# Patient Record
Sex: MALE | Race: WHITE | Employment: UNEMPLOYED | URBAN - METROPOLITAN AREA
[De-identification: names, ages, dates, MRNs, and addresses within clinical notes are randomized per-mention and may not be internally consistent; named-entity substitution may affect disease eponyms.]

---

## 2017-01-13 PROBLEM — Z82.49 FAMILY HISTORY OF ABDOMINAL AORTIC ANEURYSM (AAA): Status: ACTIVE | Noted: 2017-01-13

## 2017-03-23 PROBLEM — I71.40 AAA (ABDOMINAL AORTIC ANEURYSM) WITHOUT RUPTURE: Status: ACTIVE | Noted: 2017-03-23

## 2017-05-23 PROBLEM — I10 ESSENTIAL HYPERTENSION: Status: ACTIVE | Noted: 2017-05-23

## 2018-02-20 ENCOUNTER — HOSPITAL ENCOUNTER (OUTPATIENT)
Age: 63
Setting detail: SPECIMEN
Discharge: HOME | End: 2018-02-20
Payer: COMMERCIAL

## 2018-02-23 LAB — SURGICAL PATHOLOGY REPORT: NORMAL

## 2018-08-15 PROBLEM — I77.810 DILATED AORTIC ROOT (HCC): Status: ACTIVE | Noted: 2018-08-15

## 2018-08-15 PROBLEM — R93.1 ABNORMAL ECHOCARDIOGRAM: Status: ACTIVE | Noted: 2018-08-15

## 2019-08-21 PROBLEM — Z01.810 PREOP CARDIOVASCULAR EXAM: Status: ACTIVE | Noted: 2019-08-21

## 2019-09-20 PROBLEM — Z01.810 PREOP CARDIOVASCULAR EXAM: Status: RESOLVED | Noted: 2019-08-21 | Resolved: 2019-09-20

## 2022-08-03 PROBLEM — N10 ACUTE INTERSTITIAL NEPHRITIS: Status: ACTIVE | Noted: 2022-08-03

## 2022-08-03 PROBLEM — N17.9 ARF (ACUTE RENAL FAILURE) (HCC): Status: ACTIVE | Noted: 2022-08-03

## 2023-02-12 ENCOUNTER — HOSPITAL ENCOUNTER (INPATIENT)
Age: 68
LOS: 1 days | Discharge: ANOTHER HOSPITAL | DRG: 853 | End: 2023-02-12
Attending: EMERGENCY MEDICINE | Admitting: STUDENT IN AN ORGANIZED HEALTH CARE EDUCATION/TRAINING PROGRAM
Payer: COMMERCIAL

## 2023-02-12 ENCOUNTER — APPOINTMENT (OUTPATIENT)
Dept: GENERAL RADIOLOGY | Age: 68
DRG: 853 | End: 2023-02-12
Payer: COMMERCIAL

## 2023-02-12 ENCOUNTER — HOSPITAL ENCOUNTER (INPATIENT)
Age: 68
LOS: 22 days | Discharge: NURSING, REHAB OR LONG-TERM CARE | DRG: 853 | End: 2023-03-06
Attending: STUDENT IN AN ORGANIZED HEALTH CARE EDUCATION/TRAINING PROGRAM | Admitting: INTERNAL MEDICINE
Payer: COMMERCIAL

## 2023-02-12 VITALS
DIASTOLIC BLOOD PRESSURE: 87 MMHG | TEMPERATURE: 98.2 F | RESPIRATION RATE: 33 BRPM | WEIGHT: 245 LBS | BODY MASS INDEX: 32.47 KG/M2 | HEART RATE: 116 BPM | SYSTOLIC BLOOD PRESSURE: 126 MMHG | OXYGEN SATURATION: 98 % | HEIGHT: 73 IN

## 2023-02-12 DIAGNOSIS — J18.9 PNEUMONIA OF LEFT LUNG DUE TO INFECTIOUS ORGANISM, UNSPECIFIED PART OF LUNG: Primary | ICD-10-CM

## 2023-02-12 DIAGNOSIS — N17.9 ACUTE KIDNEY INJURY (HCC): ICD-10-CM

## 2023-02-12 DIAGNOSIS — J96.00 ACUTE RESPIRATORY FAILURE, UNSPECIFIED WHETHER WITH HYPOXIA OR HYPERCAPNIA (HCC): ICD-10-CM

## 2023-02-12 DIAGNOSIS — R73.9 HYPERGLYCEMIA: ICD-10-CM

## 2023-02-12 DIAGNOSIS — J86.9 EMPYEMA (HCC): ICD-10-CM

## 2023-02-12 DIAGNOSIS — J90 PLEURAL EFFUSION: ICD-10-CM

## 2023-02-12 LAB
ABSOLUTE EOS #: 0 K/UL (ref 0–0.4)
ABSOLUTE LYMPH #: 3.6 K/UL (ref 1–4.8)
ABSOLUTE MONO #: 1.35 K/UL (ref 0.1–0.8)
ALLEN TEST: POSITIVE
ALLEN TEST: POSITIVE
ANION GAP SERPL CALCULATED.3IONS-SCNC: 14 MMOL/L (ref 9–17)
BASOPHILS # BLD: 0 % (ref 0–2)
BASOPHILS ABSOLUTE: 0 K/UL (ref 0–0.2)
BNP SERPL-MCNC: 870 PG/ML
BUN SERPL-MCNC: 77 MG/DL (ref 8–23)
CALCIUM SERPL-MCNC: 10.1 MG/DL (ref 8.6–10.4)
CHLORIDE SERPL-SCNC: 93 MMOL/L (ref 98–107)
CO2 SERPL-SCNC: 25 MMOL/L (ref 20–31)
CREAT SERPL-MCNC: 2.16 MG/DL (ref 0.7–1.2)
D DIMER BLD IA.RAPID-MCNC: 3.88 MG/L FEU
EOSINOPHILS RELATIVE PERCENT: 0 % (ref 1–4)
FIO2: 50
FIO2: 60
FLUAV AG SPEC QL: NEGATIVE
FLUBV AG SPEC QL: NEGATIVE
GFR SERPL CREATININE-BSD FRML MDRD: 33 ML/MIN/1.73M2
GLUCOSE BLD-MCNC: 252 MG/DL (ref 75–110)
GLUCOSE BLD-MCNC: 271 MG/DL (ref 74–100)
GLUCOSE BLD-MCNC: 371 MG/DL (ref 75–110)
GLUCOSE SERPL-MCNC: 394 MG/DL (ref 70–99)
HCT VFR BLD AUTO: 39.1 % (ref 41–53)
HGB BLD-MCNC: 12.8 G/DL (ref 13.5–17.5)
LYMPHOCYTES # BLD: 16 % (ref 24–44)
MCH RBC QN AUTO: 28 PG (ref 26–34)
MCHC RBC AUTO-ENTMCNC: 32.8 G/DL (ref 31–37)
MCV RBC AUTO: 85.2 FL (ref 80–100)
MODE: NORMAL
MONOCYTES # BLD: 6 % (ref 1–7)
MORPHOLOGY: NORMAL
O2 DEVICE/FLOW/%: ABNORMAL
O2 DEVICE/FLOW/%: NORMAL
PDW BLD-RTO: 15.1 % (ref 12.5–15.4)
PLATELET # BLD AUTO: 502 K/UL (ref 140–450)
PMV BLD AUTO: 7.5 FL (ref 6–12)
POC HCO3: 24.9 MMOL/L (ref 21–28)
POC HCO3: 29.2 MMOL/L (ref 21–28)
POC O2 SATURATION: 92 % (ref 94–98)
POC O2 SATURATION: 98 % (ref 94–98)
POC PCO2: 42.9 MM HG (ref 35–48)
POC PCO2: 58.6 MM HG (ref 35–48)
POC PH: 7.3 (ref 7.35–7.45)
POC PH: 7.37 (ref 7.35–7.45)
POC PO2: 102.5 MM HG (ref 83–108)
POC PO2: 72.7 MM HG (ref 83–108)
POSITIVE BASE EXCESS, ART: 0 (ref 0–3)
POSITIVE BASE EXCESS, ART: 2 (ref 0–3)
POTASSIUM SERPL-SCNC: 4.7 MMOL/L (ref 3.7–5.3)
RBC # BLD: 4.59 M/UL (ref 4.5–5.9)
SAMPLE SITE: ABNORMAL
SAMPLE SITE: NORMAL
SARS-COV-2 RDRP RESP QL NAA+PROBE: NOT DETECTED
SEG NEUTROPHILS: 78 % (ref 36–66)
SEGMENTED NEUTROPHILS ABSOLUTE COUNT: 17.55 K/UL (ref 1.8–7.7)
SODIUM SERPL-SCNC: 132 MMOL/L (ref 135–144)
SPECIMEN DESCRIPTION: NORMAL
TROPONIN I SERPL DL<=0.01 NG/ML-MCNC: 29 NG/L (ref 0–22)
TROPONIN I SERPL DL<=0.01 NG/ML-MCNC: 31 NG/L (ref 0–22)
TROPONIN I SERPL DL<=0.01 NG/ML-MCNC: 33 NG/L (ref 0–22)
WBC # BLD AUTO: 22.5 K/UL (ref 3.5–11)

## 2023-02-12 PROCEDURE — 1200000000 HC SEMI PRIVATE

## 2023-02-12 PROCEDURE — 36600 WITHDRAWAL OF ARTERIAL BLOOD: CPT

## 2023-02-12 PROCEDURE — 80048 BASIC METABOLIC PNL TOTAL CA: CPT

## 2023-02-12 PROCEDURE — 99285 EMERGENCY DEPT VISIT HI MDM: CPT

## 2023-02-12 PROCEDURE — 96375 TX/PRO/DX INJ NEW DRUG ADDON: CPT

## 2023-02-12 PROCEDURE — 2580000003 HC RX 258: Performed by: EMERGENCY MEDICINE

## 2023-02-12 PROCEDURE — 36415 COLL VENOUS BLD VENIPUNCTURE: CPT

## 2023-02-12 PROCEDURE — 94660 CPAP INITIATION&MGMT: CPT

## 2023-02-12 PROCEDURE — 85379 FIBRIN DEGRADATION QUANT: CPT

## 2023-02-12 PROCEDURE — 2700000000 HC OXYGEN THERAPY PER DAY

## 2023-02-12 PROCEDURE — 83880 ASSAY OF NATRIURETIC PEPTIDE: CPT

## 2023-02-12 PROCEDURE — 93005 ELECTROCARDIOGRAM TRACING: CPT | Performed by: EMERGENCY MEDICINE

## 2023-02-12 PROCEDURE — 82803 BLOOD GASES ANY COMBINATION: CPT

## 2023-02-12 PROCEDURE — 2580000003 HC RX 258: Performed by: NURSE PRACTITIONER

## 2023-02-12 PROCEDURE — 82947 ASSAY GLUCOSE BLOOD QUANT: CPT

## 2023-02-12 PROCEDURE — 96372 THER/PROPH/DIAG INJ SC/IM: CPT

## 2023-02-12 PROCEDURE — 96365 THER/PROPH/DIAG IV INF INIT: CPT

## 2023-02-12 PROCEDURE — 6360000002 HC RX W HCPCS: Performed by: EMERGENCY MEDICINE

## 2023-02-12 PROCEDURE — 87040 BLOOD CULTURE FOR BACTERIA: CPT

## 2023-02-12 PROCEDURE — 99223 1ST HOSP IP/OBS HIGH 75: CPT | Performed by: INTERNAL MEDICINE

## 2023-02-12 PROCEDURE — 87804 INFLUENZA ASSAY W/OPTIC: CPT

## 2023-02-12 PROCEDURE — 6370000000 HC RX 637 (ALT 250 FOR IP): Performed by: EMERGENCY MEDICINE

## 2023-02-12 PROCEDURE — 2060000000 HC ICU INTERMEDIATE R&B

## 2023-02-12 PROCEDURE — 84484 ASSAY OF TROPONIN QUANT: CPT

## 2023-02-12 PROCEDURE — 85025 COMPLETE CBC W/AUTO DIFF WBC: CPT

## 2023-02-12 PROCEDURE — 87635 SARS-COV-2 COVID-19 AMP PRB: CPT

## 2023-02-12 PROCEDURE — 71045 X-RAY EXAM CHEST 1 VIEW: CPT

## 2023-02-12 RX ORDER — FENOFIBRATE 160 MG/1
160 TABLET ORAL DAILY
Status: CANCELLED | OUTPATIENT
Start: 2023-02-12

## 2023-02-12 RX ORDER — FINASTERIDE 5 MG/1
5 TABLET, FILM COATED ORAL DAILY
Status: CANCELLED | OUTPATIENT
Start: 2023-02-12

## 2023-02-12 RX ORDER — SODIUM CHLORIDE 9 MG/ML
INJECTION, SOLUTION INTRAVENOUS CONTINUOUS
Status: DISCONTINUED | OUTPATIENT
Start: 2023-02-12 | End: 2023-02-12 | Stop reason: HOSPADM

## 2023-02-12 RX ORDER — LEVOFLOXACIN 5 MG/ML
750 INJECTION, SOLUTION INTRAVENOUS EVERY 24 HOURS
Status: CANCELLED | OUTPATIENT
Start: 2023-02-12 | End: 2023-02-19

## 2023-02-12 RX ORDER — LORAZEPAM 2 MG/ML
0.25 INJECTION INTRAMUSCULAR ONCE
Status: COMPLETED | OUTPATIENT
Start: 2023-02-12 | End: 2023-02-12

## 2023-02-12 RX ORDER — ALBUTEROL SULFATE 2.5 MG/3ML
2.5 SOLUTION RESPIRATORY (INHALATION)
Status: CANCELLED | OUTPATIENT
Start: 2023-02-12

## 2023-02-12 RX ORDER — ENOXAPARIN SODIUM 100 MG/ML
100 INJECTION SUBCUTANEOUS ONCE
Status: DISCONTINUED | OUTPATIENT
Start: 2023-02-12 | End: 2023-02-12

## 2023-02-12 RX ORDER — FENTANYL CITRATE 50 UG/ML
50 INJECTION, SOLUTION INTRAMUSCULAR; INTRAVENOUS ONCE
Status: COMPLETED | OUTPATIENT
Start: 2023-02-12 | End: 2023-02-12

## 2023-02-12 RX ORDER — ALBUTEROL SULFATE 2.5 MG/3ML
2.5 SOLUTION RESPIRATORY (INHALATION)
Status: DISCONTINUED | OUTPATIENT
Start: 2023-02-12 | End: 2023-02-23

## 2023-02-12 RX ORDER — SODIUM CHLORIDE 0.9 % (FLUSH) 0.9 %
5-40 SYRINGE (ML) INJECTION EVERY 12 HOURS SCHEDULED
Status: CANCELLED | OUTPATIENT
Start: 2023-02-12

## 2023-02-12 RX ORDER — METOPROLOL SUCCINATE 25 MG/1
25 TABLET, EXTENDED RELEASE ORAL NIGHTLY
Status: CANCELLED | OUTPATIENT
Start: 2023-02-12

## 2023-02-12 RX ORDER — LEVOFLOXACIN 5 MG/ML
750 INJECTION, SOLUTION INTRAVENOUS ONCE
Status: COMPLETED | OUTPATIENT
Start: 2023-02-12 | End: 2023-02-12

## 2023-02-12 RX ORDER — IPRATROPIUM BROMIDE AND ALBUTEROL SULFATE 2.5; .5 MG/3ML; MG/3ML
1 SOLUTION RESPIRATORY (INHALATION)
Status: CANCELLED | OUTPATIENT
Start: 2023-02-12

## 2023-02-12 RX ORDER — SODIUM CHLORIDE 0.9 % (FLUSH) 0.9 %
5-40 SYRINGE (ML) INJECTION PRN
Status: DISCONTINUED | OUTPATIENT
Start: 2023-02-12 | End: 2023-02-23

## 2023-02-12 RX ORDER — ONDANSETRON 4 MG/1
4 TABLET, ORALLY DISINTEGRATING ORAL EVERY 8 HOURS PRN
Status: DISCONTINUED | OUTPATIENT
Start: 2023-02-12 | End: 2023-02-23

## 2023-02-12 RX ORDER — INSULIN LISPRO 100 [IU]/ML
0-4 INJECTION, SOLUTION INTRAVENOUS; SUBCUTANEOUS NIGHTLY
Status: CANCELLED | OUTPATIENT
Start: 2023-02-12

## 2023-02-12 RX ORDER — ACETAMINOPHEN 650 MG/1
650 SUPPOSITORY RECTAL EVERY 6 HOURS PRN
Status: DISCONTINUED | OUTPATIENT
Start: 2023-02-12 | End: 2023-02-23

## 2023-02-12 RX ORDER — IPRATROPIUM BROMIDE AND ALBUTEROL SULFATE 2.5; .5 MG/3ML; MG/3ML
1 SOLUTION RESPIRATORY (INHALATION)
Status: DISCONTINUED | OUTPATIENT
Start: 2023-02-13 | End: 2023-02-22

## 2023-02-12 RX ORDER — ENOXAPARIN SODIUM 100 MG/ML
30 INJECTION SUBCUTANEOUS ONCE
Status: COMPLETED | OUTPATIENT
Start: 2023-02-12 | End: 2023-02-12

## 2023-02-12 RX ORDER — INSULIN LISPRO 100 [IU]/ML
0-4 INJECTION, SOLUTION INTRAVENOUS; SUBCUTANEOUS
Status: CANCELLED | OUTPATIENT
Start: 2023-02-12

## 2023-02-12 RX ORDER — DICYCLOMINE HYDROCHLORIDE 10 MG/1
10 CAPSULE ORAL
Status: CANCELLED | OUTPATIENT
Start: 2023-02-12

## 2023-02-12 RX ORDER — PANTOPRAZOLE SODIUM 40 MG/1
40 TABLET, DELAYED RELEASE ORAL
Status: CANCELLED | OUTPATIENT
Start: 2023-02-13

## 2023-02-12 RX ORDER — ACETAMINOPHEN 325 MG/1
650 TABLET ORAL EVERY 6 HOURS PRN
Status: CANCELLED | OUTPATIENT
Start: 2023-02-12

## 2023-02-12 RX ORDER — SODIUM CHLORIDE 9 MG/ML
INJECTION, SOLUTION INTRAVENOUS PRN
Status: CANCELLED | OUTPATIENT
Start: 2023-02-12

## 2023-02-12 RX ORDER — SODIUM CHLORIDE 9 MG/ML
INJECTION, SOLUTION INTRAVENOUS CONTINUOUS
Status: DISCONTINUED | OUTPATIENT
Start: 2023-02-12 | End: 2023-02-13

## 2023-02-12 RX ORDER — SODIUM CHLORIDE 9 MG/ML
INJECTION, SOLUTION INTRAVENOUS CONTINUOUS
Status: CANCELLED | OUTPATIENT
Start: 2023-02-12

## 2023-02-12 RX ORDER — ONDANSETRON 2 MG/ML
4 INJECTION INTRAMUSCULAR; INTRAVENOUS ONCE
Status: COMPLETED | OUTPATIENT
Start: 2023-02-12 | End: 2023-02-12

## 2023-02-12 RX ORDER — ACETAMINOPHEN 325 MG/1
650 TABLET ORAL EVERY 6 HOURS PRN
Status: DISCONTINUED | OUTPATIENT
Start: 2023-02-12 | End: 2023-02-23

## 2023-02-12 RX ORDER — ONDANSETRON 4 MG/1
4 TABLET, ORALLY DISINTEGRATING ORAL EVERY 8 HOURS PRN
Status: CANCELLED | OUTPATIENT
Start: 2023-02-12

## 2023-02-12 RX ORDER — ENOXAPARIN SODIUM 100 MG/ML
40 INJECTION SUBCUTANEOUS DAILY
Status: DISCONTINUED | OUTPATIENT
Start: 2023-02-13 | End: 2023-02-13

## 2023-02-12 RX ORDER — INSULIN LISPRO 100 [IU]/ML
8 INJECTION, SOLUTION INTRAVENOUS; SUBCUTANEOUS ONCE
Status: COMPLETED | OUTPATIENT
Start: 2023-02-12 | End: 2023-02-12

## 2023-02-12 RX ORDER — SODIUM CHLORIDE 9 MG/ML
INJECTION, SOLUTION INTRAVENOUS PRN
Status: DISCONTINUED | OUTPATIENT
Start: 2023-02-12 | End: 2023-02-23

## 2023-02-12 RX ORDER — SODIUM POLYSTYRENE SULFONATE 15 G/60ML
15 SUSPENSION ORAL; RECTAL
Status: CANCELLED | OUTPATIENT
Start: 2023-02-12 | End: 2023-02-13

## 2023-02-12 RX ORDER — ENOXAPARIN SODIUM 100 MG/ML
30 INJECTION SUBCUTANEOUS 2 TIMES DAILY
Status: CANCELLED | OUTPATIENT
Start: 2023-02-12

## 2023-02-12 RX ORDER — SODIUM CHLORIDE 0.9 % (FLUSH) 0.9 %
10 SYRINGE (ML) INJECTION PRN
Status: CANCELLED | OUTPATIENT
Start: 2023-02-12

## 2023-02-12 RX ORDER — POLYETHYLENE GLYCOL 3350 17 G/17G
17 POWDER, FOR SOLUTION ORAL DAILY PRN
Status: DISCONTINUED | OUTPATIENT
Start: 2023-02-12 | End: 2023-02-23

## 2023-02-12 RX ORDER — DEXTROSE MONOHYDRATE 100 MG/ML
INJECTION, SOLUTION INTRAVENOUS CONTINUOUS PRN
Status: CANCELLED | OUTPATIENT
Start: 2023-02-12

## 2023-02-12 RX ORDER — ONDANSETRON 2 MG/ML
4 INJECTION INTRAMUSCULAR; INTRAVENOUS EVERY 6 HOURS PRN
Status: CANCELLED | OUTPATIENT
Start: 2023-02-12

## 2023-02-12 RX ORDER — SODIUM CHLORIDE 0.9 % (FLUSH) 0.9 %
5-40 SYRINGE (ML) INJECTION EVERY 12 HOURS SCHEDULED
Status: DISCONTINUED | OUTPATIENT
Start: 2023-02-12 | End: 2023-02-23

## 2023-02-12 RX ORDER — DULOXETIN HYDROCHLORIDE 30 MG/1
60 CAPSULE, DELAYED RELEASE ORAL 2 TIMES DAILY
Status: CANCELLED | OUTPATIENT
Start: 2023-02-12

## 2023-02-12 RX ORDER — ACETAMINOPHEN 500 MG
1000 TABLET ORAL ONCE
Status: COMPLETED | OUTPATIENT
Start: 2023-02-12 | End: 2023-02-12

## 2023-02-12 RX ORDER — ONDANSETRON 2 MG/ML
4 INJECTION INTRAMUSCULAR; INTRAVENOUS EVERY 6 HOURS PRN
Status: DISCONTINUED | OUTPATIENT
Start: 2023-02-12 | End: 2023-02-23

## 2023-02-12 RX ORDER — ACETAMINOPHEN 650 MG/1
650 SUPPOSITORY RECTAL EVERY 6 HOURS PRN
Status: CANCELLED | OUTPATIENT
Start: 2023-02-12

## 2023-02-12 RX ORDER — ATORVASTATIN CALCIUM 40 MG/1
80 TABLET, FILM COATED ORAL DAILY
Status: CANCELLED | OUTPATIENT
Start: 2023-02-12

## 2023-02-12 RX ADMIN — ACETAMINOPHEN 1000 MG: 500 TABLET ORAL at 15:45

## 2023-02-12 RX ADMIN — ONDANSETRON 4 MG: 2 INJECTION INTRAMUSCULAR; INTRAVENOUS at 14:26

## 2023-02-12 RX ADMIN — ENOXAPARIN SODIUM 30 MG: 100 INJECTION SUBCUTANEOUS at 14:27

## 2023-02-12 RX ADMIN — SODIUM CHLORIDE, PRESERVATIVE FREE 10 ML: 5 INJECTION INTRAVENOUS at 23:20

## 2023-02-12 RX ADMIN — SODIUM CHLORIDE: 9 INJECTION, SOLUTION INTRAVENOUS at 23:01

## 2023-02-12 RX ADMIN — FENTANYL CITRATE 50 MCG: 50 INJECTION, SOLUTION INTRAMUSCULAR; INTRAVENOUS at 21:18

## 2023-02-12 RX ADMIN — LEVOFLOXACIN 750 MG: 5 INJECTION, SOLUTION INTRAVENOUS at 15:01

## 2023-02-12 RX ADMIN — INSULIN LISPRO 8 UNITS: 100 INJECTION, SOLUTION INTRAVENOUS; SUBCUTANEOUS at 18:41

## 2023-02-12 RX ADMIN — LORAZEPAM 0.25 MG: 2 INJECTION INTRAMUSCULAR; INTRAVENOUS at 12:45

## 2023-02-12 RX ADMIN — SODIUM CHLORIDE: 9 INJECTION, SOLUTION INTRAVENOUS at 12:38

## 2023-02-12 SDOH — SOCIAL STABILITY: SOCIAL NETWORK: IN A TYPICAL WEEK, HOW MANY TIMES DO YOU TALK ON THE PHONE WITH FAMILY, FRIENDS, OR NEIGHBORS?: THREE TIMES A WEEK

## 2023-02-12 SDOH — ECONOMIC STABILITY: INCOME INSECURITY: IN THE LAST 12 MONTHS, WAS THERE A TIME WHEN YOU WERE NOT ABLE TO PAY THE MORTGAGE OR RENT ON TIME?: NO

## 2023-02-12 SDOH — HEALTH STABILITY: MENTAL HEALTH: HOW MANY STANDARD DRINKS CONTAINING ALCOHOL DO YOU HAVE ON A TYPICAL DAY?: PATIENT DOES NOT DRINK

## 2023-02-12 SDOH — ECONOMIC STABILITY: TRANSPORTATION INSECURITY
IN THE PAST 12 MONTHS, HAS LACK OF TRANSPORTATION KEPT YOU FROM MEETINGS, WORK, OR FROM GETTING THINGS NEEDED FOR DAILY LIVING?: NO

## 2023-02-12 SDOH — ECONOMIC STABILITY: HOUSING INSECURITY: IN THE LAST 12 MONTHS, HOW MANY PLACES HAVE YOU LIVED?: 1

## 2023-02-12 SDOH — ECONOMIC STABILITY: HOUSING INSECURITY
IN THE LAST 12 MONTHS, WAS THERE A TIME WHEN YOU DID NOT HAVE A STEADY PLACE TO SLEEP OR SLEPT IN A SHELTER (INCLUDING NOW)?: NO

## 2023-02-12 SDOH — ECONOMIC STABILITY: FOOD INSECURITY: WITHIN THE PAST 12 MONTHS, THE FOOD YOU BOUGHT JUST DIDN'T LAST AND YOU DIDN'T HAVE MONEY TO GET MORE.: NEVER TRUE

## 2023-02-12 SDOH — SOCIAL STABILITY: SOCIAL NETWORK: HOW OFTEN DO YOU GET TOGETHER WITH FRIENDS OR RELATIVES?: THREE TIMES A WEEK

## 2023-02-12 SDOH — ECONOMIC STABILITY: FOOD INSECURITY: WITHIN THE PAST 12 MONTHS, YOU WORRIED THAT YOUR FOOD WOULD RUN OUT BEFORE YOU GOT MONEY TO BUY MORE.: NEVER TRUE

## 2023-02-12 SDOH — HEALTH STABILITY: MENTAL HEALTH: HOW OFTEN DO YOU HAVE A DRINK CONTAINING ALCOHOL?: NEVER

## 2023-02-12 SDOH — SOCIAL STABILITY: SOCIAL NETWORK: HOW OFTEN DO YOU ATTEND CHURCH OR RELIGIOUS SERVICES?: 1 TO 4 TIMES PER YEAR

## 2023-02-12 SDOH — ECONOMIC STABILITY: TRANSPORTATION INSECURITY
IN THE PAST 12 MONTHS, HAS THE LACK OF TRANSPORTATION KEPT YOU FROM MEDICAL APPOINTMENTS OR FROM GETTING MEDICATIONS?: NO

## 2023-02-12 SDOH — SOCIAL STABILITY: SOCIAL INSECURITY
WITHIN THE LAST YEAR, HAVE TO BEEN RAPED OR FORCED TO HAVE ANY KIND OF SEXUAL ACTIVITY BY YOUR PARTNER OR EX-PARTNER?: NO

## 2023-02-12 SDOH — SOCIAL STABILITY: SOCIAL NETWORK: ARE YOU MARRIED, WIDOWED, DIVORCED, SEPARATED, NEVER MARRIED, OR LIVING WITH A PARTNER?: MARRIED

## 2023-02-12 SDOH — SOCIAL STABILITY: SOCIAL INSECURITY: WITHIN THE LAST YEAR, HAVE YOU BEEN HUMILIATED OR EMOTIONALLY ABUSED IN OTHER WAYS BY YOUR PARTNER OR EX-PARTNER?: NO

## 2023-02-12 SDOH — HEALTH STABILITY: MENTAL HEALTH
STRESS IS WHEN SOMEONE FEELS TENSE, NERVOUS, ANXIOUS, OR CAN'T SLEEP AT NIGHT BECAUSE THEIR MIND IS TROUBLED. HOW STRESSED ARE YOU?: TO SOME EXTENT

## 2023-02-12 SDOH — SOCIAL STABILITY: SOCIAL INSECURITY: WITHIN THE LAST YEAR, HAVE YOU BEEN AFRAID OF YOUR PARTNER OR EX-PARTNER?: NO

## 2023-02-12 SDOH — SOCIAL STABILITY: SOCIAL NETWORK
DO YOU BELONG TO ANY CLUBS OR ORGANIZATIONS SUCH AS CHURCH GROUPS UNIONS, FRATERNAL OR ATHLETIC GROUPS, OR SCHOOL GROUPS?: NO

## 2023-02-12 SDOH — ECONOMIC STABILITY: INCOME INSECURITY: HOW HARD IS IT FOR YOU TO PAY FOR THE VERY BASICS LIKE FOOD, HOUSING, MEDICAL CARE, AND HEATING?: NOT VERY HARD

## 2023-02-12 SDOH — SOCIAL STABILITY: SOCIAL INSECURITY
WITHIN THE LAST YEAR, HAVE YOU BEEN KICKED, HIT, SLAPPED, OR OTHERWISE PHYSICALLY HURT BY YOUR PARTNER OR EX-PARTNER?: NO

## 2023-02-12 SDOH — SOCIAL STABILITY: SOCIAL NETWORK: HOW OFTEN DO YOU ATTENT MEETINGS OF THE CLUB OR ORGANIZATION YOU BELONG TO?: 1 TO 4 TIMES PER YEAR

## 2023-02-12 ASSESSMENT — PAIN SCALES - GENERAL
PAINLEVEL_OUTOF10: 8
PAINLEVEL_OUTOF10: 6

## 2023-02-12 NOTE — ED NOTES
Phoned XRAY- they are on way currently- were off department for a stat order      Peng Diop, TORRIE  02/12/23 1214

## 2023-02-12 NOTE — ED NOTES
Pt brought by squad from home d/t difficulty breathing. - per pt x1 week. PB [facility] FIRE established a 20g in the left hand & administered a combivent.       Antonio Griffin RN  02/12/23 1697

## 2023-02-12 NOTE — ED NOTES
[facility]oned [facility] & spoke with [facility], RN- family decided on [facility] [place] since [facility] is not accepting.       Inna Braswell RN  02/12/23 5724

## 2023-02-12 NOTE — ED NOTES
Serenity Serna from respiratory therapy to place pt on bipap.   Settings are rate 20; 02 50%; EPAP 268 [facility], RN  02/12/23 8635

## 2023-02-12 NOTE — ED NOTES
Phoned [facility] to get an update- spoke with Vic Juarez RN-she stated she will give  handykin a call and get an update     Jany Guerrero RN  02/12/23 6018

## 2023-02-12 NOTE — ED NOTES
[facility] Dr[facility] Coye Meigs to have pts most recent chest xray- they will be faxing here soon     Didi Pina RN  02/12/23 2367

## 2023-02-12 NOTE — ED NOTES
Spoke with Delvin Spence with [facility] for transfer to Eileen Jhaveri- will return call one a doc is on the line     Delia Delaney RN  02/12/23 6191

## 2023-02-12 NOTE — ED PROVIDER NOTES
[address] [facility] Emergency Department  [address] [facility] [facility] [address]  Phone: [phone]  Fax: [phone] [facility]      Pt Name: Falguni Uriostegui  MRN: [id number]  [place] 1955  Date of evaluation: 2/12/2023    CHIEF COMPLAINT       Chief Complaint   Patient presents with    Shortness of Breath     Sob x 1 week       HISTORY OF PRESENT ILLNESS    Falguni Uriostegui is a 74 y.o. male who presents to the emergency room with bleeding worsening shortness of breath over the past week. Brought by ambulance and extremis. Receiving a DuoNeb treatment allergy to steroids. Admits to a cough nonproductive. No chest pain admits to tightness. No other symptoms. According to paramedics while receiving Combivent treatment he is doing much better and is able to speak in more complete sentences but still in significant distress. REVIEW OF SYSTEMS       Constitutional: No fevers or chills   HEENT: No sore throat, rhinorrhea, or earache   Eyes: No blurry vision or double vision no drainage   Cardiovascular: No chest pain positive chest tightness positive tachycardia   Respiratory: Positive wheezing shortness of breath and cough  Gastrointestinal: No nausea, vomiting, diarrhea, constipation, or abdominal pain   : No hematuria or dysuria   Musculoskeletal: No swelling or pain   Skin: No rash   Neurological: No focal neurologic complaints, paresthesias, weakness, or headache     PAST MEDICAL HISTORY    has a past medical history of Acute interstitial nephritis, Aortic regurgitation, ARF (acute renal failure) ([address] Chronic kidney disease, Hyperlipidemia, Hypertension, Non-Hodgkin lymphoma ([address] Sarcoidosis, and Type II or unspecified type diabetes mellitus without mention of complication, not stated as uncontrolled. SURGICAL HISTORY      has a past surgical history that includes eye surgery (Right); Knee arthroscopy; Rotator cuff repair (Right, 7/14);  Rotator cuff repair (Left, 5/15); Carpal tunnel release (Left, 11/15 ); Eye surgery (Right, 6/16); Total knee arthroplasty (Right, 10/2018); and malignant skin lesion excision. CURRENT MEDICATIONS       Previous Medications    ALBUTEROL SULFATE  (90 BASE) MCG/ACT INHALER    Inhale 1 puff into the lungs in the morning and at bedtime    AMLODIPINE (NORVASC) 5 MG TABLET    TAKE ONE TABLET BY MOUTH DAILY    ATORVASTATIN (LIPITOR) 80 MG TABLET    Take 80 mg by mouth daily. BRIMONIDINE-TIMOLOL (COMBIGAN) 0.2-0.5 % OPHTHALMIC SOLUTION    Place 1 drop into the right eye every 12 hours    CINNAMON PO    Take by mouth    DICYCLOMINE (BENTYL) 10 MG CAPSULE    Take 10 mg by mouth 4 times daily (before meals and nightly)     DULOXETINE (CYMBALTA) 60 MG CAPSULE    Take 60 mg by mouth 2 times daily. DUTASTERIDE (AVODART) 0.5 MG CAPSULE    Take 0.5 mg by mouth daily    FENOFIBRATE (TRICOR) 145 MG TABLET    Take 145 mg by mouth 2 times daily. FINASTERIDE (PROSCAR) 5 MG TABLET    Take 5 mg by mouth daily    FLUTICASONE-SALMETEROL (ADVAIR DISKUS) 250-50 MCG/ACT AEPB DISKUS INHALER    Inhale 1 puff into the lungs every 12 hours    LIRAGLUTIDE (VICTOZA SC)    Inject 1.8 mg into the skin daily     METOPROLOL SUCCINATE (TOPROL XL) 25 MG EXTENDED RELEASE TABLET    Take 25 mg by mouth at bedtime    MISC. DEVICES (NASAL SPRAY BOTTLE) MISC    by Does not apply route    OMEPRAZOLE (PRILOSEC) 20 MG DELAYED RELEASE CAPSULE    Take 20 mg by mouth Daily    PIMECROLIMUS (ELIDEL) 1 % CREAM    Apply topically as needed Apply topically 2 times daily. REPAGLINIDE (PRANDIN) 1 MG TABLET    Take 1 mg by mouth 3 times daily (before meals)        ALLERGIES     is allergic to latex, gabapentin, erythromycin, glimepiride, demerol hcl [meperidine], hydrocodone, lyrica [pregabalin], macrolides and ketolides, niacin and related, and trelegy ellipta [fluticasone-umeclidin-vilant]. FAMILY HISTORY     He indicated that the status of his father is unknown.  He indicated that the status of his other is unknown.     family history includes Heart Disease in an other family member; High Blood Pressure in his father; Other in his father. SOCIAL HISTORY      reports that he has never smoked. He has quit using smokeless tobacco. He reports current alcohol use. He reports that he does not use drugs. PHYSICAL EXAM       BP (!) 130/94   Pulse (!) 129   Temp 98.2 °F (36.8 °C) (Oral)   Resp 21   Ht 6' 1\" (1.854 m)   Wt 111.1 kg (245 lb)   SpO2 100%   BMI 32.32 kg/m²     Constitutional: Alert, oriented x3, nontoxic, answering questions appropriately, acting properly for age, severe respiratory distress  HEENT: Extraocular muscles intact, mucous membranes dry pupils equal, round, reactive to light,   Neck: Trachea midline   Cardiovascular: Regular rhythm and tachycardic no murmurs   Respiratory: Diminished to auscultation bilaterally diffuse audible wheezes, positive rhonchi, no rales, severe respiratory distress positive tachypnea positive retractions 95% receiving DuoNeb treatment   Gastrointestinal: Soft, nontender, nondistended, positive bowel sounds. No rebound, rigidity, or guarding. Musculoskeletal: No extremity pain or swelling   Neurologic: Moving all 4 extremities without difficulty there are no gross focal neurologic deficits   Skin: Warm and dry       DIFFERENTIAL DIAGNOSIS/ MDM:     IV labs. EKG. Chest imaging. Allergy to steroids. BiPAP. Differentials Considered but not limited to the following:    Shortness of breath: Bronchitis, pneumonia, COPD exacerbation, CHF, ACS, Asthma, PE, Anxiety, Pneumothorax, Pulmonary Fibrosis. DIAGNOSTIC RESULTS     EKG: All EKG's are interpreted by the Emergency Department Physician who either signs or Co-signs this chart in the absence of a cardiologist.    1250 sinus tachycardia rate 128  QRS 98  no acute ST or T wave changes.     Not indicated unless otherwise documented above    LABS:  Results for orders placed or performed during the hospital encounter of 02/12/23   Rapid influenza A/B antigens    Specimen: Nares   Result Value Ref Range    Flu A Antigen NEGATIVE NEGATIVE    Flu B Antigen NEGATIVE NEGATIVE   COVID-19, Rapid    Specimen: Nasopharyngeal Swab   Result Value Ref Range    Specimen Description . NASOPHARYNGEAL SWAB     SARS-CoV-2, Rapid Not Detected Not Detected   CBC with Auto Differential   Result Value Ref Range    WBC 22.5 (H) 3.5 - 11.0 k/uL    RBC 4.59 4.5 - 5.9 m/uL    Hemoglobin 12.8 (L) 13.5 - 17.5 g/dL    Hematocrit 39.1 (L) 41 - 53 %    MCV 85.2 80 - 100 fL    MCH 28.0 26 - 34 pg    MCHC 32.8 31 - 37 g/dL    RDW 15.1 12.5 - 15.4 %    Platelets 279 (H) 833 - 450 k/uL    MPV 7.5 6.0 - 12.0 fL    Seg Neutrophils 78 (H) 36 - 66 %    Lymphocytes 16 (L) 24 - 44 %    Monocytes 6 1 - 7 %    Eosinophils % 0 (L) 1 - 4 %    Basophils 0 0 - 2 %    Segs Absolute 17.55 (H) 1.8 - 7.7 k/uL    Absolute Lymph # 3.60 1.0 - 4.8 k/uL    Absolute Mono # 1.35 (H) 0.1 - 0.8 k/uL    Absolute Eos # 0.00 0.0 - 0.4 k/uL    Basophils Absolute 0.00 0.0 - 0.2 k/uL    Morphology Normal    Basic Metabolic Panel   Result Value Ref Range    Glucose 394 (H) 70 - 99 mg/dL    BUN 77 (H) 8 - 23 mg/dL    Creatinine 2.16 (H) 0.70 - 1.20 mg/dL    Est, Glom Filt Rate 33 (L) >60 mL/min/1.73m2    Calcium 10.1 8.6 - 10.4 mg/dL    Sodium 132 (L) 135 - 144 mmol/L    Potassium 4.7 3.7 - 5.3 mmol/L    Chloride 93 (L) 98 - 107 mmol/L    CO2 25 20 - 31 mmol/L    Anion Gap 14 9 - 17 mmol/L   Troponin   Result Value Ref Range    Troponin, High Sensitivity 29 (H) 0 - 22 ng/L   D-Dimer, Quantitative   Result Value Ref Range    D-Dimer, Quant 3.88 mg/L FEU   Brain Natriuretic Peptide   Result Value Ref Range    Pro- (H) <300 pg/mL   Troponin   Result Value Ref Range    Troponin, High Sensitivity 31 (H) 0 - 22 ng/L   Arterial Blood Gas, POC   Result Value Ref Range    POC pH 7.372 7.350 - 7.450    POC pCO2 42.9 35.0 - 48.0 mm Hg    POC PO2 102.5 83.0 - 108.0 mm Hg    POC HCO3 24.9 21.0 - 28.0 mmol/L    Positive Base Excess, Art 0 0.0 - 3.0    POC O2 SAT 98 94.0 - 98.0 %    O2 Device/Flow/% BIPAP     Vaughn Test POSITIVE     Sample Site Right Radial Artery     Mode Bi-Level Ventilation     FIO2 50.0        Not indicated unless otherwise documented above    RADIOLOGY:   I reviewed the radiologist interpretations:    XR CHEST PORTABLE   Final Result   Probable left-sided pneumonia with loculated pleural effusion. Underlying   pathology not excluded, as above. Mild cardiomegaly and venous hypertension. Discoid atelectasis right base. RECOMMENDATION:   Consider CT chest.             Not indicated unless otherwise documented above    EMERGENCY DEPARTMENT COURSE:     The patient was given the following medications:  Orders Placed This Encounter   Medications    0.9 % sodium chloride infusion    LORazepam (ATIVAN) injection 0.25 mg    levoFLOXacin (LEVAQUIN) 750 MG/150ML infusion 750 mg     Order Specific Question:   Antimicrobial Indications     Answer:   Pneumonia (CAP)    DISCONTD: enoxaparin (LOVENOX) injection 100 mg     Order Specific Question:   Indication of Use     Answer:   Treatment-DVT/PE    enoxaparin Sodium (LOVENOX) injection 30 mg     Order Specific Question:   Indication of Use     Answer:   Prophylaxis-DVT/PE    ondansetron (ZOFRAN) injection 4 mg    acetaminophen (TYLENOL) tablet 1,000 mg    insulin regular (HUMULIN R;NOVOLIN R) injection 8 Units        Vitals:   -------------------------  BP (!) 136/95   Pulse (!) 121   Temp 98.2 °F (36.8 °C) (Oral)   Resp (!) 37   Ht 6' 1\" (1.854 m)   Wt 111.1 kg (245 lb)   SpO2 95%   BMI 32.32 kg/m²     1:10 PM wife at bedside patient tachypneic on BiPAP but improved. Awaiting x-ray. Initial labs show white blood cell count of 22.5 mild anemia with a hemoglobin of 12.8. BUN and creatinine significantly elevated as well compared to his norm.   Elevated D-dimer unable to obtain CT secondary to kidney function we will give a dose of Lovenox. Troponin of 29.    1:45 PM chest x-ray shows left-sided pneumonia with loculated pleural effusion will start IV antibiotics. Allergy to macrolides we will do Levaquin. Blood cultures ordered. 2:15 PM patient prefers to go to [place] [address] where his pulmonologist is. Awaiting callback. 3:15 PM repeat troponin of 31 up only 2 points. Awaiting callback from [place] [address]    4:10 PM [facility] not available for beds. Patient would like to be transferred to [facility] awaiting callback. Patient remains on a nasal cannula was not tolerating the BiPAP. He is tachypneic but maintaining his saturation level of 96%. Persistently tachycardic.    5 PM discussed with [facility] Dr. Pike feels the patient can likely go to stepdown or progressive. Recommends talking to hospitalist.  Awaiting callback. 5:30 PM discussed with Dr. Guadalupe Cabral (sp?)  Hospitalist who agrees to admission. We will address elevated blood sugar. Awaiting bed assignment and transport. At 7 PM care will be transferred to Dr. Alfonso Moe. Remains off BiPAP on 6 L nasal cannula feeling much better. CRITICAL CARE:    None    PROCEDURES:    None      OARRS Report if indicated             FINAL IMPRESSION      1. Pneumonia of left lung due to infectious organism, unspecified part of lung    2. Acute kidney injury ([address]    3. Acute respiratory failure, unspecified whether with hypoxia or hypercapnia ([address]    4. Hyperglycemia          DISPOSITION/PLAN   DISPOSITION Decision To Transfer 02/12/2023 01:47:52 PM        CONDITION ON DISPOSITION: STABLE       PATIENT REFERRED TO:  No follow-up provider specified.     DISCHARGE MEDICATIONS:  New Prescriptions    No medications on file       (Please note that portions of this note were completed with a voice recognition program.  Efforts were made to edit the dictations but occasionally words are mis-transcribed.)    Brian Ramirez DO Attending Emergency Physician     Helen Cochran DO  02/12/23 4981

## 2023-02-13 ENCOUNTER — APPOINTMENT (OUTPATIENT)
Dept: INTERVENTIONAL RADIOLOGY/VASCULAR | Age: 68
DRG: 853 | End: 2023-02-13
Attending: STUDENT IN AN ORGANIZED HEALTH CARE EDUCATION/TRAINING PROGRAM
Payer: COMMERCIAL

## 2023-02-13 ENCOUNTER — APPOINTMENT (OUTPATIENT)
Dept: GENERAL RADIOLOGY | Age: 68
DRG: 853 | End: 2023-02-13
Attending: STUDENT IN AN ORGANIZED HEALTH CARE EDUCATION/TRAINING PROGRAM
Payer: COMMERCIAL

## 2023-02-13 ENCOUNTER — APPOINTMENT (OUTPATIENT)
Dept: CT IMAGING | Age: 68
DRG: 853 | End: 2023-02-13
Attending: STUDENT IN AN ORGANIZED HEALTH CARE EDUCATION/TRAINING PROGRAM
Payer: COMMERCIAL

## 2023-02-13 PROBLEM — E87.1 HYPONATREMIA: Status: ACTIVE | Noted: 2023-02-13

## 2023-02-13 PROBLEM — E87.5 HYPERKALEMIA: Status: ACTIVE | Noted: 2023-02-13

## 2023-02-13 PROBLEM — R73.9 HYPERGLYCEMIA: Status: ACTIVE | Noted: 2023-02-13

## 2023-02-13 PROBLEM — Z86.2 HISTORY OF SARCOIDOSIS: Status: ACTIVE | Noted: 2023-02-13

## 2023-02-13 PROBLEM — J96.01 ACUTE RESPIRATORY FAILURE WITH HYPOXIA (HCC): Status: ACTIVE | Noted: 2023-02-13

## 2023-02-13 PROBLEM — Z85.72 HISTORY OF NON-HODGKIN'S LYMPHOMA: Status: ACTIVE | Noted: 2023-02-13

## 2023-02-13 LAB
ABSOLUTE EOS #: 0 K/UL (ref 0–0.4)
ABSOLUTE IMMATURE GRANULOCYTE: 0.72 K/UL (ref 0–0.3)
ABSOLUTE LYMPH #: 3.8 K/UL (ref 1–4.8)
ABSOLUTE MONO #: 0.91 K/UL (ref 0.1–0.8)
ADENOVIRUS PCR: NOT DETECTED
ALBUMIN SERPL-MCNC: 3 G/DL (ref 3.5–5.2)
ALLEN TEST: POSITIVE
ALT SERPL-CCNC: 18 U/L (ref 5–41)
AMORPHOUS: ABNORMAL
ANGIOTENSIN-CONVERTING ENZYME: 20 U/L (ref 8–52)
ANION GAP SERPL CALCULATED.3IONS-SCNC: 10 MMOL/L (ref 9–17)
ANION GAP SERPL CALCULATED.3IONS-SCNC: 11 MMOL/L (ref 9–17)
ANION GAP SERPL CALCULATED.3IONS-SCNC: 12 MMOL/L (ref 9–17)
ANION GAP SERPL CALCULATED.3IONS-SCNC: 13 MMOL/L (ref 9–17)
AST SERPL-CCNC: 29 U/L
B PARAP IS1001 DNA NPH QL NAA+NON-PROBE: NOT DETECTED
B PERT DNA SPEC QL NAA+PROBE: NOT DETECTED
BASOPHILS # BLD: 0 % (ref 0–2)
BASOPHILS ABSOLUTE: 0 K/UL (ref 0–0.2)
BILIRUB SERPL-MCNC: 0.6 MG/DL (ref 0.3–1.2)
BILIRUBIN URINE: NEGATIVE
BUN SERPL-MCNC: 83 MG/DL (ref 8–23)
BUN SERPL-MCNC: 84 MG/DL (ref 8–23)
BUN SERPL-MCNC: 87 MG/DL (ref 8–23)
BUN SERPL-MCNC: 88 MG/DL (ref 8–23)
CALCIUM SERPL-MCNC: 10.1 MG/DL (ref 8.6–10.4)
CALCIUM SERPL-MCNC: 9.5 MG/DL (ref 8.6–10.4)
CALCIUM SERPL-MCNC: 9.8 MG/DL (ref 8.6–10.4)
CALCIUM SERPL-MCNC: 9.9 MG/DL (ref 8.6–10.4)
CASTS UA: ABNORMAL /LPF (ref 0–2)
CHLAMYDIA PNEUMONIAE BY PCR: NOT DETECTED
CHLORIDE SERPL-SCNC: 97 MMOL/L (ref 98–107)
CHLORIDE SERPL-SCNC: 97 MMOL/L (ref 98–107)
CHLORIDE SERPL-SCNC: 98 MMOL/L (ref 98–107)
CHLORIDE SERPL-SCNC: 98 MMOL/L (ref 98–107)
CHLORIDE, UR: 54 MMOL/L
CO2 SERPL-SCNC: 22 MMOL/L (ref 20–31)
CO2 SERPL-SCNC: 24 MMOL/L (ref 20–31)
CO2 SERPL-SCNC: 25 MMOL/L (ref 20–31)
CO2 SERPL-SCNC: 26 MMOL/L (ref 20–31)
COLOR: YELLOW
CORONAVIRUS 229E PCR: NOT DETECTED
CORONAVIRUS HKU1 PCR: NOT DETECTED
CORONAVIRUS NL63 PCR: NOT DETECTED
CORONAVIRUS OC43 PCR: NOT DETECTED
CREAT SERPL-MCNC: 2.26 MG/DL (ref 0.7–1.2)
CREAT SERPL-MCNC: 2.35 MG/DL (ref 0.7–1.2)
CREAT SERPL-MCNC: 2.38 MG/DL (ref 0.7–1.2)
CREAT SERPL-MCNC: 2.51 MG/DL (ref 0.7–1.2)
CREATININE URINE: 45.3 MG/DL (ref 39–259)
EKG ATRIAL RATE: 116 BPM
EKG ATRIAL RATE: 121 BPM
EKG ATRIAL RATE: 128 BPM
EKG P AXIS: 18 DEGREES
EKG P AXIS: 19 DEGREES
EKG P AXIS: 32 DEGREES
EKG P-R INTERVAL: 122 MS
EKG P-R INTERVAL: 126 MS
EKG P-R INTERVAL: 156 MS
EKG Q-T INTERVAL: 298 MS
EKG Q-T INTERVAL: 300 MS
EKG Q-T INTERVAL: 302 MS
EKG QRS DURATION: 100 MS
EKG QRS DURATION: 94 MS
EKG QRS DURATION: 98 MS
EKG QTC CALCULATION (BAZETT): 419 MS
EKG QTC CALCULATION (BAZETT): 426 MS
EKG QTC CALCULATION (BAZETT): 435 MS
EKG R AXIS: 43 DEGREES
EKG R AXIS: 45 DEGREES
EKG R AXIS: 55 DEGREES
EKG T AXIS: 25 DEGREES
EKG T AXIS: 7 DEGREES
EKG T AXIS: 80 DEGREES
EKG VENTRICULAR RATE: 116 BPM
EKG VENTRICULAR RATE: 121 BPM
EKG VENTRICULAR RATE: 128 BPM
EOSINOPHILS RELATIVE PERCENT: 0 % (ref 1–4)
EPITHELIAL CELLS UA: ABNORMAL /HPF (ref 0–5)
ERYTHROCYTE [SEDIMENTATION RATE] IN BLOOD BY WESTERGREN METHOD: 114 MM/HR (ref 0–20)
EST. AVERAGE GLUCOSE BLD GHB EST-MCNC: 189 MG/DL
FIO2: 50
GFR SERPL CREATININE-BSD FRML MDRD: 28 ML/MIN/1.73M2
GFR SERPL CREATININE-BSD FRML MDRD: 30 ML/MIN/1.73M2
GFR SERPL CREATININE-BSD FRML MDRD: 30 ML/MIN/1.73M2
GFR SERPL CREATININE-BSD FRML MDRD: 31 ML/MIN/1.73M2
GLUCOSE BLD-MCNC: 265 MG/DL (ref 75–110)
GLUCOSE BLD-MCNC: 281 MG/DL (ref 75–110)
GLUCOSE BLD-MCNC: 302 MG/DL (ref 74–100)
GLUCOSE BLD-MCNC: 311 MG/DL (ref 75–110)
GLUCOSE BLD-MCNC: 322 MG/DL (ref 75–110)
GLUCOSE BLD-MCNC: 325 MG/DL (ref 75–110)
GLUCOSE SERPL-MCNC: 269 MG/DL (ref 70–99)
GLUCOSE SERPL-MCNC: 301 MG/DL (ref 70–99)
GLUCOSE SERPL-MCNC: 322 MG/DL (ref 70–99)
GLUCOSE SERPL-MCNC: 357 MG/DL (ref 70–99)
GLUCOSE UR STRIP.AUTO-MCNC: ABNORMAL MG/DL
GLUCOSE, FLUID: 33 MG/DL
HBA1C MFR BLD: 8.2 % (ref 4–6)
HCT VFR BLD AUTO: 36.5 % (ref 40.7–50.3)
HGB BLD-MCNC: 11.5 G/DL (ref 13–17)
HUMAN METAPNEUMOVIRUS PCR: NOT DETECTED
IMMATURE GRANULOCYTES: 4 %
INFLUENZA A BY PCR: NOT DETECTED
INFLUENZA B BY PCR: NOT DETECTED
KETONES UR STRIP.AUTO-MCNC: NEGATIVE MG/DL
L PNEUMO1 AG UR QL IA.RAPID: NEGATIVE
LACTATE DEHYDROGENASE, FLUID: 6590 U/L
LACTIC ACID, WHOLE BLOOD: 1.4 MMOL/L (ref 0.7–2.1)
LDLC SERPL-MCNC: 164 U/L (ref 135–225)
LDLC SERPL-MCNC: 189 U/L (ref 135–225)
LEUKOCYTE ESTERASE UR QL STRIP.AUTO: NEGATIVE
LYMPHOCYTES # BLD: 21 % (ref 24–44)
M PNEUMO IGM SER QL IA: 0.02
MAGNESIUM SERPL-MCNC: 2.9 MG/DL (ref 1.6–2.6)
MCH RBC QN AUTO: 28 PG (ref 25.2–33.5)
MCHC RBC AUTO-ENTMCNC: 31.5 G/DL (ref 28.4–34.8)
MCV RBC AUTO: 89 FL (ref 82.6–102.9)
MICROORGANISM/AGENT SPEC: NORMAL
MONOCYTES # BLD: 5 % (ref 1–7)
MORPHOLOGY: ABNORMAL
MRSA, DNA, NASAL: NEGATIVE
MUCUS: ABNORMAL
MYCOPLASMA PNEUMONIAE PCR: NOT DETECTED
NITRITE UR QL STRIP.AUTO: NEGATIVE
NRBC AUTOMATED: 0.2 PER 100 WBC
NUCLEATED RED BLOOD CELLS: 1 PER 100 WBC
O2 DEVICE/FLOW/%: ABNORMAL
OSMOLALITY URINE: 468 MOSM/KG (ref 80–1300)
PARAINFLUENZA 1 PCR: NOT DETECTED
PARAINFLUENZA 2 PCR: NOT DETECTED
PARAINFLUENZA 3 PCR: NOT DETECTED
PARAINFLUENZA 4 PCR: NOT DETECTED
PDW BLD-RTO: 14.9 % (ref 11.8–14.4)
PH FLUID: 4
PLATELET # BLD AUTO: 459 K/UL (ref 138–453)
PMV BLD AUTO: 9.6 FL (ref 8.1–13.5)
POC HCO3: 26.7 MMOL/L (ref 21–28)
POC O2 SATURATION: 97 % (ref 94–98)
POC PCO2: 48.3 MM HG (ref 35–48)
POC PH: 7.35 (ref 7.35–7.45)
POC PO2: 99.8 MM HG (ref 83–108)
POSITIVE BASE EXCESS, ART: 0 (ref 0–3)
POTASSIUM SERPL-SCNC: 4.9 MMOL/L (ref 3.7–5.3)
POTASSIUM SERPL-SCNC: 5 MMOL/L (ref 3.7–5.3)
POTASSIUM SERPL-SCNC: 5.4 MMOL/L (ref 3.7–5.3)
POTASSIUM SERPL-SCNC: 5.4 MMOL/L (ref 3.7–5.3)
POTASSIUM, UR: 22.8 MMOL/L
PROT SERPL-MCNC: 6.5 G/DL (ref 6.4–8.3)
PROT UR STRIP.AUTO-MCNC: 5 MG/DL (ref 5–8)
PROT UR STRIP.AUTO-MCNC: ABNORMAL MG/DL
RBC # BLD: 4.1 M/UL (ref 4.21–5.77)
RBC CLUMPS #/AREA URNS AUTO: ABNORMAL /HPF (ref 0–2)
RESP SYNCYTIAL VIRUS PCR: NOT DETECTED
RHINO/ENTEROVIRUS PCR: NOT DETECTED
SAMPLE SITE: ABNORMAL
SARS-COV-2 RNA NPH QL NAA+NON-PROBE: NOT DETECTED
SEG NEUTROPHILS: 70 % (ref 36–66)
SEGMENTED NEUTROPHILS ABSOLUTE COUNT: 12.67 K/UL (ref 1.8–7.7)
SERUM OSMOLALITY: 331 MOSM/KG (ref 275–295)
SODIUM SERPL-SCNC: 131 MMOL/L (ref 135–144)
SODIUM SERPL-SCNC: 133 MMOL/L (ref 135–144)
SODIUM SERPL-SCNC: 134 MMOL/L (ref 135–144)
SODIUM SERPL-SCNC: 135 MMOL/L (ref 135–144)
SODIUM,UR: 43 MMOL/L
SOURCE: NORMAL
SPECIFIC GRAVITY UA: 1.02 (ref 1–1.03)
SPECIMEN DESCRIPTION: NORMAL
SPECIMEN TYPE: NORMAL
STREP PNEUMONIAE ANTIGEN: NEGATIVE
TOTAL PROTEIN, URINE: 6 MG/DL
TURBIDITY: CLEAR
UREA NITROGEN, UR: 644 MG/DL
URINE HGB: NEGATIVE
UROBILINOGEN, URINE: NORMAL
WBC # BLD AUTO: 18.1 K/UL (ref 3.5–11.3)
WBC UA: ABNORMAL /HPF (ref 0–5)

## 2023-02-13 PROCEDURE — 2580000003 HC RX 258: Performed by: NURSE PRACTITIONER

## 2023-02-13 PROCEDURE — 87077 CULTURE AEROBIC IDENTIFY: CPT

## 2023-02-13 PROCEDURE — 84155 ASSAY OF PROTEIN SERUM: CPT

## 2023-02-13 PROCEDURE — 51701 INSERT BLADDER CATHETER: CPT

## 2023-02-13 PROCEDURE — 6370000000 HC RX 637 (ALT 250 FOR IP)

## 2023-02-13 PROCEDURE — 94640 AIRWAY INHALATION TREATMENT: CPT

## 2023-02-13 PROCEDURE — 6360000002 HC RX W HCPCS: Performed by: STUDENT IN AN ORGANIZED HEALTH CARE EDUCATION/TRAINING PROGRAM

## 2023-02-13 PROCEDURE — 36415 COLL VENOUS BLD VENIPUNCTURE: CPT

## 2023-02-13 PROCEDURE — 97535 SELF CARE MNGMENT TRAINING: CPT

## 2023-02-13 PROCEDURE — 2580000003 HC RX 258: Performed by: INTERNAL MEDICINE

## 2023-02-13 PROCEDURE — 0W9B30Z DRAINAGE OF LEFT PLEURAL CAVITY WITH DRAINAGE DEVICE, PERCUTANEOUS APPROACH: ICD-10-PCS | Performed by: RADIOLOGY

## 2023-02-13 PROCEDURE — 86334 IMMUNOFIX E-PHORESIS SERUM: CPT

## 2023-02-13 PROCEDURE — 32557 INSERT CATH PLEURA W/ IMAGE: CPT

## 2023-02-13 PROCEDURE — 80048 BASIC METABOLIC PNL TOTAL CA: CPT

## 2023-02-13 PROCEDURE — 83735 ASSAY OF MAGNESIUM: CPT

## 2023-02-13 PROCEDURE — 83986 ASSAY PH BODY FLUID NOS: CPT

## 2023-02-13 PROCEDURE — 2580000003 HC RX 258: Performed by: STUDENT IN AN ORGANIZED HEALTH CARE EDUCATION/TRAINING PROGRAM

## 2023-02-13 PROCEDURE — 88305 TISSUE EXAM BY PATHOLOGIST: CPT

## 2023-02-13 PROCEDURE — 97166 OT EVAL MOD COMPLEX 45 MIN: CPT

## 2023-02-13 PROCEDURE — 85025 COMPLETE CBC W/AUTO DIFF WBC: CPT

## 2023-02-13 PROCEDURE — 87181 SC STD AGAR DILUTION PER AGT: CPT

## 2023-02-13 PROCEDURE — 94660 CPAP INITIATION&MGMT: CPT

## 2023-02-13 PROCEDURE — 89051 BODY FLUID CELL COUNT: CPT

## 2023-02-13 PROCEDURE — 94761 N-INVAS EAR/PLS OXIMETRY MLT: CPT

## 2023-02-13 PROCEDURE — 84165 PROTEIN E-PHORESIS SERUM: CPT

## 2023-02-13 PROCEDURE — 82247 BILIRUBIN TOTAL: CPT

## 2023-02-13 PROCEDURE — C1729 CATH, DRAINAGE: HCPCS

## 2023-02-13 PROCEDURE — 87205 SMEAR GRAM STAIN: CPT

## 2023-02-13 PROCEDURE — 6370000000 HC RX 637 (ALT 250 FOR IP): Performed by: NURSE PRACTITIONER

## 2023-02-13 PROCEDURE — 6360000002 HC RX W HCPCS: Performed by: INTERNAL MEDICINE

## 2023-02-13 PROCEDURE — 36600 WITHDRAWAL OF ARTERIAL BLOOD: CPT

## 2023-02-13 PROCEDURE — 97530 THERAPEUTIC ACTIVITIES: CPT

## 2023-02-13 PROCEDURE — 99223 1ST HOSP IP/OBS HIGH 75: CPT | Performed by: INTERNAL MEDICINE

## 2023-02-13 PROCEDURE — 99291 CRITICAL CARE FIRST HOUR: CPT | Performed by: INTERNAL MEDICINE

## 2023-02-13 PROCEDURE — 85652 RBC SED RATE AUTOMATED: CPT

## 2023-02-13 PROCEDURE — 84540 ASSAY OF URINE/UREA-N: CPT

## 2023-02-13 PROCEDURE — 87070 CULTURE OTHR SPECIMN AEROBIC: CPT

## 2023-02-13 PROCEDURE — 0202U NFCT DS 22 TRGT SARS-COV-2: CPT

## 2023-02-13 PROCEDURE — 82947 ASSAY GLUCOSE BLOOD QUANT: CPT

## 2023-02-13 PROCEDURE — 81001 URINALYSIS AUTO W/SCOPE: CPT

## 2023-02-13 PROCEDURE — 84156 ASSAY OF PROTEIN URINE: CPT

## 2023-02-13 PROCEDURE — 6370000000 HC RX 637 (ALT 250 FOR IP): Performed by: INTERNAL MEDICINE

## 2023-02-13 PROCEDURE — 2700000000 HC OXYGEN THERAPY PER DAY

## 2023-02-13 PROCEDURE — 87075 CULTR BACTERIA EXCEPT BLOOD: CPT

## 2023-02-13 PROCEDURE — 2709999900 HC NON-CHARGEABLE SUPPLY

## 2023-02-13 PROCEDURE — 83036 HEMOGLOBIN GLYCOSYLATED A1C: CPT

## 2023-02-13 PROCEDURE — 97163 PT EVAL HIGH COMPLEX 45 MIN: CPT

## 2023-02-13 PROCEDURE — 87899 AGENT NOS ASSAY W/OPTIC: CPT

## 2023-02-13 PROCEDURE — 82436 ASSAY OF URINE CHLORIDE: CPT

## 2023-02-13 PROCEDURE — 6360000002 HC RX W HCPCS

## 2023-02-13 PROCEDURE — 88112 CYTOPATH CELL ENHANCE TECH: CPT

## 2023-02-13 PROCEDURE — 83930 ASSAY OF BLOOD OSMOLALITY: CPT

## 2023-02-13 PROCEDURE — 83615 LACTATE (LD) (LDH) ENZYME: CPT

## 2023-02-13 PROCEDURE — 82803 BLOOD GASES ANY COMBINATION: CPT

## 2023-02-13 PROCEDURE — 71045 X-RAY EXAM CHEST 1 VIEW: CPT

## 2023-02-13 PROCEDURE — 82570 ASSAY OF URINE CREATININE: CPT

## 2023-02-13 PROCEDURE — 84133 ASSAY OF URINE POTASSIUM: CPT

## 2023-02-13 PROCEDURE — 83605 ASSAY OF LACTIC ACID: CPT

## 2023-02-13 PROCEDURE — 84311 SPECTROPHOTOMETRY: CPT

## 2023-02-13 PROCEDURE — 82040 ASSAY OF SERUM ALBUMIN: CPT

## 2023-02-13 PROCEDURE — 2000000000 HC ICU R&B

## 2023-02-13 PROCEDURE — 83935 ASSAY OF URINE OSMOLALITY: CPT

## 2023-02-13 PROCEDURE — 84450 TRANSFERASE (AST) (SGOT): CPT

## 2023-02-13 PROCEDURE — 82945 GLUCOSE OTHER FLUID: CPT

## 2023-02-13 PROCEDURE — 2500000003 HC RX 250 WO HCPCS

## 2023-02-13 PROCEDURE — 6360000002 HC RX W HCPCS: Performed by: NURSE PRACTITIONER

## 2023-02-13 PROCEDURE — 93005 ELECTROCARDIOGRAM TRACING: CPT | Performed by: NURSE PRACTITIONER

## 2023-02-13 PROCEDURE — 71250 CT THORAX DX C-: CPT

## 2023-02-13 PROCEDURE — C1769 GUIDE WIRE: HCPCS

## 2023-02-13 PROCEDURE — 82164 ANGIOTENSIN I ENZYME TEST: CPT

## 2023-02-13 PROCEDURE — 86738 MYCOPLASMA ANTIBODY: CPT

## 2023-02-13 PROCEDURE — 84300 ASSAY OF URINE SODIUM: CPT

## 2023-02-13 PROCEDURE — 87449 NOS EACH ORGANISM AG IA: CPT

## 2023-02-13 PROCEDURE — 87641 MR-STAPH DNA AMP PROBE: CPT

## 2023-02-13 PROCEDURE — 84460 ALANINE AMINO (ALT) (SGPT): CPT

## 2023-02-13 PROCEDURE — 93010 ELECTROCARDIOGRAM REPORT: CPT | Performed by: INTERNAL MEDICINE

## 2023-02-13 RX ORDER — DULOXETIN HYDROCHLORIDE 30 MG/1
60 CAPSULE, DELAYED RELEASE ORAL 2 TIMES DAILY
Status: DISCONTINUED | OUTPATIENT
Start: 2023-02-13 | End: 2023-02-23

## 2023-02-13 RX ORDER — DEXTROSE MONOHYDRATE 25 G/50ML
25 INJECTION, SOLUTION INTRAVENOUS ONCE
Status: COMPLETED | OUTPATIENT
Start: 2023-02-13 | End: 2023-02-13

## 2023-02-13 RX ORDER — LIDOCAINE HYDROCHLORIDE 10 MG/ML
20 INJECTION, SOLUTION INFILTRATION; PERINEURAL ONCE
Status: DISCONTINUED | OUTPATIENT
Start: 2023-02-13 | End: 2023-02-23

## 2023-02-13 RX ORDER — REPAGLINIDE 0.5 MG/1
1 TABLET ORAL
Status: DISCONTINUED | OUTPATIENT
Start: 2023-02-13 | End: 2023-02-23

## 2023-02-13 RX ORDER — FENOFIBRATE 160 MG/1
160 TABLET ORAL DAILY
Status: DISCONTINUED | OUTPATIENT
Start: 2023-02-13 | End: 2023-02-23

## 2023-02-13 RX ORDER — INSULIN GLARGINE 100 [IU]/ML
20 INJECTION, SOLUTION SUBCUTANEOUS 2 TIMES DAILY
Status: DISCONTINUED | OUTPATIENT
Start: 2023-02-13 | End: 2023-02-23

## 2023-02-13 RX ORDER — INSULIN LISPRO 100 [IU]/ML
0-4 INJECTION, SOLUTION INTRAVENOUS; SUBCUTANEOUS NIGHTLY
Status: DISCONTINUED | OUTPATIENT
Start: 2023-02-13 | End: 2023-02-14

## 2023-02-13 RX ORDER — AMLODIPINE BESYLATE 5 MG/1
5 TABLET ORAL DAILY
Status: DISCONTINUED | OUTPATIENT
Start: 2023-02-13 | End: 2023-02-19

## 2023-02-13 RX ORDER — METHYLPREDNISOLONE SODIUM SUCCINATE 40 MG/ML
40 INJECTION, POWDER, LYOPHILIZED, FOR SOLUTION INTRAMUSCULAR; INTRAVENOUS EVERY 6 HOURS
Status: DISCONTINUED | OUTPATIENT
Start: 2023-02-13 | End: 2023-02-13

## 2023-02-13 RX ORDER — METOPROLOL SUCCINATE 25 MG/1
25 TABLET, EXTENDED RELEASE ORAL NIGHTLY
Status: DISCONTINUED | OUTPATIENT
Start: 2023-02-13 | End: 2023-02-22

## 2023-02-13 RX ORDER — HEPARIN SODIUM 5000 [USP'U]/ML
5000 INJECTION, SOLUTION INTRAVENOUS; SUBCUTANEOUS EVERY 8 HOURS SCHEDULED
Status: DISCONTINUED | OUTPATIENT
Start: 2023-02-13 | End: 2023-02-13

## 2023-02-13 RX ORDER — INSULIN GLARGINE 100 [IU]/ML
20 INJECTION, SOLUTION SUBCUTANEOUS ONCE
Status: COMPLETED | OUTPATIENT
Start: 2023-02-13 | End: 2023-02-13

## 2023-02-13 RX ORDER — ENOXAPARIN SODIUM 100 MG/ML
40 INJECTION SUBCUTANEOUS 2 TIMES DAILY
Status: DISCONTINUED | OUTPATIENT
Start: 2023-02-13 | End: 2023-02-13

## 2023-02-13 RX ORDER — INSULIN LISPRO 100 [IU]/ML
0-8 INJECTION, SOLUTION INTRAVENOUS; SUBCUTANEOUS
Status: DISCONTINUED | OUTPATIENT
Start: 2023-02-13 | End: 2023-02-14

## 2023-02-13 RX ORDER — LIDOCAINE HYDROCHLORIDE 20 MG/ML
INJECTION, SOLUTION INFILTRATION; PERINEURAL
Status: DISPENSED
Start: 2023-02-13 | End: 2023-02-14

## 2023-02-13 RX ORDER — HEPARIN SODIUM 5000 [USP'U]/ML
5000 INJECTION, SOLUTION INTRAVENOUS; SUBCUTANEOUS EVERY 8 HOURS SCHEDULED
Status: DISCONTINUED | OUTPATIENT
Start: 2023-02-14 | End: 2023-02-13

## 2023-02-13 RX ORDER — FUROSEMIDE 10 MG/ML
40 INJECTION INTRAMUSCULAR; INTRAVENOUS 2 TIMES DAILY
Status: DISCONTINUED | OUTPATIENT
Start: 2023-02-13 | End: 2023-02-19

## 2023-02-13 RX ORDER — INSULIN LISPRO 100 [IU]/ML
0-4 INJECTION, SOLUTION INTRAVENOUS; SUBCUTANEOUS NIGHTLY
Status: DISCONTINUED | OUTPATIENT
Start: 2023-02-13 | End: 2023-02-13

## 2023-02-13 RX ORDER — TIMOLOL MALEATE 5 MG/ML
1 SOLUTION/ DROPS OPHTHALMIC 2 TIMES DAILY
Status: DISCONTINUED | OUTPATIENT
Start: 2023-02-13 | End: 2023-02-23

## 2023-02-13 RX ORDER — ATORVASTATIN CALCIUM 80 MG/1
80 TABLET, FILM COATED ORAL DAILY
Status: DISCONTINUED | OUTPATIENT
Start: 2023-02-14 | End: 2023-02-23

## 2023-02-13 RX ORDER — BRIMONIDINE TARTRATE 2 MG/ML
1 SOLUTION/ DROPS OPHTHALMIC 2 TIMES DAILY
Status: DISCONTINUED | OUTPATIENT
Start: 2023-02-13 | End: 2023-02-23

## 2023-02-13 RX ORDER — PANTOPRAZOLE SODIUM 40 MG/1
40 TABLET, DELAYED RELEASE ORAL
Status: DISCONTINUED | OUTPATIENT
Start: 2023-02-13 | End: 2023-02-23

## 2023-02-13 RX ORDER — BRIMONIDINE TARTRATE AND TIMOLOL MALEATE 2; 5 MG/ML; MG/ML
1 SOLUTION OPHTHALMIC EVERY 12 HOURS
Status: DISCONTINUED | OUTPATIENT
Start: 2023-02-13 | End: 2023-02-13

## 2023-02-13 RX ORDER — ENOXAPARIN SODIUM 100 MG/ML
30 INJECTION SUBCUTANEOUS 2 TIMES DAILY
Status: DISCONTINUED | OUTPATIENT
Start: 2023-02-13 | End: 2023-02-14

## 2023-02-13 RX ORDER — INSULIN GLARGINE 100 [IU]/ML
20 INJECTION, SOLUTION SUBCUTANEOUS NIGHTLY
Status: DISCONTINUED | OUTPATIENT
Start: 2023-02-13 | End: 2023-02-13

## 2023-02-13 RX ORDER — DICYCLOMINE HYDROCHLORIDE 10 MG/1
10 CAPSULE ORAL
Status: DISCONTINUED | OUTPATIENT
Start: 2023-02-13 | End: 2023-02-23

## 2023-02-13 RX ORDER — FINASTERIDE 5 MG/1
5 TABLET, FILM COATED ORAL DAILY
Status: DISCONTINUED | OUTPATIENT
Start: 2023-02-13 | End: 2023-02-23

## 2023-02-13 RX ORDER — DEXTROSE MONOHYDRATE 100 MG/ML
INJECTION, SOLUTION INTRAVENOUS CONTINUOUS PRN
Status: DISCONTINUED | OUTPATIENT
Start: 2023-02-13 | End: 2023-02-23

## 2023-02-13 RX ORDER — INSULIN LISPRO 100 [IU]/ML
0-4 INJECTION, SOLUTION INTRAVENOUS; SUBCUTANEOUS
Status: DISCONTINUED | OUTPATIENT
Start: 2023-02-13 | End: 2023-02-13

## 2023-02-13 RX ADMIN — VANCOMYCIN HYDROCHLORIDE 1500 MG: 10 INJECTION, POWDER, LYOPHILIZED, FOR SOLUTION INTRAVENOUS at 00:34

## 2023-02-13 RX ADMIN — BRIMONIDINE TARTRATE 1 DROP: 2 SOLUTION OPHTHALMIC at 03:21

## 2023-02-13 RX ADMIN — AMLODIPINE BESYLATE 5 MG: 5 TABLET ORAL at 08:13

## 2023-02-13 RX ADMIN — FENOFIBRATE 160 MG: 160 TABLET ORAL at 08:14

## 2023-02-13 RX ADMIN — INSULIN HUMAN 10 UNITS: 100 INJECTION, SOLUTION PARENTERAL at 08:44

## 2023-02-13 RX ADMIN — PANTOPRAZOLE SODIUM 40 MG: 40 TABLET, DELAYED RELEASE ORAL at 08:13

## 2023-02-13 RX ADMIN — DICYCLOMINE HYDROCHLORIDE 10 MG: 10 CAPSULE ORAL at 08:13

## 2023-02-13 RX ADMIN — INSULIN LISPRO 4 UNITS: 100 INJECTION, SOLUTION INTRAVENOUS; SUBCUTANEOUS at 18:07

## 2023-02-13 RX ADMIN — SODIUM CHLORIDE, PRESERVATIVE FREE 10 ML: 5 INJECTION INTRAVENOUS at 08:36

## 2023-02-13 RX ADMIN — INSULIN LISPRO 6 UNITS: 100 INJECTION, SOLUTION INTRAVENOUS; SUBCUTANEOUS at 12:14

## 2023-02-13 RX ADMIN — FUROSEMIDE 40 MG: 10 INJECTION, SOLUTION INTRAMUSCULAR; INTRAVENOUS at 08:16

## 2023-02-13 RX ADMIN — TIMOLOL MALEATE 1 DROP: 5 SOLUTION OPHTHALMIC at 19:52

## 2023-02-13 RX ADMIN — BRIMONIDINE TARTRATE 1 DROP: 2 SOLUTION OPHTHALMIC at 08:14

## 2023-02-13 RX ADMIN — TIMOLOL MALEATE 1 DROP: 5 SOLUTION OPHTHALMIC at 08:14

## 2023-02-13 RX ADMIN — IPRATROPIUM BROMIDE AND ALBUTEROL SULFATE 1 AMPULE: 2.5; .5 SOLUTION RESPIRATORY (INHALATION) at 21:02

## 2023-02-13 RX ADMIN — METHYLPREDNISOLONE SODIUM SUCCINATE 40 MG: 40 INJECTION, POWDER, FOR SOLUTION INTRAMUSCULAR; INTRAVENOUS at 01:57

## 2023-02-13 RX ADMIN — CEFEPIME 2000 MG: 2 INJECTION, POWDER, FOR SOLUTION INTRAVENOUS at 14:06

## 2023-02-13 RX ADMIN — INSULIN GLARGINE 20 UNITS: 100 INJECTION, SOLUTION SUBCUTANEOUS at 19:51

## 2023-02-13 RX ADMIN — METOPROLOL SUCCINATE 25 MG: 25 TABLET, FILM COATED, EXTENDED RELEASE ORAL at 01:57

## 2023-02-13 RX ADMIN — FINASTERIDE 5 MG: 5 TABLET, FILM COATED ORAL at 08:14

## 2023-02-13 RX ADMIN — TIMOLOL MALEATE 1 DROP: 5 SOLUTION OPHTHALMIC at 01:57

## 2023-02-13 RX ADMIN — REPAGLINIDE 1 MG: 0.5 TABLET ORAL at 18:01

## 2023-02-13 RX ADMIN — DULOXETINE HYDROCHLORIDE 60 MG: 30 CAPSULE, DELAYED RELEASE ORAL at 19:50

## 2023-02-13 RX ADMIN — DEXTROSE MONOHYDRATE 25 G: 25 INJECTION, SOLUTION INTRAVENOUS at 08:35

## 2023-02-13 RX ADMIN — BRIMONIDINE TARTRATE 1 DROP: 2 SOLUTION OPHTHALMIC at 19:50

## 2023-02-13 RX ADMIN — CEFEPIME 2000 MG: 2 INJECTION, POWDER, FOR SOLUTION INTRAVENOUS at 01:57

## 2023-02-13 RX ADMIN — SODIUM CHLORIDE, PRESERVATIVE FREE 10 ML: 5 INJECTION INTRAVENOUS at 19:50

## 2023-02-13 RX ADMIN — DICYCLOMINE HYDROCHLORIDE 10 MG: 10 CAPSULE ORAL at 14:05

## 2023-02-13 RX ADMIN — DICYCLOMINE HYDROCHLORIDE 10 MG: 10 CAPSULE ORAL at 01:57

## 2023-02-13 RX ADMIN — REPAGLINIDE 1 MG: 0.5 TABLET ORAL at 14:05

## 2023-02-13 RX ADMIN — ENOXAPARIN SODIUM 40 MG: 100 INJECTION SUBCUTANEOUS at 08:30

## 2023-02-13 RX ADMIN — REPAGLINIDE 1 MG: 0.5 TABLET ORAL at 08:14

## 2023-02-13 RX ADMIN — PIPERACILLIN AND TAZOBACTAM 3375 MG: 3; .375 INJECTION, POWDER, FOR SOLUTION INTRAVENOUS at 00:39

## 2023-02-13 RX ADMIN — SODIUM ZIRCONIUM CYCLOSILICATE 10 G: 10 POWDER, FOR SUSPENSION ORAL at 11:41

## 2023-02-13 RX ADMIN — IPRATROPIUM BROMIDE AND ALBUTEROL SULFATE 1 AMPULE: 2.5; .5 SOLUTION RESPIRATORY (INHALATION) at 16:57

## 2023-02-13 RX ADMIN — DULOXETINE HYDROCHLORIDE 60 MG: 30 CAPSULE, DELAYED RELEASE ORAL at 01:57

## 2023-02-13 RX ADMIN — DICYCLOMINE HYDROCHLORIDE 10 MG: 10 CAPSULE ORAL at 18:01

## 2023-02-13 RX ADMIN — DULOXETINE HYDROCHLORIDE 60 MG: 30 CAPSULE, DELAYED RELEASE ORAL at 08:13

## 2023-02-13 RX ADMIN — DICYCLOMINE HYDROCHLORIDE 10 MG: 10 CAPSULE ORAL at 19:50

## 2023-02-13 RX ADMIN — SODIUM CHLORIDE: 9 INJECTION, SOLUTION INTRAVENOUS at 05:38

## 2023-02-13 RX ADMIN — ENOXAPARIN SODIUM 30 MG: 100 INJECTION SUBCUTANEOUS at 19:50

## 2023-02-13 RX ADMIN — INSULIN GLARGINE 20 UNITS: 100 INJECTION, SOLUTION SUBCUTANEOUS at 12:16

## 2023-02-13 RX ADMIN — VANCOMYCIN HYDROCHLORIDE 1000 MG: 10 INJECTION, POWDER, LYOPHILIZED, FOR SOLUTION INTRAVENOUS at 12:23

## 2023-02-13 RX ADMIN — INSULIN LISPRO 6 UNITS: 100 INJECTION, SOLUTION INTRAVENOUS; SUBCUTANEOUS at 06:26

## 2023-02-13 RX ADMIN — INSULIN GLARGINE 20 UNITS: 100 INJECTION, SOLUTION SUBCUTANEOUS at 01:57

## 2023-02-13 RX ADMIN — METOPROLOL SUCCINATE 25 MG: 25 TABLET, FILM COATED, EXTENDED RELEASE ORAL at 20:28

## 2023-02-13 RX ADMIN — IPRATROPIUM BROMIDE AND ALBUTEROL SULFATE 1 AMPULE: 2.5; .5 SOLUTION RESPIRATORY (INHALATION) at 11:49

## 2023-02-13 ASSESSMENT — PAIN SCALES - GENERAL
PAINLEVEL_OUTOF10: 0

## 2023-02-13 NOTE — PROGRESS NOTES
Dr. Vahe Burrows into assess patient. Upon assessment Dr. Vahe Burrows inquired about code status. Wife was called and code status was agreed upon by patient and wife. Patient remains a full code. Critical care was called to come assess patients need for ICU care. Upon assessment critical care doctor asked that patient be placed on Bipap for 4-5 hours. Patient will remain on stepdown unit at this time. Respiratory called to set up Bipap for patient. Patient agreeable to try.

## 2023-02-13 NOTE — ED NOTES
Writer noticed pt did not have pulse ox on from the monitor by nurses station. Went straight into pts room to find that pt did not have on the bipap mask or pulse ox. Upon entering room pt was wide eyed, tripod position, RR 40's, & 's and wife @ the bedside was reading a book. Estimated amount of time off O2 was about 8 min referring to data validate. The pt nor his wife hit the call light or notify us. I immediately placed pulse ox back on while another RN placed the bipap mask back on. Pts SPO2 was 79% on RA. After placing back on O2, pt SPO2 went up to 96%. I phoned RT & relayed to Dr. Raffi House. Pt and family member were educated on that if the oxygen is pulled off for any reason to notify us.       James Kuo RN  02/12/23 4468

## 2023-02-13 NOTE — CARE COORDINATION
Post Acute Facility/Agency List     Provided spouse with the following list, the list includes the overall star ratings obtained from CMS per the Medicare Web site (www.Medicare.gov):     [] [address]  [] Acute Inpatient Rehabilitation Facilities  [x] Skilled Nursing Facilities  [x] Home Care    Provided verbal instructions on how to utilize the QR Code to obtain additional detailed star ratings from www. Medicare. gov

## 2023-02-13 NOTE — H&P
[facility]  Office: [address], DO, Radha Rosenberg, DO, Rebekah Owen, DO, [facility], DO, Shashank Saeed MD, No Fountain MD, Tip Culp MD, Kai Edwards MD,  Horace Capone MD, Uma Pink MD, Dayanna Olivas, DO, Tracey Sue MD,  Cliff Goodson MD, Surendra Pal MD, Peter Morejon, DO, Taty Ling MD, Cassidy Martinez MD, Laurence Gusman, DO, Jon Perry MD, Karoline Dakin, MD, Nette Knapp MD, Farzaneh Rogers MD, Omero Fairbanks, DO, Julio Hayward MD, Khalif Sher MD, Sadiq Sandoval, CNP,  Bina Encarnacion, CNP, Chelsy Kaminski, CNP, Monie Garvin, CNP,  Vikas Gilliam, [facility], Christin Phillips, CNP, Kelsey Sheets, CNP, Shonda Burkett, CNP, Renae Huff, CNP, Elva Otto, CNP, El Brewer PA-C, Bobby Reyes, CNS, Shelli Saavedra, CNP, Andrew Kelsey, [facility]         [address] [facility]    HISTORY AND PHYSICAL EXAMINATION            Date:   2/12/2023  Patient name:  Anjana Morrow  Date of admission:  2/12/2023 10:33 PM  MRN:   [id number]  Account:  [de-identified]  YOB: 1955  PCP:    Marii Luke MD  Room:   2010/2010-01  Code Status:    Full Code    Chief Complaint:     \" Different sign\"    History Obtained From:     patient    History of Present Illness:     Anjana Morrow is a 74 y.o.  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 Patient describes gradual onset of symptoms over the past  2 weeks with acute worsening symptoms of the past week. Denies use of oxygen at home. He does follow with Dr. Farhan Garzon, compliant with CPAP for CLAY.   Denies cough congestion or URI symptoms. Denies fevers or chills. Denies immunosuppressive medications or chemotherapy currently. Denies chest pain or pleurisy, hemoptysis. Denies exposure to COVID-19 ( not vaccinated.)  Denies issues with CHF, denies lower extremity edema. Denies near syncope or collapse or dizziness. Initially did notice some improvement with inhalers however has not noticed any improvement with inhalers in the past few days. Denies dysphagia or odynophagia. Patient initially evaluated [place] [facility] and recommended for transfer to [place] with worsening respiratory failure, status post initiation of high flow. Upon arrival to the floor called by nursing as patient hemodynamically unstable with labored tachypneic breathing, sats 94% on 60 L high flow, RR 30s with labored abdominal breathing. Review of systems is limited as patient is slightly intermittently confused, anxious and acute respiratory distress with labored tachypneic breathing with difficulty speaking, taking frequent pauses answering in one word at a time with long pausing in between each word    Past Medical History:     Past Medical History:   Diagnosis Date    Acute interstitial nephritis 8/3/2022    Unclear cause. Creat bumped to 2.9, baseline in oct 2020 1.6. Exact description of his kidney biopsy is not available to me however pathologist seems to think there is diabetic glomerosclerosis and acute interstitial nephritis seen on the biopsy specimen. No mention about chronic changes. Because of acute interstitial nephritis a trial of steroids for 15 days will be given to see if I can impro    Aortic regurgitation     ARF (acute renal failure) ([facility] [address] 8/3/2022    kidney biopsy from June 2022  showing findings of acute interstitial nephritis. Exact description not available. Trial of steroids will be given to see if I can improve renal function. Creatinine in October 2020 was 1.6, creatinine in June 2022 was 2.9.   Trial of steroids will be given to see if renal function can be improved. Chronic kidney disease     Hyperlipidemia     Hypertension     Non-Hodgkin lymphoma ([facility] [address]     Sarcoidosis     Type II or unspecified type diabetes mellitus without mention of complication, not stated as uncontrolled         Past Surgical History:     Past Surgical History:   Procedure Laterality Date    CARPAL TUNNEL RELEASE Left 11/15     EYE SURGERY Right     shunt and cataract     EYE SURGERY Right 6/16    laser     KNEE ARTHROSCOPY      MALIGNANT SKIN LESION EXCISION      ROTATOR CUFF REPAIR Right 7/14    ROTATOR CUFF REPAIR Left 5/15    TOTAL KNEE ARTHROPLASTY Right 10/2018        Medications Prior to Admission:     Prior to Admission medications    Medication Sig Start Date End Date Taking? Authorizing Provider   amLODIPine (NORVASC) 5 MG tablet TAKE ONE TABLET BY MOUTH DAILY 11/22/22   Bryson Frias MD   omeprazole (PRILOSEC) 20 MG delayed release capsule Take 20 mg by mouth Daily    Historical Provider, MD   fluticasone-salmeterol (ADVAIR DISKUS) 250-50 MCG/ACT AEPB diskus inhaler Inhale 1 puff into the lungs every 12 hours    Historical Provider, MD   metoprolol succinate (TOPROL XL) 25 MG extended release tablet Take 25 mg by mouth at bedtime    Historical Provider, MD   dutasteride (AVODART) 0.5 mg capsule Take 0.5 mg by mouth daily    Historical Provider, MD   finasteride (PROSCAR) 5 MG tablet Take 5 mg by mouth daily    Historical Provider, MD   Misc. Devices (NASAL SPRAY BOTTLE) MISC by Does not apply route    Historical Provider, MD   CINNAMON PO Take by mouth    Historical Provider, MD   repaglinide (PRANDIN) 1 MG tablet Take 1 mg by mouth 3 times daily (before meals)  4/16/18   Historical Provider, MD   pimecrolimus (ELIDEL) 1 % cream Apply topically as needed Apply topically 2 times daily.     Historical Provider, MD   brimonidine-timolol (COMBIGAN) 0.2-0.5 % ophthalmic solution Place 1 drop into the right eye every 12 hours Historical Provider, MD   albuterol sulfate  (90 BASE) MCG/ACT inhaler Inhale 1 puff into the lungs in the morning and at bedtime    Historical Provider, MD   Liraglutide (VICTOZA SC) Inject 1.8 mg into the skin daily     Historical Provider, MD   fenofibrate (TRICOR) 145 MG tablet Take 145 mg by mouth 2 times daily. Historical Provider, MD   dicyclomine (BENTYL) 10 MG capsule Take 10 mg by mouth 4 times daily (before meals and nightly)     Historical Provider, MD   atorvastatin (LIPITOR) 80 MG tablet Take 80 mg by mouth daily. Historical Provider, MD   DULoxetine (CYMBALTA) 60 MG capsule Take 60 mg by mouth 2 times daily. Historical Provider, MD        Allergies:     Latex, Gabapentin, Erythromycin, Glimepiride, Demerol hcl [meperidine], Hydrocodone, Lyrica [pregabalin], Macrolides and ketolides, Niacin and related, and Trelegy ellipta [fluticasone-umeclidin-vilant]    Social History:     Tobacco:    reports that he has never smoked. He has quit using smokeless tobacco.  Alcohol:      reports current alcohol use. Drug Use:  reports no history of drug use. Denies recent travel, denies sick contacts, denies tobacco abuse, denies excessive binge drinking, denies illegal drugs, lives with wife and family. Family History:     Family History   Problem Relation Age of Onset    High Blood Pressure Father     Other Father         AAA    Heart Disease Other         uncle x 2    Brother  from complications associated with sarcoid and lymphoma    Review of Systems:     Positive and Negative as described in HPI. Review of Systems  Limited as stated above with respiratory distress. Patient denies any recent immunosuppressive therapy, denies chemotherapy or treatment for non-Hodgkin's lymphoma or multiple myeloma, currently follows with  with maintenance monitoring. Denies fevers chills weight loss night sweats or B symptoms. Denies hemoptysis. Denies easy bruising or bleeding issues. Denies fevers or chills. Denies sinus congestion URI symptoms headaches ear fullness. Denies vertiginous symptoms. Denies acute vision changes. Denies nausea vomiting abdominal pain no indigestion heartburn, denies diarrhea or constipation. Denies urinary symptoms no dysuria hematuria frequency urgency. Denies myalgias or arthralgias. Denies easy bruising or bleeding issues. Denies prior DVT PE. Denies issues with CHF. Denies recent falls or injuries. Denies any pets or seasonal allergies allergic component. denies prior stroke or TIA. Denies focal deficits. Denies rashes or skin lesions. States his blood sugars have been stable, denies any symptomatic hypoglycemia, denies complications associate with diabetes. States his blood sugars were initially elevated in the fall when he was on steroids, improved. Previously status post prolonged course of steroids and completed in August.  Status post kidney biopsy in 2022, denies any more recent kidney biopsies. Did see pulmonary in his subspecialists a few months ago, no acute issues at that time everything was stable. review of systems otherwise -12 system reviewed and otherwise unremarkable    Physical Exam:   Pulse (!) 121   Resp (!) 33   SpO2 96%   Temp (24hrs), Av.2 °F (36.8 °C), Min:98.2 °F (36.8 °C), Max:98.2 °F (36.8 °C)    Recent Labs     23  1735   POCGLU 371*     No intake or output data in the 24 hours ending 23 2300    Physical Exam  General awake alert pleasant anxious, appropriate but intermittently confused, follows commands, oriented  Eye exam pupils equally round reactive sclera nonicteric normal horizontal gaze  ENT oral pharynx with moist mucous membranes face symmetric neck supple  Cardiovascular tachycardic regular rhythm with limit evaluation for murmurs rubs or gallops with tachypnea, pulmonary noise and tachycardia no obvious murmurs rubs or gallops.  + JVD noted sitting upright  Lungs labored tachypneic breathing with accessory muscle use noted with difficulty speaking in one-word sentences with worsening respiratory symptoms, taking frequent pauses with pursed lipped breathing diminished left lung with expiratory and inspiratory wheezing heard right side with crackles right base  GI soft nontender nondistended umbilical hernia reduces easily, bowel sounds present obese habitus  Vascular chronic trophic changes with varicosities present, minimal pedal edema in feet and ankles with intact dorsalis pedis and posterior tibialis  Musculoskeletal passive range of motion of upper and lower limbs unremarkable no synovitis or joint effusions  Derm adequate foot hygiene no rashes lesions or skin infections  Neuro nonfocal strength symmetric in upper and lower limbs without drift, sensation grossly intact, normal speech fluency    Investigations:      Laboratory Testing:  Recent Results (from the past 24 hour(s))   CBC with Auto Differential    Collection Time: 02/12/23 12:30 PM   Result Value Ref Range    WBC 22.5 (H) 3.5 - 11.0 k/uL    RBC 4.59 4.5 - 5.9 m/uL    Hemoglobin 12.8 (L) 13.5 - 17.5 g/dL    Hematocrit 39.1 (L) 41 - 53 %    MCV 85.2 80 - 100 fL    MCH 28.0 26 - 34 pg    MCHC 32.8 31 - 37 g/dL    RDW 15.1 12.5 - 15.4 %    Platelets 464 (H) 354 - 450 k/uL    MPV 7.5 6.0 - 12.0 fL    Seg Neutrophils 78 (H) 36 - 66 %    Lymphocytes 16 (L) 24 - 44 %    Monocytes 6 1 - 7 %    Eosinophils % 0 (L) 1 - 4 %    Basophils 0 0 - 2 %    Segs Absolute 17.55 (H) 1.8 - 7.7 k/uL    Absolute Lymph # 3.60 1.0 - 4.8 k/uL    Absolute Mono # 1.35 (H) 0.1 - 0.8 k/uL    Absolute Eos # 0.00 0.0 - 0.4 k/uL    Basophils Absolute 0.00 0.0 - 0.2 k/uL    Morphology Normal    Basic Metabolic Panel    Collection Time: 02/12/23 12:30 PM   Result Value Ref Range    Glucose 394 (H) 70 - 99 mg/dL    BUN 77 (H) 8 - 23 mg/dL    Creatinine 2.16 (H) 0.70 - 1.20 mg/dL    Est, Glom Filt Rate 33 (L) >60 mL/min/1.73m2    Calcium 10.1 8.6 - 10.4 mg/dL Sodium 132 (L) 135 - 144 mmol/L    Potassium 4.7 3.7 - 5.3 mmol/L    Chloride 93 (L) 98 - 107 mmol/L    CO2 25 20 - 31 mmol/L    Anion Gap 14 9 - 17 mmol/L   Troponin    Collection Time: 02/12/23 12:30 PM   Result Value Ref Range    Troponin, High Sensitivity 29 (H) 0 - 22 ng/L   D-Dimer, Quantitative    Collection Time: 02/12/23 12:30 PM   Result Value Ref Range    D-Dimer, Quant 3.88 mg/L FEU   Brain Natriuretic Peptide    Collection Time: 02/12/23 12:30 PM   Result Value Ref Range    Pro- (H) <300 pg/mL   Rapid influenza A/B antigens    Collection Time: 02/12/23  1:23 PM    Specimen: Nares   Result Value Ref Range    Flu A Antigen NEGATIVE NEGATIVE    Flu B Antigen NEGATIVE NEGATIVE   COVID-19, Rapid    Collection Time: 02/12/23  1:23 PM    Specimen: Nasopharyngeal Swab   Result Value Ref Range    Specimen Description . NASOPHARYNGEAL SWAB     SARS-CoV-2, Rapid Not Detected Not Detected   Arterial Blood Gas, POC    Collection Time: 02/12/23  1:39 PM   Result Value Ref Range    POC pH 7.372 7.350 - 7.450    POC pCO2 42.9 35.0 - 48.0 mm Hg    POC PO2 102.5 83.0 - 108.0 mm Hg    POC HCO3 24.9 21.0 - 28.0 mmol/L    Positive Base Excess, Art 0 0.0 - 3.0    POC O2 SAT 98 94.0 - 98.0 %    O2 Device/Flow/% BIPAP     Vaughn Test POSITIVE     Sample Site Right Radial Artery     Mode Bi-Level Ventilation     FIO2 50.0    Culture, Blood 1    Collection Time: 02/12/23  2:00 PM    Specimen: Blood   Result Value Ref Range    Specimen Description . BLOOD     Special Requests 20 ML RIGHT ANTECUBITAL     Culture NO GROWTH <24 HRS    Culture, Blood 1    Collection Time: 02/12/23  2:15 PM    Specimen: Blood   Result Value Ref Range    Specimen Description . BLOOD     Special Requests 18ML RIGHT FOREARM     Culture NO GROWTH <24 HRS    Troponin    Collection Time: 02/12/23  2:25 PM   Result Value Ref Range    Troponin, High Sensitivity 31 (H) 0 - 22 ng/L   POC Glucose Fingerstick    Collection Time: 02/12/23  5:35 PM   Result Value Ref Range    POC Glucose 371 (H) 75 - 110 mg/dL   Troponin    Collection Time: 02/12/23  8:12 PM   Result Value Ref Range    Troponin, High Sensitivity 33 (H) 0 - 22 ng/L   Labs 6/14/2022: Sodium 139, potassium 5.1, chloride 101, bicarb 26, creatinine 2.9, calcium 9.6, albumin 4.2, hemoglobin 11.6    Imaging/Diagnostics:  XR CHEST PORTABLE    Result Date: 2/12/2023  Probable left-sided pneumonia with loculated pleural effusion. Underlying pathology not excluded, as above. Mild cardiomegaly and venous hypertension. Discoid atelectasis right base. RECOMMENDATION: Consider CT chest.     Portable chest x-ray independently reviewed by myself with unusual left lower lobe airspace disease with effusion, vascular congestion and cardiomegaly. EKG independently reviewed by myself with sinus tachycardia with diffuse ST-T changes, heart rate 128    Prior records reviewed through care everywhere including records from cardiology, Dr. Paul Rodriguez and Dr. [facility] [employer] office    3/2022 Echo:    Left Ventricle: There is moderate increased wall thickness/hypertrophy no LVOT gradient noted. Systolic function is normal with an ejection fraction of 60-65%. Grade II diastolic dysfunction (pseudonormal) is present. Lateral E' is 7.00 cm/s. Medial E' is 8.00 cm/s. Left Atrium: Left atrium is mildly dilated. Left atrium volume index is mildly increased. The left atrial volume index is 36.0 mL/m2. Aortic Valve: There is mild regurgitation. There is no evidence of aortic valve stenosis. Mitral Valve: There is trace regurgitation. There is no evidence of mitral valve stenosis. Tricuspid Valve: There is trace to mild regurgitation. The right ventricular systolic pressure is moderately elevated. RVSP calculated at 42 mmHg. RVSP is based on RA pressure of 8 mmHg. There is moderate pulmonary hypertension. Tachycardia throughout study.      Assessment :      Hospital Problems             Last Modified POA    * (Principal) Pneumonia, unspecified organism 2/12/2023 Yes    Bilateral pleural effusion 2/12/2023 Yes       Plan:     Patient status inpatient in the Progressive Unit/Step down    Acute hypoxic respiratory failure likely multifactorial with suspected pneumonia, lll asd vs mass, chf.  Continue wean FiO2 as tolerated  Acute bronchitis/ copd exac continue iv steroids, pulm hygiene, bronchodilators  Acute on chronic diastolic hf- iv diuresis pending recheck echo  [place] with ckd 3/4. Consult nephrology to assist with diuresis. Chronic Sarcoid kidney/ with diabetic nephrosclerosis, possibly contributing to acute issues. Nhl/chronic mm pending records outpatient. Consider heme input. Check labs/ct imaging to re-evaluate  Diabetes 2 with neuropathy, nephropathy , hyperglycemia. Add lantus while on steroids. Continue oral hypoglycemics. Consider further adjustment if appropriate    D/w critical care resident with concern for acute respiratory failure, on 60L High flow, continue to monitor closely, recommend transfer to ICU. Critically ill. Code status discussed. Initially patient entertaining DNR status. However after further discussion with patient's wife , both agreeable to remaining full code. Nursing present during discussion. Discussed with nursing. D/w wife on phone. Gravity of acute illness discussed. Treatment plan reviewed including concern for possible intubation if further decline discussed, likely transfer to icu. Anticipate likely discharge in 4-7 days contingent on clinical progress, awaiting input from subspecialist with pulmonary, nephrology and cardiology consulted. Consultations:   None    Patient is admitted as inpatient status because of co-morbidities listed above, severity of signs and symptoms as outlined, requirement for current medical therapies and most importantly because of direct risk to patient if care not provided in a hospital setting.   Expected length of stay > 48 hours.    Eldon Delcid MD  2/12/2023  11:00 PM    Copy sent to Dr. Donna Muhammad MD

## 2023-02-13 NOTE — CONSULTS
Attestation signed by      Attending Physician Statement:    I have discussed the care of  Uday Hong , including pertinent history and exam findings, with the Cardiology fellow/resident. I have seen and examined the patient and the key elements of all parts of the encounter have been performed by me. I agree with the assessment, plan and orders as documented by the fellow/resident, after I modified exam findings and plan of treatments, and the final version is my approved version of the assessment. Additional Comments: The patient was seen and examined agree with below evaluation and plan. Shortness of breath due to pneumonia and effusion. No chest pain. Elevated troponin most likely due to type II from CKD and sepsis. We will obtain an echo. Continue to medical treatment per primary service. We will follow. [facility] Cardiology    Consult / H&P               Today's Date: 2/13/2023  Patient Name: Uday Hong  Date of admission: 2/12/2023 10:33 PM  Patient's age: 74 y.o., 1955  Admission Dx: Pneumonia, unspecified organism [J18.9]  Bilateral pleural effusion [J90]    Reason for Consult:  Cardiac evaluation    Requesting Physician: No admitting provider for patient encounter. CHIEF COMPLAINT: Shortness of breath, respiratory distress    History Obtained From:  electronic medical record    HISTORY OF PRESENT ILLNESS:      The patient is a 74 y.o.  male who is admitted to the hospital for respiratory distress. Uday Hong is a 74 y.o. male with past medical history significant for type 2 diabetes mellitus, multiple myeloma, non-Hodgkin lymphoma, sarcoidosis, referred from [facility] ER with worsening respiratory status, shortness of breath, nonproductive cough. He was started on BiPAP and given antibiotics. Chest x-ray was concerning for loculated left-sided pleural effusion. Transfer was made and accepted by hospitalist, further work-up.   Critical care was consulted for worsening respiratory status. Patient was on high flow and respiratory rate was in 38-40. Patient was very dyspneic and having difficulty breathing with accessory muscle use. Initially he was unable to keep BiPAP but later on able to keep BiPAP. He improved symptomatically with BiPAP and respiratory rate was in 26. Cardiology consulted for evaluation of CHF  Troponins 21--> 29-->33  proBNP 890    Past Medical History:   has a past medical history of Acute interstitial nephritis, Aortic regurgitation, ARF (acute renal failure) ([facility] [address] Chronic kidney disease, Hyperlipidemia, Hypertension, Non-Hodgkin lymphoma ([facility] [address] Sarcoidosis, and Type II or unspecified type diabetes mellitus without mention of complication, not stated as uncontrolled. Past Surgical History:   has a past surgical history that includes eye surgery (Right); Knee arthroscopy; Rotator cuff repair (Right, 7/14); Rotator cuff repair (Left, 5/15); Carpal tunnel release (Left, 11/15 ); Eye surgery (Right, 6/16); Total knee arthroplasty (Right, 10/2018); and malignant skin lesion excision. Home Medications:    Prior to Admission medications    Medication Sig Start Date End Date Taking? Authorizing Provider   amLODIPine (NORVASC) 5 MG tablet TAKE ONE TABLET BY MOUTH DAILY 11/22/22   Cowlitz Baptiste, MD   omeprazole (PRILOSEC) 20 MG delayed release capsule Take 20 mg by mouth Daily    Historical Provider, MD   fluticasone-salmeterol (ADVAIR DISKUS) 250-50 MCG/ACT AEPB diskus inhaler Inhale 1 puff into the lungs every 12 hours    Historical Provider, MD   metoprolol succinate (TOPROL XL) 25 MG extended release tablet Take 25 mg by mouth at bedtime    Historical Provider, MD   dutasteride (AVODART) 0.5 mg capsule Take 0.5 mg by mouth daily    Historical Provider, MD   finasteride (PROSCAR) 5 MG tablet Take 5 mg by mouth daily    Historical Provider, MD   Misc.  Devices (NASAL SPRAY BOTTLE) MISC by Does not apply route    Historical Provider, MD   CINNAMON PO Take by mouth    Historical Provider, MD   repaglinide (PRANDIN) 1 MG tablet Take 1 mg by mouth 3 times daily (before meals)  4/16/18   Historical Provider, MD   pimecrolimus (ELIDEL) 1 % cream Apply topically as needed Apply topically 2 times daily. Historical Provider, MD   brimonidine-timolol (COMBIGAN) 0.2-0.5 % ophthalmic solution Place 1 drop into the right eye every 12 hours    Historical Provider, MD   albuterol sulfate  (90 BASE) MCG/ACT inhaler Inhale 1 puff into the lungs in the morning and at bedtime    Historical Provider, MD   Liraglutide (VICTOZA SC) Inject 1.8 mg into the skin daily     Historical Provider, MD   fenofibrate (TRICOR) 145 MG tablet Take 145 mg by mouth 2 times daily. Historical Provider, MD   dicyclomine (BENTYL) 10 MG capsule Take 10 mg by mouth 4 times daily (before meals and nightly)     Historical Provider, MD   atorvastatin (LIPITOR) 80 MG tablet Take 80 mg by mouth daily. Historical Provider, MD   DULoxetine (CYMBALTA) 60 MG capsule Take 60 mg by mouth 2 times daily. Historical Provider, MD       Allergies:  Latex, Gabapentin, Meperidine, Erythromycin, Glimepiride, Hydrocodone, Macrolides and ketolides, Niacin and related, Trelegy ellipta [fluticasone-umeclidin-vilant], and Pregabalin    Social History:   reports that he has never smoked. He has quit using smokeless tobacco. He reports current alcohol use. He reports that he does not use drugs. Family History: family history includes Heart Disease in an other family member; High Blood Pressure in his father; Other in his father. No h/o sudden cardiac death. No for premature CAD    REVIEW OF SYSTEMS:    Constitutional: there has been no unanticipated weight loss. There's been No change in energy level, No change in activity level. Eyes: No visual changes or diplopia. No scleral icterus.   ENT: No Headaches  Cardiovascular: see above  Respiratory: Past history of severe staph infection dating from 58  Genitourinary: No dysuria, trouble voiding, or hematuria. Musculoskeletal:  No gait disturbance, No weakness or joint complaints. Integumentary: No rash or pruritis. Neurological: No headache, diplopia, change in muscle strength, numbness or tingling. No change in gait, balance, coordination, mood, affect, memory, mentation, behavior. PHYSICAL EXAM:      /89   Pulse (!) 106   Temp 98.4 °F (36.9 °C) (Axillary)   Resp 20   Wt 231 lb 7.7 oz (105 kg)   SpO2 99%   BMI 30.54 kg/m²    Constitutional and General Appearance: alert  HEENT: PERRL, no cervical lymphadenopathy. No masses palpable. Normal oral mucosa  Respiratory:  Normal excursion and expansion without use of accessory muscles  Resp Auscultation: On BiPAP. On auscultation: clear to auscultation bilaterally  Cardiovascular:  Heart tones are crisp and normal. regular S1 and S2.  Jugular venous pulsation Normal  Peripheral pulses are symmetrical and full   Abdomen:   No masses or tenderness  Bowel sounds present  Extremities:   No Cyanosis or Clubbing   Lower extremity edema: No   Skin: Warm and dry  Neurological:  Alert and oriented. DATA:    Diagnostics:    EKG: Sinus tachycardia  ECHO:   03/04/22    Left Ventricle: There is moderate increased wall thickness/hypertrophy   no LVOT gradient noted. Systolic function is normal with an ejection   fraction of 60-65%. Grade II diastolic dysfunction (pseudonormal) is   present. Lateral E' is 7.00 cm/s. Medial E' is 8.00 cm/s. Left Atrium: Left atrium is mildly dilated. Left atrium volume index is   mildly increased. The left atrial volume index is 36.0 mL/m2. Aortic Valve: There is mild regurgitation. There is no evidence of   aortic valve stenosis. Mitral Valve: There is trace regurgitation. There is no evidence of   mitral valve stenosis. Tricuspid Valve: There is trace to mild regurgitation.  The right   ventricular systolic pressure is moderately elevated. RVSP calculated at   42 mmHg. RVSP is based on RA pressure of 8 mmHg. There is moderate   pulmonary hypertension. Tachycardia throughout study. 3/2022 24 Hour Holter:  Patient's heart rate  bpm with an average heart rate of 80 bpm   · Overall PVC burden 0.2% of all heartbeats  · Overall PAC burden 4.9% of all heartbeats   · No AF seen   · No bradyarrhythmias seen   · No symptoms reported by the patient    Labs:     CBC:   Recent Labs     02/12/23  1230 02/13/23  0752   WBC 22.5* 18.1*   HGB 12.8* 11.5*   HCT 39.1* 36.5*   * 459*     BMP:   Recent Labs     02/13/23  0307 02/13/23  0752   * 133*   K 5.4* 5.4*   CO2 22 25   BUN 83* 84*   CREATININE 2.38* 2.26*   LABGLOM 30* 31*   GLUCOSE 301* 357*     BNP: No results for input(s): BNP in the last 72 hours. PT/INR: No results for input(s): PROTIME, INR in the last 72 hours. APTT:No results for input(s): APTT in the last 72 hours. CARDIAC ENZYMES:No results for input(s): CKTOTAL, CKMB, CKMBINDEX, TROPONINI in the last 72 hours. FASTING LIPID PANEL:No results found for: HDL, LDLDIRECT, LDLCALC, TRIG  LIVER PROFILE:No results for input(s): AST, ALT, LABALBU in the last 72 hours.     IMPRESSION:    Patient Active Problem List   Diagnosis    CKD (chronic kidney disease), stage III ([address]    Nephrolithiasis    DM (diabetes mellitus) ([address]    Non-rheumatic mitral regurgitation    Nonrheumatic aortic valve insufficiency    Pulmonary hypertension ([address]    Type 2 diabetes mellitus with diabetic chronic kidney disease ([address]    Sarcoidosis    Non Hodgkin's lymphoma ([facility] [address]    Hyperlipidemia    Family history of abdominal aortic aneurysm (AAA)    AAA (abdominal aortic aneurysm) without rupture    Essential hypertension    Abnormal echocardiogram    Dilated aortic root (HCC)    Acute interstitial nephritis    ARF (acute renal failure) (HCC)    Pneumonia due to infectious organism    Pneumonia, unspecified organism    Bilateral pleural effusion Acute hypoxic respiratory failure  KELSEA on CKD stage III  Type 2 diabetes mellitus  HFpEF - 01%, grade 2 diastolic dysfunction  Sarcoidosis  Non-Hodgkin lymphoma  Hypertension  History of multiple PVCs on beta-blocker  AAA  Elevated troponins  Hypokalemia      RECOMMENDATIONS:  Elevated troponins likely due to underlying KELSEA on CKD stage III, likely type II MI  We will get an echocardiogram      Discussed with Nurse.     Electronically signed by Jone Heart MD on 2/13/2023 at [email or web address]      [phone]

## 2023-02-13 NOTE — PLAN OF CARE
Problem: Discharge Planning  Goal: Discharge to home or other facility with appropriate resources  Outcome: Progressing     Problem: Safety - Adult  Goal: Free from fall injury  Outcome: Progressing  Flowsheets (Taken 2/13/2023 0444 by Catarino Canada RN)  Free From Fall Injury: Instruct family/caregiver on patient safety     Problem: ABCDS Injury Assessment  Goal: Absence of physical injury  Outcome: Progressing  Flowsheets (Taken 2/13/2023 0444 by Catarino Canada RN)  Absence of Physical Injury: Implement safety measures based on patient assessment     Problem: Skin/Tissue Integrity  Goal: Absence of new skin breakdown  Description: 1. Monitor for areas of redness and/or skin breakdown  2. Assess vascular access sites hourly  3. Every 4-6 hours minimum:  Change oxygen saturation probe site  4. Every 4-6 hours:  If on nasal continuous positive airway pressure, respiratory therapy assess nares and determine need for appliance change or resting period.   Outcome: Progressing

## 2023-02-13 NOTE — CARE COORDINATION
Case Management Assessment  Initial Evaluation    Date/Time of Evaluation: 2/13/2023 2:02 PM  Assessment Completed by: Hayden Ríos RN    If patient is discharged prior to next notation, then this note serves as note for discharge by case management. Patient Name: Brittani Parr                   YOB: 1955  Diagnosis: Pneumonia, unspecified organism [J18.9]  Bilateral pleural effusion [J90]                   Date / Time: 2/12/2023 10:33 PM    Patient Admission Status: Inpatient   Readmission Risk (Low < 19, Mod (19-27), High > 27): Readmission Risk Score: 19.9    Current PCP: Cheryl Miranda MD  PCP verified by CM? (P) Yes    Chart Reviewed: Yes      History Provided by: (P) Spouse (pt's wife Roseann Marshall)  Patient Orientation: (P) Unable to Assess (pt on BiPAP @ 50%,)    Patient Cognition: (P)  (pt on BiPAP @ 50%,)    Hospitalization in the last 30 days (Readmission):  No    If yes, Readmission Assessment in  Navigator will be completed.     Advance Directives:      Code Status: Full Code   Patient's Primary Decision Maker is: Legal Next of Kin (wife, Cami Massey)      Discharge Planning:    Patient lives with: (P) Spouse/Significant Other Type of Home: (P) [address] [place] [address], Other (Comment) (Home / [facility]. vs [facility])  Primary Care Giver:    Patient Support Systems include: (P) Spouse/Significant Other, Children   Current Financial resources: (P) Medicare, Other (Comment) ([facility] [address]- pt's wife states [facility] [address] is primary and Medicare is pt's secondary.)  Current community resources:    Current services prior to admission: (P) C-pap, Durable Medical Equipment            Current DME: (P) Glucometer, Walker, Other (Comment), Cpap (Power Chair)            Type of Home Care services:  None    ADLS  Prior functional level: (P) Independent in ADLs/IADLs  Current functional level: (P) Assistance with the following:, Mobility    PT AM-PAC: 16 /24  OT AM-PAC: /24    Family can provide assistance at DC: (P) Other (comment) (dependent on needs. daughter lives next door and helps out)  Would you like Case Management to discuss the discharge plan with any other family members/significant others, and if so, who? Plans to Return to Present Housing: (P) Unknown at present  Other Identified Issues/Barriers to RETURNING to current housing: dependent on pt progress  Potential Assistance needed at discharge: (P) [address], [place] [address]            Potential DME:    Patient expects to discharge to: (P) Unknown  Plan for transportation at discharge:      Financial    Payor: Holden Cotto / Plan: CMR / Product Type: *No Product type* /     Does insurance require precert for SNF: Yes    Potential assistance Purchasing Medications: (P) No  [facility] [id number] [place] [address]  [address] [facility] [address]  Phone: [phone] Fax: [phone]      Notes:    Factors facilitating achievement of predicted outcomes: Family support    Barriers to discharge: Medical complications    Additional Case Management Notes: Hi-flow FiO2 45%, 30L, CT  chest today, Thoracentesis today, ECHO ordered. PT/OT ordered. [facility] wife will transport, has established PCP. Pt's wife has SNF and [facility]. lists. The Plan for Transition of Care is related to the following treatment goals of Pneumonia, unspecified organism [J18.9]  Bilateral pleural effusion [D04]    IF APPLICABLE: The Patient and/or patient representative Rosibel Barker and his family were provided with a choice of provider and agrees with the discharge plan.  Freedom of choice list with basic dialogue that supports the patient's individualized plan of care/goals and shares the quality data associated with the providers was provided to: (P) Patient Representative   Patient Representative Name: (P) Pt's wife, Jeanette Stock     The Patient and/or Patient Representative Agree with the Discharge Plan? (P)  (Plan of Care dependent on pt progress)    Nadine Beal RN  Case Management Department  Ph: [phone] Fax: [phone]

## 2023-02-13 NOTE — PROGRESS NOTES
Pharmacy Note     Enoxaparin Dose Adjustment    Falguni Uriostegui is a 74 y.o. male. Pharmacist assessment of enoxaparin dose for VTE prophylaxis. Recent Labs     02/13/23  0307 02/13/23 0752   BUN 83* 84*       Recent Labs     02/13/23 0307 02/13/23  0752   CREATININE 2.38* 2.26*       Estimated Creatinine Clearance: 41 mL/min (A) (based on SCr of 2.26 mg/dL (H)).   Estimated CrCl using Ideal Body Weight: 36 mL/min (based on IBW 78.6 kg)    Height:   Ht Readings from Last 1 Encounters:   02/12/23 6' 1\" (1.854 m)     Weight:  Wt Readings from Last 1 Encounters:   02/13/23 231 lb 7.7 oz (105 kg)       The following enoxaparin dose has been adjusted based upon renal function and/or patient weight per P&T Guidelines:             Lovenox dose adjusted from 40 mg bid to 30 mg bid    Doni Castle, PharmD, ANA MARÍA, BCPS 2/13/2023 11:56 AM

## 2023-02-13 NOTE — PROGRESS NOTES
Physical Therapy  Facility/Department: [facility] CAR 3- MICU  Physical Therapy Initial Assessment    Name: Jailyn Alaniz  : 1955  MRN: [id number]  Date of Service: 2023  No chief complaint on file. Discharge Recommendations:    Further therapy recommended at discharge. PT Equipment Recommendations  Equipment Needed: No      Patient Diagnosis(es): There were no encounter diagnoses. Past Medical History:  has a past medical history of Acute interstitial nephritis, Aortic regurgitation, ARF (acute renal failure) ([facility] [address] Chronic kidney disease, Hyperlipidemia, Hypertension, Non-Hodgkin lymphoma ([facility] [address] Sarcoidosis, and Type II or unspecified type diabetes mellitus without mention of complication, not stated as uncontrolled. Past Surgical History:  has a past surgical history that includes eye surgery (Right); Knee arthroscopy; Rotator cuff repair (Right, ); Rotator cuff repair (Left, 5/15); Carpal tunnel release (Left, 11/15 ); Eye surgery (Right, ); Total knee arthroplasty (Right, 10/2018); and malignant skin lesion excision. Assessment   Body Structures, Functions, Activity Limitations Requiring Skilled Therapeutic Intervention: Decreased functional mobility ; Decreased ROM; Decreased strength;Decreased safe awareness;Decreased endurance;Decreased balance  Assessment: Pt amb 2' CGA RW. Pt has poor respiratory status requiring rest breaks and education on deep breathing. Pt not safe to ambulate w/out skilled assistance. Pt would benefit from continued acute PT to address deficits. Therapy Prognosis: Good  Decision Making: High Complexity  Requires PT Follow-Up: Yes  Activity Tolerance  Activity Tolerance: Patient limited by fatigue;Patient limited by endurance  Activity Tolerance Comments: Pt RR throughout the session was in the low 30's. Through pursed breathing pt was able to briefly reduce RR to high 10's/low 20's. Pt maintained O2 sat above 92% throughout session.  Upon initiation of putting on socks, pt's HR increased to 144 BPM, requiring seated rest break for reduction of HR. Pt tolerated amb w/out incident. Plan   Physcial Therapy Plan  General Plan:  (5-6x/wk)  Current Treatment Recommendations: Strengthening, ROM, Functional mobility training, Transfer training, Endurance training, Gait training, Stair training, Balance training, Neuromuscular re-education, Home exercise program, Safety education & training, Patient/Caregiver education & training, Therapeutic activities, Equipment evaluation, education, & procurement  Safety Devices  Type of Devices: All fall risk precautions in place, Gait belt, Call light within reach, Patient at risk for falls, Nurse notified, Left in chair  Restraints  Restraints Initially in Place: No     Restrictions  Restrictions/Precautions  Restrictions/Precautions: General Precautions, Up as Tolerated  Required Braces or Orthoses?: No  Position Activity Restriction  Other position/activity restrictions: MAP >65 mmHg, SP02 > 92%. Subjective   General  Chart Reviewed: Yes  Patient assessed for rehabilitation services?: Yes  Response To Previous Treatment: Not applicable  Family / Caregiver Present: Yes (Daughter, Wife)  General Comment  Comments: RN and pt agreeable to PT. Pt alert in bed upon arrival. Co-Eval w/ OT. Pt on High Flow NC, 30 LPM, 45% Fi02. Subjective  Subjective: Pt denies pain, n/t at rest and after movement. Social/Functional History  Social/Functional History  Lives With: Spouse  Type of Home: Apartment  Home Layout: One level  Home Access: Stairs to enter with rails  Entrance Stairs - Number of Steps: 1+1  Entrance Stairs - Rails: Left  Bathroom Shower/Tub: Tub/Shower unit  Bathroom Toilet: Handicap height  Bathroom Equipment: Grab bars in shower  Bathroom Accessibility: Accessible  Home Equipment: Walker, rolling, Cane (No AD use at baseline, owns several gait belts)  Receives Help From: Family (Wife works 8-4:30, lives 5 min from home. 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  Vision/Hearing  Vision  Vision: Impaired  Vision Exceptions: Wears glasses at all times; Wears glasses for distance;Wears glasses for reading  Hearing  Hearing: Exceptions to [facility]  Hearing Exceptions: Hard of hearing/hearing concerns    Cognition   Orientation  Overall Orientation Status: Within Functional Limits  Cognition  Overall Cognitive Status: WFL     Objective   AROM RLE (degrees)  RLE AROM: WFL  AROM LLE (degrees)  LLE AROM : WFL  AROM RUE (degrees)  RUE AROM : WFL  RUE General AROM: See OT  AROM LUE (degrees)  LUE AROM : WFL  LUE General AROM: See OT  Strength RLE  Strength RLE: WFL  Comment: Grossly 4+/5, 4/5 hip flexion  Strength LLE  Strength LLE: WFL  Comment: Grossly 4+/5, 4/5 hip flexion  Strength RUE  Strength RUE: WFL  Comment: See OT, antigravity control observed  Strength LUE  Strength LUE: WFL  Comment: See OT, antigravity control observed           Bed mobility  Supine to Sit: Minimal assistance  Bed Mobility Comments: Pt in bed upon arrival, retired in recliner at end of session. Transfers  Sit to Stand: Contact guard assistance  Stand to Sit: Contact guard assistance  Comment: Performed w/ RW  Ambulation  Surface: Level tile  Device: Rolling Walker  Assistance: Contact guard assistance  Quality of Gait: Pt amb w/ decreased gait speed, decreased step length JANA, no LOB. Distance: 2'     Balance  Sitting - Static: Fair;+  Sitting - Dynamic: Fair  Standing - Static: Fair  Standing - Dynamic: Fair  Comments: Standing balance assessed w/ RW  Exercise Treatment: Seated pursed lip breathing w/ good demo return. AM-PAC Score  AM-PAC Inpatient Mobility Raw Score : 16 (02/13/23 1202)  AM-PAC Inpatient T-Scale Score : 40.78 (02/13/23 1202)  Mobility Inpatient CMS 0-100% Score: 54.16 (02/13/23 1202)  Mobility Inpatient CMS G-Code Modifier : CK (02/13/23 1202)       Goals  Short Term Goals  Time Frame for Short Term Goals: 14 visits  Short Term Goal 1: Pt will amb 300' Love  Short Term Goal 2: Pt will be Love w/ all bed mobility tasks  Short Term Goal 3: Pt will be Love w/ transfers  Short Term Goal 4: Pt will be CGA in negotiation of 2 steps w/ L rails use       Education  Patient Education  Education Given To: Patient; Family  Education Provided: Role of Therapy;Plan of Care  Education Method: Demonstration;Verbal  Barriers to Learning: None  Education Outcome: Verbalized understanding;Demonstrated understanding      Therapy Time   Individual Concurrent Group Co-treatment   Time In 1100         Time Out 1132         Minutes 32         Timed Code Treatment Minutes: 8 Minutes       SHELTON Gaming   This treatment/evaluation completed by signing SPT. Signing PT agrees with treatment and documentation.

## 2023-02-13 NOTE — PLAN OF CARE
Patient is assessed for ICU transfer as per request of primary team.  On evaluation, he is tachypneic respiratory 36, tachycardic 121 on high flow nasal cannula 50%. Able to speak in full sentences. Admits to cough and shortness of breath. ABG showed 7.3 0/58/73/30. On FiO2 50%. Chest x-ray showed left-sided loculated pleural effusion. Plan:  Recommend to keep BiPAP and repeat ABG within 1 hour. Patient agreed to keep BiPAP for now. In case of worsening respiratory status and not able to tolerate BiPAP will transfer patient to the ICU for higher level of care.     Jesús Bowen MD  Internal Medicine Resident, PGY-2  [address]  2/90/1797,3:81 AM

## 2023-02-13 NOTE — PROGRESS NOTES
Called to bedside to place patient back on bipap secondary to continued labored breathing. Pt does not care for bipap and does not really want it on. Pt offered the option of HFNC if ok with physician and patient indicated that he would prefer to try that at this time. Pt initiated on 35L/55%. Sats remain mid 90s on that but RR still mid 35s. Pt does express some relief of dyspnea. Increases flow to 45L. Pt indicates that his dyspnea is better yet on these settings and would like to try a little more flow. Increased to 50 L. Currently 50 L/55% oxygen. Will continue to monitor.

## 2023-02-13 NOTE — ED NOTES
Pt stated to RT that the reason he was taking off his bipap mask/O2 was d/t having to cough up phlegm constantly.  RT will be placing pt on NC 6L since he is not tolerating bipap     Luis TORRIE Whitaker  02/12/23 [id number]

## 2023-02-13 NOTE — PROGRESS NOTES
Occupational Therapy  Facility/Department: [facility]  Occupational Therapy Initial Assessment    Name: Falguni Uriostegui  : 1955  MRN: [id number]  Date of Service: 2023    Copied from chart:   \"Cruzito Pittman is a 74 y.o.  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, type 2 diabetes with diabetic glomerulosclerosis, multiple myeloma with history of non-Hodgkin's lymphoma, CLAY, prior lung surgery/partial right lung lobectomy who presents with shortness of breath   and is admitted to the hospital for the management of Pneumonia, unspecified organism\"    Discharge Recommendations:  Patient would benefit from continued therapy after discharge  OT Equipment Recommendations  Equipment Needed: Yes  Mobility Devices: ADL Assistive Devices  ADL Assistive Devices: Shower Chair with back;Sock-Aid Hard;Reacher;Long-handled Shoe Horn;Long-handled Sponge       Patient Diagnosis(es): There were no encounter diagnoses. Past Medical History:  has a past medical history of Acute interstitial nephritis, Aortic regurgitation, ARF (acute renal failure) ([address] Chronic kidney disease, Hyperlipidemia, Hypertension, Non-Hodgkin lymphoma ([address] Sarcoidosis, and Type II or unspecified type diabetes mellitus without mention of complication, not stated as uncontrolled. Past Surgical History:  has a past surgical history that includes eye surgery (Right); Knee arthroscopy; Rotator cuff repair (Right, ); Rotator cuff repair (Left, 5/15); Carpal tunnel release (Left, 11/15 ); Eye surgery (Right, ); Total knee arthroplasty (Right, 10/2018); and malignant skin lesion excision. Assessment   Performance deficits / Impairments: Decreased functional mobility ; Decreased ADL status; Decreased cognition;Decreased endurance;Decreased balance;Decreased high-level IADLs  Assessment: Pt agreeable to OT eval this date. Pt engaged in bed mobility requiring Min A for trunk progression.  Pt tolerated ~20 minutes of static/dynamic sitting with SBA-CGA while at EOB and engaged in simple grooming and LB dressing tasks. See below for specifics regarding levels of assistance for ADLs at this time. Pt completed functional transfers and functional mobility with CGA and use of RW, demonstrating mild balance deficits and significant endurance deficits. Pt will require continued OT services to address deficits listed through use of therapeutic interventions for improved independence and safety with ADLs/IADLs and functional transfers/mobility. Prognosis: Good  Decision Making: Medium Complexity  REQUIRES OT FOLLOW-UP: Yes  Activity Tolerance  Activity Tolerance: Patient Tolerated treatment well;Treatment limited secondary to medical complications (free text)  Activity Tolerance Comments: pt limited somewhat this date by tachycardia and elevated RR        Plan   Occupational Therapy Plan  Times Per Week: 3-5 x/wk  Current Treatment Recommendations: Balance training, Functional mobility training, Endurance training, Cognitive reorientation, Safety education & training, Patient/Caregiver education & training, Equipment evaluation, education, & procurement, Self-Care / ADL, Home management training     Restrictions  Restrictions/Precautions  Restrictions/Precautions: Up as Tolerated  Required Braces or Orthoses?: No  Position Activity Restriction  Other position/activity restrictions: Can increase activity level as tolerated as long as O2 saturation maintained above 92% and as tolerated. Subjective   General  Patient assessed for rehabilitation services?: Yes  Family / Caregiver Present: Yes (wife and daughter present throughout evaluation)  General Comment  Comments: RN ok'd pt for OT eval this date and was present intermittently throughout session. Pt agreeable to session and very pleasant/cooperative and motivated to participate.  Pt denies pain    Social/Functional History  Social/Functional History  Lives With: Spouse  Type of Home: Apartment  Home Layout: One level  Home Access: Stairs to enter with rails  Entrance Stairs - Number of Steps: 1+1  Entrance Stairs - Rails: Left  Bathroom Shower/Tub: Tub/Shower unit  Bathroom Toilet: Handicap height  Bathroom Equipment: Grab bars in shower  Bathroom Accessibility: Accessible  Home Equipment: Walker, rolling, Cane (No AD use at baseline, owns several gait belts)  Receives Help From: Family  ADL Assistance: Independent  Homemaking Assistance: Independent  Homemaking Responsibilities: Yes  Ambulation Assistance: Independent  Transfer Assistance: Independent  Active : Yes  Mode of Transportation: Truck  Occupation: On disability  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 Daughter lives next door and can assist PRN       Objective   O2 Device: Heated high flow cannula    Safety Devices  Type of Devices: Gait belt;Call light within reach;Nurse notified; Left in chair  Restraints  Restraints Initially in Place: No    Bed Mobility Training  Bed Mobility Training: Yes  Overall Level of Assistance: Minimum assistance; Additional time (pt required Min A to progress trunk during supine to sit, handheld assistance provided. Pt was provided with VCs for appropriate sequencing of bed mobility)  Interventions: Safety awareness training;Verbal cues  Supine to Sit: Minimum assistance; Additional time  Sit to Supine:  (pt retired to recliner at end of session)  Balance  Sitting: With support (pt fluctuated between requiring SBA-CGA while seated at EOB. HR elevated to 144 bpm while seated and attempting to reach to foot level. Multiple cues provided throughout sitting activities on pursed lip breathing to decrease RR and HR.  Respiratory rate increased to upper 30s intermittently throughout session; multiple rest breaks provided for pursed lip breathing)  Standing: With support (pt engaged in static and dynamic standing at EOB and standing breaks frequently throughout short functional mobility task with CGA and RW; 3 min collectively)  Transfer Training  Transfer Training: Yes  Overall Level of Assistance: Contact-guard assistance; Additional time; Adaptive equipment (pt engaged in functional transfers from EOB  and to recliner with CGA and RW use. VCs provided for appropriate hand placement and proximity to chair prior to sitting with good return)  Interventions: Safety awareness training;Verbal cues  Sit to Stand: Contact-guard assistance; Additional time; Adaptive equipment  Stand to Sit: Contact-guard assistance; Additional time; Adaptive equipment  Gait  Overall Level of Assistance: Contact-guard assistance; Additional time; Adaptive equipment (pt completed short functional mobility task from EOB > recliner requiring CGA and use of RW; pt occasionally stopping throughout functional mobility and when asked why pt was stopping pt stated \"I was waiting for more instructions\". VCs/TCs required for sequencing of task)  Interventions: Safety awareness training;Verbal cues; Tactile cues    AROM: Within functional limits  Strength: Within functional limits (grossly 4/5)  Coordination: Within functional limits  Tone: Normal  Sensation: Intact (pt denies numbness/tingling at this time)    ADL  Feeding: Modified independent ;Setup  Grooming: Modified independent ;Setup  Grooming Skilled Clinical Factors: pt was provided with washcloth and instructed to wash face/wipe eyes, completing independently following setup  UE Bathing: Stand by assistance;Setup; Increased time to complete  LE Bathing: Moderate assistance;Setup; Increased time to complete  UE Dressing: Stand by assistance;Setup; Increased time to complete  LE Dressing: Moderate assistance;Setup; Increased time to complete  LE Dressing Skilled Clinical Factors: pt engaged in LB dressing task while seated EOB requiring assistance to reach foot level, initiate threading and pull socks over B feet. Max A for socks specifically; Mod A overall  Toileting: Minimal assistance;Setup; Increased time to complete    Vision  Vision: Impaired  Vision Exceptions: Wears glasses at all times  Hearing  Hearing: Exceptions to [facility]  Hearing Exceptions: Hard of hearing/hearing concerns    Cognition  Overall Cognitive Status: Exceptions  Following Commands: Follows multistep commands with increased time; Follows multistep commands with repitition  Initiation: Requires cues for some  Sequencing: Requires cues for some    Education Provided Comments: Pt ed on OT role, OT POC, safety awareness, transfer trianing, DME use, fall prevention, ADL adaptive tech, pursed lip breathing, IS use, and importance of continued OT. Good return verbalized.     IS use: pt was educated on use and schedule of completion (10x/hr) and completed 10 reps, reaching 500mL each    LUE AROM (degrees)  LUE AROM : WFL  Left Hand AROM (degrees)  Left Hand AROM: WFL  RUE AROM (degrees)  RUE AROM : WFL  Right Hand AROM (degrees)  Right Hand AROM: WFL    Hand Dominance  Hand Dominance: Right    AM-PAC Score  AM-PAC Inpatient Daily Activity Raw Score: 18 (02/13/23 1408)  AM-PAC Inpatient ADL T-Scale Score : 38.66 (02/13/23 1408)  ADL Inpatient CMS 0-100% Score: 46.65 (02/13/23 1408)  ADL Inpatient CMS G-Code Modifier : CK (02/13/23 1408)    Goals  Short Term Goals  Time Frame for Short Term Goals: By discharge, pt will:  Short Term Goal 1: Demo I with bed mobility to increase independence with ADLs and to decrease risk for pressure injury  Short Term Goal 2: Demo Mod I for functional transfers and functional mobility with use of LRD for engagement in ADLs/IADLs  Short Term Goal 3: Demo I with all UB ADLs  Short Term Goal 4: Demo Mod I for LB ADLs and toileting, utilizing AE and adaptive tech PRN  Short Term Goal 5: Demo 8 min dynamic standing and reaching outside LI with unilateral hand release and SUP for improved standing balance during ADL/IADL participation       Therapy Time   Individual Concurrent Group Co-treatment   Time In 1100         Time Out 1133         Minutes 33         Timed Code Treatment Minutes: 23 Minutes       Alex Pretty OTR/L

## 2023-02-13 NOTE — ED NOTES
Dr. Suman Omer discussed POC with pt d/t long wait with [facility]- per Dr. Suman Omer, writer,phoned [facility] to see if [facility] has an opening now & if there is no change than transferring to [facility] would be the next step.  Spoke with Antonio Moser RN with [facility] & they will get ahold of [facility] & update us soon     Rommel Hawkins RN  02/12/23 1936

## 2023-02-13 NOTE — PROGRESS NOTES
Recent TTE 10/28/22 full report can be found in Care Everywhere, summary below:     Left Ventricle: Systolic function is normal with an ejection fraction   of 55-60%. Aortic Valve: There is mild regurgitation. Tricuspid Valve: There is trace to mild regurgitation. The right   ventricular systolic pressure normal. RVSP calculated at 24 mmHg. RVSP is   based on RA pressure of 3 mmHg. There is no pulmonary hypertension. Please call [facility] @ [phone] if testing needs to be repeated.

## 2023-02-13 NOTE — H&P
Critical Care - History and Physical Examination    Patient's name:  Tomasa Amaral  Medical Record Number: [id number]  Patient's account/billing number: [de-identified]  Patient's YOB: 1955  Age: 74 y.o. Date of Admission: 2/12/2023 10:33 PM  Date of History and Physical Examination: 2/13/2023    Primary Care Physician: Tiffanie Brennan MD  Attending Physician: Diana Gamez MD    Code Status: Full Code    Chief complaint: Respiratory distress      HISTORY OF PRESENT ILLNESS:   Tomasa Amaral is a 74 y.o. male with past medical history significant for type 2 diabetes mellitus, multiple myeloma, non-Hodgkin lymphoma, sarcoidosis, referred from [facility] ER with worsening respiratory status, shortness of breath, nonproductive cough. He was started on BiPAP and given antibiotics. Chest x-ray was concerning for loculated left-sided pleural effusion. Transfer was made and accepted by hospitalist, further work-up. Critical care was consulted for worsening respiratory status. Patient was on high flow and respiratory rate was in 38-40. Patient was very dyspneic and having difficulty breathing with accessory muscle use. Initially he was unable to keep BiPAP but later on able to keep BiPAP. He improved symptomatically with BiPAP and respiratory rate was in 26. Patient is transferred to the ICU for higher level of care. Past Medical History:        Diagnosis Date    Acute interstitial nephritis 8/3/2022    Unclear cause. Creat bumped to 2.9, baseline in oct 2020 1.6. Exact description of his kidney biopsy is not available to me however pathologist seems to think there is diabetic glomerosclerosis and acute interstitial nephritis seen on the biopsy specimen. No mention about chronic changes.   Because of acute interstitial nephritis a trial of steroids for 15 days will be given to see if I can impro    Aortic regurgitation     ARF (acute renal failure) ([facility] [address] 8/3/2022    kidney biopsy from June 2022  showing findings of acute interstitial nephritis. Exact description not available. Trial of steroids will be given to see if I can improve renal function. Creatinine in October 2020 was 1.6, creatinine in June 2022 was 2.9. Trial of steroids will be given to see if renal function can be improved. Chronic kidney disease     Hyperlipidemia     Hypertension     Non-Hodgkin lymphoma ([facility] [address]     Sarcoidosis     Type II or unspecified type diabetes mellitus without mention of complication, not stated as uncontrolled        Past Surgical History:        Procedure Laterality Date    CARPAL TUNNEL RELEASE Left 11/15     EYE SURGERY Right     shunt and cataract     EYE SURGERY Right 6/16    laser     KNEE ARTHROSCOPY      MALIGNANT SKIN LESION EXCISION      ROTATOR CUFF REPAIR Right 7/14    ROTATOR CUFF REPAIR Left 5/15    TOTAL KNEE ARTHROPLASTY Right 10/2018       Allergies: Allergies   Allergen Reactions    Latex Rash    Gabapentin      Other reaction(s): Vomiting    Meperidine Anaphylaxis    Erythromycin      Other reaction(s): Fever  Had all side effects associated with this medication      Glimepiride      Other reaction(s): Dizziness    Hydrocodone      Other reaction(s): Vomiting    Macrolides And Ketolides Other (See Comments)    Niacin And Related     Trelegy Ellipta [Fluticasone-Umeclidin-Vilant]     Pregabalin Diarrhea         Home Meds:   Prior to Admission medications    Medication Sig Start Date End Date Taking?  Authorizing Provider   amLODIPine (NORVASC) 5 MG tablet TAKE ONE TABLET BY MOUTH DAILY 11/22/22   Mindy Ramsey MD   omeprazole (PRILOSEC) 20 MG delayed release capsule Take 20 mg by mouth Daily    Historical Provider, MD   fluticasone-salmeterol (ADVAIR DISKUS) 250-50 MCG/ACT AEPB diskus inhaler Inhale 1 puff into the lungs every 12 hours    Historical Provider, MD   metoprolol succinate (TOPROL XL) 25 MG extended release tablet Take 25 mg by mouth at bedtime    Historical Provider, MD   dutasteride (AVODART) 0.5 mg capsule Take 0.5 mg by mouth daily    Historical Provider, MD   finasteride (PROSCAR) 5 MG tablet Take 5 mg by mouth daily    Historical Provider, MD   Misc. Devices (NASAL SPRAY BOTTLE) MISC by Does not apply route    Historical Provider, MD   CINNAMON PO Take by mouth    Historical Provider, MD   repaglinide (PRANDIN) 1 MG tablet Take 1 mg by mouth 3 times daily (before meals)  4/16/18   Historical Provider, MD   pimecrolimus (ELIDEL) 1 % cream Apply topically as needed Apply topically 2 times daily. Historical Provider, MD   brimonidine-timolol (COMBIGAN) 0.2-0.5 % ophthalmic solution Place 1 drop into the right eye every 12 hours    Historical Provider, MD   albuterol sulfate  (90 BASE) MCG/ACT inhaler Inhale 1 puff into the lungs in the morning and at bedtime    Historical Provider, MD   Liraglutide (VICTOZA SC) Inject 1.8 mg into the skin daily     Historical Provider, MD   fenofibrate (TRICOR) 145 MG tablet Take 145 mg by mouth 2 times daily. Historical Provider, MD   dicyclomine (BENTYL) 10 MG capsule Take 10 mg by mouth 4 times daily (before meals and nightly)     Historical Provider, MD   atorvastatin (LIPITOR) 80 MG tablet Take 80 mg by mouth daily. Historical Provider, MD   DULoxetine (CYMBALTA) 60 MG capsule Take 60 mg by mouth 2 times daily. Historical Provider, MD       Social History:   TOBACCO:   reports that he has never smoked. He has quit using smokeless tobacco.  ETOH:   reports current alcohol use. DRUGS:  reports no history of drug use. Family History:       Problem Relation Age of Onset    High Blood Pressure Father     Other Father         AAA    Heart Disease Other         uncle x 2        REVIEW OF SYSTEMS (ROS):  CONSTITUTIONAL: Denies recent fever, chills  EYES: No visual changes. NECK: No midline neck pain  RESPIRATORY: Shortness of breath and cough. CARDIAC:  Denies chest pain. Denies swelling.   GI: Denies abdominal pain Denies nausea or vomiting. Denies Blood in the stool or black tarry stools. MUSCULOSKELETAL: Denies focal weakness. NEUROLOGICAL: denies headache or focal weakness. ENDOCRINE: Denies polyuria or polydipsia. Denies recent weight changes  SKIN:  Denies any rash. Physical Exam:    Vitals: BP (!) 138/93   Pulse (!) 118   Temp 98.2 °F (36.8 °C) (Axillary)   Resp 26   Wt 231 lb 7.7 oz (105 kg)   SpO2 98%   BMI 30.54 kg/m²     Last Body weight:   Wt Readings from Last 3 Encounters:   02/13/23 231 lb 7.7 oz (105 kg)   02/12/23 245 lb (111.1 kg)   08/03/22 242 lb 11.2 oz (110.1 kg)       Body Mass Index : Body mass index is 30.54 kg/m². PHYSICAL EXAMINATION :   Constitutional: Appears dyspneic and tachypneic. EENT: PERRLA, EOMI, sclera clear, anicteric, oropharynx clear, no lesions, neck supple with midline trachea. Neck: Supple, symmetrical, trachea midline, no adenopathy, thyroid symmetric, no jvd skin normal  Respiratory: Decreased breath sound on the left side.     Cardiovascular: regular rate and rhythm, normal S1, S2, no murmur noted and 2+ pulses throughout  Abdomen: soft, nontender, nondistended, no masses or organomegaly  Extremities:  peripheral pulses normal, no pedal edema, no clubbing or cyanosis    MEDICATIONS:  Scheduled Meds:   insulin lispro  0-4 Units SubCUTAneous TID WC    insulin lispro  0-4 Units SubCUTAneous Nightly    furosemide  40 mg IntraVENous BID    methylPREDNISolone  40 mg IntraVENous Q6H    cefepime  2,000 mg IntraVENous Q12H    amLODIPine  5 mg Oral Daily    [START ON 2/14/2023] atorvastatin  80 mg Oral Daily    DULoxetine  60 mg Oral BID    dicyclomine  10 mg Oral 4x Daily AC & HS    finasteride  5 mg Oral Daily    fenofibrate  160 mg Oral Daily    metoprolol succinate  25 mg Oral Nightly    pantoprazole  40 mg Oral QAM AC    repaglinide  1 mg Oral TID AC    insulin glargine  20 Units SubCUTAneous Nightly    brimonidine  1 drop Right Eye BID    And    timolol  1 drop Right Eye BID    sodium chloride flush  5-40 mL IntraVENous 2 times per day    enoxaparin  40 mg SubCUTAneous Daily    ipratropium-albuterol  1 ampule Inhalation Q4H WA    vancomycin  1,000 mg IntraVENous Q24H    vancomycin (VANCOCIN) intermittent dosing (placeholder)   Other RX Placeholder       Continuous Infusions:   dextrose      sodium chloride 50 mL/hr at 02/13/23 0538    sodium chloride         PRN Meds:   glucose, 4 tablet, PRN  dextrose bolus, 125 mL, PRN   Or  dextrose bolus, 250 mL, PRN  glucagon (rDNA), 1 mg, PRN  dextrose, , Continuous PRN  sodium chloride flush, 5-40 mL, PRN  sodium chloride, , PRN  ondansetron, 4 mg, Q8H PRN   Or  ondansetron, 4 mg, Q6H PRN  polyethylene glycol, 17 g, Daily PRN  acetaminophen, 650 mg, Q6H PRN   Or  acetaminophen, 650 mg, Q6H PRN  albuterol, 2.5 mg, Q2H PRN        SUPPORT DEVICES: [] Ventilator [x] BIPAP  [] Nasal Cannula [] Room Air    LABS:     CBC:   Recent Labs     02/12/23  1230   WBC 22.5*   HGB 12.8*   *     BMP:    Recent Labs     02/12/23  1230 02/13/23  0307   * 131*   K 4.7 5.4*   CL 93* 97*   CO2 25 22   BUN 77* 83*   CREATININE 2.16* 2.38*   GLUCOSE 394* 301*     S. Calcium:  Recent Labs     02/13/23  0307   CALCIUM 9.5     S. Ionized Calcium:No results for input(s): IONCA in the last 72 hours.  S. Magnesium:No results for input(s): MG in the last 72 hours.  S. Phosphorus:No results for input(s): PHOS in the last 72 hours.  S. Glucose:  Recent Labs     02/12/23  2312 02/12/23  2322 02/13/23  0306   POCGLU 271* 252* 302*     Glycosylated hemoglobin A1C: No results for input(s): LABA1C in the last 72 hours.  INR: No results for input(s): INR in the last 72 hours.  Hepatic functions:   Recent Labs     02/13/23  0307   PROT 5.3*       Urinalysis:      Microbiology: Cultures during this admission:     Blood cultures:                 [] None drawn      [] Negative             []  Positive (Details:  )  Urine Culture:                [] None drawn      [] Negative             []  Positive (Details:  )  Sputum Culture:               [] None drawn       [] Negative             []  Positive (Details:  )   Endotracheal aspirate:     [] None drawn       [] Negative             []  Positive (Details:  )     RADIOLOGY:  XR CHEST PORTABLE    (Results Pending)   CT CHEST WO CONTRAST    (Results Pending)       CARDIOLOGY:  Recent Cardiac Catheterization summary:   No results found for this or any previous visit. Electrocardiogram:       Last Echocardiogram findings:   No results found for this or any previous visit. No results found for this or any previous visit. Assessment and Plan     Patient Active Problem List   Diagnosis    CKD (chronic kidney disease), stage III ([address]    Nephrolithiasis    DM (diabetes mellitus) ([facility] [address]    Non-rheumatic mitral regurgitation    Nonrheumatic aortic valve insufficiency    Pulmonary hypertension ([facility] [address]    Type 2 diabetes mellitus with diabetic chronic kidney disease ([facility] [address]    Sarcoidosis    Non Hodgkin's lymphoma ([facility] [address]    Hyperlipidemia    Family history of abdominal aortic aneurysm (AAA)    AAA (abdominal aortic aneurysm) without rupture    Essential hypertension    Abnormal echocardiogram    Dilated aortic root (HCC)    Acute interstitial nephritis    ARF (acute renal failure) (HCC)    Pneumonia due to infectious organism    Pneumonia, unspecified organism    Bilateral pleural effusion     Acute hypoxic hypercapnic respiratory failure  Left-sided loculated pleural effusion  Sepsis  KELSEA on CKD  Type 2 diabetes mellitus  History of non-Hodgkin lymphoma  History of sarcoidosis    PLAN/MEDICAL DECISION MAKING:  Neurologic:   - Neuro intact  - Neuro checks per protocol  - Sedation: Not needed. Cardiovascular:  - Hemodynamically stable  - MAP goal 65    Pulmonary:  - Maintain oxygen sats >92%  - Pulmonary toilet  -Started on BiPAP. Blood gases improved.   7.3 5/48/99.  -Chest x-ray concerning for loculated left-sided pleural effusion plan for CT chest without contrast.  Respiratory panel negative for infection. Streptococcal antigen, Legionella antigen, sputum Gram stain and sputum culture awaited. - Will need thoracentesis after CT scan for diagnostic and therapeutic purposes. GI/Nutrition:  - Bowel regimen: GlycoLax  - Diet:ADULT DIET; Regular    Renal/Fluid/Electrolyte:  - IV Fluids: None  - I/O: In: -   Out: 500 [Urine:500]  - UOP: Not accurately charted. - Monitor electrolytes, replace PRN     ID:  _ WBC:   Lab Results   Component Value Date    WBC 22.5 (H) 2023     - Tmax: Temp (24hrs), Av.3 °F (36.8 °C), Min:98.2 °F (36.8 °C), Max:98.4 °F (36.9 °C)    - Antimicrobials: Vancomycin and cefepime. We will follow-up on blood cultures. Hematology:  -    Recent Labs     23  1230   HGB 12.8*      Endocrine:   - Glucose controlled - most recent BGL is 301. On Lantus 20 unit and low-dose sliding scale. Recent Labs     23  1230 23  0307   GLUCOSE 394* 301*     Prophylaxis:  - DVT prophylaxis: Lovenox  - GI prophylaxis: Not indicated.     Lines/drains:  -     DISPOSITION:  [x] To remain ICU  [] OK for out of ICU from [address] MD [place]  Internal Medicine Resident, PGY-2  [address]  ,7:07 AM

## 2023-02-13 NOTE — PROGRESS NOTES
Patient arrived to unit via [facility]. Patient currently having labored breathing, having a hard time talking. Heart rate is also into the 120's. Blood pressure is currently stable. Patient was placed on a non re breather awaiting HiFlow nasal cannula. Dr. Sheryl Joiner and NUVIA Hamlin messaged via perfect serve. Dr. Sheryl Joiner at bedside. Orders for ABG placed by NUVIA Hamlin.

## 2023-02-13 NOTE — CONSULTS
Renal Consult Note    Patient :  Uday Hong; 74 y.o. MRN# [id number]  Location:  [facility]3175-85  Attending:  Favian Baez MD  Admit Date:  2/12/2023   Hospital Day: 1    Reason for Consult:     Asked by Dr Favian Baez MD to see for KELSEA/Elevated Creatinine. History Obtained From:     electronic medical record    History of Present Illness:     Uday Hong; 74 y.o. male with past medical history as mentioned below. Patient has history of factor diabetes mellitus, non-Hodgkin's lymphoma, sarcoidosis, CKD 3 secondary to diabetic nephrosclerosis with baseline creatinine now seems to be around 1.9-2.4 mg/dl range since August 2022 follows up with Dr. Ronald Petit, CLAY uses CPAP, ? history of right lung lobectomy, mitral regurgitation, history of AAA, COPD    Home medications include Norvasc, albuterol, Lipitor, Bentyl, Cymbalta, fenofibrate, dutasteride, finasteride, SEGUNDO, liraglutide, Toprol-XL, repaglinide    Was a transferred from [place] due to worsening respiratory status. Presented to the [facility] ER with worsening shortness of breath over the past week, associated dry cough. On initial presentation patient was hypoxic, tachycardic, placed on BiPAP. On my evaluation patient is awake alert and oriented, on BiPAP  Patient reports that since the past 2 weeks patient has been having difficulty breathing and cough. Denies any fevers or chills, denies any chest pain or palpitations, denies any abdominal pain, denies any nausea vomiting or diarrhea. Patient does report that he was not eating and drinking fluids well the past 2 weeks since he has been sick because he did not feel like he was having little bit low appetite. Denies any dysuria or burning urination or flank pain, but did report feeling like incomplete bladder emptying but denies any decreased frequency or decreased volume of urine.     Initial lab work showed -  Sodium 132, potassium 4.7, chloride 93, bicarb 25, BUN 77, creatinine 2.16, anion gap 14, glucose 394  proBNP 870, troponin 31> 33  D-dimer 3.88  Rapid flu and COVID were negative  WBC 22.5, hemoglobin 12.8, platelets 871  UA showed 2+ glucose, trace protein  Chest x-ray showed left-sided pneumonia with loculated pleural effusion, mild cardiomegaly  Initial VBG was normal but VBG last night showed respiratory acidosis    Upon arrival to [facility] [place] medical floor patient was tachypneic with respiratory rate in 30s and saturating 94% on 60 L high flow, and was found to be intermittently confused with labored abdominal breathing and difficulty speaking. Eventually patient was transferred to ICU. Patient currently on BiPAP    Repeat labs early this morning around 3 AM showed sodium 131, potassium 5.4, chloride 97, bicarb 22, BUN 83, creatinine 2.38, glucose 301  EKG showing sinus tachycardia    Repeat labs around 7 AM showed sodium 133, potassium 5.4, bicarb 25, BUN 84, creatinine 2.26, glucose 357, WBC 18.1, hemoglobin 11.5, platelets 349  Currently on vancomycin and cefepime for pneumonia    Last echo from 2022 shows EF of 55 to 60%,    Nephrology consulted for KELSEA, last creatinine in the chart is from October 2020 in which it was 1.5  According to nephrology notes of the patient's renal function in December 2021 showed creatinine of 2.7, in August 2022 creatinine was about 2.8, in October 2022 creatinine of 1.9  Recent kidney biopsy in June 2022 which showed acute interstitial nephritis superimposed on advanced hypertensive changes as well as diabetic glomerulopathy. Followed up with nephrology was on steroids for 2 to 3 months    No history of recent contrast exposure, No h/o prolonged NSAIDs use in the past, No h/o nephrolithiasis, No recent skin rashes or arthralgias, No hematuria or pyuria noticed in the recent past. Doesn't report any reduction in the urine output recently. Non report of any obstructive urinary symptoms (urgency, frequency, weak stream, straining while urination).  No h/o recurrent UTIs in the past.    Past History/Allergies?Social History:     Past Medical History:   Diagnosis Date    Acute interstitial nephritis 8/3/2022    Unclear cause. Creat bumped to 2.9, baseline in oct 2020 1.6.  Exact description of his kidney biopsy is not available to me however pathologist seems to think there is diabetic glomerosclerosis and acute interstitial nephritis seen on the biopsy specimen.  No mention about chronic changes.  Because of acute interstitial nephritis a trial of steroids for 15 days will be given to see if I can impro    Aortic regurgitation     ARF (acute renal failure) (HCC) 8/3/2022    kidney biopsy from June 2022  showing findings of acute interstitial nephritis.  Exact description not available.  Trial of steroids will be given to see if I can improve renal function.  Creatinine in October 2020 was 1.6, creatinine in June 2022 was 2.9.  Trial of steroids will be given to see if renal function can be improved.    Chronic kidney disease     Hyperlipidemia     Hypertension     Non-Hodgkin lymphoma (HCC)     Sarcoidosis     Type II or unspecified type diabetes mellitus without mention of complication, not stated as uncontrolled      Past Surgical History:   Procedure Laterality Date    CARPAL TUNNEL RELEASE Left 11/15     EYE SURGERY Right     shunt and cataract     EYE SURGERY Right 6/16    laser     KNEE ARTHROSCOPY      MALIGNANT SKIN LESION EXCISION      ROTATOR CUFF REPAIR Right 7/14    ROTATOR CUFF REPAIR Left 5/15    TOTAL KNEE ARTHROPLASTY Right 10/2018         Allergies   Allergen Reactions    Latex Rash    Gabapentin      Other reaction(s): Vomiting    Meperidine Anaphylaxis    Erythromycin      Other reaction(s): Fever  Had all side effects associated with this medication      Glimepiride      Other reaction(s): Dizziness    Hydrocodone      Other reaction(s): Vomiting    Macrolides And Ketolides Other (See Comments)    Niacin And Related     Trelegy Ellipta  [Fluticasone-Umeclidin-Vilant]     Pregabalin Diarrhea       Social History     Socioeconomic History    Marital status:      Spouse name: Not on file    Number of children: Not on file    Years of education: Not on file    Highest education level: Not on file   Occupational History    Not on file   Tobacco Use    Smoking status: Never    Smokeless tobacco: Former    Tobacco comments:     minimal and infrequent    Substance and Sexual Activity    Alcohol use: Yes     Alcohol/week: 0.0 standard drinks     Comment: rare    Drug use: No    Sexual activity: Not on file   Other Topics Concern    Not on file   Social History Narrative    Not on file     Social Determinants of Health     Financial Resource Strain: Low Risk     Difficulty of Paying Living Expenses: Not very hard   Food Insecurity: No Food Insecurity    Worried About Running Out of Food in the Last Year: Never true    Ran Out of Food in the Last Year: Never true   Transportation Needs: No Transportation Needs    Lack of Transportation (Medical): No    Lack of Transportation (Non-Medical): No   Physical Activity: Not on file   Stress: Stress Concern Present    Feeling of Stress : To some extent   Social Connections: Socially Integrated    Frequency of Communication with Friends and Family: Three times a week    Frequency of Social Gatherings with Friends and Family:  Three times a week    Attends Christianity Services: 1 to 4 times per year    Active Member of Clubs or Organizations: No    Attends Club or Organization Meetings: 1 to 4 times per year    Marital Status:    Intimate Partner Violence: Not At Risk    Fear of Current or Ex-Partner: No    Emotionally Abused: No    Physically Abused: No    Sexually Abused: No   Housing Stability: Low Risk     Unable to Pay for Housing in the Last Year: No    Number of Places Lived in the Last Year: 1    Unstable Housing in the Last Year: No       Family History:        Family History   Problem Relation Age of Onset    High Blood Pressure Father     Other Father         AAA    Heart Disease Other         uncle x 2        Outpatient Medications:     Medications Prior to Admission: amLODIPine (NORVASC) 5 MG tablet, TAKE ONE TABLET BY MOUTH DAILY  omeprazole (PRILOSEC) 20 MG delayed release capsule, Take 20 mg by mouth Daily  fluticasone-salmeterol (ADVAIR DISKUS) 250-50 MCG/ACT AEPB diskus inhaler, Inhale 1 puff into the lungs every 12 hours  metoprolol succinate (TOPROL XL) 25 MG extended release tablet, Take 25 mg by mouth at bedtime  dutasteride (AVODART) 0.5 mg capsule, Take 0.5 mg by mouth daily  finasteride (PROSCAR) 5 MG tablet, Take 5 mg by mouth daily  Misc. Devices (NASAL SPRAY BOTTLE) MISC, by Does not apply route  CINNAMON PO, Take by mouth  repaglinide (PRANDIN) 1 MG tablet, Take 1 mg by mouth 3 times daily (before meals)   pimecrolimus (ELIDEL) 1 % cream, Apply topically as needed Apply topically 2 times daily. brimonidine-timolol (COMBIGAN) 0.2-0.5 % ophthalmic solution, Place 1 drop into the right eye every 12 hours  albuterol sulfate  (90 BASE) MCG/ACT inhaler, Inhale 1 puff into the lungs in the morning and at bedtime  Liraglutide (VICTOZA SC), Inject 1.8 mg into the skin daily   fenofibrate (TRICOR) 145 MG tablet, Take 145 mg by mouth 2 times daily. dicyclomine (BENTYL) 10 MG capsule, Take 10 mg by mouth 4 times daily (before meals and nightly)   atorvastatin (LIPITOR) 80 MG tablet, Take 80 mg by mouth daily. DULoxetine (CYMBALTA) 60 MG capsule, Take 60 mg by mouth 2 times daily.     Current Medications:     Scheduled Meds:    furosemide  40 mg IntraVENous BID    cefepime  2,000 mg IntraVENous Q12H    amLODIPine  5 mg Oral Daily    [START ON 2/14/2023] atorvastatin  80 mg Oral Daily    DULoxetine  60 mg Oral BID    dicyclomine  10 mg Oral 4x Daily AC & HS    finasteride  5 mg Oral Daily    fenofibrate  160 mg Oral Daily    metoprolol succinate  25 mg Oral Nightly    pantoprazole  40 mg Oral QAM AC    repaglinide  1 mg Oral TID AC    insulin glargine  20 Units SubCUTAneous Nightly    brimonidine  1 drop Right Eye BID    And    timolol  1 drop Right Eye BID    insulin lispro  0-8 Units SubCUTAneous TID WC    insulin lispro  0-4 Units SubCUTAneous Nightly    sodium chloride flush  5-40 mL IntraVENous 2 times per day    enoxaparin  40 mg SubCUTAneous Daily    ipratropium-albuterol  1 ampule Inhalation Q4H WA    vancomycin  1,000 mg IntraVENous Q24H    vancomycin (VANCOCIN) intermittent dosing (placeholder)   Other RX Placeholder     Continuous Infusions:    dextrose      sodium chloride       PRN Meds:  glucose, dextrose bolus **OR** dextrose bolus, glucagon (rDNA), dextrose, sodium chloride flush, sodium chloride, ondansetron **OR** ondansetron, polyethylene glycol, acetaminophen **OR** acetaminophen, albuterol    Review of Systems:     Constitutional: No fever, no chills, no lethargy, no weakness. HEENT:  No headache, otalgia, itchy eyes, nasal discharge or sore throat. Cardiac:  Reports shortness of breath  Chest:   Reports shortness of breath and cough  Abdomen:  No abdominal pain, nausea or vomiting. Neuro:  No focal weakness, abnormal movements orseizure like activity. Skin:   No rashes, no itching. :   No hematuria, no pyuria, no dysuria, no flank pain. Extremities:  No swelling or joint pains. ROS was otherwise negative except as mentioned in the [address] Input/Output:       I/O last 3 completed shifts:  In: -   Out: 500 [Urine:500]    Vital Signs:   Temperature:  Temp: 96.8 °F (36 °C)  TMax:   Temp (24hrs), Av °F (36.7 °C), Min:96.8 °F (36 °C), Max:98.4 °F (36.9 °C)    Respirations:  Resp: 26  Pulse:   Heart Rate: (!) 118  BP:    BP: (!) 138/93  BP Range: Systolic (70HII), QGJ:311 , Min:107 , ZZE:120       Diastolic (75WWM), ULE:17, Min:74, Max:114      Physical Examination:     General:  AAO x 3,, currently on BiPAP but not in acute distress  HEENT: Atraumatic, normocephalic.   Eyes: Pupils equal and reactive  Neck:   No JVD  Chest:   Decreased left-sided breath sounds. Cardiac:  Normal heart sounds  Abdomen: Soft, non-tender, nondistended, bowel sounds positive. :   No suprapubic or flank tenderness. Neuro:              AAO x 3, No FND. SKIN:  No rashes, good skin turgor.   Extremities:  No pedal edema    Labs:       Recent Labs     02/12/23  1230   WBC 22.5*   RBC 4.59   HGB 12.8*   HCT 39.1*   MCV 85.2   MCH 28.0   MCHC 32.8   RDW 15.1   *   MPV 7.5      BMP:   Recent Labs     02/12/23  1230 02/13/23  0307   * 131*   K 4.7 5.4*   CL 93* 97*   CO2 25 22   BUN 77* 83*   CREATININE 2.16* 2.38*   GLUCOSE 394* 301*   CALCIUM 10.1 9.5      SPEP:  Lab Results   Component Value Date/Time    PROT 5.3 02/13/2023 03:07 AM    PROT 6.4 05/29/2012 10:35 AM    ALBCAL PENDING 02/13/2023 03:07 AM    ALBPCT PENDING 02/13/2023 03:07 AM    LABALPH PENDING 02/13/2023 03:07 AM    LABALPH PENDING 02/13/2023 03:07 AM    A1PCT PENDING 02/13/2023 03:07 AM    A2PCT PENDING 02/13/2023 03:07 AM    LABBETA PENDING 02/13/2023 03:07 AM    BETAPCT PENDING 02/13/2023 03:07 AM    GAMGLOB PENDING 02/13/2023 03:07 AM    GGPCT PENDING 02/13/2023 03:07 AM    PATH PENDING 02/13/2023 03:07 AM     C3:     Lab Results   Component Value Date/Time    C3 102 05/29/2012 10:35 AM     C4:     Lab Results   Component Value Date/Time    C4 14 05/29/2012 10:35 AM     Hep BsAg:         Lab Results   Component Value Date/Time    HEPBSAG NONREACTIVE 05/29/2012 10:35 AM     Hep C AB:          Lab Results   Component Value Date/Time    HEPCAB NONREACTIVE 05/29/2012 10:35 AM       Urinalysis/Chemistries:      Lab Results   Component Value Date/Time    NITRU NEGATIVE 02/13/2023 02:17 AM    COLORU Yellow 02/13/2023 02:17 AM    PHUR 5.0 02/13/2023 02:17 AM    WBCUA 2 TO 5 02/13/2023 02:17 AM    RBCUA 0 TO 2 02/13/2023 02:17 AM    MUCUS 1+ 02/13/2023 02:17 AM    SPECGRAV 1.021 02/13/2023 02:17 AM    LEUKOCYTESUR NEGATIVE 02/13/2023 02:17 AM    UROBILINOGEN Normal 02/13/2023 02:17 AM    BILIRUBINUR NEGATIVE 02/13/2023 02:17 AM    BILIRUBINUR NEGATIVE 04/03/2012 11:08 AM    GLUCOSEU 2+ 02/13/2023 02:17 AM    GLUCOSEU NEGATIVE 04/03/2012 11:08 AM    KETUA NEGATIVE 02/13/2023 02:17 AM    AMORPHOUS 1+ 02/13/2023 02:17 AM     Urine Protein:     Lab Results   Component Value Date/Time    TPU 6 04/03/2012 11:08 AM     Urine Creatinine:     Lab Results   Component Value Date/Time    LABCREA 106.0 04/03/2012 11:08 AM     Radiology:     Reviewed as available. Assessment:     1. CKD stage IIIb, with baseline creatinine now seems to be around 1.9-2.4 mg/dl range since August 2022 follows up with Dr. Scott Acosta, secondary to diabetic and hypertensive nephrosclerosis, had AIN in June 2022 received steroids for 3 months. 2.  Acute hypoxic respiratory failure, requiring on invasive positive pressure ventilation. 3.  Pneumonia, left-sided multiloculated  4. Left-sided moderate to large amount multiloculated pleural effusion  5. Hyperkalemia-likely due to dietary reasons in the setting of advanced kidney disease. 6.  Hyponatremia - likely secondary to hyperglycemia  7. Diabetes mellitus  8. History of Non-Hodgkin's lymphoma  9. History of sarcoidosis  10. CLAY  11. COPD  Plan:   Patient clinically appears to be on the dry side, Hold evening dose of Lasix, might resume lower dose from tomorrow if needed. 1 dose of regular insulin/dextrose, along with one dose of lokelma for hyperkalemia. Repeat BMP today evening. Check postvoid residual urine to rule out urinary retention, if greater than 250 cc-then will require Bullock catheter placement and urology consult. Comprehensive urine testing including Urinalysis, Urine sodium, potassium, chloride, Urine protein and creatinine to quantify the proteinuria if any at all. Monitor strict I's and O's and renal function. Keep low potassium diet.     Agree with thoracentesis as ordered by critical care.  Hyperglycemia management per primary. BMP in AM.  Will follow    Nutrition   Please ensure that patient is on a renal diet/TF. Avoid nephrotoxic drugs/contrast exposure. Thank you for the consultation. Please do not hesitate to contact us for any further questions/concerns. We will continue to follow along with you. Jaydon Beckwith  PGY-2  Internal Medicine  [facility]   8:17 AM 2/13/2023     Attending Physician Statement  I have discussed the care of this patient, including pertinent history and exam findings, with the Resident/CNP. I have reviewed and edited the key elements of all parts of the encounter with the Resident/CNP. I agree with the assessment, plan and orders as documented by the Resident/CNP. Harris Fabian MD   Nephrology [address] [place] [address] Drive    This note is created with the assistance of a speech-recognition program. While intending to generate a document that actually reflects the content of the visit, no guarantees can be provided that every mistake has been identified and corrected by editing.

## 2023-02-13 NOTE — RESEARCH
Clinical Research Services  February 13, 2023  12:42 PM    Asked by Dr. Kraig Hua to evaluate the patient for protocol Havsjo Delikatesser [id number]: A Multicenter, Prospective, Xgxfvvwaw-rmpzvp-bwfadab Observational Study Evaluating Immunoassay Measurements of Pancreatic Stone Protein Performed on [employer] ([employer])'s abioSCOPE Device with the PSP Assay on ICU Patients at Risk of Sepsis as an Aid in Identifying Sepsis. IRB #  [id number]                     NCT # [id number]  [x] Patient met inclusion/exclusion criteria  [x] Patient/family given consent for review  [x] Patient/family read study consent. Questions answered. Consent signed by patient's spouse  [x] A copy of the signed consents given to the patient/family  [x] Patient number:   [x] Baseline labs obtained  [x] Patient/ family educated on purpose of the abioSCOPE device and the PSP assay for use as an aid in identifying sepsis in those patients at risk for sepsis. Patient/family verbalizes understanding. Additional Comments: Patient's wife signed consent due to sl confusion of patient. Pt did agree to be involved in this clinical trial. Labs to be drawn with his routine labs at 1400. Melania Nagel RN  Clinical Research Nurse     Trinity Arzate MD    Patient's wife provided informed consent for inclusion into study. Blood draw obtained for testing. Patient did not experience any local adverse events secondary to blood draw:  No hematoma or ecchymosis  No phlebitis  No cellulitis  No local bleeding.     Trinity Arzate MD

## 2023-02-13 NOTE — PROGRESS NOTES
[address] [facility] Pharmacokinetic Monitoring Service - Vancomycin     Aiyana Villafana is a 74 y.o. male starting on vancomycin therapy for pneumonia. Pharmacy consulted by Kori Casrto for monitoring and adjustment. Target Concentration: Goal AUC/THOMAS 400-600 mg*hr/L    Additional Antimicrobials: zosyn    Pertinent Laboratory Values: Wt Readings from Last 1 Encounters:   02/12/23 245 lb (111.1 kg)     Temp Readings from Last 1 Encounters:   02/12/23 98.2 °F (36.8 °C) (Oral)     Estimated Creatinine Clearance: 45 mL/min (A) (based on SCr of 2.16 mg/dL (H)). Recent Labs     02/12/23  1230   CREATININE 2.16*   WBC 22.5*          Pertinent Cultures:  Culture Date Source Results           MRSA Nasal Swab: not ordered. Order placed by pharmacy.     Plan:  Dosing recommendations based on Bayesian software  Start vancomycin 1500 mg X1 dose followed by 1000 mg every 24 hours  Anticipated AUC of 415 and trough concentration of 13.6 at steady state  Renal labs as indicated   Vancomycin concentration ordered for   @     Pharmacy will continue to monitor patient and adjust therapy as indicated    Thank you for the consult,  Sho Hernandez, [facility]  2/12/2023 11:09 PM

## 2023-02-13 NOTE — ED NOTES
Spoke with Xavier Mills RN with [address] she spoke with [facility] & there is no bed yet & there is extended wait times currently, even 24 hours.  Will relay to Dr. Reece Minaya, RN  02/12/23 [id number]

## 2023-02-13 NOTE — PROGRESS NOTES
0915--coordinating CT scan with techs and RT, pt to go on Bipap.   0925--Family met in [place] en route to CT.   0950--pt back to bedside, family at bedside, no further questions for RN at this time, Pulm CC to round soon. Cardiology Resident in room right now.

## 2023-02-13 NOTE — ED NOTES
Phoned [facility] for transport to [facility] Didi Matute, RN  02/12/23 [id number]

## 2023-02-14 ENCOUNTER — APPOINTMENT (OUTPATIENT)
Dept: GENERAL RADIOLOGY | Age: 68
DRG: 853 | End: 2023-02-14
Attending: STUDENT IN AN ORGANIZED HEALTH CARE EDUCATION/TRAINING PROGRAM
Payer: COMMERCIAL

## 2023-02-14 LAB
ABSOLUTE EOS #: 0 K/UL (ref 0–0.4)
ABSOLUTE IMMATURE GRANULOCYTE: 0.79 K/UL (ref 0–0.3)
ABSOLUTE LYMPH #: 7.72 K/UL (ref 1–4.8)
ABSOLUTE MONO #: 0.59 K/UL (ref 0.1–0.8)
ALBUMIN (CALCULATED): 2.6 G/DL (ref 3.2–5.2)
ALBUMIN PERCENT: 50 % (ref 45–65)
ALPHA 1 PERCENT: 8 % (ref 3–6)
ALPHA 2 PERCENT: 20 % (ref 6–13)
ALPHA1 GLOB SERPL ELPH-MCNC: 0.5 G/DL (ref 0.1–0.4)
ALPHA2 GLOB SERPL ELPH-MCNC: 1 G/DL (ref 0.5–0.9)
ANION GAP SERPL CALCULATED.3IONS-SCNC: 10 MMOL/L (ref 9–17)
B-GLOBULIN SERPL ELPH-MCNC: 0.7 G/DL (ref 0.5–1.1)
BASOPHILS # BLD: 0 % (ref 0–2)
BASOPHILS ABSOLUTE: 0 K/UL (ref 0–0.2)
BETA PERCENT: 13 % (ref 11–19)
BUN SERPL-MCNC: 88 MG/DL (ref 8–23)
CALCIUM SERPL-MCNC: 9.5 MG/DL (ref 8.6–10.4)
CASE NUMBER:: NORMAL
CHLORIDE SERPL-SCNC: 98 MMOL/L (ref 98–107)
CO2 SERPL-SCNC: 25 MMOL/L (ref 20–31)
CREAT SERPL-MCNC: 2.38 MG/DL (ref 0.7–1.2)
EOSINOPHILS RELATIVE PERCENT: 0 % (ref 1–4)
GAMMA GLOB SERPL ELPH-MCNC: 0.5 G/DL (ref 0.5–1.5)
GAMMA GLOBULIN %: 9 % (ref 9–20)
GFR SERPL CREATININE-BSD FRML MDRD: 30 ML/MIN/1.73M2
GLUCOSE BLD-MCNC: 175 MG/DL (ref 75–110)
GLUCOSE BLD-MCNC: 181 MG/DL (ref 75–110)
GLUCOSE BLD-MCNC: 208 MG/DL (ref 75–110)
GLUCOSE BLD-MCNC: 225 MG/DL (ref 75–110)
GLUCOSE SERPL-MCNC: 266 MG/DL (ref 70–99)
HCT VFR BLD AUTO: 35.8 % (ref 40.7–50.3)
HGB BLD-MCNC: 11.1 G/DL (ref 13–17)
IMMATURE GRANULOCYTES: 4 %
LYMPHOCYTES # BLD: 39 % (ref 24–44)
MCH RBC QN AUTO: 27.7 PG (ref 25.2–33.5)
MCHC RBC AUTO-ENTMCNC: 31 G/DL (ref 28.4–34.8)
MCV RBC AUTO: 89.3 FL (ref 82.6–102.9)
MICROORGANISM SPEC CULT: NORMAL
MICROORGANISM/AGENT SPEC: NORMAL
MONOCYTES # BLD: 3 % (ref 1–7)
MORPHOLOGY: ABNORMAL
NRBC AUTOMATED: 0.3 PER 100 WBC
OTHER CELLS FLUID: NORMAL %
PATHOLOGIST: ABNORMAL
PATHOLOGIST: NORMAL
PDW BLD-RTO: 15 % (ref 11.8–14.4)
PLATELET # BLD AUTO: 446 K/UL (ref 138–453)
PMV BLD AUTO: 9.6 FL (ref 8.1–13.5)
POTASSIUM SERPL-SCNC: 4.4 MMOL/L (ref 3.7–5.3)
PROT SERPL-MCNC: 5.3 G/DL (ref 6.4–8.3)
PROTEIN ELECTROPHORESIS, SERUM: ABNORMAL
RBC # BLD: 4.01 M/UL (ref 4.21–5.77)
RBC FLUID: NORMAL CELLS/UL
SEG NEUTROPHILS: 54 % (ref 36–66)
SEGMENTED NEUTROPHILS ABSOLUTE COUNT: 10.7 K/UL (ref 1.8–7.7)
SERUM IFX INTERP: NORMAL
SODIUM SERPL-SCNC: 133 MMOL/L (ref 135–144)
SPECIMEN DESCRIPTION: NORMAL
SPECIMEN DESCRIPTION: NORMAL
SPECIMEN TYPE: NORMAL
TOTAL PROT. SUM,%: 100 % (ref 98–102)
TOTAL PROT. SUM: 5.3 G/DL (ref 6.3–8.2)
WBC # BLD AUTO: 19.8 K/UL (ref 3.5–11.3)
WBC FLUID: NORMAL CELLS/UL

## 2023-02-14 PROCEDURE — 6360000002 HC RX W HCPCS: Performed by: NURSE PRACTITIONER

## 2023-02-14 PROCEDURE — 3E0L3GC INTRODUCTION OF OTHER THERAPEUTIC SUBSTANCE INTO PLEURAL CAVITY, PERCUTANEOUS APPROACH: ICD-10-PCS | Performed by: INTERNAL MEDICINE

## 2023-02-14 PROCEDURE — 82947 ASSAY GLUCOSE BLOOD QUANT: CPT

## 2023-02-14 PROCEDURE — 2580000003 HC RX 258: Performed by: NURSE PRACTITIONER

## 2023-02-14 PROCEDURE — 99291 CRITICAL CARE FIRST HOUR: CPT | Performed by: INTERNAL MEDICINE

## 2023-02-14 PROCEDURE — 6370000000 HC RX 637 (ALT 250 FOR IP): Performed by: INTERNAL MEDICINE

## 2023-02-14 PROCEDURE — 6360000002 HC RX W HCPCS: Performed by: STUDENT IN AN ORGANIZED HEALTH CARE EDUCATION/TRAINING PROGRAM

## 2023-02-14 PROCEDURE — 6370000000 HC RX 637 (ALT 250 FOR IP): Performed by: STUDENT IN AN ORGANIZED HEALTH CARE EDUCATION/TRAINING PROGRAM

## 2023-02-14 PROCEDURE — 71045 X-RAY EXAM CHEST 1 VIEW: CPT

## 2023-02-14 PROCEDURE — 2580000003 HC RX 258: Performed by: STUDENT IN AN ORGANIZED HEALTH CARE EDUCATION/TRAINING PROGRAM

## 2023-02-14 PROCEDURE — 80048 BASIC METABOLIC PNL TOTAL CA: CPT

## 2023-02-14 PROCEDURE — 6370000000 HC RX 637 (ALT 250 FOR IP): Performed by: NURSE PRACTITIONER

## 2023-02-14 PROCEDURE — 2000000000 HC ICU R&B

## 2023-02-14 PROCEDURE — 94761 N-INVAS EAR/PLS OXIMETRY MLT: CPT

## 2023-02-14 PROCEDURE — 6370000000 HC RX 637 (ALT 250 FOR IP)

## 2023-02-14 PROCEDURE — 94640 AIRWAY INHALATION TREATMENT: CPT

## 2023-02-14 PROCEDURE — 2580000003 HC RX 258: Performed by: INTERNAL MEDICINE

## 2023-02-14 PROCEDURE — 36415 COLL VENOUS BLD VENIPUNCTURE: CPT

## 2023-02-14 PROCEDURE — 2700000000 HC OXYGEN THERAPY PER DAY

## 2023-02-14 PROCEDURE — 99232 SBSQ HOSP IP/OBS MODERATE 35: CPT | Performed by: INTERNAL MEDICINE

## 2023-02-14 PROCEDURE — 6360000002 HC RX W HCPCS: Performed by: INTERNAL MEDICINE

## 2023-02-14 PROCEDURE — 85025 COMPLETE CBC W/AUTO DIFF WBC: CPT

## 2023-02-14 RX ORDER — HEPARIN SODIUM 5000 [USP'U]/ML
5000 INJECTION, SOLUTION INTRAVENOUS; SUBCUTANEOUS EVERY 8 HOURS SCHEDULED
Status: DISCONTINUED | OUTPATIENT
Start: 2023-02-14 | End: 2023-02-23

## 2023-02-14 RX ORDER — ALPRAZOLAM 0.5 MG/1
0.5 TABLET ORAL NIGHTLY PRN
Status: DISCONTINUED | OUTPATIENT
Start: 2023-02-14 | End: 2023-02-15

## 2023-02-14 RX ORDER — INSULIN LISPRO 100 [IU]/ML
0-16 INJECTION, SOLUTION INTRAVENOUS; SUBCUTANEOUS
Status: DISCONTINUED | OUTPATIENT
Start: 2023-02-14 | End: 2023-02-23

## 2023-02-14 RX ORDER — INSULIN LISPRO 100 [IU]/ML
0-4 INJECTION, SOLUTION INTRAVENOUS; SUBCUTANEOUS NIGHTLY
Status: DISCONTINUED | OUTPATIENT
Start: 2023-02-14 | End: 2023-02-23

## 2023-02-14 RX ADMIN — INSULIN GLARGINE 20 UNITS: 100 INJECTION, SOLUTION SUBCUTANEOUS at 10:55

## 2023-02-14 RX ADMIN — IPRATROPIUM BROMIDE AND ALBUTEROL SULFATE 1 AMPULE: 2.5; .5 SOLUTION RESPIRATORY (INHALATION) at 08:20

## 2023-02-14 RX ADMIN — REPAGLINIDE 1 MG: 0.5 TABLET ORAL at 10:57

## 2023-02-14 RX ADMIN — INSULIN LISPRO 4 UNITS: 100 INJECTION, SOLUTION INTRAVENOUS; SUBCUTANEOUS at 08:22

## 2023-02-14 RX ADMIN — CEFEPIME 2000 MG: 2 INJECTION, POWDER, FOR SOLUTION INTRAVENOUS at 13:41

## 2023-02-14 RX ADMIN — DULOXETINE HYDROCHLORIDE 60 MG: 30 CAPSULE, DELAYED RELEASE ORAL at 20:03

## 2023-02-14 RX ADMIN — PANTOPRAZOLE SODIUM 40 MG: 40 TABLET, DELAYED RELEASE ORAL at 08:12

## 2023-02-14 RX ADMIN — SODIUM CHLORIDE, PRESERVATIVE FREE 10 ML: 5 INJECTION INTRAVENOUS at 08:20

## 2023-02-14 RX ADMIN — TIMOLOL MALEATE 1 DROP: 5 SOLUTION OPHTHALMIC at 08:10

## 2023-02-14 RX ADMIN — REPAGLINIDE 1 MG: 0.5 TABLET ORAL at 08:12

## 2023-02-14 RX ADMIN — REPAGLINIDE 1 MG: 0.5 TABLET ORAL at 16:55

## 2023-02-14 RX ADMIN — HEPARIN SODIUM 5000 UNITS: 5000 INJECTION INTRAVENOUS; SUBCUTANEOUS at 13:48

## 2023-02-14 RX ADMIN — ALBUTEROL SULFATE 2.5 MG: 2.5 SOLUTION RESPIRATORY (INHALATION) at 20:42

## 2023-02-14 RX ADMIN — DICYCLOMINE HYDROCHLORIDE 10 MG: 10 CAPSULE ORAL at 16:56

## 2023-02-14 RX ADMIN — DICYCLOMINE HYDROCHLORIDE 10 MG: 10 CAPSULE ORAL at 20:03

## 2023-02-14 RX ADMIN — ALTEPLASE 10 MG: 2.2 INJECTION, POWDER, LYOPHILIZED, FOR SOLUTION INTRAVENOUS at 13:31

## 2023-02-14 RX ADMIN — DICYCLOMINE HYDROCHLORIDE 10 MG: 10 CAPSULE ORAL at 10:57

## 2023-02-14 RX ADMIN — INSULIN GLARGINE 20 UNITS: 100 INJECTION, SOLUTION SUBCUTANEOUS at 19:59

## 2023-02-14 RX ADMIN — TIMOLOL MALEATE 1 DROP: 5 SOLUTION OPHTHALMIC at 20:04

## 2023-02-14 RX ADMIN — INSULIN LISPRO 4 UNITS: 100 INJECTION, SOLUTION INTRAVENOUS; SUBCUTANEOUS at 12:37

## 2023-02-14 RX ADMIN — METOPROLOL SUCCINATE 25 MG: 25 TABLET, FILM COATED, EXTENDED RELEASE ORAL at 20:03

## 2023-02-14 RX ADMIN — SODIUM CHLORIDE, PRESERVATIVE FREE 10 ML: 5 INJECTION INTRAVENOUS at 20:04

## 2023-02-14 RX ADMIN — ATORVASTATIN CALCIUM 80 MG: 80 TABLET, FILM COATED ORAL at 20:04

## 2023-02-14 RX ADMIN — BRIMONIDINE TARTRATE 1 DROP: 2 SOLUTION OPHTHALMIC at 08:10

## 2023-02-14 RX ADMIN — DORNASE ALFA 5 MG: 1 SOLUTION RESPIRATORY (INHALATION) at 13:32

## 2023-02-14 RX ADMIN — DULOXETINE HYDROCHLORIDE 60 MG: 30 CAPSULE, DELAYED RELEASE ORAL at 08:12

## 2023-02-14 RX ADMIN — FENOFIBRATE 160 MG: 160 TABLET ORAL at 08:13

## 2023-02-14 RX ADMIN — ONDANSETRON 4 MG: 2 INJECTION INTRAMUSCULAR; INTRAVENOUS at 17:35

## 2023-02-14 RX ADMIN — FINASTERIDE 5 MG: 5 TABLET, FILM COATED ORAL at 08:12

## 2023-02-14 RX ADMIN — HEPARIN SODIUM 5000 UNITS: 5000 INJECTION INTRAVENOUS; SUBCUTANEOUS at 21:38

## 2023-02-14 RX ADMIN — DICYCLOMINE HYDROCHLORIDE 10 MG: 10 CAPSULE ORAL at 08:12

## 2023-02-14 RX ADMIN — BRIMONIDINE TARTRATE 1 DROP: 2 SOLUTION OPHTHALMIC at 20:04

## 2023-02-14 RX ADMIN — CEFEPIME 2000 MG: 2 INJECTION, POWDER, FOR SOLUTION INTRAVENOUS at 01:31

## 2023-02-14 RX ADMIN — ALPRAZOLAM 0.5 MG: 0.5 TABLET ORAL at 23:54

## 2023-02-14 RX ADMIN — AMLODIPINE BESYLATE 5 MG: 5 TABLET ORAL at 08:12

## 2023-02-14 ASSESSMENT — PAIN SCALES - GENERAL
PAINLEVEL_OUTOF10: 0

## 2023-02-14 ASSESSMENT — ENCOUNTER SYMPTOMS
COUGH: 0
ABDOMINAL PAIN: 0
SHORTNESS OF BREATH: 0

## 2023-02-14 NOTE — PROGRESS NOTES
Renal Progress Note    Patient :  Cruzito Pittman; 67 y.o. MRN# [id number]  Location:  3016/3016-01  Attending:  Adrian La MD  Admit Date:  2/12/2023   Hospital Day: 2    Subjective:     Patient was seen and examined. No new issues overnight.     Urine output documented as about 1.8 L in the last 24 hours.  Chest x-ray from today 2/14/2023 showed interval placement of a left chest tube with reduced left pleural fluid.  Patient did receive left-sided chest tube 2/13/2023.    BMP results from today showed sodium 133, potassium 4.4, chloride 98, bicarb 25, calcium 9.5, BUN 88, creatinine stable at 2.38 mg/dl.  His baseline creatinine seems to be around 1.9-2.4 mg/dl.  Outpatient Medications:     Medications Prior to Admission: amLODIPine (NORVASC) 5 MG tablet, TAKE ONE TABLET BY MOUTH DAILY  omeprazole (PRILOSEC) 20 MG delayed release capsule, Take 20 mg by mouth Daily  fluticasone-salmeterol (ADVAIR DISKUS) 250-50 MCG/ACT AEPB diskus inhaler, Inhale 1 puff into the lungs every 12 hours  metoprolol succinate (TOPROL XL) 25 MG extended release tablet, Take 25 mg by mouth at bedtime  dutasteride (AVODART) 0.5 mg capsule, Take 0.5 mg by mouth daily  finasteride (PROSCAR) 5 MG tablet, Take 5 mg by mouth daily  Misc. Devices (NASAL SPRAY BOTTLE) MISC, by Does not apply route  CINNAMON PO, Take by mouth  repaglinide (PRANDIN) 1 MG tablet, Take 1 mg by mouth 3 times daily (before meals)   pimecrolimus (ELIDEL) 1 % cream, Apply topically as needed Apply topically 2 times daily.  brimonidine-timolol (COMBIGAN) 0.2-0.5 % ophthalmic solution, Place 1 drop into the right eye every 12 hours  albuterol sulfate  (90 BASE) MCG/ACT inhaler, Inhale 1 puff into the lungs in the morning and at bedtime  Liraglutide (VICTOZA SC), Inject 1.8 mg into the skin daily   fenofibrate (TRICOR) 145 MG tablet, Take 145 mg by mouth 2 times daily.  dicyclomine (BENTYL) 10 MG capsule, Take 10 mg by mouth 4 times daily (before meals and nightly)  atorvastatin (LIPITOR) 80 MG tablet, Take 80 mg by mouth daily. DULoxetine (CYMBALTA) 60 MG capsule, Take 60 mg by mouth 2 times daily. Current Medications:     Scheduled Meds:    insulin lispro  0-16 Units SubCUTAneous TID WC    insulin lispro  0-4 Units SubCUTAneous Nightly    [Held by provider] furosemide  40 mg IntraVENous BID    cefepime  2,000 mg IntraVENous Q12H    amLODIPine  5 mg Oral Daily    atorvastatin  80 mg Oral Daily    DULoxetine  60 mg Oral BID    dicyclomine  10 mg Oral 4x Daily AC & HS    finasteride  5 mg Oral Daily    fenofibrate  160 mg Oral Daily    metoprolol succinate  25 mg Oral Nightly    pantoprazole  40 mg Oral QAM AC    repaglinide  1 mg Oral TID AC    brimonidine  1 drop Right Eye BID    And    timolol  1 drop Right Eye BID    insulin glargine  20 Units SubCUTAneous BID    enoxaparin  30 mg SubCUTAneous BID    lidocaine  20 mL IntraDERmal Once    sodium chloride flush  5-40 mL IntraVENous 2 times per day    ipratropium-albuterol  1 ampule Inhalation Q4H WA    vancomycin  1,000 mg IntraVENous Q24H    vancomycin (VANCOCIN) intermittent dosing (placeholder)   Other RX Placeholder     Continuous Infusions:    dextrose      sodium chloride       PRN Meds:  glucose, dextrose bolus **OR** dextrose bolus, glucagon (rDNA), dextrose, sodium chloride flush, sodium chloride, ondansetron **OR** ondansetron, polyethylene glycol, acetaminophen **OR** acetaminophen, albuterol    Input/Output:       I/O last 3 completed shifts: In: 65 [P.O.:530]  Out: 3150 [Urine:2350; Chest Tube:800].     Patient Vitals for the past 96 hrs (Last 3 readings):   Weight   23 0600 231 lb 11.3 oz (105.1 kg)   23 0519 231 lb 7.7 oz (105 kg)       Vital Signs:   Temperature:  Temp: 97.9 °F (36.6 °C)  TMax:   Temp (24hrs), Av.3 °F (36.8 °C), Min:97.9 °F (36.6 °C), Max:98.6 °F (37 °C)    Respirations:  Resp: 28  Pulse:   Heart Rate: 95  BP:    BP: 121/85  BP Range: Systolic (74QUL), DVN:695 , Min:100 , FFS:158       Diastolic (81SHZ), DFX:19, Min:82, Max:115      Physical Examination:     General:  AAO x 3, speaking in full sentences, no accessory muscle use. HEENT: Atraumatic, normocephalic, no throat congestion, moist mucosa. Eyes:   Pupils equal, round and reactive to light, EOMI. Neck:   Supple  Chest:   Bilateral vesicular breath sounds, no rales or wheezes. Cardiac:  S1 S2 RR, no murmurs, gallops or rubs. Abdomen: Soft, non-tender, no masses or organomegaly, BS audible. :   No suprapubic or flank tenderness. Neuro:  AAO x 3, No FND. SKIN:  No rashes, good skin turgor. Extremities:  No edema. Labs:       Recent Labs     02/12/23  1230 02/13/23  0752 02/14/23  0534   WBC 22.5* 18.1* 19.8*   RBC 4.59 4.10* 4.01*   HGB 12.8* 11.5* 11.1*   HCT 39.1* 36.5* 35.8*   MCV 85.2 89.0 89.3   MCH 28.0 28.0 27.7   MCHC 32.8 31.5 31.0   RDW 15.1 14.9* 15.0*   * 459* 446   MPV 7.5 9.6 9.6      BMP:   Recent Labs     02/13/23  1336 02/13/23  1814 02/14/23  0534    134* 133*   K 5.0 4.9 4.4   CL 98 97* 98   CO2 24 26 25   BUN 87* 88* 88*   CREATININE 2.51* 2.35* 2.38*   GLUCOSE 322* 269* 266*   CALCIUM 9.9 10.1 9.5      Magnesium:    Recent Labs     02/13/23  0752   MG 2.9*     Albumin:    Recent Labs     02/13/23  1336   LABALBU 3.0*     SPEP:  Lab Results   Component Value Date/Time    PROT 6.5 02/13/2023 01:36 PM    PROT 6.4 05/29/2012 10:35 AM    ALBCAL 2.6 02/13/2023 03:07 AM    ALBPCT 50 02/13/2023 03:07 AM    LABALPH 0.5 02/13/2023 03:07 AM    LABALPH 1.0 02/13/2023 03:07 AM    A1PCT 8 02/13/2023 03:07 AM    A2PCT 20 02/13/2023 03:07 AM    LABBETA 0.7 02/13/2023 03:07 AM    BETAPCT 13 02/13/2023 03:07 AM    GAMGLOB 0.5 02/13/2023 03:07 AM    GGPCT 9 02/13/2023 03:07 AM    PATH ELECTRONICALLY SIGNED. Dominic Keith M.D. 02/13/2023 03:07 AM    PATH ELECTRONICALLY SIGNED.  Dominic Keith M.D. 02/13/2023 03:07 AM     C3:     Lab Results   Component Value Date/Time    C3 102 05/29/2012 10:35 AM     C4:     Lab Results   Component Value Date/Time    C4 14 05/29/2012 10:35 AM     Hep BsAg:         Lab Results   Component Value Date/Time    HEPBSAG NONREACTIVE 05/29/2012 10:35 AM     Hep C AB:          Lab Results   Component Value Date/Time    HEPCAB NONREACTIVE 05/29/2012 10:35 AM       Urinalysis/Chemistries:      Lab Results   Component Value Date/Time    NITRU NEGATIVE 02/13/2023 02:17 AM    COLORU Yellow 02/13/2023 02:17 AM    PHUR 5.0 02/13/2023 02:17 AM    WBCUA 2 TO 5 02/13/2023 02:17 AM    RBCUA 0 TO 2 02/13/2023 02:17 AM    MUCUS 1+ 02/13/2023 02:17 AM    SPECGRAV 1.021 02/13/2023 02:17 AM    LEUKOCYTESUR NEGATIVE 02/13/2023 02:17 AM    UROBILINOGEN Normal 02/13/2023 02:17 AM    BILIRUBINUR NEGATIVE 02/13/2023 02:17 AM    BILIRUBINUR NEGATIVE 04/03/2012 11:08 AM    GLUCOSEU 2+ 02/13/2023 02:17 AM    GLUCOSEU NEGATIVE 04/03/2012 11:08 AM    KETUA NEGATIVE 02/13/2023 02:17 AM    AMORPHOUS 1+ 02/13/2023 02:17 AM     Urine Sodium:     Lab Results   Component Value Date/Time    RUBY 43 02/13/2023 10:18 AM     Urine Potassium:    Lab Results   Component Value Date/Time    KUR 22.8 02/13/2023 10:18 AM     Urine Chloride:    Lab Results   Component Value Date/Time    CLUR 54 02/13/2023 10:18 AM     Urine Osmolarity:   Lab Results   Component Value Date/Time    OSMOU 468 02/13/2023 10:18 AM     Urine Creatinine:     Lab Results   Component Value Date/Time    LABCREA 45.3 02/13/2023 10:18 AM     Radiology:     Reviewed. Assessment:     1. CKD stage IIIb, with baseline creatinine now seems to be around 1.9-2.4 mg/dl range since August 2022 follows up with Dr. Scott Acosta, secondary to diabetic and hypertensive nephrosclerosis, had AIN in June 2022 received steroids for 3 months. 2.  Acute hypoxic respiratory failure, requiring on invasive positive pressure ventilation. 3.  Pneumonia, left-sided multiloculated  4.   Left-sided moderate to large amount multiloculated pleural effusion, status post chest tube placement 2/13/2023.  5.  Hyperkalemia-likely due to dietary reasons in the setting of advanced kidney disease. 6.  Hyponatremia - likely secondary to hyperglycemia  7. Diabetes mellitus  8. History of Non-Hodgkin's lymphoma  9. History of sarcoidosis  10. CLAY  11. COPD     Plan:   No acute need for diuretics currently. Continue to monitor strict I's and O's renal function. Pneumonia/pleural effusion management as per primary, antibiotics as per infectious diseases per renal dosing. Since renal function is stable at baseline, nephrology signed off. Please call with any other questions. Patient should get BMP in 1 week post discharge and results faxed to Dr. Sophia Nava at [phone]. Follow-up with Dr. Sophia Nava in 3 to 4 weeks postdischarge. Nutrition   Please ensure that patient is on a renal diet/TF. Avoid nephrotoxic drugs/contrast exposure. Harris Ruano MD  [facility]     This note is created with the assistance of a speech-recognition program. While intending to generate a document that actually reflects the content of the visit, no guarantees can be provided that every mistake has been identified and corrected by editing.

## 2023-02-14 NOTE — CARE COORDINATION
Transitional planning. Plan is home w/ [facility]- referral sent. 5L NC, L post CT. PT/OT ordered. Wife will transport, has established PCP. [address] Nancy with [facility] called, pt's insurance is showing Medicare and Darian Hiraws are primary insurance. She provided Medicare number for pt's wife to call and inform that Darian Iniguezws is primary.  Provided number [phone] to pt's wife

## 2023-02-14 NOTE — PROGRESS NOTES
SPIRITUAL CARE DEPARTMENT - [facility] [address]  PROGRESS NOTE    Shift date: 2/14/2023  Shift day: Tuesday   Shift # 1    Room # 4404/3636-10   Name: Phyllis Horner                Adventism: Unknown   Place of Latter-day: Unknown    Referral: Routine Visit    Admit Date & Time: 2/12/2023 10:33 PM    Assessment:  Phyllis Horner is a 74 y.o. male in the hospital because pneumonia, unspecified organism. Upon entering the room writer observes patient is sitting up in the bed. Patient does not appear to be in any discomfort. Intervention:  Writer introduced self and title as  Writer offered space for patient  to express feelings, needs, and concerns and provided a ministry presence. Patient said that he has been receiving excellent care and is very grateful. Patient says everyone has even been taking care of his wife and family. Patient mentioned that it was Saturday or Sunday that the went unconscious. He knows he went to [facility], but woke up here at [address]. He is pleased with the medical team making a plan for his care. Outcome:  Patient is grateful for the  for visiting. Patient said that chaplains can pop in anytime they are able. Plan:  Chaplains will remain available to offer spiritual and emotional support as needed. Electronically signed by Garrett Durham, on 2/14/2023 at 12:16 PM.  [facility]  [phone]       02/14/23 1214   Encounter Summary   Service Provided For: Patient   Referral/Consult From: [address] Family members;Spouse   Last Encounter  02/14/23   Complexity of Encounter Low   Begin Time 1115   End Time  1120   Total Time Calculated 5 min   Assessment/Intervention/Outcome   Assessment Calm;Coping; Hopeful   Intervention Sustaining Presence/Ministry of presence;Prayer (assurance of)/Wells; Active listening   Outcome Connection/Belonging;Encouraged;Expressed Gratitude

## 2023-02-14 NOTE — PLAN OF CARE
Problem: Discharge Planning  Goal: Discharge to home or other facility with appropriate resources  2/13/2023 2114 by David Goode RN  Outcome: Progressing  Flowsheets (Taken 2/13/2023 0900 by Nelsy Weiner RN)  Discharge to home or other facility with appropriate resources:   Identify barriers to discharge with patient and caregiver   Identify discharge learning needs (meds, wound care, etc)   Refer to discharge planning if patient needs post-hospital services based on physician order or complex needs related to functional status, cognitive ability or social support system  2/13/2023 0853 by Nelsy Weiner RN  Outcome: Progressing     Problem: Safety - Adult  Goal: Free from fall injury  2/13/2023 2114 by David Goode RN  Outcome: Progressing  [address] (Taken 2/13/2023 2112)  Free From Fall Injury: Instruct family/caregiver on patient safety  2/13/2023 0853 by Nelsy Weiner RN  Outcome: Progressing  [address] (Taken 2/13/2023 0444 by Galileo Lazcano RN)  Free From Fall Injury: Instruct family/caregiver on patient safety     Problem: ABCDS Injury Assessment  Goal: Absence of physical injury  2/13/2023 2114 by David Goode RN  Outcome: Progressing  Flowsheets (Taken 2/13/2023 2112)  Absence of Physical Injury: Implement safety measures based on patient assessment  2/13/2023 0853 by Nelsy Weiner RN  Outcome: Progressing  Flowsheets (Taken 2/13/2023 0444 by Galileo Lazcano RN)  Absence of Physical Injury: Implement safety measures based on patient assessment     Problem: Skin/Tissue Integrity  Goal: Absence of new skin breakdown  Description: 1. Monitor for areas of redness and/or skin breakdown  2. Assess vascular access sites hourly  3. Every 4-6 hours minimum:  Change oxygen saturation probe site  4. Every 4-6 hours:  If on nasal continuous positive airway pressure, respiratory therapy assess nares and determine need for appliance change or resting period.   2/13/2023 2114 by David Goode RN  Outcome: Progressing  2/13/2023 0853 by Santiago Cheung RN  Outcome: Progressing     Problem: Chronic Conditions and Co-morbidities  Goal: Patient's chronic conditions and co-morbidity symptoms are monitored and maintained or improved  Outcome: Progressing  Flowsheets (Taken 2/13/2023 0900 by Santiago Cheung RN)  Care Plan - Patient's Chronic Conditions and Co-Morbidity Symptoms are Monitored and Maintained or Improved: Monitor and assess patient's chronic conditions and comorbid symptoms for stability, deterioration, or improvement     Problem: Respiratory - Adult  Goal: Achieves optimal ventilation and oxygenation  2/13/2023 2114 by Nicolle Pacheco RN  Outcome: Progressing  2/13/2023 1158 by Mayda Olea RCP  Outcome: Progressing

## 2023-02-14 NOTE — PROGRESS NOTES
Physical Therapy          Physical Therapy Cancel Note      DATE: 2023    NAME: Simran Dang  MRN: [id number]   : 1955      Patient not seen this date for Physical Therapy due to:    Patient Declined: Pt stated he had been up to chair for hours, just retired to bed, too tired to get up at this time.  Therapy will resume 2/15/23      Electronically signed by Kendra Fraga PTA on 2023 at 4:04 PM

## 2023-02-14 NOTE — ACP (ADVANCE CARE PLANNING)
Advance Care Planning     Advance Care Planning Activator (Inpatient)  Conversation Note      Date of ACP Conversation: 2/14/2023     Conversation Conducted with:  Healthcare Decision Maker: Next of Kin by law (only applies in absence of above) (name) Bree Shows    ACP Activator: Buddy Dimas RN        Health Care Decision Maker:     Current Designated Health Care Decision Maker:     Click here to complete Healthcare Decision Makers including section of the Healthcare Decision Maker Relationship (ie \"Primary\")      Care Preferences    Ventilation: \"If you were in your present state of health and suddenly became very ill and were unable to breathe on your own, what would your preference be about the use of a ventilator (breathing machine) if it were available to you? \"      Would the patient desire the use of ventilator (breathing machine)?: yes    \"If your health worsens and it becomes clear that your chance of recovery is unlikely, what would your preference be about the use of a ventilator (breathing machine) if it were available to you? \"     Would the patient desire the use of ventilator (breathing machine)?: Yes      Resuscitation  \"CPR works best to restart the heart when there is a sudden event, like a heart attack, in someone who is otherwise healthy. Unfortunately, CPR does not typically restart the heart for people who have serious health conditions or who are very sick. \"    \"In the event your heart stopped as a result of an underlying serious health condition, would you want attempts to be made to restart your heart (answer \"yes\" for attempt to resuscitate) or would you prefer a natural death (answer \"no\" for do not attempt to resuscitate)? \" yes       [x] Yes   [] No   Educated Patient / Delbert Pineda regarding differences between Advance Directives and portable DNR orders.     Length of ACP Conversation in minutes:      Conversation Outcomes:  [x] ACP discussion completed  [] Existing advance directive reviewed with patient; no changes to patient's previously recorded wishes  [] New Advance Directive completed  [] Portable Do Not Rescitate prepared for Provider review and signature  [] POLST/POST/MOLST/MOST prepared for Provider review and signature      Follow-up plan:    [] Schedule follow-up conversation to continue planning  [] Referred individual to Provider for additional questions/concerns   [x] Advised patient/agent/surrogate to review completed ACP document and update if needed with changes in condition, patient preferences or care setting    [] This note routed to one or more involved healthcare providers

## 2023-02-14 NOTE — RESEARCH
Clinical Research Services  2/14/2023  12:06 PM    Subject: 01RADHA- Marcial De La Garza evaluated via chart review follow up for [employer] AB-PSP-002 A Multicenter, Prospective, Ycwiktuyz-ohgjci-pgcneoz Observational Study Evaluating Immunoassay Measurements of Pancreatic Stone Protein Performed on [employer]'s abioSCOPE Device with the PSP Assay on ICU Patients at Risk of Sepsis as an Aid in Identifying Sepsis. IRB #  [id number]                     NCT # [id number]    Day 2 assessment completed as applicable for [id number]    SOFA score: 6     Protocol appendix 1 Susanna Harvey 2016) clinical definition met? No .      Protocol defined events to report? No  will continue to monitor. Protocol defined concomitant medications reviewed and updated as appropriate. Please reach out to the primary investigator Dr. Jocelyne Hatchet via perfect serve or clinical research at [phone] with any questions or concerns regarding this research for [employer] participation.         Prashant Hanley RN  Clinical Research Nurse  Infectious Disease Research  [address] [place] [address], [facility] Anibal Tolbert  P: [phone]  F: [phone]

## 2023-02-14 NOTE — PLAN OF CARE
Problem: Respiratory - Adult  Goal: Achieves optimal ventilation and oxygenation  2/14/2023 0826 by Burak Miner RCP  Outcome: Progressing   BRONCHOSPASM/BRONCHOCONSTRICTION     [x]         IMPROVE AERATION/BREATH SOUNDS  [x]   ADMINISTER BRONCHODILATOR THERAPY AS APPROPRIATE  [x]   ASSESS BREATH SOUNDS  []   IMPLEMENT AEROSOL/MDI PROTOCOL  [x]   PATIENT EDUCATION AS NEEDED

## 2023-02-14 NOTE — PLAN OF CARE
Med pended Problem: Discharge Planning  Goal: Discharge to home or other facility with appropriate resources  Outcome: Progressing     Problem: Safety - Adult  Goal: Free from fall injury  Outcome: Progressing     Problem: ABCDS Injury Assessment  Goal: Absence of physical injury  Outcome: Progressing     Problem: Skin/Tissue Integrity  Goal: Absence of new skin breakdown  Outcome: Progressing     Problem: Chronic Conditions and Co-morbidities  Goal: Patient's chronic conditions and co-morbidity symptoms are monitored and maintained or improved  Outcome: Progressing     Problem: Respiratory - Adult  Goal: Achieves optimal ventilation and oxygenation  2/14/2023 1508 by Maria Elena Peace RN  Outcome: Progressing  2/14/2023 0826 by Kyle De La Rosa RCP  Outcome: Progressing

## 2023-02-14 NOTE — PROGRESS NOTES
INTENSIVE CARE UNIT  Resident Physician Progress Note    Patient - Walden Gosselin  Date of Admission -  2/12/2023 10:33 PM  Date of Evaluation -  2/14/2023  Room and Bed Number -  7961/8503-11   Hospital Day - 2      SUBJECTIVE:     OVERNIGHT EVENTS: Placed chest tube yesterday, weaned off high flow, feeling \"100 x better\". No nursing concerns     HOSPITAL COURSE: Walden Gosselin is a 74 y.o. male with past medical history significant for type 2 diabetes mellitus, multiple myeloma, non-Hodgkin lymphoma, sarcoidosis, referred from [facility] ER with worsening respiratory status, shortness of breath, nonproductive cough. He was started on BiPAP and given antibiotics. Chest x-ray was concerning for loculated left-sided pleural effusion. Transfer was made and accepted by hospitalist, further work-up. Critical care was consulted for worsening respiratory status. Patient was on high flow and respiratory rate was in 38-40. Patient was very dyspneic and having difficulty breathing with accessory muscle use. Initially he was unable to keep BiPAP but later on able to keep BiPAP. He improved symptomatically with BiPAP and respiratory rate was in 26. Patient is transferred to the ICU for higher level of care. 2/13: Attempted to perform bedside thoracentesis, no clear pocket identified, sent to IR, chest tube placed, appears that an empyema was encountered and chest tube was placed growing gram positive cocci in pairs, on cefepime and vanco      REVIEW OF SYSTEMS:  Review of Systems   Constitutional:  Negative for appetite change. Respiratory:  Negative for cough and shortness of breath. Gastrointestinal:  Negative for abdominal pain. Genitourinary:  Negative for difficulty urinating. Skin:  Positive for wound.        OBJECTIVE:     VITAL SIGNS:  BP (!) 135/90   Pulse 93   Temp 98.6 °F (37 °C) (Oral)   Resp 25   Wt 231 lb 11.3 oz (105.1 kg)   SpO2 99%   BMI 30.57 kg/m²   Tmax over 24 hours: Temp (24hrs), Av.3 °F (36.8 °C), Min:98.1 °F (36.7 °C), Max:98.6 °F (37 °C)      Patient Vitals for the past 8 hrs:   BP Temp Temp src Pulse Resp SpO2 Weight   23 0600 (!) 135/90 -- -- 93 25 99 % 231 lb 11.3 oz (105.1 kg)   23 0500 132/83 -- -- 94 24 99 % --   23 0400 122/84 98.6 °F (37 °C) Oral 96 26 96 % --   23 0300 132/87 -- -- 95 25 99 % --   23 0200 115/84 98.3 °F (36.8 °C) Oral 95 25 93 % --   23 0100 (!) 135/90 -- -- 96 25 96 % --   23 0000 100/88 98.1 °F (36.7 °C) Oral 98 24 98 % --         Intake/Output Summary (Last 24 hours) at 2023 0701  Last data filed at 2023 0416  Gross per 24 hour   Intake 530 ml   Output 2280 ml   Net -1750 ml     Date 23 0000 - 23 2359   Shift 8802-9293 4718-8709 6778-3292 24 Hour Total   INTAKE   Shift Total(mL/kg)       OUTPUT   Urine(mL/kg/hr) 800   800   Shift Total(mL/kg) 800(7.6)   800(7.6)   Weight (kg) 105.1 105.1 105.1 105.1     Wt Readings from Last 3 Encounters:   23 231 lb 11.3 oz (105.1 kg)   23 245 lb (111.1 kg)   22 242 lb 11.2 oz (110.1 kg)     Body mass index is 30.57 kg/m². PHYSICAL EXAM:   Physical Exam  Constitutional:       General: He is not in acute distress. Appearance: He is obese. He is not ill-appearing. Cardiovascular:      Rate and Rhythm: Normal rate and regular rhythm. Heart sounds: No murmur heard. Pulmonary:      Effort: No respiratory distress. Breath sounds: Wheezing present. Chest:      Chest wall: Tenderness present. Abdominal:      Tenderness: There is no abdominal tenderness. There is no guarding. Musculoskeletal:      Right lower leg: No edema. Left lower leg: No edema. Skin:     General: Skin is warm and dry. Capillary Refill: Capillary refill takes less than 2 seconds. Neurological:      General: No focal deficit present. Mental Status: He is oriented to person, place, and time. MEDICATIONS:  Scheduled Meds:   [Held by provider] furosemide  40 mg IntraVENous BID    cefepime  2,000 mg IntraVENous Q12H    amLODIPine  5 mg Oral Daily    atorvastatin  80 mg Oral Daily    DULoxetine  60 mg Oral BID    dicyclomine  10 mg Oral 4x Daily AC & HS    finasteride  5 mg Oral Daily    fenofibrate  160 mg Oral Daily    metoprolol succinate  25 mg Oral Nightly    pantoprazole  40 mg Oral QAM AC    repaglinide  1 mg Oral TID AC    brimonidine  1 drop Right Eye BID    And    timolol  1 drop Right Eye BID    insulin lispro  0-8 Units SubCUTAneous TID WC    insulin lispro  0-4 Units SubCUTAneous Nightly    insulin glargine  20 Units SubCUTAneous BID    enoxaparin  30 mg SubCUTAneous BID    lidocaine  20 mL IntraDERmal Once    sodium chloride flush  5-40 mL IntraVENous 2 times per day    ipratropium-albuterol  1 ampule Inhalation Q4H WA    vancomycin  1,000 mg IntraVENous Q24H    vancomycin (VANCOCIN) intermittent dosing (placeholder)   Other RX Placeholder     Continuous Infusions:   dextrose      sodium chloride       PRN Meds:   glucose, 4 tablet, PRN  dextrose bolus, 125 mL, PRN   Or  dextrose bolus, 250 mL, PRN  glucagon (rDNA), 1 mg, PRN  dextrose, , Continuous PRN  sodium chloride flush, 5-40 mL, PRN  sodium chloride, , PRN  ondansetron, 4 mg, Q8H PRN   Or  ondansetron, 4 mg, Q6H PRN  polyethylene glycol, 17 g, Daily PRN  acetaminophen, 650 mg, Q6H PRN   Or  acetaminophen, 650 mg, Q6H PRN  albuterol, 2.5 mg, Q2H PRN        SUPPORT DEVICES: [x] Nasal Cannula 6 L       Additional Respiratory Assessments  Heart Rate: 93  Resp: 25  SpO2: 99 %  Humidification Source: Heated wire  Humidification Temp: 34        DATA:  Complete Blood Count:   Recent Labs     02/12/23  1230 02/13/23  0752 02/14/23  0534   WBC 22.5* 18.1* PENDING   RBC 4.59 4.10* 4.01*   HGB 12.8* 11.5* 11.1*   HCT 39.1* 36.5* 35.8*   MCV 85.2 89.0 89.3   MCH 28.0 28.0 27.7   MCHC 32.8 31.5 31.0   RDW 15.1 14.9* 15.0*   * 459* PENDING   MPV 7.5 9.6 PENDING        Last 3 Blood Glucose:   Recent Labs     02/12/23  1230 02/13/23  0307 02/13/23  0752 02/13/23  1336 02/13/23  1814 02/14/23  0534   GLUCOSE 394* 301* 357* 322* 269* 266*        PT/INR:  No results found for: PROTIME, INR  PTT:  No results found for: APTT, PTT    Comprehensive Metabolic Profile:   Recent Labs     02/13/23  0307 02/13/23  0752 02/13/23  1336 02/13/23  1814 02/14/23  0534   *   < > 135 134* 133*   K 5.4*   < > 5.0 4.9 4.4   CL 97*   < > 98 97* 98   CO2 22   < > 24 26 25   BUN 83*   < > 87* 88* 88*   CREATININE 2.38*   < > 2.51* 2.35* 2.38*   GLUCOSE 301*   < > 322* 269* 266*   CALCIUM 9.5   < > 9.9 10.1 9.5   PROT 5.3*  --  6.5  --   --    LABALBU  --   --  3.0*  --   --    BILITOT  --   --  0.6  --   --    AST  --   --  29  --   --    ALT  --   --  18  --   --     < > = values in this interval not displayed.       Magnesium:   Lab Results   Component Value Date/Time    MG 2.9 02/13/2023 07:52 AM    MG 1.9 10/12/2020 12:00 AM    MG 1.9 03/04/2020 12:00 AM     Phosphorus:   Lab Results   Component Value Date/Time    PHOS 2.5 03/04/2020 12:00 AM    PHOS 2.7 08/19/2019 12:00 AM     Ionized Calcium:   Lab Results   Component Value Date/Time    CAION 5.19 04/03/2012 11:08 AM        Urinalysis:   Lab Results   Component Value Date/Time    NITRU NEGATIVE 02/13/2023 02:17 AM    COLORU Yellow 02/13/2023 02:17 AM    PHUR 5.0 02/13/2023 02:17 AM    WBCUA 2 TO 5 02/13/2023 02:17 AM    RBCUA 0 TO 2 02/13/2023 02:17 AM    MUCUS 1+ 02/13/2023 02:17 AM    SPECGRAV 1.021 02/13/2023 02:17 AM    LEUKOCYTESUR NEGATIVE 02/13/2023 02:17 AM    UROBILINOGEN Normal 02/13/2023 02:17 AM    BILIRUBINUR NEGATIVE 02/13/2023 02:17 AM    BILIRUBINUR NEGATIVE 04/03/2012 11:08 AM    GLUCOSEU 2+ 02/13/2023 02:17 AM    GLUCOSEU NEGATIVE 04/03/2012 11:08 AM    KETUA NEGATIVE 02/13/2023 02:17 AM    AMORPHOUS 1+ 02/13/2023 02:17 AM       HgBA1c:    Lab Results   Component Value Date/Time    LABA1C 8.2 2023 03:07 AM     TSH:  No results found for: TSH  Lactic Acid: No results found for: LACTA   Troponin: No results for input(s): TROPONINI in the last 72 hours.       Radiology/Imagin/13 CT chest showed moderate to large amount of multiloculated left pleural effusion    ASSESSMENT:     Patient Active Problem List    Diagnosis Date Noted    Hyperkalemia 2023    Acute respiratory failure with hypoxia (HCC) 2023    Hyponatremia 2023    Hyperglycemia 2023    History of non-Hodgkin's lymphoma 2023    History of sarcoidosis 2023    Pneumonia due to infectious organism 2023    Pneumonia, unspecified organism 2023    Pleural effusion 2023    Acute interstitial nephritis 2022    ARF (acute renal failure) ([facility] 2022    Abnormal echocardiogram 08/15/2018    Dilated aortic root ([address] 08/15/2018    Essential hypertension 2017    AAA (abdominal aortic aneurysm) without rupture 2017    Family history of abdominal aortic aneurysm (AAA) 2017    Hyperlipidemia 2016    Sarcoidosis 2016    Non Hodgkin's lymphoma ([facility] [address] 2016    Type 2 diabetes mellitus with diabetic chronic kidney disease ([address] 2015    Non-rheumatic mitral regurgitation 2015    Nonrheumatic aortic valve insufficiency 2015    Pulmonary hypertension ([address] 2015    DM (diabetes mellitus) ([address] 2014    CKD (chronic kidney disease), stage III ([address] 10/28/2014    Nephrolithiasis 10/28/2014          PLAN:   Potential Discharge from the ICU     HOME MEDS RECONCILED: Yes    CONSULTATION NEEDED:  No    FAMILY UPDATED:    Yes    TRANSFER OUT OF ICU:   Potential         Additional Assessment:  Principal Problem:    Pneumonia, unspecified organism  Active Problems:    Pleural effusion    Hyperkalemia    Acute respiratory failure with hypoxia (HCC)    Hyponatremia    Hyperglycemia    History of non-Hodgkin's lymphoma    History of sarcoidosis  Resolved Problems:    * No resolved hospital problems.  *             Plan:  Neuro:  No deficits   Sedation: None  Pain control: Tylenol PRN    CV:  Telemetry  BP and HR normal - tachycardic yesterday, resolved overnight   Amlodipine 5 mg daily  Metoprolol 25 mg nightly    Heme:  H&H 11.1  Platelets 726    Resp:  RR and SpO2 25 99% on 6L NC  Weaned off Hi-Gennaro  Went for thoracentesis yesterday, 10 Slovak chest tube placed, 800 cc out, on continuous wall suction   Thoracentesis results: Glucose 33, pH 4.0, LDH 6590, culture showing many gram-positive cocci in pairs  Repeat CXR today     /Fluids/Electrolytes:  BUN 88, Cr 2.38 (stable)  Monitor UO - 530/1850  Daily BMP: yes  Electrolytes: Sodium 133, otherwise stable  Fluids - None    GI/Nutrition:  Adult diabetic diet  Bowel regimen: PRN    ID:  Cefepime and Vanco    Endo:  Monitor glucose - running high   Lantus 20 units nightly   Switched to high dose sliding scale, diet changed to carb restricted       Musculoskeletal:  Injuries: None  Restrictions: Left sided chest tube     Lines/Drains:  Left chest tube   Peripheral IV    Prophylaxis:  DVT: Lovenox 30 mg BID, SCD  GI: Protonix 40 mg    Dispo:  Potential transfer       Jeannette Gaspar MD  EM Resident PGY-3  Intensive Care Unit  2/14/2023 7:01 AM

## 2023-02-14 NOTE — PROGRESS NOTES
2Attestation signed by      Attending Physician Statement:    I have discussed the care of  Gera Wheat , including pertinent history and exam findings, with the Cardiology fellow/resident. I have seen and examined the patient and the key elements of all parts of the encounter have been performed by me. I agree with the assessment, plan and orders as documented by the fellow/resident, after I modified exam findings and plan of treatments, and the final version is my approved version of the assessment. Additional Comments: The patient was seen and examined agree with below evaluation and plan. Shortness of breath due to pneumonia and effusion. No chest pain. Elevated troponin most likely due to type II from CKD and sepsis. We will obtain an echo. Continue to medical treatment per primary service. We will follow. [facility] Cardiology    Consult / H&P               Today's Date: 2/14/2023  Patient Name: Gera Wheat  Date of admission: 2/12/2023 10:33 PM  Patient's age: 74 y.o., 1955  Admission Dx: Pneumonia, unspecified organism [J18.9]  Bilateral pleural effusion [J90]    Reason for Consult:  Cardiac evaluation    Requesting Physician: Hung Chavez MD    CHIEF COMPLAINT: Shortness of breath, respiratory distress    History Obtained From:  electronic medical record    INTERVAL HISTORY    Patient seen and examined bedside. History bedside thoracentesis was attempted but no clear pocket of fluid was identified. IR ended up placing chest tube, 10 cc of purulent fluid taken out, exudative patient on antibiotics. Patient hemodynamically stable, afebrile. Currently saturating well on 5 L nasal cannula. Urine output 1.8 L    Recent echo reviewed from 10/22/2022 showing EF of 55 to 60%    HISTORY OF PRESENT ILLNESS:      The patient is a 74 y.o.  male who is admitted to the hospital for respiratory distress.   Gera Wheat is a 74 y.o. male with past medical history significant for type 2 diabetes mellitus, multiple myeloma, non-Hodgkin lymphoma, sarcoidosis, referred from [facility] ER with worsening respiratory status, shortness of breath, nonproductive cough. He was started on BiPAP and given antibiotics. Chest x-ray was concerning for loculated left-sided pleural effusion. Transfer was made and accepted by hospitalist, further work-up. Critical care was consulted for worsening respiratory status. Patient was on high flow and respiratory rate was in 38-40. Patient was very dyspneic and having difficulty breathing with accessory muscle use. Initially he was unable to keep BiPAP but later on able to keep BiPAP. He improved symptomatically with BiPAP and respiratory rate was in 26. Cardiology consulted for evaluation of CHF  Troponins 21--> 29-->33  proBNP 890    Past Medical History:   has a past medical history of Acute interstitial nephritis, Aortic regurgitation, ARF (acute renal failure) ([facility] Chronic kidney disease, Hyperlipidemia, Hypertension, Non-Hodgkin lymphoma ([facility] [address] Research study patient, Sarcoidosis, and Type II or unspecified type diabetes mellitus without mention of complication, not stated as uncontrolled. Past Surgical History:   has a past surgical history that includes eye surgery (Right); Knee arthroscopy; Rotator cuff repair (Right, 7/14); Rotator cuff repair (Left, 5/15); Carpal tunnel release (Left, 11/15 ); Eye surgery (Right, 6/16); Total knee arthroplasty (Right, 10/2018); and malignant skin lesion excision. Home Medications:    Prior to Admission medications    Medication Sig Start Date End Date Taking?  Authorizing Provider   amLODIPine (NORVASC) 5 MG tablet TAKE ONE TABLET BY MOUTH DAILY 11/22/22   Flavia Vergara MD   omeprazole (PRILOSEC) 20 MG delayed release capsule Take 20 mg by mouth Daily    Historical Provider, MD   fluticasone-salmeterol (ADVAIR DISKUS) 250-50 MCG/ACT AEPB diskus inhaler Inhale 1 puff into the lungs every 12 hours    Historical Provider, MD   metoprolol succinate (TOPROL XL) 25 MG extended release tablet Take 25 mg by mouth at bedtime    Historical Provider, MD   dutasteride (AVODART) 0.5 mg capsule Take 0.5 mg by mouth daily    Historical Provider, MD   finasteride (PROSCAR) 5 MG tablet Take 5 mg by mouth daily    Historical Provider, MD   Misc. Devices (NASAL SPRAY BOTTLE) MISC by Does not apply route    Historical Provider, MD   CINNAMON PO Take by mouth    Historical Provider, MD   repaglinide (PRANDIN) 1 MG tablet Take 1 mg by mouth 3 times daily (before meals)  4/16/18   Historical Provider, MD   pimecrolimus (ELIDEL) 1 % cream Apply topically as needed Apply topically 2 times daily. Historical Provider, MD   brimonidine-timolol (COMBIGAN) 0.2-0.5 % ophthalmic solution Place 1 drop into the right eye every 12 hours    Historical Provider, MD   albuterol sulfate  (90 BASE) MCG/ACT inhaler Inhale 1 puff into the lungs in the morning and at bedtime    Historical Provider, MD   Liraglutide (VICTOZA SC) Inject 1.8 mg into the skin daily     Historical Provider, MD   fenofibrate (TRICOR) 145 MG tablet Take 145 mg by mouth 2 times daily. Historical Provider, MD   dicyclomine (BENTYL) 10 MG capsule Take 10 mg by mouth 4 times daily (before meals and nightly)     Historical Provider, MD   atorvastatin (LIPITOR) 80 MG tablet Take 80 mg by mouth daily. Historical Provider, MD   DULoxetine (CYMBALTA) 60 MG capsule Take 60 mg by mouth 2 times daily. Historical Provider, MD       Allergies:  Latex, Gabapentin, Meperidine, Erythromycin, Glimepiride, Hydrocodone, Macrolides and ketolides, Niacin and related, Trelegy ellipta [fluticasone-umeclidin-vilant], and Pregabalin    Social History:   reports that he has never smoked. He has quit using smokeless tobacco. He reports current alcohol use. He reports that he does not use drugs.      Family History: family history includes Heart Disease in an other family member; High Blood Pressure in his father; Other in his father. No h/o sudden cardiac death. No for premature CAD    REVIEW OF SYSTEMS:    Constitutional: there has been no unanticipated weight loss. There's been No change in energy level, No change in activity level. Eyes: No visual changes or diplopia. No scleral icterus. ENT: No Headaches  Cardiovascular: see above  Respiratory: Past history of severe staph infection dating from 58  Genitourinary: No dysuria, trouble voiding, or hematuria. Musculoskeletal:  No gait disturbance, No weakness or joint complaints. Integumentary: No rash or pruritis. Neurological: No headache, diplopia, change in muscle strength, numbness or tingling. No change in gait, balance, coordination, mood, affect, memory, mentation, behavior. PHYSICAL EXAM:      /85   Pulse 95   Temp 97.9 °F (36.6 °C) (Axillary)   Resp 28   Wt 231 lb 11.3 oz (105.1 kg)   SpO2 100%   BMI 30.57 kg/m²    Constitutional and General Appearance: alert  HEENT: PERRL, no cervical lymphadenopathy. No masses palpable. Normal oral mucosa  Respiratory:  Normal excursion and expansion without use of accessory muscles  Resp Auscultation: On auscultation: clear to auscultation bilaterally  Cardiovascular:  Heart tones are crisp and normal. regular S1 and S2.  Jugular venous pulsation Normal  Peripheral pulses are symmetrical and full   Abdomen:   No masses or tenderness  Bowel sounds present  Extremities:   No Cyanosis or Clubbing   Lower extremity edema: No   Skin: Warm and dry  Neurological:  Alert and oriented. DATA:    Diagnostics:    EKG: Sinus tachycardia  ECHO:   03/04/22    Left Ventricle: There is moderate increased wall thickness/hypertrophy   no LVOT gradient noted. Systolic function is normal with an ejection   fraction of 60-65%. Grade II diastolic dysfunction (pseudonormal) is   present. Lateral E' is 7.00 cm/s. Medial E' is 8.00 cm/s.      Left Atrium: Left atrium is mildly dilated. Left atrium volume index is   mildly increased. The left atrial volume index is 36.0 mL/m2. Aortic Valve: There is mild regurgitation. There is no evidence of   aortic valve stenosis. Mitral Valve: There is trace regurgitation. There is no evidence of   mitral valve stenosis. Tricuspid Valve: There is trace to mild regurgitation. The right   ventricular systolic pressure is moderately elevated. RVSP calculated at   42 mmHg. RVSP is based on RA pressure of 8 mmHg. There is moderate   pulmonary hypertension. Tachycardia throughout study. 3/2022 24 Hour Holter:  Patient's heart rate  bpm with an average heart rate of 80 bpm   · Overall PVC burden 0.2% of all heartbeats  · Overall PAC burden 4.9% of all heartbeats   · No AF seen   · No bradyarrhythmias seen   · No symptoms reported by the patient    Labs:     CBC:   Recent Labs     02/13/23  0752 02/14/23  0534   WBC 18.1* 19.8*   HGB 11.5* 11.1*   HCT 36.5* 35.8*   * 446       BMP:   Recent Labs     02/13/23  1814 02/14/23  0534   * 133*   K 4.9 4.4   CO2 26 25   BUN 88* 88*   CREATININE 2.35* 2.38*   LABGLOM 30* 30*   GLUCOSE 269* 266*       BNP: No results for input(s): BNP in the last 72 hours. PT/INR: No results for input(s): PROTIME, INR in the last 72 hours. APTT:No results for input(s): APTT in the last 72 hours. CARDIAC ENZYMES:No results for input(s): CKTOTAL, CKMB, CKMBINDEX, TROPONINI in the last 72 hours.   FASTING LIPID PANEL:No results found for: HDL, LDLDIRECT, LDLCALC, TRIG  LIVER PROFILE:  Recent Labs     02/13/23  1336   AST 29   ALT 18   LABALBU 3.0*       IMPRESSION:    Patient Active Problem List   Diagnosis    CKD (chronic kidney disease), stage III ([facility]    Nephrolithiasis    DM (diabetes mellitus) ([facility] [address]    Non-rheumatic mitral regurgitation    Nonrheumatic aortic valve insufficiency    Pulmonary hypertension (HCC)    Type 2 diabetes mellitus with diabetic chronic kidney disease ([facility] [address] Sarcoidosis    Non Hodgkin's lymphoma ([facility]    Hyperlipidemia    Family history of abdominal aortic aneurysm (AAA)    AAA (abdominal aortic aneurysm) without rupture    Essential hypertension    Abnormal echocardiogram    Dilated aortic root (HCC)    Acute interstitial nephritis    ARF (acute renal failure) (HCC)    Pneumonia due to infectious organism    Pneumonia, unspecified organism    Pleural effusion    Hyperkalemia    Acute respiratory failure with hypoxia (HCC)    Hyponatremia    Hyperglycemia    History of non-Hodgkin's lymphoma    History of sarcoidosis     Acute hypoxic respiratory failure  KELSEA on CKD stage III  Type 2 diabetes mellitus  HFpEF - 34%, grade 2 diastolic dysfunction  Sarcoidosis  Non-Hodgkin lymphoma  Hypertension  History of multiple PVCs on beta-blocker  AAA  Elevated troponins  Hypokalemia      RECOMMENDATIONS:  Elevated troponins likely due to underlying KELSEA on CKD stage III, likely type II MI  Recent echo: Reviewed  We will sign off at this time. Discussed with Nurse.     Electronically signed by Cheri Dumas MD on 2/14/2023 at 9:41 [address] [place] [address] [facility]      [phone]

## 2023-02-15 LAB
ABSOLUTE EOS #: 0 K/UL (ref 0–0.4)
ABSOLUTE IMMATURE GRANULOCYTE: 1.09 K/UL (ref 0–0.3)
ABSOLUTE LYMPH #: 5.67 K/UL (ref 1–4.8)
ABSOLUTE MONO #: 1.74 K/UL (ref 0.1–0.8)
ANION GAP SERPL CALCULATED.3IONS-SCNC: 9 MMOL/L (ref 9–17)
BASOPHILS # BLD: 0 % (ref 0–2)
BASOPHILS ABSOLUTE: 0 K/UL (ref 0–0.2)
BUN SERPL-MCNC: 74 MG/DL (ref 8–23)
CALCIUM SERPL-MCNC: 9.7 MG/DL (ref 8.6–10.4)
CHLORIDE SERPL-SCNC: 100 MMOL/L (ref 98–107)
CHOLESTEROL FLUID: 44 MG/DL
CO2 SERPL-SCNC: 24 MMOL/L (ref 20–31)
CREAT SERPL-MCNC: 2.02 MG/DL (ref 0.7–1.2)
EOSINOPHILS RELATIVE PERCENT: 0 % (ref 1–4)
GFR SERPL CREATININE-BSD FRML MDRD: 36 ML/MIN/1.73M2
GLUCOSE BLD-MCNC: 128 MG/DL (ref 75–110)
GLUCOSE BLD-MCNC: 151 MG/DL (ref 75–110)
GLUCOSE BLD-MCNC: 178 MG/DL (ref 75–110)
GLUCOSE BLD-MCNC: 216 MG/DL (ref 75–110)
GLUCOSE BLD-MCNC: 226 MG/DL (ref 75–110)
GLUCOSE SERPL-MCNC: 138 MG/DL (ref 70–99)
HCT VFR BLD AUTO: 38.1 % (ref 40.7–50.3)
HGB BLD-MCNC: 12 G/DL (ref 13–17)
IMMATURE GRANULOCYTES: 5 %
LYMPHOCYTES # BLD: 26 % (ref 24–44)
MCH RBC QN AUTO: 28.2 PG (ref 25.2–33.5)
MCHC RBC AUTO-ENTMCNC: 31.5 G/DL (ref 28.4–34.8)
MCV RBC AUTO: 89.4 FL (ref 82.6–102.9)
MONOCYTES # BLD: 8 % (ref 1–7)
MORPHOLOGY: ABNORMAL
NRBC AUTOMATED: 0.2 PER 100 WBC
PDW BLD-RTO: 15.3 % (ref 11.8–14.4)
PLATELET # BLD AUTO: 471 K/UL (ref 138–453)
PMV BLD AUTO: 9.6 FL (ref 8.1–13.5)
POTASSIUM SERPL-SCNC: 4.7 MMOL/L (ref 3.7–5.3)
RBC # BLD: 4.26 M/UL (ref 4.21–5.77)
SEG NEUTROPHILS: 61 % (ref 36–66)
SEGMENTED NEUTROPHILS ABSOLUTE COUNT: 13.3 K/UL (ref 1.8–7.7)
SODIUM SERPL-SCNC: 133 MMOL/L (ref 135–144)
SOURCE: NORMAL
SURGICAL PATHOLOGY REPORT: NORMAL
WBC # BLD AUTO: 21.8 K/UL (ref 3.5–11.3)

## 2023-02-15 PROCEDURE — 6370000000 HC RX 637 (ALT 250 FOR IP)

## 2023-02-15 PROCEDURE — 6370000000 HC RX 637 (ALT 250 FOR IP): Performed by: INTERNAL MEDICINE

## 2023-02-15 PROCEDURE — 1200000000 HC SEMI PRIVATE

## 2023-02-15 PROCEDURE — 80048 BASIC METABOLIC PNL TOTAL CA: CPT

## 2023-02-15 PROCEDURE — 99291 CRITICAL CARE FIRST HOUR: CPT | Performed by: INTERNAL MEDICINE

## 2023-02-15 PROCEDURE — 2580000003 HC RX 258: Performed by: STUDENT IN AN ORGANIZED HEALTH CARE EDUCATION/TRAINING PROGRAM

## 2023-02-15 PROCEDURE — 2580000003 HC RX 258: Performed by: NURSE PRACTITIONER

## 2023-02-15 PROCEDURE — 6360000002 HC RX W HCPCS: Performed by: NURSE PRACTITIONER

## 2023-02-15 PROCEDURE — 97535 SELF CARE MNGMENT TRAINING: CPT

## 2023-02-15 PROCEDURE — 94640 AIRWAY INHALATION TREATMENT: CPT

## 2023-02-15 PROCEDURE — 6370000000 HC RX 637 (ALT 250 FOR IP): Performed by: NURSE PRACTITIONER

## 2023-02-15 PROCEDURE — 36415 COLL VENOUS BLD VENIPUNCTURE: CPT

## 2023-02-15 PROCEDURE — 6370000000 HC RX 637 (ALT 250 FOR IP): Performed by: STUDENT IN AN ORGANIZED HEALTH CARE EDUCATION/TRAINING PROGRAM

## 2023-02-15 PROCEDURE — 6360000002 HC RX W HCPCS: Performed by: STUDENT IN AN ORGANIZED HEALTH CARE EDUCATION/TRAINING PROGRAM

## 2023-02-15 PROCEDURE — 6360000002 HC RX W HCPCS: Performed by: INTERNAL MEDICINE

## 2023-02-15 PROCEDURE — 2580000003 HC RX 258: Performed by: INTERNAL MEDICINE

## 2023-02-15 PROCEDURE — 2700000000 HC OXYGEN THERAPY PER DAY

## 2023-02-15 PROCEDURE — 85025 COMPLETE CBC W/AUTO DIFF WBC: CPT

## 2023-02-15 PROCEDURE — 82947 ASSAY GLUCOSE BLOOD QUANT: CPT

## 2023-02-15 PROCEDURE — 97530 THERAPEUTIC ACTIVITIES: CPT

## 2023-02-15 PROCEDURE — 94761 N-INVAS EAR/PLS OXIMETRY MLT: CPT

## 2023-02-15 RX ORDER — ALPRAZOLAM 0.5 MG/1
0.5 TABLET ORAL ONCE
Status: COMPLETED | OUTPATIENT
Start: 2023-02-15 | End: 2023-02-15

## 2023-02-15 RX ORDER — CHOLECALCIFEROL (VITAMIN D3) 125 MCG
5 CAPSULE ORAL NIGHTLY
Status: DISCONTINUED | OUTPATIENT
Start: 2023-02-15 | End: 2023-02-23

## 2023-02-15 RX ORDER — QUETIAPINE FUMARATE 25 MG/1
25 TABLET, FILM COATED ORAL NIGHTLY
Status: DISCONTINUED | OUTPATIENT
Start: 2023-02-15 | End: 2023-02-17

## 2023-02-15 RX ORDER — OXYCODONE HYDROCHLORIDE AND ACETAMINOPHEN 5; 325 MG/1; MG/1
1 TABLET ORAL EVERY 4 HOURS PRN
Status: DISCONTINUED | OUTPATIENT
Start: 2023-02-15 | End: 2023-02-21

## 2023-02-15 RX ADMIN — Medication 5 MG: at 19:24

## 2023-02-15 RX ADMIN — DICYCLOMINE HYDROCHLORIDE 10 MG: 10 CAPSULE ORAL at 17:05

## 2023-02-15 RX ADMIN — METOPROLOL SUCCINATE 25 MG: 25 TABLET, FILM COATED, EXTENDED RELEASE ORAL at 19:24

## 2023-02-15 RX ADMIN — ACETAMINOPHEN 650 MG: 325 TABLET ORAL at 15:23

## 2023-02-15 RX ADMIN — DICYCLOMINE HYDROCHLORIDE 10 MG: 10 CAPSULE ORAL at 11:57

## 2023-02-15 RX ADMIN — INSULIN GLARGINE 20 UNITS: 100 INJECTION, SOLUTION SUBCUTANEOUS at 20:48

## 2023-02-15 RX ADMIN — FENOFIBRATE 160 MG: 160 TABLET ORAL at 08:12

## 2023-02-15 RX ADMIN — TIMOLOL MALEATE 1 DROP: 5 SOLUTION OPHTHALMIC at 19:52

## 2023-02-15 RX ADMIN — ATORVASTATIN CALCIUM 80 MG: 80 TABLET, FILM COATED ORAL at 19:25

## 2023-02-15 RX ADMIN — FINASTERIDE 5 MG: 5 TABLET, FILM COATED ORAL at 08:12

## 2023-02-15 RX ADMIN — QUETIAPINE FUMARATE 25 MG: 25 TABLET ORAL at 19:24

## 2023-02-15 RX ADMIN — SODIUM CHLORIDE, PRESERVATIVE FREE 10 ML: 5 INJECTION INTRAVENOUS at 08:06

## 2023-02-15 RX ADMIN — HEPARIN SODIUM 5000 UNITS: 5000 INJECTION INTRAVENOUS; SUBCUTANEOUS at 21:31

## 2023-02-15 RX ADMIN — ALPRAZOLAM 0.5 MG: 0.5 TABLET ORAL at 03:54

## 2023-02-15 RX ADMIN — TIMOLOL MALEATE 1 DROP: 5 SOLUTION OPHTHALMIC at 08:21

## 2023-02-15 RX ADMIN — ALTEPLASE 10 MG: 2.2 INJECTION, POWDER, LYOPHILIZED, FOR SOLUTION INTRAVENOUS at 02:28

## 2023-02-15 RX ADMIN — DORNASE ALFA 5 MG: 1 SOLUTION RESPIRATORY (INHALATION) at 14:26

## 2023-02-15 RX ADMIN — SODIUM CHLORIDE, PRESERVATIVE FREE 10 ML: 5 INJECTION INTRAVENOUS at 19:51

## 2023-02-15 RX ADMIN — PANTOPRAZOLE SODIUM 40 MG: 40 TABLET, DELAYED RELEASE ORAL at 08:12

## 2023-02-15 RX ADMIN — CEFEPIME 2000 MG: 2 INJECTION, POWDER, FOR SOLUTION INTRAVENOUS at 14:05

## 2023-02-15 RX ADMIN — ALTEPLASE 10 MG: 2.2 INJECTION, POWDER, LYOPHILIZED, FOR SOLUTION INTRAVENOUS at 14:25

## 2023-02-15 RX ADMIN — DULOXETINE HYDROCHLORIDE 60 MG: 30 CAPSULE, DELAYED RELEASE ORAL at 08:12

## 2023-02-15 RX ADMIN — HEPARIN SODIUM 5000 UNITS: 5000 INJECTION INTRAVENOUS; SUBCUTANEOUS at 06:13

## 2023-02-15 RX ADMIN — DICYCLOMINE HYDROCHLORIDE 10 MG: 10 CAPSULE ORAL at 19:25

## 2023-02-15 RX ADMIN — DORNASE ALFA 5 MG: 1 SOLUTION RESPIRATORY (INHALATION) at 02:27

## 2023-02-15 RX ADMIN — HEPARIN SODIUM 5000 UNITS: 5000 INJECTION INTRAVENOUS; SUBCUTANEOUS at 14:08

## 2023-02-15 RX ADMIN — BRIMONIDINE TARTRATE 1 DROP: 2 SOLUTION OPHTHALMIC at 19:52

## 2023-02-15 RX ADMIN — BRIMONIDINE TARTRATE 1 DROP: 2 SOLUTION OPHTHALMIC at 08:31

## 2023-02-15 RX ADMIN — REPAGLINIDE 1 MG: 0.5 TABLET ORAL at 17:05

## 2023-02-15 RX ADMIN — IPRATROPIUM BROMIDE AND ALBUTEROL SULFATE 1 AMPULE: 2.5; .5 SOLUTION RESPIRATORY (INHALATION) at 16:39

## 2023-02-15 RX ADMIN — CEFEPIME 2000 MG: 2 INJECTION, POWDER, FOR SOLUTION INTRAVENOUS at 01:50

## 2023-02-15 RX ADMIN — ALBUTEROL SULFATE 2.5 MG: 2.5 SOLUTION RESPIRATORY (INHALATION) at 08:45

## 2023-02-15 RX ADMIN — REPAGLINIDE 1 MG: 0.5 TABLET ORAL at 11:57

## 2023-02-15 RX ADMIN — DULOXETINE HYDROCHLORIDE 60 MG: 30 CAPSULE, DELAYED RELEASE ORAL at 19:24

## 2023-02-15 RX ADMIN — DICYCLOMINE HYDROCHLORIDE 10 MG: 10 CAPSULE ORAL at 08:12

## 2023-02-15 RX ADMIN — REPAGLINIDE 1 MG: 0.5 TABLET ORAL at 08:12

## 2023-02-15 RX ADMIN — AMLODIPINE BESYLATE 5 MG: 5 TABLET ORAL at 08:12

## 2023-02-15 RX ADMIN — ACETAMINOPHEN 650 MG: 325 TABLET ORAL at 08:25

## 2023-02-15 RX ADMIN — ACETAMINOPHEN 650 MG: 325 TABLET ORAL at 21:31

## 2023-02-15 ASSESSMENT — ENCOUNTER SYMPTOMS
SHORTNESS OF BREATH: 0
COUGH: 1
ABDOMINAL PAIN: 0

## 2023-02-15 ASSESSMENT — PAIN SCALES - GENERAL
PAINLEVEL_OUTOF10: 0

## 2023-02-15 NOTE — CARE COORDINATION
Transitional planning. Nancy with [facility] called, they can accept pt as long as one of his insurances is determined to be primary. [facility] takes both, [facility]. Provided pt's wife the Medicare's number yesterday (so she could call Medicare to inform that her insurance, Neema Jama is primary)       5 pt's wife confirmed that she spoke to Medicare and confirmed that her insurance Neema Jama is primary since she is still working. Informed Nancy w/ [facility]. Pt has L CT, on 4L NC. Plan is home w/ family support & [facility]  (accepted as long as Primary insurance is identified) , wife will transport.  Has established PCP

## 2023-02-15 NOTE — PROGRESS NOTES
Physical Therapy  Facility/Department: [facility] CAR 3- MICU  Physical Therapy Daily Treatment Note    Name: Jailyn Alaniz  : 1955  MRN: [id number]  Date of Service: 2/15/2023    Discharge Recommendations:  Patient would benefit from continued therapy after discharge   PT Equipment Recommendations  Equipment Needed: No      Patient Diagnosis(es): There were no encounter diagnoses. Past Medical History:  has a past medical history of Acute interstitial nephritis, Aortic regurgitation, ARF (acute renal failure) ([facility] [place] [address] Chronic kidney disease, Hyperlipidemia, Hypertension, Non-Hodgkin lymphoma ([facility] [place] [address] Research study patient, Sarcoidosis, and Type II or unspecified type diabetes mellitus without mention of complication, not stated as uncontrolled. Past Surgical History:  has a past surgical history that includes eye surgery (Right); Knee arthroscopy; Rotator cuff repair (Right, ); Rotator cuff repair (Left, 5/15); Carpal tunnel release (Left, 11/15 ); Eye surgery (Right, ); Total knee arthroplasty (Right, 10/2018); malignant skin lesion excision; and IR CHEST TUBE INSERTION (2023). Assessment   Body Structures, Functions, Activity Limitations Requiring Skilled Therapeutic Intervention: Decreased functional mobility ; Decreased ROM; Decreased strength;Decreased safe awareness;Decreased endurance;Decreased balance  Assessment: Pt required CGA for bed mobility and ambulated ~2ft to chair with RW and CGA, most limited by pain and endurance. Pt would benefit from continued PT to address further deficits and maximize safety and independence with mobility. Therapy Prognosis: Good  Requires PT Follow-Up: Yes  Activity Tolerance  Activity Tolerance: Patient limited by endurance; Patient limited by pain; Patient tolerated treatment well     Plan   Physcial Therapy Plan  General Plan:  (5-6x/week)  Current Treatment Recommendations: Strengthening, ROM, Functional mobility training, Transfer training, Endurance training, Gait training, Stair training, Balance training, Neuromuscular re-education, Home exercise program, Safety education & training, Patient/Caregiver education & training, Therapeutic activities, Equipment evaluation, education, & procurement  Safety Devices  Type of Devices: Gait belt, Nurse notified, Left in chair (Pt left with OT present in room.)  Restraints  Restraints Initially in Place: No     Restrictions  Restrictions/Precautions  Restrictions/Precautions: Up as Tolerated  Required Braces or Orthoses?: No  Position Activity Restriction  Other position/activity restrictions: Can increase activity level as tolerated as long as O2 saturation maintained above 92% and as tolerated. Subjective   General  Chart Reviewed: Yes  Patient assessed for rehabilitation services?: Yes  Response To Previous Treatment: Patient with no complaints from previous session. Family / Caregiver Present: Yes (daughter and wife)  Follows Commands: Within Functional Limits  General Comment  Comments: Pt retired to chair at end of session set up to work with OT. Subjective  Subjective: Pt and RN agreeable to PT this afternoon. Pt supine in bed upon arrival with c/o 4/10 pain in chest tube site. Pt pleasant and cooperative throughout session. Cognition   Orientation  Overall Orientation Status: Within Functional Limits  Cognition  Overall Cognitive Status: WFL     Objective   Bed mobility  Supine to Sit: Contact guard assistance  Sit to Supine: Unable to assess (Pt retired to chair at end of session.)  Scooting: Contact guard assistance  Bed Mobility Comments: HOB very slightly elevated, increased time and effort utilized bed rails. Transfers  Sit to Stand: Contact guard assistance  Stand to Sit: Contact guard assistance  Comment: RW used for transfers. Ambulation  Surface: Level tile  Device: Rolling Walker  Other Apparatus: O2  Assistance: Contact guard assistance  Quality of Gait: slowed small steps.   Gait Deviations: Slow Portia;Decreased step length  Distance: 2ft to chair  Comments: Pt slow and steady with no true LOB, limited by pain. More Ambulation?: No  Stairs/Curb  Stairs?: No     Balance  Posture: Good  Sitting - Static: Good  Sitting - Dynamic: Fair  Standing - Static: Fair;+  Standing - Dynamic: Fair;-  Comments: Standing balance assessed w/ RW, able to sit EOB with CGA/Gema. Exercise Treatment:  Seated LE exercise program: Long Arc Quads, hip abduction/adduction, heel/toe raises, and marches. Reps: x10 BLE  Comments: Pt performed some exercises while seated EOB and some while seated in chair. AM-PAC Score  AM-PAC Inpatient Mobility Raw Score : 16 (02/15/23 1612)  AM-PAC Inpatient T-Scale Score : 40.78 (02/15/23 1612)  Mobility Inpatient CMS 0-100% Score: 54.16 (02/15/23 1612)  Mobility Inpatient CMS G-Code Modifier : CK (02/15/23 1612)      Goals  Short Term Goals  Time Frame for Short Term Goals: 14 visits  Short Term Goal 1: Pt will amb 300' Love  Short Term Goal 2: Pt will be Love w/ all bed mobility tasks  Short Term Goal 3: Pt will be Love w/ transfers  Short Term Goal 4: Pt will be CGA in negotiation of 2 steps w/ L rails use       Education  Patient Education  Education Given To: Patient; Family  Education Provided: Role of Therapy;Plan of Care;Home Exercise Program;Transfer Training  Education Method: Demonstration;Verbal  Barriers to Learning: None  Education Outcome: Verbalized understanding;Demonstrated understanding      Therapy Time   Individual Concurrent Group Co-treatment   Time In 1550         Time Out 1605         Minutes 15         Timed Code Treatment Minutes: Feng 46, PTA

## 2023-02-15 NOTE — PROGRESS NOTES
Occupational Therapy  Facility/Department: [facility] CAR 3- MICU  Occupational Therapy Daily Treatment Note      Name: Milla Ochoa  : 1955  MRN: [id number]  Date of Service: 2/15/2023    Discharge Recommendations:  Patient would benefit from continued therapy after discharge  OT Equipment Recommendations  ADL Assistive Devices: Shower Chair with back;Sock-Aid Hard;Reacher;Long-handled Shoe Horn;Long-handled Sponge       Patient Diagnosis(es): There were no encounter diagnoses. Past Medical History:  has a past medical history of Acute interstitial nephritis, Aortic regurgitation, ARF (acute renal failure) ([facility] [address] Chronic kidney disease, Hyperlipidemia, Hypertension, Non-Hodgkin lymphoma ([facility] [address] Research study patient, Sarcoidosis, and Type II or unspecified type diabetes mellitus without mention of complication, not stated as uncontrolled. Past Surgical History:  has a past surgical history that includes eye surgery (Right); Knee arthroscopy; Rotator cuff repair (Right, ); Rotator cuff repair (Left, 5/15); Carpal tunnel release (Left, 11/15 ); Eye surgery (Right, ); Total knee arthroplasty (Right, 10/2018); malignant skin lesion excision; and IR CHEST TUBE INSERTION (2023). Treatment Diagnosis: PNA      Assessment   Performance deficits / Impairments: Decreased functional mobility ; Decreased ADL status; Decreased cognition;Decreased endurance;Decreased balance;Decreased high-level IADLs  Assessment: Pt will require continued OT services to address deficits listed through use of therapeutic interventions for improved independence and safety with ADLs/IADLs and functional transfers/mobility.   Treatment Diagnosis: PNA  Prognosis: Good  Activity Tolerance  Activity Tolerance: Patient Tolerated treatment well;Patient limited by fatigue        Plan   Occupational Therapy Plan  Times Per Week: 3-5 x/wk  Current Treatment Recommendations: Balance training, Functional mobility training, Endurance training, Cognitive reorientation, Safety education & training, Patient/Caregiver education & training, Equipment evaluation, education, & procurement, Self-Care / ADL, Home management training     Restrictions  Restrictions/Precautions  Restrictions/Precautions: Up as Tolerated, Surgical Protocols, Fall Risk  Required Braces or Orthoses?: No  Position Activity Restriction  Other position/activity restrictions: Can increase activity level as tolerated as long as O2 saturation maintained above 92% and as tolerated. Subjective   General  Patient assessed for rehabilitation services?: Yes  Family / Caregiver Present: Yes (wife and daughter present throughout)  General Comment  Comments: RN ok'd pt for OT tx this date. Pt agreeable to session and very pleasant/cooperative and motivated to participate. Pt denies pain. Objective   SpO2: 95 %  O2 Device: Nasal cannula  Safety Devices  Type of Devices: Nurse notified; Left in chair;Call light within reach; Patient at risk for falls  Restraints  Restraints Initially in Place: No  Bed Mobility Training  Bed Mobility Training: Yes  Overall Level of Assistance: Contact-guard assistance  Supine to Sit: Contact-guard assistance  Sit to Supine: Other (left up in chair)  Scooting: Stand-by assistance  Balance  Sitting: With support (seated at EOB ~10 min and in recliner ~30 min supported w/SBA)  Standing: With support (w/RW ~2 min for transfers and func mob w/CGA)  Transfer Training  Transfer Training: Yes  Overall Level of Assistance: Contact-guard assistance; Additional time; Adaptive equipment  Interventions: Safety awareness training;Verbal cues  Sit to Stand: Contact-guard assistance; Additional time; Adaptive equipment  Stand to Sit: Contact-guard assistance; Additional time; Adaptive equipment  Functional mobility: CGA w/RW  Overall Level of Assistance: Contact-guard assistance; Additional time; Adaptive equipment  Interventions: Safety awareness training;Verbal cues; Tactile cues ADL  Feeding: Modified independent ;Setup; Increased time to complete (able to open up most containers, assist to cut food, able to feed self)  Grooming: Setup;Stand by assistance; Increased time to complete;Verbal cueing (wash face/head, brush teeth)  UE Bathing: Stand by assistance;Setup; Increased time to complete;Verbal cueing (assist to wash back)  LE Bathing: Setup; Increased time to complete;Minimal assistance;Verbal cueing (assist to wash feet)  UE Dressing: Stand by assistance;Setup; Increased time to complete;Verbal cueing (assist to doff/don gown and tie)  LE Dressing: Setup; Increased time to complete;Maximum assistance;Verbal cueing (assist to doff/don footies)  Toileting: Minimal assistance;Setup; Increased time to complete (has urinal at chair side)  Comments: Pt transferred to recliner w/RW and setup at tray table for self care (see above for LOF). Pt needed increased time and assist d/t fatigue and overall weakness. Activity Tolerance  Activity Tolerance: Patient limited by endurance; Patient limited by pain; Patient tolerated treatment well           Cognition  Overall Cognitive Status: WFL  Orientation  Overall Orientation Status: Within Functional Limits  Orientation Level: Oriented X4                  Education Given To: Patient  Education Provided: Role of Therapy;Plan of Care;ADL Adaptive Strategies;Transfer Training;Energy Conservation;IADL Safety; Family Education  Education Provided Comments: Pt ed on OT role, OT POC, safety awareness, transfer trianing, DME use, fall prevention, ADL adaptive tech, pursed lip breathing, IS use, and importance of continued OT. Good return verbalized.   Education Method: Demonstration;Verbal  Barriers to Learning: None  Education Outcome: Verbalized understanding;Demonstrated understanding;Continued education needed     AM-PAC Score  AM-PAC Inpatient Daily Activity Raw Score: 18 (02/15/23 1647)  AM-PAC Inpatient ADL T-Scale Score : 38.66 (02/15/23 1647)  ADL Inpatient CMS 0-100% Score: 46.65 (02/15/23 1647)  ADL Inpatient CMS G-Code Modifier : CK (02/15/23 1647)      Goals  Short Term Goals  Time Frame for Short Term Goals: By discharge, pt will:  Short Term Goal 1: Demo I with bed mobility to increase independence with ADLs and to decrease risk for pressure injury  Short Term Goal 2: Demo Mod I for functional transfers and functional mobility with use of LRD for engagement in ADLs/IADLs  Short Term Goal 3: Demo I with all UB ADLs  Short Term Goal 4: Demo Mod I for LB ADLs and toileting, utilizing AE and adaptive tech PRN  Short Term Goal 5: Demo 8 min dynamic standing and reaching outside LI with unilateral hand release and SUP for improved standing balance during ADL/IADL participation       Therapy Time   Individual Concurrent Group Co-treatment   Time In  1600         Time Out  1645         Minutes  45 total tx time                 FREEDOM JOSE, MELODY/WINTER

## 2023-02-15 NOTE — PLAN OF CARE
Problem: Discharge Planning  Goal: Discharge to home or other facility with appropriate resources  2/14/2023 2028 by Stefania Malhotra RN  Outcome: Progressing  2/14/2023 1508 by Nevaeh Goodson RN  Outcome: Progressing     Problem: Safety - Adult  Goal: Free from fall injury  2/14/2023 2028 by Stefania Malhotra RN  Outcome: Yasir Santos (Taken 2/14/2023 2027)  Free From Fall Injury: Instruct family/caregiver on patient safety  2/14/2023 1508 by Nevaeh Goodson RN  Outcome: Progressing     Problem: ABCDS Injury Assessment  Goal: Absence of physical injury  2/14/2023 2028 by Stefania Malhotra RN  Outcome: Progressing  Flowsheets (Taken 2/14/2023 2027)  Absence of Physical Injury: Implement safety measures based on patient assessment  2/14/2023 1508 by Nevaeh Goodson RN  Outcome: Progressing     Problem: Skin/Tissue Integrity  Goal: Absence of new skin breakdown  Description: 1. Monitor for areas of redness and/or skin breakdown  2. Assess vascular access sites hourly  3. Every 4-6 hours minimum:  Change oxygen saturation probe site  4. Every 4-6 hours:  If on nasal continuous positive airway pressure, respiratory therapy assess nares and determine need for appliance change or resting period.   2/14/2023 2028 by Stefania Malhotra RN  Outcome: Progressing  2/14/2023 1508 by Nevaeh Goodson RN  Outcome: Progressing     Problem: Chronic Conditions and Co-morbidities  Goal: Patient's chronic conditions and co-morbidity symptoms are monitored and maintained or improved  2/14/2023 2028 by Stefania Malhotra RN  Outcome: Progressing  2/14/2023 1508 by Nevaeh Goodson RN  Outcome: Progressing     Problem: Respiratory - Adult  Goal: Achieves optimal ventilation and oxygenation  2/14/2023 2028 by Stefania Malhotra RN  Outcome: Progressing  2/14/2023 1508 by Nevaeh Goodson RN  Outcome: Progressing  2/14/2023 0826 by Hiram Huitron RCP  Outcome: Progressing     Problem: Pain  Goal: Verbalizes/displays adequate comfort level or baseline comfort level  Outcome: Progressing

## 2023-02-15 NOTE — RESEARCH
Clinical Research Services  2/15/2023  9:25 AM    Subject:     Quinten Garvin evaluated via chart review follow up for [employer] Trans [id number] A Multicenter, Prospective, Nwqbwqsec-ouemyb-vywotrq Observational Study Evaluating Immunoassay Measurements of Pancreatic Stone Protein Performed on [employer]'s abioSCOPE Device with the PSP Assay on ICU Patients at Risk of Sepsis as an Aid in Identifying Sepsis. IRB #  [id number]                     NCT # [id number]    Day 2 & Day 3 assessments completed as applicable for [id number]    Day 2 SOFA score: 6     Day 3 SOFA score: 6     Protocol appendix 1 Rafaela Stanley 2016) clinical definition met? No.      Protocol defined events to report? No. Will continue to monitor. Protocol defined concomitant medications reviewed and updated as appropriate. Applicable laboratory results listed below:   Latest Reference Range & Units 2/15/23 04:13   Sodium 135 - 144 mmol/L 133 (L)   Potassium 3.7 - 5.3 mmol/L 4.7   BUN,BUNPL 8 - 23 mg/dL 74 (H)   Creatinine 0.70 - 1.20 mg/dL 2.02 (H)   Glucose, Random 70 - 99 mg/dL 138 (H)   CALCIUM, SERUM, 497391 8.6 - 10.4 mg/dL 9.7   WBC 3.5 - 11.3 k/uL 21.8 (H)   RBC 4.21 - 5.77 m/uL 4.26   Hemoglobin Quant 13.0 - 17.0 g/dL 12.0 (L)   Hematocrit 40.7 - 50.3 % 38.1 (L)   RDW 11.8 - 14.4 % 15.3 (H)   Platelet Count 289 - 453 k/uL 471 (H)   Segs Absolute 1.8 - 7.7 k/uL 13.30 (H)   Absolute Lymph # 1.0 - 4.8 k/uL 5.67 (H)   Absolute Eos # 0.0 - 0.4 k/uL 0.00   Immature Granulocytes 0 % 5 (H)           Please reach out to the primary investigator Dr. Serafin Brewer via perfect serve or clinical research at [phone] with any questions or concerns regarding this research or [facility] participation.         Dwayne Sevilla RN  Clinical Research Nurse  Infectious Disease Research  [address] [place] [address], [facility] Anibal Tolbert  P: [phone]  F: [phone]

## 2023-02-15 NOTE — PLAN OF CARE
Concurrent intra-pleural administration of alteplase and dornase done at 2 35am. Tube clamped. Dwell time 2 hours. Unclamp and suction after 2 hours.     Falguni Kee MD  INTERNAL MEDICINE RESIDENT,  PGY3  [address] [place], [place]   2/15/2023

## 2023-02-15 NOTE — PLAN OF CARE
Problem: Discharge Planning  Goal: Discharge to home or other facility with appropriate resources  2/15/2023 0738 by Valeriy Cronin  Outcome: Progressing  2/14/2023 2028 by Breann Mccallum RN  Outcome: Progressing     Problem: Safety - Adult  Goal: Free from fall injury  2/15/2023 0738 by Valeiry Cronin  Outcome: Progressing  2/14/2023 2028 by Breann Mccallum RN  Outcome: Judit Restrepo (Taken 2/14/2023 2027)  Free From Fall Injury: Instruct family/caregiver on patient safety     Problem: ABCDS Injury Assessment  Goal: Absence of physical injury  2/15/2023 0738 by Valeriy Cronin  Outcome: Progressing  2/14/2023 2028 by Breann Mccallum RN  Outcome: Progressing  Flowsheets (Taken 2/14/2023 2027)  Absence of Physical Injury: Implement safety measures based on patient assessment     Problem: Skin/Tissue Integrity  Goal: Absence of new skin breakdown  Description: 1. Monitor for areas of redness and/or skin breakdown  2. Assess vascular access sites hourly  3. Every 4-6 hours minimum:  Change oxygen saturation probe site  4. Every 4-6 hours:  If on nasal continuous positive airway pressure, respiratory therapy assess nares and determine need for appliance change or resting period.   2/15/2023 0738 by Valeriy Cronin  Outcome: Progressing  2/14/2023 2028 by Breann Mccallum RN  Outcome: Progressing     Problem: Chronic Conditions and Co-morbidities  Goal: Patient's chronic conditions and co-morbidity symptoms are monitored and maintained or improved  2/15/2023 0738 by Valeriy Cronin  Outcome: Progressing  2/14/2023 2028 by Breann Mccallum RN  Outcome: Progressing     Problem: Respiratory - Adult  Goal: Achieves optimal ventilation and oxygenation  2/15/2023 0738 by Valeriy Cronin  Outcome: Progressing  2/14/2023 2028 by Breann Mccallum RN  Outcome: Progressing     Problem: Pain  Goal: Verbalizes/displays adequate comfort level or baseline comfort level  2/15/2023 0738 by Randi Kwan Bernarda  Outcome: Progressing  2/14/2023 2028 by Alen Moss, RN  Outcome: Progressing

## 2023-02-15 NOTE — PROGRESS NOTES
PATIENT REFUSES TO WEAR BIPAP     [x] Risks and benefits explained to patient   [x] Patient refuses to wear Bipap stating he doesn't want to wear it at this time. \"He can't sleep. \" RN is giving him some sleeping medicine. [x] Patient verbalizes understanding of information presented.

## 2023-02-15 NOTE — PROGRESS NOTES
INTENSIVE CARE UNIT  Resident Physician Progress Note    Patient - Cruzito Pittman  Date of Admission -  2/12/2023 10:33 PM  Date of Evaluation -  2/15/2023  Room and Bed Number -  3016/3016-01   Hospital Day - 3      SUBJECTIVE:     OVERNIGHT EVENTS: Continued TPA/Dornase chest tube flush, Got anxious overnight requiring xanax, 1200 cc of purulent chest tube output in the last 24 hours, patient did not sleep last night    HOSPITAL COURSE: HOSPITAL COURSE: Cruzito Pittman is a 67 y.o. male with past medical history significant for type 2 diabetes mellitus, multiple myeloma, non-Hodgkin lymphoma, sarcoidosis, referred from [facility] ER with worsening respiratory status, shortness of breath, nonproductive cough.  He was started on BiPAP and given antibiotics.  Chest x-ray was concerning for loculated left-sided pleural effusion.  Transfer was made and accepted by hospitalist, further work-up.  Critical care was consulted for worsening respiratory status.  Patient was on high flow and respiratory rate was in 38-40.  Patient was very dyspneic and having difficulty breathing with accessory muscle use.  Initially he was unable to keep BiPAP but later on able to keep BiPAP.  He improved symptomatically with BiPAP and respiratory rate was in 26.  Patient is transferred to the ICU for higher level of care.       2/13: Attempted to perform bedside thoracentesis, no clear pocket identified, sent to IR, chest tube placed, appears that an empyema was encountered and chest tube was placed growing gram positive cocci in pairs, on cefepime and vanco    2/14: Placed chest tube yesterday, weaned off high flow, feeling \"100 x better\".       REVIEW OF SYSTEMS:  Review of Systems   Constitutional:  Negative for chills and fever.   Respiratory:  Positive for cough. Negative for shortness of breath.    Cardiovascular:  Negative for chest pain.   Gastrointestinal:  Negative for abdominal pain.   Psychiatric/Behavioral:  Positive  for sleep disturbance. The patient is nervous/anxious. OBJECTIVE:     VITAL SIGNS:  BP 98/74   Pulse 90   Temp 97.3 °F (36.3 °C) (Oral)   Resp 19   Ht 6' 1\" (1.854 m)   Wt 225 lb 12 oz (102.4 kg)   SpO2 94%   BMI 29.78 kg/m²   Tmax over 24 hours:  Temp (24hrs), Av.1 °F (36.7 °C), Min:97.3 °F (36.3 °C), Max:98.5 °F (36.9 °C)      Patient Vitals for the past 8 hrs:   BP Temp Temp src Pulse Resp SpO2 Height   02/15/23 1045 -- -- -- -- -- -- 6' 1\" (1.854 m)   02/15/23 1000 98/74 -- -- 90 19 94 % --   02/15/23 0900 105/74 -- -- 90 21 96 % --   02/15/23 0846 -- -- -- 92 20 97 % --   02/15/23 0800 (!) 117/98 97.3 °F (36.3 °C) Oral (!) 103 18 95 % --         Intake/Output Summary (Last 24 hours) at 2/15/2023 1535  Last data filed at 2/15/2023 1244  Gross per 24 hour   Intake 197.13 ml   Output 1650 ml   Net -1452.87 ml     Date 02/15/23 0000 - 02/15/23 2359   Shift 9878-7363 5384-5017 9262-0239 24 Hour Total   INTAKE   Shift Total(mL/kg)       OUTPUT   Urine(mL/kg/hr) 300(0.4) 325  625   Chest Tube 600   600   Shift Total(mL/kg) 900(8.8) 325(3.2)  1225(12)   Weight (kg) 102.4 102.4 102.4 102.4     Wt Readings from Last 3 Encounters:   02/15/23 225 lb 12 oz (102.4 kg)   23 245 lb (111.1 kg)   22 242 lb 11.2 oz (110.1 kg)     Body mass index is 29.78 kg/m². PHYSICAL EXAM:   Physical Exam  Constitutional:       Appearance: Normal appearance. He is obese. HENT:      Head: Normocephalic and atraumatic. Nose: Nose normal.   Eyes:      Extraocular Movements: Extraocular movements intact. Pupils: Pupils are equal, round, and reactive to light. Cardiovascular:      Rate and Rhythm: Regular rhythm. Tachycardia present. Pulmonary:      Effort: No respiratory distress. Breath sounds: Rhonchi present. No rales. Abdominal:      General: Abdomen is flat. Palpations: Abdomen is soft. Tenderness: There is no abdominal tenderness. There is no guarding.    Musculoskeletal: Right lower leg: No edema. Left lower leg: No edema. Skin:     General: Skin is warm and dry. Neurological:      Mental Status: He is alert. MEDICATIONS:  Scheduled Meds:   melatonin  5 mg Oral Nightly    QUEtiapine  25 mg Oral Nightly    insulin lispro  0-16 Units SubCUTAneous TID WC    insulin lispro  0-4 Units SubCUTAneous Nightly    alteplase (ACTIVASE) syringe  10 mg IntraPLEUral Q12H    And    dornase (PULMOZYME) syringe  5 mg IntraPLEUral Q12H    heparin (porcine)  5,000 Units SubCUTAneous 3 times per day    [Held by provider] furosemide  40 mg IntraVENous BID    cefepime  2,000 mg IntraVENous Q12H    amLODIPine  5 mg Oral Daily    atorvastatin  80 mg Oral Daily    DULoxetine  60 mg Oral BID    dicyclomine  10 mg Oral 4x Daily AC & HS    finasteride  5 mg Oral Daily    fenofibrate  160 mg Oral Daily    metoprolol succinate  25 mg Oral Nightly    pantoprazole  40 mg Oral QAM AC    repaglinide  1 mg Oral TID AC    brimonidine  1 drop Right Eye BID    And    timolol  1 drop Right Eye BID    insulin glargine  20 Units SubCUTAneous BID    lidocaine  20 mL IntraDERmal Once    sodium chloride flush  5-40 mL IntraVENous 2 times per day    ipratropium-albuterol  1 ampule Inhalation Q4H WA     Continuous Infusions:   dextrose      sodium chloride       PRN Meds:   ALPRAZolam, 0.5 mg, Nightly PRN  glucose, 4 tablet, PRN  dextrose bolus, 125 mL, PRN   Or  dextrose bolus, 250 mL, PRN  glucagon (rDNA), 1 mg, PRN  dextrose, , Continuous PRN  sodium chloride flush, 5-40 mL, PRN  sodium chloride, , PRN  ondansetron, 4 mg, Q8H PRN   Or  ondansetron, 4 mg, Q6H PRN  polyethylene glycol, 17 g, Daily PRN  acetaminophen, 650 mg, Q6H PRN   Or  acetaminophen, 650 mg, Q6H PRN  albuterol, 2.5 mg, Q2H PRN      SUPPORT DEVICES: [x] Nasal Cannula 4L    VENT SETTINGS (Comprehensive) (if applicable):      Additional Respiratory Assessments  Heart Rate: 90  Resp: 19  SpO2: 94 %  Humidification Source: Heated wire  Humidification Temp: 34    ABGs:   Lab Results   Component Value Date/Time    FIO2 50.0 02/13/2023 03:06 AM         DATA:  Complete Blood Count:   Recent Labs     02/13/23 0752 02/14/23  0534 02/15/23  0413   WBC 18.1* 19.8* 21.8*   RBC 4.10* 4.01* 4.26   HGB 11.5* 11.1* 12.0*   HCT 36.5* 35.8* 38.1*   MCV 89.0 89.3 89.4   MCH 28.0 27.7 28.2   MCHC 31.5 31.0 31.5   RDW 14.9* 15.0* 15.3*   * 446 471*   MPV 9.6 9.6 9.6        Last 3 Blood Glucose:   Recent Labs     02/13/23 0307 02/13/23 0752 02/13/23 1336 02/13/23 1814 02/14/23  0534 02/15/23  0413   GLUCOSE 301* 357* 322* 269* 266* 138*        PT/INR:  No results found for: PROTIME, INR  PTT:  No results found for: APTT, PTT    Comprehensive Metabolic Profile:   Recent Labs     02/13/23 0307 02/13/23 0752 02/13/23 1336 02/13/23 1814 02/14/23  0534 02/15/23  0413   *   < > 135 134* 133* 133*   K 5.4*   < > 5.0 4.9 4.4 4.7   CL 97*   < > 98 97* 98 100   CO2 22   < > 24 26 25 24   BUN 83*   < > 87* 88* 88* 74*   CREATININE 2.38*   < > 2.51* 2.35* 2.38* 2.02*   GLUCOSE 301*   < > 322* 269* 266* 138*   CALCIUM 9.5   < > 9.9 10.1 9.5 9.7   PROT 5.3*  --  6.5  --   --   --    LABALBU  --   --  3.0*  --   --   --    BILITOT  --   --  0.6  --   --   --    AST  --   --  29  --   --   --    ALT  --   --  18  --   --   --     < > = values in this interval not displayed.       Magnesium:   Lab Results   Component Value Date/Time    MG 2.9 02/13/2023 07:52 AM    MG 1.9 10/12/2020 12:00 AM    MG 1.9 03/04/2020 12:00 AM     Phosphorus:   Lab Results   Component Value Date/Time    PHOS 2.5 03/04/2020 12:00 AM    PHOS 2.7 08/19/2019 12:00 AM     Ionized Calcium:   Lab Results   Component Value Date/Time    CAION 5.19 04/03/2012 11:08 AM        Urinalysis:   Lab Results   Component Value Date/Time    NITRU NEGATIVE 02/13/2023 02:17 AM    COLORU Yellow 02/13/2023 02:17 AM    PHUR 5.0 02/13/2023 02:17 AM    WBCUA 2 TO 5 02/13/2023 02:17 AM    RBCUA 0 TO 2 02/13/2023 02:17 AM    MUCUS 1+ 02/13/2023 02:17 AM    SPECGRAV 1.021 02/13/2023 02:17 AM    LEUKOCYTESUR NEGATIVE 02/13/2023 02:17 AM    UROBILINOGEN Normal 02/13/2023 02:17 AM    BILIRUBINUR NEGATIVE 02/13/2023 02:17 AM    BILIRUBINUR NEGATIVE 04/03/2012 11:08 AM    GLUCOSEU 2+ 02/13/2023 02:17 AM    GLUCOSEU NEGATIVE 04/03/2012 11:08 AM    KETUA NEGATIVE 02/13/2023 02:17 AM    AMORPHOUS 1+ 02/13/2023 02:17 AM       HgBA1c:    Lab Results   Component Value Date/Time    LABA1C 8.2 02/13/2023 03:07 AM     TSH:  No results found for: TSH  Lactic Acid: No results found for: LACTA   Troponin: No results for input(s): TROPONINI in the last 72 hours.       Radiology/Imaging:  CXR yesterday shows interval placement of the chest tube with reduced left sided pleural effusion     ASSESSMENT:     Patient Active Problem List    Diagnosis Date Noted    Hyperkalemia 02/13/2023    Acute respiratory failure with hypoxia ([address] 02/13/2023    Hyponatremia 02/13/2023    Hyperglycemia 02/13/2023    History of non-Hodgkin's lymphoma 02/13/2023    History of sarcoidosis 02/13/2023    Pneumonia due to infectious organism 02/12/2023    Pneumonia, unspecified organism 02/12/2023    Pleural effusion 02/12/2023    Acute interstitial nephritis 08/03/2022    ARF (acute renal failure) ([address] 08/03/2022    Abnormal echocardiogram 08/15/2018    Dilated aortic root ([address] 08/15/2018    Essential hypertension 05/23/2017    AAA (abdominal aortic aneurysm) without rupture 03/23/2017    Family history of abdominal aortic aneurysm (AAA) 01/13/2017    Hyperlipidemia 06/17/2016    Sarcoidosis 05/24/2016    Non Hodgkin's lymphoma ([address] 05/24/2016    Type 2 diabetes mellitus with diabetic chronic kidney disease ([address] 12/08/2015    Non-rheumatic mitral regurgitation 04/08/2015    Nonrheumatic aortic valve insufficiency 04/08/2015    Pulmonary hypertension ([address] 04/08/2015    DM (diabetes mellitus) ([facility] 12/02/2014    CKD (chronic kidney disease), stage III ([facility] 10/28/2014    Nephrolithiasis 10/28/2014          PLAN:   Continue Dornase/TPA chest tube washes, potential transfer, may need repeat CT Chest with contrast in 2 days     HOME MEDS RECONCILED: Yes    CONSULTATION NEEDED:  No    FAMILY UPDATED:    Yes    TRANSFER OUT OF ICU:   Potential        Additional Assessment:  Principal Problem:    Pneumonia, unspecified organism  Active Problems:    Pleural effusion    Hyperkalemia    Acute respiratory failure with hypoxia (HCC)    Hyponatremia    Hyperglycemia    History of non-Hodgkin's lymphoma    History of sarcoidosis  Resolved Problems:    * No resolved hospital problems.  *             Plan:  Neuro:  No deficits   Sedation: None  Pain control: Tylenol PRN     CV:  Telemetry  BP and HR normal - tachycardic overnight, BP stable,, RR improved  Amlodipine 5 mg daily  Metoprolol 25 mg nightly     Heme:  H&H 12.0  Platelets 349     Resp:  99% on 4L NC  Weaned off Hi-Gennaro yesterday  Went for thoracentesis yesterday, 10 German chest tube placed, 800 cc out, on continuous wall suction   Chest tube output - 600 cc/24 hours  Began Dornase/TPA flushes  Thoracentesis results: Glucose 33, pH 4.0, LDH 6590, culture showing many gram-positive cocci in pairs  Repeat CXR yesterday - improved pleural effusion      /Fluids/Electrolytes:  BUN 74, Cr 2.02 (stable)  Monitor UO - 197 (taking PO fluids) / 1525 (urine)  Daily BMP: yes  Electrolytes: Sodium 133, otherwise stable  Fluids - None     GI/Nutrition:  Adult diabetic diet  Bowel regimen: PRN     ID:  Cefepime   Dc'd vanco yesterday, MRSA negative, empyema suggesting strep     Endo:  Monitor glucose - stabilized   Lantus 20 units nightly   Switched to high dose sliding scale, diet changed to carb restricted         Musculoskeletal:  Injuries: None  Restrictions: Left sided chest tube      Lines/Drains:  Left chest tube   Peripheral IV     Prophylaxis:  DVT: Heparin 5000 units TID, SCD  GI: Protonix 40 mg     Dispo:  Potential transfer       Sophia Menchaca MD  EM Resident PGY-3  Intensive Care Unit  2/15/2023 3:35 PM

## 2023-02-15 NOTE — PROGRESS NOTES
Physician Progress Note      Lucy Petit  CSN #:                  [id number]  :                       1955  ADMIT DATE:       2023 10:33 PM  [address] [place] DATE:  Tod English  PROVIDER #:        Tammi Montes De Oca MD          QUERY TEXT:    Patient admitted with Pneumonia, Acute respiratory failure . noted Empyema s/p   chest tube . Documentation reflects Sepsis in H&P on  . Per medical   record review WBC 22.5>18.1>19.8, heart rate 90s-120, creatinine 2.16 and   respiratory rate of 28-33. Please clarify one of the following: The medical record reflects the following:  Risk Factors: pneumonia, empyema history of lymphoma  Clinical Indicators:  admitted with Pneumonia, Acute respiratory failure . noted Empyema s/p chest tube . Documentation reflects Sepsis in H&P on  . Per medical record review WBC 22.5>18.1>19.8, heart rate 90s-120, creatinine   2.16 and respiratory rate of 28-33  Treatment: chest tube insertion, iv antibiotics, oxygen, ICU monitoring    Thank Timothy Sheridan RN BSN  CCDS  Email [email or web address]  Cell [phone]  office hours M-F 6am to 2:30pm  Options provided:  -- Sepsis confirmed after study  -- Sepsis  ruled out after study  -- Other - I will add my own diagnosis  -- Disagree - Not applicable / Not valid  -- Disagree - Clinically unable to determine / Unknown  -- Refer to Clinical Documentation Reviewer    PROVIDER RESPONSE TEXT:    sepsis confirmed after study    Query created by:  Archana Maciel on 2023 12:38 PM      Electronically signed by:  Tammi Montes De Oca MD 2/15/2023 6:03 AM

## 2023-02-15 NOTE — PROGRESS NOTES
Critical care team - Resident sign-out to medicine service      Date and time: 2/15/2023 3:35 PM  Patient's name:  Pelon Spain Record Number: [id number]  Patient's account/billing number: [de-identified]  Patient's YOB: 1955  Age: 74 y.o. Date of Admission: 2/12/2023 10:33 PM  Length of stay during current admission: 3    Primary Care Physician: Clifford Huffman MD    Code Status: Full Code    Mode of physician to physician communication:        [x] Via telephone   [] In person     Date and time of sign-out: 2/15/2023 3:35 PM    Accepting Internal Medicine NP: Primitivo Keith    Accepting Medicine team: IM Team Intermed    Accepting team's attending: Dr. Kristen Santillan    Patient's current ICU Bed:  [phone]     Patient's assigned bed on floor:  [phone]        [] Med-Surg Monitored [x] Step-down       [] Psychiatry ICU       [] Psych floor     Reason for ICU admission:     Respiratory Distress, Increased O2 demand     ICU course summary:      Marylene Monaco is a 74 y.o. male with past medical history significant for type 2 diabetes mellitus, multiple myeloma, non-Hodgkin lymphoma, sarcoidosis, referred from [facility] ER with worsening respiratory status, shortness of breath, nonproductive cough. He was started on BiPAP and given antibiotics. Chest x-ray was concerning for loculated left-sided pleural effusion. Transfer was made and accepted by hospitalist, further work-up. Critical care was consulted for worsening respiratory status. Patient was on high flow and respiratory rate was in 38-40. Patient was very dyspneic and having difficulty breathing with accessory muscle use. Initially he was unable to keep BiPAP but later on able to keep BiPAP. He improved symptomatically with BiPAP and respiratory rate was in 26. Patient is transferred to the ICU for higher level of care.        2/13: Attempted to perform bedside thoracentesis, no clear pocket identified, sent to IR, chest tube placed, appears that an empyema was encountered and chest tube was placed growing gram positive cocci in pairs, on cefepime and vanco     2/14: Placed chest tube yesterday, weaned off high flow, feeling \"100 x better\". 2/15: 1200 cc out of chest tube, conitnue Dornase/TPA Q12h, transfer out of ICU     2/16: Continued TPA/DORNASE    2/17: WBC increased, decreased chest tube output    2/18:consulted CT surgery and IR, will go back to IR for more chest tube placement    Procedures during patient's ICU stay:     IR Chest tube    Current Vitals:     BP 98/74   Pulse 90   Temp 97.3 °F (36.3 °C) (Oral)   Resp 19   Ht 6' 1\" (1.854 m)   Wt 225 lb 12 oz (102.4 kg)   SpO2 94%   BMI 29.78 kg/m²       Cultures:     Blood cultures:                 [] None drawn      [x] Negative             []  Positive  Urine Culture:                   [x] None drawn        Viral PCR Culture  [x] Negative          Pleural Culture:     [x]  Positive (Details: Strep Viridans  )       Consults:     1.  Pulmonology will stay on     Assessment:     Patient Active Problem List    Diagnosis Date Noted    Hyperkalemia 02/13/2023    Acute respiratory failure with hypoxia ([address] 02/13/2023    Hyponatremia 02/13/2023    Hyperglycemia 02/13/2023    History of non-Hodgkin's lymphoma 02/13/2023    History of sarcoidosis 02/13/2023    Pneumonia due to infectious organism 02/12/2023    Pneumonia, unspecified organism 02/12/2023    Pleural effusion 02/12/2023    Acute interstitial nephritis 08/03/2022    ARF (acute renal failure) ([address] 08/03/2022    Abnormal echocardiogram 08/15/2018    Dilated aortic root ([address] 08/15/2018    Essential hypertension 05/23/2017    AAA (abdominal aortic aneurysm) without rupture 03/23/2017    Family history of abdominal aortic aneurysm (AAA) 01/13/2017    Hyperlipidemia 06/17/2016    Sarcoidosis 05/24/2016    Non Hodgkin's lymphoma ([address] 05/24/2016    Type 2 diabetes mellitus with diabetic chronic kidney disease ([address] 12/08/2015 Non-rheumatic mitral regurgitation 04/08/2015    Nonrheumatic aortic valve insufficiency 04/08/2015    Pulmonary hypertension ([facility] 04/08/2015    DM (diabetes mellitus) ([facility] 12/02/2014    CKD (chronic kidney disease), stage III ([facility] 10/28/2014    Nephrolithiasis 10/28/2014       Additional assessment:    Continue to wean off O2 if able  Continue Abx  Continue Dornase/TPA    Recommended Follow-up:     Pulmonology         Above mentioned assessment and plan was discussed by me with the admitting medicine resident. The medicine team assigned to the patient by medicine admitting resident will be following up the patient from now onwards on the floor.        Mariama Knott MD  Emergency Medicine Resident  [address]  2/15/2023, 3:35 PM EST

## 2023-02-16 LAB
ABSOLUTE EOS #: 0.18 K/UL (ref 0–0.4)
ABSOLUTE IMMATURE GRANULOCYTE: 0.92 K/UL (ref 0–0.3)
ABSOLUTE LYMPH #: 6.07 K/UL (ref 1–4.8)
ABSOLUTE MONO #: 2.94 K/UL (ref 0.1–0.8)
ANION GAP SERPL CALCULATED.3IONS-SCNC: 9 MMOL/L (ref 9–17)
BASOPHILS # BLD: 0 % (ref 0–2)
BASOPHILS ABSOLUTE: 0 K/UL (ref 0–0.2)
BUN SERPL-MCNC: 67 MG/DL (ref 8–23)
CALCIUM SERPL-MCNC: 9.5 MG/DL (ref 8.6–10.4)
CHLORIDE SERPL-SCNC: 99 MMOL/L (ref 98–107)
CO2 SERPL-SCNC: 23 MMOL/L (ref 20–31)
CREAT SERPL-MCNC: 1.83 MG/DL (ref 0.7–1.2)
EOSINOPHILS RELATIVE PERCENT: 1 % (ref 1–4)
GFR SERPL CREATININE-BSD FRML MDRD: 40 ML/MIN/1.73M2
GLUCOSE BLD-MCNC: 129 MG/DL (ref 75–110)
GLUCOSE BLD-MCNC: 161 MG/DL (ref 75–110)
GLUCOSE BLD-MCNC: 217 MG/DL (ref 75–110)
GLUCOSE BLD-MCNC: 221 MG/DL (ref 75–110)
GLUCOSE SERPL-MCNC: 162 MG/DL (ref 70–99)
HCT VFR BLD AUTO: 41.8 % (ref 40.7–50.3)
HGB BLD-MCNC: 12.7 G/DL (ref 13–17)
IMMATURE GRANULOCYTES: 5 %
LYMPHOCYTES # BLD: 33 % (ref 24–44)
MCH RBC QN AUTO: 27.9 PG (ref 25.2–33.5)
MCHC RBC AUTO-ENTMCNC: 30.4 G/DL (ref 28.4–34.8)
MCV RBC AUTO: 91.9 FL (ref 82.6–102.9)
MICROORGANISM SPEC CULT: ABNORMAL
MICROORGANISM/AGENT SPEC: ABNORMAL
MONOCYTES # BLD: 16 % (ref 1–7)
MORPHOLOGY: ABNORMAL
NRBC AUTOMATED: 0.2 PER 100 WBC
PDW BLD-RTO: 15.5 % (ref 11.8–14.4)
PLATELET # BLD AUTO: 422 K/UL (ref 138–453)
PMV BLD AUTO: 9.4 FL (ref 8.1–13.5)
POTASSIUM SERPL-SCNC: 4.6 MMOL/L (ref 3.7–5.3)
RBC # BLD: 4.55 M/UL (ref 4.21–5.77)
SEG NEUTROPHILS: 45 % (ref 36–66)
SEGMENTED NEUTROPHILS ABSOLUTE COUNT: 8.29 K/UL (ref 1.8–7.7)
SODIUM SERPL-SCNC: 131 MMOL/L (ref 135–144)
SPECIMEN DESCRIPTION: ABNORMAL
WBC # BLD AUTO: 18.4 K/UL (ref 3.5–11.3)

## 2023-02-16 PROCEDURE — 6360000002 HC RX W HCPCS: Performed by: INTERNAL MEDICINE

## 2023-02-16 PROCEDURE — 94640 AIRWAY INHALATION TREATMENT: CPT

## 2023-02-16 PROCEDURE — 6370000000 HC RX 637 (ALT 250 FOR IP): Performed by: INTERNAL MEDICINE

## 2023-02-16 PROCEDURE — 6370000000 HC RX 637 (ALT 250 FOR IP): Performed by: NURSE PRACTITIONER

## 2023-02-16 PROCEDURE — 6370000000 HC RX 637 (ALT 250 FOR IP)

## 2023-02-16 PROCEDURE — 6360000002 HC RX W HCPCS: Performed by: STUDENT IN AN ORGANIZED HEALTH CARE EDUCATION/TRAINING PROGRAM

## 2023-02-16 PROCEDURE — 36415 COLL VENOUS BLD VENIPUNCTURE: CPT

## 2023-02-16 PROCEDURE — 2700000000 HC OXYGEN THERAPY PER DAY

## 2023-02-16 PROCEDURE — 82947 ASSAY GLUCOSE BLOOD QUANT: CPT

## 2023-02-16 PROCEDURE — 93005 ELECTROCARDIOGRAM TRACING: CPT

## 2023-02-16 PROCEDURE — 2580000003 HC RX 258: Performed by: INTERNAL MEDICINE

## 2023-02-16 PROCEDURE — 2580000003 HC RX 258: Performed by: NURSE PRACTITIONER

## 2023-02-16 PROCEDURE — 6370000000 HC RX 637 (ALT 250 FOR IP): Performed by: STUDENT IN AN ORGANIZED HEALTH CARE EDUCATION/TRAINING PROGRAM

## 2023-02-16 PROCEDURE — 99291 CRITICAL CARE FIRST HOUR: CPT | Performed by: INTERNAL MEDICINE

## 2023-02-16 PROCEDURE — 2580000003 HC RX 258

## 2023-02-16 PROCEDURE — 85025 COMPLETE CBC W/AUTO DIFF WBC: CPT

## 2023-02-16 PROCEDURE — 80048 BASIC METABOLIC PNL TOTAL CA: CPT

## 2023-02-16 PROCEDURE — 1200000000 HC SEMI PRIVATE

## 2023-02-16 PROCEDURE — 2580000003 HC RX 258: Performed by: STUDENT IN AN ORGANIZED HEALTH CARE EDUCATION/TRAINING PROGRAM

## 2023-02-16 RX ORDER — 0.9 % SODIUM CHLORIDE 0.9 %
500 INTRAVENOUS SOLUTION INTRAVENOUS ONCE
Status: COMPLETED | OUTPATIENT
Start: 2023-02-16 | End: 2023-02-17

## 2023-02-16 RX ORDER — OXYCODONE HYDROCHLORIDE 5 MG/1
5 TABLET ORAL EVERY 4 HOURS PRN
Status: DISCONTINUED | OUTPATIENT
Start: 2023-02-16 | End: 2023-02-23

## 2023-02-16 RX ORDER — ALPRAZOLAM 0.5 MG/1
0.5 TABLET ORAL ONCE
Status: COMPLETED | OUTPATIENT
Start: 2023-02-17 | End: 2023-02-17

## 2023-02-16 RX ADMIN — INSULIN GLARGINE 20 UNITS: 100 INJECTION, SOLUTION SUBCUTANEOUS at 07:58

## 2023-02-16 RX ADMIN — BRIMONIDINE TARTRATE 1 DROP: 2 SOLUTION OPHTHALMIC at 07:54

## 2023-02-16 RX ADMIN — ALTEPLASE 10 MG: 2.2 INJECTION, POWDER, LYOPHILIZED, FOR SOLUTION INTRAVENOUS at 02:02

## 2023-02-16 RX ADMIN — INSULIN GLARGINE 20 UNITS: 100 INJECTION, SOLUTION SUBCUTANEOUS at 20:54

## 2023-02-16 RX ADMIN — DORNASE ALFA 5 MG: 1 SOLUTION RESPIRATORY (INHALATION) at 14:27

## 2023-02-16 RX ADMIN — DORNASE ALFA 5 MG: 1 SOLUTION RESPIRATORY (INHALATION) at 02:02

## 2023-02-16 RX ADMIN — HEPARIN SODIUM 5000 UNITS: 5000 INJECTION INTRAVENOUS; SUBCUTANEOUS at 14:04

## 2023-02-16 RX ADMIN — FENOFIBRATE 160 MG: 160 TABLET ORAL at 07:53

## 2023-02-16 RX ADMIN — DICYCLOMINE HYDROCHLORIDE 10 MG: 10 CAPSULE ORAL at 11:44

## 2023-02-16 RX ADMIN — IPRATROPIUM BROMIDE AND ALBUTEROL SULFATE 1 AMPULE: 2.5; .5 SOLUTION RESPIRATORY (INHALATION) at 11:31

## 2023-02-16 RX ADMIN — SODIUM CHLORIDE, PRESERVATIVE FREE 10 ML: 5 INJECTION INTRAVENOUS at 20:49

## 2023-02-16 RX ADMIN — DULOXETINE HYDROCHLORIDE 60 MG: 30 CAPSULE, DELAYED RELEASE ORAL at 07:53

## 2023-02-16 RX ADMIN — DICYCLOMINE HYDROCHLORIDE 10 MG: 10 CAPSULE ORAL at 17:03

## 2023-02-16 RX ADMIN — QUETIAPINE FUMARATE 25 MG: 25 TABLET ORAL at 20:48

## 2023-02-16 RX ADMIN — SODIUM CHLORIDE, PRESERVATIVE FREE 10 ML: 5 INJECTION INTRAVENOUS at 07:59

## 2023-02-16 RX ADMIN — AMLODIPINE BESYLATE 5 MG: 5 TABLET ORAL at 07:54

## 2023-02-16 RX ADMIN — CEFEPIME 2000 MG: 2 INJECTION, POWDER, FOR SOLUTION INTRAVENOUS at 14:00

## 2023-02-16 RX ADMIN — ACETAMINOPHEN 650 MG: 325 TABLET ORAL at 11:44

## 2023-02-16 RX ADMIN — OXYCODONE 5 MG: 5 TABLET ORAL at 14:08

## 2023-02-16 RX ADMIN — REPAGLINIDE 1 MG: 0.5 TABLET ORAL at 07:53

## 2023-02-16 RX ADMIN — HEPARIN SODIUM 5000 UNITS: 5000 INJECTION INTRAVENOUS; SUBCUTANEOUS at 21:34

## 2023-02-16 RX ADMIN — FINASTERIDE 5 MG: 5 TABLET, FILM COATED ORAL at 07:53

## 2023-02-16 RX ADMIN — TIMOLOL MALEATE 1 DROP: 5 SOLUTION OPHTHALMIC at 20:49

## 2023-02-16 RX ADMIN — HEPARIN SODIUM 5000 UNITS: 5000 INJECTION INTRAVENOUS; SUBCUTANEOUS at 05:32

## 2023-02-16 RX ADMIN — TIMOLOL MALEATE 1 DROP: 5 SOLUTION OPHTHALMIC at 07:58

## 2023-02-16 RX ADMIN — IPRATROPIUM BROMIDE AND ALBUTEROL SULFATE 1 AMPULE: 2.5; .5 SOLUTION RESPIRATORY (INHALATION) at 15:43

## 2023-02-16 RX ADMIN — BRIMONIDINE TARTRATE 1 DROP: 2 SOLUTION OPHTHALMIC at 20:49

## 2023-02-16 RX ADMIN — Medication 5 MG: at 20:48

## 2023-02-16 RX ADMIN — SODIUM CHLORIDE 500 ML: 9 INJECTION, SOLUTION INTRAVENOUS at 23:23

## 2023-02-16 RX ADMIN — ALTEPLASE 10 MG: 2.2 INJECTION, POWDER, LYOPHILIZED, FOR SOLUTION INTRAVENOUS at 14:27

## 2023-02-16 RX ADMIN — DICYCLOMINE HYDROCHLORIDE 10 MG: 10 CAPSULE ORAL at 07:53

## 2023-02-16 RX ADMIN — ATORVASTATIN CALCIUM 80 MG: 80 TABLET, FILM COATED ORAL at 20:48

## 2023-02-16 RX ADMIN — METOPROLOL SUCCINATE 25 MG: 25 TABLET, FILM COATED, EXTENDED RELEASE ORAL at 20:49

## 2023-02-16 RX ADMIN — REPAGLINIDE 1 MG: 0.5 TABLET ORAL at 17:03

## 2023-02-16 RX ADMIN — OXYCODONE HYDROCHLORIDE AND ACETAMINOPHEN 1 TABLET: 5; 325 TABLET ORAL at 04:17

## 2023-02-16 RX ADMIN — IPRATROPIUM BROMIDE AND ALBUTEROL SULFATE 1 AMPULE: 2.5; .5 SOLUTION RESPIRATORY (INHALATION) at 08:06

## 2023-02-16 RX ADMIN — DULOXETINE HYDROCHLORIDE 60 MG: 30 CAPSULE, DELAYED RELEASE ORAL at 20:48

## 2023-02-16 RX ADMIN — PANTOPRAZOLE SODIUM 40 MG: 40 TABLET, DELAYED RELEASE ORAL at 07:53

## 2023-02-16 RX ADMIN — REPAGLINIDE 1 MG: 0.5 TABLET ORAL at 11:44

## 2023-02-16 RX ADMIN — CEFEPIME 2000 MG: 2 INJECTION, POWDER, FOR SOLUTION INTRAVENOUS at 01:45

## 2023-02-16 RX ADMIN — IPRATROPIUM BROMIDE AND ALBUTEROL SULFATE 1 AMPULE: 2.5; .5 SOLUTION RESPIRATORY (INHALATION) at 20:19

## 2023-02-16 RX ADMIN — INSULIN LISPRO 4 UNITS: 100 INJECTION, SOLUTION INTRAVENOUS; SUBCUTANEOUS at 11:46

## 2023-02-16 RX ADMIN — DICYCLOMINE HYDROCHLORIDE 10 MG: 10 CAPSULE ORAL at 20:49

## 2023-02-16 ASSESSMENT — PAIN DESCRIPTION - ORIENTATION: ORIENTATION: LEFT;LOWER

## 2023-02-16 ASSESSMENT — PAIN DESCRIPTION - ONSET: ONSET: AWAKENED FROM SLEEP

## 2023-02-16 ASSESSMENT — PAIN DESCRIPTION - FREQUENCY: FREQUENCY: INTERMITTENT

## 2023-02-16 ASSESSMENT — PAIN SCALES - GENERAL
PAINLEVEL_OUTOF10: 0
PAINLEVEL_OUTOF10: 7
PAINLEVEL_OUTOF10: 7
PAINLEVEL_OUTOF10: 0

## 2023-02-16 ASSESSMENT — ENCOUNTER SYMPTOMS
COUGH: 1
ABDOMINAL PAIN: 0
SHORTNESS OF BREATH: 0

## 2023-02-16 ASSESSMENT — PAIN DESCRIPTION - DESCRIPTORS: DESCRIPTORS: ACHING

## 2023-02-16 ASSESSMENT — PAIN DESCRIPTION - PAIN TYPE: TYPE: SURGICAL PAIN

## 2023-02-16 ASSESSMENT — PAIN DESCRIPTION - LOCATION: LOCATION: BACK

## 2023-02-16 ASSESSMENT — PAIN - FUNCTIONAL ASSESSMENT: PAIN_FUNCTIONAL_ASSESSMENT: PREVENTS OR INTERFERES SOME ACTIVE ACTIVITIES AND ADLS

## 2023-02-16 NOTE — PROGRESS NOTES
INTENSIVE CARE UNIT  Resident Physician Progress Note    Patient - Marva Chavis  Date of Admission -  2/12/2023 10:33 PM  Date of Evaluation -  2/16/2023  Room and Bed Number -  2112/9754-13   Hospital Day - 4      SUBJECTIVE:     OVERNIGHT EVENTS: Added seroquel to help sleep and agitation at night, continued Dornase/TPA, 700 cc of chest tube output that was less purulent, afebrile, WBC downtrending, continue cefepime    HOSPITAL COURSE: HOSPITAL COURSE: Marva Chavis is a 74 y.o. male with past medical history significant for type 2 diabetes mellitus, multiple myeloma, non-Hodgkin lymphoma, sarcoidosis, [place] from [place] [facility] with worsening respiratory status, shortness of breath, nonproductive cough. He was started on BiPAP and given antibiotics. Chest x-ray was concerning for loculated left-sided pleural effusion. Transfer was made and accepted by hospitalist, further work-up. Critical care was consulted for worsening respiratory status. Patient was on high flow and respiratory rate was in 38-40. Patient was very dyspneic and having difficulty breathing with accessory muscle use. Initially he was unable to keep BiPAP but later on able to keep BiPAP. He improved symptomatically with BiPAP and respiratory rate was in 26. Patient is transferred to the ICU for higher level of care. 2/13: Attempted to perform bedside thoracentesis, no clear pocket identified, sent to IR, chest tube placed, appears that an empyema was encountered and chest tube was placed growing gram positive cocci in pairs, on cefepime and vanco    2/14: Placed chest tube yesterday, weaned off high flow, feeling \"100 x better\". 2/15: Continued TPA/Dornase chest tube flush, Got anxious overnight requiring xanax, 1200 cc of purulent chest tube output in the last 24 hours, patient did not sleep last night    REVIEW OF SYSTEMS:  Review of Systems   Constitutional:  Negative for fever.    Respiratory:  Positive for cough. Negative for shortness of breath. Cardiovascular:  Positive for chest pain. Gastrointestinal:  Negative for abdominal pain. Psychiatric/Behavioral:  Negative for sleep disturbance. The patient is not nervous/anxious. OBJECTIVE:     VITAL SIGNS:  /77   Pulse 96   Temp 97.7 °F (36.5 °C) (Axillary)   Resp 21   Ht 6' 1\" (1.854 m)   Wt 224 lb 6.9 oz (101.8 kg)   SpO2 98%   BMI 29.61 kg/m²   Tmax over 24 hours:  Temp (24hrs), Av.6 °F (36.4 °C), Min:97.3 °F (36.3 °C), Max:97.7 °F (36.5 °C)      Patient Vitals for the past 8 hrs:   BP Temp Temp src Pulse Resp SpO2 Weight   23 0607 110/77 -- -- 96 21 -- --   23 0559 -- -- -- 95 22 98 % --   23 0536 -- -- -- -- -- -- 224 lb 6.9 oz (101.8 kg)   23 0400 130/86 97.7 °F (36.5 °C) Axillary 99 28 94 % --   23 0000 125/75 97.7 °F (36.5 °C) Axillary 89 21 95 % --         Intake/Output Summary (Last 24 hours) at 2023 0704  Last data filed at 2023 0600  Gross per 24 hour   Intake --   Output 1675 ml   Net -1675 ml     Date 23 0000 - 23 2359   Shift 4625-0494 6246-4214 0122-8596 24 Hour Total   INTAKE   Shift Total(mL/kg)       OUTPUT   Chest Tube 350   350   Shift Total(mL/kg) 350(3.4)   350(3.4)   Weight (kg) 101.8 101.8 101.8 101.8     Wt Readings from Last 3 Encounters:   23 224 lb 6.9 oz (101.8 kg)   23 245 lb (111.1 kg)   22 242 lb 11.2 oz (110.1 kg)     Body mass index is 29.61 kg/m². PHYSICAL EXAM:   Physical Exam  Constitutional:       General: He is not in acute distress. Appearance: Normal appearance. He is obese. He is not ill-appearing. HENT:      Head: Normocephalic and atraumatic. Nose: Nose normal.   Eyes:      Extraocular Movements: Extraocular movements intact. Pupils: Pupils are equal, round, and reactive to light. Cardiovascular:      Rate and Rhythm: Regular rhythm. Tachycardia present.    Pulmonary:      Effort: No respiratory distress. Breath sounds: No wheezing, rhonchi or rales. Abdominal:      General: Abdomen is flat. Palpations: Abdomen is soft. Tenderness: There is no abdominal tenderness. There is no guarding. Musculoskeletal:      Right lower leg: No edema. Left lower leg: No edema. Skin:     General: Skin is warm and dry. Neurological:      Mental Status: He is alert.    Psychiatric:         Mood and Affect: Mood normal.         Behavior: Behavior normal.          MEDICATIONS:  Scheduled Meds:   melatonin  5 mg Oral Nightly    QUEtiapine  25 mg Oral Nightly    insulin lispro  0-16 Units SubCUTAneous TID WC    insulin lispro  0-4 Units SubCUTAneous Nightly    alteplase (ACTIVASE) syringe  10 mg IntraPLEUral Q12H    And    dornase (PULMOZYME) syringe  5 mg IntraPLEUral Q12H    heparin (porcine)  5,000 Units SubCUTAneous 3 times per day    [Held by provider] furosemide  40 mg IntraVENous BID    cefepime  2,000 mg IntraVENous Q12H    amLODIPine  5 mg Oral Daily    atorvastatin  80 mg Oral Daily    DULoxetine  60 mg Oral BID    dicyclomine  10 mg Oral 4x Daily AC & HS    finasteride  5 mg Oral Daily    fenofibrate  160 mg Oral Daily    metoprolol succinate  25 mg Oral Nightly    pantoprazole  40 mg Oral QAM AC    repaglinide  1 mg Oral TID AC    brimonidine  1 drop Right Eye BID    And    timolol  1 drop Right Eye BID    insulin glargine  20 Units SubCUTAneous BID    lidocaine  20 mL IntraDERmal Once    sodium chloride flush  5-40 mL IntraVENous 2 times per day    ipratropium-albuterol  1 ampule Inhalation Q4H WA     Continuous Infusions:   dextrose      sodium chloride       PRN Meds:   oxyCODONE-acetaminophen, 1 tablet, Q4H PRN  glucose, 4 tablet, PRN  dextrose bolus, 125 mL, PRN   Or  dextrose bolus, 250 mL, PRN  glucagon (rDNA), 1 mg, PRN  dextrose, , Continuous PRN  sodium chloride flush, 5-40 mL, PRN  sodium chloride, , PRN  ondansetron, 4 mg, Q8H PRN   Or  ondansetron, 4 mg, Q6H PRN  polyethylene glycol, 17 g, Daily PRN  acetaminophen, 650 mg, Q6H PRN   Or  acetaminophen, 650 mg, Q6H PRN  albuterol, 2.5 mg, Q2H PRN      SUPPORT DEVICES: [x] Nasal Cannula 2L    VENT SETTINGS (Comprehensive) (if applicable): Additional Respiratory Assessments  Heart Rate: 96  Resp: 21  SpO2: 98 %  Humidification Source: Heated wire  Humidification Temp: 34    ABGs:   Lab Results   Component Value Date/Time    FIO2 50.0 02/13/2023 03:06 AM         DATA:  Complete Blood Count:   Recent Labs     02/14/23  0534 02/15/23  0413 02/16/23  0333   WBC 19.8* 21.8* 18.4*   RBC 4.01* 4.26 4.55   HGB 11.1* 12.0* 12.7*   HCT 35.8* 38.1* 41.8   MCV 89.3 89.4 91.9   MCH 27.7 28.2 27.9   MCHC 31.0 31.5 30.4   RDW 15.0* 15.3* 15.5*    471* 422   MPV 9.6 9.6 9.4        Last 3 Blood Glucose:   Recent Labs     02/13/23  0752 02/13/23  1336 02/13/23 1814 02/14/23 0534 02/15/23  0413 02/16/23  0333   GLUCOSE 357* 322* 269* 266* 138* 162*        PT/INR:  No results found for: PROTIME, INR  PTT:  No results found for: APTT, PTT    Comprehensive Metabolic Profile:   Recent Labs     02/13/23  1336 02/13/23 1814 02/14/23 0534 02/15/23  0413 02/16/23  0333      < > 133* 133* 131*   K 5.0   < > 4.4 4.7 4.6   CL 98   < > 98 100 99   CO2 24   < > 25 24 23   BUN 87*   < > 88* 74* 67*   CREATININE 2.51*   < > 2.38* 2.02* 1.83*   GLUCOSE 322*   < > 266* 138* 162*   CALCIUM 9.9   < > 9.5 9.7 9.5   PROT 6.5  --   --   --   --    LABALBU 3.0*  --   --   --   --    BILITOT 0.6  --   --   --   --    AST 29  --   --   --   --    ALT 18  --   --   --   --     < > = values in this interval not displayed.       Magnesium:   Lab Results   Component Value Date/Time    MG 2.9 02/13/2023 07:52 AM    MG 1.9 10/12/2020 12:00 AM    MG 1.9 03/04/2020 12:00 AM     Phosphorus:   Lab Results   Component Value Date/Time    PHOS 2.5 03/04/2020 12:00 AM    PHOS 2.7 08/19/2019 12:00 AM     Ionized Calcium:   Lab Results   Component Value Date/Time    CAION 5.19 04/03/2012 11:08 AM        Urinalysis:   Lab Results   Component Value Date/Time    NITRU NEGATIVE 02/13/2023 02:17 AM    COLORU Yellow 02/13/2023 02:17 AM    PHUR 5.0 02/13/2023 02:17 AM    WBCUA 2 TO 5 02/13/2023 02:17 AM    RBCUA 0 TO 2 02/13/2023 02:17 AM    MUCUS 1+ 02/13/2023 02:17 AM    SPECGRAV 1.021 02/13/2023 02:17 AM    LEUKOCYTESUR NEGATIVE 02/13/2023 02:17 AM    UROBILINOGEN Normal 02/13/2023 02:17 AM    BILIRUBINUR NEGATIVE 02/13/2023 02:17 AM    BILIRUBINUR NEGATIVE 04/03/2012 11:08 AM    GLUCOSEU 2+ 02/13/2023 02:17 AM    GLUCOSEU NEGATIVE 04/03/2012 11:08 AM    KETUA NEGATIVE 02/13/2023 02:17 AM    AMORPHOUS 1+ 02/13/2023 02:17 AM       HgBA1c:    Lab Results   Component Value Date/Time    LABA1C 8.2 02/13/2023 03:07 AM     TSH:  No results found for: TSH  Lactic Acid: No results found for: LACTA   Troponin: No results for input(s): TROPONINI in the last 72 hours.       Radiology/Imaging:  CXR yesterday shows interval placement of the chest tube with reduced left sided pleural effusion     ASSESSMENT:     Patient Active Problem List    Diagnosis Date Noted    Hyperkalemia 02/13/2023    Acute respiratory failure with hypoxia ([address] 02/13/2023    Hyponatremia 02/13/2023    Hyperglycemia 02/13/2023    History of non-Hodgkin's lymphoma 02/13/2023    History of sarcoidosis 02/13/2023    Pneumonia due to infectious organism 02/12/2023    Pneumonia, unspecified organism 02/12/2023    Pleural effusion 02/12/2023    Acute interstitial nephritis 08/03/2022    ARF (acute renal failure) ([address] 08/03/2022    Abnormal echocardiogram 08/15/2018    Dilated aortic root ([address] 08/15/2018    Essential hypertension 05/23/2017    AAA (abdominal aortic aneurysm) without rupture 03/23/2017    Family history of abdominal aortic aneurysm (AAA) 01/13/2017    Hyperlipidemia 06/17/2016    Sarcoidosis 05/24/2016    Non Hodgkin's lymphoma ([facility] 05/24/2016    Type 2 diabetes mellitus with diabetic chronic kidney disease ([facility] 12/08/2015    Non-rheumatic mitral regurgitation 04/08/2015    Nonrheumatic aortic valve insufficiency 04/08/2015    Pulmonary hypertension ([facility] 04/08/2015    DM (diabetes mellitus) ([facility] 12/02/2014    CKD (chronic kidney disease), stage III ([facility] 10/28/2014    Nephrolithiasis 10/28/2014          PLAN:   Continue Dornase/TPA chest tube washes, potential transfer, may need repeat CT Chest with contrast in 2 days     HOME MEDS RECONCILED: Yes    CONSULTATION NEEDED:  No    FAMILY UPDATED:    Yes    TRANSFER OUT OF ICU:   Potential        Additional Assessment:  Principal Problem:    Pneumonia, unspecified organism  Active Problems:    Pleural effusion    Hyperkalemia    Acute respiratory failure with hypoxia (HCC)    Hyponatremia    Hyperglycemia    History of non-Hodgkin's lymphoma    History of sarcoidosis  Resolved Problems:    * No resolved hospital problems.  *             Plan:  Neuro:  No deficits   Sedation: None  Pain control: Tylenol PRN     CV:  Telemetry  BP and HR normal - HR stable overnight, BP stable, RR improved  Amlodipine 5 mg daily  Metoprolol 25 mg nightly     Heme:  H&H 12.7  Platelets 350     Resp:  100% on 2L NC  Weaned off Hi-Gennaro 2/14  Went for thoracentesis yesterday, 10 Palestinian chest tube placed, 700 cc out, on continuous wall suction   Continue Dornase/TPA flushes  Thoracentesis results: Glucose 33, pH 4.0, LDH 6590, culture growing strep viridans  CXR 2/14 - improved pleural effusion      /Fluids/Electrolytes:  BUN 67, Cr 1.83 (stable)  Monitor UO - 975 out (urine)  Daily BMP: yes  Electrolytes: Sodium 131, otherwise stable  Fluids - None     GI/Nutrition:  Adult diabetic diet  Bowel regimen: PRN     ID:  Cefepime   Afebrile, WBC downtrending     Endo:  Monitor glucose - stabilized   Lantus 20 units nightly   Switched to high dose sliding scale, diet changed to carb restricted         Musculoskeletal:  Injuries: None  Restrictions: Left sided chest tube      Lines/Drains:  Left chest tube Peripheral IV     Prophylaxis:  DVT: Heparin 5000 units TID, SCD  GI: Protonix 40 mg     Dispo:  Transfer pending bed        Karlos Morgan MD  EM Resident PGY-3  Intensive Care Unit  2/16/2023 7:04 AM

## 2023-02-16 NOTE — PLAN OF CARE
Problem: Discharge Planning  Goal: Discharge to home or other facility with appropriate resources  2/15/2023 2049 by Shakeel Machado RN  Outcome: Progressing  2/15/2023 0738 by Guillermo Medrano  Outcome: Progressing     Problem: Safety - Adult  Goal: Free from fall injury  2/15/2023 2049 by Shakeel Machado RN  Outcome: Progressing  2/15/2023 0738 by Guillermo Medrano  Outcome: Progressing     Problem: ABCDS Injury Assessment  Goal: Absence of physical injury  2/15/2023 2049 by Shakeel Machado RN  Outcome: Progressing  2/15/2023 0738 by Guillermo Medrano  Outcome: Progressing     Problem: Skin/Tissue Integrity  Goal: Absence of new skin breakdown  Description: 1. Monitor for areas of redness and/or skin breakdown  2. Assess vascular access sites hourly  3. Every 4-6 hours minimum:  Change oxygen saturation probe site  4. Every 4-6 hours:  If on nasal continuous positive airway pressure, respiratory therapy assess nares and determine need for appliance change or resting period.   2/15/2023 2049 by Shakeel Machado RN  Outcome: Progressing  2/15/2023 0738 by Guillermo Medrano  Outcome: Progressing     Problem: Pain  Goal: Verbalizes/displays adequate comfort level or baseline comfort level  2/15/2023 2049 by Shakeel aMchado RN  Outcome: Progressing  2/15/2023 0738 by Guillermo Medrano  Outcome: Progressing

## 2023-02-16 NOTE — PROGRESS NOTES
PATIENT REFUSES TO WEAR BIPAP     [x] Risks and benefits explained to patient   [x] Patient refuses to wear Bipap stating he is finally sleeping after not sleeping for days. [x] Patient verbalizes understanding of information presented.

## 2023-02-17 ENCOUNTER — APPOINTMENT (OUTPATIENT)
Dept: CT IMAGING | Age: 68
DRG: 853 | End: 2023-02-17
Attending: STUDENT IN AN ORGANIZED HEALTH CARE EDUCATION/TRAINING PROGRAM
Payer: COMMERCIAL

## 2023-02-17 ENCOUNTER — APPOINTMENT (OUTPATIENT)
Dept: GENERAL RADIOLOGY | Age: 68
DRG: 853 | End: 2023-02-17
Attending: STUDENT IN AN ORGANIZED HEALTH CARE EDUCATION/TRAINING PROGRAM
Payer: COMMERCIAL

## 2023-02-17 LAB
ABSOLUTE EOS #: 0 K/UL (ref 0–0.4)
ABSOLUTE EOS #: 0.33 K/UL (ref 0–0.4)
ABSOLUTE IMMATURE GRANULOCYTE: 2.4 K/UL (ref 0–0.3)
ABSOLUTE IMMATURE GRANULOCYTE: 2.62 K/UL (ref 0–0.3)
ABSOLUTE LYMPH #: 10.82 K/UL (ref 1–4.8)
ABSOLUTE LYMPH #: 5.07 K/UL (ref 1–4.8)
ABSOLUTE MONO #: 0.27 K/UL (ref 0.1–0.8)
ABSOLUTE MONO #: 1.64 K/UL (ref 0.1–0.8)
ANION GAP SERPL CALCULATED.3IONS-SCNC: 9 MMOL/L (ref 9–17)
BASOPHILS # BLD: 0 % (ref 0–2)
BASOPHILS # BLD: 0 % (ref 0–2)
BASOPHILS ABSOLUTE: 0 K/UL (ref 0–0.2)
BASOPHILS ABSOLUTE: 0 K/UL (ref 0–0.2)
BUN SERPL-MCNC: 66 MG/DL (ref 8–23)
CALCIUM SERPL-MCNC: 9.6 MG/DL (ref 8.6–10.4)
CHLORIDE SERPL-SCNC: 101 MMOL/L (ref 98–107)
CO2 SERPL-SCNC: 22 MMOL/L (ref 20–31)
CREAT SERPL-MCNC: 1.98 MG/DL (ref 0.7–1.2)
EKG ATRIAL RATE: 124 BPM
EKG P AXIS: -21 DEGREES
EKG P-R INTERVAL: 130 MS
EKG Q-T INTERVAL: 292 MS
EKG QRS DURATION: 94 MS
EKG QTC CALCULATION (BAZETT): 419 MS
EKG R AXIS: 34 DEGREES
EKG T AXIS: -14 DEGREES
EKG VENTRICULAR RATE: 124 BPM
EOSINOPHILS RELATIVE PERCENT: 0 % (ref 1–4)
EOSINOPHILS RELATIVE PERCENT: 1 % (ref 1–4)
GFR SERPL CREATININE-BSD FRML MDRD: 37 ML/MIN/1.73M2
GLUCOSE BLD-MCNC: 137 MG/DL (ref 75–110)
GLUCOSE BLD-MCNC: 155 MG/DL (ref 75–110)
GLUCOSE BLD-MCNC: 161 MG/DL (ref 75–110)
GLUCOSE SERPL-MCNC: 139 MG/DL (ref 70–99)
HCT VFR BLD AUTO: 39.8 % (ref 40.7–50.3)
HCT VFR BLD AUTO: 42.4 % (ref 40.7–50.3)
HGB BLD-MCNC: 12.3 G/DL (ref 13–17)
HGB BLD-MCNC: 12.9 G/DL (ref 13–17)
IMMATURE GRANULOCYTES: 8 %
IMMATURE GRANULOCYTES: 9 %
LYMPHOCYTES # BLD: 19 % (ref 24–44)
LYMPHOCYTES # BLD: 33 % (ref 24–44)
MCH RBC QN AUTO: 27.2 PG (ref 25.2–33.5)
MCH RBC QN AUTO: 27.7 PG (ref 25.2–33.5)
MCHC RBC AUTO-ENTMCNC: 30.4 G/DL (ref 28.4–34.8)
MCHC RBC AUTO-ENTMCNC: 30.9 G/DL (ref 28.4–34.8)
MCV RBC AUTO: 89.5 FL (ref 82.6–102.9)
MCV RBC AUTO: 89.6 FL (ref 82.6–102.9)
MICROORGANISM SPEC CULT: NORMAL
MICROORGANISM SPEC CULT: NORMAL
MONOCYTES # BLD: 1 % (ref 1–7)
MONOCYTES # BLD: 5 % (ref 1–7)
MORPHOLOGY: ABNORMAL
NRBC AUTOMATED: 0 PER 100 WBC
NRBC AUTOMATED: 0 PER 100 WBC
PDW BLD-RTO: 15.2 % (ref 11.8–14.4)
PDW BLD-RTO: 15.2 % (ref 11.8–14.4)
PLATELET # BLD AUTO: 450 K/UL (ref 138–453)
PLATELET # BLD AUTO: 538 K/UL (ref 138–453)
PMV BLD AUTO: 9.7 FL (ref 8.1–13.5)
PMV BLD AUTO: 9.8 FL (ref 8.1–13.5)
POTASSIUM SERPL-SCNC: 5.4 MMOL/L (ref 3.7–5.3)
PROCALCITONIN SERPL-MCNC: 0.94 NG/ML
RBC # BLD: 4.44 M/UL (ref 4.21–5.77)
RBC # BLD: 4.74 M/UL (ref 4.21–5.77)
SEG NEUTROPHILS: 53 % (ref 36–66)
SEG NEUTROPHILS: 71 % (ref 36–66)
SEGMENTED NEUTROPHILS ABSOLUTE COUNT: 17.39 K/UL (ref 1.8–7.7)
SEGMENTED NEUTROPHILS ABSOLUTE COUNT: 18.96 K/UL (ref 1.8–7.7)
SERVICE CMNT-IMP: NORMAL
SERVICE CMNT-IMP: NORMAL
SODIUM SERPL-SCNC: 132 MMOL/L (ref 135–144)
SPECIMEN DESCRIPTION: NORMAL
SPECIMEN DESCRIPTION: NORMAL
WBC # BLD AUTO: 26.7 K/UL (ref 3.5–11.3)
WBC # BLD AUTO: 32.8 K/UL (ref 3.5–11.3)

## 2023-02-17 PROCEDURE — 2500000003 HC RX 250 WO HCPCS: Performed by: STUDENT IN AN ORGANIZED HEALTH CARE EDUCATION/TRAINING PROGRAM

## 2023-02-17 PROCEDURE — 97530 THERAPEUTIC ACTIVITIES: CPT

## 2023-02-17 PROCEDURE — 82947 ASSAY GLUCOSE BLOOD QUANT: CPT

## 2023-02-17 PROCEDURE — 2700000000 HC OXYGEN THERAPY PER DAY

## 2023-02-17 PROCEDURE — 87086 URINE CULTURE/COLONY COUNT: CPT

## 2023-02-17 PROCEDURE — 6370000000 HC RX 637 (ALT 250 FOR IP): Performed by: INTERNAL MEDICINE

## 2023-02-17 PROCEDURE — 6370000000 HC RX 637 (ALT 250 FOR IP): Performed by: STUDENT IN AN ORGANIZED HEALTH CARE EDUCATION/TRAINING PROGRAM

## 2023-02-17 PROCEDURE — 6370000000 HC RX 637 (ALT 250 FOR IP)

## 2023-02-17 PROCEDURE — 94640 AIRWAY INHALATION TREATMENT: CPT

## 2023-02-17 PROCEDURE — 2580000003 HC RX 258: Performed by: NURSE PRACTITIONER

## 2023-02-17 PROCEDURE — 80048 BASIC METABOLIC PNL TOTAL CA: CPT

## 2023-02-17 PROCEDURE — 94761 N-INVAS EAR/PLS OXIMETRY MLT: CPT

## 2023-02-17 PROCEDURE — 6360000002 HC RX W HCPCS: Performed by: INTERNAL MEDICINE

## 2023-02-17 PROCEDURE — 2580000003 HC RX 258: Performed by: STUDENT IN AN ORGANIZED HEALTH CARE EDUCATION/TRAINING PROGRAM

## 2023-02-17 PROCEDURE — 97110 THERAPEUTIC EXERCISES: CPT

## 2023-02-17 PROCEDURE — 2580000003 HC RX 258: Performed by: INTERNAL MEDICINE

## 2023-02-17 PROCEDURE — 71250 CT THORAX DX C-: CPT

## 2023-02-17 PROCEDURE — 85025 COMPLETE CBC W/AUTO DIFF WBC: CPT

## 2023-02-17 PROCEDURE — 81001 URINALYSIS AUTO W/SCOPE: CPT

## 2023-02-17 PROCEDURE — 93970 EXTREMITY STUDY: CPT

## 2023-02-17 PROCEDURE — 36415 COLL VENOUS BLD VENIPUNCTURE: CPT

## 2023-02-17 PROCEDURE — 87040 BLOOD CULTURE FOR BACTERIA: CPT

## 2023-02-17 PROCEDURE — 93010 ELECTROCARDIOGRAM REPORT: CPT | Performed by: INTERNAL MEDICINE

## 2023-02-17 PROCEDURE — 6370000000 HC RX 637 (ALT 250 FOR IP): Performed by: NURSE PRACTITIONER

## 2023-02-17 PROCEDURE — 71045 X-RAY EXAM CHEST 1 VIEW: CPT

## 2023-02-17 PROCEDURE — 2060000000 HC ICU INTERMEDIATE R&B

## 2023-02-17 PROCEDURE — 94660 CPAP INITIATION&MGMT: CPT

## 2023-02-17 PROCEDURE — 6360000002 HC RX W HCPCS: Performed by: STUDENT IN AN ORGANIZED HEALTH CARE EDUCATION/TRAINING PROGRAM

## 2023-02-17 PROCEDURE — 84145 PROCALCITONIN (PCT): CPT

## 2023-02-17 PROCEDURE — 2500000003 HC RX 250 WO HCPCS

## 2023-02-17 PROCEDURE — 99291 CRITICAL CARE FIRST HOUR: CPT | Performed by: INTERNAL MEDICINE

## 2023-02-17 PROCEDURE — 97116 GAIT TRAINING THERAPY: CPT

## 2023-02-17 RX ORDER — QUETIAPINE FUMARATE 25 MG/1
50 TABLET, FILM COATED ORAL NIGHTLY
Status: DISCONTINUED | OUTPATIENT
Start: 2023-02-17 | End: 2023-02-19

## 2023-02-17 RX ORDER — DEXTROSE MONOHYDRATE 25 G/50ML
25 INJECTION, SOLUTION INTRAVENOUS ONCE
Status: COMPLETED | OUTPATIENT
Start: 2023-02-17 | End: 2023-02-17

## 2023-02-17 RX ORDER — METOPROLOL TARTRATE 5 MG/5ML
5 INJECTION INTRAVENOUS ONCE
Status: COMPLETED | OUTPATIENT
Start: 2023-02-17 | End: 2023-02-17

## 2023-02-17 RX ADMIN — Medication 5 MG: at 20:59

## 2023-02-17 RX ADMIN — ATORVASTATIN CALCIUM 80 MG: 80 TABLET, FILM COATED ORAL at 21:01

## 2023-02-17 RX ADMIN — CEFEPIME 2000 MG: 2 INJECTION, POWDER, FOR SOLUTION INTRAVENOUS at 13:33

## 2023-02-17 RX ADMIN — INSULIN GLARGINE 20 UNITS: 100 INJECTION, SOLUTION SUBCUTANEOUS at 20:33

## 2023-02-17 RX ADMIN — HEPARIN SODIUM 5000 UNITS: 5000 INJECTION INTRAVENOUS; SUBCUTANEOUS at 21:00

## 2023-02-17 RX ADMIN — SODIUM CHLORIDE, PRESERVATIVE FREE 10 ML: 5 INJECTION INTRAVENOUS at 09:08

## 2023-02-17 RX ADMIN — IPRATROPIUM BROMIDE AND ALBUTEROL SULFATE 1 AMPULE: 2.5; .5 SOLUTION RESPIRATORY (INHALATION) at 11:54

## 2023-02-17 RX ADMIN — DICYCLOMINE HYDROCHLORIDE 10 MG: 10 CAPSULE ORAL at 20:59

## 2023-02-17 RX ADMIN — SODIUM CHLORIDE, PRESERVATIVE FREE 10 ML: 5 INJECTION INTRAVENOUS at 21:00

## 2023-02-17 RX ADMIN — OXYCODONE 5 MG: 5 TABLET ORAL at 03:25

## 2023-02-17 RX ADMIN — DULOXETINE HYDROCHLORIDE 60 MG: 30 CAPSULE, DELAYED RELEASE ORAL at 08:39

## 2023-02-17 RX ADMIN — HEPARIN SODIUM 5000 UNITS: 5000 INJECTION INTRAVENOUS; SUBCUTANEOUS at 05:37

## 2023-02-17 RX ADMIN — HEPARIN SODIUM 5000 UNITS: 5000 INJECTION INTRAVENOUS; SUBCUTANEOUS at 13:33

## 2023-02-17 RX ADMIN — INSULIN HUMAN 10 UNITS: 100 INJECTION, SOLUTION PARENTERAL at 09:01

## 2023-02-17 RX ADMIN — ALTEPLASE 10 MG: 2.2 INJECTION, POWDER, LYOPHILIZED, FOR SOLUTION INTRAVENOUS at 02:22

## 2023-02-17 RX ADMIN — IPRATROPIUM BROMIDE AND ALBUTEROL SULFATE 1 AMPULE: 2.5; .5 SOLUTION RESPIRATORY (INHALATION) at 08:28

## 2023-02-17 RX ADMIN — DEXTROSE MONOHYDRATE 25 G: 25 INJECTION, SOLUTION INTRAVENOUS at 08:54

## 2023-02-17 RX ADMIN — REPAGLINIDE 1 MG: 0.5 TABLET ORAL at 12:04

## 2023-02-17 RX ADMIN — IPRATROPIUM BROMIDE AND ALBUTEROL SULFATE 1 AMPULE: 2.5; .5 SOLUTION RESPIRATORY (INHALATION) at 15:43

## 2023-02-17 RX ADMIN — IPRATROPIUM BROMIDE AND ALBUTEROL SULFATE 1 AMPULE: 2.5; .5 SOLUTION RESPIRATORY (INHALATION) at 20:09

## 2023-02-17 RX ADMIN — DULOXETINE HYDROCHLORIDE 60 MG: 30 CAPSULE, DELAYED RELEASE ORAL at 20:59

## 2023-02-17 RX ADMIN — PANTOPRAZOLE SODIUM 40 MG: 40 TABLET, DELAYED RELEASE ORAL at 08:38

## 2023-02-17 RX ADMIN — ALPRAZOLAM 0.5 MG: 0.5 TABLET ORAL at 00:01

## 2023-02-17 RX ADMIN — OXYCODONE 5 MG: 5 TABLET ORAL at 16:24

## 2023-02-17 RX ADMIN — FENOFIBRATE 160 MG: 160 TABLET ORAL at 08:37

## 2023-02-17 RX ADMIN — BRIMONIDINE TARTRATE 1 DROP: 2 SOLUTION OPHTHALMIC at 08:39

## 2023-02-17 RX ADMIN — INSULIN GLARGINE 20 UNITS: 100 INJECTION, SOLUTION SUBCUTANEOUS at 08:51

## 2023-02-17 RX ADMIN — DICYCLOMINE HYDROCHLORIDE 10 MG: 10 CAPSULE ORAL at 08:38

## 2023-02-17 RX ADMIN — METOPROLOL TARTRATE 5 MG: 5 INJECTION, SOLUTION INTRAVENOUS at 01:17

## 2023-02-17 RX ADMIN — REPAGLINIDE 1 MG: 0.5 TABLET ORAL at 16:25

## 2023-02-17 RX ADMIN — TIMOLOL MALEATE 1 DROP: 5 SOLUTION OPHTHALMIC at 08:39

## 2023-02-17 RX ADMIN — BRIMONIDINE TARTRATE 1 DROP: 2 SOLUTION OPHTHALMIC at 21:01

## 2023-02-17 RX ADMIN — QUETIAPINE FUMARATE 50 MG: 25 TABLET ORAL at 20:59

## 2023-02-17 RX ADMIN — AMLODIPINE BESYLATE 5 MG: 5 TABLET ORAL at 08:38

## 2023-02-17 RX ADMIN — DICYCLOMINE HYDROCHLORIDE 10 MG: 10 CAPSULE ORAL at 16:25

## 2023-02-17 RX ADMIN — TIMOLOL MALEATE 1 DROP: 5 SOLUTION OPHTHALMIC at 21:01

## 2023-02-17 RX ADMIN — DICYCLOMINE HYDROCHLORIDE 10 MG: 10 CAPSULE ORAL at 12:04

## 2023-02-17 RX ADMIN — CEFEPIME 2000 MG: 2 INJECTION, POWDER, FOR SOLUTION INTRAVENOUS at 01:15

## 2023-02-17 RX ADMIN — FINASTERIDE 5 MG: 5 TABLET, FILM COATED ORAL at 08:38

## 2023-02-17 RX ADMIN — DORNASE ALFA 5 MG: 1 SOLUTION RESPIRATORY (INHALATION) at 02:22

## 2023-02-17 RX ADMIN — MAGNESIUM HYDROXIDE 30 ML: 400 SUSPENSION ORAL at 08:51

## 2023-02-17 ASSESSMENT — PAIN DESCRIPTION - ONSET: ONSET: AWAKENED FROM SLEEP

## 2023-02-17 ASSESSMENT — PAIN DESCRIPTION - FREQUENCY: FREQUENCY: INTERMITTENT

## 2023-02-17 ASSESSMENT — PAIN DESCRIPTION - LOCATION
LOCATION: BACK
LOCATION: BACK

## 2023-02-17 ASSESSMENT — ENCOUNTER SYMPTOMS
ABDOMINAL PAIN: 0
COUGH: 1
SHORTNESS OF BREATH: 0

## 2023-02-17 ASSESSMENT — PAIN - FUNCTIONAL ASSESSMENT: PAIN_FUNCTIONAL_ASSESSMENT: PREVENTS OR INTERFERES SOME ACTIVE ACTIVITIES AND ADLS

## 2023-02-17 ASSESSMENT — PAIN DESCRIPTION - ORIENTATION: ORIENTATION: POSTERIOR;LEFT

## 2023-02-17 ASSESSMENT — PAIN SCALES - GENERAL
PAINLEVEL_OUTOF10: 7
PAINLEVEL_OUTOF10: 7
PAINLEVEL_OUTOF10: 6

## 2023-02-17 ASSESSMENT — PAIN DESCRIPTION - DESCRIPTORS
DESCRIPTORS: ACHING
DESCRIPTORS: ACHING

## 2023-02-17 ASSESSMENT — PAIN DESCRIPTION - PAIN TYPE: TYPE: SURGICAL PAIN

## 2023-02-17 NOTE — CARE COORDINATION
Transitional planning. CT Chest today. L Posterior CT, 2L NC, Plan is home w/ [facility] (accepted as long as Primary insurance is identified) Wife will transport, has established PCP.

## 2023-02-17 NOTE — PLAN OF CARE
Suction turned off, Chest tube clamped. Intrapleural alteplase and dornase were administered at 2:20 am. Dwell for 2 hours until 4:20 am. Unclamp and start on suction after 2 hours. Discussed with TORRIE.         Dragan Fileds MD, MPH,   Internal Medicine Resident  [place], [place]

## 2023-02-17 NOTE — PROGRESS NOTES
INTENSIVE CARE UNIT  Resident Physician Progress Note    Patient - Abdulaziz Humphrey  Date of Admission -  2/12/2023 10:33 PM  Date of Evaluation -  2/17/2023  Room and Bed Number -  3104/8475-12   Hospital Day - 5      SUBJECTIVE:     OVERNIGHT EVENTS: WBC uptrended, sent cultures, tachypneic, restless overnight, given xanax, more tachycardic overnight as well, plan for CT chest today, will discuss possible CTA to evaluate for PE    HOSPITAL COURSE: HOSPITAL COURSE: Abdulaziz Humphrey is a 74 y.o. male with past medical history significant for type 2 diabetes mellitus, multiple myeloma, non-Hodgkin lymphoma, sarcoidosis, referred from [facility] ER with worsening respiratory status, shortness of breath, nonproductive cough. He was started on BiPAP and given antibiotics. Chest x-ray was concerning for loculated left-sided pleural effusion. Transfer was made and accepted by hospitalist, further work-up. Critical care was consulted for worsening respiratory status. Patient was on high flow and respiratory rate was in 38-40. Patient was very dyspneic and having difficulty breathing with accessory muscle use. Initially he was unable to keep BiPAP but later on able to keep BiPAP. He improved symptomatically with BiPAP and respiratory rate was in 26. Patient is transferred to the ICU for higher level of care. 2/13: Attempted to perform bedside thoracentesis, no clear pocket identified, sent to IR, chest tube placed, appears that an empyema was encountered and chest tube was placed growing gram positive cocci in pairs, on cefepime and vanco    2/14: Placed chest tube yesterday, weaned off high flow, feeling \"100 x better\".      2/15: Continued TPA/Dornase chest tube flush, Got anxious overnight requiring xanax, 1200 cc of purulent chest tube output in the last 24 hours, patient did not sleep last night    2/16: Added seroquel to help sleep and agitation at night, continued Dornase/TPA, 700 cc of chest tube output that was less purulent, afebrile, WBC downtrending, continue cefepime    REVIEW OF SYSTEMS:  Review of Systems   Constitutional:  Negative for fever. Respiratory:  Positive for cough. Negative for shortness of breath. Cardiovascular:  Positive for chest pain. Gastrointestinal:  Negative for abdominal pain. Psychiatric/Behavioral:  Negative for sleep disturbance. The patient is not nervous/anxious. OBJECTIVE:     VITAL SIGNS:  /81   Pulse 100   Temp 97.9 °F (36.6 °C) (Oral)   Resp 22   Ht 6' 1\" (1.854 m)   Wt 224 lb 10.4 oz (101.9 kg)   SpO2 94%   BMI 29.64 kg/m²   Tmax over 24 hours:  Temp (24hrs), Av.8 °F (36.6 °C), Min:97.5 °F (36.4 °C), Max:98.2 °F (36.8 °C)      Patient Vitals for the past 8 hrs:   BP Temp Temp src Pulse Resp SpO2 Weight   23 0700 111/81 -- -- 100 22 94 % --   23 0656 -- -- -- -- -- -- 224 lb 10.4 oz (101.9 kg)   23 0600 124/85 97.9 °F (36.6 °C) Oral (!) 103 23 95 % --   23 0500 103/77 -- -- (!) 105 22 96 % --   23 0400 117/82 98.2 °F (36.8 °C) Oral (!) 108 26 95 % --   23 0340 -- -- -- (!) 111 27 94 % --   23 0300 113/82 -- -- (!) 111 (!) 35 95 % --   23 0200 117/80 -- -- (!) 109 20 95 % --   23 0100 117/87 -- -- (!) 122 25 94 % --           Intake/Output Summary (Last 24 hours) at 2023 0817  Last data filed at 2023 0600  Gross per 24 hour   Intake --   Output 2125 ml   Net -2125 ml       Date 23 0000 - 23 2359   Shift 6053-0203 7503-9222 2517-8215 24 Hour Total   INTAKE   Shift Total(mL/kg)       OUTPUT   Urine(mL/kg/hr) 200(0.2)   200   Chest Tube 420   420   Shift Total(mL/kg) 620(6.1)   620(6.1)   Weight (kg) 101.9 101.9 101.9 101.9       Wt Readings from Last 3 Encounters:   23 224 lb 10.4 oz (101.9 kg)   23 245 lb (111.1 kg)   22 242 lb 11.2 oz (110.1 kg)     Body mass index is 29.64 kg/m².         PHYSICAL EXAM:   Physical Exam  Constitutional: General: He is not in acute distress.     Appearance: Normal appearance. He is obese. He is not ill-appearing.   HENT:      Head: Normocephalic and atraumatic.      Nose: Nose normal.   Eyes:      Extraocular Movements: Extraocular movements intact.      Pupils: Pupils are equal, round, and reactive to light.   Cardiovascular:      Rate and Rhythm: Regular rhythm. Tachycardia present.      Pulses: Normal pulses.   Pulmonary:      Effort: No respiratory distress.      Breath sounds: No wheezing, rhonchi or rales.   Abdominal:      General: Abdomen is flat.      Palpations: Abdomen is soft.      Tenderness: There is no abdominal tenderness. There is no guarding.   Musculoskeletal:      Right lower leg: No edema.      Left lower leg: No edema.   Skin:     General: Skin is warm and dry.      Capillary Refill: Capillary refill takes less than 2 seconds.   Neurological:      Mental Status: He is alert.   Psychiatric:         Mood and Affect: Mood normal.         Behavior: Behavior normal.          MEDICATIONS:  Scheduled Meds:   melatonin  5 mg Oral Nightly    QUEtiapine  25 mg Oral Nightly    insulin lispro  0-16 Units SubCUTAneous TID WC    insulin lispro  0-4 Units SubCUTAneous Nightly    heparin (porcine)  5,000 Units SubCUTAneous 3 times per day    [Held by provider] furosemide  40 mg IntraVENous BID    cefepime  2,000 mg IntraVENous Q12H    amLODIPine  5 mg Oral Daily    atorvastatin  80 mg Oral Daily    DULoxetine  60 mg Oral BID    dicyclomine  10 mg Oral 4x Daily AC & HS    finasteride  5 mg Oral Daily    fenofibrate  160 mg Oral Daily    metoprolol succinate  25 mg Oral Nightly    pantoprazole  40 mg Oral QAM AC    repaglinide  1 mg Oral TID AC    brimonidine  1 drop Right Eye BID    And    timolol  1 drop Right Eye BID    insulin glargine  20 Units SubCUTAneous BID    lidocaine  20 mL IntraDERmal Once    sodium chloride flush  5-40 mL IntraVENous 2 times per day    ipratropium-albuterol  1 ampule Inhalation Q4H  WA     Continuous Infusions:   dextrose      sodium chloride       PRN Meds:   oxyCODONE, 5 mg, Q4H PRN  oxyCODONE-acetaminophen, 1 tablet, Q4H PRN  glucose, 4 tablet, PRN  dextrose bolus, 125 mL, PRN   Or  dextrose bolus, 250 mL, PRN  glucagon (rDNA), 1 mg, PRN  dextrose, , Continuous PRN  sodium chloride flush, 5-40 mL, PRN  sodium chloride, , PRN  ondansetron, 4 mg, Q8H PRN   Or  ondansetron, 4 mg, Q6H PRN  polyethylene glycol, 17 g, Daily PRN  acetaminophen, 650 mg, Q6H PRN   Or  acetaminophen, 650 mg, Q6H PRN  albuterol, 2.5 mg, Q2H PRN      SUPPORT DEVICES: [x] Nasal Cannula 5L    VENT SETTINGS (Comprehensive) (if applicable):     Additional Respiratory Assessments  Heart Rate: 100  Resp: 22  SpO2: 94 %  Humidification Source: Heated wire  Humidification Temp: 34    ABGs:   Lab Results   Component Value Date/Time    FIO2 50.0 02/13/2023 03:06 AM         DATA:  Complete Blood Count:   Recent Labs     02/15/23  0413 02/16/23  0333 02/17/23  0342   WBC 21.8* 18.4* 32.8*   RBC 4.26 4.55 4.74   HGB 12.0* 12.7* 12.9*   HCT 38.1* 41.8 42.4   MCV 89.4 91.9 89.5   MCH 28.2 27.9 27.2   MCHC 31.5 30.4 30.4   RDW 15.3* 15.5* 15.2*   * 422 538*   MPV 9.6 9.4 9.8          Last 3 Blood Glucose:   Recent Labs     02/15/23  0413 02/16/23  0333 02/17/23  0342   GLUCOSE 138* 162* 139*          PT/INR:  No results found for: PROTIME, INR  PTT:  No results found for: APTT, PTT    Comprehensive Metabolic Profile:   Recent Labs     02/15/23  0413 02/16/23  0333 02/17/23  0342   * 131* 132*   K 4.7 4.6 5.4*    99 101   CO2 24 23 22   BUN 74* 67* 66*   CREATININE 2.02* 1.83* 1.98*   GLUCOSE 138* 162* 139*   CALCIUM 9.7 9.5 9.6        Magnesium:   Lab Results   Component Value Date/Time    MG 2.9 02/13/2023 07:52 AM    MG 1.9 10/12/2020 12:00 AM    MG 1.9 03/04/2020 12:00 AM     Phosphorus:   Lab Results   Component Value Date/Time    PHOS 2.5 03/04/2020 12:00 AM    PHOS 2.7 08/19/2019 12:00 AM     Ionized Calcium:  Lab Results   Component Value Date/Time    CAION 5.19 04/03/2012 11:08 AM        Urinalysis:   Lab Results   Component Value Date/Time    NITRU NEGATIVE 02/13/2023 02:17 AM    COLORU Yellow 02/13/2023 02:17 AM    PHUR 5.0 02/13/2023 02:17 AM    WBCUA 2 TO 5 02/13/2023 02:17 AM    RBCUA 0 TO 2 02/13/2023 02:17 AM    MUCUS 1+ 02/13/2023 02:17 AM    SPECGRAV 1.021 02/13/2023 02:17 AM    LEUKOCYTESUR NEGATIVE 02/13/2023 02:17 AM    UROBILINOGEN Normal 02/13/2023 02:17 AM    BILIRUBINUR NEGATIVE 02/13/2023 02:17 AM    BILIRUBINUR NEGATIVE 04/03/2012 11:08 AM    GLUCOSEU 2+ 02/13/2023 02:17 AM    GLUCOSEU NEGATIVE 04/03/2012 11:08 AM    KETUA NEGATIVE 02/13/2023 02:17 AM    AMORPHOUS 1+ 02/13/2023 02:17 AM       HgBA1c:    Lab Results   Component Value Date/Time    LABA1C 8.2 02/13/2023 03:07 AM     TSH:  No results found for: TSH  Lactic Acid: No results found for: LACTA   Troponin: No results for input(s): TROPONINI in the last 72 hours.       Radiology/Imaging:  CXR yesterday shows interval placement of the chest tube with reduced left sided pleural effusion     CT chest today    ASSESSMENT:     Patient Active Problem List    Diagnosis Date Noted    Hyperkalemia 02/13/2023    Acute respiratory failure with hypoxia ([address] 02/13/2023    Hyponatremia 02/13/2023    Hyperglycemia 02/13/2023    History of non-Hodgkin's lymphoma 02/13/2023    History of sarcoidosis 02/13/2023    Pneumonia due to infectious organism 02/12/2023    Pneumonia, unspecified organism 02/12/2023    Pleural effusion 02/12/2023    Acute interstitial nephritis 08/03/2022    ARF (acute renal failure) ([address] 08/03/2022    Abnormal echocardiogram 08/15/2018    Dilated aortic root ([address] 08/15/2018    Essential hypertension 05/23/2017    AAA (abdominal aortic aneurysm) without rupture 03/23/2017    Family history of abdominal aortic aneurysm (AAA) 01/13/2017    Hyperlipidemia 06/17/2016    Sarcoidosis 05/24/2016    Non Hodgkin's lymphoma ([facility] 05/24/2016 Type 2 diabetes mellitus with diabetic chronic kidney disease ([facility] 12/08/2015    Non-rheumatic mitral regurgitation 04/08/2015    Nonrheumatic aortic valve insufficiency 04/08/2015    Pulmonary hypertension ([facility] 04/08/2015    DM (diabetes mellitus) ([facility] 12/02/2014    CKD (chronic kidney disease), stage III ([facility] 10/28/2014    Nephrolithiasis 10/28/2014          PLAN:     Feeding Diet: ADULT DIET; Regular; 3 carb choices (45 gm/meal)    Fluids normal saline 500 cc bolus last night    Family at bedside     Analgesic acetaminophen and roxicodone    Sedation none    Thrombo-prophylaxis heparin     Mobility difficult to assess     Heads up 45 Degrees    Ulcer prophylaxis [x] PPI Agent      Glycemic control insulin Lantus 20 units nightly, HISS     Bowel regimen/urine output Constipation     Indwelling catheter/lines: Left sided chest tube - 900 cc out    De-escalation: N/A      HOME MEDS RECONCILED: Yes    CONSULTATION NEEDED:  No    FAMILY UPDATED:    Yes    TRANSFER OUT OF ICU:   Potential        Additional Assessment:  Principal Problem:    Pneumonia, unspecified organism  Active Problems:    Pleural effusion    Hyperkalemia    Acute respiratory failure with hypoxia (HCC)    Hyponatremia    Hyperglycemia    History of non-Hodgkin's lymphoma    History of sarcoidosis  Resolved Problems:    * No resolved hospital problems.  *             Plan:  Neuro:  Anxious overnight again, seroquel nightly  1 time dose of xanax  Poor Sleep  No deficits   Sedation: None  Pain control: Tylenol PRN, roxicodone PRN     CV:  Telemetry  BP and HR - HR  tachycardic overnight, BP stable  Given 500 cc bolus of NS  Amlodipine 5 mg daily  Metoprolol 25 mg nightly     Heme:  H&H 12.7  Platelets 067     Resp:  100% on 5L NC  CT Chest today, possible CTA to look for PE  10 [place] chest tube placed, 900 cc out, on continuous wall suction   Continue Dornase/TPA flushes  Thoracentesis results: Glucose 33, pH 4.0, LDH 6590, culture growing strep viridans  CXR 2/14 - improved pleural effusion        /Fluids/Electrolytes:  BUN 66, Cr 1.98 (stable)  Monitor UO - 1775 out (urine)  Daily BMP: yes  Electrolytes: potassium 5.4, given dextrose and insulin  Fluids - 500 cc bolus last night for tachycardia     GI/Nutrition:  Adult diabetic diet  Bowel regimen: PRN     ID:  Cefepime   Afebrile  WBC jumped up to 32k, resent cbc to make sure it was not lab error  Sent cultures     Endo:  Monitor glucose - stabilized   Lantus 20 units nightly   Switched to high dose sliding scale, diet changed to carb restricted         Musculoskeletal:  Injuries: None  Restrictions: Left sided chest tube      Lines/Drains:  Left chest tube   Peripheral IV     Prophylaxis:  DVT: Heparin 5000 units TID, SCD  GI: Protonix 40 mg     Dispo:  Transfer pending bed        Jennifer Blount MD  EM Resident PGY-3  Intensive Care Unit  2/17/2023 8:17 AM

## 2023-02-17 NOTE — PROGRESS NOTES
Physical Therapy  Facility/Department: [facility] CAR 3- MICU  Physical Therapy Daily Treatment Note    Name: Milla Ochoa  : 1955  MRN: [id number]  Date of Service: 2023    Discharge Recommendations:  Patient would benefit from continued therapy after discharge   PT Equipment Recommendations  Equipment Needed: Yes  Mobility Devices: Jolynn Call: Rolling      Patient Diagnosis(es): There were no encounter diagnoses. Past Medical History:  has a past medical history of Acute interstitial nephritis, Aortic regurgitation, ARF (acute renal failure) ([facility] Chronic kidney disease, Hyperlipidemia, Hypertension, Non-Hodgkin lymphoma ([facility] Research study patient, Sarcoidosis, and Type II or unspecified type diabetes mellitus without mention of complication, not stated as uncontrolled. Past Surgical History:  has a past surgical history that includes eye surgery (Right); Knee arthroscopy; Rotator cuff repair (Right, ); Rotator cuff repair (Left, 5/15); Carpal tunnel release (Left, 11/15 ); Eye surgery (Right, ); Total knee arthroplasty (Right, 10/2018); malignant skin lesion excision; and IR CHEST TUBE INSERTION (2023). Assessment   Body Structures, Functions, Activity Limitations Requiring Skilled Therapeutic Intervention: Decreased functional mobility ; Decreased ROM; Decreased strength;Decreased safe awareness;Decreased endurance;Decreased balance  Assessment: Pt with mobility deficits requiring CGA to ambulate 4 feet with a RW. Pt limited this date secondary to decreased endurance, BLE strength and standing balance. Pt would benefit from additional PT upon discharge to maximize functional independence. Pt will require 24 hour assistance with mobility upon discharge.   Therapy Prognosis: Good  Requires PT Follow-Up: Yes  Activity Tolerance  Activity Tolerance: Patient limited by fatigue;Patient limited by endurance     Plan   Physcial Therapy Plan  General Plan:  (5-6x/week)  Current Treatment Recommendations: Strengthening, ROM, Functional mobility training, Transfer training, Endurance training, Gait training, Stair training, Balance training, Neuromuscular re-education, Home exercise program, Safety education & training, Patient/Caregiver education & training, Therapeutic activities, Equipment evaluation, education, & procurement  Safety Devices  Type of Devices: Nurse notified, Left in chair, Call light within reach, Patient at risk for falls, Chair alarm in place  Restraints  Restraints Initially in Place: No     Restrictions  Restrictions/Precautions  Restrictions/Precautions: Up as Tolerated, Fall Risk  Required Braces or Orthoses?: No  Position Activity Restriction  Other position/activity restrictions: Can increase activity level as tolerated as long as O2 saturation maintained above 92% and as tolerated. Subjective   General  Patient assessed for rehabilitation services?: Yes  Response To Previous Treatment: Patient with no complaints from previous session. Family / Caregiver Present: Yes (spouse and dtr)  Follows Commands: Within Functional Limits  Subjective  Subjective: Pt supine in bed and agreeable to therapy, RN agreeable to therapy. Pt pleasant and cooperative throughout. Pt denies pain at rest.           Cognition   Cognition  Overall Cognitive Status: WNL     Objective                                Bed mobility  Supine to Sit: Contact guard assistance  Sit to Supine:  (pt retired up to a chair at the conclusion of today's session.)  Scooting: Minimal assistance  Bed Mobility Comments: HOB elevated ~25 degrees with use of handrails. Increased time/effort to perform. Transfers  Sit to Stand: Contact guard assistance  Stand to Sit: Contact guard assistance  Comment: Transfers performed 3x this date. Ambulation  Surface: Level tile  Device: Rolling Walker  Assistance: Contact guard assistance  Quality of Gait: mildly unsteady, decreased stride length, no true LOB.   Gait Deviations: Slow Portia;Decreased step length  Distance: 4 feet  More Ambulation?: No  Stairs/Curb  Stairs?: No     Balance  Posture: Fair  Sitting - Static: Good  Sitting - Dynamic: Fair  Standing - Static: Fair;+  Standing - Dynamic: Fair;-  Comments: Standing balance assessed w/ RW  Exercise Treatment: x10 BLE in sitting: LAQ, hip abduction, hip flexion, ankle dorsi/plantarflexion.                                                      AM-PAC Score  AM-PAC Inpatient Mobility Raw Score : 16 (02/17/23 1625)  AM-PAC Inpatient T-Scale Score : 40.78 (02/17/23 1625)  Mobility Inpatient CMS 0-100% Score: 54.16 (02/17/23 1625)  Mobility Inpatient CMS G-Code Modifier : CK (02/17/23 1625)            Goals  Short Term Goals  Time Frame for Short Term Goals: 14 visits  Short Term Goal 1: Pt will amb 300' Love  Short Term Goal 2: Pt will be Love w/ all bed mobility tasks  Short Term Goal 3: Pt will be Love w/ transfers  Short Term Goal 4: Pt will be CGA in negotiation of 2 steps w/ L rails use       Education  Patient Education  Education Given To: Patient  Education Provided: Role of Therapy;Plan of Care;Transfer Training  Education Method: Verbal  Barriers to Learning: None  Education Outcome: Verbalized understanding      Therapy Time   Individual Concurrent Group Co-treatment   Time In 1441         Time Out 1519         Minutes 38         Timed Code Treatment Minutes: Robert Miller [address], PT

## 2023-02-17 NOTE — PLAN OF CARE
Problem: Discharge Planning  Goal: Discharge to home or other facility with appropriate resources  2/17/2023 1043 by Bharath Covarrubias RN  Outcome: Progressing  2/16/2023 2117 by Caitlin Castillo RN  Outcome: Progressing     Problem: Safety - Adult  Goal: Free from fall injury  2/17/2023 1043 by Bharath Covarrubias RN  Outcome: Progressing  2/16/2023 2117 by Caitlin Castillo RN  Outcome: Progressing     Problem: ABCDS Injury Assessment  Goal: Absence of physical injury  2/17/2023 1043 by Bharath Covarrubias RN  Outcome: Progressing  2/16/2023 2117 by Caitlin Castillo RN  Outcome: Progressing     Problem: Skin/Tissue Integrity  Goal: Absence of new skin breakdown  Description: 1. Monitor for areas of redness and/or skin breakdown  2. Assess vascular access sites hourly  3. Every 4-6 hours minimum:  Change oxygen saturation probe site  4. Every 4-6 hours:  If on nasal continuous positive airway pressure, respiratory therapy assess nares and determine need for appliance change or resting period.   2/17/2023 1043 by Bharath Covarrubias RN  Outcome: Progressing  2/16/2023 2117 by Caitlin Castillo RN  Outcome: Progressing     Problem: Chronic Conditions and Co-morbidities  Goal: Patient's chronic conditions and co-morbidity symptoms are monitored and maintained or improved  2/17/2023 1043 by Bharath Covarrubias RN  Outcome: Progressing  2/16/2023 2117 by Caitlin Castillo RN  Outcome: Progressing     Problem: Respiratory - Adult  Goal: Achieves optimal ventilation and oxygenation  2/17/2023 1043 by Bharath Covarrubias RN  Outcome: Progressing  2/16/2023 2117 by Caitlin Castillo RN  Outcome: Progressing     Problem: Pain  Goal: Verbalizes/displays adequate comfort level or baseline comfort level  2/17/2023 1043 by Bharath Covarrubias RN  Outcome: Progressing  Flowsheets (Taken 2/17/2023 0800)  Verbalizes/displays adequate comfort level or baseline comfort level:   Assess pain using appropriate pain scale   Administer analgesics based on type and severity of pain and evaluate response   Encourage patient to monitor pain and request assistance   Implement non-pharmacological measures as appropriate and evaluate response   Consider cultural and social influences on pain and pain management   Notify Licensed Independent Practitioner if interventions unsuccessful or patient reports new pain  2/16/2023 2117 by Reymundo Esteban RN  Outcome: Progressing

## 2023-02-18 ENCOUNTER — APPOINTMENT (OUTPATIENT)
Dept: INTERVENTIONAL RADIOLOGY/VASCULAR | Age: 68
DRG: 853 | End: 2023-02-18
Attending: STUDENT IN AN ORGANIZED HEALTH CARE EDUCATION/TRAINING PROGRAM
Payer: COMMERCIAL

## 2023-02-18 ENCOUNTER — APPOINTMENT (OUTPATIENT)
Dept: GENERAL RADIOLOGY | Age: 68
DRG: 853 | End: 2023-02-18
Attending: STUDENT IN AN ORGANIZED HEALTH CARE EDUCATION/TRAINING PROGRAM
Payer: COMMERCIAL

## 2023-02-18 PROBLEM — J86.9 EMPYEMA (HCC): Status: ACTIVE | Noted: 2023-02-18

## 2023-02-18 LAB
ABSOLUTE EOS #: 0 K/UL (ref 0–0.4)
ABSOLUTE IMMATURE GRANULOCYTE: 1.62 K/UL (ref 0–0.3)
ABSOLUTE LYMPH #: 10.04 K/UL (ref 1–4.8)
ABSOLUTE MONO #: 1.62 K/UL (ref 0.1–0.8)
ANION GAP SERPL CALCULATED.3IONS-SCNC: 11 MMOL/L (ref 9–17)
BASOPHILS # BLD: 0 % (ref 0–2)
BASOPHILS ABSOLUTE: 0 K/UL (ref 0–0.2)
BILIRUBIN URINE: NEGATIVE
BUN SERPL-MCNC: 76 MG/DL (ref 8–23)
CALCIUM SERPL-MCNC: 9.6 MG/DL (ref 8.6–10.4)
CASTS UA: NORMAL /LPF (ref 0–8)
CHLORIDE SERPL-SCNC: 96 MMOL/L (ref 98–107)
CO2 SERPL-SCNC: 23 MMOL/L (ref 20–31)
COLOR: YELLOW
CREAT SERPL-MCNC: 1.95 MG/DL (ref 0.7–1.2)
EOSINOPHILS RELATIVE PERCENT: 0 % (ref 1–4)
EPITHELIAL CELLS UA: NORMAL /HPF (ref 0–5)
GFR SERPL CREATININE-BSD FRML MDRD: 37 ML/MIN/1.73M2
GLUCOSE BLD-MCNC: 127 MG/DL (ref 75–110)
GLUCOSE BLD-MCNC: 129 MG/DL (ref 75–110)
GLUCOSE BLD-MCNC: 146 MG/DL (ref 75–110)
GLUCOSE BLD-MCNC: 148 MG/DL (ref 75–110)
GLUCOSE SERPL-MCNC: 159 MG/DL (ref 70–99)
GLUCOSE UR STRIP.AUTO-MCNC: NEGATIVE MG/DL
HCT VFR BLD AUTO: 38.7 % (ref 40.7–50.3)
HGB BLD-MCNC: 11.9 G/DL (ref 13–17)
IMMATURE GRANULOCYTES: 5 %
KETONES UR STRIP.AUTO-MCNC: ABNORMAL MG/DL
LEUKOCYTE ESTERASE UR QL STRIP.AUTO: NEGATIVE
LYMPHOCYTES # BLD: 31 % (ref 24–44)
MCH RBC QN AUTO: 27.8 PG (ref 25.2–33.5)
MCHC RBC AUTO-ENTMCNC: 30.7 G/DL (ref 28.4–34.8)
MCV RBC AUTO: 90.4 FL (ref 82.6–102.9)
MICROORGANISM SPEC CULT: NO GROWTH
MONOCYTES # BLD: 5 % (ref 1–7)
MORPHOLOGY: ABNORMAL
MORPHOLOGY: ABNORMAL
NITRITE UR QL STRIP.AUTO: NEGATIVE
NRBC AUTOMATED: 0 PER 100 WBC
PDW BLD-RTO: 15.7 % (ref 11.8–14.4)
PLATELET # BLD AUTO: 492 K/UL (ref 138–453)
PMV BLD AUTO: 10.2 FL (ref 8.1–13.5)
POTASSIUM SERPL-SCNC: 5 MMOL/L (ref 3.7–5.3)
PROT UR STRIP.AUTO-MCNC: 5.5 MG/DL (ref 5–8)
PROT UR STRIP.AUTO-MCNC: ABNORMAL MG/DL
RBC # BLD: 4.28 M/UL (ref 4.21–5.77)
RBC CLUMPS #/AREA URNS AUTO: NORMAL /HPF (ref 0–4)
SEG NEUTROPHILS: 59 % (ref 36–66)
SEGMENTED NEUTROPHILS ABSOLUTE COUNT: 19.12 K/UL (ref 1.8–7.7)
SODIUM SERPL-SCNC: 130 MMOL/L (ref 135–144)
SPECIFIC GRAVITY UA: 1.02 (ref 1–1.03)
SPECIMEN DESCRIPTION: NORMAL
TURBIDITY: ABNORMAL
URINE HGB: NEGATIVE
UROBILINOGEN, URINE: NORMAL
WBC # BLD AUTO: 32.4 K/UL (ref 3.5–11.3)
WBC UA: NORMAL /HPF (ref 0–5)

## 2023-02-18 PROCEDURE — 6370000000 HC RX 637 (ALT 250 FOR IP): Performed by: STUDENT IN AN ORGANIZED HEALTH CARE EDUCATION/TRAINING PROGRAM

## 2023-02-18 PROCEDURE — 6370000000 HC RX 637 (ALT 250 FOR IP): Performed by: INTERNAL MEDICINE

## 2023-02-18 PROCEDURE — 6360000002 HC RX W HCPCS: Performed by: STUDENT IN AN ORGANIZED HEALTH CARE EDUCATION/TRAINING PROGRAM

## 2023-02-18 PROCEDURE — 80048 BASIC METABOLIC PNL TOTAL CA: CPT

## 2023-02-18 PROCEDURE — 97530 THERAPEUTIC ACTIVITIES: CPT

## 2023-02-18 PROCEDURE — 94761 N-INVAS EAR/PLS OXIMETRY MLT: CPT

## 2023-02-18 PROCEDURE — 6360000002 HC RX W HCPCS: Performed by: RADIOLOGY

## 2023-02-18 PROCEDURE — 2580000003 HC RX 258: Performed by: STUDENT IN AN ORGANIZED HEALTH CARE EDUCATION/TRAINING PROGRAM

## 2023-02-18 PROCEDURE — 99232 SBSQ HOSP IP/OBS MODERATE 35: CPT | Performed by: INTERNAL MEDICINE

## 2023-02-18 PROCEDURE — 2700000000 HC OXYGEN THERAPY PER DAY

## 2023-02-18 PROCEDURE — 82947 ASSAY GLUCOSE BLOOD QUANT: CPT

## 2023-02-18 PROCEDURE — 71045 X-RAY EXAM CHEST 1 VIEW: CPT

## 2023-02-18 PROCEDURE — C1729 CATH, DRAINAGE: HCPCS

## 2023-02-18 PROCEDURE — 2060000000 HC ICU INTERMEDIATE R&B

## 2023-02-18 PROCEDURE — 94640 AIRWAY INHALATION TREATMENT: CPT

## 2023-02-18 PROCEDURE — 87205 SMEAR GRAM STAIN: CPT

## 2023-02-18 PROCEDURE — 36415 COLL VENOUS BLD VENIPUNCTURE: CPT

## 2023-02-18 PROCEDURE — 49423 EXCHANGE DRAINAGE CATHETER: CPT

## 2023-02-18 PROCEDURE — 2709999900 HC NON-CHARGEABLE SUPPLY

## 2023-02-18 PROCEDURE — 0W2BX0Z CHANGE DRAINAGE DEVICE IN LEFT PLEURAL CAVITY, EXTERNAL APPROACH: ICD-10-PCS | Performed by: RADIOLOGY

## 2023-02-18 PROCEDURE — 6360000002 HC RX W HCPCS: Performed by: INTERNAL MEDICINE

## 2023-02-18 PROCEDURE — 2580000003 HC RX 258: Performed by: INTERNAL MEDICINE

## 2023-02-18 PROCEDURE — 87070 CULTURE OTHR SPECIMN AEROBIC: CPT

## 2023-02-18 PROCEDURE — 75984 XRAY CONTROL CATHETER CHANGE: CPT

## 2023-02-18 PROCEDURE — 94660 CPAP INITIATION&MGMT: CPT

## 2023-02-18 PROCEDURE — 6370000000 HC RX 637 (ALT 250 FOR IP)

## 2023-02-18 PROCEDURE — 99291 CRITICAL CARE FIRST HOUR: CPT | Performed by: INTERNAL MEDICINE

## 2023-02-18 PROCEDURE — 87075 CULTR BACTERIA EXCEPT BLOOD: CPT

## 2023-02-18 PROCEDURE — 6370000000 HC RX 637 (ALT 250 FOR IP): Performed by: NURSE PRACTITIONER

## 2023-02-18 PROCEDURE — 2580000003 HC RX 258: Performed by: NURSE PRACTITIONER

## 2023-02-18 PROCEDURE — 85025 COMPLETE CBC W/AUTO DIFF WBC: CPT

## 2023-02-18 RX ORDER — BISACODYL 10 MG
10 SUPPOSITORY, RECTAL RECTAL ONCE
Status: COMPLETED | OUTPATIENT
Start: 2023-02-18 | End: 2023-02-18

## 2023-02-18 RX ORDER — FENTANYL CITRATE 50 UG/ML
INJECTION, SOLUTION INTRAMUSCULAR; INTRAVENOUS
Status: COMPLETED | OUTPATIENT
Start: 2023-02-18 | End: 2023-02-18

## 2023-02-18 RX ORDER — 0.9 % SODIUM CHLORIDE 0.9 %
1000 INTRAVENOUS SOLUTION INTRAVENOUS ONCE
Status: COMPLETED | OUTPATIENT
Start: 2023-02-18 | End: 2023-02-18

## 2023-02-18 RX ORDER — FENTANYL CITRATE 50 UG/ML
100 INJECTION, SOLUTION INTRAMUSCULAR; INTRAVENOUS ONCE
Status: DISCONTINUED | OUTPATIENT
Start: 2023-02-18 | End: 2023-02-23

## 2023-02-18 RX ORDER — SODIUM CHLORIDE 0.9 % (FLUSH) 0.9 %
10 SYRINGE (ML) INJECTION 2 TIMES DAILY
Status: DISCONTINUED | OUTPATIENT
Start: 2023-02-19 | End: 2023-02-23

## 2023-02-18 RX ORDER — ALPRAZOLAM 0.5 MG/1
0.5 TABLET ORAL NIGHTLY PRN
Status: DISCONTINUED | OUTPATIENT
Start: 2023-02-18 | End: 2023-02-23

## 2023-02-18 RX ADMIN — QUETIAPINE FUMARATE 50 MG: 25 TABLET ORAL at 20:23

## 2023-02-18 RX ADMIN — BRIMONIDINE TARTRATE 1 DROP: 2 SOLUTION OPHTHALMIC at 08:13

## 2023-02-18 RX ADMIN — DICYCLOMINE HYDROCHLORIDE 10 MG: 10 CAPSULE ORAL at 12:10

## 2023-02-18 RX ADMIN — TIMOLOL MALEATE 1 DROP: 5 SOLUTION OPHTHALMIC at 08:13

## 2023-02-18 RX ADMIN — BISACODYL 10 MG: 10 SUPPOSITORY RECTAL at 12:00

## 2023-02-18 RX ADMIN — DICYCLOMINE HYDROCHLORIDE 10 MG: 10 CAPSULE ORAL at 20:23

## 2023-02-18 RX ADMIN — Medication 5 MG: at 20:23

## 2023-02-18 RX ADMIN — HEPARIN SODIUM 5000 UNITS: 5000 INJECTION INTRAVENOUS; SUBCUTANEOUS at 06:07

## 2023-02-18 RX ADMIN — IPRATROPIUM BROMIDE AND ALBUTEROL SULFATE 1 AMPULE: 2.5; .5 SOLUTION RESPIRATORY (INHALATION) at 16:45

## 2023-02-18 RX ADMIN — CEFEPIME 2000 MG: 2 INJECTION, POWDER, FOR SOLUTION INTRAVENOUS at 00:56

## 2023-02-18 RX ADMIN — SODIUM CHLORIDE 1000 ML: 9 INJECTION, SOLUTION INTRAVENOUS at 08:32

## 2023-02-18 RX ADMIN — PANTOPRAZOLE SODIUM 40 MG: 40 TABLET, DELAYED RELEASE ORAL at 08:16

## 2023-02-18 RX ADMIN — HEPARIN SODIUM 5000 UNITS: 5000 INJECTION INTRAVENOUS; SUBCUTANEOUS at 20:23

## 2023-02-18 RX ADMIN — TIMOLOL MALEATE 1 DROP: 5 SOLUTION OPHTHALMIC at 20:22

## 2023-02-18 RX ADMIN — DULOXETINE HYDROCHLORIDE 60 MG: 30 CAPSULE, DELAYED RELEASE ORAL at 20:23

## 2023-02-18 RX ADMIN — SODIUM CHLORIDE, PRESERVATIVE FREE 10 ML: 5 INJECTION INTRAVENOUS at 08:34

## 2023-02-18 RX ADMIN — FENOFIBRATE 160 MG: 160 TABLET ORAL at 12:12

## 2023-02-18 RX ADMIN — CEFEPIME 2000 MG: 2 INJECTION, POWDER, FOR SOLUTION INTRAVENOUS at 15:46

## 2023-02-18 RX ADMIN — OXYCODONE 5 MG: 5 TABLET ORAL at 12:08

## 2023-02-18 RX ADMIN — ATORVASTATIN CALCIUM 80 MG: 80 TABLET, FILM COATED ORAL at 20:22

## 2023-02-18 RX ADMIN — DULOXETINE HYDROCHLORIDE 60 MG: 30 CAPSULE, DELAYED RELEASE ORAL at 08:13

## 2023-02-18 RX ADMIN — IPRATROPIUM BROMIDE AND ALBUTEROL SULFATE 1 AMPULE: 2.5; .5 SOLUTION RESPIRATORY (INHALATION) at 08:40

## 2023-02-18 RX ADMIN — SODIUM CHLORIDE, PRESERVATIVE FREE 10 ML: 5 INJECTION INTRAVENOUS at 21:28

## 2023-02-18 RX ADMIN — FENTANYL CITRATE 75 MCG: 50 INJECTION, SOLUTION INTRAMUSCULAR; INTRAVENOUS at 17:26

## 2023-02-18 RX ADMIN — FINASTERIDE 5 MG: 5 TABLET, FILM COATED ORAL at 08:14

## 2023-02-18 RX ADMIN — DICYCLOMINE HYDROCHLORIDE 10 MG: 10 CAPSULE ORAL at 08:14

## 2023-02-18 RX ADMIN — ACETAMINOPHEN 650 MG: 325 TABLET ORAL at 12:08

## 2023-02-18 RX ADMIN — IPRATROPIUM BROMIDE AND ALBUTEROL SULFATE 1 AMPULE: 2.5; .5 SOLUTION RESPIRATORY (INHALATION) at 20:43

## 2023-02-18 RX ADMIN — BRIMONIDINE TARTRATE 1 DROP: 2 SOLUTION OPHTHALMIC at 20:22

## 2023-02-18 RX ADMIN — IPRATROPIUM BROMIDE AND ALBUTEROL SULFATE 1 AMPULE: 2.5; .5 SOLUTION RESPIRATORY (INHALATION) at 12:27

## 2023-02-18 RX ADMIN — INSULIN GLARGINE 20 UNITS: 100 INJECTION, SOLUTION SUBCUTANEOUS at 08:37

## 2023-02-18 RX ADMIN — METOPROLOL TARTRATE 25 MG: 25 TABLET, FILM COATED ORAL at 09:07

## 2023-02-18 RX ADMIN — POLYETHYLENE GLYCOL 3350 17 G: 17 POWDER, FOR SOLUTION ORAL at 09:07

## 2023-02-18 RX ADMIN — REPAGLINIDE 1 MG: 0.5 TABLET ORAL at 08:14

## 2023-02-18 RX ADMIN — METOPROLOL SUCCINATE 25 MG: 25 TABLET, FILM COATED, EXTENDED RELEASE ORAL at 20:23

## 2023-02-18 RX ADMIN — MAGNESIUM HYDROXIDE 30 ML: 400 SUSPENSION ORAL at 08:38

## 2023-02-18 RX ADMIN — REPAGLINIDE 1 MG: 0.5 TABLET ORAL at 12:10

## 2023-02-18 ASSESSMENT — ENCOUNTER SYMPTOMS
SHORTNESS OF BREATH: 0
ABDOMINAL PAIN: 0
COUGH: 1

## 2023-02-18 ASSESSMENT — PAIN DESCRIPTION - LOCATION: LOCATION: BACK

## 2023-02-18 ASSESSMENT — PAIN SCALES - GENERAL
PAINLEVEL_OUTOF10: 7
PAINLEVEL_OUTOF10: 0
PAINLEVEL_OUTOF10: 7

## 2023-02-18 NOTE — PROGRESS NOTES
INTENSIVE CARE UNIT  Resident Physician Progress Note    Patient - Karol Pérez  Date of Admission -  2/12/2023 10:33 PM  Date of Evaluation -  2/18/2023  Room and Bed Number -  5619/3023-44   Hospital Day - 6      SUBJECTIVE:     OVERNIGHT EVENTS: Improved Sleep, decreased chest tube output (200 cc), Still no BM, tolerated BiPAP overnight , still tachycardic will give 1L bolus and metoprolol XL    HOSPITAL COURSE: HOSPITAL COURSE: Karol Pérez is a 74 y.o. male with past medical history significant for type 2 diabetes mellitus, multiple myeloma, non-Hodgkin lymphoma, sarcoidosis, referred from [facility] ER with worsening respiratory status, shortness of breath, nonproductive cough. He was started on BiPAP and given antibiotics. Chest x-ray was concerning for loculated left-sided pleural effusion. Transfer was made and accepted by hospitalist, further work-up. Critical care was consulted for worsening respiratory status. Patient was on high flow and respiratory rate was in 38-40. Patient was very dyspneic and having difficulty breathing with accessory muscle use. Initially he was unable to keep BiPAP but later on able to keep BiPAP. He improved symptomatically with BiPAP and respiratory rate was in 26. Patient is transferred to the ICU for higher level of care. 2/13: Attempted to perform bedside thoracentesis, no clear pocket identified, sent to IR, chest tube placed, appears that an empyema was encountered and chest tube was placed growing gram positive cocci in pairs, on cefepime and vanco    2/14: Placed chest tube yesterday, weaned off high flow, feeling \"100 x better\".      2/15: Continued TPA/Dornase chest tube flush, Got anxious overnight requiring xanax, 1200 cc of purulent chest tube output in the last 24 hours, patient did not sleep last night    2/16: Added seroquel to help sleep and agitation at night, continued Dornase/TPA, 700 cc of chest tube output that was less purulent, afebrile, WBC downtrending, continue cefepime    : WBC uptrended, sent cultures, tachypneic, restless overnight, given xanax, more tachycardic overnight as well, plan for CT chest today, will discuss possible CTA to evaluate for PE    REVIEW OF SYSTEMS:   Review of Systems   Constitutional:  Negative for fever. Respiratory:  Positive for cough. Negative for shortness of breath. Cardiovascular:  Positive for chest pain. Gastrointestinal:  Negative for abdominal pain. Psychiatric/Behavioral:  Negative for sleep disturbance. The patient is not nervous/anxious.         OBJECTIVE:     VITAL SIGNS:  BP 92/74   Pulse (!) 115   Temp 98.2 °F (36.8 °C) (Axillary)   Resp 19   Ht 6' 1\" (1.854 m)   Wt 228 lb 13.4 oz (103.8 kg)   SpO2 96%   BMI 30.19 kg/m²   Tmax over 24 hours:  Temp (24hrs), Av °F (36.7 °C), Min:97.7 °F (36.5 °C), Max:98.2 °F (36.8 °C)      Patient Vitals for the past 8 hrs:   BP Temp Temp src Pulse Resp SpO2   23 0600 92/74 -- -- (!) 115 19 96 %   23 0500 92/77 -- -- (!) 110 18 96 %   23 0400 103/79 98.2 °F (36.8 °C) Axillary (!) 110 19 96 %   23 0300 98/80 -- -- (!) 112 19 95 %   23 0200 103/71 -- -- (!) 113 19 96 %   23 0100 104/74 -- -- (!) 113 14 95 %   23 0000 93/72 97.7 °F (36.5 °C) Axillary (!) 109 18 97 %   23 2329 -- 97.7 °F (36.5 °C) Axillary (!) 111 18 --   23 2306 -- -- -- -- 16 --   23 2300 88/62 -- -- (!) 110 20 --           Intake/Output Summary (Last 24 hours) at 2023 0654  Last data filed at 2023 0400  Gross per 24 hour   Intake 360 ml   Output 600 ml   Net -240 ml       Date 23 0000 - 23 2359   Shift 6377-6770 9364-2483 7658-7882 24 Hour Total   INTAKE   Shift Total(mL/kg)       OUTPUT   Chest Tube 0   0   Shift Total(mL/kg) 0(0)   0(0)   Weight (kg) 103.8 103.8 103.8 103.8       Wt Readings from Last 3 Encounters:   23 228 lb 13.4 oz (103.8 kg)   23 245 lb (111.1 kg)   22 242 lb 11.2 oz (110.1 kg)     Body mass index is 30.19 kg/m². PHYSICAL EXAM:   Physical Exam  Constitutional:       General: He is not in acute distress. Appearance: Normal appearance. He is obese. He is not ill-appearing. HENT:      Head: Normocephalic and atraumatic. Nose: Nose normal.   Eyes:      Extraocular Movements: Extraocular movements intact. Pupils: Pupils are equal, round, and reactive to light. Cardiovascular:      Rate and Rhythm: Regular rhythm. Tachycardia present. Pulses: Normal pulses. Pulmonary:      Effort: No respiratory distress. Breath sounds: No wheezing, rhonchi or rales. Abdominal:      General: Abdomen is flat. Palpations: Abdomen is soft. Tenderness: There is no abdominal tenderness. There is no guarding. Musculoskeletal:      Right lower leg: No edema. Left lower leg: No edema. Skin:     General: Skin is warm and dry. Capillary Refill: Capillary refill takes less than 2 seconds. Neurological:      Mental Status: He is alert.    Psychiatric:         Mood and Affect: Mood normal.         Behavior: Behavior normal.          MEDICATIONS:  Scheduled Meds:   QUEtiapine  50 mg Oral Nightly    melatonin  5 mg Oral Nightly    insulin lispro  0-16 Units SubCUTAneous TID WC    insulin lispro  0-4 Units SubCUTAneous Nightly    heparin (porcine)  5,000 Units SubCUTAneous 3 times per day    [Held by provider] furosemide  40 mg IntraVENous BID    cefepime  2,000 mg IntraVENous Q12H    amLODIPine  5 mg Oral Daily    atorvastatin  80 mg Oral Daily    DULoxetine  60 mg Oral BID    dicyclomine  10 mg Oral 4x Daily AC & HS    finasteride  5 mg Oral Daily    fenofibrate  160 mg Oral Daily    metoprolol succinate  25 mg Oral Nightly    pantoprazole  40 mg Oral QAM AC    repaglinide  1 mg Oral TID AC    brimonidine  1 drop Right Eye BID    And    timolol  1 drop Right Eye BID    insulin glargine  20 Units SubCUTAneous BID    lidocaine  20 mL IntraDERmal Once    sodium chloride flush  5-40 mL IntraVENous 2 times per day    ipratropium-albuterol  1 ampule Inhalation Q4H WA     Continuous Infusions:   dextrose      sodium chloride       PRN Meds:   oxyCODONE, 5 mg, Q4H PRN  oxyCODONE-acetaminophen, 1 tablet, Q4H PRN  glucose, 4 tablet, PRN  dextrose bolus, 125 mL, PRN   Or  dextrose bolus, 250 mL, PRN  glucagon (rDNA), 1 mg, PRN  dextrose, , Continuous PRN  sodium chloride flush, 5-40 mL, PRN  sodium chloride, , PRN  ondansetron, 4 mg, Q8H PRN   Or  ondansetron, 4 mg, Q6H PRN  polyethylene glycol, 17 g, Daily PRN  acetaminophen, 650 mg, Q6H PRN   Or  acetaminophen, 650 mg, Q6H PRN  albuterol, 2.5 mg, Q2H PRN      SUPPORT DEVICES: [x] Nasal Cannula During the day, BiPAP at night    VENT SETTINGS (Comprehensive) (if applicable):      Additional Respiratory Assessments  Heart Rate: (!) 115  Resp: 19  SpO2: 96 %  Humidification Source: Heated wire  Humidification Temp: 34    ABGs:   Lab Results   Component Value Date/Time    FIO2 50.0 02/13/2023 03:06 AM         DATA:  Complete Blood Count:   Recent Labs     02/16/23 0333 02/17/23 0342 02/17/23 0915   WBC 18.4* 32.8* 26.7*   RBC 4.55 4.74 4.44   HGB 12.7* 12.9* 12.3*   HCT 41.8 42.4 39.8*   MCV 91.9 89.5 89.6   MCH 27.9 27.2 27.7   MCHC 30.4 30.4 30.9   RDW 15.5* 15.2* 15.2*    538* 450   MPV 9.4 9.8 9.7          Last 3 Blood Glucose:   Recent Labs     02/16/23 0333 02/17/23 0342   GLUCOSE 162* 139*          PT/INR:  No results found for: PROTIME, INR  PTT:  No results found for: APTT, PTT    Comprehensive Metabolic Profile:   Recent Labs     02/16/23 0333 02/17/23 0342   * 132*   K 4.6 5.4*   CL 99 101   CO2 23 22   BUN 67* 66*   CREATININE 1.83* 1.98*   GLUCOSE 162* 139*   CALCIUM 9.5 9.6        Magnesium:   Lab Results   Component Value Date/Time    MG 2.9 02/13/2023 07:52 AM    MG 1.9 10/12/2020 12:00 AM    MG 1.9 03/04/2020 12:00 AM     Phosphorus:   Lab Results   Component Value Date/Time    PHOS 2.5 03/04/2020 12:00 AM    PHOS 2.7 08/19/2019 12:00 AM     Ionized Calcium:   Lab Results   Component Value Date/Time    CAION 5.19 04/03/2012 11:08 AM        Urinalysis:   Lab Results   Component Value Date/Time    NITRU NEGATIVE 02/13/2023 02:17 AM    COLORU Yellow 02/13/2023 02:17 AM    PHUR 5.0 02/13/2023 02:17 AM    WBCUA 2 TO 5 02/13/2023 02:17 AM    RBCUA 0 TO 2 02/13/2023 02:17 AM    MUCUS 1+ 02/13/2023 02:17 AM    SPECGRAV 1.021 02/13/2023 02:17 AM    LEUKOCYTESUR NEGATIVE 02/13/2023 02:17 AM    UROBILINOGEN Normal 02/13/2023 02:17 AM    BILIRUBINUR NEGATIVE 02/13/2023 02:17 AM    BILIRUBINUR NEGATIVE 04/03/2012 11:08 AM    GLUCOSEU 2+ 02/13/2023 02:17 AM    GLUCOSEU NEGATIVE 04/03/2012 11:08 AM    KETUA NEGATIVE 02/13/2023 02:17 AM    AMORPHOUS 1+ 02/13/2023 02:17 AM       HgBA1c:    Lab Results   Component Value Date/Time    LABA1C 8.2 02/13/2023 03:07 AM     TSH:  No results found for: TSH  Lactic Acid: No results found for: LACTA   Troponin: No results for input(s): TROPONINI in the last 72 hours. Radiology/Imaging:  CT CHEST 2/17:   Small to moderate-sized multiloculated left pleural effusions have improved. New small foci of air within the collection is presumably related to the   introduction of the left thoracostomy tube. Stable small to moderate sized mediastinal lymph nodes are nonspecific but   may relate to the patient's history of lymphoma. Additional small bilateral   axial lymph nodes are identified.        ASSESSMENT:     Patient Active Problem List    Diagnosis Date Noted    Hyperkalemia 02/13/2023    Acute respiratory failure with hypoxia (HCC) 02/13/2023    Hyponatremia 02/13/2023    Hyperglycemia 02/13/2023    History of non-Hodgkin's lymphoma 02/13/2023    History of sarcoidosis 02/13/2023    Pneumonia due to infectious organism 02/12/2023    Pneumonia, unspecified organism 02/12/2023    Pleural effusion 02/12/2023    Acute interstitial nephritis 08/03/2022    ARF (acute renal failure) ([facility] 08/03/2022    Abnormal echocardiogram 08/15/2018    Dilated aortic root ([facility] 08/15/2018    Essential hypertension 05/23/2017    AAA (abdominal aortic aneurysm) without rupture 03/23/2017    Family history of abdominal aortic aneurysm (AAA) 01/13/2017    Hyperlipidemia 06/17/2016    Sarcoidosis 05/24/2016    Non Hodgkin's lymphoma [facility] 05/24/2016    Type 2 diabetes mellitus with diabetic chronic kidney disease ([facility] 12/08/2015    Non-rheumatic mitral regurgitation 04/08/2015    Nonrheumatic aortic valve insufficiency 04/08/2015    Pulmonary hypertension ([facility] 04/08/2015    DM (diabetes mellitus) ([facility] 12/02/2014    CKD (chronic kidney disease), stage III ([facility] 10/28/2014    Nephrolithiasis 10/28/2014          PLAN:     Feeding Diet: ADULT DIET; Regular; 3 carb choices (45 gm/meal)    Fluids - Regular Diet    Family at bedside     Analgesic acetaminophen and roxicodone    Sedation none    Thrombo-prophylaxis heparin     Mobility difficult to assess     Heads up 45 Degrees    Ulcer prophylaxis [x] PPI Agent      Glycemic control insulin Lantus 20 units nightly, HISS     Bowel regimen/urine output Constipation     Indwelling catheter/lines: Left sided chest tube - 200 cc out    De-escalation: N/A      HOME MEDS RECONCILED: Yes    CONSULTATION NEEDED:  No    FAMILY UPDATED:    Yes    TRANSFER OUT OF ICU:   Potential        Additional Assessment:  Principal Problem:    Pneumonia, unspecified organism  Active Problems:    Pleural effusion    Hyperkalemia    Acute respiratory failure with hypoxia (HCC)    Hyponatremia    Hyperglycemia    History of non-Hodgkin's lymphoma    History of sarcoidosis  Resolved Problems:    * No resolved hospital problems.  *             Plan:  Neuro:  Anxious overnight again,  increased seroquel to 50 mg nightly  Improved Sleep  No deficits   Sedation: None  Pain control: Tylenol PRN, roxicodone PRN     CV:  Telemetry  BP and HR - HR  tachycardic overnight, BP stable  Amlodipine 5 mg daily  Metoprolol 25 mg nightly   Low-Normal blood pressure      Heme:   Stable  Pending morning labs     Resp:  Pneumonia with Empyema   100% on 5L NC during the day, BiPAP at night  10 Amharic chest tube placed, 200 cc out over 24 hours, on continuous wall suction   Finished Dornase/TPA flushes  Thoracentesis results: Glucose 33, pH 4.0, LDH 6590, culture growing strep viridans  CT Chest 2/17: Small to moderate-sized multiloculated left pleural effusions have improved. New small foci of air within the collection is presumably related to the introduction of the left thoracostomy tube. Stable small to moderate sized mediastinal lymph nodes are nonspecific but may relate to the patient's history of lymphoma. Additional small bilateral axial lymph nodes are identified.        /Fluids/Electrolytes:   CKD  Pending mornig labs  BUN 66, Cr 1.98 (stable)  Monitor UO - 1775 out (urine)  Daily BMP: yes  Electrolytes: potassium 5.4, given dextrose and insulin  Fluids - 500 cc bolus last night for tachycardia     GI/Nutrition:  Adult diabetic diet  Bowel regimen: PRN     ID:  Left lower pneumonia with empyema   Cefepime   Afebrile  WBC jumped up to 32k, resent cbc to make sure it was not lab error, repeat did show persistently elevated leukocytosis  Sent cultures     Endo:  Diabetes  Monitor glucose - stabilized   Lantus 20 units nightly   Switched to high dose sliding scale, diet changed to carb restricted         Musculoskeletal:  Injuries: None  Restrictions: Left sided chest tube      Lines/Drains:  Left chest tube - keep in   Peripheral IV     Prophylaxis:  DVT: Heparin 5000 units TID, SCD  GI: Protonix 40 mg     Dispo:  Transfer pending bed        Jennifer Blount MD  EM Resident PGY-3  Intensive Care Unit  2/18/2023 6:54 AM

## 2023-02-18 NOTE — PLAN OF CARE
Problem: Respiratory - Adult  Goal: Achieves optimal ventilation and oxygenation  2/17/2023 2158 by Uma Leone RCP  Outcome: Progressing  Flowsheets (Taken 2/17/2023 2158)  Achieves optimal ventilation and oxygenation:   Assess for changes in respiratory status   Respiratory therapy support as indicated   Assess for changes in mentation and behavior   Oxygen supplementation based on oxygen saturation or arterial blood gases   Encourage broncho-pulmonary hygiene including cough, deep breathe, incentive spirometry   Assess and instruct to report shortness of breath or any respiratory difficulty

## 2023-02-18 NOTE — PLAN OF CARE
Problem: Discharge Planning  Goal: Discharge to home or other facility with appropriate resources  Outcome: Progressing     Problem: Safety - Adult  Goal: Free from fall injury  Outcome: Progressing     Problem: ABCDS Injury Assessment  Goal: Absence of physical injury  Outcome: Progressing     Problem: Skin/Tissue Integrity  Goal: Absence of new skin breakdown  Description: 1. Monitor for areas of redness and/or skin breakdown  2. Assess vascular access sites hourly  3. Every 4-6 hours minimum:  Change oxygen saturation probe site  4. Every 4-6 hours:  If on nasal continuous positive airway pressure, respiratory therapy assess nares and determine need for appliance change or resting period.   Outcome: Progressing     Problem: Chronic Conditions and Co-morbidities  Goal: Patient's chronic conditions and co-morbidity symptoms are monitored and maintained or improved  Outcome: Progressing     Problem: Respiratory - Adult  Goal: Achieves optimal ventilation and oxygenation  2/18/2023 0259 by Louis Pratt RN  Outcome: Progressing  2/17/2023 2158 by Seda Leone RCP  Outcome: Progressing  Flowsheets (Taken 2/17/2023 2158)  Achieves optimal ventilation and oxygenation:   Assess for changes in respiratory status   Respiratory therapy support as indicated   Assess for changes in mentation and behavior   Oxygen supplementation based on oxygen saturation or arterial blood gases   Encourage broncho-pulmonary hygiene including cough, deep breathe, incentive spirometry   Assess and instruct to report shortness of breath or any respiratory difficulty     Problem: Pain  Goal: Verbalizes/displays adequate comfort level or baseline comfort level  Outcome: Progressing  Flowsheets (Taken 2/17/2023 1600 by Bharath Covarrubias RN)  Verbalizes/displays adequate comfort level or baseline comfort level:   Encourage patient to monitor pain and request assistance   Assess pain using appropriate pain scale   Administer analgesics based on type and severity of pain and evaluate response   Implement non-pharmacological measures as appropriate and evaluate response   Consider cultural and social influences on pain and pain management   Notify Licensed Independent Practitioner if interventions unsuccessful or patient reports new pain     Problem: Neurosensory - Adult  Goal: Achieves stable or improved neurological status  Outcome: Progressing  Goal: Absence of seizures  Outcome: Progressing  Goal: Remains free of injury related to seizures activity  Outcome: Progressing  Goal: Achieves maximal functionality and self care  Outcome: Progressing     Problem: Cardiovascular - Adult  Goal: Maintains optimal cardiac output and hemodynamic stability  Outcome: Progressing  Goal: Absence of cardiac dysrhythmias or at baseline  Outcome: Progressing     Problem: Skin/Tissue Integrity - Adult  Goal: Skin integrity remains intact  Outcome: Progressing  Flowsheets (Taken 2/17/2023 2043)  Skin Integrity Remains Intact: Monitor for areas of redness and/or skin breakdown  Goal: Incisions, wounds, or drain sites healing without S/S of infection  Outcome: Progressing  Goal: Oral mucous membranes remain intact  Outcome: Progressing     Problem: Musculoskeletal - Adult  Goal: Return mobility to safest level of function  Outcome: Progressing  Goal: Maintain proper alignment of affected body part  Outcome: Progressing  Goal: Return ADL status to a safe level of function  Outcome: Progressing     Problem: Gastrointestinal - Adult  Goal: Minimal or absence of nausea and vomiting  Outcome: Progressing  Goal: Maintains or returns to baseline bowel function  Outcome: Progressing  Goal: Maintains adequate nutritional intake  Outcome: Progressing  Goal: Establish and maintain optimal ostomy function  Outcome: Progressing     Problem: Genitourinary - Adult  Goal: Absence of urinary retention  Outcome: Progressing  Goal: Urinary catheter remains patent  Outcome: Progressing Problem: Infection - Adult  Goal: Absence of infection at discharge  Outcome: Progressing  Goal: Absence of infection during hospitalization  Outcome: Progressing  Goal: Absence of fever/infection during anticipated neutropenic period  Outcome: Progressing     Problem: Metabolic/Fluid and Electrolytes - Adult  Goal: Electrolytes maintained within normal limits  Outcome: Progressing  Goal: Hemodynamic stability and optimal renal function maintained  Outcome: Progressing  Goal: Glucose maintained within prescribed range  Outcome: Progressing     Problem: Hematologic - Adult  Goal: Maintains hematologic stability  Outcome: Progressing

## 2023-02-18 NOTE — PROGRESS NOTES
Physical Therapy  Facility/Department: [facility] CAR 3- MICU  Daily Treatment Note  NAME: Anaya Dhillon  : 1955  MRN: [id number]    Date of Service: 2023    Discharge Recommendations:  Patient would benefit from continued therapy after discharge      Patient Diagnosis(es): There were no encounter diagnoses. Assessment   Assessment: pt mariann transfer from bedside commode to bed. pt took ~6 steps toward bed with RW & CGA of 1.pt requires v.c's to upright flexed posture. Pt defer there ex & further mobility d/t reports of fatigue. Pt sit to supine with min assist of 1 for bilat LE management & v.c's for trunk. Pt could benefit from increasing standing & or amb distance next therapy visit to improve his dynamic functional mobility. Activity Tolerance: Patient limited by fatigue;Patient limited by endurance     Plan    Physcial Therapy Plan  Current Treatment Recommendations: Strengthening;ROM; Functional mobility training;Transfer training; Endurance training;Gait training;Stair training;Balance training;Neuromuscular re-education;Home exercise program;Safety education & training;Patient/Caregiver education & training; Therapeutic activities; Equipment evaluation, education, & procurement     Restrictions  Restrictions/Precautions  Restrictions/Precautions: Up as Tolerated, Fall Risk  Required Braces or Orthoses?: No  Position Activity Restriction  Other position/activity restrictions: Can increase activity level as tolerated as long as O2 saturation maintained above 92% and as tolerated. Subjective    Subjective  Subjective: RN & pt agreeable to participate with therapy. Pain: 9/10 chest pain  Orientation  Overall Orientation Status: Within Functional Limits  Orientation Level: Oriented X4  Cognition  Overall Cognitive Status: WNL  Following Commands:  Follows one step commands consistently     Objective      Bed Mobility Training  Bed Mobility Training: Yes  Interventions: Safety awareness training  Sit to Supine: Minimum assistance (with bilat LE management & v.c's for trunk)  Scooting: Stand-by assistance  Balance  Sitting: Without support (slumped posture while on bedside commode)  Standing: With support (RW, CGA of 1 during static stand)  Transfer Training  Transfer Training: Yes  Overall Level of Assistance: Contact-guard assistance  Interventions: Safety awareness training;Visual cues  Sit to Stand: Contact-guard assistance  Stand to Sit: contact guard assist  (& RW & v.c's for technique)  Toilet Transfer: Contact-guard assistance (& RW from commode to EOB)  Gait Training  Gait Training: Yes  Gait  Overall Level of Assistance: Contact-guard assistance (RW)  Interventions: Verbal cues; Safety awareness training (v.c's to ensure upright posture as pt's posture is flexed)  Speed/Portia: Shuffled  Distance (ft):  (6 steps)  Assistive Device: Walker, rolling     PT Exercises  Exercise Treatment:  (pt defer as he requested to eat his lunch)     Safety Devices  Type of Devices: All fall risk precautions in place; Bed alarm in place;Call light within reach; Left in bed  Restraints  Restraints Initially in Place: No       Goals  Short Term Goals  Time Frame for Short Term Goals: 14 visits  Short Term Goal 1: Pt will amb 300' Love  Short Term Goal 2: Pt will be Love w/ all bed mobility tasks  Short Term Goal 3: Pt will be Love w/ transfers  Short Term Goal 4: Pt will be CGA in negotiation of 2 steps w/ L rails use    Education  Patient Education  Education Given To: Patient (pt with family present)  Education Provided: Role of Therapy;Plan of Care;Transfer Training  Education Provided Comments: pt educated on use of call light  Education Method: Verbal  Barriers to Learning: None  Education Outcome: Verbalized understanding    Therapy Time   Individual Concurrent Group Co-treatment   Time In  1358         Time Out  1406         Minutes  [address] [place] [address], [facility]

## 2023-02-18 NOTE — PROGRESS NOTES
[facility]  Office: [phone]  Bernardo Morales DO, Ronny Cruz DO, Mason García DO, Sher Lamar DO, Enrique Pickett MD, Sumi Ferro MD, Bandar Aguirre MD, Farzana Trammell MD,  Garcia Berrios MD, Buzz Penn MD, Steven Raymundo DO, René Maloney MD,  Laura Melvin DO, Shereen Sagastume MD, Lucas Ambrose MD, Jose Morales DO, Dona Smith MD, Bandar Grier MD, Carlos A Selby DO, Meche Golden MD, Anastacia Farah MD, Jing Dodge MD, Regi Timmons MD, Adarsh Alvarado DO, Travis Rubio MD, Yariel Miranda MD, Shirley Waterhouse, CNP,  Mariel Etseban, CNP, Harleen Mccain, CNP, Yogesh Lopez, CNP,  Muriel Potts, DNP, Vibha Padilla, CNP, Gia Francisco, CNP, Camila Buenrostro, CNP, Amanda Persaud, CNP, Aide Ferguson, CNP, Mesfin Tovar PA-C, Carina David, CNS, Demetra Mcrae, CNP, Keira Arguelles, CNP         [facility]   IN-PATIENT SERVICE   [facility]    Progress Note    2/18/2023    6:47 PM    Name:   Cruzito Pittman  MRN:     [id number]     Acct:      [id number]   Room:   0539/0539-01   Day:  6  Admit Date:  2/12/2023 10:33 PM    PCP:   DAVID RAYA MD  Code Status:  Full Code    Subjective:     C/C: Shortness of breath    Interval History Status: not changed.     Patient continues with intermittent cough and sputum production, chest discomfort on the left at chest tube insertion site.  Some pleuritic quality chest pain.  Denies any nausea vomiting, fevers or chills or acute complaints.    Brief History:     This is a 67-year-old male who was initially admitted to [facility] on February 12 with complaint of shortness of breath and found to have pneumonia with pleural effusion.  He subsequently decompensated and was transferred to the intensive care unit with high flow oxygen and for further management.  He was found to have empyema and underwent chest tube insertion and has been treated with tPA without improvement in his fluid volume.   Cardiothoracic surgery has been consulted. He is going for additional chest tube placement today with interventional radiology and being transferred out of the intensive care unit. Review of Systems:     Constitutional:  negative for chills, fevers, sweats  Respiratory:  negative for cough, dyspnea on exertion, shortness of breath, wheezing  Cardiovascular:  negative for chest pain, chest pressure/discomfort, lower extremity edema, palpitations  Gastrointestinal:  negative for abdominal pain, constipation, diarrhea, nausea, vomiting  Neurological:  negative for dizziness, headache    Medications: Allergies:     Allergies   Allergen Reactions    Latex Rash    Gabapentin      Other reaction(s): Vomiting    Meperidine Anaphylaxis    Erythromycin      Other reaction(s): Fever  Had all side effects associated with this medication      Glimepiride      Other reaction(s): Dizziness    Hydrocodone      Other reaction(s): Vomiting    Macrolides And Ketolides Other (See Comments)    Niacin And Related     Trelegy Ellipta [Fluticasone-Umeclidin-Vilant]     Pregabalin Diarrhea    Xanax [Alprazolam] Anxiety     Anxiety, restlessness, insomnia       Current Meds:   Scheduled Meds:    fentanNYL  100 mcg IntraVENous Once    [START ON 2/19/2023] sodium chloride flush  10 mL IntraCATHeter BID    QUEtiapine  50 mg Oral Nightly    melatonin  5 mg Oral Nightly    insulin lispro  0-16 Units SubCUTAneous TID WC    insulin lispro  0-4 Units SubCUTAneous Nightly    heparin (porcine)  5,000 Units SubCUTAneous 3 times per day    [Held by provider] furosemide  40 mg IntraVENous BID    cefepime  2,000 mg IntraVENous Q12H    amLODIPine  5 mg Oral Daily    atorvastatin  80 mg Oral Daily    DULoxetine  60 mg Oral BID    dicyclomine  10 mg Oral 4x Daily AC & HS    finasteride  5 mg Oral Daily    fenofibrate  160 mg Oral Daily    metoprolol succinate  25 mg Oral Nightly    pantoprazole  40 mg Oral QAM AC    repaglinide  1 mg Oral TID AC brimonidine  1 drop Right Eye BID    And    timolol  1 drop Right Eye BID    insulin glargine  20 Units SubCUTAneous BID    lidocaine  20 mL IntraDERmal Once    sodium chloride flush  5-40 mL IntraVENous 2 times per day    ipratropium-albuterol  1 ampule Inhalation Q4H WA     Continuous Infusions:    dextrose      sodium chloride       PRN Meds: ALPRAZolam, oxyCODONE, oxyCODONE-acetaminophen, glucose, dextrose bolus **OR** dextrose bolus, glucagon (rDNA), dextrose, sodium chloride flush, sodium chloride, ondansetron **OR** ondansetron, polyethylene glycol, acetaminophen **OR** acetaminophen, albuterol    Data:     Past Medical History:   has a past medical history of Acute interstitial nephritis, Aortic regurgitation, ARF (acute renal failure) ([facility] [address] Chronic kidney disease, Hyperlipidemia, Hypertension, Non-Hodgkin lymphoma ([facility] [address] Research study patient, Sarcoidosis, and Type II or unspecified type diabetes mellitus without mention of complication, not stated as uncontrolled. Social History:   reports that he has never smoked. He has quit using smokeless tobacco. He reports current alcohol use. He reports that he does not use drugs. Family History:   Family History   Problem Relation Age of Onset    High Blood Pressure Father     Other Father         AAA    Heart Disease Other         uncle x 2        Vitals:  /76   Pulse (!) 109   Temp 97.9 °F (36.6 °C) (Oral)   Resp 21   Ht 6' 1\" (1.854 m)   Wt 228 lb 13.4 oz (103.8 kg)   SpO2 96%   BMI 30.19 kg/m²   Temp (24hrs), Av °F (36.7 °C), Min:97.7 °F (36.5 °C), Max:98.4 °F (36.9 °C)    Recent Labs     23  2033 02/18/23  0757 23  1216 23  1520   POCGLU 155* 129* 148* 127*       I/O (24Hr):     Intake/Output Summary (Last 24 hours) at 2023 1847  Last data filed at 2023 1800  Gross per 24 hour   Intake 1840 ml   Output 880 ml   Net 960 ml       Labs:  Hematology:  Recent Labs     23  0342 23  0915 23  8517 WBC 32.8* 26.7* 32.4*   RBC 4.74 4.44 4.28   HGB 12.9* 12.3* 11.9*   HCT 42.4 39.8* 38.7*   MCV 89.5 89.6 90.4   MCH 27.2 27.7 27.8   MCHC 30.4 30.9 30.7   RDW 15.2* 15.2* 15.7*   * 450 492*   MPV 9.8 9.7 10.2     Chemistry:  Recent Labs     02/16/23  0333 02/17/23  0342 02/18/23  0852   * 132* 130*   K 4.6 5.4* 5.0   CL 99 101 96*   CO2 23 22 23   GLUCOSE 162* 139* 159*   BUN 67* 66* 76*   CREATININE 1.83* 1.98* 1.95*   ANIONGAP 9 9 11   LABGLOM 40* 37* 37*   CALCIUM 9.5 9.6 9.6     Recent Labs     02/17/23  1142 02/17/23  1642 02/17/23 2033 02/18/23  0757 02/18/23  1216 02/18/23  1520   POCGLU 137* 161* 155* 129* 148* 127*     ABG:  Lab Results   Component Value Date/Time    POCPH 7.351 02/13/2023 03:06 AM    POCPCO2 48.3 02/13/2023 03:06 AM    POCPO2 99.8 02/13/2023 03:06 AM    POCHCO3 26.7 02/13/2023 03:06 AM    PBEA 0 02/13/2023 03:06 AM    BASA5GIG 97 02/13/2023 03:06 AM    FIO2 50.0 02/13/2023 03:06 AM     Lab Results   Component Value Date/Time    SPECIAL 18ML RIGHT FOREARM 02/12/2023 02:15 PM     Lab Results   Component Value Date/Time    CULTURE NO GROWTH 1 DAY 02/17/2023 09:15 AM    CULTURE NO GROWTH 1 DAY 02/17/2023 09:15 AM       Radiology:  CT CHEST WO CONTRAST    Result Date: 2/17/2023  Small to moderate-sized multiloculated left pleural effusions have improved. New small foci of air within the collection is presumably related to the introduction of the left thoracostomy tube. Stable small to moderate sized mediastinal lymph nodes are nonspecific but may relate to the patient's history of lymphoma. Additional small bilateral axial lymph nodes are identified. Small hiatal hernia. CT CHEST WO CONTRAST    Result Date: 2/13/2023  1. Moderate to large amount of multiloculated left pleural fluid.   Assessment for pleural thickening and/or pleural nodularity is limited without IV contrast. 2. Consolidation in the left lower lobe, likely atelectasis, but possibility of superimposed pneumonia would be difficult to exclude. 3. Mediastinal and bilateral axillary lymphadenopathy, which may be related to the patient's history of lymphoma. If available, comparison to any recent imaging may be of use to assess for progression. XR CHEST PORTABLE    Result Date: 2/17/2023  Similar findings when rotation taken into account; left chest tube with unchanged or slightly increased loculated appearing left pleural effusion; atelectatic appearing changes. No pneumothorax. XR CHEST PORTABLE    Result Date: 2/14/2023  Interval placement of a left chest tube with reduced left pleural fluid. XR CHEST PORTABLE    Result Date: 2/13/2023  No significant interval change. Redemonstration of opacification of the mid to lower left lung field which may in part be due to loculated left pleural effusion. Consider further evaluation with CT. XR CHEST PORTABLE    Result Date: 2/12/2023  Probable left-sided pneumonia with loculated pleural effusion. Underlying pathology not excluded, as above. Mild cardiomegaly and venous hypertension. Discoid atelectasis right base.  RECOMMENDATION: Consider CT chest.     IR CHEST TUBE INSERTION    Result Date: 2/14/2023  Successful ultrasound guided placement of a left 10 [place] chest tube       Physical Examination:        General appearance:  alert, cooperative and no distress  Mental Status:  oriented to person, place and time and normal affect  Lungs:  clear to auscultation bilaterally, normal effort, diminished throughout  Heart:  regular rate and rhythm, no murmur  Abdomen:  soft, nontender, nondistended, normal bowel sounds, no masses, hepatomegaly, splenomegaly  Extremities:  no edema, redness, tenderness in the calves  Skin:  no gross lesions, rashes, induration    Assessment:        Hospital Problems             Last Modified POA    * (Principal) Pneumonia, unspecified organism 2/12/2023 Yes    Pleural effusion 2/13/2023 Yes    Hyperkalemia 2/13/2023 Yes [facility] 2/13/2023 Yes    Hyponatremia 2/13/2023 Yes    Hyperglycemia 2/13/2023 Yes    History of non-Hodgkin's lymphoma 2/13/2023 Yes    History of sarcoidosis 2/13/2023 Yes    Empyema ([facility] 2/18/2023 Yes       Plan:        Continue present antibiotics  Second chest tube to be placed today by IR  We will eventually require VATS/decortication per cardiothoracic surgery  Trend labs, correct electrolytes as needed  Monitor renal function  GI DVT prophylaxis  Trend sugars, correct insulin scale as needed  Discussed with family at [address] [place] [address], DO  2/18/2023  6:47 PM

## 2023-02-18 NOTE — PLAN OF CARE
Problem: Discharge Planning  Goal: Discharge to home or other facility with appropriate resources  Outcome: Progressing     Problem: Safety - Adult  Goal: Free from fall injury  Outcome: Progressing     Problem: Skin/Tissue Integrity  Goal: Absence of new skin breakdown  Description: 1. Monitor for areas of redness and/or skin breakdown  2. Assess vascular access sites hourly  3. Every 4-6 hours minimum:  Change oxygen saturation probe site  4. Every 4-6 hours:  If on nasal continuous positive airway pressure, respiratory therapy assess nares and determine need for appliance change or resting period.   Outcome: Progressing     Problem: Chronic Conditions and Co-morbidities  Goal: Patient's chronic conditions and co-morbidity symptoms are monitored and maintained or improved  Outcome: Progressing     Problem: Respiratory - Adult  Goal: Achieves optimal ventilation and oxygenation  Outcome: Progressing     Problem: Pain  Goal: Verbalizes/displays adequate comfort level or baseline comfort level  Outcome: Progressing     Problem: Neurosensory - Adult  Goal: Achieves stable or improved neurological status  Outcome: Progressing  Goal: Absence of seizures  Outcome: Progressing  Goal: Remains free of injury related to seizures activity  Outcome: Progressing  Goal: Achieves maximal functionality and self care  Outcome: Progressing     Problem: Cardiovascular - Adult  Goal: Maintains optimal cardiac output and hemodynamic stability  Outcome: Progressing  Goal: Absence of cardiac dysrhythmias or at baseline  Outcome: Progressing     Problem: Skin/Tissue Integrity - Adult  Goal: Skin integrity remains intact  Outcome: Progressing  Flowsheets (Taken 2/18/2023 0800 by Gwenlyn Kawasaki, RN)  Skin Integrity Remains Intact: Monitor for areas of redness and/or skin breakdown  Goal: Incisions, wounds, or drain sites healing without S/S of infection  Outcome: Progressing  Goal: Oral mucous membranes remain intact  Outcome: Progressing     Problem: Musculoskeletal - Adult  Goal: Return mobility to safest level of function  Outcome: Progressing  Goal: Maintain proper alignment of affected body part  Outcome: Progressing  Goal: Return ADL status to a safe level of function  Outcome: Progressing     Problem: Gastrointestinal - Adult  Goal: Minimal or absence of nausea and vomiting  Outcome: Progressing  Goal: Maintains or returns to baseline bowel function  Outcome: Progressing  Goal: Maintains adequate nutritional intake  Outcome: Progressing  Goal: Establish and maintain optimal ostomy function  Outcome: Progressing     Problem: Genitourinary - Adult  Goal: Absence of urinary retention  Outcome: Progressing  Goal: Urinary catheter remains patent  Outcome: Progressing     Problem: Infection - Adult  Goal: Absence of infection at discharge  Outcome: Progressing  Goal: Absence of infection during hospitalization  Outcome: Progressing  Goal: Absence of fever/infection during anticipated neutropenic period  Outcome: Progressing     Problem: Metabolic/Fluid and Electrolytes - Adult  Goal: Electrolytes maintained within normal limits  Outcome: Progressing  Goal: Hemodynamic stability and optimal renal function maintained  Outcome: Progressing  Goal: Glucose maintained within prescribed range  Outcome: Progressing     Problem: Hematologic - Adult  Goal: Maintains hematologic stability  Outcome: Progressing

## 2023-02-18 NOTE — PROGRESS NOTES
[facility]up[facility] [employer]  Occupational Therapy Not Seen Note    DATE: 2023    NAME: Keila Powell  MRN: [id number]   : 1955      Patient not seen this date for Occupational Therapy due to: Pt declined d/t being in a lot of pain 10/10, RN in room giving pain meds.     Next Scheduled Treatment: 23    Electronically signed by NORIS Johnson on 2023 at 12:39 PM DISCHARGE

## 2023-02-19 ENCOUNTER — APPOINTMENT (OUTPATIENT)
Dept: ULTRASOUND IMAGING | Age: 68
DRG: 853 | End: 2023-02-19
Attending: STUDENT IN AN ORGANIZED HEALTH CARE EDUCATION/TRAINING PROGRAM
Payer: COMMERCIAL

## 2023-02-19 LAB
ABSOLUTE EOS #: 0 K/UL (ref 0–0.4)
ABSOLUTE IMMATURE GRANULOCYTE: 1.3 K/UL (ref 0–0.3)
ABSOLUTE LYMPH #: 9.72 K/UL (ref 1–4.8)
ABSOLUTE MONO #: 1.62 K/UL (ref 0.1–0.8)
ANION GAP SERPL CALCULATED.3IONS-SCNC: 12 MMOL/L (ref 9–17)
BASOPHILS # BLD: 0 % (ref 0–2)
BASOPHILS ABSOLUTE: 0 K/UL (ref 0–0.2)
BUN SERPL-MCNC: 69 MG/DL (ref 8–23)
CALCIUM SERPL-MCNC: 9.5 MG/DL (ref 8.6–10.4)
CHLORIDE SERPL-SCNC: 100 MMOL/L (ref 98–107)
CO2 SERPL-SCNC: 22 MMOL/L (ref 20–31)
CREAT SERPL-MCNC: 2 MG/DL (ref 0.7–1.2)
EOSINOPHILS RELATIVE PERCENT: 0 % (ref 1–4)
GFR SERPL CREATININE-BSD FRML MDRD: 36 ML/MIN/1.73M2
GLUCOSE BLD-MCNC: 118 MG/DL (ref 75–110)
GLUCOSE BLD-MCNC: 120 MG/DL (ref 75–110)
GLUCOSE BLD-MCNC: 132 MG/DL (ref 75–110)
GLUCOSE BLD-MCNC: 199 MG/DL (ref 75–110)
GLUCOSE SERPL-MCNC: 123 MG/DL (ref 70–99)
HCT VFR BLD AUTO: 35.1 % (ref 40.7–50.3)
HGB BLD-MCNC: 11 G/DL (ref 13–17)
IMMATURE GRANULOCYTES: 4 %
LYMPHOCYTES # BLD: 30 % (ref 24–44)
MCH RBC QN AUTO: 27.8 PG (ref 25.2–33.5)
MCHC RBC AUTO-ENTMCNC: 31.3 G/DL (ref 28.4–34.8)
MCV RBC AUTO: 88.6 FL (ref 82.6–102.9)
MONOCYTES # BLD: 5 % (ref 1–7)
MORPHOLOGY: ABNORMAL
MORPHOLOGY: ABNORMAL
NRBC AUTOMATED: 0 PER 100 WBC
PDW BLD-RTO: 15.3 % (ref 11.8–14.4)
PLATELET # BLD AUTO: 470 K/UL (ref 138–453)
PMV BLD AUTO: 9.7 FL (ref 8.1–13.5)
POTASSIUM SERPL-SCNC: 5.3 MMOL/L (ref 3.7–5.3)
RBC # BLD: 3.96 M/UL (ref 4.21–5.77)
SEG NEUTROPHILS: 61 % (ref 36–66)
SEGMENTED NEUTROPHILS ABSOLUTE COUNT: 19.76 K/UL (ref 1.8–7.7)
SODIUM SERPL-SCNC: 134 MMOL/L (ref 135–144)
WBC # BLD AUTO: 32.4 K/UL (ref 3.5–11.3)

## 2023-02-19 PROCEDURE — 2580000003 HC RX 258: Performed by: RADIOLOGY

## 2023-02-19 PROCEDURE — 80048 BASIC METABOLIC PNL TOTAL CA: CPT

## 2023-02-19 PROCEDURE — 6360000002 HC RX W HCPCS: Performed by: NURSE PRACTITIONER

## 2023-02-19 PROCEDURE — 2700000000 HC OXYGEN THERAPY PER DAY

## 2023-02-19 PROCEDURE — 6370000000 HC RX 637 (ALT 250 FOR IP): Performed by: FAMILY MEDICINE

## 2023-02-19 PROCEDURE — 2060000000 HC ICU INTERMEDIATE R&B

## 2023-02-19 PROCEDURE — 6370000000 HC RX 637 (ALT 250 FOR IP): Performed by: INTERNAL MEDICINE

## 2023-02-19 PROCEDURE — 51798 US URINE CAPACITY MEASURE: CPT

## 2023-02-19 PROCEDURE — 94640 AIRWAY INHALATION TREATMENT: CPT

## 2023-02-19 PROCEDURE — 6370000000 HC RX 637 (ALT 250 FOR IP): Performed by: NURSE PRACTITIONER

## 2023-02-19 PROCEDURE — 6360000002 HC RX W HCPCS: Performed by: INTERNAL MEDICINE

## 2023-02-19 PROCEDURE — 82947 ASSAY GLUCOSE BLOOD QUANT: CPT

## 2023-02-19 PROCEDURE — 94761 N-INVAS EAR/PLS OXIMETRY MLT: CPT

## 2023-02-19 PROCEDURE — 6370000000 HC RX 637 (ALT 250 FOR IP): Performed by: STUDENT IN AN ORGANIZED HEALTH CARE EDUCATION/TRAINING PROGRAM

## 2023-02-19 PROCEDURE — 85025 COMPLETE CBC W/AUTO DIFF WBC: CPT

## 2023-02-19 PROCEDURE — 99232 SBSQ HOSP IP/OBS MODERATE 35: CPT | Performed by: FAMILY MEDICINE

## 2023-02-19 PROCEDURE — 99233 SBSQ HOSP IP/OBS HIGH 50: CPT | Performed by: INTERNAL MEDICINE

## 2023-02-19 PROCEDURE — 94660 CPAP INITIATION&MGMT: CPT

## 2023-02-19 PROCEDURE — 76770 US EXAM ABDO BACK WALL COMP: CPT

## 2023-02-19 PROCEDURE — 6370000000 HC RX 637 (ALT 250 FOR IP)

## 2023-02-19 PROCEDURE — 2580000003 HC RX 258: Performed by: NURSE PRACTITIONER

## 2023-02-19 PROCEDURE — 6360000002 HC RX W HCPCS: Performed by: STUDENT IN AN ORGANIZED HEALTH CARE EDUCATION/TRAINING PROGRAM

## 2023-02-19 PROCEDURE — 36415 COLL VENOUS BLD VENIPUNCTURE: CPT

## 2023-02-19 PROCEDURE — 2580000003 HC RX 258: Performed by: FAMILY MEDICINE

## 2023-02-19 PROCEDURE — 2580000003 HC RX 258: Performed by: INTERNAL MEDICINE

## 2023-02-19 RX ORDER — AMLODIPINE BESYLATE 2.5 MG/1
2.5 TABLET ORAL DAILY
Status: DISCONTINUED | OUTPATIENT
Start: 2023-02-20 | End: 2023-02-22

## 2023-02-19 RX ORDER — DIPHENHYDRAMINE HYDROCHLORIDE 50 MG/ML
25 INJECTION INTRAMUSCULAR; INTRAVENOUS ONCE
Status: COMPLETED | OUTPATIENT
Start: 2023-02-19 | End: 2023-02-19

## 2023-02-19 RX ORDER — DOCUSATE SODIUM 100 MG/1
100 CAPSULE, LIQUID FILLED ORAL 3 TIMES DAILY
Status: DISCONTINUED | OUTPATIENT
Start: 2023-02-19 | End: 2023-02-23

## 2023-02-19 RX ORDER — BISACODYL 10 MG
10 SUPPOSITORY, RECTAL RECTAL DAILY PRN
Status: DISCONTINUED | OUTPATIENT
Start: 2023-02-19 | End: 2023-02-23

## 2023-02-19 RX ORDER — QUETIAPINE FUMARATE 25 MG/1
25 TABLET, FILM COATED ORAL NIGHTLY
Status: DISCONTINUED | OUTPATIENT
Start: 2023-02-19 | End: 2023-02-21

## 2023-02-19 RX ORDER — SODIUM CHLORIDE 9 MG/ML
INJECTION, SOLUTION INTRAVENOUS CONTINUOUS
Status: ACTIVE | OUTPATIENT
Start: 2023-02-19 | End: 2023-02-20

## 2023-02-19 RX ADMIN — INSULIN GLARGINE 20 UNITS: 100 INJECTION, SOLUTION SUBCUTANEOUS at 08:27

## 2023-02-19 RX ADMIN — HEPARIN SODIUM 5000 UNITS: 5000 INJECTION INTRAVENOUS; SUBCUTANEOUS at 21:38

## 2023-02-19 RX ADMIN — IPRATROPIUM BROMIDE AND ALBUTEROL SULFATE 1 AMPULE: 2.5; .5 SOLUTION RESPIRATORY (INHALATION) at 08:45

## 2023-02-19 RX ADMIN — HEPARIN SODIUM 5000 UNITS: 5000 INJECTION INTRAVENOUS; SUBCUTANEOUS at 06:18

## 2023-02-19 RX ADMIN — ALPRAZOLAM 0.5 MG: 0.5 TABLET ORAL at 01:17

## 2023-02-19 RX ADMIN — Medication 5 MG: at 21:36

## 2023-02-19 RX ADMIN — IPRATROPIUM BROMIDE AND ALBUTEROL SULFATE 1 AMPULE: 2.5; .5 SOLUTION RESPIRATORY (INHALATION) at 11:25

## 2023-02-19 RX ADMIN — TIMOLOL MALEATE 1 DROP: 5 SOLUTION OPHTHALMIC at 21:38

## 2023-02-19 RX ADMIN — DOCUSATE SODIUM 100 MG: 100 CAPSULE ORAL at 21:36

## 2023-02-19 RX ADMIN — REPAGLINIDE 1 MG: 0.5 TABLET ORAL at 17:08

## 2023-02-19 RX ADMIN — ATORVASTATIN CALCIUM 80 MG: 80 TABLET, FILM COATED ORAL at 21:36

## 2023-02-19 RX ADMIN — HEPARIN SODIUM 5000 UNITS: 5000 INJECTION INTRAVENOUS; SUBCUTANEOUS at 14:38

## 2023-02-19 RX ADMIN — SODIUM CHLORIDE, PRESERVATIVE FREE 10 ML: 5 INJECTION INTRAVENOUS at 10:04

## 2023-02-19 RX ADMIN — REPAGLINIDE 1 MG: 0.5 TABLET ORAL at 11:20

## 2023-02-19 RX ADMIN — METOPROLOL SUCCINATE 25 MG: 25 TABLET, FILM COATED, EXTENDED RELEASE ORAL at 21:36

## 2023-02-19 RX ADMIN — IPRATROPIUM BROMIDE AND ALBUTEROL SULFATE 1 AMPULE: 2.5; .5 SOLUTION RESPIRATORY (INHALATION) at 21:11

## 2023-02-19 RX ADMIN — SODIUM CHLORIDE, PRESERVATIVE FREE 10 ML: 5 INJECTION INTRAVENOUS at 10:38

## 2023-02-19 RX ADMIN — OXYCODONE HYDROCHLORIDE AND ACETAMINOPHEN 1 TABLET: 5; 325 TABLET ORAL at 21:36

## 2023-02-19 RX ADMIN — INSULIN GLARGINE 20 UNITS: 100 INJECTION, SOLUTION SUBCUTANEOUS at 21:52

## 2023-02-19 RX ADMIN — DICYCLOMINE HYDROCHLORIDE 10 MG: 10 CAPSULE ORAL at 07:03

## 2023-02-19 RX ADMIN — CEFEPIME 2000 MG: 2 INJECTION, POWDER, FOR SOLUTION INTRAVENOUS at 14:02

## 2023-02-19 RX ADMIN — ACETAMINOPHEN 650 MG: 325 TABLET ORAL at 17:08

## 2023-02-19 RX ADMIN — DICYCLOMINE HYDROCHLORIDE 10 MG: 10 CAPSULE ORAL at 21:36

## 2023-02-19 RX ADMIN — SODIUM CHLORIDE, PRESERVATIVE FREE 10 ML: 5 INJECTION INTRAVENOUS at 01:16

## 2023-02-19 RX ADMIN — REPAGLINIDE 1 MG: 0.5 TABLET ORAL at 06:16

## 2023-02-19 RX ADMIN — FINASTERIDE 5 MG: 5 TABLET, FILM COATED ORAL at 08:06

## 2023-02-19 RX ADMIN — FENOFIBRATE 160 MG: 160 TABLET ORAL at 08:06

## 2023-02-19 RX ADMIN — IPRATROPIUM BROMIDE AND ALBUTEROL SULFATE 1 AMPULE: 2.5; .5 SOLUTION RESPIRATORY (INHALATION) at 15:25

## 2023-02-19 RX ADMIN — BRIMONIDINE TARTRATE 1 DROP: 2 SOLUTION OPHTHALMIC at 10:04

## 2023-02-19 RX ADMIN — AMLODIPINE BESYLATE 5 MG: 5 TABLET ORAL at 08:06

## 2023-02-19 RX ADMIN — PANTOPRAZOLE SODIUM 40 MG: 40 TABLET, DELAYED RELEASE ORAL at 06:16

## 2023-02-19 RX ADMIN — DICYCLOMINE HYDROCHLORIDE 10 MG: 10 CAPSULE ORAL at 17:08

## 2023-02-19 RX ADMIN — QUETIAPINE FUMARATE 25 MG: 25 TABLET ORAL at 21:36

## 2023-02-19 RX ADMIN — TIMOLOL MALEATE 1 DROP: 5 SOLUTION OPHTHALMIC at 10:04

## 2023-02-19 RX ADMIN — DULOXETINE HYDROCHLORIDE 60 MG: 30 CAPSULE, DELAYED RELEASE ORAL at 08:06

## 2023-02-19 RX ADMIN — BRIMONIDINE TARTRATE 1 DROP: 2 SOLUTION OPHTHALMIC at 21:38

## 2023-02-19 RX ADMIN — DOCUSATE SODIUM 100 MG: 100 CAPSULE ORAL at 17:08

## 2023-02-19 RX ADMIN — DIPHENHYDRAMINE HYDROCHLORIDE 25 MG: 50 INJECTION, SOLUTION INTRAMUSCULAR; INTRAVENOUS at 22:55

## 2023-02-19 RX ADMIN — CEFEPIME 2000 MG: 2 INJECTION, POWDER, FOR SOLUTION INTRAVENOUS at 01:16

## 2023-02-19 RX ADMIN — DULOXETINE HYDROCHLORIDE 60 MG: 30 CAPSULE, DELAYED RELEASE ORAL at 21:36

## 2023-02-19 RX ADMIN — SODIUM CHLORIDE, PRESERVATIVE FREE 10 ML: 5 INJECTION INTRAVENOUS at 21:53

## 2023-02-19 RX ADMIN — DICYCLOMINE HYDROCHLORIDE 10 MG: 10 CAPSULE ORAL at 11:20

## 2023-02-19 RX ADMIN — SODIUM CHLORIDE: 9 INJECTION, SOLUTION INTRAVENOUS at 16:37

## 2023-02-19 ASSESSMENT — PAIN DESCRIPTION - DESCRIPTORS: DESCRIPTORS: ACHING

## 2023-02-19 ASSESSMENT — ENCOUNTER SYMPTOMS
CONSTIPATION: 0
DIARRHEA: 0
SHORTNESS OF BREATH: 1
VOICE CHANGE: 0
NAUSEA: 0
SORE THROAT: 0
WHEEZING: 0
VOMITING: 0
COUGH: 0
BLOOD IN STOOL: 0
ABDOMINAL PAIN: 0
SINUS PRESSURE: 0

## 2023-02-19 ASSESSMENT — PAIN DESCRIPTION - LOCATION: LOCATION: BACK

## 2023-02-19 ASSESSMENT — PAIN SCALES - GENERAL: PAINLEVEL_OUTOF10: 4

## 2023-02-19 NOTE — PROGRESS NOTES
PULMONARY & CRITICAL CARE MEDICINE CONSULT NOTE     Patient:  Ayah Herr  MRN: [id number]  [facility] date: 2/12/2023  Primary Care Physician: Misha Marti MD  Consulting Physician: Mitesh Nguyen MD  CODE Status: Full Code  LOS: 7     SUBJECTIVE     CHIEF COMPLAINT/REASON FOR CONSULT:  Acute Hypoxic respiratory failure    HISTORY OF PRESENT ILLNESS:  Ayah Herr is a 74 y.o. male with past medical history significant for type 2 diabetes mellitus, multiple myeloma, non-Hodgkin lymphoma, sarcoidosis, referred from [facility] with worsening respiratory status, shortness of breath, nonproductive cough. He was started on BiPAP and given antibiotics. Chest x-ray was concerning for loculated left-sided pleural effusion. Transfer was made and accepted by hospitalist, further work-up. Critical care was consulted for worsening respiratory status. Patient was on high flow and respiratory rate was in 38-40. Patient was very dyspneic and having difficulty breathing with accessory muscle use. Initially he was unable to keep BiPAP but later on able to keep BiPAP. He improved symptomatically with BiPAP and respiratory rate was in 26. Patient is transferred to the ICU for higher level of care. 2/13: Attempted to perform bedside thoracentesis, no clear pocket identified, sent to IR, chest tube placed, appears that an empyema was encountered and chest tube was placed growing gram positive cocci in pairs, on cefepime and vanco     2/14: Placed chest tube yesterday, weaned off high flow, feeling \"100 x better\".       2/15: Continued TPA/Dornase chest tube flush, Got anxious overnight requiring xanax, 1200 cc of purulent chest tube output in the last 24 hours, patient did not sleep last night     2/16: Added seroquel to help sleep and agitation at night, continued Dornase/TPA, 700 cc of chest tube output that was less purulent, afebrile, WBC downtrending, continue cefepime     2/17: WBC uptrended, sent cultures, tachypneic, restless overnight, given xanax, more tachycardic overnight as well, plan for CT chest today, will discuss possible CTA to evaluate for PE.    02/18: Cardiothoracic surgery consulted and they increased the size of the chest tube. Will likely need decortication. Interval History:  Patient seen and examined at the bed side:  No acute events over night   I/O record:  Intake 1480 ml  Chest tube 910 ml   Culture growing STREPTOCOCCUS GORDONII         PAST MEDICAL HISTORY:        Diagnosis Date    Acute interstitial nephritis 08/03/2022    Unclear cause. Creat bumped to 2.9, baseline in oct 2020 1.6. Exact description of his kidney biopsy is not available to me however pathologist seems to think there is diabetic glomerosclerosis and acute interstitial nephritis seen on the biopsy specimen. No mention about chronic changes. Because of acute interstitial nephritis a trial of steroids for 15 days will be given to see if I can impro    Aortic regurgitation     ARF (acute renal failure) ([facility] [address] 08/03/2022    kidney biopsy from June 2022  showing findings of acute interstitial nephritis. Exact description not available. Trial of steroids will be given to see if I can improve renal function. Creatinine in October 2020 was 1.6, creatinine in June 2022 was 2.9. Trial of steroids will be given to see if renal function can be improved.     Chronic kidney disease     Hyperlipidemia     Hypertension     Non-Hodgkin lymphoma ([facility] [address]     Research study patient 02/13/2023    [id number] Completed 2/15/23    Sarcoidosis     Type II or unspecified type diabetes mellitus without mention of complication, not stated as uncontrolled      PAST SURGICAL HISTORY:        Procedure Laterality Date    CARPAL TUNNEL RELEASE Left 11/15     EYE SURGERY Right     shunt and cataract     EYE SURGERY Right 6/16    laser     IR CHEST TUBE INSERTION  2/13/2023    IR CHEST TUBE INSERTION 2/13/2023 [facility] PROCEDURES    KNEE ARTHROSCOPY      MALIGNANT SKIN LESION EXCISION      ROTATOR CUFF REPAIR Right 7/14    ROTATOR CUFF REPAIR Left 5/15    TOTAL KNEE ARTHROPLASTY Right 10/2018     FAMILY HISTORY:       Problem Relation Age of Onset    High Blood Pressure Father     Other Father         AAA    Heart Disease Other         uncle x 2      SOCIAL HISTORY:   TOBACCO:   reports that he has never smoked. He has quit using smokeless tobacco.  ETOH:  reports current alcohol use. DRUGS: reports no history of drug use. ALLERGIES:    Allergies   Allergen Reactions    Latex Rash    Gabapentin      Other reaction(s): Vomiting    Meperidine Anaphylaxis    Erythromycin      Other reaction(s): Fever  Had all side effects associated with this medication      Glimepiride      Other reaction(s): Dizziness    Hydrocodone      Other reaction(s): Vomiting    Macrolides And Ketolides Other (See Comments)    Niacin And Related     Trelegy Ellipta [Fluticasone-Umeclidin-Vilant]     Pregabalin Diarrhea    Xanax [Alprazolam] Anxiety     Anxiety, restlessness, insomnia         HOME MEDICATIONS:  Prior to Admission medications    Medication Sig Start Date End Date Taking? Authorizing Provider   amLODIPine (NORVASC) 5 MG tablet TAKE ONE TABLET BY MOUTH DAILY 11/22/22   Mindy Ramsey MD   omeprazole (PRILOSEC) 20 MG delayed release capsule Take 20 mg by mouth Daily    Historical Provider, MD   fluticasone-salmeterol (ADVAIR DISKUS) 250-50 MCG/ACT AEPB diskus inhaler Inhale 1 puff into the lungs every 12 hours    Historical Provider, MD   metoprolol succinate (TOPROL XL) 25 MG extended release tablet Take 25 mg by mouth at bedtime    Historical Provider, MD   dutasteride (AVODART) 0.5 mg capsule Take 0.5 mg by mouth daily    Historical Provider, MD   finasteride (PROSCAR) 5 MG tablet Take 5 mg by mouth daily    Historical Provider, MD   Misc.  Devices (NASAL SPRAY BOTTLE) MISC by Does not apply route    Historical Provider, MD   CINNAMON PO Take by mouth Historical Provider, MD   repaglinide (PRANDIN) 1 MG tablet Take 1 mg by mouth 3 times daily (before meals)  4/16/18   Historical Provider, MD   pimecrolimus (ELIDEL) 1 % cream Apply topically as needed Apply topically 2 times daily. Historical Provider, MD   brimonidine-timolol (COMBIGAN) 0.2-0.5 % ophthalmic solution Place 1 drop into the right eye every 12 hours    Historical Provider, MD   albuterol sulfate  (90 BASE) MCG/ACT inhaler Inhale 1 puff into the lungs in the morning and at bedtime    Historical Provider, MD   Liraglutide (VICTOZA SC) Inject 1.8 mg into the skin daily     Historical Provider, MD   fenofibrate (TRICOR) 145 MG tablet Take 145 mg by mouth 2 times daily. Historical Provider, MD   dicyclomine (BENTYL) 10 MG capsule Take 10 mg by mouth 4 times daily (before meals and nightly)     Historical Provider, MD   atorvastatin (LIPITOR) 80 MG tablet Take 80 mg by mouth daily. Historical Provider, MD   DULoxetine (CYMBALTA) 60 MG capsule Take 60 mg by mouth 2 times daily. Historical Provider, MD     IMMUNIZATIONS:  Most Recent Immunizations   Administered Date(s) Administered    COVID-19, PFIZER Bivalent BOOSTER, DO NOT Dilute, (age 12y+), IM, 30 mcg/0.3 mL 10/01/2022    COVID-19, PFIZER GRAY top, DO NOT Dilute, (age 15 y+), IM, 30 mcg/0.3 mL 04/03/2022    COVID-19, PFIZER PURPLE top, DILUTE for use, (age 15 y+), 30mcg/0.3mL 10/10/2021     REVIEW OF SYSTEMS:  Review of Systems   Constitutional:  Positive for activity change, appetite change and fatigue. Negative for chills, diaphoresis, fever and unexpected weight change. HENT:  Negative for congestion. Respiratory:  Positive for cough and shortness of breath. Negative for apnea, choking, chest tightness, wheezing and stridor. Cardiovascular:  Positive for chest pain (at the site of chest tube insertion). Negative for leg swelling. Gastrointestinal:  Negative for abdominal distention, diarrhea, nausea and vomiting. Genitourinary:  Negative for difficulty urinating. Neurological:  Negative for dizziness and numbness. Psychiatric/Behavioral:  Negative for agitation. OBJECTIVE     PaO2/FiO2 RATIO:  No results for input(s): POCPO2 in the last 72 hours. FiO2 : 40 %     VITAL SIGNS:   LAST:  /79   Pulse (!) 112   Temp 98.7 °F (37.1 °C) (Axillary)   Resp 20   Ht 6' 1\" (1.854 m)   Wt 228 lb 13.4 oz (103.8 kg)   SpO2 95%   BMI 30.19 kg/m²   8-24 HR RANGE:  TEMP Temp  Av.4 °F (36.9 °C)  Min: 97.9 °F (36.6 °C)  Max: 98.7 °F (87.0 °C)   BP Systolic (46AIU), AOQ:489 , Min:102 , XCW:334      Diastolic (68TUF), DSF:63, Min:62, Max:107     PULSE Pulse  Av.1  Min: 97  Max: 128   RR Resp  Av  Min: 20  Max: 24   O2 SAT No data recorded   OXYGEN DELIVERY No data recorded        SYSTEMIC EXAMINATION:   Constitutional:  Alert, cooperative and no distress. Mental Status:  Oriented to person, place and time and normal affect. Lungs: Decreased air entry on the left side with chest tube in place  Heart:  Regular rate and rhythm, no murmur. Abdomen:  Soft, nontender, nondistended, normal bowel sounds. Extremities:  No edema, redness, tenderness in the calves. Skin:  Warm, dry, no gross lesions or rashes.   DATA REVIEW     Medications: Current Inpatient  Scheduled Meds:   fentanNYL  100 mcg IntraVENous Once    sodium chloride flush  10 mL IntraCATHeter BID    QUEtiapine  50 mg Oral Nightly    melatonin  5 mg Oral Nightly    insulin lispro  0-16 Units SubCUTAneous TID     insulin lispro  0-4 Units SubCUTAneous Nightly    heparin (porcine)  5,000 Units SubCUTAneous 3 times per day    [Held by provider] furosemide  40 mg IntraVENous BID    cefepime  2,000 mg IntraVENous Q12H    amLODIPine  5 mg Oral Daily    atorvastatin  80 mg Oral Daily    DULoxetine  60 mg Oral BID    dicyclomine  10 mg Oral 4x Daily AC & HS    finasteride  5 mg Oral Daily    fenofibrate  160 mg Oral Daily    metoprolol succinate  25 mg Oral Nightly    pantoprazole  40 mg Oral QAM AC    repaglinide  1 mg Oral TID AC    brimonidine  1 drop Right Eye BID    And    timolol  1 drop Right Eye BID    insulin glargine  20 Units SubCUTAneous BID    lidocaine  20 mL IntraDERmal Once    sodium chloride flush  5-40 mL IntraVENous 2 times per day    ipratropium-albuterol  1 ampule Inhalation Q4H WA     Continuous Infusions:   dextrose      sodium chloride         INPUT/OUTPUT:  In: 1480 [P.O.:480; I.V.:1000]  Out: 1110 [Urine:200]  Date 02/19/23 0000 - 02/19/23 2359   Shift 2582-3141 5322-7314 6258-5612 24 Hour Total   INTAKE   Shift Total(mL/kg)       OUTPUT   Chest Tube 460   460   Shift Total(mL/kg) 460(4.4)   460(4.4)   Weight (kg) 103.8 103.8 103.8 103.8        LABS:  ABGs:   No results for input(s): POCPH, POCPCO2, POCPO2, POCHCO3, ZJYH4LKO in the last 72 hours. CBC:   Recent Labs     02/17/23 0342 02/17/23 0915 02/18/23  0852 02/19/23  0557   WBC 32.8* 26.7* 32.4* 32.4*   HGB 12.9* 12.3* 11.9* 11.0*   HCT 42.4 39.8* 38.7* 35.1*   MCV 89.5 89.6 90.4 88.6   * 450 492* 470*   LYMPHOPCT 33 19* 31 PENDING   RBC 4.74 4.44 4.28 3.96*   MCH 27.2 27.7 27.8 27.8   MCHC 30.4 30.9 30.7 31.3   RDW 15.2* 15.2* 15.7* 15.3*     CRP:   No results for input(s): CRP in the last 72 hours. LDH:   No results for input(s): LDH in the last 72 hours. BMP:   Recent Labs     02/17/23 0342 02/18/23  0852 02/19/23  0557   * 130* 134*   K 5.4* 5.0 5.3    96* 100   CO2 22 23 22   BUN 66* 76* 69*   CREATININE 1.98* 1.95* 2.00*   GLUCOSE 139* 159* 123*     Liver Function Test:   No results for input(s): PROT, LABALBU, ALT, AST, GGT, ALKPHOS, BILITOT in the last 72 hours. Coagulation Profile:   No results for input(s): INR, PROTIME, APTT in the last 72 hours. D-Dimer:  No results for input(s): DDIMER in the last 72 hours. Lactic Acid:  No results for input(s): LACTA in the last 72 hours.   Cardiac Enzymes:  No results for input(s): CKTOTAL, CKMB, CKMBINDEX, TROPONINI in the last 72 hours. Invalid input(s): TROPONIN, HSTROP  BNP/ProBNP:   No results for input(s): BNP, PROBNP in the last 72 hours. Triglycerides:  No results for input(s): TRIG in the last 72 hours. Microbiology:  Urine Culture:  No components found for: CURINE  Blood Culture:  No components found for: CBLOOD, CFUNGUSBL  Sputum Culture:  No components found for: CSPUTUM  Recent Labs     02/18/23  1810   SPECDESC . CHEST   CULTURE PENDING     Recent Labs     02/17/23  0615 02/17/23  0915 02/18/23  1810   CULTURE NO GROWTH NO GROWTH 1 DAY  NO GROWTH 1 DAY PENDING          Radiology Reports:  XR CHEST PORTABLE   Final Result   Moderate left pleural effusion increased. No change left chest tube/pigtail   catheter. VL DUP LOWER EXTREMITY VENOUS BILATERAL   Final Result      CT CHEST WO CONTRAST   Final Result   Small to moderate-sized multiloculated left pleural effusions have improved. New small foci of air within the collection is presumably related to the   introduction of the left thoracostomy tube. Stable small to moderate sized mediastinal lymph nodes are nonspecific but   may relate to the patient's history of lymphoma. Additional small bilateral   axial lymph nodes are identified. Small hiatal hernia. XR CHEST PORTABLE   Final Result   Similar findings when rotation taken into account; left chest tube with   unchanged or slightly increased loculated appearing left pleural effusion;   atelectatic appearing changes. No pneumothorax. XR CHEST PORTABLE   Final Result   Interval placement of a left chest tube with reduced left pleural fluid. IR CHEST TUBE INSERTION   Final Result   Successful ultrasound guided placement of a left 10 [place] chest tube         CT CHEST WO CONTRAST   Final Result   1. Moderate to large amount of multiloculated left pleural fluid.   Assessment   for pleural thickening and/or pleural nodularity is limited without IV contrast.   2. Consolidation in the left lower lobe, likely atelectasis, but possibility   of superimposed pneumonia would be difficult to exclude. 3. Mediastinal and bilateral axillary lymphadenopathy, which may be related   to the patient's history of lymphoma. If available, comparison to any recent   imaging may be of use to assess for progression. XR CHEST PORTABLE   Final Result   No significant interval change. Redemonstration of opacification of the mid   to lower left lung field which may in part be due to loculated left pleural   effusion. Consider further evaluation with CT. IR CHEST TUBE INSERTION    (Results Pending)        Echocardiogram:   No results found for this or any previous visit. ASSESSMENT AND PLAN     Assessment:  Acute hypoxic respiratory failure due to #2 amd #3  Community acquired pneumonia  Empyema left side s/p chest tube insertion  Mediastinal and bilateral axillary lymphadenopathy  Hx of lymphoma  Hypertension  Hx of AAA  Hx of mitral regurgitation    Plan:  Patient is currently on 2.5 L of oxygen and saturating 96%  Keep supplemental oxygen to keep oxygen saturation above 92%  Chest tube management as per cardiothoracic surgery  Patient will likely need decortication as per the CTS  Patient is on cefepime -we will suggest infectious disease consult'  We suggest hematology/oncology consult due to background hx of lymphoma  Bronchodilator therapy  Encourage incentive spirometry/Acapella  Bronchopulmonary hygiene  Patient is getting heparin for DVT prophylaxis  Fasting blood glucose 120, management of diabetes per the primary team  Rest of the management as per urinary team  We will continue to follow    Thank you for this interesting consult. We will continue to follow. I will discuss with attending.     Sary Almendarez MD      Internal Medicine Resident, PGY-3  [facility]         2/19/2023, 8:17 AM    Please note that this chart was generated using voice recognition Dragon dictation software. Although every effort was made to ensure the accuracy of this automated transcription, some errors in transcription may have occurred. Attending Physician Statement  I have discussed the care of Caity Miranda, including pertinent history and exam findings with the resident. I have reviewed the key elements of all parts of the encounter with the resident. I have seen and examined the patient with the resident. I agree with the assessment and plan and status of the problem list as documented. I have reviewed the chart, labs seen, 2 CT scan seen and also chest x-ray seen and I have reviewed the ICU events culture data reviewed. Patient has multiloculated empyema on first CT scan which was done on admission on 02/13/2023 patient has multiloculated pleural effusion posterior laterally extending up there and he had a chest tube placed apparently he had good drainage with 1200 mL and then he had 2 doses of intrapleural dornase xena/alteplase in the ICU. Patient had repositioning of the chest tube yesterday on the left side because of persistent loculation as a CT scan of the chest on 02/17/2023 shows slight decrease in the loculation but continued to have persistent multiloculation with air in the locules. Patient is persistently leukocytotic with WBC count of 32 which is persistent very weak. On nasal cannula 3 L. He had grown Streptococcus viridans from the first pleural fluid culture currently he is on cefepime. CT surgery has been consulted patient is going to need decortication as he has persistent multiloculation and failure of intrapleural thrombolytic  Continue to follow-up drainage from the chest tube he had apparently 900 out after chest tube was placed on 475 overnight. Continue with respiratory/pulmonary toilet and bronchopulmonary hygiene.   I would recommend infectious disease evaluation as patient is currently on cefepime and would need change in antibiotic possibly Unasyn/Zosyn also to cover anaerobes with persistent leukocytosis. Patient also have history of lymphoma he does have mediastinal lymph node and axillary lymph node present on CT and his status of his treatment is not confirmed. Will need hematology oncology evaluation/follow-up    Discussed with nursing staff, treatment and plan discussed. Please note that this chart was generated using voice recognition Dragon dictation software. Although every effort was made to ensure the accuracy of this automated transcription, some errors in transcription may have occurred.      Anibal Holliday MD  2/19/2023 2:20 PM

## 2023-02-19 NOTE — PROGRESS NOTES
2482: Page sent to Michelle Le NP to verify order to flush chest tube. [id number] Reasons NP said to hold off and they will be by to see the patient.    Electronically signed by Chani Michelle RN on 2/19/2023 at 9:47 AM

## 2023-02-19 NOTE — DISCHARGE INSTR - COC
Continuity of Care Form    Patient Name: Gera Wheat   :  1955  MRN:  [id number]    Admit date:  2023  Discharge date:  3/6/2023    Code Status Order: Full Code   Advance Directives:     Admitting Physician:  Garo Deleon MD  PCP: Sowmya Carbajal MD    Discharging Nurse: Patricio Unit/Room#: 5689/8062-27  Discharging Unit Phone Number: [phone]    Emergency Contact:   Extended Emergency Contact Information  Primary Emergency Contact: Myriam Guillaume-  [facility] [address] Phone: [phone]  Mobile Phone: [phone]  Relation: Spouse  Secondary Emergency Contact: James Pittmanine  Address: [place] [address] Phone: [phone]  Relation: Child    Past Surgical History:  Past Surgical History:   Procedure Laterality Date    CARPAL TUNNEL RELEASE Left 11/15     EYE SURGERY Right     shunt and cataract     EYE SURGERY Right     laser     IR CHEST TUBE INSERTION  2023    IR CHEST TUBE INSERTION 2023 [facility] PROCEDURES    KNEE ARTHROSCOPY      MALIGNANT SKIN LESION EXCISION      ROTATOR CUFF REPAIR Right     ROTATOR CUFF REPAIR Left 5/15    TOTAL KNEE ARTHROPLASTY Right 10/2018       Immunization History:   Immunization History   Administered Date(s) Administered    COVID-19, PFIZER Bivalent BOOSTER, DO NOT Dilute, (age 12y+), IM, 27 mcg/0.3 mL 10/01/2022    COVID-19, PFIZER GRAY top, DO NOT Dilute, (age 15 y+), IM, 30 mcg/0.3 mL 2022    COVID-19, PFIZER PURPLE top, DILUTE for use, (age 15 y+), 30mcg/0.3mL 2021, 2021, 10/10/2021       Active Problems:  Patient Active Problem List   Diagnosis Code    CKD (chronic kidney disease), stage III ([facility] [address] N18.30    Nephrolithiasis N20.0    DM (diabetes mellitus) ([facility] [address] E11.9    Non-rheumatic mitral regurgitation I34.0    Nonrheumatic aortic valve insufficiency I35.1    Pulmonary hypertension ([facility] [address] I27.20    Type 2 diabetes mellitus with diabetic chronic kidney disease (HCC) E11.22    Sarcoidosis D86.9    Non Hodgkin's lymphoma (HCC) C85.90    Hyperlipidemia E78.5    Family history of abdominal aortic aneurysm (AAA) Z82.49    AAA (abdominal aortic aneurysm) without rupture I71.40    Essential hypertension I10    Abnormal echocardiogram R93.1    Dilated aortic root ([facility]) I77.810    Acute interstitial nephritis N10    ARF (acute renal failure) ([facility]) N17.9    Pneumonia due to infectious organism J18.9    Pneumonia, unspecified organism J18.9    Pleural effusion J90    Hyperkalemia E87.5    Acute respiratory failure with hypoxia ([facility]) J96.01    Hyponatremia E87.1    Hyperglycemia R73.9    History of non-Hodgkin's lymphoma Z85.72    History of sarcoidosis Z86.2    Empyema ([facility]) J86.9       Isolation/Infection:   Isolation            No Isolation          Patient Infection Status       Infection Onset Added Last Indicated Last Indicated By Review Planned Expiration Resolved Resolved By    None active    Resolved    COVID-19 (Rule Out) 02/13/23 02/13/23 02/13/23 Respiratory Panel, Molecular, with COVID-19 (Restricted: peds pts or suitable admitted adults) (Ordered)   02/13/23 Rule-Out Test Resulted    COVID-19 (Rule Out) 02/12/23 02/12/23 02/12/23 COVID-19, Rapid (Ordered)   02/12/23 Rule-Out Test Resulted            Nurse Assessment:  Last Vital Signs: /79   Pulse (!) 105   Temp 98.7 °F (37.1 °C) (Axillary)   Resp 20   Ht 6' 1\" (1.854 m)   Wt 228 lb 13.4 oz (103.8 kg)   SpO2 96%   BMI 30.19 kg/m²     Last documented pain score (0-10 scale): Pain Level: 0  Last Weight:   Wt Readings from Last 1 Encounters:   02/17/23 228 lb 13.4 oz (103.8 kg)     Mental Status:  alert, coherent, and able to concentrate and follow conversation    IV Access:  - Peripheral IV - site  L Basilic, insertion date: Midline Single Lumen 57/48/62 Left Basilic    Nursing Mobility/ADLs:  Walking   Assisted  Transfer Assisted  Bathing  Assisted  Dressing  Assisted  Toileting  Independent  Feeding  Assisted  Med Admin  Assisted  Med Delivery   whole    Wound Care Documentation and Therapy:        Elimination:  Continence:   Bowel: Yes  Bladder: Yes  Urinary Catheter: None   Colostomy/Ileostomy/Ileal Conduit: No       Date of Last BM: 3/4/2023      Intake/Output Summary (Last 24 hours) at 2/19/2023 1116  Last data filed at 2/19/2023 0942  Gross per 24 hour   Intake --   Output 1210 ml   Net -1210 ml     I/O last 3 completed shifts:  In: 1480 [P.O.:480; I.V.:1000]  Out: 1110 [Urine:200; Chest Tube:910]    Safety Concerns:     At Risk for Falls    Impairments/Disabilities:      Vision and Hearing    Nutrition Therapy:  Current Nutrition Therapy:   - Oral Diet:  Low Fat, Low Sodium (2gm), and renal oral supplement   - Oral Nutrition Supplement:  Renal  twice a day    Routes of Feeding: Oral  Liquids: No Restrictions  Daily Fluid Restriction: no  Last Modified Barium Swallow with Video (Video Swallowing Test): not done    Treatments at the Time of Hospital Discharge:   Respiratory Treatments: room air  Oxygen Therapy:  is not on home oxygen therapy.  Ventilator:    - No ventilator support    Rehab Therapies: Physical Therapy and Occupational Therapy  Weight Bearing Status/Restrictions: No weight bearing restrictions  Other Medical Equipment (for information only, NOT a DME order):  wheelchair, walker, and bath bench  Other Treatments: N/A    Patient's personal belongings (please select all that are sent with patient):  Maggy    RN SIGNATURE:  Electronically signed by Carina Elise RN on 3/6/23 at 9:36 AM EST    CASE MANAGEMENT/SOCIAL WORK SECTION    Inpatient Status Date: 2-    Readmission Risk Assessment Score:  Readmission Risk              Risk of Unplanned Readmission:  31           Discharging to Facility/ Agency   Name: [facility]  Address:  Phone:  Fax:    Dialysis Facility (if applicable)  Name:  Address:  Dialysis Schedule:  Phone:  Fax:    / signature: Electronically signed by Jorge Shore RN on 3/6/23 at 9:08 AM EST    PHYSICIAN SECTION    Prognosis: Fair    Condition at Discharge: Stable    Rehab Potential (if transferring to Rehab): Good    Recommended Labs or Other Treatments After Discharge: BMP in 3 days    Physician Certification: I certify the above information and transfer of Simran Dang  is necessary for the continuing treatment of the diagnosis listed and that he requires [facility] for less 30 days.      Update Admission H&P: No change in H&P    PHYSICIAN SIGNATURE:  Electronically signed by Hilda Pickett MD on 3/6/2023 9:17AM EST

## 2023-02-19 NOTE — PROGRESS NOTES
[facility]  Office: [address], DO, Salas Tarina, DO, Comfort Paredes, DO, Eli Olvera Blood, DO, Rylan Soriano MD, Dodie Wall MD, Jackeline Chris MD, Lanie Hill MD,  Joel Sears MD, Sage Maciel MD, Izzy Chu, DO, Tracy Pantoja MD,  Tae Durant MD, Kendra Redman MD, Gera Wallace DO, Renata Ruiz MD, Yash Coleman MD, Sowmya Munson DO, Tabatha Cartwright MD, Olinda Desai MD, Richard Hurst MD, Ifeoma Horn MD, Jenifer Teran, DO, Christina Wagoner MD, Froy Mabry MD, Emma Avila, CNP,  Jamia Phillips, CNP, Tete Negus, CNP, Elena Edilberto, CNP,  Lisa Wiseman, [facility], Solitario Camera, CNP, Radha [place], CNP, [facility], CNP, [employer], [facility], [employer], CNP, Juan Baugh PA-C, Fracisco Bergman, CNS, Christian Nelson, [facility], Delilah Nurse, [address] [facility]      Daily Progress Note     Admit Date: 2/12/2023  Bed/Room No.  3442/9530-38  Admitting Physician : Jackeline Chris MD  Code Status :Full Code  Hospital Day:  LOS: 7 days   Chief Complaint:     Shortness of breath      Principal Problem:    Pneumonia, unspecified organism  Active Problems:    Pleural effusion    Hyperkalemia    Acute respiratory failure with hypoxia (HCC)    Hyponatremia    Hyperglycemia    History of non-Hodgkin's lymphoma    History of sarcoidosis    Empyema [facility])  Resolved Problems:    * No resolved hospital problems. *    Subjective : Interval History/Significant events :  02/19/23    Patient is feeling down and depressed. He has poor appetite. Chest tube in place and is having purulent drainage. Patient remains afebrile. He is still tachycardic. Blood pressure stable. Breathing is stable on 2.5 L of oxygen by nasal cannula. Vitals - Stable afebrile  Labs -BUN 69, creatinine 2.00, leukocytosis worsening. Thrombocytosis. Hemoglobin stable. Nursing notes , Consults notes reviewed.  Overnight events and updates discussed with Nursing staff . Background History:         Milla Ochoa is 74 y.o. male  Who was admitted to the hospital on 2/12/2023 for treatment of Pneumonia, unspecified organism. Patient presented to ER at [facility] on 2/12/2023 with dyspnea and was found to have left multiloculated pneumonia with large pleural effusion with mediastinal and bilateral axillary lymphadenopathy. Patient has prior history of non-Hodgkin's lymphoma, multiple myeloma. Patient was requiring high flow nasal cannula and had thoracentesis. Pleural fluid was consistent with empyema and patient had chest tube placement by IR. He was treated with broad-spectrum antibiotics. Patient was also given dornase without much improvement. CT surgery was consulted and recommended increasing the size of chest tube and did not recommend any open surgical intervention at this time. Patient had exchange of chest tube on 2/18/2023. PMH:  Past Medical History:   Diagnosis Date    Acute interstitial nephritis 08/03/2022    Unclear cause. Creat bumped to 2.9, baseline in oct 2020 1.6. Exact description of his kidney biopsy is not available to me however pathologist seems to think there is diabetic glomerosclerosis and acute interstitial nephritis seen on the biopsy specimen. No mention about chronic changes. Because of acute interstitial nephritis a trial of steroids for 15 days will be given to see if I can impro    Aortic regurgitation     ARF (acute renal failure) ([facility] [address] 08/03/2022    kidney biopsy from June 2022  showing findings of acute interstitial nephritis. Exact description not available. Trial of steroids will be given to see if I can improve renal function. Creatinine in October 2020 was 1.6, creatinine in June 2022 was 2.9. Trial of steroids will be given to see if renal function can be improved.     Chronic kidney disease     Hyperlipidemia     Hypertension     Non-Hodgkin lymphoma ([facility] [address]     Research study patient 02/13/2023    [id number] Completed 2/15/23    Sarcoidosis     Type II or unspecified type diabetes mellitus without mention of complication, not stated as uncontrolled       Allergies:   Allergies   Allergen Reactions    Latex Rash    Gabapentin      Other reaction(s): Vomiting    Meperidine Anaphylaxis    Erythromycin      Other reaction(s): Fever  Had all side effects associated with this medication      Glimepiride      Other reaction(s): Dizziness    Hydrocodone      Other reaction(s): Vomiting    Macrolides And Ketolides Other (See Comments)    Niacin And Related     Trelegy Ellipta [Fluticasone-Umeclidin-Vilant]     Pregabalin Diarrhea    Xanax [Alprazolam] Anxiety     Anxiety, restlessness, insomnia      Medications :  fentanNYL, 100 mcg, IntraVENous, Once  sodium chloride flush, 10 mL, IntraCATHeter, BID  QUEtiapine, 50 mg, Oral, Nightly  melatonin, 5 mg, Oral, Nightly  insulin lispro, 0-16 Units, SubCUTAneous, TID WC  insulin lispro, 0-4 Units, SubCUTAneous, Nightly  heparin (porcine), 5,000 Units, SubCUTAneous, 3 times per day  [Held by provider] furosemide, 40 mg, IntraVENous, BID  cefepime, 2,000 mg, IntraVENous, Q12H  amLODIPine, 5 mg, Oral, Daily  atorvastatin, 80 mg, Oral, Daily  DULoxetine, 60 mg, Oral, BID  dicyclomine, 10 mg, Oral, 4x Daily AC & HS  finasteride, 5 mg, Oral, Daily  fenofibrate, 160 mg, Oral, Daily  metoprolol succinate, 25 mg, Oral, Nightly  pantoprazole, 40 mg, Oral, QAM AC  repaglinide, 1 mg, Oral, TID AC  brimonidine, 1 drop, Right Eye, BID   And  timolol, 1 drop, Right Eye, BID  insulin glargine, 20 Units, SubCUTAneous, BID  lidocaine, 20 mL, IntraDERmal, Once  sodium chloride flush, 5-40 mL, IntraVENous, 2 times per day  ipratropium-albuterol, 1 ampule, Inhalation, Q4H WA        Review of Systems   Review of Systems   Constitutional:  Positive for activity change, appetite change and fatigue. Negative for chills, fever and unexpected weight change.   HENT:  Negative for  congestion, mouth sores, postnasal drip, sinus pressure, sore throat and voice change. Eyes:  Negative for visual disturbance. Respiratory:  Positive for shortness of breath. Negative for cough and wheezing. Cardiovascular:  Negative for chest pain and palpitations. Gastrointestinal:  Negative for abdominal pain, blood in stool, constipation, diarrhea, nausea and vomiting. Endocrine: Negative for polyuria. Genitourinary:  Negative for difficulty urinating, dysuria, frequency and urgency. Musculoskeletal:  Negative for arthralgias, joint swelling and myalgias. Neurological:  Negative for dizziness, tremors, speech difficulty, light-headedness and headaches.    Objective :      Current Vitals : Temp: 98.7 °F (37.1 °C),  Heart Rate: (!) 112, Resp: 20, BP: 112/79, SpO2: 95 %  Last 24 Hrs Vitals   Patient Vitals for the past 24 hrs:   BP Temp Temp src Pulse Resp SpO2   02/19/23 0806 112/79 -- -- -- -- --   02/19/23 0400 102/82 98.7 °F (37.1 °C) Axillary (!) 112 20 --   02/19/23 0339 -- -- -- -- 24 --   02/19/23 0000 128/84 98.4 °F (36.9 °C) Axillary (!) 113 20 95 %   02/18/23 2330 -- -- -- -- 24 --   02/18/23 2106 -- -- -- (!) 115 -- --   02/18/23 2044 116/72 98.6 °F (37 °C) Oral (!) 114 28 97 %   02/18/23 1834 104/76 97.9 °F (36.6 °C) Oral (!) 109 21 96 %   02/18/23 1730 (!) 130/95 -- -- (!) 108 22 --   02/18/23 1725 (!) 141/107 -- -- (!) 107 23 99 %   02/18/23 1718 (!) 143/103 -- -- (!) 107 21 99 %   02/18/23 1717 (!) 142/99 -- -- (!) 108 22 94 %   02/18/23 1651 -- -- -- (!) 103 30 99 %   02/18/23 1630 -- -- -- (!) 104 25 97 %   02/18/23 1600 -- 98.4 °F (36.9 °C) Axillary 100 24 96 %   02/18/23 1530 -- -- -- 100 30 99 %   02/18/23 1500 -- -- -- 100 24 98 %   02/18/23 1430 -- -- -- (!) 102 25 98 %   02/18/23 1330 106/62 -- -- (!) 107 23 97 %   02/18/23 1300 102/70 -- -- (!) 104 15 97 %   02/18/23 1238 -- -- -- (!) 102 26 100 %   02/18/23 1230 106/75 -- -- (!) 102 (!) 36 99 %   02/18/23 1200 113/87 -- -- (!) 102 (!) 33 98 %   02/18/23 1100 -- -- -- 97 28 95 %   02/18/23 1030 105/83 -- -- 98 23 100 %   02/18/23 1000 108/71 -- -- (!) 103 23 100 %   02/18/23 0930 118/71 -- -- (!) 128 26 93 %   02/18/23 0907 120/83 -- -- (!) 118 25 100 %   02/18/23 0900 120/83 -- -- (!) 113 29 100 %   02/18/23 0840 -- -- -- (!) 125 20 97 %   02/18/23 0830 -- -- -- (!) 127 27 100 %     Intake / output   02/17 1901 - 02/19 0700  In: 1480 [P.O.:480; I.V.:1000]  Out: 1110 [Urine:200]  Physical Exam:  Physical Exam  Vitals and nursing note reviewed. Constitutional:       General: He is not in acute distress. Appearance: He is not diaphoretic. Interventions: Nasal cannula in place. HENT:      Head: Normocephalic and atraumatic. Nose:      Right Sinus: No maxillary sinus tenderness or frontal sinus tenderness. Left Sinus: No maxillary sinus tenderness or frontal sinus tenderness. Mouth/Throat:      Pharynx: No oropharyngeal exudate. Eyes:      General: No scleral icterus. Conjunctiva/sclera: Conjunctivae normal.      Pupils: Pupils are equal, round, and reactive to light. Neck:      Thyroid: No thyromegaly. Vascular: No JVD. Cardiovascular:      Rate and Rhythm: Normal rate and regular rhythm. Pulses:           Dorsalis pedis pulses are 2+ on the right side and 2+ on the left side. Heart sounds: Normal heart sounds. No murmur heard. Pulmonary:      Effort: Pulmonary effort is normal.      Breath sounds: Normal breath sounds. No wheezing or rales. Chest:      Comments: Left chest tube in place. Abdominal:      Palpations: Abdomen is soft. There is no mass. Tenderness: There is no abdominal tenderness. Musculoskeletal:      Cervical back: Full passive range of motion without pain and neck supple. Lymphadenopathy:      Head:      Right side of head: No submandibular adenopathy. Left side of head: No submandibular adenopathy. Cervical: No cervical adenopathy.    Skin: General: Skin is warm. Neurological:      Mental Status: He is alert and oriented to person, place, and time. Motor: No tremor. Psychiatric:         Behavior: Behavior is cooperative. Laboratory findings:    Recent Labs     02/17/23  0915 02/18/23  0852 02/19/23  0557   WBC 26.7* 32.4* 32.4*   HGB 12.3* 11.9* 11.0*   HCT 39.8* 38.7* 35.1*    492* 470*     Recent Labs     02/17/23  0342 02/18/23  0852 02/19/23  0557   * 130* 134*   K 5.4* 5.0 5.3    96* 100   CO2 22 23 22   GLUCOSE 139* 159* 123*   BUN 66* 76* 69*   CREATININE 1.98* 1.95* 2.00*   CALCIUM 9.6 9.6 9.5     No results for input(s): PROT, LABALBU, LABA1C, X2LXVAH, S0UXBMR, FT4, TSH, AST, ALT, LDH, GGT, ALKPHOS, BILITOT, BILIDIR, AMMONIA, AMYLASE, LIPASE, LACTATE, CHOL, HDL, LDLCHOLESTEROL, CHOLHDLRATIO, TRIG, VLDL, BNP, TROPONINI, CKTOTAL, CKMB, CKMBINDEX, RF, SAMANTHA in the last 72 hours. Specific Gravity, UA   Date Value Ref Range Status   02/17/2023 1.025 1.005 - 1.030 Final     Protein, UA   Date Value Ref Range Status   02/17/2023 1+ (A) NEGATIVE Final     RBC, UA   Date Value Ref Range Status   02/17/2023 5 TO 10 0 - 4 /HPF Final     Comment:     Reference range defined for non-centrifuged specimen. Nitrite, Urine   Date Value Ref Range Status   02/17/2023 NEGATIVE NEGATIVE Final     WBC, UA   Date Value Ref Range Status   02/17/2023 0 TO 2 0 - 5 /HPF Final     Leukocyte Esterase, Urine   Date Value Ref Range Status   02/17/2023 NEGATIVE NEGATIVE Final       Imaging / Clinical Data :-   CT CHEST WO CONTRAST    Result Date: 2/17/2023  Small to moderate-sized multiloculated left pleural effusions have improved. New small foci of air within the collection is presumably related to the introduction of the left thoracostomy tube. Stable small to moderate sized mediastinal lymph nodes are nonspecific but may relate to the patient's history of lymphoma. Additional small bilateral axial lymph nodes are identified. Small hiatal hernia. CT CHEST WO CONTRAST    Result Date: 2/13/2023  1. Moderate to large amount of multiloculated left pleural fluid. Assessment for pleural thickening and/or pleural nodularity is limited without IV contrast. 2. Consolidation in the left lower lobe, likely atelectasis, but possibility of superimposed pneumonia would be difficult to exclude. 3. Mediastinal and bilateral axillary lymphadenopathy, which may be related to the patient's history of lymphoma. If available, comparison to any recent imaging may be of use to assess for progression. XR CHEST PORTABLE    Result Date: 2/18/2023  Moderate left pleural effusion increased. No change left chest tube/pigtail catheter. XR CHEST PORTABLE    Result Date: 2/17/2023  Similar findings when rotation taken into account; left chest tube with unchanged or slightly increased loculated appearing left pleural effusion; atelectatic appearing changes. No pneumothorax. XR CHEST PORTABLE    Result Date: 2/14/2023  Interval placement of a left chest tube with reduced left pleural fluid. XR CHEST PORTABLE    Result Date: 2/13/2023  No significant interval change. Redemonstration of opacification of the mid to lower left lung field which may in part be due to loculated left pleural effusion. Consider further evaluation with CT. XR CHEST PORTABLE    Result Date: 2/12/2023  Probable left-sided pneumonia with loculated pleural effusion. Underlying pathology not excluded, as above. Mild cardiomegaly and venous hypertension. Discoid atelectasis right base. RECOMMENDATION: Consider CT chest.     IR CHEST TUBE INSERTION    Result Date: 2/14/2023  Successful ultrasound guided placement of a left 10 Greek chest tube        Clinical Course : unchanged  Assessment and Plan  :        Left empyema -on cefepime. Pleural fluid growing strep viridans gordonii -consult ID. Chest tube in place. Patient getting tPA.   CT surgery and pulmonary following. Acute respiratory failure with hypoxia -continue O2 supplement. H/o non-Hodgkin's lymphoma and multiple myeloma  H/o sarcoidosis -   Acute kidney injury with CKD stage III - baseline creatinine 1.5 -hold Lasix. Check kidney ultrasound. Start IVF   Demand ischemia from acute respiratory failure  Reactive thrombocytosis. Obstructive sleep apnea -   COPD - albuterol as needed     Discussed with Id attedning Dr Dilia Arce   Continue to monitor vitals , Intake / output ,  Cell count , HGB , Kidney function, oxygenation  as indicated . Plan and updates discussed with patient ,  answers  explained to satisfaction.    Plan discussed with Staff Andree Alaniz RN     (Please note that portions of this note were completed with a voice recognition program. Efforts were made to edit the dictations but occasionally words are mis-transcribed.)      Conor Tobias MD  2/19/2023

## 2023-02-19 NOTE — PLAN OF CARE
Problem: Discharge Planning  Goal: Discharge to home or other facility with appropriate resources  2/19/2023 0319 by Bridgette Cortez  Outcome: Progressing  2/18/2023 1839 by Ac Capps RN  Outcome: Progressing     Problem: Safety - Adult  Goal: Free from fall injury  2/19/2023 0319 by Bridgette Cortez  Outcome: Progressing  2/18/2023 1839 by Ac Capps RN  Outcome: Progressing     Problem: ABCDS Injury Assessment  Goal: Absence of physical injury  2/19/2023 0319 by Bridgette Cortez  Outcome: Progressing  2/18/2023 1839 by Ac Capps RN  Outcome: Progressing     Problem: Skin/Tissue Integrity  Goal: Absence of new skin breakdown  Description: 1. Monitor for areas of redness and/or skin breakdown  2. Assess vascular access sites hourly  3. Every 4-6 hours minimum:  Change oxygen saturation probe site  4. Every 4-6 hours:  If on nasal continuous positive airway pressure, respiratory therapy assess nares and determine need for appliance change or resting period.   2/19/2023 0319 by Bridgette Cortez  Outcome: Progressing  2/18/2023 1839 by Ac Capps RN  Outcome: Progressing     Problem: Chronic Conditions and Co-morbidities  Goal: Patient's chronic conditions and co-morbidity symptoms are monitored and maintained or improved  2/19/2023 0319 by Bridgette Cortez  Outcome: Progressing  2/18/2023 1839 by Ac Capps RN  Outcome: Progressing     Problem: Respiratory - Adult  Goal: Achieves optimal ventilation and oxygenation  2/19/2023 0319 by Bridgette Cortez  Outcome: Progressing  2/18/2023 1839 by Ac Capps RN  Outcome: Progressing     Problem: Pain  Goal: Verbalizes/displays adequate comfort level or baseline comfort level  2/19/2023 0319 by Bridgette Cortez  Outcome: Progressing  2/18/2023 1839 by Ac Capps RN  Outcome: Progressing     Problem: Neurosensory - Adult  Goal: Achieves stable or improved neurological status  2/19/2023 0319 by Bridgette Cortez  Outcome: Progressing  2/18/2023 1839 by Renetta Juarez RN  Outcome: Progressing  Goal: Absence of seizures  2/19/2023 0319 by Everrett Decree  Outcome: Progressing  2/18/2023 1839 by Renetta Juarez RN  Outcome: Progressing  Goal: Remains free of injury related to seizures activity  2/19/2023 0319 by Everrett Decree  Outcome: Progressing  2/18/2023 1839 by Renetta Juarez RN  Outcome: Progressing  Goal: Achieves maximal functionality and self care  2/19/2023 0319 by Everrett Decree  Outcome: Progressing  2/18/2023 1839 by Renetta Juarez RN  Outcome: Progressing     Problem: Cardiovascular - Adult  Goal: Maintains optimal cardiac output and hemodynamic stability  2/19/2023 0319 by Everrett Decree  Outcome: Progressing  2/18/2023 1839 by Renetta Juarez RN  Outcome: Progressing  Goal: Absence of cardiac dysrhythmias or at baseline  2/19/2023 0319 by Everrett Decree  Outcome: Progressing  2/18/2023 1839 by Renetta Juarez RN  Outcome: Progressing     Problem: Skin/Tissue Integrity - Adult  Goal: Skin integrity remains intact  2/19/2023 0319 by Everrett Decree  Outcome: Progressing  2/18/2023 1839 by Renetta Juarez RN  Outcome: Progressing  Flowsheets (Taken 2/18/2023 0800 by Duong Hyman RN)  Skin Integrity Remains Intact: Monitor for areas of redness and/or skin breakdown  Goal: Incisions, wounds, or drain sites healing without S/S of infection  2/19/2023 0319 by Everrett Decree  Outcome: Progressing  2/18/2023 1839 by Renetta Juarez RN  Outcome: Progressing  Goal: Oral mucous membranes remain intact  2/19/2023 0319 by Everrett Decree  Outcome: Progressing  2/18/2023 1839 by Renetta Juarez RN  Outcome: Progressing     Problem: Musculoskeletal - Adult  Goal: Return mobility to safest level of function  2/19/2023 0319 by Everrett Decree  Outcome: Progressing  2/18/2023 1839 by Renetta Juarez RN  Outcome: Progressing  Goal: Maintain proper alignment of affected body part  2/19/2023 0319 by Miko Wiley  Outcome: Progressing  2/18/2023 1839 by Yogesh Tanner RN  Outcome: Progressing  Goal: Return ADL status to a safe level of function  2/19/2023 0319 by Miko Wiley  Outcome: Progressing  2/18/2023 1839 by Yogesh Tanner RN  Outcome: Progressing     Problem: Gastrointestinal - Adult  Goal: Minimal or absence of nausea and vomiting  2/19/2023 0319 by Miko Wiley  Outcome: Progressing  2/18/2023 1839 by Yogesh Tanner RN  Outcome: Progressing  Goal: Maintains or returns to baseline bowel function  2/19/2023 0319 by Miko Wiley  Outcome: Progressing  2/18/2023 1839 by Yogesh Tanner RN  Outcome: Progressing  Goal: Maintains adequate nutritional intake  2/19/2023 0319 by Miko Wiley  Outcome: Progressing  2/18/2023 1839 by Yogesh Tanner RN  Outcome: Progressing  Goal: Establish and maintain optimal ostomy function  2/19/2023 0319 by Miko Wiley  Outcome: Progressing  2/18/2023 1839 by Yogesh Tanner RN  Outcome: Progressing     Problem: Genitourinary - Adult  Goal: Absence of urinary retention  2/19/2023 0319 by Miko Wiley  Outcome: Progressing  2/18/2023 1839 by Yogesh Tanner RN  Outcome: Progressing  Goal: Urinary catheter remains patent  2/19/2023 0319 by Miko Wiley  Outcome: Progressing  2/18/2023 1839 by Yogesh Tanner RN  Outcome: Progressing     Problem: Infection - Adult  Goal: Absence of infection at discharge  2/19/2023 0319 by Miko Wiley  Outcome: Progressing  2/18/2023 1839 by Yogesh Tanner RN  Outcome: Progressing  Goal: Absence of infection during hospitalization  2/19/2023 0319 by Miko Wiley  Outcome: Progressing  2/18/2023 1839 by Yogesh Tanner RN  Outcome: Progressing  Goal: Absence of fever/infection during anticipated neutropenic period  2/19/2023 0319 by Miko Wiley  Outcome: Progressing  2/18/2023 1839 by Yogesh Tanner RN  Outcome: Progressing     Problem: Metabolic/Fluid and Electrolytes  - Adult  Goal: Electrolytes maintained within normal limits  2/19/2023 0319 by Venda Schilder  Outcome: Progressing  2/18/2023 1839 by Janae Benoit RN  Outcome: Progressing  Goal: Hemodynamic stability and optimal renal function maintained  2/19/2023 0319 by Venda Schilder  Outcome: Progressing  2/18/2023 1839 by Janae Benoit RN  Outcome: Progressing  Goal: Glucose maintained within prescribed range  2/19/2023 0319 by Venda Schilder  Outcome: Progressing  2/18/2023 1839 by Janae Benoit RN  Outcome: Progressing     Problem: Hematologic - Adult  Goal: Maintains hematologic stability  2/19/2023 0319 by Venda Schilder  Outcome: Progressing  2/18/2023 1839 by Janae Benoit RN  Outcome: Progressing

## 2023-02-19 NOTE — PLAN OF CARE
Problem: Discharge Planning  Goal: Discharge to home or other facility with appropriate resources  2/19/2023 0907 by Regine Jackson RN  Outcome: Progressing  2/19/2023 0319 by Nicci Coronado  Outcome: Progressing     Problem: Safety - Adult  Goal: Free from fall injury  2/19/2023 0907 by Regine Jackson RN  Outcome: Progressing  2/19/2023 0319 by Nicci Coronado  Outcome: Progressing     Problem: ABCDS Injury Assessment  Goal: Absence of physical injury  2/19/2023 0907 by Regine Jackson RN  Outcome: Progressing  2/19/2023 0319 by Nicci Coronado  Outcome: Progressing     Problem: Skin/Tissue Integrity  Goal: Absence of new skin breakdown  Description: 1. Monitor for areas of redness and/or skin breakdown  2. Assess vascular access sites hourly  3. Every 4-6 hours minimum:  Change oxygen saturation probe site  4. Every 4-6 hours:  If on nasal continuous positive airway pressure, respiratory therapy assess nares and determine need for appliance change or resting period.   2/19/2023 6123 by Regine Jackson RN  Outcome: Progressing  2/19/2023 0319 by Nicci Coronado  Outcome: Progressing     Problem: Chronic Conditions and Co-morbidities  Goal: Patient's chronic conditions and co-morbidity symptoms are monitored and maintained or improved  2/19/2023 0907 by Regine Jackson RN  Outcome: Progressing  2/19/2023 0319 by Nicci Coronado  Outcome: Progressing     Problem: Respiratory - Adult  Goal: Achieves optimal ventilation and oxygenation  2/19/2023 0907 by Regine Jackson RN  Outcome: Progressing  2/19/2023 0319 by Nicci Coronado  Outcome: Progressing     Problem: Pain  Goal: Verbalizes/displays adequate comfort level or baseline comfort level  2/19/2023 0907 by Regine Jackson RN  Outcome: Progressing  2/19/2023 0319 by Nicci Coronado  Outcome: Progressing     Problem: Neurosensory - Adult  Goal: Achieves stable or improved neurological status  2/19/2023 0907 by Regine Jackson RN  Outcome: Progressing  2/19/2023 0319 by Tayla Zelaya  Outcome: Progressing  Goal: Absence of seizures  2/19/2023 0907 by Roseann Garrett RN  Outcome: Progressing  2/19/2023 0319 by Tayla Zelaya  Outcome: Progressing  Goal: Remains free of injury related to seizures activity  2/19/2023 0907 by Roseann Garrett RN  Outcome: Progressing  2/19/2023 0319 by aTyla Zelaya  Outcome: Progressing  Goal: Achieves maximal functionality and self care  2/19/2023 0907 by Roseann Garrett RN  Outcome: Progressing  2/19/2023 0319 by Tayla Zelaya  Outcome: Progressing     Problem: Cardiovascular - Adult  Goal: Maintains optimal cardiac output and hemodynamic stability  2/19/2023 0907 by Roseann Garrett RN  Outcome: Progressing  2/19/2023 0319 by Tayla Zelaya  Outcome: Progressing  Goal: Absence of cardiac dysrhythmias or at baseline  2/19/2023 0907 by Roseann Garrett RN  Outcome: Progressing  2/19/2023 0319 by Tayla Zelaya  Outcome: Progressing     Problem: Skin/Tissue Integrity - Adult  Goal: Skin integrity remains intact  2/19/2023 0907 by Roseann Garrett RN  Outcome: Progressing  2/19/2023 0319 by Tayla Zelaya  Outcome: Progressing  Goal: Incisions, wounds, or drain sites healing without S/S of infection  2/19/2023 0907 by oRseann Garrett RN  Outcome: Progressing  2/19/2023 0319 by Tayla Zelaya  Outcome: Progressing  Goal: Oral mucous membranes remain intact  2/19/2023 0907 by Roseann Garrett RN  Outcome: Progressing  2/19/2023 0319 by Tayla Zelaya  Outcome: Progressing     Problem: Musculoskeletal - Adult  Goal: Return mobility to safest level of function  2/19/2023 0907 by Roseann Garrett RN  Outcome: Progressing  2/19/2023 0319 by Tayla Zelaya  Outcome: Progressing  Goal: Maintain proper alignment of affected body part  2/19/2023 0907 by Roseann Garrett RN  Outcome: Progressing  2/19/2023 0319 by Tayla Zelaya  Outcome: Progressing  Goal: Return ADL status to a safe level of function  2/19/2023 0907 by Jaimie Cruz RN  Outcome: Progressing  2/19/2023 0319 by Emelia Heart  Outcome: Progressing     Problem: Gastrointestinal - Adult  Goal: Minimal or absence of nausea and vomiting  2/19/2023 0907 by Jaimie Cruz RN  Outcome: Progressing  2/19/2023 0319 by Emelia Heart  Outcome: Progressing  Goal: Maintains or returns to baseline bowel function  2/19/2023 0907 by Jaimie Cruz RN  Outcome: Progressing  2/19/2023 0319 by Emelia Heart  Outcome: Progressing  Goal: Maintains adequate nutritional intake  2/19/2023 0907 by Jaimie Cruz RN  Outcome: Progressing  2/19/2023 0319 by Emelia Heart  Outcome: Progressing  Goal: Establish and maintain optimal ostomy function  2/19/2023 0907 by Jaimie Cruz RN  Outcome: Progressing  2/19/2023 0319 by Emelia Heart  Outcome: Progressing     Problem: Genitourinary - Adult  Goal: Absence of urinary retention  2/19/2023 0907 by Jaimie Cruz RN  Outcome: Progressing  2/19/2023 0319 by Emelia Heart  Outcome: Progressing  Goal: Urinary catheter remains patent  2/19/2023 0907 by Jaimie Cruz RN  Outcome: Progressing  2/19/2023 0319 by Emelia Heart  Outcome: Progressing     Problem: Infection - Adult  Goal: Absence of infection at discharge  2/19/2023 0907 by Jaimie Cruz RN  Outcome: Progressing  2/19/2023 0319 by Emelia Heart  Outcome: Progressing  Goal: Absence of infection during hospitalization  2/19/2023 0907 by Jaimie Cruz RN  Outcome: Progressing  2/19/2023 0319 by Emelia Heart  Outcome: Progressing  Goal: Absence of fever/infection during anticipated neutropenic period  2/19/2023 0907 by Jaimie Cruz RN  Outcome: Progressing  2/19/2023 0319 by Emelia Heart  Outcome: Progressing     Problem: Metabolic/Fluid and Electrolytes - Adult  Goal: Electrolytes maintained within normal limits  2/19/2023 0907 by Jaimie Cruz RN  Outcome: Progressing  2/19/2023 4986 by Andrés Rojas  Outcome: Progressing  Goal: Hemodynamic stability and optimal renal function maintained  2/19/2023 0907 by Hector Meckel, RN  Outcome: Progressing  2/19/2023 0319 by Andrés Rojas  Outcome: Progressing  Goal: Glucose maintained within prescribed range  2/19/2023 0907 by Hector Meckel, RN  Outcome: Progressing  2/19/2023 0319 by Andrés Rojas  Outcome: Progressing     Problem: Hematologic - Adult  Goal: Maintains hematologic stability  2/19/2023 0907 by Hector Meckel, RN  Outcome: Progressing  2/19/2023 0319 by Andrés Rojas  Outcome: Progressing

## 2023-02-20 LAB
ABSOLUTE EOS #: 0 K/UL (ref 0–0.4)
ABSOLUTE IMMATURE GRANULOCYTE: 0.76 K/UL (ref 0–0.3)
ABSOLUTE LYMPH #: 7.6 K/UL (ref 1–4.8)
ABSOLUTE MONO #: 1.14 K/UL (ref 0.1–0.8)
ANION GAP SERPL CALCULATED.3IONS-SCNC: 9 MMOL/L (ref 9–17)
BASOPHILS # BLD: 0 % (ref 0–2)
BASOPHILS ABSOLUTE: 0 K/UL (ref 0–0.2)
BUN SERPL-MCNC: 63 MG/DL (ref 8–23)
CALCIUM SERPL-MCNC: 9 MG/DL (ref 8.6–10.4)
CHLORIDE SERPL-SCNC: 101 MMOL/L (ref 98–107)
CO2 SERPL-SCNC: 23 MMOL/L (ref 20–31)
CREAT SERPL-MCNC: 2.24 MG/DL (ref 0.7–1.2)
EOSINOPHILS RELATIVE PERCENT: 0 % (ref 1–4)
GFR SERPL CREATININE-BSD FRML MDRD: 32 ML/MIN/1.73M2
GLUCOSE BLD-MCNC: 162 MG/DL (ref 75–110)
GLUCOSE BLD-MCNC: 177 MG/DL (ref 75–110)
GLUCOSE BLD-MCNC: 222 MG/DL (ref 75–110)
GLUCOSE SERPL-MCNC: 151 MG/DL (ref 70–99)
HCT VFR BLD AUTO: 32.1 % (ref 40.7–50.3)
HGB BLD-MCNC: 10 G/DL (ref 13–17)
IMMATURE GRANULOCYTES: 4 %
LYMPHOCYTES # BLD: 40 % (ref 24–44)
MCH RBC QN AUTO: 28 PG (ref 25.2–33.5)
MCHC RBC AUTO-ENTMCNC: 31.2 G/DL (ref 28.4–34.8)
MCV RBC AUTO: 89.9 FL (ref 82.6–102.9)
MONOCYTES # BLD: 6 % (ref 1–7)
MORPHOLOGY: ABNORMAL
MORPHOLOGY: ABNORMAL
NRBC AUTOMATED: 0 PER 100 WBC
PDW BLD-RTO: 15.5 % (ref 11.8–14.4)
PLATELET # BLD AUTO: 425 K/UL (ref 138–453)
PMV BLD AUTO: 9.6 FL (ref 8.1–13.5)
POTASSIUM SERPL-SCNC: 5.1 MMOL/L (ref 3.7–5.3)
RBC # BLD: 3.57 M/UL (ref 4.21–5.77)
RBC # BLD: ABNORMAL 10*6/UL
SEG NEUTROPHILS: 50 % (ref 36–66)
SEGMENTED NEUTROPHILS ABSOLUTE COUNT: 9.5 K/UL (ref 1.8–7.7)
SODIUM SERPL-SCNC: 133 MMOL/L (ref 135–144)
WBC # BLD AUTO: 19 K/UL (ref 3.5–11.3)

## 2023-02-20 PROCEDURE — 6360000002 HC RX W HCPCS: Performed by: INTERNAL MEDICINE

## 2023-02-20 PROCEDURE — 6370000000 HC RX 637 (ALT 250 FOR IP)

## 2023-02-20 PROCEDURE — 99232 SBSQ HOSP IP/OBS MODERATE 35: CPT | Performed by: FAMILY MEDICINE

## 2023-02-20 PROCEDURE — 6370000000 HC RX 637 (ALT 250 FOR IP): Performed by: INTERNAL MEDICINE

## 2023-02-20 PROCEDURE — 82947 ASSAY GLUCOSE BLOOD QUANT: CPT

## 2023-02-20 PROCEDURE — 97110 THERAPEUTIC EXERCISES: CPT

## 2023-02-20 PROCEDURE — 97530 THERAPEUTIC ACTIVITIES: CPT

## 2023-02-20 PROCEDURE — 6360000002 HC RX W HCPCS: Performed by: STUDENT IN AN ORGANIZED HEALTH CARE EDUCATION/TRAINING PROGRAM

## 2023-02-20 PROCEDURE — 99254 IP/OBS CNSLTJ NEW/EST MOD 60: CPT | Performed by: INTERNAL MEDICINE

## 2023-02-20 PROCEDURE — 2580000003 HC RX 258: Performed by: NURSE PRACTITIONER

## 2023-02-20 PROCEDURE — 6370000000 HC RX 637 (ALT 250 FOR IP): Performed by: FAMILY MEDICINE

## 2023-02-20 PROCEDURE — 94640 AIRWAY INHALATION TREATMENT: CPT

## 2023-02-20 PROCEDURE — 97116 GAIT TRAINING THERAPY: CPT

## 2023-02-20 PROCEDURE — 99232 SBSQ HOSP IP/OBS MODERATE 35: CPT | Performed by: INTERNAL MEDICINE

## 2023-02-20 PROCEDURE — 2580000003 HC RX 258: Performed by: FAMILY MEDICINE

## 2023-02-20 PROCEDURE — 6370000000 HC RX 637 (ALT 250 FOR IP): Performed by: NURSE PRACTITIONER

## 2023-02-20 PROCEDURE — 2580000003 HC RX 258: Performed by: RADIOLOGY

## 2023-02-20 PROCEDURE — 2060000000 HC ICU INTERMEDIATE R&B

## 2023-02-20 PROCEDURE — 85025 COMPLETE CBC W/AUTO DIFF WBC: CPT

## 2023-02-20 PROCEDURE — 2580000003 HC RX 258: Performed by: INTERNAL MEDICINE

## 2023-02-20 PROCEDURE — 6370000000 HC RX 637 (ALT 250 FOR IP): Performed by: STUDENT IN AN ORGANIZED HEALTH CARE EDUCATION/TRAINING PROGRAM

## 2023-02-20 PROCEDURE — 36415 COLL VENOUS BLD VENIPUNCTURE: CPT

## 2023-02-20 PROCEDURE — 80048 BASIC METABOLIC PNL TOTAL CA: CPT

## 2023-02-20 PROCEDURE — 94660 CPAP INITIATION&MGMT: CPT

## 2023-02-20 PROCEDURE — 2700000000 HC OXYGEN THERAPY PER DAY

## 2023-02-20 RX ORDER — SODIUM CHLORIDE 9 MG/ML
INJECTION, SOLUTION INTRAVENOUS CONTINUOUS
Status: ACTIVE | OUTPATIENT
Start: 2023-02-20 | End: 2023-02-20

## 2023-02-20 RX ADMIN — TIMOLOL MALEATE 1 DROP: 5 SOLUTION OPHTHALMIC at 09:04

## 2023-02-20 RX ADMIN — HEPARIN SODIUM 5000 UNITS: 5000 INJECTION INTRAVENOUS; SUBCUTANEOUS at 15:10

## 2023-02-20 RX ADMIN — SODIUM CHLORIDE, PRESERVATIVE FREE 10 ML: 5 INJECTION INTRAVENOUS at 10:42

## 2023-02-20 RX ADMIN — IPRATROPIUM BROMIDE AND ALBUTEROL SULFATE 1 AMPULE: 2.5; .5 SOLUTION RESPIRATORY (INHALATION) at 20:28

## 2023-02-20 RX ADMIN — AMLODIPINE BESYLATE 2.5 MG: 2.5 TABLET ORAL at 08:11

## 2023-02-20 RX ADMIN — DICYCLOMINE HYDROCHLORIDE 10 MG: 10 CAPSULE ORAL at 21:21

## 2023-02-20 RX ADMIN — BRIMONIDINE TARTRATE 1 DROP: 2 SOLUTION OPHTHALMIC at 21:21

## 2023-02-20 RX ADMIN — DULOXETINE HYDROCHLORIDE 60 MG: 30 CAPSULE, DELAYED RELEASE ORAL at 21:21

## 2023-02-20 RX ADMIN — REPAGLINIDE 1 MG: 0.5 TABLET ORAL at 10:42

## 2023-02-20 RX ADMIN — PANTOPRAZOLE SODIUM 40 MG: 40 TABLET, DELAYED RELEASE ORAL at 06:29

## 2023-02-20 RX ADMIN — INSULIN GLARGINE 20 UNITS: 100 INJECTION, SOLUTION SUBCUTANEOUS at 22:09

## 2023-02-20 RX ADMIN — CEFTRIAXONE SODIUM 1000 MG: 10 INJECTION, POWDER, FOR SOLUTION INTRAVENOUS at 15:11

## 2023-02-20 RX ADMIN — FINASTERIDE 5 MG: 5 TABLET, FILM COATED ORAL at 08:11

## 2023-02-20 RX ADMIN — SODIUM CHLORIDE: 9 INJECTION, SOLUTION INTRAVENOUS at 09:43

## 2023-02-20 RX ADMIN — Medication 5 MG: at 22:10

## 2023-02-20 RX ADMIN — HEPARIN SODIUM 5000 UNITS: 5000 INJECTION INTRAVENOUS; SUBCUTANEOUS at 22:11

## 2023-02-20 RX ADMIN — OXYCODONE 5 MG: 5 TABLET ORAL at 11:51

## 2023-02-20 RX ADMIN — IPRATROPIUM BROMIDE AND ALBUTEROL SULFATE 1 AMPULE: 2.5; .5 SOLUTION RESPIRATORY (INHALATION) at 11:30

## 2023-02-20 RX ADMIN — DICYCLOMINE HYDROCHLORIDE 10 MG: 10 CAPSULE ORAL at 10:42

## 2023-02-20 RX ADMIN — SODIUM CHLORIDE, PRESERVATIVE FREE 10 ML: 5 INJECTION INTRAVENOUS at 21:22

## 2023-02-20 RX ADMIN — REPAGLINIDE 1 MG: 0.5 TABLET ORAL at 06:29

## 2023-02-20 RX ADMIN — ATORVASTATIN CALCIUM 80 MG: 80 TABLET, FILM COATED ORAL at 21:21

## 2023-02-20 RX ADMIN — BRIMONIDINE TARTRATE 1 DROP: 2 SOLUTION OPHTHALMIC at 09:04

## 2023-02-20 RX ADMIN — ACETAMINOPHEN 650 MG: 325 TABLET ORAL at 17:16

## 2023-02-20 RX ADMIN — HEPARIN SODIUM 5000 UNITS: 5000 INJECTION INTRAVENOUS; SUBCUTANEOUS at 06:29

## 2023-02-20 RX ADMIN — OXYCODONE HYDROCHLORIDE AND ACETAMINOPHEN 1 TABLET: 5; 325 TABLET ORAL at 22:10

## 2023-02-20 RX ADMIN — DICYCLOMINE HYDROCHLORIDE 10 MG: 10 CAPSULE ORAL at 06:29

## 2023-02-20 RX ADMIN — IPRATROPIUM BROMIDE AND ALBUTEROL SULFATE 1 AMPULE: 2.5; .5 SOLUTION RESPIRATORY (INHALATION) at 15:34

## 2023-02-20 RX ADMIN — SODIUM CHLORIDE, PRESERVATIVE FREE 10 ML: 5 INJECTION INTRAVENOUS at 09:05

## 2023-02-20 RX ADMIN — SODIUM CHLORIDE, PRESERVATIVE FREE 10 ML: 5 INJECTION INTRAVENOUS at 21:23

## 2023-02-20 RX ADMIN — DICYCLOMINE HYDROCHLORIDE 10 MG: 10 CAPSULE ORAL at 17:16

## 2023-02-20 RX ADMIN — IPRATROPIUM BROMIDE AND ALBUTEROL SULFATE 1 AMPULE: 2.5; .5 SOLUTION RESPIRATORY (INHALATION) at 08:18

## 2023-02-20 RX ADMIN — DULOXETINE HYDROCHLORIDE 60 MG: 30 CAPSULE, DELAYED RELEASE ORAL at 08:11

## 2023-02-20 RX ADMIN — INSULIN GLARGINE 20 UNITS: 100 INJECTION, SOLUTION SUBCUTANEOUS at 08:11

## 2023-02-20 RX ADMIN — OXYCODONE 5 MG: 5 TABLET ORAL at 15:57

## 2023-02-20 RX ADMIN — FENOFIBRATE 160 MG: 160 TABLET ORAL at 08:11

## 2023-02-20 RX ADMIN — REPAGLINIDE 1 MG: 0.5 TABLET ORAL at 17:16

## 2023-02-20 RX ADMIN — TIMOLOL MALEATE 1 DROP: 5 SOLUTION OPHTHALMIC at 21:21

## 2023-02-20 RX ADMIN — CEFEPIME 2000 MG: 2 INJECTION, POWDER, FOR SOLUTION INTRAVENOUS at 01:01

## 2023-02-20 RX ADMIN — METOPROLOL SUCCINATE 25 MG: 25 TABLET, FILM COATED, EXTENDED RELEASE ORAL at 21:21

## 2023-02-20 RX ADMIN — QUETIAPINE FUMARATE 25 MG: 25 TABLET ORAL at 22:10

## 2023-02-20 RX ADMIN — DOCUSATE SODIUM 100 MG: 100 CAPSULE ORAL at 08:11

## 2023-02-20 ASSESSMENT — ENCOUNTER SYMPTOMS
COUGH: 0
SHORTNESS OF BREATH: 1
SINUS PRESSURE: 0
ABDOMINAL PAIN: 0
SORE THROAT: 0
DIARRHEA: 0
BLOOD IN STOOL: 0
VOMITING: 0
WHEEZING: 0
VOICE CHANGE: 0
NAUSEA: 0
CONSTIPATION: 0

## 2023-02-20 NOTE — PROGRESS NOTES
Physical Therapy  Facility/Department: [facility]  Physical Therapy daily treatment note  Name: Tomasa Amaral  : 1955  MRN: [id number]  Date of Service: 2023    Discharge Recommendations:  Patient would benefit from continued therapy after discharge   PT Equipment Recommendations  Equipment Needed: Yes  Mobility Devices: Rosa Shay: Rolling      Patient Diagnosis(es): There were no encounter diagnoses. Past Medical History:  has a past medical history of Acute interstitial nephritis, Aortic regurgitation, ARF (acute renal failure) ([facility] [address] Chronic kidney disease, Hyperlipidemia, Hypertension, Non-Hodgkin lymphoma ([facility] [address] Research study patient, Sarcoidosis, and Type II or unspecified type diabetes mellitus without mention of complication, not stated as uncontrolled. Past Surgical History:  has a past surgical history that includes eye surgery (Right); Knee arthroscopy; Rotator cuff repair (Right, ); Rotator cuff repair (Left, 5/15); Carpal tunnel release (Left, 11/15 ); Eye surgery (Right, ); Total knee arthroplasty (Right, 10/2018); malignant skin lesion excision; IR CHEST TUBE INSERTION (2023); and IR CHEST TUBE INSERTION (2023). Assessment   Body Structures, Functions, Activity Limitations Requiring Skilled Therapeutic Intervention: Decreased functional mobility ; Decreased ROM; Decreased strength;Decreased safe awareness;Decreased endurance;Decreased balance  Assessment: Pt sup to sit with increased cues and CGA, pt sat EOB x15 without support, pt midly confused, increased time for processing, CGA grossly for mobility with 2nd staff SBA. Pt with mobility deficits requiring CGA to ambulate 6 feet with a RW. Pt limited this date secondary to decreased endurance, BLE strength and standing balance. Pt would benefit from additional PT upon discharge to maximize functional independence. Pt will require 24 hour assistance with mobility upon discharge.   Therapy Prognosis: Good  Decision Making: High Complexity  Requires PT Follow-Up: Yes  Activity Tolerance  Activity Tolerance: Treatment limited secondary to decreased cognition;Patient tolerated treatment well  Activity Tolerance Comments: .increased cueing     Plan   Physcial Therapy Plan  General Plan:  (5-6x week)  Current Treatment Recommendations: Strengthening, ROM, Functional mobility training, Transfer training, Endurance training, Gait training, Stair training, Balance training, Neuromuscular re-education, Home exercise program, Safety education & training, Patient/Caregiver education & training, Therapeutic activities, Equipment evaluation, education, & procurement  Safety Devices  Type of Devices: All fall risk precautions in place, Call light within reach, Chair alarm in place, Gait belt, Left in chair  Restraints  Restraints Initially in Place: No     Restrictions  Restrictions/Precautions  Restrictions/Precautions: Up as Tolerated, Fall Risk  Required Braces or Orthoses?: No  Position Activity Restriction  Other position/activity restrictions: Can increase activity level as tolerated as long as O2 saturation maintained above 92% and as tolerated. Subjective   General  Chart Reviewed: Yes  Patient assessed for rehabilitation services?: Yes  Response To Previous Treatment: Patient with no complaints from previous session. Family / Caregiver Present: Yes (spouse and daughter)  Follows Commands: Within Functional Limits  General Comment  Comments: Pt retired to chair at end of session, chair alarm set, call light given  Subjective  Subjective: Pt supine in bed and agreeable to therapy, RN agreeable to therapy. Pt pleasant and cooperative throughout.   Pt denies pain at rest.         Cognition   Orientation  Overall Orientation Status: Within Functional Limits  Orientation Level: Oriented to person;Disoriented to time;Disoriented to situation;Disoriented to place  Cognition  Overall Cognitive Status: Exceptions  Arousal/Alertness: Delayed responses to stimuli  Following Commands: Follows one step commands with increased time; Follows one step commands with repetition  Attention Span: Difficulty attending to directions  Memory: Decreased recall of biographical Information;Decreased recall of recent events  Safety Judgement: Decreased awareness of need for assistance;Decreased awareness of need for safety  Problem Solving: Assistance required to generate solutions;Assistance required to identify errors made  Insights: Decreased awareness of deficits  Initiation: Requires cues for some  Sequencing: Requires cues for some  Cognition Comment: Pt in and out of lucidy, can be redirected      Bed mobility  Supine to Sit: Contact guard assistance  Sit to Supine: Unable to assess  Scooting: Minimal assistance  Bed Mobility Comments: HOB elevated ~25 degrees with use of handrails. Increased time/effort to perform, pt semi confused with task  Transfers  Sit to Stand: Stand by assistance;Contact guard assistance  Stand to Sit: Contact guard assistance;Stand by assistance  Bed to Chair: Stand by assistance;Contact guard assistance  Comment: Transfers performed 3x this date with RW. Increased time, pt sat EOB 15 mins,\"prepping\" for transfer, than stood up himself, with SBAx1 and CGA x1. Ambulation  Surface: Level tile  Device: Rolling Walker  Other Apparatus: O2  Assistance: Contact guard assistance  Quality of Gait: mildly unsteady, decreased stride length, no true LOB.   Gait Deviations: Slow Portia;Decreased step length  Distance: 6 ft  Comments: Pt slow and steady with no true LOB, midly confused  More Ambulation?: No  Stairs/Curb  Stairs?: No     Balance  Posture: Fair  Sitting - Static: Good  Sitting - Dynamic: Fair  Standing - Static: Fair;+  Standing - Dynamic: Fair;-  Comments: Standing balance assessed w/ RW, pt stood 3x for hygiene 1-3 minutes with CGA and RW  Exercise Treatment: pt performed ankle pumps , gustabo and LAQs x10 in recliner  Static Sitting Balance Exercises: Pt sat EOB x15 without assist  Dynamic Standing Balance Exercises: Pt stood 3x 1-3 min with CGA and RW             AM-PAC Score  AM-PAC Inpatient Mobility Raw Score : 16 (02/20/23 1628)  AM-PAC Inpatient T-Scale Score : 40.78 (02/20/23 1628)  Mobility Inpatient CMS 0-100% Score: 54.16 (02/20/23 1628)  Mobility Inpatient CMS G-Code Modifier : CK (02/20/23 1628)     Goals  Short Term Goals  Time Frame for Short Term Goals: 14 visits  Short Term Goal 1: Pt will amb 300' Love  Short Term Goal 2: Pt will be Love w/ all bed mobility tasks  Short Term Goal 3: Pt will be Love w/ transfers  Short Term Goal 4: Pt will be CGA in negotiation of 2 steps w/ L rails use       Education  Patient Education  Education Given To: Patient  Education Provided: Role of Therapy;Plan of Care;Transfer Training  Education Provided Comments: pt educated on use of call light  Education Method: Verbal  Barriers to Learning: Cognition  Education Outcome: Verbalized understanding      Therapy Time   Individual Concurrent Group Co-treatment   Time In 1505         Time Out 1544         Minutes 39         Timed Code Treatment Minutes: 39 Minutes       Skylar Fox PTA

## 2023-02-20 NOTE — PROGRESS NOTES
Comprehensive Nutrition Assessment    Type and Reason for Visit:  Initial (Length of stay)    Nutrition Recommendations/Plan:   Liberalize diet to allow for 75 gm CHO per meal  Encourage PO intake as tolerated. If PO does not consistently improve, will consider trial of ONS (available liquid ONS are lactose-free). Nutrition Assessment:    Pt difficult to obtain detailed diet hx from. States appetite has been \"up and down\". Two days ago, pt had two meals documented by nursing staff (% x 1, 1-25% x 1). Untouched breakfast at bedside. Pt does state it is difficult to find foods to eat with various disease states requiring nutrition restrictions (DM, HLD, CKD, lymphoma, etc). Offered nutrition supplements - pt states milk consistencies cause significant nausea a couple hours after consumption. Nutrition Related Findings:    meds/labs reviewed Wound Type: None       Current Nutrition Intake & Therapies:    Average Meal Intake: 0% (breakfast this morning - observed)  Average Supplements Intake: None Ordered  ADULT DIET; Regular; 3 carb choices (45 gm/meal)    Anthropometric Measures:  Height: 6' 1\" (185.4 cm)  Ideal Body Weight (IBW): 184 lbs (84 kg)    Admission Body Weight: 231 lb 7.7 oz (105 kg)  Current Body Weight: 240 lb 15.4 oz (109.3 kg), 131 % IBW. Current BMI (kg/m2): 31.8                          BMI Categories: Obese Class 1 (BMI 30.0-34. 9)    Estimated Daily Nutrient Needs:  Energy Requirements Based On: Formula  Weight Used for Energy Requirements: Admission  Energy (kcal/day): 2100 kcals/day  Weight Used for Protein Requirements: Ideal  Protein (g/day): 110-120 gm/day  Method Used for Fluid Requirements: Other (Comment)  Fluid (ml/day): per MD    Nutrition Diagnosis:   Predicted inadequate energy intake related to  (appetite, current condition) as evidenced by  (variable PO intake)    Nutrition Interventions:   Food and/or Nutrient Delivery: Modify Current Diet  Nutrition Education/Counseling: Survival skills/brief education completed (Reviewed high potassium foods/beverages to limit. Encouraged adequate PO intake.)  Coordination of Nutrition Care: Continue to monitor while inpatient  Plan of Care discussed with: Pt    Goals:  Previous Goal Met:  (goal set)  Goals: PO intake 50% or greater, prior to discharge       Nutrition Monitoring and Evaluation:   Behavioral-Environmental Outcomes: None Identified  Food/Nutrient Intake Outcomes: Food and Nutrient Intake  Physical Signs/Symptoms Outcomes: Weight, Biochemical Data, Nutrition Focused Physical Findings, Meal Time Behavior    Discharge Planning:     Too soon to determine     Dexter Ownes MS, RD, LD  Contact: [phone]

## 2023-02-20 NOTE — PROGRESS NOTES
PULMONARY & CRITICAL CARE MEDICINE CONSULT NOTE     Patient:  Rasheed Lewis  MRN: [id number]  [facility] date: 2/12/2023  Primary Care Physician: Osmin Moralez MD  Consulting Physician: Kahlil Cisneros MD  CODE Status: Full Code  LOS: 8     SUBJECTIVE     CHIEF COMPLAINT/REASON FOR CONSULT:  Acute Hypoxic respiratory failure    HISTORY OF PRESENT ILLNESS:  Rasheed Lewis is a 74 y.o. male with past medical history significant for type 2 diabetes mellitus, multiple myeloma, non-Hodgkin lymphoma, sarcoidosis, referred from [facility] ER with worsening respiratory status, shortness of breath, nonproductive cough. He was started on BiPAP and given antibiotics. Chest x-ray was concerning for loculated left-sided pleural effusion. Transfer was made and accepted by hospitalist, further work-up. Critical care was consulted for worsening respiratory status. Patient was on high flow and respiratory rate was in 38-40. Patient was very dyspneic and having difficulty breathing with accessory muscle use. Initially he was unable to keep BiPAP but later on able to keep BiPAP. He improved symptomatically with BiPAP and respiratory rate was in 26. Patient is transferred to the ICU for higher level of care. 2/13: Attempted to perform bedside thoracentesis, no clear pocket identified, sent to IR, chest tube placed, appears that an empyema was encountered and chest tube was placed growing gram positive cocci in pairs, on cefepime and vanco     2/14: Placed chest tube yesterday, weaned off high flow, feeling \"100 x better\".       2/15: Continued TPA/Dornase chest tube flush, Got anxious overnight requiring xanax, 1200 cc of purulent chest tube output in the last 24 hours, patient did not sleep last night     2/16: Added seroquel to help sleep and agitation at night, continued Dornase/TPA, 700 cc of chest tube output that was less purulent, afebrile, WBC downtrending, continue cefepime     2/17: WBC uptrended, sent cultures, tachypneic, restless overnight, given xanax, more tachycardic overnight as well, plan for CT chest today, will discuss possible CTA to evaluate for PE.    02/18: Cardiothoracic surgery consulted and they increased the size of the chest tube. Will likely need decortication. Interval History:  Patient seen and examined at the bed side:  No acute events overnight  Patient is currently on BiPAP with FiO2 40% and saturating 100%  Patient is mildly tachycardic with heart rate of 103 and vitals are otherwise stable  Chest tube have to 275 mL of output in 24 hours      PAST MEDICAL HISTORY:        Diagnosis Date    Acute interstitial nephritis 08/03/2022    Unclear cause. Creat bumped to 2.9, baseline in oct 2020 1.6. Exact description of his kidney biopsy is not available to me however pathologist seems to think there is diabetic glomerosclerosis and acute interstitial nephritis seen on the biopsy specimen. No mention about chronic changes. Because of acute interstitial nephritis a trial of steroids for 15 days will be given to see if I can impro    Aortic regurgitation     ARF (acute renal failure) ([facility] [address] 08/03/2022    kidney biopsy from June 2022  showing findings of acute interstitial nephritis. Exact description not available. Trial of steroids will be given to see if I can improve renal function. Creatinine in October 2020 was 1.6, creatinine in June 2022 was 2.9. Trial of steroids will be given to see if renal function can be improved.     Chronic kidney disease     Hyperlipidemia     Hypertension     Non-Hodgkin lymphoma ([facility] [address]     Research study patient 02/13/2023    [id number] Completed 2/15/23    Sarcoidosis     Type II or unspecified type diabetes mellitus without mention of complication, not stated as uncontrolled      PAST SURGICAL HISTORY:        Procedure Laterality Date    CARPAL TUNNEL RELEASE Left 11/15     EYE SURGERY Right     shunt and cataract     EYE SURGERY Right 6/16    laser IR CHEST TUBE INSERTION  2/13/2023    IR CHEST TUBE INSERTION 2/13/2023 [facility] SPECIAL PROCEDURES    KNEE ARTHROSCOPY      MALIGNANT SKIN LESION EXCISION      ROTATOR CUFF REPAIR Right 7/14    ROTATOR CUFF REPAIR Left 5/15    TOTAL KNEE ARTHROPLASTY Right 10/2018     FAMILY HISTORY:       Problem Relation Age of Onset    High Blood Pressure Father     Other Father         AAA    Heart Disease Other         uncle x 2      SOCIAL HISTORY:   TOBACCO:   reports that he has never smoked. He has quit using smokeless tobacco.  ETOH:  reports current alcohol use. DRUGS: reports no history of drug use. ALLERGIES:    Allergies   Allergen Reactions    Latex Rash    Gabapentin      Other reaction(s): Vomiting    Meperidine Anaphylaxis    Erythromycin      Other reaction(s): Fever  Had all side effects associated with this medication      Glimepiride      Other reaction(s): Dizziness    Hydrocodone      Other reaction(s): Vomiting    Macrolides And Ketolides Other (See Comments)    Niacin And Related     Trelegy Ellipta [Fluticasone-Umeclidin-Vilant]     Pregabalin Diarrhea    Xanax [Alprazolam] Anxiety     Anxiety, restlessness, insomnia         HOME MEDICATIONS:  Prior to Admission medications    Medication Sig Start Date End Date Taking? Authorizing Provider   amLODIPine (NORVASC) 5 MG tablet TAKE ONE TABLET BY MOUTH DAILY 11/22/22   Marcella Shaw MD   omeprazole (PRILOSEC) 20 MG delayed release capsule Take 20 mg by mouth Daily    Historical Provider, MD   fluticasone-salmeterol (ADVAIR DISKUS) 250-50 MCG/ACT AEPB diskus inhaler Inhale 1 puff into the lungs every 12 hours    Historical Provider, MD   metoprolol succinate (TOPROL XL) 25 MG extended release tablet Take 25 mg by mouth at bedtime    Historical Provider, MD   dutasteride (AVODART) 0.5 mg capsule Take 0.5 mg by mouth daily    Historical Provider, MD   finasteride (PROSCAR) 5 MG tablet Take 5 mg by mouth daily    Historical Provider, MD [place].  Devices (NASAL SPRAY BOTTLE) MISC by Does not apply route    Historical Provider, MD   CINNAMON PO Take by mouth    Historical Provider, MD   repaglinide (PRANDIN) 1 MG tablet Take 1 mg by mouth 3 times daily (before meals)  4/16/18   Historical Provider, MD   pimecrolimus (ELIDEL) 1 % cream Apply topically as needed Apply topically 2 times daily. Historical Provider, MD   brimonidine-timolol (COMBIGAN) 0.2-0.5 % ophthalmic solution Place 1 drop into the right eye every 12 hours    Historical Provider, MD   albuterol sulfate  (90 BASE) MCG/ACT inhaler Inhale 1 puff into the lungs in the morning and at bedtime    Historical Provider, MD   Liraglutide (VICTOZA SC) Inject 1.8 mg into the skin daily     Historical Provider, MD   fenofibrate (TRICOR) 145 MG tablet Take 145 mg by mouth 2 times daily. Historical Provider, MD   dicyclomine (BENTYL) 10 MG capsule Take 10 mg by mouth 4 times daily (before meals and nightly)     Historical Provider, MD   atorvastatin (LIPITOR) 80 MG tablet Take 80 mg by mouth daily. Historical Provider, MD   DULoxetine (CYMBALTA) 60 MG capsule Take 60 mg by mouth 2 times daily. Historical Provider, MD     IMMUNIZATIONS:  Most Recent Immunizations   Administered Date(s) Administered    COVID-19, PFIZER Bivalent BOOSTER, DO NOT Dilute, (age 12y+), IM, 30 mcg/0.3 mL 10/01/2022    COVID-19, PFIZER GRAY top, DO NOT Dilute, (age 15 y+), IM, 30 mcg/0.3 mL 04/03/2022    COVID-19, PFIZER PURPLE top, DILUTE for use, (age 15 y+), 30mcg/0.3mL 10/10/2021     REVIEW OF SYSTEMS:  Review of Systems   Constitutional:  Positive for activity change, appetite change and fatigue. Negative for chills, diaphoresis, fever and unexpected weight change. HENT:  Negative for congestion. Respiratory:  Positive for cough and shortness of breath. Negative for apnea, choking, chest tightness, wheezing and stridor. Cardiovascular:  Positive for chest pain (at the site of chest tube insertion).  Negative for leg swelling. Gastrointestinal:  Negative for abdominal distention, diarrhea, nausea and vomiting. Genitourinary:  Negative for difficulty urinating. Neurological:  Negative for dizziness and numbness. Psychiatric/Behavioral:  Negative for agitation. OBJECTIVE     PaO2/FiO2 RATIO:  No results for input(s): POCPO2 in the last 72 hours. FiO2 : 40 %     VITAL SIGNS:   LAST:  /77   Pulse 90   Temp 97.5 °F (36.4 °C) (Axillary)   Resp 13   Ht 6' 1\" (1.854 m)   Wt 240 lb 15.4 oz (109.3 kg) Comment: Bed zeroed  SpO2 100%   BMI 31.79 kg/m²   8-24 HR RANGE:  TEMP Temp  Av °F (36.7 °C)  Min: 97.5 °F (36.4 °C)  Max: 98.6 °F (37 °C)   BP Systolic (70XUA), TRT:709 , Min:107 , AUO:376      Diastolic (15IUS), EVQ:69, Min:71, Max:82     PULSE Pulse  Av.4  Min: 86  Max: 109   RR Resp  Av.5  Min: 13  Max: 16   O2 SAT SpO2  Av %  Min: 100 %  Max: 100 %   OXYGEN DELIVERY No data recorded        SYSTEMIC EXAMINATION:   Constitutional:  Alert, cooperative and no distress. Mental Status:  Oriented to person, place and time and normal affect. Lungs: Decreased air entry on the left side with chest tube in place  Heart:  Regular rate and rhythm, no murmur. Abdomen:  Soft, nontender, nondistended, normal bowel sounds. Extremities:  No edema, redness, tenderness in the calves. Skin:  Warm, dry, no gross lesions or rashes.   DATA REVIEW     Medications: Current Inpatient  Scheduled Meds:   QUEtiapine  25 mg Oral Nightly    amLODIPine  2.5 mg Oral Daily    docusate sodium  100 mg Oral TID    fentanNYL  100 mcg IntraVENous Once    sodium chloride flush  10 mL IntraCATHeter BID    melatonin  5 mg Oral Nightly    insulin lispro  0-16 Units SubCUTAneous TID     insulin lispro  0-4 Units SubCUTAneous Nightly    heparin (porcine)  5,000 Units SubCUTAneous 3 times per day    cefepime  2,000 mg IntraVENous Q12H    atorvastatin  80 mg Oral Daily    DULoxetine  60 mg Oral BID    dicyclomine  10 mg Oral 4x Daily AC & HS    finasteride  5 mg Oral Daily    fenofibrate  160 mg Oral Daily    metoprolol succinate  25 mg Oral Nightly    pantoprazole  40 mg Oral QAM AC    repaglinide  1 mg Oral TID AC    brimonidine  1 drop Right Eye BID    And    timolol  1 drop Right Eye BID    insulin glargine  20 Units SubCUTAneous BID    lidocaine  20 mL IntraDERmal Once    sodium chloride flush  5-40 mL IntraVENous 2 times per day    ipratropium-albuterol  1 ampule Inhalation Q4H WA     Continuous Infusions:   dextrose      sodium chloride         INPUT/OUTPUT:  In: -   Out: 2000 [[id number]]  Date 02/20/23 0000 - 02/20/23 2359   Shift 4255-5847 6946-2210 2745-4982 24 Hour Total   INTAKE   Shift Total(mL/kg)       OUTPUT   Urine(mL/kg/hr) 350(0.4)   350   Chest Tube 150   150   Shift Total(mL/kg) 500(4.6)   500(4.6)   Weight (kg) 109.3 109.3 109.3 109.3          LABS:  ABGs:   No results for input(s): POCPH, POCPCO2, POCPO2, POCHCO3, QLTO4GTN in the last 72 hours. CBC:   Recent Labs     02/17/23  0915 02/18/23  0852 02/19/23 0557 02/20/23 0233   WBC 26.7* 32.4* 32.4* 19.0*   HGB 12.3* 11.9* 11.0* 10.0*   HCT 39.8* 38.7* 35.1* 32.1*   MCV 89.6 90.4 88.6 89.9    492* 470* 425   LYMPHOPCT 19* 31 30 40   RBC 4.44 4.28 3.96* 3.57*   MCH 27.7 27.8 27.8 28.0   MCHC 30.9 30.7 31.3 31.2   RDW 15.2* 15.7* 15.3* 15.5*       CRP:   No results for input(s): CRP in the last 72 hours. LDH:   No results for input(s): LDH in the last 72 hours. BMP:   Recent Labs     02/18/23  0852 02/19/23 0557 02/20/23 0233   * 134* 133*   K 5.0 5.3 5.1   CL 96* 100 101   CO2 23 22 23   BUN 76* 69* 63*   CREATININE 1.95* 2.00* 2.24*   GLUCOSE 159* 123* 151*       Liver Function Test:   No results for input(s): PROT, LABALBU, ALT, AST, GGT, ALKPHOS, BILITOT in the last 72 hours. Coagulation Profile:   No results for input(s): INR, PROTIME, APTT in the last 72 hours. D-Dimer:  No results for input(s): DDIMER in the last 72 hours.   Lactic Acid:  No results for input(s): LACTA in the last 72 hours. Cardiac Enzymes:  No results for input(s): CKTOTAL, CKMB, CKMBINDEX, TROPONINI in the last 72 hours. Invalid input(s): TROPONIN, HSTROP  BNP/ProBNP:   No results for input(s): BNP, PROBNP in the last 72 hours. Triglycerides:  No results for input(s): TRIG in the last 72 hours. Microbiology:  Urine Culture:  No components found for: CURINE  Blood Culture:  No components found for: CBLOOD, CFUNGUSBL  Sputum Culture:  No components found for: CSPUTUM  Recent Labs     02/18/23  1810   SPECDESC . CHEST   CULTURE NO GROWTH 16 HOURS       Recent Labs     02/17/23  0915 02/18/23  1810   CULTURE NO GROWTH 2 DAYS  NO GROWTH 2 DAYS NO GROWTH 16 HOURS            Radiology Reports:  US RETROPERITONEAL COMPLETE   Final Result   1. Simple cortical cyst at the upper pole left kidney measuring 6 mm. 2. Limited renal ultrasound. No hydronephrosis. 3. Nonvisualization of ureteral jets. Minimal distention of the urinary   bladder. Patient no urge to void during the exam.      RECOMMENDATIONS:   Unavailable         XR CHEST PORTABLE   Final Result   Moderate left pleural effusion increased. No change left chest tube/pigtail   catheter. VL DUP LOWER EXTREMITY VENOUS BILATERAL   Final Result      CT CHEST WO CONTRAST   Final Result   Small to moderate-sized multiloculated left pleural effusions have improved. New small foci of air within the collection is presumably related to the   introduction of the left thoracostomy tube. Stable small to moderate sized mediastinal lymph nodes are nonspecific but   may relate to the patient's history of lymphoma. Additional small bilateral   axial lymph nodes are identified. Small hiatal hernia. XR CHEST PORTABLE   Final Result   Similar findings when rotation taken into account; left chest tube with   unchanged or slightly increased loculated appearing left pleural effusion;   atelectatic appearing changes. No pneumothorax. XR CHEST PORTABLE   Final Result   Interval placement of a left chest tube with reduced left pleural fluid. IR CHEST TUBE INSERTION   Final Result   Successful ultrasound guided placement of a left 10 Tajik chest tube         CT CHEST WO CONTRAST   Final Result   1. Moderate to large amount of multiloculated left pleural fluid. Assessment   for pleural thickening and/or pleural nodularity is limited without IV   contrast.   2. Consolidation in the left lower lobe, likely atelectasis, but possibility   of superimposed pneumonia would be difficult to exclude. 3. Mediastinal and bilateral axillary lymphadenopathy, which may be related   to the patient's history of lymphoma. If available, comparison to any recent   imaging may be of use to assess for progression. XR CHEST PORTABLE   Final Result   No significant interval change. Redemonstration of opacification of the mid   to lower left lung field which may in part be due to loculated left pleural   effusion. Consider further evaluation with CT. IR CHEST TUBE INSERTION    (Results Pending)        Echocardiogram:   No results found for this or any previous visit.        ASSESSMENT AND PLAN     Assessment:  Acute hypoxic respiratory failure due to #2 amd #3  Community acquired pneumonia  Empyema left side s/p chest tube insertion  Mediastinal and bilateral axillary lymphadenopathy  Hx of lymphoma  Hypertension  Hx of AAA  Hx of mitral regurgitation    Plan:  Patient is currently on BiPAP and saturating 100%  Keep supplemental oxygen to keep oxygen saturation above 92%  Chest tube management as per cardiothoracic surgery  Patient will likely need decortication as per the CTS  Patient is on cefepime -we will suggest infectious disease consult  We suggest hematology/oncology consult due to background hx of lymphoma  Bronchodilator therapy  Encourage incentive spirometry/Acapella  Bronchopulmonary hygiene  Patient is getting heparin for DVT prophylaxis  Fasting blood glucose 120, management of diabetes per the primary team  Rest of the management as per urinary team  We will continue to follow    Thank you for this interesting consult. We will continue to follow. I will discuss with attending. Sierra Polk MD      Internal Medicine Resident, PGY-3  [facility]         2/20/2023, 8:10 AM    Attending Physician Statement  I have discussed the care of Aiyana Ba, including pertinent history and exam findings,  with the resident. I have seen and examined the patient and the key elements of all parts of the encounter have been performed by me. I agree with the assessment, plan and orders as documented by the resident with additions . Will lkely need decotication. robably too late for fibronolytic    Treatment plan Discussed with nursing staff in detail , all questions answered . Electronically signed by Rosalba Perdue MD on   2/20/23 at 12:19 PM EST    Please note that this chart was generated using voice recognition Dragon dictation software. Although every effort was made to ensure the accuracy of this automated transcription, some errors in transcription may have occurred. Please note that this chart was generated using voice recognition Dragon dictation software. Although every effort was made to ensure the accuracy of this automated transcription, some errors in transcription may have occurred.

## 2023-02-20 NOTE — PLAN OF CARE
Problem: Discharge Planning  Goal: Discharge to home or other facility with appropriate resources  Outcome: Progressing     Problem: Safety - Adult  Goal: Free from fall injury  Outcome: Progressing     Problem: ABCDS Injury Assessment  Goal: Absence of physical injury  Outcome: Progressing     Problem: Skin/Tissue Integrity  Goal: Absence of new skin breakdown  Description: 1. Monitor for areas of redness and/or skin breakdown  2. Assess vascular access sites hourly  3. Every 4-6 hours minimum:  Change oxygen saturation probe site  4. Every 4-6 hours:  If on nasal continuous positive airway pressure, respiratory therapy assess nares and determine need for appliance change or resting period.   Outcome: Progressing     Problem: Chronic Conditions and Co-morbidities  Goal: Patient's chronic conditions and co-morbidity symptoms are monitored and maintained or improved  Outcome: Progressing     Problem: Respiratory - Adult  Goal: Achieves optimal ventilation and oxygenation  Outcome: Progressing     Problem: Pain  Goal: Verbalizes/displays adequate comfort level or baseline comfort level  Outcome: Progressing     Problem: Neurosensory - Adult  Goal: Achieves stable or improved neurological status  Outcome: Progressing  Goal: Absence of seizures  Outcome: Progressing  Goal: Remains free of injury related to seizures activity  Outcome: Progressing  Goal: Achieves maximal functionality and self care  Outcome: Progressing     Problem: Cardiovascular - Adult  Goal: Maintains optimal cardiac output and hemodynamic stability  Outcome: Progressing  Goal: Absence of cardiac dysrhythmias or at baseline  Outcome: Progressing     Problem: Skin/Tissue Integrity - Adult  Goal: Skin integrity remains intact  Outcome: Progressing  Goal: Incisions, wounds, or drain sites healing without S/S of infection  Outcome: Progressing  Goal: Oral mucous membranes remain intact  Outcome: Progressing     Problem: Musculoskeletal - Adult  Goal: Return mobility to safest level of function  Outcome: Progressing  Goal: Maintain proper alignment of affected body part  Outcome: Progressing  Goal: Return ADL status to a safe level of function  Outcome: Progressing     Problem: Gastrointestinal - Adult  Goal: Minimal or absence of nausea and vomiting  Outcome: Progressing  Goal: Maintains or returns to baseline bowel function  Outcome: Progressing  Goal: Maintains adequate nutritional intake  Outcome: Progressing  Goal: Establish and maintain optimal ostomy function  Outcome: Progressing     Problem: Genitourinary - Adult  Goal: Absence of urinary retention  Outcome: Progressing  Goal: Urinary catheter remains patent  Outcome: Progressing     Problem: Infection - Adult  Goal: Absence of infection at discharge  Outcome: Progressing  Goal: Absence of infection during hospitalization  Outcome: Progressing  Goal: Absence of fever/infection during anticipated neutropenic period  Outcome: Progressing     Problem: Metabolic/Fluid and Electrolytes - Adult  Goal: Electrolytes maintained within normal limits  Outcome: Progressing  Goal: Hemodynamic stability and optimal renal function maintained  Outcome: Progressing  Goal: Glucose maintained within prescribed range  Outcome: Progressing     Problem: Hematologic - Adult  Goal: Maintains hematologic stability  Outcome: Progressing

## 2023-02-20 NOTE — ADT AUTH CERT
Pneumonia - Care Day 5 (2/16/2023) by Sandra Erickson RN       Review Status Review Entered   Completed 2/20/2023 1026       Created By   Sandra Erickson RN      Criteria Review      Care Day: 5 Care Date: 2/16/2023 Level of Care: ICU    Guideline Day 2    Level Of Care    (X) Floor    2/20/2023 10:26 AM EST by Jose L Dorsey      icu    Clinical Status    ( ) * No CO2 retention or acidosis    (X) * No requirement for mechanical ventilation    2/20/2023 10:26 AM EST by Jose L Dorsey      5L nc    (X) * Hypotension absent    2/20/2023 10:26 AM EST by Jose L Dorsey      100  111/81    (X) * Afebrile or fever improved    2/20/2023 10:26 AM EST by Jose L Dorsey      98.1 oral    ( ) * No hypoxia on room air or oxygenation improved    2/20/2023 10:26 AM EST by Jose L Dorsey      02 5L nc   fi02 40    ( ) * Mental status improved or at baseline    Activity    (X) * Increased activity    2/20/2023 10:26 AM EST by Jose L Dorsey      up as mariann    Routes    (X) Oral hydration    2/20/2023 10:26 AM EST by Jose L Dorsey      po and iv    500 ml ns bolus    (X) Oral medications    (X) Usual diet    2/20/2023 10:26 AM EST by Jose L Dorsey      reg diet    Interventions    (X) Incentive spirometry    2/20/2023 10:26 AM EST by Jose L Dorsey      is  q2h    (X) Pulse oximetry    2/20/2023 10:26 AM EST by Jose L Dorsey      cont pulse ox    (X) Head of bed at 30 degrees    2/20/2023 10:26 AM EST by Jose L Dorsey      up as mariann    (X) Possible oxygen    2/20/2023 10:26 AM EST by Jose L Dorsey      5L nc    Medications    (X) IV or oral antibiotics    2/20/2023 10:26 AM EST by Jose L Dorsey      maxipime 2 gm iv   q12h    * Milestone   Additional Notes   DATE: 2.16.23       VITALS:   98.1   100   22   111/81   map 93    sp02 94%   5L nc          ABNL/PERTINENT LABS/RADIOLOGY/DIAGNOSTIC STUDIES:   Na 131   Bun 67   Cr 1.83   Gfr 40   Glucose   162   Wbc 18.4    H/h  12.7 / 41.8       Sinus tachycardia   Moderate voltage criteria for LVH, may be normal variant   Nonspecific ST and T wave abnormality   Abnormal ECG   When compared with ECG of 16-FEB-2023 23:10,   No significant change was found         PHYSICAL EXAM:   He is obese. He is not ill-appearing. HENT:       Head: Normocephalic and atraumatic. Nose: Nose normal.    Eyes:       Extraocular Movements: Extraocular movements intact. Pupils: Pupils are equal, round, and reactive to light. Cardiovascular:       Rate and Rhythm: Regular rhythm. Tachycardia present. Pulmonary:       Effort: No respiratory distress. Breath sounds: No wheezing, rhonchi or rales. Abdominal:       General: Abdomen is flat. Palpations: Abdomen is soft. Tenderness: There is no abdominal tenderness. There is no guarding. Musculoskeletal:       Right lower leg: No edema. Left lower leg: No edema. Skin:      General: Skin is warm and dry. Neurological:       Mental Status: He is alert.     Psychiatric:          Mood and Affect: Mood normal.          Behavior: Behavior normal.              MD CONSULTS/ASSESSMENT AND PLAN:   Critical care    OVERNIGHT EVENTS: Added seroquel to help sleep and agitation at night, continued Dornase/TPA, 700 cc of chest tube output that was less purulent, afebrile, WBC downtrending, continue cefepime      Continue Dornase/TPA chest tube washes, potential transfer, may need repeat CT Chest with contrast in 2 days       Neuro:   · No deficits    · Sedation: None   · Pain control: Tylenol PRN       CV:   · Telemetry   · BP and HR normal - HR stable overnight, BP stable, RR improved   · Amlodipine 5 mg daily   · Metoprolol 25 mg nightly       Heme:   · H&H 12.7   · Platelets 463       Resp:   · 100% on 2L NC   · Weaned off Hi-Gennaro 2/14   · Went for thoracentesis yesterday, 10 [place] chest tube placed, 700 cc out, on continuous wall suction    · Continue Dornase/TPA flushes   · Thoracentesis results: Glucose 33, pH 4.0, LDH 6590, culture growing strep viridans   · CXR 2/14 - improved pleural effusion        /Fluids/Electrolytes:   · BUN 67, Cr 1.83 (stable)   · Monitor UO - 975 out (urine)   · Daily BMP: yes   · Electrolytes: Sodium 131, otherwise stable   · Fluids - None       GI/Nutrition:   · Adult diabetic diet      · Lantus 20 units nightly    · Switched to high dose sliding scale, diet changed to carb restricted          MEDICATIONS:   norvasc  5mg   daily,  lipitor 80 mg daily,  alphagan and timoptic  ophthalmic soln  1 drop right eye 2xd,  bentyl 10 mg   4xd, triglade 160 mg daily,  hepairn  5000 units sc  3xd,  duoneb neb  q4h,  toprol 25 mg   daily,   tylenol 650 mg   q6hrpn x1,     prandin 1 mg   3xd   oxycodone 5 mg   q4hprn x1,  percocet 5-325 1 tab  q4hprn x1        PLEURAL : q12h    alteplase (CATHFLO) 10 mg in sodium chloride 0.9 % 30 mL   dornase alpha (PULMOZYME) 5 mg in sterile water 30 mL      ORDERS:   CT  care and mgmt,  suction  to wall low continuous ,    glucose  4xd,  dailywt ,  iand o q8h,   epc,  increase act level as mariann keep  02 sat  >92%,  cont pulse ox , telemetry,   vs       PT/OT/SLP/CM ASSESSMENT OR NOTES:    Dc plan  home with home care                       Pneumonia - Care Day 4 (2/15/2023) by Kusum Munoz RN       Review Status Review Entered   Completed 2/20/2023 1012       Created By   Kusum Munoz RN      Criteria Review      Care Day: 4 Care Date: 2/15/2023 Level of Care: ICU    Guideline Day 2    Level Of Care    (X) Floor    2/20/2023 10:12 AM EST by Dmitry Daily      icu    Clinical Status    ( ) * No CO2 retention or acidosis    ( ) * No requirement for mechanical ventilation    (X) * Hypotension absent    2/20/2023 10:12 AM EST by Dmitry Daily      95  115/84    (X) * Afebrile or fever improved    2/20/2023 10:12 AM EST by Dmitry Daily      97.3  oral    ( ) * No hypoxia on room air or oxygenation improved    2/20/2023 10:12 AM EST by Dmitry Daily      02 2l nc   sp02 95%    ( ) * Mental status improved or at baseline    Activity    (X) * Increased activity    2/20/2023 10:12 AM EST by Rafa Guerra      as mariann    Routes    (X) Oral hydration    (X) Oral medications    (X) Usual diet    2/20/2023 10:12 AM EST by Rafa Guerra      reg diet    Interventions    (X) Incentive spirometry    2/20/2023 10:12 AM EST by Rafa Guerra      q2h    (X) Pulse oximetry    2/20/2023 10:12 AM EST by Rafa Guerra      cont pulse ox    (X) Head of bed at 30 degrees    2/20/2023 10:12 AM EST by Rafa Guerra      hob as mariann    (X) Possible oxygen    2/20/2023 10:12 AM EST by Rafa Guerra      02 2l nc    Medications    (X) IV or oral antibiotics    2/20/2023 10:12 AM EST by Rafa Guerra      maxipime  2 gm iv   q12h    * Milestone   Additional Notes   DATE: 2.15.23             VITALS:   97.3  95   20   115/84  map 93   sp02 95%  2L nc         ABNL/PERTINENT LABS/RADIOLOGY/DIAGNOSTIC STUDIES:   Na 133   Bun 74   Cr 2.02   Gfr 36   Glucose 138   Wbc 21.8   H/h 12.0 / 38.1          PHYSICAL EXAM:   He is obese. HENT:       Head: Normocephalic and atraumatic. Nose: Nose normal.    Eyes:       Extraocular Movements: Extraocular movements intact. Pupils: Pupils are equal, round, and reactive to light. Cardiovascular:       Rate and Rhythm: Regular rhythm. Tachycardia present. Pulmonary:       Effort: No respiratory distress. Breath sounds: Rhonchi present. No rales. Abdominal:       General: Abdomen is flat. Palpations: Abdomen is soft. Tenderness: There is no abdominal tenderness. There is no guarding. Musculoskeletal:       Right lower leg: No edema. Left lower leg: No edema. Skin:      General: Skin is warm and dry. Neurological:       Mental Status: He is alert. MD CONSULTS/ASSESSMENT AND PLAN:   Critical care    Concurrent intra-pleural administration of alteplase and dornase done at 2 35am. Tube clamped. Dwell time 2 hours.  Unclamp and suction after 2 hours. OVERNIGHT EVENTS: Continued TPA/Dornase chest tube flush, Got anxious overnight requiring xanax, 1200 cc of purulent chest tube output in the last 24 hours, patient did not sleep last night       PLAN:   Continue Dornase/TPA chest tube washes, potential transfer, may need repeat CT Chest with contrast in 2 days       Plan:   Neuro:   · No deficits    · Sedation: None   · Pain control: Tylenol PRN       CV:   · Telemetry   · BP and HR normal - tachycardic overnight, BP stable,, RR improved   · Amlodipine 5 mg daily   · Metoprolol 25 mg nightly       Heme:   · H&H 12.0   · Platelets 405       Resp:   · 99% on 4L NC   · Weaned off Hi-Gennaro yesterday   · Went for thoracentesis yesterday, 10 [place] chest tube placed, 800 cc out, on continuous wall suction    · Chest tube output - 600 cc/24 hours   · Began Dornase/TPA flushes   · Thoracentesis results: Glucose 33, pH 4.0, LDH 6590, culture showing many gram-positive cocci in pairs   · Repeat CXR yesterday - improved pleural effusion             MEDICATIONS:   Xanax 0.5 mg   x1,  norvasc  5mg   daily,  lipitor 80 mg daily,  alphagan and timoptic  ophthalmic soln  1 drop right eye 2xd,  bentyl 10 mg   4xd, triglade 160 mg daily,  hepairn  5000 units sc  3xd,  duoneb neb  q4h,  toprol 25 mg   daily,   tylenol 650 mg   q6hrpn x3,  proventil neb  2.5 mg   q2hprn x1,        ORDERS:   CT  care and mgmt,  suction  to wall low continuous ,    glucose  4xd,  dailywt ,  iand o q8h,   epc,  increase act level as mariann keep  02 sat  >92%,  cont pulse ox , telemetry,   vs       PT/OT/SLP/CM ASSESSMENT OR NOTES:    Dc plan  home with home care                Physical Therapy   Patient would benefit from continued therapy after discharge    Body Structures, Functions, Activity Limitations Requiring Skilled Therapeutic Intervention: Decreased functional mobility ; Decreased ROM; Decreased strength;Decreased safe awareness;Decreased endurance;Decreased balance Assessment: Pt required CGA for bed mobility and ambulated ~2ft to chair with RW and CGA, most limited by pain and endurance. Pt would benefit from continued PT to address further deficits and maximize safety and independence with mobility. Therapy Prognosis: Good      Occupational Therapy   Patient would benefit from continued therapy after discharge   Performance deficits / Impairments: Decreased functional mobility ; Decreased ADL status; Decreased cognition;Decreased endurance;Decreased balance;Decreased high-level IADLs   Assessment: Pt will require continued OT services to address deficits listed through use of therapeutic interventions for improved independence and safety with ADLs/IADLs and functional transfers/mobility.    Treatment Diagnosis: PNA   Prognosis: Good

## 2023-02-20 NOTE — CARE COORDINATION
Transitional planning-attempted to talk with patient-confused. Has CT to SX, O2 2 1/2L per N/C. Talked with wife Jordan Duggan and daughter Xuan Martinez are wanting SNF. Telesitter.

## 2023-02-20 NOTE — PROGRESS NOTES
[facility]  Office: [address], DO, Loretta Cervantesa, DO, Bree Easton, DO, Ruby Valdez Blood, DO, Demetra Moreno MD, Aristeo Hernandez MD, Geovanna Valdez MD, Claudean Millard, MD,  Martine Cordero MD, Mana Chávez MD, Jaimie Chowdary, DO, Farzana Hernandez MD,  Delmy Blake MD, Isaias Casarez MD, Verla Kawasaki, DO, Steven Sousa MD, Tari Gusman MD, Brooke Bonilla, DO, Christine Earl MD, Malcolm Eaton MD, Hitesh Stephens MD, Cristina Alvares MD, Shawn Campbell, DO, Jarocho Stern MD, Gertrude Dockery MD, Parvin Rodriguez, CNP,  Reji Phillips, CNP, Antonella Maravilla, CNP, Tomasa Norwood, CNP,  Juvenal July, [facility], Michelle Mendes, CNP, Josie Aviles, CNP, Deena Winn, CNP, Tim Saint, CNP, Moe Carter, CNP, Shayna Benedict PA-C, Deirdre Leahy, [facility], Srinvias Coronado, [facility], Ame Griffin, [address]      Daily Progress Note     Admit Date: 2/12/2023  Bed/Room No.  5920/5420-30  Admitting Physician : Geovanna Valdez MD  Code Status :Full Code  Hospital Day:  LOS: 8 days   Chief Complaint:     Shortness of breath      Principal Problem:    Pneumonia, unspecified organism  Active Problems:    Pleural effusion    Hyperkalemia    Acute respiratory failure with hypoxia (HCC)    Hyponatremia    Hyperglycemia    History of non-Hodgkin's lymphoma    History of sarcoidosis    Empyema (HCC)    KELSEA (acute kidney injury) [facility])  Resolved Problems:    * No resolved hospital problems. *    Subjective : Interval History/Significant events :  02/20/23    Patient is confused today. He is on the edge trying to get up. Patient has chest tube in place and drained 275 ml in last 24 Hrs . He is on supplemental oxygen. Denies any cough , congestion, chest pain. No focal weakness or swallowing or speech difficulty. Vitals - Stable afebrile  Labs - worsened creatinine 2.24 , BUN 63 , improving leucocytosis   Kidney US did not show any hydronephrosis .      Nursing notes , Consults notes reviewed. Overnight events and updates discussed with Nursing staff . Background History:         Caity Miranda is 74 y.o. male  Who was admitted to the hospital on 2/12/2023 for treatment of Pneumonia, unspecified organism. Patient presented to ER at [facility] on 2/12/2023 with dyspnea and was found to have left multiloculated pneumonia with large pleural effusion with mediastinal and bilateral axillary lymphadenopathy. Patient has prior history of non-Hodgkin's lymphoma, multiple myeloma. Patient was requiring high flow nasal cannula and had thoracentesis. Pleural fluid was consistent with empyema and patient had chest tube placement by IR. He was treated with broad-spectrum antibiotics. Patient was also given dornase without much improvement. CT surgery was consulted and recommended increasing the size of chest tube and did not recommend any open surgical intervention at this time. Patient had exchange of chest tube on 2/18/2023. PMH:  Past Medical History:   Diagnosis Date    Acute interstitial nephritis 08/03/2022    Unclear cause. Creat bumped to 2.9, baseline in oct 2020 1.6. Exact description of his kidney biopsy is not available to me however pathologist seems to think there is diabetic glomerosclerosis and acute interstitial nephritis seen on the biopsy specimen. No mention about chronic changes. Because of acute interstitial nephritis a trial of steroids for 15 days will be given to see if I can impro    Aortic regurgitation     ARF (acute renal failure) ([facility] [address] 08/03/2022    kidney biopsy from June 2022  showing findings of acute interstitial nephritis. Exact description not available. Trial of steroids will be given to see if I can improve renal function. Creatinine in October 2020 was 1.6, creatinine in June 2022 was 2.9. Trial of steroids will be given to see if renal function can be improved.     Chronic kidney disease     Hyperlipidemia     Hy[facility]     Research study patient 02/13/2023    [id number] Completed 2/15/23    Sarcoidosis     Type II or unspecified type diabetes mellitus without mention of complication, not stated as uncontrolled       Allergies: Allergies   Allergen Reactions    Latex Rash    Gabapentin      Other reaction(s): Vomiting    Meperidine Anaphylaxis    Erythromycin      Other reaction(s): Fever  Had all side effects associated with this medication      Glimepiride      Other reaction(s): Dizziness    Hydrocodone      Other reaction(s): Vomiting    Macrolides And Ketolides Other (See Comments)    Niacin And Related     Trelegy Ellipta [Fluticasone-Umeclidin-Vilant]     Pregabalin Diarrhea    Xanax [Alprazolam] Anxiety     Anxiety, restlessness, insomnia      Medications :  QUEtiapine, 25 mg, Oral, Nightly  amLODIPine, 2.5 mg, Oral, Daily  docusate sodium, 100 mg, Oral, TID  fentanNYL, 100 mcg, IntraVENous, Once  sodium chloride flush, 10 mL, IntraCATHeter, BID  melatonin, 5 mg, Oral, Nightly  insulin lispro, 0-16 Units, SubCUTAneous, TID WC  insulin lispro, 0-4 Units, SubCUTAneous, Nightly  heparin (porcine), 5,000 Units, SubCUTAneous, 3 times per day  cefepime, 2,000 mg, IntraVENous, Q12H  atorvastatin, 80 mg, Oral, Daily  DULoxetine, 60 mg, Oral, BID  dicyclomine, 10 mg, Oral, 4x Daily AC & HS  finasteride, 5 mg, Oral, Daily  fenofibrate, 160 mg, Oral, Daily  metoprolol succinate, 25 mg, Oral, Nightly  pantoprazole, 40 mg, Oral, QAM AC  repaglinide, 1 mg, Oral, TID AC  brimonidine, 1 drop, Right Eye, BID   And  timolol, 1 drop, Right Eye, BID  insulin glargine, 20 Units, SubCUTAneous, BID  lidocaine, 20 mL, IntraDERmal, Once  sodium chloride flush, 5-40 mL, IntraVENous, 2 times per day  ipratropium-albuterol, 1 ampule, Inhalation, Q4H WA      Review of Systems   Review of Systems   Constitutional:  Positive for activity change, appetite change and fatigue.  Negative for chills, fever and unexpected weight change. HENT:  Negative for congestion, mouth sores, postnasal drip, sinus pressure, sore throat and voice change. Eyes:  Negative for visual disturbance. Respiratory:  Positive for shortness of breath. Negative for cough and wheezing. Cardiovascular:  Negative for chest pain and palpitations. Gastrointestinal:  Negative for abdominal pain, blood in stool, constipation, diarrhea, nausea and vomiting. Endocrine: Negative for polyuria. Genitourinary:  Negative for difficulty urinating, dysuria, frequency and urgency. Musculoskeletal:  Negative for arthralgias, joint swelling and myalgias. Neurological:  Negative for dizziness, tremors, speech difficulty, light-headedness and headaches. Psychiatric/Behavioral:  Positive for confusion. Objective :      Current Vitals : Temp: 97.5 °F (36.4 °C),  Heart Rate: 90, Resp: 13, BP: 110/77, SpO2: 100 %  Last 24 Hrs Vitals   Patient Vitals for the past 24 hrs:   BP Temp Temp src Pulse Resp SpO2 Weight   02/20/23 0600 -- -- -- -- -- -- 240 lb 15.4 oz (109.3 kg)   02/20/23 0400 110/77 97.5 °F (36.4 °C) Axillary 90 13 100 % --   02/20/23 0336 -- -- -- 86 16 100 % --   02/19/23 2338 -- -- -- -- 15 -- --   02/19/23 2300 107/71 98.6 °F (37 °C) Oral 100 18 98 % --   02/19/23 2140 124/82 98.1 °F (36.7 °C) Oral (!) 104 25 99 % --   02/19/23 1525 -- -- -- (!) 109 26 96 % --   02/19/23 1224 116/75 97.7 °F (36.5 °C) Oral (!) 106 30 98 % --   02/19/23 1125 -- -- -- (!) 103 24 98 % --   02/19/23 0845 -- -- -- (!) 105 20 96 % --   02/19/23 0806 112/79 -- -- -- -- -- --       Intake / output   02/18 1901 - 02/20 0700  In: -   Out: 2460 [Urine:1725]  Physical Exam:  Physical Exam  Vitals and nursing note reviewed. Constitutional:       General: He is not in acute distress. Appearance: He is not diaphoretic. Interventions: Nasal cannula in place. HENT:      Head: Normocephalic and atraumatic.       Nose:      Right Sinus: No maxillary sinus tenderness or frontal sinus tenderness. Left Sinus: No maxillary sinus tenderness or frontal sinus tenderness. Mouth/Throat:      Pharynx: No oropharyngeal exudate. Eyes:      General: No scleral icterus. Conjunctiva/sclera: Conjunctivae normal.      Pupils: Pupils are equal, round, and reactive to light. Neck:      Thyroid: No thyromegaly. Vascular: No JVD. Cardiovascular:      Rate and Rhythm: Normal rate and regular rhythm. Pulses:           Dorsalis pedis pulses are 2+ on the right side and 2+ on the left side. Heart sounds: Normal heart sounds. No murmur heard. Pulmonary:      Effort: Pulmonary effort is normal.      Breath sounds: Normal breath sounds. No wheezing or rales. Chest:      Comments: Left chest tube in place. Abdominal:      Palpations: Abdomen is soft. There is no mass. Tenderness: There is no abdominal tenderness. Musculoskeletal:      Cervical back: Full passive range of motion without pain and neck supple. Lymphadenopathy:      Head:      Right side of head: No submandibular adenopathy. Left side of head: No submandibular adenopathy. Cervical: No cervical adenopathy. Skin:     General: Skin is warm. Neurological:      Mental Status: He is alert and oriented to person, place, and time. Motor: No tremor. Psychiatric:         Behavior: Behavior is cooperative.          Laboratory findings:    Recent Labs     02/18/23  0852 02/19/23  0557 02/20/23  0233   WBC 32.4* 32.4* 19.0*   HGB 11.9* 11.0* 10.0*   HCT 38.7* 35.1* 32.1*   * 470* 425       Recent Labs     02/18/23  0852 02/19/23  0557 02/20/23  0233   * 134* 133*   K 5.0 5.3 5.1   CL 96* 100 101   CO2 23 22 23   GLUCOSE 159* 123* 151*   BUN 76* 69* 63*   CREATININE 1.95* 2.00* 2.24*   CALCIUM 9.6 9.5 9.0       No results for input(s): PROT, LABALBU, LABA1C, W4OYLCV, W7EQDBI, FT4, TSH, AST, ALT, LDH, GGT, ALKPHOS, BILITOT, BILIDIR, AMMONIA, AMYLASE, LIPASE, LACTATE, CHOL, HDL, LDLCHOLESTEROL, CHOLHDLRATIO, TRIG, VLDL, BNP, TROPONINI, CKTOTAL, CKMB, CKMBINDEX, RF, SAMANTHA in the last 72 hours. Specific Gravity, UA   Date Value Ref Range Status   02/17/2023 1.025 1.005 - 1.030 Final     Protein, UA   Date Value Ref Range Status   02/17/2023 1+ (A) NEGATIVE Final     RBC, UA   Date Value Ref Range Status   02/17/2023 5 TO 10 0 - 4 /HPF Final     Comment:     Reference range defined for non-centrifuged specimen. Nitrite, Urine   Date Value Ref Range Status   02/17/2023 NEGATIVE NEGATIVE Final     WBC, UA   Date Value Ref Range Status   02/17/2023 0 TO 2 0 - 5 /HPF Final     Leukocyte Esterase, Urine   Date Value Ref Range Status   02/17/2023 NEGATIVE NEGATIVE Final       Imaging / Clinical Data :-   CT CHEST WO CONTRAST    Result Date: 2/17/2023  Small to moderate-sized multiloculated left pleural effusions have improved. New small foci of air within the collection is presumably related to the introduction of the left thoracostomy tube. Stable small to moderate sized mediastinal lymph nodes are nonspecific but may relate to the patient's history of lymphoma. Additional small bilateral axial lymph nodes are identified. Small hiatal hernia. CT CHEST WO CONTRAST    Result Date: 2/13/2023  1. Moderate to large amount of multiloculated left pleural fluid. Assessment for pleural thickening and/or pleural nodularity is limited without IV contrast. 2. Consolidation in the left lower lobe, likely atelectasis, but possibility of superimposed pneumonia would be difficult to exclude. 3. Mediastinal and bilateral axillary lymphadenopathy, which may be related to the patient's history of lymphoma. If available, comparison to any recent imaging may be of use to assess for progression. XR CHEST PORTABLE    Result Date: 2/18/2023  Moderate left pleural effusion increased. No change left chest tube/pigtail catheter.      XR CHEST PORTABLE    Result Date: 2/17/2023  Similar findings when rotation taken into account; left chest tube with unchanged or slightly increased loculated appearing left pleural effusion; atelectatic appearing changes. No pneumothorax. XR CHEST PORTABLE    Result Date: 2/14/2023  Interval placement of a left chest tube with reduced left pleural fluid. XR CHEST PORTABLE    Result Date: 2/13/2023  No significant interval change. Redemonstration of opacification of the mid to lower left lung field which may in part be due to loculated left pleural effusion. Consider further evaluation with CT. XR CHEST PORTABLE    Result Date: 2/12/2023  Probable left-sided pneumonia with loculated pleural effusion. Underlying pathology not excluded, as above. Mild cardiomegaly and venous hypertension. Discoid atelectasis right base. RECOMMENDATION: Consider CT chest.     IR CHEST TUBE INSERTION    Result Date: 2/14/2023  Successful ultrasound guided placement of a left 10 French chest tube        Clinical Course : unchanged  Assessment and Plan  :        Left empyema - on cefepime. Pleural fluid growing strep viridans gordonii -ID consultation pending . Chest tube in place. Patient getting tPA. CT surgery and pulmonary following. Acute respiratory failure with hypoxia -continue O2 supplement. H/o non-Hodgkin's lymphoma and multiple myeloma  H/o sarcoidosis -   Acute kidney injury with CKD stage III - baseline creatinine 1.5 - hold Lasix. Continue IVF. Demand ischemia from acute respiratory failure  Reactive thrombocytosis. Obstructive sleep apnea -   COPD - albuterol as needed   Acute Delirium - Seroquel at night       Continue to monitor vitals , Intake / output ,  Cell count , HGB , Kidney function, oxygenation  as indicated . Plan and updates discussed with patient ,  answers  explained to satisfaction.    Plan discussed with Staff  RN     (Please note that portions of this note were completed with a voice recognition program. Efforts were made to edit the dictations but occasionally words are mis-transcribed.)      Leif Schafer MD  2/20/2023

## 2023-02-20 NOTE — PLAN OF CARE
Problem: Discharge Planning  Goal: Discharge to home or other facility with appropriate resources  2/20/2023 1032 by Yogesh Tanner RN  Outcome: Progressing  2/20/2023 0636 by Samantha Coelho RN  Outcome: Progressing     Problem: Safety - Adult  Goal: Free from fall injury  2/20/2023 1032 by Yogesh Tanner RN  Outcome: Progressing  2/20/2023 0636 by Samantha Coelho RN  Outcome: Progressing     Problem: ABCDS Injury Assessment  Goal: Absence of physical injury  2/20/2023 1032 by Yogesh Tanner RN  Outcome: Progressing  2/20/2023 0636 by Samantha Coelho RN  Outcome: Progressing     Problem: Skin/Tissue Integrity  Goal: Absence of new skin breakdown  Description: 1.  Monitor for areas of redness and/or skin breakdown  2.  Assess vascular access sites hourly  3.  Every 4-6 hours minimum:  Change oxygen saturation probe site  4.  Every 4-6 hours:  If on nasal continuous positive airway pressure, respiratory therapy assess nares and determine need for appliance change or resting period.  2/20/2023 1032 by Yogesh Tanner RN  Outcome: Progressing  2/20/2023 0636 by Samantha Coelho RN  Outcome: Progressing     Problem: Chronic Conditions and Co-morbidities  Goal: Patient's chronic conditions and co-morbidity symptoms are monitored and maintained or improved  2/20/2023 1032 by Yogesh Tanner RN  Outcome: Progressing  2/20/2023 0636 by Samantha Coelho RN  Outcome: Progressing     Problem: Respiratory - Adult  Goal: Achieves optimal ventilation and oxygenation  2/20/2023 1032 by Yogesh Tanner RN  Outcome: Progressing  2/20/2023 0636 by Samantha Coelho RN  Outcome: Progressing     Problem: Pain  Goal: Verbalizes/displays adequate comfort level or baseline comfort level  2/20/2023 1032 by Yogesh Tanner RN  Outcome: Progressing  2/20/2023 0636 by Samantha Coelho RN  Outcome: Progressing     Problem: Neurosensory - Adult  Goal: Achieves stable or improved neurological  status  2/20/2023 1032 by Noemi Ramon RN  Outcome: Progressing  2/20/2023 0636 by Francisco Hernandez RN  Outcome: Progressing  Goal: Absence of seizures  2/20/2023 1032 by Noemi Ramon RN  Outcome: Progressing  2/20/2023 0636 by Francisco Hernandez RN  Outcome: Progressing  Goal: Remains free of injury related to seizures activity  2/20/2023 1032 by Noemi Ramon RN  Outcome: Progressing  2/20/2023 0636 by Francisco Hernandez RN  Outcome: Progressing  Goal: Achieves maximal functionality and self care  2/20/2023 1032 by Noemi Ramon RN  Outcome: Progressing  2/20/2023 0636 by Francisco Hernandez RN  Outcome: Progressing     Problem: Cardiovascular - Adult  Goal: Maintains optimal cardiac output and hemodynamic stability  2/20/2023 1032 by Noemi Ramon RN  Outcome: Progressing  2/20/2023 0636 by Francisco Hernandez RN  Outcome: Progressing  Goal: Absence of cardiac dysrhythmias or at baseline  2/20/2023 1032 by Noemi Ramon RN  Outcome: Progressing  2/20/2023 0636 by Francisco Hernandez RN  Outcome: Progressing     Problem: Skin/Tissue Integrity - Adult  Goal: Skin integrity remains intact  2/20/2023 1032 by Noemi Ramon RN  Outcome: Progressing  2/20/2023 0636 by Francisco Hernandez RN  Outcome: Progressing  Goal: Incisions, wounds, or drain sites healing without S/S of infection  2/20/2023 1032 by Noemi Ramon RN  Outcome: Progressing  2/20/2023 0636 by Francisco Hernandez RN  Outcome: Progressing  Goal: Oral mucous membranes remain intact  2/20/2023 1032 by Noemi Ramon RN  Outcome: Progressing  2/20/2023 0636 by Francisco Hernandez RN  Outcome: Progressing     Problem: Musculoskeletal - Adult  Goal: Return mobility to safest level of function  2/20/2023 1032 by Noemi Ramon RN  Outcome: Progressing  2/20/2023 0636 by Francisco Hernandez RN  Outcome: Progressing  Goal: Maintain proper alignment of affected body part  2/20/2023 1032 by Niya Howard Sancho López RN  Outcome: Progressing  2/20/2023 0636 by Steen Lanes, RN  Outcome: Progressing  Goal: Return ADL status to a safe level of function  2/20/2023 1032 by Fadia Banks RN  Outcome: Progressing  2/20/2023 0636 by Steen Lanes, RN  Outcome: Progressing     Problem: Gastrointestinal - Adult  Goal: Minimal or absence of nausea and vomiting  2/20/2023 1032 by Fadia Banks RN  Outcome: Progressing  2/20/2023 0636 by Steen Lanes, RN  Outcome: Progressing  Goal: Maintains or returns to baseline bowel function  2/20/2023 1032 by Fadia Banks RN  Outcome: Progressing  2/20/2023 0636 by Steen Lanes, RN  Outcome: Progressing  Goal: Maintains adequate nutritional intake  2/20/2023 1032 by Fadia Banks RN  Outcome: Progressing  2/20/2023 0636 by Steen Lanes, RN  Outcome: Progressing  Goal: Establish and maintain optimal ostomy function  2/20/2023 1032 by Fadia Banks RN  Outcome: Progressing  2/20/2023 0636 by Steen Lanes, RN  Outcome: Progressing     Problem: Genitourinary - Adult  Goal: Absence of urinary retention  2/20/2023 1032 by Fadia Banks RN  Outcome: Progressing  2/20/2023 0636 by Steen Lanes, RN  Outcome: Progressing  Goal: Urinary catheter remains patent  2/20/2023 1032 by Fadia Banks RN  Outcome: Progressing  2/20/2023 0636 by Steen Lanes, RN  Outcome: Progressing     Problem: Infection - Adult  Goal: Absence of infection at discharge  2/20/2023 1032 by Fadia Banks RN  Outcome: Progressing  2/20/2023 0636 by Steen Lanes, RN  Outcome: Progressing  Goal: Absence of infection during hospitalization  2/20/2023 1032 by Fadia Banks RN  Outcome: Progressing  2/20/2023 0636 by Steen Lanes, RN  Outcome: Progressing  Goal: Absence of fever/infection during anticipated neutropenic period  2/20/2023 1032 by Fadia Banks RN  Outcome: Progressing  2/20/2023 0636 by Steen Lanes, RN  Outcome: Progressing     Problem: Metabolic/Fluid and Electrolytes - Adult  Goal: Electrolytes maintained within normal limits  2/20/2023 1032 by Renetta Juarez RN  Outcome: Progressing  2/20/2023 0636 by Jossy Rivas RN  Outcome: Progressing  Goal: Hemodynamic stability and optimal renal function maintained  2/20/2023 1032 by Renetta Juarez RN  Outcome: Progressing  2/20/2023 0636 by Jossy Rivas RN  Outcome: Progressing  Goal: Glucose maintained within prescribed range  2/20/2023 1032 by Renetta Juarez RN  Outcome: Progressing  2/20/2023 0636 by Jossy Rivas RN  Outcome: Progressing

## 2023-02-20 NOTE — CONSULTS
[facility] - Initial Consult Note  Today's Date and Time: 2/20/2023, 11:54 AM    Impression :   Left-sided empyema  Streptococci Gordonii on pleural fluid culture from 2/14/23  Repeat culture 2/18/23 with no bacterial growth thus far  Shortness of breath  KELSEA on CKD  Hyponatremia  Leukocytosis  Hx non-Hodgkin's lymphoma  Hx multiple myeloma  Sarcoidosis  DM II  Interstitial nephritis  COPD  Hx partial right lung lobectomy  Allergies to macrolides and ketolides. Recommendations:   Continue IV Cefepime while inpatient    Medical Decision Making/Summary/Discussion:2/20/2023       Infection Control Recommendations   [place] Precautions    Antimicrobial Stewardship Recommendations     Simplification of therapy  Targeted therapy  PK dosing    Coordination of Outpatient Care:   Estimated Length of IV antimicrobials:TBD  Patient will need Midline Catheter Insertion: TBD  Patient will need PICC line Insertion:TBD  Patient will need: Home IV , [place],  SNF,  LTAC: TBD  Patient will need outpatient wound care:No    Chief complaint/reason for consultation:   Empyema-streptococcus Gordonii      History of Present Illness:   Lindsay Huff is a 74y.o.-year-old male who was initially admitted on 2/12/2023. Patient seen at the request of Dr. Ling Scales:    This patient, with a history of COPD, CKD, sarcoidosis, partial right lung lobectomy, multiple myeloma, non-hodgkin's lymphoma, acute interstitial nephritis and DM II, initially presented to [facility] ED on 2/12/23 with complaints of worsening shortness of breath over the previous week with a non-productive cough. He has has allergies to macrolides and ketolides. He was experiencing increased work of breathing upon evaluation, initially requiring CPAP. ABG values were grossly unremarkable.     He denied any recent history of chemotherapy, fever, chills, N/V/D, abnormal bleeding, weight-loss or night sweats. Chest x-ray showed probable left-sided pneumonia with loculated pleural effusion which was confirmed via CT chest, which revealed a moderate to large amount of multiloculated left pleural fluid in the left lung. Mediastinal and bilateral axillary lymphadenopathy was also noted. He was transferred to [facility] for a higher level of care and was initiated on broad spectrum antibiotics with IV cefepime and vancomycin. Pulmonology was consulted and a thoracentesis was attempted, but was unable to be completed as no clear pocket was visualized via [address]. IR was consulted and a 10 [place], left-sided chest tube was placed on 2/14/23.  10 ml of purulent fluid was initially drained and sent for culture. TPA and dornase were administered with improvement in output. MRSA Nares was not detected and the pleural fluid culture grew PCN sensitive Streptococcus Gordonii. Vancomycin was discontinued on 2/14/23 and cefepime continued. A repeat CT of the patient's chest  on 2/17/23 revealed some improvement in the multiloculated left pleural effusions. A larger bore chest tube exchange was completed on 2/18/23 in IR. A repeat pleural fluid culture has not had any bacterial growth to date. Chest tube output to date:   2/14 - 800 cc  2/15- 600 cc  2/16 - 700 cc  2/17 - 900 cc  2/18- 200 cc  2/19 - 910 cc  2/20 - 275 cc    CT sugery was consulted for possible decortication, but they have no plans for decortication at this time. Abnormal labs at the time of consult include:   Na:133  Cr:2.24  WBC:32.4-->19.0  Hgb:10.0    There is no growth on urine or blood cultures. Imaging/Diagonstics:  CT CHEST WO CONTRAST     Result Date: 2/17/2023  Small to moderate-sized multiloculated left pleural effusions have improved. New small foci of air within the collection is presumably related to the introduction of the left thoracostomy tube.  Stable small to moderate sized mediastinal lymph nodes are nonspecific but may relate to the patient's history of lymphoma.  Additional small bilateral axial lymph nodes are identified. Small hiatal hernia.      XR CHEST PORTABLE     Result Date: 2/18/2023  Moderate left pleural effusion increased.  No change left chest tube/pigtail catheter.      XR CHEST PORTABLE     Result Date: 2/17/2023  Similar findings when rotation taken into account; left chest tube with unchanged or slightly increased loculated appearing left pleural effusion; atelectatic appearing changes.  No pneumothorax.      XR CHEST PORTABLE     Result Date: 2/14/2023  Interval placement of a left chest tube with reduced left pleural fluid.      IR CHEST TUBE INSERTION     Result Date: 2/14/2023  Successful ultrasound guided placement of a left 10 [place] chest tube      US RETROPERITONEAL COMPLETE     Result Date: 2/19/2023  1. Simple cortical cyst at the upper pole left kidney measuring 6 mm. 2. Limited renal ultrasound.  No hydronephrosis. 3. Nonvisualization of ureteral jets.  Minimal distention of the urinary bladder.  Patient no urge to void during the exam. RECOMMENDATIONS: Unavailable        Infectious disease was consulted for continued empyema.      CURRENT EVALUATION 2/20/2023  /65   Pulse (!) 104   Temp 97.5 °F (36.4 °C) (Axillary)   Resp (!) 32   Ht 6' 1\" (1.854 m)   Wt 240 lb 15.4 oz (109.3 kg) Comment: Bed zeroed  SpO2 100%   BMI 31.79 kg/m²     Afebrile  VS stable on 3 L NC    He is alert and has no new complaints    Left sided-chest tube is in place and hooked to suction.  Serosanguinous drainage present.    Medications Reviewed:  On IV cefepime  Will simplify treatment to Ceftriaxone  Leukocytosis is on a down-trend    Labs, X rays reviewed: 2/20/2023    BUN:63  Cr:2.24    WBC:32.4-->19.0  Hb:10.0  Plat: 425    Cultures:  Urine:  2/17/23: No growth  Blood:  2/17/23: No growth  MRSA Nares:  2/13/23: Not detected  Pleural Fluid:  2/14/23: Streptococci Gordonii  2/18/23: No growth  thus far    Discussed with patient, RN, ADRIANNE. I have personally reviewed the past medical history, past surgical history, medications, social history, and family history, and I have updated the database accordingly. Past Medical History:     Past Medical History:   Diagnosis Date    Acute interstitial nephritis 08/03/2022    Unclear cause. Creat bumped to 2.9, baseline in oct 2020 1.6. Exact description of his kidney biopsy is not available to me however pathologist seems to think there is diabetic glomerosclerosis and acute interstitial nephritis seen on the biopsy specimen. No mention about chronic changes. Because of acute interstitial nephritis a trial of steroids for 15 days will be given to see if I can impro    Aortic regurgitation     ARF (acute renal failure) ([facility] 08/03/2022    kidney biopsy from June 2022  showing findings of acute interstitial nephritis. Exact description not available. Trial of steroids will be given to see if I can improve renal function. Creatinine in October 2020 was 1.6, creatinine in June 2022 was 2.9. Trial of steroids will be given to see if renal function can be improved.     Chronic kidney disease     Hyperlipidemia     Hypertension     Non-Hodgkin lymphoma ([facility]     Research study patient 02/13/2023    [id number] Completed 2/15/23    Sarcoidosis     Type II or unspecified type diabetes mellitus without mention of complication, not stated as uncontrolled        Past Surgical  History:     Past Surgical History:   Procedure Laterality Date    CARPAL TUNNEL RELEASE Left 11/15     EYE SURGERY Right     shunt and cataract     EYE SURGERY Right 6/16    laser     IR CHEST TUBE INSERTION  2/13/2023    IR CHEST TUBE INSERTION 2/13/2023 [facility] PROCEDURES    KNEE ARTHROSCOPY      MALIGNANT SKIN LESION EXCISION      ROTATOR CUFF REPAIR Right 7/14    ROTATOR CUFF REPAIR Left 5/15    TOTAL KNEE ARTHROPLASTY Right 10/2018       Medications:      QUEtiapine  25 mg Oral Nightly    amLODIPine  2.5 mg Oral Daily    docusate sodium  100 mg Oral TID    fentanNYL  100 mcg IntraVENous Once    sodium chloride flush  10 mL IntraCATHeter BID    melatonin  5 mg Oral Nightly    insulin lispro  0-16 Units SubCUTAneous TID WC    insulin lispro  0-4 Units SubCUTAneous Nightly    heparin (porcine)  5,000 Units SubCUTAneous 3 times per day    cefepime  2,000 mg IntraVENous Q12H    atorvastatin  80 mg Oral Daily    DULoxetine  60 mg Oral BID    dicyclomine  10 mg Oral 4x Daily AC & HS    finasteride  5 mg Oral Daily    fenofibrate  160 mg Oral Daily    metoprolol succinate  25 mg Oral Nightly    pantoprazole  40 mg Oral QAM AC    repaglinide  1 mg Oral TID AC    brimonidine  1 drop Right Eye BID    And    timolol  1 drop Right Eye BID    insulin glargine  20 Units SubCUTAneous BID    lidocaine  20 mL IntraDERmal Once    sodium chloride flush  5-40 mL IntraVENous 2 times per day    ipratropium-albuterol  1 ampule Inhalation Q4H WA       Social History:     Social History     Socioeconomic History    Marital status:      Spouse name: Not on file    Number of children: Not on file    Years of education: Not on file    Highest education level: Not on file   Occupational History    Not on file   Tobacco Use    Smoking status: Never    Smokeless tobacco: Former    Tobacco comments:     minimal and infrequent    Substance and Sexual Activity    Alcohol use: Yes     Alcohol/week: 0.0 standard drinks     Comment: rare    Drug use: No    Sexual activity: Not on file   Other Topics Concern    Not on file   Social History Narrative    Not on file     Social Determinants of Health     Financial Resource Strain: Low Risk     Difficulty of Paying Living Expenses: Not very hard   Food Insecurity: No Food Insecurity    Worried About Running Out of Food in the Last Year: Never true    Ran Out of Food in the Last Year: Never true   Transportation Needs: No Transportation Needs    Lack of Transportation  (Medical): No    Lack of Transportation (Non-Medical): No   Physical Activity: Not on file   Stress: Stress Concern Present    Feeling of Stress : To some extent   Social Connections: Socially Integrated    Frequency of Communication with Friends and Family: Three times a week    Frequency of Social Gatherings with Friends and Family: Three times a week    Attends Christianity Services: 1 to 4 times per year    Active Member of Clubs or Organizations: No    Attends Club or Organization Meetings: 1 to 4 times per year    Marital Status:    Intimate Partner Violence: Not At Risk    Fear of Current or Ex-Partner: No    Emotionally Abused: No    Physically Abused: No    Sexually Abused: No   Housing Stability: Low Risk     Unable to Pay for Housing in the Last Year: No    Number of [place] in the Last Year: 1    Unstable Housing in the Last Year: No       Family History:     Family History   Problem Relation Age of Onset    High Blood Pressure Father     Other Father         AAA    Heart Disease Other         uncle x 2         Allergies:   Latex, Gabapentin, Meperidine, Erythromycin, Glimepiride, Hydrocodone, Macrolides and ketolides, Niacin and related, Trelegy ellipta [fluticasone-umeclidin-vilant], Pregabalin, and Xanax [alprazolam]     Review of Systems:   Constitutional: No fevers or chills. No systemic complaints  Head: No headaches  Eyes: No double vision or blurry vision. No conjunctival inflammation. ENT: No sore throat or runny nose. . No hearing loss, tinnitus or vertigo. Cardiovascular: No chest pain or palpitations. shortness of breath. WANG  Lung: Left sided chest tube with some shortness of breath with dry cough. No sputum production  Abdomen: No nausea, vomiting, diarrhea, or abdominal pain. Gl[place]y Red No cramps. Genitourinary: No increased urinary frequency, or dysuria. No hematuria. No suprapubic or CVA pain  Musculoskeletal: No muscle aches or pains.  No joint effusions, swelling or deformities  Hematologic: No bleeding or bruising. Neurologic: No headache, weakness, numbness, or tingling. Integument: No rash, no ulcers. Psychiatric: No depression. Endocrine: No polyuria, no polydipsia, no polyphagia. Physical Examination :   Patient Vitals for the past 8 hrs:   BP Temp Temp src Pulse Resp SpO2 Height Weight   02/20/23 1133 -- -- -- (!) 104 (!) 32 100 % -- --   02/20/23 1125 -- -- -- -- -- -- 6' 1\" (1.854 m) --   02/20/23 0811 111/65 -- -- -- -- -- -- --   02/20/23 0600 -- -- -- -- -- -- -- 240 lb 15.4 oz (109.3 kg)   02/20/23 0400 110/77 97.5 °F (36.4 °C) Axillary 90 13 100 % -- --       General Appearance: Awake, alert, and in no apparent distress  Head:  Normocephalic, no trauma  Eyes: Pupils equal, round, reactive to light and accommodation; extraocular movements intact; sclera anicteric; conjunctivae pink. No embolic phenomena. ENT: Oropharynx clear, without erythema, exudate, or thrush. No tenderness of sinuses. Mouth/throat: mucosa pink and moist. No lesions. Dentition in good repair. Neck:Supple, without lymphadenopathy. Thyroid normal, No bruits. Pulmonary/Chest: Left sided chest tube in place to suction with serosanguinous drainage. Diminished to auscultation. No dullness to percussion. Cardiovascular: Regular rate and rhythm without murmurs, rubs, or gallops. Abdomen: Soft, non tender. Bowel sounds normal. No organomegaly  All four Extremities: No cyanosis, clubbing, edema, or effusions. Neurologic: No gross sensory or motor deficits. Skin: Warm and dry with good turgor. No signs of peripheral arterial or venous insufficiency. No ulcerations.  Left sided-chest tube site CDI    Medical Decision Making -Laboratory:   I have independently reviewed/ordered the following labs:    CBC with Differential:   Recent Labs     02/19/23  0557 02/20/23  0233   WBC 32.4* 19.0*   HGB 11.0* 10.0*   HCT 35.1* 32.1*   * 425   LYMPHOPCT 30 40   MONOPCT 5 6       BMP:   Recent Labs 02/19/23  0557 02/20/23  0233   * 133*   K 5.3 5.1    101   CO2 22 23   BUN 69* 63*   CREATININE 2.00* 2.24*       Hepatic Function Panel: No results for input(s): PROT, LABALBU, BILIDIR, IBILI, BILITOT, ALKPHOS, ALT, AST in the last 72 hours. No results for input(s): RPR in the last 72 hours. No results for input(s): HIV in the last 72 hours. No results for input(s): BC in the last 72 hours. Lab Results   Component Value Date/Time    MUCUS 1+ 02/13/2023 02:17 AM    RBC 3.57 02/20/2023 02:33 AM    RBC 5.15 04/03/2012 11:08 AM    WBC 19.0 02/20/2023 02:33 AM    TURBIDITY Cloudy 02/17/2023 06:15 AM     Lab Results   Component Value Date/Time    CREATININE 2.24 02/20/2023 02:33 AM    GLUCOSE 151 02/20/2023 02:33 AM    GLUCOSE 140 04/03/2012 11:08 AM       Medical Decision Making-Imaging:     Narrative   EXAMINATION:   CT OF THE CHEST WITHOUT CONTRAST, 2/17/2023 9:29 am       TECHNIQUE:   CT of the chest was performed without the administration of intravenous   contrast. Multiplanar reformatted images are provided for review. Automated   exposure control, iterative reconstruction, and/or weight based adjustment of   the mA/kV was utilized to reduce the radiation dose to as low as reasonably   achievable. COMPARISON:   02/13/2023       HISTORY:   ORDERING SYSTEM PROVIDED HISTORY:  Improvement? TECHNOLOGIST PROVIDED HISTORY:   Improvement? FINDINGS:   Mediastinum: Small bilateral axial lymph nodes are identified not   significantly changed. Small to moderate size mediastinal lymph nodes are   additionally present. Lungs/Pleura: There are multiple loculated effusions within the left chest   which have improved since previous exam.  Small amounts of new air are likely   secondary to the presence of the left thoracostomy tube. Left lower lobe atelectasis is identified. Upper Abdomen: There is a probable small hiatal hernia.        Soft Tissues/Bones:  No acute osseous abnormality is appreciated. Old right   rib fractures are noted. Impression   Small to moderate-sized multiloculated left pleural effusions have improved. New small foci of air within the collection is presumably related to the   introduction of the left thoracostomy tube. Stable small to moderate sized mediastinal lymph nodes are nonspecific but   may relate to the patient's history of lymphoma. Additional small bilateral   axial lymph nodes are identified. Small hiatal hernia. Narrative   EXAMINATION:   ONE XRAY VIEW OF THE CHEST       2/18/2023 3:04 pm       COMPARISON:   CT chest February 17, 2023       HISTORY:   ORDERING SYSTEM PROVIDED HISTORY: worsening   TECHNOLOGIST PROVIDED HISTORY:   worsening       FINDINGS:   Pigtail catheter on the left. Moderate left effusion appears   denser/increased. Mild bilateral interstitial infiltrates or edema. Heart   and mediastinum unremarkable. Bony thorax intact. No pneumothorax noted. Subsegmental atelectasis right lower lung. Impression   Moderate left pleural effusion increased. No change left chest tube/pigtail   catheter. Medical Decision Ofheps-[id number]:       Medical Decision Making-Other:     Note:  Labs, medications, radiologic studies were reviewed with personal review of films  Large amounts of data were reviewed  Discussed with nursing Staff, Discharge planner  Infection Control and Prevention measures reviewed  All prior entries were reviewed  Administer medications as ordered  Prognosis: Genny Aguilar  Discharge planning reviewed  Follow up as outpatient. Thank you for allowing us to participate in the care of this patient. Please call with questions. FLOYD Woodruff - CNP    ATTESTATION:    I have discussed the case, including pertinent history and exam findings with the APRN.  I have evaluated the  History, physical findings and pictures of the patient and the key elements of the encounter have been performed by me. I have reviewed the laboratory data, other diagnostic studies and discussed them with the APRN. I have updated the medical record where necessary. I agree with the assessment, plan and orders as documented by the APRN.     Meagan Myers MD.    Pager: [phone] - Office: [phone]

## 2023-02-21 LAB
ABSOLUTE EOS #: 0 K/UL (ref 0–0.4)
ABSOLUTE IMMATURE GRANULOCYTE: 1.37 K/UL (ref 0–0.3)
ABSOLUTE LYMPH #: 6.41 K/UL (ref 1–4.8)
ABSOLUTE MONO #: 2.06 K/UL (ref 0.1–0.8)
ANION GAP SERPL CALCULATED.3IONS-SCNC: 11 MMOL/L (ref 9–17)
BASOPHILS # BLD: 0 % (ref 0–2)
BASOPHILS ABSOLUTE: 0 K/UL (ref 0–0.2)
BUN SERPL-MCNC: 47 MG/DL (ref 8–23)
CALCIUM SERPL-MCNC: 9.8 MG/DL (ref 8.6–10.4)
CHLORIDE SERPL-SCNC: 98 MMOL/L (ref 98–107)
CO2 SERPL-SCNC: 23 MMOL/L (ref 20–31)
CREAT SERPL-MCNC: 1.96 MG/DL (ref 0.7–1.2)
EOSINOPHILS RELATIVE PERCENT: 0 % (ref 1–4)
GFR SERPL CREATININE-BSD FRML MDRD: 37 ML/MIN/1.73M2
GLUCOSE BLD-MCNC: 126 MG/DL (ref 75–110)
GLUCOSE BLD-MCNC: 142 MG/DL (ref 75–110)
GLUCOSE BLD-MCNC: 150 MG/DL (ref 75–110)
GLUCOSE BLD-MCNC: 153 MG/DL (ref 75–110)
GLUCOSE BLD-MCNC: 168 MG/DL (ref 75–110)
GLUCOSE SERPL-MCNC: 150 MG/DL (ref 70–99)
HCT VFR BLD AUTO: 38.1 % (ref 40.7–50.3)
HGB BLD-MCNC: 11.4 G/DL (ref 13–17)
IMMATURE GRANULOCYTES: 6 %
INTERVENTION: NORMAL
LV EF: 51 %
LVEF MODALITY: NORMAL
LYMPHOCYTES # BLD: 28 % (ref 24–44)
MCH RBC QN AUTO: 27.6 PG (ref 25.2–33.5)
MCHC RBC AUTO-ENTMCNC: 29.9 G/DL (ref 28.4–34.8)
MCV RBC AUTO: 92.3 FL (ref 82.6–102.9)
MONOCYTES # BLD: 9 % (ref 1–7)
MORPHOLOGY: ABNORMAL
MORPHOLOGY: ABNORMAL
NRBC AUTOMATED: 0 PER 100 WBC
PDW BLD-RTO: 15.8 % (ref 11.8–14.4)
PLATELET # BLD AUTO: 461 K/UL (ref 138–453)
PMV BLD AUTO: 9.8 FL (ref 8.1–13.5)
POTASSIUM SERPL-SCNC: 5.5 MMOL/L (ref 3.7–5.3)
RBC # BLD: 4.13 M/UL (ref 4.21–5.77)
SEG NEUTROPHILS: 57 % (ref 36–66)
SEGMENTED NEUTROPHILS ABSOLUTE COUNT: 13.06 K/UL (ref 1.8–7.7)
SODIUM SERPL-SCNC: 132 MMOL/L (ref 135–144)
TROPONIN I SERPL DL<=0.01 NG/ML-MCNC: 25 NG/L (ref 0–22)
TROPONIN I SERPL DL<=0.01 NG/ML-MCNC: 26 NG/L (ref 0–22)
WBC # BLD AUTO: 22.9 K/UL (ref 3.5–11.3)

## 2023-02-21 PROCEDURE — 6360000002 HC RX W HCPCS: Performed by: STUDENT IN AN ORGANIZED HEALTH CARE EDUCATION/TRAINING PROGRAM

## 2023-02-21 PROCEDURE — 6370000000 HC RX 637 (ALT 250 FOR IP): Performed by: NURSE PRACTITIONER

## 2023-02-21 PROCEDURE — 6360000002 HC RX W HCPCS: Performed by: INTERNAL MEDICINE

## 2023-02-21 PROCEDURE — 94660 CPAP INITIATION&MGMT: CPT

## 2023-02-21 PROCEDURE — 84484 ASSAY OF TROPONIN QUANT: CPT

## 2023-02-21 PROCEDURE — 99232 SBSQ HOSP IP/OBS MODERATE 35: CPT | Performed by: FAMILY MEDICINE

## 2023-02-21 PROCEDURE — 2500000003 HC RX 250 WO HCPCS: Performed by: STUDENT IN AN ORGANIZED HEALTH CARE EDUCATION/TRAINING PROGRAM

## 2023-02-21 PROCEDURE — 6370000000 HC RX 637 (ALT 250 FOR IP): Performed by: INTERNAL MEDICINE

## 2023-02-21 PROCEDURE — 85025 COMPLETE CBC W/AUTO DIFF WBC: CPT

## 2023-02-21 PROCEDURE — 6370000000 HC RX 637 (ALT 250 FOR IP): Performed by: FAMILY MEDICINE

## 2023-02-21 PROCEDURE — 93306 TTE W/DOPPLER COMPLETE: CPT

## 2023-02-21 PROCEDURE — 2060000000 HC ICU INTERMEDIATE R&B

## 2023-02-21 PROCEDURE — 99232 SBSQ HOSP IP/OBS MODERATE 35: CPT | Performed by: INTERNAL MEDICINE

## 2023-02-21 PROCEDURE — 6370000000 HC RX 637 (ALT 250 FOR IP): Performed by: STUDENT IN AN ORGANIZED HEALTH CARE EDUCATION/TRAINING PROGRAM

## 2023-02-21 PROCEDURE — 36415 COLL VENOUS BLD VENIPUNCTURE: CPT

## 2023-02-21 PROCEDURE — 2580000003 HC RX 258: Performed by: NURSE PRACTITIONER

## 2023-02-21 PROCEDURE — 6370000000 HC RX 637 (ALT 250 FOR IP)

## 2023-02-21 PROCEDURE — 82947 ASSAY GLUCOSE BLOOD QUANT: CPT

## 2023-02-21 PROCEDURE — 94640 AIRWAY INHALATION TREATMENT: CPT

## 2023-02-21 PROCEDURE — 6360000002 HC RX W HCPCS: Performed by: NURSE PRACTITIONER

## 2023-02-21 PROCEDURE — 2580000003 HC RX 258: Performed by: RADIOLOGY

## 2023-02-21 PROCEDURE — 80048 BASIC METABOLIC PNL TOTAL CA: CPT

## 2023-02-21 PROCEDURE — 2700000000 HC OXYGEN THERAPY PER DAY

## 2023-02-21 PROCEDURE — 93005 ELECTROCARDIOGRAM TRACING: CPT | Performed by: NURSE PRACTITIONER

## 2023-02-21 PROCEDURE — 2580000003 HC RX 258: Performed by: INTERNAL MEDICINE

## 2023-02-21 PROCEDURE — 94761 N-INVAS EAR/PLS OXIMETRY MLT: CPT

## 2023-02-21 PROCEDURE — 99222 1ST HOSP IP/OBS MODERATE 55: CPT | Performed by: NURSE PRACTITIONER

## 2023-02-21 RX ORDER — METOPROLOL TARTRATE 5 MG/5ML
5 INJECTION INTRAVENOUS EVERY 6 HOURS PRN
Status: DISCONTINUED | OUTPATIENT
Start: 2023-02-21 | End: 2023-02-23

## 2023-02-21 RX ORDER — QUETIAPINE FUMARATE 25 MG/1
50 TABLET, FILM COATED ORAL NIGHTLY
Status: DISCONTINUED | OUTPATIENT
Start: 2023-02-21 | End: 2023-02-23

## 2023-02-21 RX ORDER — SODIUM POLYSTYRENE SULFONATE 15 G/60ML
15 SUSPENSION ORAL; RECTAL ONCE
Status: COMPLETED | OUTPATIENT
Start: 2023-02-21 | End: 2023-02-21

## 2023-02-21 RX ADMIN — DOCUSATE SODIUM 100 MG: 100 CAPSULE ORAL at 22:03

## 2023-02-21 RX ADMIN — HEPARIN SODIUM 5000 UNITS: 5000 INJECTION INTRAVENOUS; SUBCUTANEOUS at 22:03

## 2023-02-21 RX ADMIN — Medication 5 MG: at 22:11

## 2023-02-21 RX ADMIN — SODIUM CHLORIDE, PRESERVATIVE FREE 10 ML: 5 INJECTION INTRAVENOUS at 10:25

## 2023-02-21 RX ADMIN — INSULIN GLARGINE 20 UNITS: 100 INJECTION, SOLUTION SUBCUTANEOUS at 22:11

## 2023-02-21 RX ADMIN — ONDANSETRON 4 MG: 2 INJECTION INTRAMUSCULAR; INTRAVENOUS at 17:53

## 2023-02-21 RX ADMIN — OXYCODONE 5 MG: 5 TABLET ORAL at 22:32

## 2023-02-21 RX ADMIN — IPRATROPIUM BROMIDE AND ALBUTEROL SULFATE 1 AMPULE: 2.5; .5 SOLUTION RESPIRATORY (INHALATION) at 16:38

## 2023-02-21 RX ADMIN — SODIUM CHLORIDE, PRESERVATIVE FREE 10 ML: 5 INJECTION INTRAVENOUS at 20:55

## 2023-02-21 RX ADMIN — METOPROLOL SUCCINATE 25 MG: 25 TABLET, FILM COATED, EXTENDED RELEASE ORAL at 22:02

## 2023-02-21 RX ADMIN — DICYCLOMINE HYDROCHLORIDE 10 MG: 10 CAPSULE ORAL at 05:34

## 2023-02-21 RX ADMIN — IPRATROPIUM BROMIDE AND ALBUTEROL SULFATE 1 AMPULE: 2.5; .5 SOLUTION RESPIRATORY (INHALATION) at 12:36

## 2023-02-21 RX ADMIN — METOPROLOL TARTRATE 5 MG: 5 INJECTION, SOLUTION INTRAVENOUS at 20:55

## 2023-02-21 RX ADMIN — PANTOPRAZOLE SODIUM 40 MG: 40 TABLET, DELAYED RELEASE ORAL at 05:34

## 2023-02-21 RX ADMIN — ATORVASTATIN CALCIUM 80 MG: 80 TABLET, FILM COATED ORAL at 16:58

## 2023-02-21 RX ADMIN — DICYCLOMINE HYDROCHLORIDE 10 MG: 10 CAPSULE ORAL at 16:53

## 2023-02-21 RX ADMIN — DULOXETINE HYDROCHLORIDE 60 MG: 30 CAPSULE, DELAYED RELEASE ORAL at 22:03

## 2023-02-21 RX ADMIN — SODIUM CHLORIDE, PRESERVATIVE FREE 10 ML: 5 INJECTION INTRAVENOUS at 22:03

## 2023-02-21 RX ADMIN — IPRATROPIUM BROMIDE AND ALBUTEROL SULFATE 1 AMPULE: 2.5; .5 SOLUTION RESPIRATORY (INHALATION) at 21:10

## 2023-02-21 RX ADMIN — HEPARIN SODIUM 5000 UNITS: 5000 INJECTION INTRAVENOUS; SUBCUTANEOUS at 16:49

## 2023-02-21 RX ADMIN — HEPARIN SODIUM 5000 UNITS: 5000 INJECTION INTRAVENOUS; SUBCUTANEOUS at 05:53

## 2023-02-21 RX ADMIN — FINASTERIDE 5 MG: 5 TABLET, FILM COATED ORAL at 22:02

## 2023-02-21 RX ADMIN — IPRATROPIUM BROMIDE AND ALBUTEROL SULFATE 1 AMPULE: 2.5; .5 SOLUTION RESPIRATORY (INHALATION) at 08:04

## 2023-02-21 RX ADMIN — CEFTRIAXONE SODIUM 1000 MG: 10 INJECTION, POWDER, FOR SOLUTION INTRAVENOUS at 16:48

## 2023-02-21 RX ADMIN — QUETIAPINE FUMARATE 50 MG: 25 TABLET ORAL at 22:11

## 2023-02-21 RX ADMIN — REPAGLINIDE 1 MG: 0.5 TABLET ORAL at 05:34

## 2023-02-21 RX ADMIN — DULOXETINE HYDROCHLORIDE 60 MG: 30 CAPSULE, DELAYED RELEASE ORAL at 10:13

## 2023-02-21 RX ADMIN — OXYCODONE 5 MG: 5 TABLET ORAL at 16:24

## 2023-02-21 RX ADMIN — REPAGLINIDE 1 MG: 0.5 TABLET ORAL at 16:56

## 2023-02-21 RX ADMIN — INSULIN GLARGINE 20 UNITS: 100 INJECTION, SOLUTION SUBCUTANEOUS at 09:34

## 2023-02-21 RX ADMIN — FENOFIBRATE 160 MG: 160 TABLET ORAL at 22:14

## 2023-02-21 RX ADMIN — DOCUSATE SODIUM 100 MG: 100 CAPSULE ORAL at 16:54

## 2023-02-21 RX ADMIN — SODIUM POLYSTYRENE SULFONATE 15 G: 15 SUSPENSION ORAL; RECTAL at 16:24

## 2023-02-21 RX ADMIN — DICYCLOMINE HYDROCHLORIDE 10 MG: 10 CAPSULE ORAL at 22:03

## 2023-02-21 ASSESSMENT — ENCOUNTER SYMPTOMS
ABDOMINAL PAIN: 0
BLOOD IN STOOL: 0
NAUSEA: 0
CONSTIPATION: 0
VOMITING: 0
DIARRHEA: 0
COUGH: 0
WHEEZING: 0
SORE THROAT: 0
SINUS PRESSURE: 0
VOICE CHANGE: 0
SHORTNESS OF BREATH: 1

## 2023-02-21 ASSESSMENT — PAIN SCALES - GENERAL
PAINLEVEL_OUTOF10: 7
PAINLEVEL_OUTOF10: 8
PAINLEVEL_OUTOF10: 0

## 2023-02-21 NOTE — PROGRESS NOTES
Occupational [address]  Occupational Therapy Not Seen Note    DATE: 2023    NAME: Vidhya Vo  MRN: [id number]   : 1955      Patient not seen this date for Occupational Therapy due to:    Pt. Off floor at [facility].     Electronically signed by NORIS Crowley on 2023 at 3:13 PM

## 2023-02-21 NOTE — PLAN OF CARE
Problem: Respiratory - Adult  Goal: Achieves optimal ventilation and oxygenation  2/20/2023 2030 by Jessika Farah RCP  Outcome: Progressing   BRONCHOSPASM/BRONCHOCONSTRICTION     [x]         IMPROVE AERATION/BREATH SOUNDS  [x]   ADMINISTER BRONCHODILATOR THERAPY AS APPROPRIATE  [x]   ASSESS BREATH SOUNDS  []   IMPLEMENT AEROSOL/MDI PROTOCOL  [x]   PATIENT EDUCATION AS NEEDED

## 2023-02-21 NOTE — PLAN OF CARE
Problem: Respiratory - Adult  Goal: Achieves optimal ventilation and oxygenation  2/21/2023 0809 by Erin Fonseca RCNATI  Outcome: Progressing  Flowsheets (Taken 2/21/2023 0809)  Achieves optimal ventilation and oxygenation:   Assess for changes in respiratory status   Respiratory therapy support as indicated   Assess for changes in mentation and behavior   Oxygen supplementation based on oxygen saturation or arterial blood gases   Encourage broncho-pulmonary hygiene including cough, deep breathe, incentive spirometry   Assess and instruct to report shortness of breath or any respiratory difficulty  2/21/2023 0350 by Sherryle Camel, RN  Outcome: Progressing  2/20/2023 2030 by Yulisa Presley RCP  Outcome: Progressing

## 2023-02-21 NOTE — PROGRESS NOTES
[facility] - Progress Note  Today's Date and Time: 2/21/2023, 9:21 AM    Impression :   Left-sided empyema  Streptococci Gordonii on pleural fluid culture from 2/14/23  Repeat culture 2/18/23 with no bacterial growth thus far  Shortness of breath  KELSEA on CKD  Hyponatremia  Leukocytosis  Hx non-Hodgkin's lymphoma  Hx multiple myeloma  Sarcoidosis  DM II  Interstitial nephritis  COPD  Hx partial right lung lobectomy  Allergies to macrolides and ketolides. Recommendations:   2/20/23: Discontinued IV Cefepime on 2-20-23  IV Rocephin 1 gm q 24 hrs until 3/12/23  [facility] for [facility] for outpatient therapy    Medical Decision Making/Summary/Discussion:2/21/2023       Infection Control Recommendations   Edison Precautions    Antimicrobial Stewardship Recommendations     Simplification of therapy  Targeted therapy  PK dosing    Coordination of Outpatient Care:   Estimated Length of IV antimicrobials:3/12/23  Patient will need Midline Catheter Insertion: TBD  Patient will need PICC line Insertion:TBD  Patient will need: Home IV , [place],  SNF,  LTAC: TBD  Patient will need outpatient wound care:No    Chief complaint/reason for consultation:   Empyema-streptococcus Gordonii      History of Present Illness:   Bernie Austin is a 79y.o.-year-old male who was initially admitted on 2/12/2023. Patient seen at the request of Dr. Leonidas Ross:    This patient, with a history of COPD, CKD, sarcoidosis, partial right lung lobectomy, multiple myeloma, non-hodgkin's lymphoma, acute interstitial nephritis and DM II, initially presented to [facility] ED on 2/12/23 with complaints of worsening shortness of breath over the previous week with a non-productive cough. He has has allergies to macrolides and ketolides. He was experiencing increased work of breathing upon evaluation, initially requiring CPAP. ABG values were grossly unremarkable.     He denied any recent history of chemotherapy, fever, chills, N/V/D, abnormal bleeding, weight-loss or night sweats.    Chest x-ray showed probable left-sided pneumonia with loculated pleural effusion which was confirmed via CT chest, which revealed a moderate to large amount of multiloculated left pleural fluid in the left lung. Mediastinal and bilateral axillary lymphadenopathy was also noted.    He was transferred to [facility] for a higher level of care and was initiated on broad spectrum antibiotics with IV cefepime and vancomycin.    Pulmonology was consulted and a thoracentesis was attempted, but was unable to be completed as no clear pocket was visualized via US.   IR was consulted and a 10 Russian, left-sided chest tube was placed on 2/14/23.  10 ml of purulent fluid was initially drained and sent for culture.   TPA and dornase were administered with improvement in output.     MRSA Nares was not detected and the pleural fluid culture grew PCN sensitive Streptococcus Gordonii.    Vancomycin was discontinued on 2/14/23 and cefepime continued.    A repeat CT of the patient's chest  on 2/17/23 revealed some improvement in the multiloculated left pleural effusions.     A larger bore chest tube exchange was completed on 2/18/23 in IR. A repeat pleural fluid culture has not had any bacterial growth to date.     Chest tube output to date:   2/14 - 800 cc  2/15- 600 cc  2/16 - 700 cc  2/17 - 900 cc  2/18- 200 cc  2/19 - 910 cc  2/20 - 275 cc    CT sugery was consulted for possible decortication, but they have no plans for decortication at this time.     Abnormal labs at the time of consult include:   Na:133  Cr:2.24  WBC:32.4-->19.0  Hgb:10.0    There is no growth on urine or blood cultures.      Imaging/Diagonstics:  CT CHEST WO CONTRAST     Result Date: 2/17/2023  Small to moderate-sized multiloculated left pleural effusions have improved. New small foci of air within the collection is presumably related to the introduction of the  left thoracostomy tube. Stable small to moderate sized mediastinal lymph nodes are nonspecific but may relate to the patient's history of lymphoma. Additional small bilateral axial lymph nodes are identified. Small hiatal hernia. XR CHEST PORTABLE     Result Date: 2/18/2023  Moderate left pleural effusion increased. No change left chest tube/pigtail catheter. XR CHEST PORTABLE     Result Date: 2/17/2023  Similar findings when rotation taken into account; left chest tube with unchanged or slightly increased loculated appearing left pleural effusion; atelectatic appearing changes. No pneumothorax. XR CHEST PORTABLE     Result Date: 2/14/2023  Interval placement of a left chest tube with reduced left pleural fluid. IR CHEST TUBE INSERTION     Result Date: 2/14/2023  Successful ultrasound guided placement of a left 10 [place] chest tube      US RETROPERITONEAL COMPLETE     Result Date: 2/19/2023  1. Simple cortical cyst at the upper pole left kidney measuring 6 mm. 2. Limited renal ultrasound. No hydronephrosis. 3. Nonvisualization of ureteral jets. Minimal distention of the urinary bladder. Patient no urge to void during the exam. RECOMMENDATIONS: Unavailable        Infectious disease was consulted for continued empyema. CURRENT EVALUATION 2/21/2023  /70   Pulse (!) 103   Temp 97.9 °F (36.6 °C) (Oral)   Resp 24   Ht 6' 1\" (1.854 m)   Wt 244 lb 4.3 oz (110.8 kg)   SpO2 96%   BMI 32.23 kg/m²     Afebrile  VS stable on 3 L NC  Tachycardia    He is experiencing some confusion  Wife indicates he had some memory lapses at home before admission but that he is confused at present. Left sided-chest tube is in place and hooked to suction. Serosanguinous drainage present. Discussed with patient and wife at length on 2-20-23  CT scans reviewed with wife and daughter on 2-20-23  Indicated that he will need about a month of I antibiotics.  He may need additional drainage procedures. Wife indicated similar empyema a few years ago on Rt chest. He had S aureus back then and was severely ill. Medications Reviewed: On IV Ceftriaxone    Leukocytosis increased to 22.9    Discharge planning is underway    Labs, X rays reviewed: 2/21/2023    BUN:63-->47  Cr:2.24-->1.96    WBC:32.4-->19.0-->22.9  Hb:10.0-->11.4  Plat: 425-->461    Cultures:  Urine:  2/17/23: No growth  Blood:  2/17/23: No growth  MRSA Nares:  2/13/23: Not detected  Pleural Fluid:  2/14/23: Streptococci Gordonii  2/18/23: No growth thus far    Discussed with patient, RN, IM. I have personally reviewed the past medical history, past surgical history, medications, social history, and family history, and I have updated the database accordingly. Past Medical History:     Past Medical History:   Diagnosis Date    Acute interstitial nephritis 08/03/2022    Unclear cause. Creat bumped to 2.9, baseline in oct 2020 1.6. Exact description of his kidney biopsy is not available to me however pathologist seems to think there is diabetic glomerosclerosis and acute interstitial nephritis seen on the biopsy specimen. No mention about chronic changes. Because of acute interstitial nephritis a trial of steroids for 15 days will be given to see if I can impro    Aortic regurgitation     ARF (acute renal failure) ([facility] [address] 08/03/2022    kidney biopsy from June 2022  showing findings of acute interstitial nephritis. Exact description not available. Trial of steroids will be given to see if I can improve renal function. Creatinine in October 2020 was 1.6, creatinine in June 2022 was 2.9. Trial of steroids will be given to see if renal function can be improved.     Chronic kidney disease     Hyperlipidemia     Hypertension     Non-Hodgkin lymphoma ([facility] [address]     Research study patient 02/13/2023    [id number] Completed 2/15/23    Sarcoidosis     Type II or unspecified type diabetes mellitus without mention of complication, not stated as uncontrolled        Past Surgical  History:     Past Surgical History:   Procedure Laterality Date    CARPAL TUNNEL RELEASE Left 11/15     EYE SURGERY Right     shunt and cataract     EYE SURGERY Right 6/16    laser     IR CHEST TUBE INSERTION  2/13/2023    IR CHEST TUBE INSERTION 2/13/2023 [facility]    IR CHEST TUBE INSERTION  2/18/2023    IR CHEST TUBE INSERTION 2/18/2023 Ishaan Falk MD [facility]    KNEE ARTHROSCOPY      MALIGNANT SKIN LESION EXCISION      ROTATOR CUFF REPAIR Right 7/14    ROTATOR CUFF REPAIR Left 5/15    TOTAL KNEE ARTHROPLASTY Right 10/2018       Medications:      cefTRIAXone (ROCEPHIN) IV  1,000 mg IntraVENous Q24H    QUEtiapine  25 mg Oral Nightly    amLODIPine  2.5 mg Oral Daily    docusate sodium  100 mg Oral TID    fentanNYL  100 mcg IntraVENous Once    sodium chloride flush  10 mL IntraCATHeter BID    melatonin  5 mg Oral Nightly    insulin lispro  0-16 Units SubCUTAneous TID WC    insulin lispro  0-4 Units SubCUTAneous Nightly    heparin (porcine)  5,000 Units SubCUTAneous 3 times per day    atorvastatin  80 mg Oral Daily    DULoxetine  60 mg Oral BID    dicyclomine  10 mg Oral 4x Daily AC & HS    finasteride  5 mg Oral Daily    fenofibrate  160 mg Oral Daily    metoprolol succinate  25 mg Oral Nightly    pantoprazole  40 mg Oral QAM AC    repaglinide  1 mg Oral TID AC    brimonidine  1 drop Right Eye BID    And    timolol  1 drop Right Eye BID    insulin glargine  20 Units SubCUTAneous BID    lidocaine  20 mL IntraDERmal Once    sodium chloride flush  5-40 mL IntraVENous 2 times per day    ipratropium-albuterol  1 ampule Inhalation Q4H WA       Social History:     Social History     Socioeconomic History    Marital status:      Spouse name: Not on file    Number of children: Not on file    Years of education: Not on file    Highest education level: Not on file   Occupational History    Not on file   Tobacco Use    Smoking status: Never Smokeless tobacco: Former    Tobacco comments:     minimal and infrequent    Substance and Sexual Activity    Alcohol use: Yes     Alcohol/week: 0.0 standard drinks     Comment: rare    Drug use: No    Sexual activity: Not on file   Other Topics Concern    Not on file   Social History Narrative    Not on file     Social Determinants of Health     Financial Resource Strain: Low Risk     Difficulty of Paying Living Expenses: Not very hard   Food Insecurity: No Food Insecurity    Worried About Running Out of Food in the Last Year: Never true    Ran Out of Food in the Last Year: Never true   Transportation Needs: No Transportation Needs    Lack of Transportation (Medical): No    Lack of Transportation (Non-Medical): No   Physical Activity: Not on file   Stress: Stress Concern Present    Feeling of Stress : To some extent   Social Connections: Socially Integrated    Frequency of Communication with Friends and Family: Three times a week    Frequency of Social Gatherings with Friends and Family:  Three times a week    Attends L[employer] Services: 1 to 4 times per year    Active Member of Clubs or Organizations: No    Attends Club or Organization Meetings: 1 to 4 times per year    Marital Status:    Intimate Partner Violence: Not At Risk    Fear of Current or Ex-Partner: No    Emotionally Abused: No    Physically Abused: No    Sexually Abused: No   Housing Stability: Low Risk     Unable to Pay for Housing in the Last Year: No    Number of [place] in the Last Year: 1    Unstable Housing in the Last Year: No       Family History:     Family History   Problem Relation Age of Onset    High Blood Pressure Father     Other Father         AAA    Heart Disease Other         uncle x 2         Allergies:   Latex, Gabapentin, Meperidine, Erythromycin, Glimepiride, Hydrocodone, Macrolides and ketolides, Niacin and related, Trelegy ellipta [fluticasone-umeclidin-vilant], Pregabalin, and Xanax [alprazolam]     Review of Systems:   Constitutional: No fevers or chills. No systemic complaints  Head: No headaches  Eyes: No double vision or blurry vision. No conjunctival inflammation. ENT: No sore throat or runny nose. . No hearing loss, tinnitus or vertigo. Cardiovascular: No chest pain or palpitations. shortness of breath. WANG  Lung: Left sided chest tube with some shortness of breath with dry cough. No sputum production  Abdomen: No nausea, vomiting, diarrhea, or abdominal pain. Ian Juli No cramps. Genitourinary: No increased urinary frequency, or dysuria. No hematuria. No suprapubic or CVA pain  Musculoskeletal: No muscle aches or pains. No joint effusions, swelling or deformities  Hematologic: No bleeding or bruising. Neurologic: No headache, weakness, numbness, or tingling. Integument: No rash, no ulcers. Psychiatric: No depression. Endocrine: No polyuria, no polydipsia, no polyphagia. Physical Examination :   Patient Vitals for the past 8 hrs:   BP Temp Temp src Pulse Resp SpO2 Weight   02/21/23 0815 113/70 97.9 °F (36.6 °C) Oral (!) 103 24 96 % --   02/21/23 0805 -- -- -- (!) 104 27 95 % --   02/21/23 0630 -- -- -- (!) 107 26 98 % --   02/21/23 0535 -- -- -- -- -- -- 244 lb 4.3 oz (110.8 kg)   02/21/23 0343 -- -- -- 100 20 100 % --   02/21/23 0330 107/75 98.4 °F (36.9 °C) Oral 100 22 100 % --     General Appearance: Awake, alert, and in no apparent distress  Head:  Normocephalic, no trauma  Eyes: Pupils equal, round, reactive to light and accommodation; extraocular movements intact; sclera anicteric; conjunctivae pink. No embolic phenomena. ENT: Oropharynx clear, without erythema, exudate, or thrush. No tenderness of sinuses. Mouth/throat: mucosa pink and moist. No lesions. Dentition in good repair. Neck:Supple, without lymphadenopathy. Thyroid normal, No bruits. Pulmonary/Chest: Left sided chest tube in place to suction with serosanguinous drainage. Diminished to auscultation. No dullness to percussion.    Cardiovascular: Regular rate and rhythm without murmurs, rubs, or gallops. Abdomen: Soft, non tender. Bowel sounds normal. No organomegaly  All four Extremities: No cyanosis, clubbing, edema, or effusions. Neurologic: No gross sensory or motor deficits. Skin: Warm and dry with good turgor. No signs of peripheral arterial or venous insufficiency. No ulcerations. Left sided-chest tube site CDI    Medical Decision Making -Laboratory:   I have independently reviewed/ordered the following labs:    CBC with Differential:   Recent Labs     02/20/23 0233 02/21/23 0622   WBC 19.0* 22.9*   HGB 10.0* 11.4*   HCT 32.1* 38.1*    461*   LYMPHOPCT 40 28   MONOPCT 6 9*     BMP:   Recent Labs     02/20/23 0233 02/21/23 0622   * 132*   K 5.1 5.5*    98   CO2 23 23   BUN 63* 47*   CREATININE 2.24* 1.96*     Hepatic Function Panel: No results for input(s): PROT, LABALBU, BILIDIR, IBILI, BILITOT, ALKPHOS, ALT, AST in the last 72 hours. No results for input(s): RPR in the last 72 hours. No results for input(s): HIV in the last 72 hours. No results for input(s): BC in the last 72 hours. Lab Results   Component Value Date/Time    MUCUS 1+ 02/13/2023 02:17 AM    RBC 4.13 02/21/2023 06:22 AM    RBC 5.15 04/03/2012 11:08 AM    WBC 22.9 02/21/2023 06:22 AM    TURBIDITY Cloudy 02/17/2023 06:15 AM     Lab Results   Component Value Date/Time    CREATININE 1.96 02/21/2023 06:22 AM    GLUCOSE 150 02/21/2023 06:22 AM    GLUCOSE 140 04/03/2012 11:08 AM       Medical Decision Making-Imaging:     Narrative   EXAMINATION:   CT OF THE CHEST WITHOUT CONTRAST, 2/17/2023 9:29 am       TECHNIQUE:   CT of the chest was performed without the administration of intravenous   contrast. Multiplanar reformatted images are provided for review. Automated   exposure control, iterative reconstruction, and/or weight based adjustment of   the mA/kV was utilized to reduce the radiation dose to as low as reasonably   achievable.        COMPARISON:   02/13/2023 HISTORY:   ORDERING SYSTEM PROVIDED HISTORY:  Improvement? TECHNOLOGIST PROVIDED HISTORY:   Improvement? FINDINGS:   Mediastinum: Small bilateral axial lymph nodes are identified not   significantly changed. Small to moderate size mediastinal lymph nodes are   additionally present. Lungs/Pleura: There are multiple loculated effusions within the left chest   which have improved since previous exam.  Small amounts of new air are likely   secondary to the presence of the left thoracostomy tube. Left lower lobe atelectasis is identified. Upper Abdomen: There is a probable small hiatal hernia. Soft Tissues/Bones:  No acute osseous abnormality is appreciated. Old right   rib fractures are noted. Impression   Small to moderate-sized multiloculated left pleural effusions have improved. New small foci of air within the collection is presumably related to the   introduction of the left thoracostomy tube. Stable small to moderate sized mediastinal lymph nodes are nonspecific but   may relate to the patient's history of lymphoma. Additional small bilateral   axial lymph nodes are identified. Small hiatal hernia. Narrative   EXAMINATION:   ONE XRAY VIEW OF THE CHEST       2/18/2023 3:04 pm       COMPARISON:   CT chest February 17, 2023       HISTORY:   ORDERING SYSTEM PROVIDED HISTORY: worsening   TECHNOLOGIST PROVIDED HISTORY:   worsening       FINDINGS:   Pigtail catheter on the left. Moderate left effusion appears   denser/increased. Mild bilateral interstitial infiltrates or edema. Heart   and mediastinum unremarkable. Bony thorax intact. No pneumothorax noted. Subsegmental atelectasis right lower lung. Impression   Moderate left pleural effusion increased. No change left chest tube/pigtail   catheter.            Medical Decision Tlmcws-Lkmgmcah-Ddley:       Medical Decision Making-Other:     Note:  Labs, medications, radiologic studies were reviewed with personal review of films  Large amounts of data were reviewed  Discussed with nursing Staff, Discharge planner  Infection Control and Prevention measures reviewed  All prior entries were reviewed  Administer medications as ordered  Prognosis: [address]  Discharge planning reviewed  Follow up as outpatient. Thank you for allowing us to participate in the care of this patient. Please call with questions.     FLOYD Nguyen CNP      Pager: [phone] - Office: [phone]

## 2023-02-21 NOTE — PROGRESS NOTES
[facility] Cardiothoracic Surgery  Progress Note    2/21/2023 11:58 AM       Subjective:  Mr. Evan Reid   Patient has mild anxiety no SOb. Bedside he was upset regarding communication with significant other. Pt is paranoid. No SOb or CP     Objective:  /70   Pulse (!) 103   Temp 97.9 °F (36.6 °C) (Oral)   Resp 24   Ht 6' 1\" (1.854 m)   Wt 244 lb 4.3 oz (110.8 kg)   SpO2 96%   BMI 32.23 kg/m²   Chest: pacing wires: no, chest tubes:yes, air leak no, 1 +  CV: no murmur noted, Normal S1, S2, NSR-concerns for EKG changes on monitor in room  Lungs: clear to auscultation, no wheezes, rales, or rhonchi  Abd: normal bowel sounds   Lower Extremities: Trace edema  Saph Incison: no sign of drainage or infection  Sternal Incison: dressing applied-no excessive drainage or signs of infection noted  Reviewed Ct chest which shows many loculated pockets with chest tube in place. Labs: Labs reviewed today  CBC:   Recent Labs     02/19/23  0557 02/20/23  0233 02/21/23  0622   WBC 32.4* 19.0* 22.9*   HGB 11.0* 10.0* 11.4*   HCT 35.1* 32.1* 38.1*   MCV 88.6 89.9 92.3   * 425 461*       BMP:   Recent Labs     02/19/23  0557 02/20/23  0233 02/21/23  0622   * 133* 132*   K 5.3 5.1 5.5*    101 98   CO2 22 23 23   BUN 69* 63* 47*   CREATININE 2.00* 2.24* 1.96*         I/O: I/O last 3 completed shifts:  In: -   Out: 8500 [Urine:1050;  Chest Tube:585]  Scheduled Meds:   QUEtiapine  50 mg Oral Nightly    cefTRIAXone (ROCEPHIN) IV  1,000 mg IntraVENous Q24H    amLODIPine  2.5 mg Oral Daily    docusate sodium  100 mg Oral TID    fentanNYL  100 mcg IntraVENous Once    sodium chloride flush  10 mL IntraCATHeter BID    melatonin  5 mg Oral Nightly    insulin lispro  0-16 Units SubCUTAneous TID     insulin lispro  0-4 Units SubCUTAneous Nightly    heparin (porcine)  5,000 Units SubCUTAneous 3 times per day    atorvastatin  80 mg Oral Daily    DULoxetine  60 mg Oral BID    dicyclomine  10 mg Oral 4x Daily AC & HS finasteride  5 mg Oral Daily    fenofibrate  160 mg Oral Daily    metoprolol succinate  25 mg Oral Nightly    pantoprazole  40 mg Oral QAM AC    repaglinide  1 mg Oral TID AC    brimonidine  1 drop Right Eye BID    And    timolol  1 drop Right Eye BID    insulin glargine  20 Units SubCUTAneous BID    lidocaine  20 mL IntraDERmal Once    sodium chloride flush  5-40 mL IntraVENous 2 times per day    ipratropium-albuterol  1 ampule Inhalation Q4H WA     Continuous Infusions:   dextrose      sodium chloride       PRN Meds:bisacodyl, ALPRAZolam, oxyCODONE, oxyCODONE-acetaminophen, glucose, dextrose bolus **OR** dextrose bolus, glucagon (rDNA), dextrose, sodium chloride flush, sodium chloride, ondansetron **OR** ondansetron, polyethylene glycol, acetaminophen **OR** acetaminophen, albuterol        Daily Nursing Care:  Please keep SCDS in place as DVT prophylaxis  If not intubated, Please have patient use IS and acapella 10X/hr or during commercial breaks  Please continue BM management  Daily labs and CXR  Daily PT/OT and ambulation  Continue with Case Management for DC planning      Assessment/ Plan:    6 min walk test to see how patient oxygen saturation   Patient has no known cardiac Hx. Family Hx noted. Noted tachycardia present today   Hx of sarcoidosis  At this time we would like cardiac risk stratification and clearance. Echocardiogram ordered. Cardiology consulted. EKG ordered. Pt has many loculated pockets therefore will likely need decortication. No surgical date at this time.    We will continue to round       [address] [place] [address], FLOYD - NP

## 2023-02-21 NOTE — PROGRESS NOTES
[facility]  Office: [address], DO, Rhona Cain, DO, Jean-Pierre Lopez, DO, April Victoria Blood, DO, Paola Lindsey MD, Pennie Sheridan MD, Marilu Sears MD, Cassie Foster MD,  Omero Drake MD, Adan Garvin MD, Fouzia Foley, DO, Deborah Skinner MD,  Praful Espinoza MD, Hakeem Weathers MD, Yi Hernandez DO, Kenn Tate MD, Mireille Paul MD, Jose De Jesus Mitchell DO, Praneeth Yao MD, Jarocho Padron MD, Des Baugh MD, Sabi Nelson MD, Ambreen Sawyer DO, Nanette Ingram MD, Sadi Medeiros MD, Steffanie Flowers, CNP,  Carol Wakefield, CNP, Valentín Aviles, CNP, Bala Mendoza, CNP,  Kaitlyn Barrios, [facility], Vikram Stevens, CNP, Maikel Schmidt, CNP, Susan Duenas, CNP, Edith Titus, CNP, Felix Porras, CNP, Jocelyn De Leon PAHaileeC, [facility], CNS, Veronique Mcelroy, CNP, Emmanuelle Esparza, [address] [facility]      Daily Progress Note     Admit Date: 2/12/2023  Bed/Room No.  5872/4561-37  Admitting Physician : Marilu Sears MD  Code Status :Full Code  Hospital Day:  LOS: 9 days   Chief Complaint:     Shortness of breath      Principal Problem:    Pneumonia, unspecified organism  Active Problems:    Pleural effusion    Hyperkalemia    Acute respiratory failure with hypoxia (HCC)    Hyponatremia    Hyperglycemia    History of non-Hodgkin's lymphoma    History of sarcoidosis    Empyema (HCC)    KELSEA (acute kidney injury) [facility])  Resolved Problems:    * No resolved hospital problems. *    Subjective : Interval History/Significant events :  02/21/23    Patient has been more confused. He has been trying to get out of the bed. Patient is oriented to self. He is redirectable. Denies any agitation. Patient is feeling overall weak. Vitals - Stable afebrile, tachycardia  Labs -hyperkalemia 5.5, BUN 47, creatinine 1.96. Kidney US did not show any hydronephrosis . Nursing notes , Consults notes reviewed.  Overnight events and updates discussed with Nursing staff . Background History:         Marylene Monaco is 79 y.o. male  Who was admitted to the hospital on 2/12/2023 for treatment of Pneumonia, unspecified organism. Patient presented to ER at [facility] on 2/12/2023 with dyspnea and was found to have left multiloculated pneumonia with large pleural effusion with mediastinal and bilateral axillary lymphadenopathy. Patient has prior history of non-Hodgkin's lymphoma, multiple myeloma. Patient was requiring high flow nasal cannula and had thoracentesis. Pleural fluid was consistent with empyema and patient had chest tube placement by IR. He was treated with broad-spectrum antibiotics. Patient was also given dornase without much improvement. CT surgery was consulted and recommended increasing the size of chest tube and did not recommend any open surgical intervention at this time. Patient had exchange of chest tube on 2/18/2023. PMH:  Past Medical History:   Diagnosis Date    Acute interstitial nephritis 08/03/2022    Unclear cause. Creat bumped to 2.9, baseline in oct 2020 1.6. Exact description of his kidney biopsy is not available to me however pathologist seems to think there is diabetic glomerosclerosis and acute interstitial nephritis seen on the biopsy specimen. No mention about chronic changes. Because of acute interstitial nephritis a trial of steroids for 15 days will be given to see if I can impro    Aortic regurgitation     ARF (acute renal failure) ([facility] [address] 08/03/2022    kidney biopsy from June 2022  showing findings of acute interstitial nephritis. Exact description not available. Trial of steroids will be given to see if I can improve renal function. Creatinine in October 2020 was 1.6, creatinine in June 2022 was 2.9. Trial of steroids will be given to see if renal function can be improved.     Chronic kidney disease     Hyperlipidemia     Hypertension     Non-Hodgkin lymphoma ([facility] [address]     Research study patient 02/13/2023    [id number] Completed 2/15/23    Sarcoidosis     Type II or unspecified type diabetes mellitus without mention of complication, not stated as uncontrolled       Allergies: Allergies   Allergen Reactions    Latex Rash    Gabapentin      Other reaction(s): Vomiting    Meperidine Anaphylaxis    Erythromycin      Other reaction(s): Fever  Had all side effects associated with this medication      Glimepiride      Other reaction(s): Dizziness    Hydrocodone      Other reaction(s): Vomiting    Macrolides And Ketolides Other (See Comments)    Niacin And Related     Trelegy Ellipta [Fluticasone-Umeclidin-Vilant]     Pregabalin Diarrhea    Xanax [Alprazolam] Anxiety     Anxiety, restlessness, insomnia      Medications :  cefTRIAXone (ROCEPHIN) IV, 1,000 mg, IntraVENous, Q24H  QUEtiapine, 25 mg, Oral, Nightly  amLODIPine, 2.5 mg, Oral, Daily  docusate sodium, 100 mg, Oral, TID  fentanNYL, 100 mcg, IntraVENous, Once  sodium chloride flush, 10 mL, IntraCATHeter, BID  melatonin, 5 mg, Oral, Nightly  insulin lispro, 0-16 Units, SubCUTAneous, TID WC  insulin lispro, 0-4 Units, SubCUTAneous, Nightly  heparin (porcine), 5,000 Units, SubCUTAneous, 3 times per day  atorvastatin, 80 mg, Oral, Daily  DULoxetine, 60 mg, Oral, BID  dicyclomine, 10 mg, Oral, 4x Daily AC & HS  finasteride, 5 mg, Oral, Daily  fenofibrate, 160 mg, Oral, Daily  metoprolol succinate, 25 mg, Oral, Nightly  pantoprazole, 40 mg, Oral, QAM AC  repaglinide, 1 mg, Oral, TID AC  brimonidine, 1 drop, Right Eye, BID   And  timolol, 1 drop, Right Eye, BID  insulin glargine, 20 Units, SubCUTAneous, BID  lidocaine, 20 mL, IntraDERmal, Once  sodium chloride flush, 5-40 mL, IntraVENous, 2 times per day  ipratropium-albuterol, 1 ampule, Inhalation, Q4H WA      Review of Systems   Review of Systems   Constitutional:  Positive for activity change, appetite change and fatigue. Negative for chills, fever and unexpected weight change.    HENT:  Negative for congestion, mouth sores, postnasal drip, sinus pressure, sore throat and voice change. Eyes:  Negative for visual disturbance. Respiratory:  Positive for shortness of breath. Negative for cough and wheezing. Cardiovascular:  Negative for chest pain and palpitations. Gastrointestinal:  Negative for abdominal pain, blood in stool, constipation, diarrhea, nausea and vomiting. Endocrine: Negative for polyuria. Genitourinary:  Negative for difficulty urinating, dysuria, frequency and urgency. Musculoskeletal:  Negative for arthralgias, joint swelling and myalgias. Neurological:  Negative for dizziness, tremors, speech difficulty, light-headedness and headaches. Psychiatric/Behavioral:  Positive for confusion. Objective :      Current Vitals : Temp: 98.4 °F (36.9 °C),  Heart Rate: (!) 107, Resp: 26, BP: 107/75, SpO2: 98 %  Last 24 Hrs Vitals   Patient Vitals for the past 24 hrs:   BP Temp Temp src Pulse Resp SpO2 Height Weight   02/21/23 0630 -- -- -- (!) 107 26 98 % -- --   02/21/23 0535 -- -- -- -- -- -- -- 244 lb 4.3 oz (110.8 kg)   02/21/23 0343 -- -- -- 100 20 100 % -- --   02/21/23 0330 107/75 98.4 °F (36.9 °C) Oral 100 22 100 % -- --   02/20/23 2345 117/80 98.4 °F (36.9 °C) Oral (!) 107 22 99 % -- --   02/20/23 2327 -- -- -- -- 22 -- -- --   02/20/23 2240 -- -- -- -- 25 -- -- --   02/20/23 2000 (!) 121/93 98.4 °F (36.9 °C) Oral (!) 118 22 100 % -- --   02/20/23 1600 125/76 97.7 °F (36.5 °C) Oral (!) 108 25 100 % -- --   02/20/23 1133 -- -- -- (!) 104 (!) 32 100 % -- --   02/20/23 1125 -- -- -- -- -- -- 6' 1\" (1.854 m) --   02/20/23 1100 (!) 112/99 98.4 °F (36.9 °C) Oral (!) 103 18 -- -- --   02/20/23 0811 111/65 -- -- -- -- -- -- --       Intake / output   02/19 1901 - 02/21 0700  In: -   Out: 0935 [Urine:1050]  Physical Exam:  Physical Exam  Vitals and nursing note reviewed. Constitutional:       General: He is not in acute distress. Appearance: He is not diaphoretic. Interventions: Nasal cannula in place.    HENT: Head: Normocephalic and atraumatic. Nose:      Right Sinus: No maxillary sinus tenderness or frontal sinus tenderness. Left Sinus: No maxillary sinus tenderness or frontal sinus tenderness. Mouth/Throat:      Pharynx: No oropharyngeal exudate. Eyes:      General: No scleral icterus. Conjunctiva/sclera: Conjunctivae normal.      Pupils: Pupils are equal, round, and reactive to light. Neck:      Thyroid: No thyromegaly. Vascular: No JVD. Cardiovascular:      Rate and Rhythm: Normal rate and regular rhythm. Pulses:           Dorsalis pedis pulses are 2+ on the right side and 2+ on the left side. Heart sounds: Normal heart sounds. No murmur heard. Pulmonary:      Effort: Pulmonary effort is normal.      Breath sounds: Normal breath sounds. No wheezing or rales. Chest:      Comments: Left chest tube in place. Abdominal:      Palpations: Abdomen is soft. There is no mass. Tenderness: There is no abdominal tenderness. Musculoskeletal:      Cervical back: Full passive range of motion without pain and neck supple. Lymphadenopathy:      Head:      Right side of head: No submandibular adenopathy. Left side of head: No submandibular adenopathy. Cervical: No cervical adenopathy. Skin:     General: Skin is warm. Neurological:      Mental Status: He is alert and oriented to person, place, and time. Motor: No tremor. Psychiatric:         Behavior: Behavior is cooperative.          Laboratory findings:    Recent Labs     02/19/23  0557 02/20/23  0233 02/21/23 0622   WBC 32.4* 19.0* 22.9*   HGB 11.0* 10.0* 11.4*   HCT 35.1* 32.1* 38.1*   * 425 461*       Recent Labs     02/19/23  0557 02/20/23  0233 02/21/23 0622   * 133* 132*   K 5.3 5.1 5.5*    101 98   CO2 22 23 23   GLUCOSE 123* 151* 150*   BUN 69* 63* 47*   CREATININE 2.00* 2.24* 1.96*   CALCIUM 9.5 9.0 9.8       No results for input(s): PROT, LABALBU, LABA1C, A5HGAMF, U5KNOZV, FT4, TSH, AST, ALT, LDH, GGT, ALKPHOS, BILITOT, BILIDIR, AMMONIA, AMYLASE, LIPASE, LACTATE, CHOL, HDL, LDLCHOLESTEROL, CHOLHDLRATIO, TRIG, VLDL, BNP, TROPONINI, CKTOTAL, CKMB, CKMBINDEX, RF, SAMANTHA in the last 72 hours. Specific Gravity, UA   Date Value Ref Range Status   02/17/2023 1.025 1.005 - 1.030 Final     Protein, UA   Date Value Ref Range Status   02/17/2023 1+ (A) NEGATIVE Final     RBC, UA   Date Value Ref Range Status   02/17/2023 5 TO 10 0 - 4 /HPF Final     Comment:     Reference range defined for non-centrifuged specimen. Nitrite, Urine   Date Value Ref Range Status   02/17/2023 NEGATIVE NEGATIVE Final     WBC, UA   Date Value Ref Range Status   02/17/2023 0 TO 2 0 - 5 /HPF Final     Leukocyte Esterase, Urine   Date Value Ref Range Status   02/17/2023 NEGATIVE NEGATIVE Final       Imaging / Clinical Data :-   CT CHEST WO CONTRAST    Result Date: 2/17/2023  Small to moderate-sized multiloculated left pleural effusions have improved. New small foci of air within the collection is presumably related to the introduction of the left thoracostomy tube. Stable small to moderate sized mediastinal lymph nodes are nonspecific but may relate to the patient's history of lymphoma. Additional small bilateral axial lymph nodes are identified. Small hiatal hernia. CT CHEST WO CONTRAST    Result Date: 2/13/2023  1. Moderate to large amount of multiloculated left pleural fluid. Assessment for pleural thickening and/or pleural nodularity is limited without IV contrast. 2. Consolidation in the left lower lobe, likely atelectasis, but possibility of superimposed pneumonia would be difficult to exclude. 3. Mediastinal and bilateral axillary lymphadenopathy, which may be related to the patient's history of lymphoma. If available, comparison to any recent imaging may be of use to assess for progression. XR CHEST PORTABLE    Result Date: 2/18/2023  Moderate left pleural effusion increased.   No change left chest tube/pigtail catheter. XR CHEST PORTABLE    Result Date: 2/17/2023  Similar findings when rotation taken into account; left chest tube with unchanged or slightly increased loculated appearing left pleural effusion; atelectatic appearing changes. No pneumothorax. XR CHEST PORTABLE    Result Date: 2/14/2023  Interval placement of a left chest tube with reduced left pleural fluid. XR CHEST PORTABLE    Result Date: 2/13/2023  No significant interval change. Redemonstration of opacification of the mid to lower left lung field which may in part be due to loculated left pleural effusion. Consider further evaluation with CT. XR CHEST PORTABLE    Result Date: 2/12/2023  Probable left-sided pneumonia with loculated pleural effusion. Underlying pathology not excluded, as above. Mild cardiomegaly and venous hypertension. Discoid atelectasis right base. RECOMMENDATION: Consider CT chest.     IR CHEST TUBE INSERTION    Result Date: 2/14/2023  Successful ultrasound guided placement of a left 10 French chest tube        Clinical Course : unchanged  Assessment and Plan  :        Left empyema -   Pleural fluid growing strep viridans gordonii -ID following. Chest tube in place. Cefepime switched to Rocephin. Patient getting tPA. CT surgery and pulmonary following. Plan for decortication in few days. Acute respiratory failure with hypoxia -continue O2 supplement. H/o non-Hodgkin's lymphoma and multiple myeloma  H/o sarcoidosis -   Acute kidney injury with CKD stage III - baseline creatinine 1.5 - hold Lasix. Continue IVF. Hyperkalemia due to decreased renal clearance-Kayexalate. Demand ischemia from acute respiratory failure  Reactive thrombocytosis. Obstructive sleep apnea -CPAP at nighttime. COPD - albuterol as needed   Acute Delirium -increase Seroquel dose at night       Continue to monitor vitals , Intake / output ,  Cell count , HGB , Kidney function, oxygenation  as indicated .    Plan and updates discussed with patient ,  answers  explained to satisfaction.    Plan discussed with Staff Epifanio Rojas RN     (Please note that portions of this note were completed with a voice recognition program. Efforts were made to edit the dictations but occasionally words are mis-transcribed.)      Estrada Nava MD  2/21/2023

## 2023-02-21 NOTE — PROGRESS NOTES
Patient confused and refusing treatment.  Will not take oral meds, eat breakfast or allow therapy to ambulate for saturation screening

## 2023-02-21 NOTE — PLAN OF CARE
Problem: Discharge Planning  Goal: Discharge to home or other facility with appropriate resources  Outcome: Progressing     Problem: Safety - Adult  Goal: Free from fall injury  Outcome: Progressing     Problem: ABCDS Injury Assessment  Goal: Absence of physical injury  Outcome: Progressing     Problem: Skin/Tissue Integrity  Goal: Absence of new skin breakdown  Description: 1.  Monitor for areas of redness and/or skin breakdown  2.  Assess vascular access sites hourly  3.  Every 4-6 hours minimum:  Change oxygen saturation probe site  4.  Every 4-6 hours:  If on nasal continuous positive airway pressure, respiratory therapy assess nares and determine need for appliance change or resting period.  Outcome: Progressing     Problem: Chronic Conditions and Co-morbidities  Goal: Patient's chronic conditions and co-morbidity symptoms are monitored and maintained or improved  Outcome: Progressing     Problem: Respiratory - Adult  Goal: Achieves optimal ventilation and oxygenation  2/21/2023 0350 by Cecy Ramos RN  Outcome: Progressing  2/20/2023 2030 by Alvin Sanchez RCP  Outcome: Progressing     Problem: Pain  Goal: Verbalizes/displays adequate comfort level or baseline comfort level  Outcome: Progressing     Problem: Neurosensory - Adult  Goal: Achieves stable or improved neurological status  Outcome: Progressing  Goal: Absence of seizures  Outcome: Progressing  Goal: Remains free of injury related to seizures activity  Outcome: Progressing  Goal: Achieves maximal functionality and self care  Outcome: Progressing     Problem: Cardiovascular - Adult  Goal: Maintains optimal cardiac output and hemodynamic stability  Outcome: Progressing  Goal: Absence of cardiac dysrhythmias or at baseline  Outcome: Progressing     Problem: Skin/Tissue Integrity - Adult  Goal: Skin integrity remains intact  Outcome: Progressing  Goal: Incisions, wounds, or drain sites healing without S/S of infection  Outcome: Progressing  Goal:  Oral mucous membranes remain intact  Outcome: Progressing     Problem: Musculoskeletal - Adult  Goal: Return mobility to safest level of function  Outcome: Progressing  Goal: Maintain proper alignment of affected body part  Outcome: Progressing  Goal: Return ADL status to a safe level of function  Outcome: Progressing     Problem: Gastrointestinal - Adult  Goal: Minimal or absence of nausea and vomiting  Outcome: Progressing  Goal: Maintains or returns to baseline bowel function  Outcome: Progressing  Goal: Maintains adequate nutritional intake  Outcome: Progressing  Goal: Establish and maintain optimal ostomy function  Outcome: Progressing     Problem: Genitourinary - Adult  Goal: Absence of urinary retention  Outcome: Progressing  Goal: Urinary catheter remains patent  Outcome: Progressing     Problem: Infection - Adult  Goal: Absence of infection at discharge  Outcome: Progressing  Goal: Absence of infection during hospitalization  Outcome: Progressing  Goal: Absence of fever/infection during anticipated neutropenic period  Outcome: Progressing     Problem: Metabolic/Fluid and Electrolytes - Adult  Goal: Electrolytes maintained within normal limits  Outcome: Progressing  Goal: Hemodynamic stability and optimal renal function maintained  Outcome: Progressing  Goal: Glucose maintained within prescribed range  Outcome: Progressing     Problem: Hematologic - Adult  Goal: Maintains hematologic stability  Outcome: Progressing       - will continue to monitor the patient

## 2023-02-21 NOTE — PROGRESS NOTES
Physical Therapy        Physical Therapy Cancel Note      DATE: 2023    NAME: Jeevan Bennett  MRN: [id number]   : 1955      Patient not seen this date for Physical Therapy due to:    Patient Declined: Attempted to encourage pt x 2 trials to participate; RN present during 2nd trial.  Pt continued to refuse. Pt stating, \"The doctor said to stay in bed. If I move, the doctor said it would burst. I have cancer. I don't trust the doctor.     Plan to check back tomorrow 2023      Electronically signed by Dee Gilliam PTA on 2023 at 10:12 AM

## 2023-02-21 NOTE — CONSULTS
[facility]   Consult Note         Today's Date: 2/21/2023  Patient Name: Falguni Uriostegui  Date of admission: 2/12/2023 10:33 PM  Patient's age: 79 y.o., 1955  Admission Dx: Pneumonia, unspecified organism [J18.9]  Bilateral pleural effusion [J90]    Reason for Consult:  Cardiac evaluation    Requesting Physician: Geovanna Valdez MD    REASON FOR CONSULT:  Pre-op clearance for VATS procedure     History Obtained From:  Patient, chart, staff, records    HISTORY OF PRESENT ILLNESS:      The patient is a 79 y.o. male who is admitted to the hospital for pneumonia. 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 Patient presented with shortness of breath describing his symptoms as a gradual onset over the last 2 weeks with acute worsening of symptoms over the past week. Patient denies use of oxygen at home. Does follow with Dr. Tomeka David and is compliant with CPAP for obstructive sleep apnea. Patient denied any congestion cough or upper respiratory symptoms. Denies fevers and chills. Not on any immunosuppressive medications or chemotherapy currently. Patient denies any pleurisy or hemoptysis. Patient denies any lower extremity edema syncope or collapse. No dizziness. Patient presented to [facility] ED and was transferred to [facility] for worsening respiratory failure. Upon arrival to the floor patient became hemodynamically unstable and tachypneic saturating 94% on 60 L of high flow respiratory rate was 30 at that time with labored abdominal breathing at which time he was transferred to the ICU. Chest x-ray was concerning for loculated left-sided pleural effusion. Had extensive conversation with wife over the phone regarding PMHx.      Past Medical History:   has a past medical history of Acute interstitial nephritis, Aortic regurgitation, ARF (acute renal failure) ([facility]), Chronic kidney disease, Hyperlipidemia, Hypertension, Non-Hodgkin lymphoma (HCC), Research study patient, Sarcoidosis, and Type II or unspecified type diabetes mellitus without mention of complication, not stated as uncontrolled.    Past Surgical History:   has a past surgical history that includes eye surgery (Right); Knee arthroscopy; Rotator cuff repair (Right, 7/14); Rotator cuff repair (Left, 5/15); Carpal tunnel release (Left, 11/15 ); Eye surgery (Right, 6/16); Total knee arthroplasty (Right, 10/2018); malignant skin lesion excision; IR CHEST TUBE INSERTION (2/13/2023); and IR CHEST TUBE INSERTION (2/18/2023).     Home Medications:    Prior to Admission medications    Medication Sig Start Date End Date Taking? Authorizing Provider   amLODIPine (NORVASC) 5 MG tablet TAKE ONE TABLET BY MOUTH DAILY 11/22/22   Torres Arrieta MD   omeprazole (PRILOSEC) 20 MG delayed release capsule Take 20 mg by mouth Daily    Historical Provider, MD   fluticasone-salmeterol (ADVAIR DISKUS) 250-50 MCG/ACT AEPB diskus inhaler Inhale 1 puff into the lungs every 12 hours    Historical Provider, MD   metoprolol succinate (TOPROL XL) 25 MG extended release tablet Take 25 mg by mouth at bedtime    Historical Provider, MD   dutasteride (AVODART) 0.5 mg capsule Take 0.5 mg by mouth daily    Historical Provider, MD   finasteride (PROSCAR) 5 MG tablet Take 5 mg by mouth daily    Historical Provider, MD   Misc. Devices (NASAL SPRAY BOTTLE) MISC by Does not apply route    Historical Provider, MD   CINNAMON PO Take by mouth    Historical Provider, MD   repaglinide (PRANDIN) 1 MG tablet Take 1 mg by mouth 3 times daily (before meals)  4/16/18   Historical Provider, MD   pimecrolimus (ELIDEL) 1 % cream Apply topically as needed Apply topically 2 times daily.    Historical Provider, MD   brimonidine-timolol (COMBIGAN) 0.2-0.5 % ophthalmic solution  Place 1 drop into the right eye every 12 hours    Historical Provider, MD   albuterol sulfate  (90 BASE) MCG/ACT inhaler Inhale 1 puff into the lungs in the morning and at bedtime    Historical Provider, MD   Liraglutide (VICTOZA SC) Inject 1.8 mg into the skin daily     Historical Provider, MD   fenofibrate (TRICOR) 145 MG tablet Take 145 mg by mouth 2 times daily. Historical Provider, MD   dicyclomine (BENTYL) 10 MG capsule Take 10 mg by mouth 4 times daily (before meals and nightly)     Historical Provider, MD   atorvastatin (LIPITOR) 80 MG tablet Take 80 mg by mouth daily. Historical Provider, MD   DULoxetine (CYMBALTA) 60 MG capsule Take 60 mg by mouth 2 times daily.     Historical Provider, MD       QUEtiapine, 50 mg, Oral, Nightly    cefTRIAXone (ROCEPHIN) IV, 1,000 mg, IntraVENous, Q24H    amLODIPine, 2.5 mg, Oral, Daily    docusate sodium, 100 mg, Oral, TID    fentanNYL, 100 mcg, IntraVENous, Once    sodium chloride flush, 10 mL, IntraCATHeter, BID    melatonin, 5 mg, Oral, Nightly    insulin lispro, 0-16 Units, SubCUTAneous, TID WC    insulin lispro, 0-4 Units, SubCUTAneous, Nightly    heparin (porcine), 5,000 Units, SubCUTAneous, 3 times per day    atorvastatin, 80 mg, Oral, Daily    DULoxetine, 60 mg, Oral, BID    dicyclomine, 10 mg, Oral, 4x Daily AC & HS    finasteride, 5 mg, Oral, Daily    fenofibrate, 160 mg, Oral, Daily    metoprolol succinate, 25 mg, Oral, Nightly    pantoprazole, 40 mg, Oral, QAM AC    repaglinide, 1 mg, Oral, TID AC    brimonidine, 1 drop, Right Eye, BID **AND** timolol, 1 drop, Right Eye, BID    insulin glargine, 20 Units, SubCUTAneous, BID    lidocaine, 20 mL, IntraDERmal, Once    sodium chloride flush, 5-40 mL, IntraVENous, 2 times per day    ipratropium-albuterol, 1 ampule, Inhalation, Q4H WA      Allergies:  Latex, Gabapentin, Meperidine, Erythromycin, Glimepiride, Hydrocodone, Macrolides and ketolides, Niacin and related, Trelegy ellipta [fluticasone-umeclidin-vilant], Pregabalin, and Xanax [alprazolam]    Social History:   reports that he has never smoked. He has quit using smokeless tobacco. He reports current alcohol use. He reports that he does not use drugs. Family History: family history includes Heart Disease in an other family member; High Blood Pressure in his father; Other in his father. No h/o sudden cardiac death. REVIEW OF SYSTEMS:    Constitutional: Pt appeared to answer questions appropriately but was stating he knew my father and became tearful     Eyes: No visual changes or diplopia. No scleral icterus. ENT: No Headaches, hearing loss or vertigo. No mouth sores or sore throat. Cardiovascular: per HPI  Respiratory: per HPI  Gastrointestinal: No abdominal pain, appetite loss, blood in stools. No change in bowel or bladder habits. Genitourinary: No dysuria, trouble voiding, or hematuria. Musculoskeletal:  No gait disturbance, No weakness or joint complaints. Integumentary: No rash or pruritis. Neurological: No headache, diplopia, change in muscle strength, numbness or tingling. No change in gait, balance, coordination, mood, affect, memory, mentation, behavior. Psychiatric: No anxiety, or depression. Endocrine: No temperature intolerance. No excessive thirst, fluid intake, or urination. No tremor. Hematologic/Lymphatic: No abnormal bruising or bleeding, blood clots or swollen lymph nodes. Allergic/Immunologic: No nasal congestion or hives. PHYSICAL EXAM:      /70   Pulse (!) 103   Temp 97.9 °F (36.6 °C) (Oral)   Resp 24   Ht 6' 1\" (1.854 m)   Wt 244 lb 4.3 oz (110.8 kg)   SpO2 96%   BMI 32.23 kg/m²    Constitutional and General Appearance: Pt   HEENT: PERRL, no cervical lymphadenopathy. No masses palpable. Normal oral mucosa  Respiratory:  Normal excursion and expansion without use of accessory muscles  Resp Auscultation: Good respiratory effort. No for increased work of breathing.  Absent breath sounds in the right lower lobe   On 3L of O2  Left sided chest tube present with purulent contents   Cardiovascular: The apical impulse is not displaced  Heart tones are crisp and normal. regular S1 and S2.  Jugular venous pulsation Normal  The carotid upstroke is normal in amplitude and contour without delay or bruit  Peripheral pulses are symmetrical and full   Abdomen:   No masses or tenderness  Bowel sounds present  Extremities:   No Cyanosis or Clubbing   Lower extremity edema: No   Skin: Warm and dry  Neurological:  Alert and oriented. Moves all extremities well  No abnormalities of mood, affect, memory, mentation, or behavior are noted        EKG:    Date: 02/21/23  Reading:  Sinus tachycardia  Moderate voltage criteria for LVH, may be normal variant  Nonspecific ST and T wave abnormality  Abnormal ECG  When compared with ECG of 16-FEB-2023 23:10,  No significant change was found    LAST ECHO:  Date:  Findings Summary: 10/22     Left Ventricle: Systolic function is normal with an ejection fraction   of 55-60%. Aortic Valve: There is mild regurgitation. Tricuspid Valve: There is trace to mild regurgitation. The right   ventricular systolic pressure normal. RVSP calculated at 24 mmHg. RVSP is   based on RA pressure of 3 mmHg. There is no pulmonary hypertension. Left Ventricle   Systolic function is normal with an ejection fraction of 55-60%. No obvious regional wall motion abnormalities. There is abnormal septal motion. Right Ventricle   Right ventricular size appears normal. Systolic function is normal.     Right Atrium   Right atrium is normal in size. IVC/SVC   The right atrial pressure is estimated at 3 mmHg. IVC appears normal. There is normal collapse with deep inspiration. Tricuspid Valve   Tricuspid valve appears to be normal. There is trace to mild regurgitation. The right ventricular systolic pressure normal. RVSP calculated at 24 mmHg. RVSP is based on RA pressure of 3 mmHg. There is no pulmonary hypertension. Aortic Valve   The aortic valve is trileaflet. The leaflets are mildly thickened. There is mild regurgitation. Pulmonic Valve   Pulmonic valve structure is grossly normal. There is trace to mild regurgitation. Pericardium   There is no pericardial effusion. LAST Stress Test:   Date of last ST:  Major Findings:    LAST Cardiac Angiography:.  Date:  Findings:      Labs:     CBC:   Recent Labs     02/20/23 0233 02/21/23  0622   WBC 19.0* 22.9*   HGB 10.0* 11.4*   HCT 32.1* 38.1*    461*     BMP:   Recent Labs     02/20/23  0233 02/21/23 0622   * 132*   K 5.1 5.5*   CO2 23 23   BUN 63* 47*   CREATININE 2.24* 1.96*   LABGLOM 32* 37*   GLUCOSE 151* 150*     BNP: No results for input(s): BNP in the last 72 hours. PT/INR: No results for input(s): PROTIME, INR in the last 72 hours. APTT:No results for input(s): APTT in the last 72 hours. CARDIAC ENZYMES:No results for input(s): CKTOTAL, CKMB, CKMBINDEX, TROPONINI in the last 72 hours. FASTING LIPID PANEL:No results found for: HDL, LDLDIRECT, LDLCALC, TRIG  LIVER PROFILE:No results for input(s): AST, ALT, LABALBU in the last 72 hours.   Troponins: Invalid input(s): TROPONIN     Other Current Problems  Patient Active Problem List   Diagnosis    CKD (chronic kidney disease), stage III ([address]    Nephrolithiasis    DM (diabetes mellitus) ([facility]    Non-rheumatic mitral regurgitation    Nonrheumatic aortic valve insufficiency    Pulmonary hypertension ([address]    Type 2 diabetes mellitus with diabetic chronic kidney disease ([address]    Sarcoidosis    Non Hodgkin's lymphoma ([address]    Hyperlipidemia    Family history of abdominal aortic aneurysm (AAA)    AAA (abdominal aortic aneurysm) without rupture    Essential hypertension    Abnormal echocardiogram    Dilated aortic root (HCC)    Acute interstitial nephritis    KELSEA (acute kidney injury) ([address]    Pneumonia due to infectious organism    Pneumonia, unspecified organism Pleural effusion    Hyperkalemia    Acute respiratory failure with hypoxia (HCC)    Hyponatremia    Hyperglycemia    History of non-Hodgkin's lymphoma    History of sarcoidosis    Empyema (HCC)     Impression:  Preoperative clearance for VATS procedure. Patient has a revised cardiac index score of . METS greater than 4. Empyema  Loculated left-sided pleural effusion   Acute hypoxic respiratory failure secondary to pneumonia  Pneumonia  History of sarcoidosis  History of non-Hodgkin's lymphoma  History of nonrheumatic mitral regurgitation  Tricuspid regurgitation  History of dilated aortic root  Type 2 diabetes  Chronic kidney disease stage III/IV    Recommendations: Follow-up echocardiogram  Follow up EKG   Will discuss clearance with Dr. Nini Power     Discussed with patient, family, and Nurse. Electronically signed by Pancho Wheat MD on 2/21/2023 at 11:00 AM    Pancho Wheat MD   [id number] [facility]. Attending note,   Seen and examined, agree with above, SOB, Empyema. Possible VATS, no chest pain, Troponin negative, ECG showed Diffuse ST elevation with LA elevation in AVR consisting with acute pericarditis, trend troponin, obtain Echo. Will follow.

## 2023-02-21 NOTE — PLAN OF CARE
[facility] Cardiothoracic Surgery  Progress Note    2/21/2023 11:39 AM       Subjective:  Mr. Enrique Lentz   Patient has mild anxiety no SOb. Bedside he was upset regarding communication with significant other. Pt is paranoid. No SOb or CP     Objective:  /70   Pulse (!) 103   Temp 97.9 °F (36.6 °C) (Oral)   Resp 24   Ht 6' 1\" (1.854 m)   Wt 244 lb 4.3 oz (110.8 kg)   SpO2 96%   BMI 32.23 kg/m²   Chest: pacing wires: no, chest tubes:yes, air leak no, 1 +  CV: no murmur noted, Normal S1, S2, NSR-concerns for EKG changes on monitor in room  Lungs: clear to auscultation, no wheezes, rales, or rhonchi  Abd: normal bowel sounds   Lower Extremities: Trace edema  Saph Incison: no sign of drainage or infection  Sternal Incison: dressing applied-no excessive drainage or signs of infection noted  Reviewed Ct chest which shows many loculated pockets with chest tube in place. Labs: Labs reviewed today  CBC:   Recent Labs     02/19/23  0557 02/20/23  0233 02/21/23  0622   WBC 32.4* 19.0* 22.9*   HGB 11.0* 10.0* 11.4*   HCT 35.1* 32.1* 38.1*   MCV 88.6 89.9 92.3   * 425 461*     BMP:   Recent Labs     02/19/23  0557 02/20/23  0233 02/21/23  0622   * 133* 132*   K 5.3 5.1 5.5*    101 98   CO2 22 23 23   BUN 69* 63* 47*   CREATININE 2.00* 2.24* 1.96*       I/O: I/O last 3 completed shifts:  In: -   Out: 1504 [Urine:1050;  Chest Tube:585]  Scheduled Meds:   QUEtiapine  50 mg Oral Nightly    cefTRIAXone (ROCEPHIN) IV  1,000 mg IntraVENous Q24H    amLODIPine  2.5 mg Oral Daily    docusate sodium  100 mg Oral TID    fentanNYL  100 mcg IntraVENous Once    sodium chloride flush  10 mL IntraCATHeter BID    melatonin  5 mg Oral Nightly    insulin lispro  0-16 Units SubCUTAneous TID     insulin lispro  0-4 Units SubCUTAneous Nightly    heparin (porcine)  5,000 Units SubCUTAneous 3 times per day    atorvastatin  80 mg Oral Daily    DULoxetine  60 mg Oral BID    dicyclomine  10 mg Oral 4x Daily AC & HS finasteride  5 mg Oral Daily    fenofibrate  160 mg Oral Daily    metoprolol succinate  25 mg Oral Nightly    pantoprazole  40 mg Oral QAM AC    repaglinide  1 mg Oral TID AC    brimonidine  1 drop Right Eye BID    And    timolol  1 drop Right Eye BID    insulin glargine  20 Units SubCUTAneous BID    lidocaine  20 mL IntraDERmal Once    sodium chloride flush  5-40 mL IntraVENous 2 times per day    ipratropium-albuterol  1 ampule Inhalation Q4H WA     Continuous Infusions:   dextrose      sodium chloride       PRN Meds:bisacodyl, ALPRAZolam, oxyCODONE, oxyCODONE-acetaminophen, glucose, dextrose bolus **OR** dextrose bolus, glucagon (rDNA), dextrose, sodium chloride flush, sodium chloride, ondansetron **OR** ondansetron, polyethylene glycol, acetaminophen **OR** acetaminophen, albuterol        Daily Nursing Care:  Please keep SCDS in place as DVT prophylaxis  If not intubated, Please have patient use IS and acapella 10X/hr or during commercial breaks  Please continue BM management  Daily labs and CXR  Daily PT/OT and ambulation  Continue with Case Management for DC planning      Assessment/ Plan:    6 min walk test to see how patient oxygen saturation   Patient has no known cardiac Hx. Family Hx noted. Noted tachycardia present today   Hx of sarcoidosis  At this time we would like cardiac risk stratification and clearance. Echocardiogram ordered. Cardiology consulted. EKG ordered. Pt has many loculated pockets therefore will likely need decortication. No surgical date at this time.    We will continue to round       [address] [place] [address], FLOYD - NP

## 2023-02-22 LAB
ABSOLUTE EOS #: 0 K/UL (ref 0–0.4)
ABSOLUTE IMMATURE GRANULOCYTE: 0.72 K/UL (ref 0–0.3)
ABSOLUTE LYMPH #: 6.96 K/UL (ref 1–4.8)
ABSOLUTE MONO #: 2.16 K/UL (ref 0.1–0.8)
AMORPHOUS: ABNORMAL
ANION GAP SERPL CALCULATED.3IONS-SCNC: 11 MMOL/L (ref 9–17)
BACTERIA: ABNORMAL
BASOPHILS # BLD: 0 % (ref 0–2)
BASOPHILS ABSOLUTE: 0 K/UL (ref 0–0.2)
BILIRUBIN URINE: NEGATIVE
BUN SERPL-MCNC: 47 MG/DL (ref 8–23)
CALCIUM SERPL-MCNC: 9.4 MG/DL (ref 8.6–10.4)
CASTS UA: ABNORMAL /LPF (ref 0–2)
CASTS UA: ABNORMAL /LPF (ref 0–2)
CHLORIDE SERPL-SCNC: 97 MMOL/L (ref 98–107)
CO2 SERPL-SCNC: 23 MMOL/L (ref 20–31)
COLOR: YELLOW
CREAT SERPL-MCNC: 2.62 MG/DL (ref 0.7–1.2)
EKG ATRIAL RATE: 116 BPM
EKG ATRIAL RATE: 119 BPM
EKG P AXIS: 15 DEGREES
EKG P AXIS: 17 DEGREES
EKG P-R INTERVAL: 114 MS
EKG P-R INTERVAL: 120 MS
EKG Q-T INTERVAL: 302 MS
EKG Q-T INTERVAL: 304 MS
EKG QRS DURATION: 92 MS
EKG QRS DURATION: 94 MS
EKG QTC CALCULATION (BAZETT): 422 MS
EKG QTC CALCULATION (BAZETT): 424 MS
EKG R AXIS: 36 DEGREES
EKG R AXIS: 38 DEGREES
EKG T AXIS: 26 DEGREES
EKG T AXIS: 28 DEGREES
EKG VENTRICULAR RATE: 116 BPM
EKG VENTRICULAR RATE: 119 BPM
EOSINOPHILS RELATIVE PERCENT: 0 % (ref 1–4)
EPITHELIAL CELLS UA: ABNORMAL /HPF (ref 0–5)
GFR SERPL CREATININE-BSD FRML MDRD: 26 ML/MIN/1.73M2
GLUCOSE BLD-MCNC: 170 MG/DL (ref 75–110)
GLUCOSE BLD-MCNC: 175 MG/DL (ref 75–110)
GLUCOSE BLD-MCNC: 176 MG/DL (ref 75–110)
GLUCOSE BLD-MCNC: 182 MG/DL (ref 75–110)
GLUCOSE BLD-MCNC: 188 MG/DL (ref 75–110)
GLUCOSE BLD-MCNC: 192 MG/DL (ref 75–110)
GLUCOSE BLD-MCNC: 198 MG/DL (ref 75–110)
GLUCOSE BLD-MCNC: 211 MG/DL (ref 75–110)
GLUCOSE BLD-MCNC: 244 MG/DL (ref 75–110)
GLUCOSE BLD-MCNC: 296 MG/DL (ref 75–110)
GLUCOSE SERPL-MCNC: 180 MG/DL (ref 70–99)
GLUCOSE UR STRIP.AUTO-MCNC: ABNORMAL MG/DL
HCT VFR BLD AUTO: 36 % (ref 40.7–50.3)
HGB BLD-MCNC: 11.2 G/DL (ref 13–17)
IMMATURE GRANULOCYTES: 3 %
INR PPP: 1.1
KETONES UR STRIP.AUTO-MCNC: NEGATIVE MG/DL
LEUKOCYTE ESTERASE UR QL STRIP.AUTO: NEGATIVE
LYMPHOCYTES # BLD: 29 % (ref 24–44)
MAGNESIUM SERPL-MCNC: 2.2 MG/DL (ref 1.6–2.6)
MCH RBC QN AUTO: 27.5 PG (ref 25.2–33.5)
MCHC RBC AUTO-ENTMCNC: 31.1 G/DL (ref 28.4–34.8)
MCV RBC AUTO: 88.5 FL (ref 82.6–102.9)
MICROORGANISM SPEC CULT: NORMAL
MONOCYTES # BLD: 9 % (ref 1–7)
MORPHOLOGY: ABNORMAL
MORPHOLOGY: ABNORMAL
NITRITE UR QL STRIP.AUTO: NEGATIVE
NRBC AUTOMATED: 0 PER 100 WBC
NUCLEATED RED BLOOD CELLS: 1 PER 100 WBC
PDW BLD-RTO: 15.7 % (ref 11.8–14.4)
PLATELET # BLD AUTO: 434 K/UL (ref 138–453)
PMV BLD AUTO: 9.8 FL (ref 8.1–13.5)
POTASSIUM SERPL-SCNC: 5.1 MMOL/L (ref 3.7–5.3)
POTASSIUM SERPL-SCNC: 5.8 MMOL/L (ref 3.7–5.3)
PROT UR STRIP.AUTO-MCNC: 5 MG/DL (ref 5–8)
PROT UR STRIP.AUTO-MCNC: ABNORMAL MG/DL
PROTHROMBIN TIME: 11.7 SEC (ref 9.1–12.3)
RBC # BLD: 4.07 M/UL (ref 4.21–5.77)
RBC CLUMPS #/AREA URNS AUTO: ABNORMAL /HPF (ref 0–2)
SEG NEUTROPHILS: 59 % (ref 36–66)
SEGMENTED NEUTROPHILS ABSOLUTE COUNT: 14.16 K/UL (ref 1.8–7.7)
SODIUM SERPL-SCNC: 131 MMOL/L (ref 135–144)
SPECIFIC GRAVITY UA: 1.02 (ref 1–1.03)
SPECIMEN DESCRIPTION: NORMAL
TURBIDITY: ABNORMAL
URINE HGB: NEGATIVE
UROBILINOGEN, URINE: NORMAL
WBC # BLD AUTO: 24 K/UL (ref 3.5–11.3)
WBC UA: ABNORMAL /HPF (ref 0–5)

## 2023-02-22 PROCEDURE — 86850 RBC ANTIBODY SCREEN: CPT

## 2023-02-22 PROCEDURE — 80048 BASIC METABOLIC PNL TOTAL CA: CPT

## 2023-02-22 PROCEDURE — 2580000003 HC RX 258: Performed by: INTERNAL MEDICINE

## 2023-02-22 PROCEDURE — 94761 N-INVAS EAR/PLS OXIMETRY MLT: CPT

## 2023-02-22 PROCEDURE — 99232 SBSQ HOSP IP/OBS MODERATE 35: CPT | Performed by: INTERNAL MEDICINE

## 2023-02-22 PROCEDURE — 6370000000 HC RX 637 (ALT 250 FOR IP)

## 2023-02-22 PROCEDURE — 83036 HEMOGLOBIN GLYCOSYLATED A1C: CPT

## 2023-02-22 PROCEDURE — 2580000003 HC RX 258: Performed by: NURSE PRACTITIONER

## 2023-02-22 PROCEDURE — 86920 COMPATIBILITY TEST SPIN: CPT

## 2023-02-22 PROCEDURE — 6360000002 HC RX W HCPCS: Performed by: INTERNAL MEDICINE

## 2023-02-22 PROCEDURE — 6360000002 HC RX W HCPCS: Performed by: STUDENT IN AN ORGANIZED HEALTH CARE EDUCATION/TRAINING PROGRAM

## 2023-02-22 PROCEDURE — 6370000000 HC RX 637 (ALT 250 FOR IP): Performed by: INTERNAL MEDICINE

## 2023-02-22 PROCEDURE — 93005 ELECTROCARDIOGRAM TRACING: CPT | Performed by: FAMILY MEDICINE

## 2023-02-22 PROCEDURE — 94640 AIRWAY INHALATION TREATMENT: CPT

## 2023-02-22 PROCEDURE — 6370000000 HC RX 637 (ALT 250 FOR IP): Performed by: STUDENT IN AN ORGANIZED HEALTH CARE EDUCATION/TRAINING PROGRAM

## 2023-02-22 PROCEDURE — 2700000000 HC OXYGEN THERAPY PER DAY

## 2023-02-22 PROCEDURE — 93010 ELECTROCARDIOGRAM REPORT: CPT | Performed by: INTERNAL MEDICINE

## 2023-02-22 PROCEDURE — 6370000000 HC RX 637 (ALT 250 FOR IP): Performed by: FAMILY MEDICINE

## 2023-02-22 PROCEDURE — 93005 ELECTROCARDIOGRAM TRACING: CPT | Performed by: INTERNAL MEDICINE

## 2023-02-22 PROCEDURE — 99232 SBSQ HOSP IP/OBS MODERATE 35: CPT | Performed by: FAMILY MEDICINE

## 2023-02-22 PROCEDURE — 6370000000 HC RX 637 (ALT 250 FOR IP): Performed by: NURSE PRACTITIONER

## 2023-02-22 PROCEDURE — 87086 URINE CULTURE/COLONY COUNT: CPT

## 2023-02-22 PROCEDURE — 85025 COMPLETE CBC W/AUTO DIFF WBC: CPT

## 2023-02-22 PROCEDURE — 36415 COLL VENOUS BLD VENIPUNCTURE: CPT

## 2023-02-22 PROCEDURE — 2580000003 HC RX 258: Performed by: STUDENT IN AN ORGANIZED HEALTH CARE EDUCATION/TRAINING PROGRAM

## 2023-02-22 PROCEDURE — 83735 ASSAY OF MAGNESIUM: CPT

## 2023-02-22 PROCEDURE — 85610 PROTHROMBIN TIME: CPT

## 2023-02-22 PROCEDURE — 86900 BLOOD TYPING SEROLOGIC ABO: CPT

## 2023-02-22 PROCEDURE — 81001 URINALYSIS AUTO W/SCOPE: CPT

## 2023-02-22 PROCEDURE — 84132 ASSAY OF SERUM POTASSIUM: CPT

## 2023-02-22 PROCEDURE — 2060000000 HC ICU INTERMEDIATE R&B

## 2023-02-22 PROCEDURE — 99233 SBSQ HOSP IP/OBS HIGH 50: CPT | Performed by: INTERNAL MEDICINE

## 2023-02-22 PROCEDURE — 87081 CULTURE SCREEN ONLY: CPT

## 2023-02-22 PROCEDURE — 6360000002 HC RX W HCPCS: Performed by: FAMILY MEDICINE

## 2023-02-22 PROCEDURE — 86901 BLOOD TYPING SEROLOGIC RH(D): CPT

## 2023-02-22 PROCEDURE — 6370000000 HC RX 637 (ALT 250 FOR IP): Performed by: SURGERY

## 2023-02-22 PROCEDURE — 87641 MR-STAPH DNA AMP PROBE: CPT

## 2023-02-22 PROCEDURE — 2580000003 HC RX 258: Performed by: RADIOLOGY

## 2023-02-22 RX ORDER — DIGOXIN 0.25 MG/ML
125 INJECTION INTRAMUSCULAR; INTRAVENOUS ONCE
Status: COMPLETED | OUTPATIENT
Start: 2023-02-22 | End: 2023-02-22

## 2023-02-22 RX ORDER — DEXTROSE MONOHYDRATE 100 MG/ML
INJECTION, SOLUTION INTRAVENOUS CONTINUOUS PRN
Status: DISCONTINUED | OUTPATIENT
Start: 2023-02-22 | End: 2023-02-23

## 2023-02-22 RX ORDER — 0.9 % SODIUM CHLORIDE 0.9 %
500 INTRAVENOUS SOLUTION INTRAVENOUS ONCE
Status: COMPLETED | OUTPATIENT
Start: 2023-02-22 | End: 2023-02-22

## 2023-02-22 RX ORDER — METOPROLOL SUCCINATE 25 MG/1
25 TABLET, EXTENDED RELEASE ORAL DAILY
Status: DISCONTINUED | OUTPATIENT
Start: 2023-02-22 | End: 2023-02-22

## 2023-02-22 RX ADMIN — HEPARIN SODIUM 5000 UNITS: 5000 INJECTION INTRAVENOUS; SUBCUTANEOUS at 05:54

## 2023-02-22 RX ADMIN — DEXTROSE 150 MG: 50 INJECTION, SOLUTION INTRAVENOUS at 02:50

## 2023-02-22 RX ADMIN — METOPROLOL TARTRATE 25 MG: 25 TABLET, FILM COATED ORAL at 20:48

## 2023-02-22 RX ADMIN — OXYCODONE 5 MG: 5 TABLET ORAL at 20:45

## 2023-02-22 RX ADMIN — FENOFIBRATE 160 MG: 160 TABLET ORAL at 20:45

## 2023-02-22 RX ADMIN — DOCUSATE SODIUM 100 MG: 100 CAPSULE ORAL at 14:18

## 2023-02-22 RX ADMIN — INSULIN GLARGINE 20 UNITS: 100 INJECTION, SOLUTION SUBCUTANEOUS at 10:04

## 2023-02-22 RX ADMIN — SODIUM CHLORIDE 500 ML: 9 INJECTION, SOLUTION INTRAVENOUS at 02:05

## 2023-02-22 RX ADMIN — ATORVASTATIN CALCIUM 80 MG: 80 TABLET, FILM COATED ORAL at 11:33

## 2023-02-22 RX ADMIN — REPAGLINIDE 1 MG: 0.5 TABLET ORAL at 11:33

## 2023-02-22 RX ADMIN — DOCUSATE SODIUM 100 MG: 100 CAPSULE ORAL at 20:45

## 2023-02-22 RX ADMIN — IPRATROPIUM BROMIDE AND ALBUTEROL SULFATE 1 AMPULE: 2.5; .5 SOLUTION RESPIRATORY (INHALATION) at 08:20

## 2023-02-22 RX ADMIN — FINASTERIDE 5 MG: 5 TABLET, FILM COATED ORAL at 20:45

## 2023-02-22 RX ADMIN — HEPARIN SODIUM 5000 UNITS: 5000 INJECTION INTRAVENOUS; SUBCUTANEOUS at 20:47

## 2023-02-22 RX ADMIN — SODIUM CHLORIDE, PRESERVATIVE FREE 10 ML: 5 INJECTION INTRAVENOUS at 20:50

## 2023-02-22 RX ADMIN — INSULIN LISPRO 8 UNITS: 100 INJECTION, SOLUTION INTRAVENOUS; SUBCUTANEOUS at 18:29

## 2023-02-22 RX ADMIN — DICYCLOMINE HYDROCHLORIDE 10 MG: 10 CAPSULE ORAL at 16:08

## 2023-02-22 RX ADMIN — INSULIN GLARGINE 20 UNITS: 100 INJECTION, SOLUTION SUBCUTANEOUS at 20:43

## 2023-02-22 RX ADMIN — CEFTRIAXONE SODIUM 1000 MG: 10 INJECTION, POWDER, FOR SOLUTION INTRAVENOUS at 16:07

## 2023-02-22 RX ADMIN — PANTOPRAZOLE SODIUM 40 MG: 40 TABLET, DELAYED RELEASE ORAL at 05:54

## 2023-02-22 RX ADMIN — HEPARIN SODIUM 5000 UNITS: 5000 INJECTION INTRAVENOUS; SUBCUTANEOUS at 14:18

## 2023-02-22 RX ADMIN — DICYCLOMINE HYDROCHLORIDE 10 MG: 10 CAPSULE ORAL at 05:54

## 2023-02-22 RX ADMIN — Medication 5 MG: at 20:47

## 2023-02-22 RX ADMIN — INSULIN HUMAN 10 UNITS: 100 INJECTION, SOLUTION PARENTERAL at 04:20

## 2023-02-22 RX ADMIN — DICYCLOMINE HYDROCHLORIDE 10 MG: 10 CAPSULE ORAL at 20:45

## 2023-02-22 RX ADMIN — BRIMONIDINE TARTRATE 1 DROP: 2 SOLUTION OPHTHALMIC at 11:29

## 2023-02-22 RX ADMIN — AMIODARONE HYDROCHLORIDE 1 MG/MIN: 50 INJECTION, SOLUTION INTRAVENOUS at 03:20

## 2023-02-22 RX ADMIN — METOPROLOL SUCCINATE 25 MG: 25 TABLET, EXTENDED RELEASE ORAL at 11:29

## 2023-02-22 RX ADMIN — DIGOXIN 125 MCG: 0.25 INJECTION INTRAMUSCULAR; INTRAVENOUS at 11:29

## 2023-02-22 RX ADMIN — AMIODARONE HYDROCHLORIDE 0.5 MG/MIN: 50 INJECTION, SOLUTION INTRAVENOUS at 10:02

## 2023-02-22 RX ADMIN — TIMOLOL MALEATE 1 DROP: 5 SOLUTION OPHTHALMIC at 20:44

## 2023-02-22 RX ADMIN — DULOXETINE HYDROCHLORIDE 60 MG: 30 CAPSULE, DELAYED RELEASE ORAL at 11:30

## 2023-02-22 RX ADMIN — QUETIAPINE FUMARATE 50 MG: 25 TABLET ORAL at 20:45

## 2023-02-22 RX ADMIN — SODIUM CHLORIDE, PRESERVATIVE FREE 10 ML: 5 INJECTION INTRAVENOUS at 11:35

## 2023-02-22 RX ADMIN — DIGOXIN 125 MCG: 0.25 INJECTION INTRAMUSCULAR; INTRAVENOUS at 01:34

## 2023-02-22 RX ADMIN — DULOXETINE HYDROCHLORIDE 60 MG: 30 CAPSULE, DELAYED RELEASE ORAL at 20:45

## 2023-02-22 RX ADMIN — DICYCLOMINE HYDROCHLORIDE 10 MG: 10 CAPSULE ORAL at 11:34

## 2023-02-22 RX ADMIN — BRIMONIDINE TARTRATE 1 DROP: 2 SOLUTION OPHTHALMIC at 20:44

## 2023-02-22 RX ADMIN — DEXTROSE MONOHYDRATE 250 ML: 100 INJECTION, SOLUTION INTRAVENOUS at 04:22

## 2023-02-22 RX ADMIN — TIMOLOL MALEATE 1 DROP: 5 SOLUTION OPHTHALMIC at 11:28

## 2023-02-22 RX ADMIN — REPAGLINIDE 1 MG: 0.5 TABLET ORAL at 16:08

## 2023-02-22 ASSESSMENT — ENCOUNTER SYMPTOMS
VOMITING: 0
NAUSEA: 0
SHORTNESS OF BREATH: 1
SORE THROAT: 0
CONSTIPATION: 0
BLOOD IN STOOL: 0
DIARRHEA: 0
WHEEZING: 0
SINUS PRESSURE: 0
COUGH: 0
VOICE CHANGE: 0
ABDOMINAL PAIN: 0

## 2023-02-22 NOTE — PROGRESS NOTES
[facility]  Office: [address], DO, Kat Pole, DO, Betzy Ponce De Leon, DO, Pb Rodriguez Blood, DO, Julieth Perla MD, Amanda Mazariegos MD, Garo Deleon MD, Lisandro Walls MD,  Maritza Encarnacion MD, Margarita Carr MD, Blake Whitfield, DO, Bree Escoto MD,  Silver Taylor MD, Gera Ibarra MD, Tyree Restrepo DO, Brandy Crump MD, Michelle Luna MD, Francy Mcfarland DO, Kriill Beyer MD, Rene Hull MD, Richard May MD, Bailee Jimenez MD, Shalom Moseley DO, Maximo Waller MD, Dona Funk MD, Dorita Donis, CNP,  Jana Gonzales, CNP, Comfort Rust, CNP, Oleg Talavera, CNP,  Barrera Ferrer, DNP, Oh Wall, CNP, Miko Bergman, CNP, Monika Vergara, CNP, Clarke Hwang, CNP, Kraig Fam, CNP, Flaquito Castro PA-C, Pepper Canchola, CNS, Pedro Luis Turner, CNP, [facility], [facility]      Daily Progress Note     Admit Date: 2/12/2023  Bed/Room No.  3184/7671-82  Admitting Physician : Garo Deleon MD  Code Status :Full Code  Hospital Day:  LOS: 10 days   Chief Complaint:     Shortness of breath      Principal Problem:    Pneumonia, unspecified organism  Active Problems:    Pleural effusion    Hyperkalemia    Acute respiratory failure with hypoxia (HCC)    Hyponatremia    Hyperglycemia    History of non-Hodgkin's lymphoma    History of sarcoidosis    Empyema (HCC)    KELSEA (acute kidney injury) [facility])  Resolved Problems:    * No resolved hospital problems. *    Subjective : Interval History/Significant events :  02/22/23    Patient is oriented to self and place. He has poor oral intake , breathing is stable on supplemental oxygen, Patient denies any chest pain. No fever or chills. Vitals - Stable afebrile, tachycardia  Labs - hyperkalemia 5.1, BUN 47, creatinine 2.62   Kidney US did not show any hydronephrosis . Nursing notes , Consults notes reviewed. Overnight events and updates discussed with Nursing staff . Background History:         Gera Wheat is 79 y.o. male  Who was admitted to the hospital on 2/12/2023 for treatment of Pneumonia, unspecified organism. Patient presented to ER at [facility] on 2/12/2023 with dyspnea and was found to have left multiloculated pneumonia with large pleural effusion with mediastinal and bilateral axillary lymphadenopathy. Patient has prior history of non-Hodgkin's lymphoma, multiple myeloma. Patient was requiring high flow nasal cannula and had thoracentesis. Pleural fluid was consistent with empyema and patient had chest tube placement by IR. He was treated with broad-spectrum antibiotics. Patient was also given dornase without much improvement. CT surgery was consulted and recommended increasing the size of chest tube and did not recommend any open surgical intervention at this time. Patient had exchange of chest tube on 2/18/2023. He had diffuse ST elevation secondary to pericarditis on 1/21/23. Patient had new onset atrial fibrillation with RVR on 10/22/23 and was given digoxin and amiodarone infusion . PMH:  Past Medical History:   Diagnosis Date    Acute interstitial nephritis 08/03/2022    Unclear cause. Creat bumped to 2.9, baseline in oct 2020 1.6. Exact description of his kidney biopsy is not available to me however pathologist seems to think there is diabetic glomerosclerosis and acute interstitial nephritis seen on the biopsy specimen. No mention about chronic changes. Because of acute interstitial nephritis a trial of steroids for 15 days will be given to see if I can impro    Aortic regurgitation     ARF (acute renal failure) ([facility] [address] 08/03/2022    kidney biopsy from June 2022  showing findings of acute interstitial nephritis. Exact description not available. Trial of steroids will be given to see if I can improve renal function. Creatinine in October 2020 was 1.6, creatinine in June 2022 was 2.9.   Trial of steroids will be given to see if renal function can be improved. Chronic kidney disease     Hyperlipidemia     Hypertension     Non-Hodgkin lymphoma ([facility] [address]     Research study patient 02/13/2023    [id number] Completed 2/15/23    Sarcoidosis     Type II or unspecified type diabetes mellitus without mention of complication, not stated as uncontrolled       Allergies:    Allergies   Allergen Reactions    Latex Rash    Gabapentin      Other reaction(s): Vomiting    Meperidine Anaphylaxis    Erythromycin      Other reaction(s): Fever  Had all side effects associated with this medication      Glimepiride      Other reaction(s): Dizziness    Hydrocodone      Other reaction(s): Vomiting    Macrolides And Ketolides Other (See Comments)    Niacin And Related     Trelegy Ellipta [Fluticasone-Umeclidin-Vilant]     Pregabalin Diarrhea    Xanax [Alprazolam] Anxiety     Anxiety, restlessness, insomnia      Medications :  QUEtiapine, 50 mg, Oral, Nightly  cefTRIAXone (ROCEPHIN) IV, 1,000 mg, IntraVENous, Q24H  amLODIPine, 2.5 mg, Oral, Daily  docusate sodium, 100 mg, Oral, TID  fentanNYL, 100 mcg, IntraVENous, Once  sodium chloride flush, 10 mL, IntraCATHeter, BID  melatonin, 5 mg, Oral, Nightly  insulin lispro, 0-16 Units, SubCUTAneous, TID WC  insulin lispro, 0-4 Units, SubCUTAneous, Nightly  heparin (porcine), 5,000 Units, SubCUTAneous, 3 times per day  atorvastatin, 80 mg, Oral, Daily  DULoxetine, 60 mg, Oral, BID  dicyclomine, 10 mg, Oral, 4x Daily AC & HS  finasteride, 5 mg, Oral, Daily  fenofibrate, 160 mg, Oral, Daily  metoprolol succinate, 25 mg, Oral, Nightly  pantoprazole, 40 mg, Oral, QAM AC  repaglinide, 1 mg, Oral, TID AC  brimonidine, 1 drop, Right Eye, BID   And  timolol, 1 drop, Right Eye, BID  insulin glargine, 20 Units, SubCUTAneous, BID  lidocaine, 20 mL, IntraDERmal, Once  sodium chloride flush, 5-40 mL, IntraVENous, 2 times per day  ipratropium-albuterol, 1 ampule, Inhalation, Q4H WA      Review of Systems   Review of Systems   Constitutional:  Positive for activity change, appetite change and fatigue. Negative for chills, fever and unexpected weight change.   HENT:  Negative for congestion, mouth sores, postnasal drip, sinus pressure, sore throat and voice change.    Eyes:  Negative for visual disturbance.   Respiratory:  Positive for shortness of breath. Negative for cough and wheezing.    Cardiovascular:  Negative for chest pain and palpitations.   Gastrointestinal:  Negative for abdominal pain, blood in stool, constipation, diarrhea, nausea and vomiting.   Endocrine: Negative for polyuria.   Genitourinary:  Negative for difficulty urinating, dysuria, frequency and urgency.   Musculoskeletal:  Negative for arthralgias, joint swelling and myalgias.   Neurological:  Negative for dizziness, tremors, speech difficulty, light-headedness and headaches.   Psychiatric/Behavioral:  Positive for confusion.    Objective :      Current Vitals : Temp: 98.4 °F (36.9 °C),  Heart Rate: (!) 119, Resp: 19, BP: 99/62, SpO2: 99 %  Last 24 Hrs Vitals   Patient Vitals for the past 24 hrs:   BP Temp Temp src Pulse Resp SpO2   02/22/23 0600 99/62 -- -- (!) 119 19 99 %   02/22/23 0545 97/79 -- -- (!) 129 (!) 37 99 %   02/22/23 0500 84/69 -- -- (!) 127 23 98 %   02/22/23 0415 98/70 -- -- (!) 145 26 99 %   02/22/23 0400 93/76 -- -- (!) 135 25 99 %   02/22/23 0345 94/78 98.4 °F (36.9 °C) Axillary (!) 146 26 98 %   02/22/23 0330 91/66 -- -- (!) 142 25 98 %   02/22/23 0315 92/79 -- -- (!) 142 24 98 %   02/22/23 0303 92/74 -- -- (!) 133 25 100 %   02/22/23 0257 91/62 -- -- (!) 149 25 99 %   02/22/23 0248 102/63 -- -- (!) 152 26 100 %   02/22/23 0235 97/75 -- -- (!) 152 23 100 %   02/22/23 0230 110/70 -- -- (!) 152 23 100 %   02/22/23 0200 100/78 -- -- (!) 157 24 100 %   02/22/23 0157 104/75 -- -- (!) 159 24 100 %   02/22/23 0142 96/67 -- -- (!) 157 24 100 %   02/22/23 0140 99/78 -- -- (!) 149 23 --   02/22/23 0134 100/79 -- -- (!) 169 -- --   02/22/23 0124 103/67 -- -- (!) 166 21 100 %  02/22/23 0113 -- -- -- (!) 157 23 98 %   02/22/23 0110 -- -- -- (!) 159 22 98 %   02/22/23 0058 -- -- -- (!) 161 25 99 %   02/22/23 0057 -- -- -- (!) 121 24 99 %   02/22/23 0016 -- -- -- (!) 161 21 98 %   02/22/23 0015 114/74 -- -- (!) 163 22 97 %   02/22/23 0014 -- -- -- (!) 159 25 98 %   02/21/23 2348 -- -- -- -- 22 --   02/21/23 2337 103/75 98.4 °F (36.9 °C) Oral (!) 120 21 98 %   02/21/23 2302 -- -- -- -- 24 --   02/21/23 2245 -- -- -- (!) 123 23 97 %   02/21/23 2100 -- -- -- (!) 118 15 96 %   02/21/23 1945 (!) 142/89 98.3 °F (36.8 °C) Oral (!) 132 (!) 32 96 %   02/21/23 1653 -- -- -- (!) 122 25 98 %   02/21/23 1652 119/60 97.2 °F (36.2 °C) Oral (!) 120 (!) 36 98 %   02/21/23 1651 -- -- -- (!) 121 (!) 38 97 %   02/21/23 1650 -- -- -- (!) 122 28 96 %   02/21/23 1649 -- -- -- (!) 122 22 97 %   02/21/23 1648 -- -- -- (!) 121 (!) 35 95 %   02/21/23 1639 -- -- -- (!) 120 (!) 37 98 %   02/21/23 1515 127/88 98.2 °F (36.8 °C) Oral (!) 118 28 97 %   02/21/23 1332 133/85 98.6 °F (37 °C) Oral (!) 117 23 98 %   02/21/23 1236 -- -- -- (!) 117 23 98 %   02/21/23 0815 113/70 97.9 °F (36.6 °C) Oral (!) 103 24 96 %   02/21/23 0805 -- -- -- (!) 104 27 95 %       Intake / output   02/20 1901 - 02/22 0700  In: 1169.5 [P.O.:200; I.V.:480.6]  Out: 1080 [Urine:900]  Physical Exam:  Physical Exam  Vitals and nursing note reviewed. Constitutional:       General: He is not in acute distress. Appearance: He is not diaphoretic. Interventions: Nasal cannula in place. HENT:      Head: Normocephalic and atraumatic. Nose:      Right Sinus: No maxillary sinus tenderness or frontal sinus tenderness. Left Sinus: No maxillary sinus tenderness or frontal sinus tenderness. Mouth/Throat:      Pharynx: No oropharyngeal exudate. Eyes:      General: No scleral icterus. Conjunctiva/sclera: Conjunctivae normal.      Pupils: Pupils are equal, round, and reactive to light. Neck:      Thyroid: No thyromegaly. Vascular: No JVD. Cardiovascular:      Rate and Rhythm: Normal rate and regular rhythm. Pulses:           Dorsalis pedis pulses are 2+ on the right side and 2+ on the left side. Heart sounds: Normal heart sounds. No murmur heard. Pulmonary:      Effort: Pulmonary effort is normal.      Breath sounds: Normal breath sounds. No wheezing or rales. Chest:      Comments: Left chest tube in place. Abdominal:      Palpations: Abdomen is soft. There is no mass. Tenderness: There is no abdominal tenderness. Musculoskeletal:      Cervical back: Full passive range of motion without pain and neck supple. Lymphadenopathy:      Head:      Right side of head: No submandibular adenopathy. Left side of head: No submandibular adenopathy. Cervical: No cervical adenopathy. Skin:     General: Skin is warm. Neurological:      Mental Status: He is alert and oriented to person, place, and time. Motor: No tremor. Psychiatric:         Behavior: Behavior is cooperative. Laboratory findings:    Recent Labs     02/20/23 0233 02/21/23  0622   WBC 19.0* 22.9*   HGB 10.0* 11.4*   HCT 32.1* 38.1*    461*       Recent Labs     02/20/23 0233 02/21/23  0622 02/22/23  0215   * 132*  --    K 5.1 5.5* 5.8*    98  --    CO2 23 23  --    GLUCOSE 151* 150*  --    BUN 63* 47*  --    CREATININE 2.24* 1.96*  --    MG  --   --  2.2   CALCIUM 9.0 9.8  --        No results for input(s): PROT, LABALBU, LABA1C, L7PMTFC, X7AMTMO, FT4, TSH, AST, ALT, LDH, GGT, ALKPHOS, BILITOT, BILIDIR, AMMONIA, AMYLASE, LIPASE, LACTATE, CHOL, HDL, LDLCHOLESTEROL, CHOLHDLRATIO, TRIG, VLDL, BNP, TROPONINI, CKTOTAL, CKMB, CKMBINDEX, RF, SAMANTHA in the last 72 hours.        Specific Gravity, UA   Date Value Ref Range Status   02/17/2023 1.025 1.005 - 1.030 Final     Protein, UA   Date Value Ref Range Status   02/17/2023 1+ (A) NEGATIVE Final     RBC, UA   Date Value Ref Range Status   02/17/2023 5 TO 10 0 - 4 /HPF Final     Comment:     Reference range defined for non-centrifuged specimen. Nitrite, Urine   Date Value Ref Range Status   02/17/2023 NEGATIVE NEGATIVE Final     WBC, UA   Date Value Ref Range Status   02/17/2023 0 TO 2 0 - 5 /HPF Final     Leukocyte Esterase, Urine   Date Value Ref Range Status   02/17/2023 NEGATIVE NEGATIVE Final       Imaging / Clinical Data :-   CT CHEST WO CONTRAST    Result Date: 2/17/2023  Small to moderate-sized multiloculated left pleural effusions have improved. New small foci of air within the collection is presumably related to the introduction of the left thoracostomy tube. Stable small to moderate sized mediastinal lymph nodes are nonspecific but may relate to the patient's history of lymphoma. Additional small bilateral axial lymph nodes are identified. Small hiatal hernia. CT CHEST WO CONTRAST    Result Date: 2/13/2023  1. Moderate to large amount of multiloculated left pleural fluid. Assessment for pleural thickening and/or pleural nodularity is limited without IV contrast. 2. Consolidation in the left lower lobe, likely atelectasis, but possibility of superimposed pneumonia would be difficult to exclude. 3. Mediastinal and bilateral axillary lymphadenopathy, which may be related to the patient's history of lymphoma. If available, comparison to any recent imaging may be of use to assess for progression. XR CHEST PORTABLE    Result Date: 2/18/2023  Moderate left pleural effusion increased. No change left chest tube/pigtail catheter. XR CHEST PORTABLE    Result Date: 2/17/2023  Similar findings when rotation taken into account; left chest tube with unchanged or slightly increased loculated appearing left pleural effusion; atelectatic appearing changes. No pneumothorax. XR CHEST PORTABLE    Result Date: 2/14/2023  Interval placement of a left chest tube with reduced left pleural fluid.      XR CHEST PORTABLE    Result Date: 2/13/2023  No significant interval change. Redemonstration of opacification of the mid to lower left lung field which may in part be due to loculated left pleural effusion. Consider further evaluation with CT. XR CHEST PORTABLE    Result Date: 2/12/2023  Probable left-sided pneumonia with loculated pleural effusion. Underlying pathology not excluded, as above. Mild cardiomegaly and venous hypertension. Discoid atelectasis right base. RECOMMENDATION: Consider CT chest.     IR CHEST TUBE INSERTION    Result Date: 2/14/2023  Successful ultrasound guided placement of a left 10 French chest tube        Clinical Course : unchanged  Assessment and Plan  :        Left empyema -   Pleural fluid growing strep viridans gordonii -ID following. Chest tube in place. Cefepime switched to Rocephin. Patient received tPA. CT surgery and pulmonary following. Plan for decortication in few days. New onset Afib RVR - continue amiodarone infusion, Digoxin , no AC . Pericarditis - follow echo  Acute respiratory failure with hypoxia - continue O2 supplement. H/o non-Hodgkin's lymphoma and multiple myeloma  H/o sarcoidosis -   Acute kidney injury with CKD stage III - baseline creatinine 1.9  - hold Lasix. Continue IVF. Nephrology signed off  Hyperkalemia due to decreased renal clearance- Kayexalate. Demand ischemia from acute respiratory failure  Reactive thrombocytosis. Obstructive sleep apnea -CPAP at nighttime. COPD - albuterol as needed   Acute Delirium -continue  Seroquel dose at night     Cardiac clearance for VATS   Discussed for Cardiology Attending Dr Leah Valdez [facility] for VATS . Digoxin for rate control , continue amiodarone . Hold off on anticoagulation for now due to surgery and Pericarditis     Continue to monitor vitals , Intake / output ,  Cell count , HGB , Kidney function, oxygenation  as indicated . Plan and updates discussed with patient ,  answers  explained to satisfaction.    Plan discussed with Staff    RN     (Please note that portions of this note were completed with a voice recognition program. Efforts were made to edit the dictations but occasionally words are mis-transcribed.)      Katerin Cancino MD  2/22/2023

## 2023-02-22 NOTE — PROGRESS NOTES
Occupational [address]  Occupational Therapy Not Seen Note    DATE: 2023    NAME: Vidhya Vo  MRN: [id number]   : 1955      Patient not seen this date for Occupational Therapy due to:    Pt. Receiving self care from nursing staff.     Electronically signed by NORIS Crowley on 2023 at 3:05 PM

## 2023-02-22 NOTE — PLAN OF CARE
Problem: Respiratory - Adult  Goal: Achieves optimal ventilation and oxygenation  2/22/2023 0513 by Sophia Cordero RCP  Flowsheets (Taken 2/22/2023 1634)  Achieves optimal ventilation and oxygenation:   Assess for changes in respiratory status   Position to facilitate oxygenation and minimize respiratory effort   Respiratory therapy support as indicated   Assess for changes in mentation and behavior   Oxygen supplementation based on oxygen saturation or arterial blood gases   Assess and instruct to report shortness of breath or any respiratory difficulty   Encourage broncho-pulmonary hygiene including cough, deep breathe, incentive spirometry  2/22/2023 0337 by Cleopatra Starr RN  Outcome: Progressing

## 2023-02-22 NOTE — PROGRESS NOTES
PULMONARY & CRITICAL CARE MEDICINE CONSULT NOTE     Patient:  Lisa Briones  MRN: [id number]  [facility] date: 2/12/2023  Primary Care Physician: Sylvia Mosley MD  Consulting Physician: Cal Cohen MD  CODE Status: Full Code  LOS: 8     SUBJECTIVE     CHIEF COMPLAINT/REASON FOR CONSULT:  Acute Hypoxic respiratory failure    HISTORY OF PRESENT ILLNESS:  Lisa Briones is a 74 y.o. male with past medical history significant for type 2 diabetes mellitus, multiple myeloma, non-Hodgkin lymphoma, sarcoidosis, referred from [facility] ER with worsening respiratory status, shortness of breath, nonproductive cough. He was started on BiPAP and given antibiotics. Chest x-ray was concerning for loculated left-sided pleural effusion. Transfer was made and accepted by hospitalist, further work-up. Critical care was consulted for worsening respiratory status. Patient was on high flow and respiratory rate was in 38-40. Patient was very dyspneic and having difficulty breathing with accessory muscle use. Initially he was unable to keep BiPAP but later on able to keep BiPAP. He improved symptomatically with BiPAP and respiratory rate was in 26. Patient is transferred to the ICU for higher level of care. 2/13: Attempted to perform bedside thoracentesis, no clear pocket identified, sent to IR, chest tube placed, appears that an empyema was encountered and chest tube was placed growing gram positive cocci in pairs, on cefepime and vanco     2/14: Placed chest tube yesterday, weaned off high flow, feeling \"100 x better\".       2/15: Continued TPA/Dornase chest tube flush, Got anxious overnight requiring xanax, 1200 cc of purulent chest tube output in the last 24 hours, patient did not sleep last night     2/16: Added seroquel to help sleep and agitation at night, continued Dornase/TPA, 700 cc of chest tube output that was less purulent, afebrile, WBC downtrending, continue cefepime     2/17: WBC uptrended, sent cultures, tachypneic, restless overnight, given xanax, more tachycardic overnight as well, plan for CT chest today, will discuss possible CTA to evaluate for PE.    02/18: Cardiothoracic surgery consulted and they increased the size of the chest tube. Will likely need decortication. 02/20: Cardiothoracic surgery determined to review imaging and diagnostics with on-call surgeon, plan to repeat CT scan at later date, hold off on plan for decortication. Infectious Disease discontinued IV cefepime 02/20/23.    02/21: Patient more confused, oriented to self. Trying to get out of bed. Denies agitation but feeling overall week. Kidney US did not show hydronephrosis. Infectious disease recommends starting IV Rocephin 1 gm q24 hrs until 03/12/23. Lesa Plane for midline for outpatient therapy. Echo performed. Pt declined PT.    02/22: Pt is paranoid and declined PT. Cardiothoracic planning for left-sided VATS decortication on 02/23/23. Breathing stable, 99% O2 saturation on 3L NC 40% FiO2. Interval History:  Patient seen and examined at the bed side:  No acute events overnight  Patient is currently on BiPAP with FiO2 40% and saturating 100%  Patient is mildly tachycardic with heart rate of 103 and vitals are otherwise stable  Chest tube have to 275 mL of output in 24 hours      PAST MEDICAL HISTORY:        Diagnosis Date    Acute interstitial nephritis 08/03/2022    Unclear cause. Creat bumped to 2.9, baseline in oct 2020 1.6. Exact description of his kidney biopsy is not available to me however pathologist seems to think there is diabetic glomerosclerosis and acute interstitial nephritis seen on the biopsy specimen. No mention about chronic changes. Because of acute interstitial nephritis a trial of steroids for 15 days will be given to see if I can impro    Aortic regurgitation     ARF (acute renal failure) ([facility] [address] 08/03/2022    kidney biopsy from June 2022  showing findings of acute interstitial nephritis.   Exact description not available. Trial of steroids will be given to see if I can improve renal function. Creatinine in October 2020 was 1.6, creatinine in June 2022 was 2.9. Trial of steroids will be given to see if renal function can be improved. Chronic kidney disease     Hyperlipidemia     Hypertension     Non-Hodgkin lymphoma ([facility] [address]     Research study patient 02/13/2023    [id number] Completed 2/15/23    Sarcoidosis     Type II or unspecified type diabetes mellitus without mention of complication, not stated as uncontrolled      PAST SURGICAL HISTORY:        Procedure Laterality Date    CARPAL TUNNEL RELEASE Left 11/15     EYE SURGERY Right     shunt and cataract     EYE SURGERY Right 6/16    laser     IR CHEST TUBE INSERTION  2/13/2023    IR CHEST TUBE INSERTION 2/13/2023 [facility]    IR CHEST TUBE INSERTION  2/18/2023    IR CHEST TUBE INSERTION 2/18/2023 [facility]    KNEE ARTHROSCOPY      MALIGNANT SKIN LESION EXCISION      ROTATOR CUFF REPAIR Right 7/14    ROTATOR CUFF REPAIR Left 5/15    TOTAL KNEE ARTHROPLASTY Right 10/2018     FAMILY HISTORY:       Problem Relation Age of Onset    High Blood Pressure Father     Other Father         AAA    Heart Disease Other         uncle x 2      SOCIAL HISTORY:   TOBACCO:   reports that he has never smoked. He has quit using smokeless tobacco.  ETOH:  reports current alcohol use. DRUGS: reports no history of drug use.     ALLERGIES:    Allergies   Allergen Reactions    Latex Rash    Gabapentin      Other reaction(s): Vomiting    Meperidine Anaphylaxis    Erythromycin      Other reaction(s): Fever  Had all side effects associated with this medication      Glimepiride      Other reaction(s): Dizziness    Hydrocodone      Other reaction(s): Vomiting    Macrolides And Ketolides Other (See Comments)    Niacin And Related     Trelegy Ellipta [Fluticasone-Umeclidin-Vilant]     Pregabalin Diarrhea    Xanax [Alprazolam] Anxiety Anxiety, restlessness, insomnia         HOME MEDICATIONS:  Prior to Admission medications    Medication Sig Start Date End Date Taking? Authorizing Provider   amLODIPine (NORVASC) 5 MG tablet TAKE ONE TABLET BY MOUTH DAILY  Patient taking differently: nightly 11/22/22   Scott Lopez MD   omeprazole (PRILOSEC) 20 MG delayed release capsule Take 20 mg by mouth nightly    Historical Provider, MD   fluticasone-salmeterol (ADVAIR DISKUS) 250-50 MCG/ACT AEPB diskus inhaler Inhale 1 puff into the lungs every 12 hours    Historical Provider, MD   metoprolol succinate (TOPROL XL) 25 MG extended release tablet Take 25 mg by mouth at bedtime    Historical Provider, MD   dutasteride (AVODART) 0.5 mg capsule Take 0.5 mg by mouth daily    Historical Provider, MD   finasteride (PROSCAR) 5 MG tablet Take 5 mg by mouth nightly    Historical Provider, MD   Misc. Devices (NASAL SPRAY BOTTLE) MISC by Does not apply route    Historical Provider, MD   CINNAMON PO Take by mouth    Historical Provider, MD   repaglinide (PRANDIN) 1 MG tablet Take 1 mg by mouth with breakfast and with evening meal 4/16/18   Historical Provider, MD   pimecrolimus (ELIDEL) 1 % cream Apply topically as needed Apply topically 2 times daily. Historical Provider, MD   brimonidine-timolol (COMBIGAN) 0.2-0.5 % ophthalmic solution Place 1 drop into the right eye every 12 hours  Patient not taking: Reported on 2/21/2023    Historical Provider, MD   albuterol sulfate  (90 BASE) MCG/ACT inhaler Inhale 1 puff into the lungs in the morning and at bedtime    Historical Provider, MD   Liraglutide (VICTOZA SC) Inject 1.8 mg into the skin daily     Historical Provider, MD   fenofibrate (TRICOR) 145 MG tablet Take 145 mg by mouth nightly    Historical Provider, MD   dicyclomine (BENTYL) 10 MG capsule Take 10 mg by mouth 3 times daily (with meals)    Historical Provider, MD   atorvastatin (LIPITOR) 80 MG tablet Take 80 mg by mouth daily.     Historical Provider, MD   DULoxetine (CYMBALTA) 60 MG capsule Take 120 mg by mouth nightly    Historical Provider, MD     IMMUNIZATIONS:  Most Recent Immunizations   Administered Date(s) Administered    COVID-19, PFIZER Bivalent BOOSTER, DO NOT Dilute, (age 12y+), IM, 30 mcg/0.3 mL 10/01/2022    COVID-19, PFIZER GRAY top, DO NOT Dilute, (age 15 y+), IM, 30 mcg/0.3 mL 2022    COVID-19, PFIZER PURPLE top, DILUTE for use, (age 15 y+), 30mcg/0.3mL 10/10/2021     REVIEW OF SYSTEMS:  Review of Systems   Constitutional:  Positive for activity change, appetite change and fatigue. Negative for chills, diaphoresis, fever and unexpected weight change. HENT:  Negative for congestion. Respiratory:  Positive for cough and shortness of breath. Negative for apnea, choking, chest tightness, wheezing and stridor. Cardiovascular:  Positive for chest pain (at the site of chest tube insertion). Negative for leg swelling. Gastrointestinal:  Negative for abdominal distention, diarrhea, nausea and vomiting. Genitourinary:  Negative for difficulty urinating. Neurological:  Negative for dizziness and numbness. Psychiatric/Behavioral:  Negative for agitation. OBJECTIVE     PaO2/FiO2 RATIO:  No results for input(s): POCPO2 in the last 72 hours. FiO2 : 40 %     VITAL SIGNS:   LAST:  BP 97/77   Pulse (!) 130   Temp 98.6 °F (37 °C) (Oral)   Resp 18   Ht 6' 1\" (1.854 m)   Wt 244 lb 4.3 oz (110.8 kg)   SpO2 98%   BMI 32.23 kg/m²   8-24 HR RANGE:  TEMP Temp  Av.2 °F (36.8 °C)  Min: 97.2 °F (36.2 °C)  Max: 98.6 °F (37 °C)   BP Systolic (82VWZ), GWE:666 , Min:84 , VRX:933      Diastolic (29CEA), NMR:55, Min:60, Max:89     PULSE Pulse  Av.4  Min: 117  Max: 169   RR Resp  Av.8  Min: 18  Max: 37   O2 SAT SpO2  Av.4 %  Min: 98 %  Max: 99 %   OXYGEN DELIVERY O2 Flow Rate (L/min)  Avg: 3 L/min  Min: 3 L/min  Max: 3 L/min        SYSTEMIC EXAMINATION:   Constitutional:  Alert, cooperative and no distress.   Mental Status: Oriented to person, place and time and normal affect. Lungs: Decreased air entry on the left side with chest tube in place  Heart:  Regular rate and rhythm, no murmur. Abdomen:  Soft, nontender, nondistended, normal bowel sounds. Extremities:  No edema, redness, tenderness in the calves. Skin:  Warm, dry, no gross lesions or rashes. DATA REVIEW     Medications: Current Inpatient  Scheduled Meds:   metoprolol succinate  25 mg Oral Daily    QUEtiapine  50 mg Oral Nightly    cefTRIAXone (ROCEPHIN) IV  1,000 mg IntraVENous Q24H    docusate sodium  100 mg Oral TID    fentanNYL  100 mcg IntraVENous Once    sodium chloride flush  10 mL IntraCATHeter BID    melatonin  5 mg Oral Nightly    insulin lispro  0-16 Units SubCUTAneous TID WC    insulin lispro  0-4 Units SubCUTAneous Nightly    heparin (porcine)  5,000 Units SubCUTAneous 3 times per day    atorvastatin  80 mg Oral Daily    DULoxetine  60 mg Oral BID    dicyclomine  10 mg Oral 4x Daily AC & HS    finasteride  5 mg Oral Daily    fenofibrate  160 mg Oral Daily    pantoprazole  40 mg Oral QAM AC    repaglinide  1 mg Oral TID AC    brimonidine  1 drop Right Eye BID    And    timolol  1 drop Right Eye BID    insulin glargine  20 Units SubCUTAneous BID    lidocaine  20 mL IntraDERmal Once    sodium chloride flush  5-40 mL IntraVENous 2 times per day     Continuous Infusions:   amiodarone 0.5 mg/min (02/22/23 1002)    dextrose      dextrose      sodium chloride         INPUT/OUTPUT:  In: 1169.5 [P.O.:200; I.V.:480.6]  Out: 1070 [Urine:1000]  Date 02/22/23 0000 - 02/22/23 2359   Shift 5365-4242 4530-0588 9656-9338 24 Hour Total   INTAKE   P.O. 200   200   I. V.(mL/kg/hr) 480.6(0.5)   480.6   IV Piggyback 488.9   488.9   Shift Total(mL/kg) 1169.5(10.6)   1169.5(10.6)   OUTPUT   Urine(mL/kg/hr)  400  400   Chest Tube 0 0  0   Shift Total(mL/kg) 0(0) 400(3.6)  400(3.6)   Weight (kg) 110.8 110.8 110.8 110.8        LABS:  ABGs:   No results for input(s): POCPH, POCPCO2, POCPO2, POCHCO3, LJKL3YTE in the last 72 hours. CBC:   Recent Labs     02/20/23  0233 02/21/23  0622 02/22/23  0725   WBC 19.0* 22.9* 24.0*   HGB 10.0* 11.4* 11.2*   HCT 32.1* 38.1* 36.0*   MCV 89.9 92.3 88.5    461* 434   LYMPHOPCT 40 28 29   RBC 3.57* 4.13* 4.07*   MCH 28.0 27.6 27.5   MCHC 31.2 29.9 31.1   RDW 15.5* 15.8* 15.7*     CRP:   No results for input(s): CRP in the last 72 hours. LDH:   No results for input(s): LDH in the last 72 hours. BMP:   Recent Labs     02/20/23  0233 02/21/23  0622 02/22/23  0215 02/22/23  0725   * 132*  --  131*   K 5.1 5.5* 5.8* 5.1    98  --  97*   CO2 23 23  --  23   BUN 63* 47*  --  47*   CREATININE 2.24* 1.96*  --  2.62*   GLUCOSE 151* 150*  --  180*     Liver Function Test:   No results for input(s): PROT, LABALBU, ALT, AST, GGT, ALKPHOS, BILITOT in the last 72 hours. Coagulation Profile:   Recent Labs     02/22/23  1157   INR 1.1   PROTIME 11.7     D-Dimer:  No results for input(s): DDIMER in the last 72 hours. Lactic Acid:  No results for input(s): LACTA in the last 72 hours. Cardiac Enzymes:  No results for input(s): CKTOTAL, CKMB, CKMBINDEX, TROPONINI in the last 72 hours. Invalid input(s): TROPONIN, HSTROP  BNP/ProBNP:   No results for input(s): BNP, PROBNP in the last 72 hours. Triglycerides:  No results for input(s): TRIG in the last 72 hours. Microbiology:  Urine Culture:  No components found for: CURINE  Blood Culture:  No components found for: CBLOOD, CFUNGUSBL  Sputum Culture:  No components found for: CSPUTUM  No results for input(s): SPECDESC, SPECIAL, CULTURE, STATUS, ORG, CDIFFTOXPCR, CAMPYLOBPCR, SALMONELLAPC, SHIGAPCR, SHIGELLAPCR, MPNEUG, MPNEUM, LACTOQL in the last 72 hours. No results for input(s): SPUTUM, SPECIAL, CULTURE, STATUS, ORG, CDIFFTOXPCR, MPNEUM, MPNEUG in the last 72 hours. Invalid input(s): Jo Christina, DENISE,  [facility]       Radiology Reports:  US RETROPERITONEAL COMPLETE   Final Result   1. Simple cortical cyst at the upper pole left kidney measuring 6 mm. 2. Limited renal ultrasound. No hydronephrosis. 3. Nonvisualization of ureteral jets. Minimal distention of the urinary   bladder. Patient no urge to void during the exam.      RECOMMENDATIONS:   Unavailable         IR CHEST TUBE INSERTION   Final Result   Successful fluoroscopic up sizing of left sided chest tube with 14 [place]   tube placed. XR CHEST PORTABLE   Final Result   Moderate left pleural effusion increased. No change left chest tube/pigtail   catheter. VL DUP LOWER EXTREMITY VENOUS BILATERAL   Final Result      CT CHEST WO CONTRAST   Final Result   Small to moderate-sized multiloculated left pleural effusions have improved. New small foci of air within the collection is presumably related to the   introduction of the left thoracostomy tube. Stable small to moderate sized mediastinal lymph nodes are nonspecific but   may relate to the patient's history of lymphoma. Additional small bilateral   axial lymph nodes are identified. Small hiatal hernia. XR CHEST PORTABLE   Final Result   Similar findings when rotation taken into account; left chest tube with   unchanged or slightly increased loculated appearing left pleural effusion;   atelectatic appearing changes. No pneumothorax. XR CHEST PORTABLE   Final Result   Interval placement of a left chest tube with reduced left pleural fluid. IR CHEST TUBE INSERTION   Final Result   Successful ultrasound guided placement of a left 10 [place] chest tube         CT CHEST WO CONTRAST   Final Result   1. Moderate to large amount of multiloculated left pleural fluid. Assessment   for pleural thickening and/or pleural nodularity is limited without IV   contrast.   2. Consolidation in the left lower lobe, likely atelectasis, but possibility   of superimposed pneumonia would be difficult to exclude.    3. Mediastinal and bilateral axillary lymphadenopathy, which may be related   to the patient's history of lymphoma. If available, comparison to any recent   imaging may be of use to assess for progression. XR CHEST PORTABLE   Final Result   No significant interval change. Redemonstration of opacification of the mid   to lower left lung field which may in part be due to loculated left pleural   effusion. Consider further evaluation with CT. Echocardiogram:   No results found for this or any previous visit. ASSESSMENT AND PLAN     Assessment:  Acute hypoxic respiratory failure due to #2 amd #3  Community acquired pneumonia  Empyema left side s/p chest tube insertion  Mediastinal and bilateral axillary lymphadenopathy  Hx of lymphoma  Hypertension  Hx of AAA  Hx of mitral regurgitation    Plan:  Patient is currently on BiPAP and saturating 100%  Keep supplemental oxygen to keep oxygen saturation above 92%  Chest tube management as per cardiothoracic surgery  Patient will likely need decortication as per the CTS  On Rocephin as per ID team  We suggest hematology/oncology consult due to background hx of lymphoma  Bronchodilator therapy  Encourage incentive spirometry/Acapella  Bronchopulmonary hygiene  Patient is getting heparin for DVT prophylaxis  Fasting blood glucose 120, management of diabetes per the primary team  Rest of the management as per urinary team  We will continue to follow    Thank you for this interesting consult. We will continue to follow. I will discuss with attending.     Henrietta Galan MD  Internal Medicine Resident, PGY-3  [facility]         2/22/2023, 12:30 PM

## 2023-02-22 NOTE — PLAN OF CARE
Problem: Discharge Planning  Goal: Discharge to home or other facility with appropriate resources  Outcome: Progressing     Problem: Safety - Adult  Goal: Free from fall injury  Outcome: Progressing     Problem: ABCDS Injury Assessment  Goal: Absence of physical injury  Outcome: Progressing     Problem: Skin/Tissue Integrity  Goal: Absence of new skin breakdown  Description: 1. Monitor for areas of redness and/or skin breakdown  2. Assess vascular access sites hourly  3. Every 4-6 hours minimum:  Change oxygen saturation probe site  4. Every 4-6 hours:  If on nasal continuous positive airway pressure, respiratory therapy assess nares and determine need for appliance change or resting period.   Outcome: Progressing     Problem: Chronic Conditions and Co-morbidities  Goal: Patient's chronic conditions and co-morbidity symptoms are monitored and maintained or improved  Outcome: Progressing     Problem: Respiratory - Adult  Goal: Achieves optimal ventilation and oxygenation  Outcome: Progressing     Problem: Pain  Goal: Verbalizes/displays adequate comfort level or baseline comfort level  Outcome: Progressing     Problem: Neurosensory - Adult  Goal: Achieves stable or improved neurological status  Outcome: Progressing  Goal: Absence of seizures  Outcome: Progressing  Goal: Remains free of injury related to seizures activity  Outcome: Progressing  Goal: Achieves maximal functionality and self care  Outcome: Progressing     Problem: Cardiovascular - Adult  Goal: Maintains optimal cardiac output and hemodynamic stability  Outcome: Progressing  Goal: Absence of cardiac dysrhythmias or at baseline  Outcome: Progressing     Problem: Skin/Tissue Integrity - Adult  Goal: Skin integrity remains intact  Outcome: Progressing  Goal: Incisions, wounds, or drain sites healing without S/S of infection  Outcome: Progressing  Goal: Oral mucous membranes remain intact  Outcome: Progressing     Problem: Musculoskeletal - Adult  Goal: Return mobility to safest level of function  Outcome: Progressing  Goal: Maintain proper alignment of affected body part  Outcome: Progressing  Goal: Return ADL status to a safe level of function  Outcome: Progressing     Problem: Gastrointestinal - Adult  Goal: Minimal or absence of nausea and vomiting  Outcome: Progressing  Goal: Maintains or returns to baseline bowel function  Outcome: Progressing  Goal: Maintains adequate nutritional intake  Outcome: Progressing  Goal: Establish and maintain optimal ostomy function  Outcome: Progressing     Problem: Genitourinary - Adult  Goal: Absence of urinary retention  Outcome: Progressing  Goal: Urinary catheter remains patent  Outcome: Progressing     Problem: Infection - Adult  Goal: Absence of infection at discharge  Outcome: Progressing  Goal: Absence of infection during hospitalization  Outcome: Progressing  Goal: Absence of fever/infection during anticipated neutropenic period  Outcome: Progressing     Problem: Metabolic/Fluid and Electrolytes - Adult  Goal: Electrolytes maintained within normal limits  Outcome: Progressing  Goal: Hemodynamic stability and optimal renal function maintained  Outcome: Progressing  Goal: Glucose maintained within prescribed range  Outcome: Progressing     Problem: Hematologic - Adult  Goal: Maintains hematologic stability  Outcome: Progressing     Problem: Nutrition Deficit:  Goal: Optimize nutritional status  Outcome: Progressing       - will continue to monitor the patient

## 2023-02-22 NOTE — PROGRESS NOTES
[facility] - Progress Note  Today's Date and Time: 2/22/2023, 11:05 AM    Impression :   Left-sided empyema  Streptococci Gordonii on pleural fluid culture from 2/14/23  Repeat culture 2/18/23 with no bacterial growth   Shortness of breath  KELSEA on CKD  Hyponatremia  Leukocytosis  Hx non-Hodgkin's lymphoma  Hx multiple myeloma  Sarcoidosis  DM II  Interstitial nephritis  COPD  Hx partial right lung lobectomy  Allergies to macrolides and ketolides. Recommendations:   2/20/23: Discontinued IV Cefepime on 2-20-23  IV Rocephin 1 gm q 24 hrs until 3/12/23  Ok for midline for outpatient therapy    Medical Decision Making/Summary/Discussion:2/22/2023     Patient with Hx of non-Hodgkin's lymphoma and multiple myeloma  Admitted with Shortness of breath  Found to have Left-sided empyema  Streptococci Gordonii on pleural fluid culture from 2/14/23  Repeat culture 2/18/23 with no bacterial growth   Empyema partially drained by chest tube. [facility] considering decortication  Confusion with associated hyponatremia  KELSEA on CKD 3  On treatment with ceftriaxone. Infection Control Recommendations   [place] Precautions    Antimicrobial Stewardship Recommendations     Simplification of therapy  Targeted therapy  PK dosing    Coordination of Outpatient Care:   Estimated Length of IV antimicrobials:3/12/23  Patient will need Midline Catheter Insertion: TBD  Patient will need PICC line Insertion:TBD  Patient will need: Home IV , [place],  SNF,  LTAC: TBD  Patient will need outpatient wound care:No    Chief complaint/reason for consultation:   Empyema-streptococcus Gordonii      History of Present Illness:   Keila Powell is a 79y.o.-year-old male who was initially admitted on 2/12/2023.  Patient seen at the request of Dr. Eva Ho:    This patient, with a history of COPD, CKD, sarcoidosis, partial right lung lobectomy, multiple myeloma, non-hodgkin's lymphoma, acute interstitial nephritis and DM II, initially presented to [facility] ED on 2/12/23 with complaints of worsening shortness of breath over the previous week with a non-productive cough. He has has allergies to macrolides and ketolides. He was experiencing increased work of breathing upon evaluation, initially requiring CPAP. ABG values were grossly unremarkable. He denied any recent history of chemotherapy, fever, chills, N/V/D, abnormal bleeding, weight-loss or night sweats. Chest x-ray showed probable left-sided pneumonia with loculated pleural effusion which was confirmed via CT chest, which revealed a moderate to large amount of multiloculated left pleural fluid in the left lung. Mediastinal and bilateral axillary lymphadenopathy was also noted. He was transferred to [facility] for a higher level of care and was initiated on broad spectrum antibiotics with IV cefepime and vancomycin. Pulmonology was consulted and a thoracentesis was attempted, but was unable to be completed as no clear pocket was visualized via [address]. IR was consulted and a 10 [place], left-sided chest tube was placed on 2/14/23.  10 ml of purulent fluid was initially drained and sent for culture. TPA and dornase were administered with improvement in output. MRSA Nares was not detected and the pleural fluid culture grew PCN sensitive Streptococcus Gordonii. Vancomycin was discontinued on 2/14/23 and cefepime continued. A repeat CT of the patient's chest  on 2/17/23 revealed some improvement in the multiloculated left pleural effusions. A larger bore chest tube exchange was completed on 2/18/23 in IR. A repeat pleural fluid culture has not had any bacterial growth to date. Chest tube output to date:   2/14 - 800 cc  2/15- 600 cc  2/16 - 700 cc  2/17 - 900 cc  2/18- 200 cc  2/19 - 910 cc  2/20 - 275 cc    CT sugery was consulted for possible decortication, but they have no plans for decortication at this time. Abnormal labs at the time of consult include:   Na:133  Cr:2.24  WBC:32.4-->19.0  Hgb:10.0    There is no growth on urine or blood cultures. Imaging/Diagonstics:  CT CHEST WO CONTRAST     Result Date: 2/17/2023  Small to moderate-sized multiloculated left pleural effusions have improved. New small foci of air within the collection is presumably related to the introduction of the left thoracostomy tube. Stable small to moderate sized mediastinal lymph nodes are nonspecific but may relate to the patient's history of lymphoma. Additional small bilateral axial lymph nodes are identified. Small hiatal hernia. XR CHEST PORTABLE     Result Date: 2/18/2023  Moderate left pleural effusion increased. No change left chest tube/pigtail catheter. XR CHEST PORTABLE     Result Date: 2/17/2023  Similar findings when rotation taken into account; left chest tube with unchanged or slightly increased loculated appearing left pleural effusion; atelectatic appearing changes. No pneumothorax. XR CHEST PORTABLE     Result Date: 2/14/2023  Interval placement of a left chest tube with reduced left pleural fluid. IR CHEST TUBE INSERTION     Result Date: 2/14/2023  Successful ultrasound guided placement of a left 10 [place] chest tube      US RETROPERITONEAL COMPLETE     Result Date: 2/19/2023  1. Simple cortical cyst at the upper pole left kidney measuring 6 mm. 2. Limited renal ultrasound. No hydronephrosis. 3. Nonvisualization of ureteral jets. Minimal distention of the urinary bladder. Patient no urge to void during the exam. RECOMMENDATIONS: Unavailable        Infectious disease was consulted for continued empyema.       CURRENT EVALUATION 2/22/2023  BP 97/77   Pulse (!) 130   Temp 98.6 °F (37 °C) (Oral)   Resp 18   Ht 6' 1\" (1.854 m)   Wt 244 lb 4.3 oz (110.8 kg)   SpO2 98%   BMI 32.23 kg/m²       Afebrile  VS stable, tachycardic     On 3 L NC  RR 18  02 sat 98    Patient feels better  No complaints  No new issues per RN    He is experiencing some confusion  Wife indicates he had some memory lapses at home before admission but that he is more confused at present. Na 131 which may be contributing to confusion. Left sided-chest tube is in place and hooked to suction. Serosanguinous drainage present. Discussed with patient and wife at length on 2-20-23  CT scans reviewed with wife and daughter on 2-20-23  Indicated that he will need about a month of I antibiotics. He may need additional drainage procedures. Wife indicated similar empyema a few years ago on Rt chest. He had S aureus back then and was severely ill. Medications reviewed: On IV Ceftriaxone    Leukocytosis increased to 22.9    Discharge planning is underway    Labs, X rays reviewed: 2/22/2023    BUN:63-->47  Cr:2.24-->1.96-->2.62  Na 131  K 5.1    WBC:32.4-->19.0-->22.9-->24.0  Hb:10.0-->11.2  Plat: 425-->461-->434    Cultures:  Urine:  2/17/23: No growth  Blood:  2/17/23: No growth  MRSA Nares:  2/13/23: Not detected  Pleural Fluid:  2/14/23: Streptococci Gordonii  2/18/23: No growth thus far    Discussed with patient, RN, . I have personally reviewed the past medical history, past surgical history, medications, social history, and family history, and I have updated the database accordingly. Past Medical History:     Past Medical History:   Diagnosis Date    Acute interstitial nephritis 08/03/2022    Unclear cause. Creat bumped to 2.9, baseline in oct 2020 1.6. Exact description of his kidney biopsy is not available to me however pathologist seems to think there is diabetic glomerosclerosis and acute interstitial nephritis seen on the biopsy specimen. No mention about chronic changes.   Because of acute interstitial nephritis a trial of steroids for 15 days will be given to see if I can impro    Aortic regurgitation     ARF (acute renal failure) ([facility] 08/03/2022    kidney biopsy from June 2022  showing findings of acute interstitial nephritis. Exact description not available. Trial of steroids will be given to see if I can improve renal function. Creatinine in October 2020 was 1.6, creatinine in June 2022 was 2.9. Trial of steroids will be given to see if renal function can be improved.     Chronic kidney disease     Hyperlipidemia     Hypertension     Non-Hodgkin lymphoma ([facility] [address]     Research study patient 02/13/2023    [id number] Completed 2/15/23    Sarcoidosis     Type II or unspecified type diabetes mellitus without mention of complication, not stated as uncontrolled        Past Surgical  History:     Past Surgical History:   Procedure Laterality Date    CARPAL TUNNEL RELEASE Left 11/15     EYE SURGERY Right     shunt and cataract     EYE SURGERY Right 6/16    laser     IR CHEST TUBE INSERTION  2/13/2023    IR CHEST TUBE INSERTION 2/13/2023 [facility]    IR CHEST TUBE INSERTION  2/18/2023    IR CHEST TUBE INSERTION 2/18/2023 Brenda Haddad MD [facility]    KNEE ARTHROSCOPY      MALIGNANT SKIN LESION EXCISION      ROTATOR CUFF REPAIR Right 7/14    ROTATOR CUFF REPAIR Left 5/15    TOTAL KNEE ARTHROPLASTY Right 10/2018       Medications:      metoprolol succinate  25 mg Oral Daily    digoxin  125 mcg IntraVENous Once    QUEtiapine  50 mg Oral Nightly    cefTRIAXone (ROCEPHIN) IV  1,000 mg IntraVENous Q24H    docusate sodium  100 mg Oral TID    fentanNYL  100 mcg IntraVENous Once    sodium chloride flush  10 mL IntraCATHeter BID    melatonin  5 mg Oral Nightly    insulin lispro  0-16 Units SubCUTAneous TID WC    insulin lispro  0-4 Units SubCUTAneous Nightly    heparin (porcine)  5,000 Units SubCUTAneous 3 times per day    atorvastatin  80 mg Oral Daily    DULoxetine  60 mg Oral BID    dicyclomine  10 mg Oral 4x Daily AC & HS    finasteride  5 mg Oral Daily    fenofibrate  160 mg Oral Daily    pantoprazole  40 mg Oral QAM AC    repaglinide  1 mg Oral TID AC    brimonidine  1 drop Right Eye BID    And    timolol  1 drop Right Eye BID    insulin glargine  20 Units SubCUTAneous BID    lidocaine  20 mL IntraDERmal Once    sodium chloride flush  5-40 mL IntraVENous 2 times per day       Social History:     Social History     Socioeconomic History    Marital status:      Spouse name: Not on file    Number of children: Not on file    Years of education: Not on file    Highest education level: Not on file   Occupational History    Not on file   Tobacco Use    Smoking status: Never    Smokeless tobacco: Former    Tobacco comments:     minimal and infrequent    Substance and Sexual Activity    Alcohol use: Yes     Alcohol/week: 0.0 standard drinks     Comment: rare    Drug use: No    Sexual activity: Not on file   Other Topics Concern    Not on file   Social History Narrative    Not on file     Social Determinants of Health     Financial Resource Strain: Low Risk     Difficulty of Paying Living Expenses: Not very hard   Food Insecurity: No Food Insecurity    Worried About Running Out of Food in the Last Year: Never true    Ran Out of Food in the Last Year: Never true   Transportation Needs: No Transportation Needs    Lack of Transportation (Medical): No    Lack of Transportation (Non-Medical): No   Physical Activity: Not on file   Stress: Stress Concern Present    Feeling of Stress : To some extent   Social Connections: Socially Integrated    Frequency of Communication with Friends and Family: Three times a week    Frequency of Social Gatherings with Friends and Family: Three times a week    Attends Sikhism Services: 1 to 4 times per year    Active Member of Clubs or Organizations: No    Attends Club or Organization Meetings: 1 to 4 times per year    Marital Status:    Intimate Partner Violence: Not At Risk    Fear of Current or Ex-Partner: No    Emotionally Abused: No    Physically Abused: No    Sexually Abused: No   Housing Stability: Low Risk     Unable to Pay for Housing in the Last Year: No  Number of Places Lived in the Last Year: 1    Unstable Housing in the Last Year: No       Family History:     Family History   Problem Relation Age of Onset    High Blood Pressure Father     Other Father         AAA    Heart Disease Other         uncle x 2         Allergies:   Latex, Gabapentin, Meperidine, Erythromycin, Glimepiride, Hydrocodone, Macrolides and ketolides, Niacin and related, Trelegy ellipta [fluticasone-umeclidin-vilant], Pregabalin, and Xanax [alprazolam]     Review of Systems:   Constitutional: No fevers or chills. No systemic complaints  Head: No headaches  Eyes: No double vision or blurry vision. No conjunctival inflammation. ENT: No sore throat or runny nose. . No hearing loss, tinnitus or vertigo. Cardiovascular: No chest pain or palpitations. shortness of breath. WANG  Lung: Left sided chest tube with some shortness of breath with dry cough. No sputum production  Abdomen: No nausea, vomiting, diarrhea, or abdominal pain. Xenia Fanning No cramps. Genitourinary: No increased urinary frequency, or dysuria. No hematuria. No suprapubic or CVA pain  Musculoskeletal: No muscle aches or pains. No joint effusions, swelling or deformities  Hematologic: No bleeding or bruising. Neurologic: No headache, weakness, numbness, or tingling. Integument: No rash, no ulcers. Psychiatric: No depression. Endocrine: No polyuria, no polydipsia, no polyphagia.     Physical Examination :   Patient Vitals for the past 8 hrs:   BP Temp Temp src Pulse Resp SpO2   02/22/23 0822 -- -- -- (!) 130 18 98 %   02/22/23 0800 97/77 98.6 °F (37 °C) Oral (!) 130 22 98 %   02/22/23 0600 99/62 -- -- (!) 119 19 99 %   02/22/23 0545 97/79 -- -- (!) 129 (!) 37 99 %   02/22/23 0500 84/69 -- -- (!) 127 23 98 %   02/22/23 0415 98/70 -- -- (!) 145 26 99 %   02/22/23 0400 93/76 -- -- (!) 135 25 99 %   02/22/23 0345 94/78 98.4 °F (36.9 °C) Axillary (!) 146 26 98 %   02/22/23 0330 91/66 -- -- (!) 142 25 98 %   02/22/23 0315 92/79 -- -- (!) 142 24 98 %       General Appearance: Awake, alert, and in no apparent distress  Head:  Normocephalic, no trauma  Eyes: Pupils equal, round, reactive to light and accommodation; extraocular movements intact; sclera anicteric; conjunctivae pink. No embolic phenomena. ENT: Oropharynx clear, without erythema, exudate, or thrush. No tenderness of sinuses. Mouth/throat: mucosa pink and moist. No lesions. Dentition in good repair. Neck:Supple, without lymphadenopathy. Thyroid normal, No bruits. Pulmonary/Chest: Left sided chest tube in place to suction with serosanguinous drainage. Diminished to auscultation. No dullness to percussion. Cardiovascular: Regular rate and rhythm without murmurs, rubs, or gallops. Abdomen: Soft, non tender. Bowel sounds normal. No organomegaly  All four Extremities: No cyanosis, clubbing, edema, or effusions. Neurologic: No gross sensory or motor deficits. Skin: Warm and dry with good turgor. No signs of peripheral arterial or venous insufficiency. No ulcerations. Left sided-chest tube site CDI    Medical Decision Making -Laboratory:   I have independently reviewed/ordered the following labs:    CBC with Differential:   Recent Labs     02/21/23  0622 02/22/23  0725   WBC 22.9* 24.0*   HGB 11.4* 11.2*   HCT 38.1* 36.0*   * 434   LYMPHOPCT 28 29   MONOPCT 9* 9*       BMP:   Recent Labs     02/21/23  0622 02/22/23  0215 02/22/23  0725   *  --  131*   K 5.5* 5.8* 5.1   CL 98  --  97*   CO2 23  --  23   BUN 47*  --  47*   CREATININE 1.96*  --  2.62*   MG  --  2.2  --        Hepatic Function Panel: No results for input(s): PROT, LABALBU, BILIDIR, IBILI, BILITOT, ALKPHOS, ALT, AST in the last 72 hours. No results for input(s): RPR in the last 72 hours. No results for input(s): HIV in the last 72 hours. No results for input(s): BC in the last 72 hours.   Lab Results   Component Value Date/Time    MUCUS 1+ 02/13/2023 02:17 AM    RBC 4.07 02/22/2023 07:25 AM    RBC 5.15 04/03/2012 11:08 AM    WBC 24.0 02/22/2023 07:25 AM    TURBIDITY Cloudy 02/17/2023 06:15 AM     Lab Results   Component Value Date/Time    CREATININE 2.62 02/22/2023 07:25 AM    GLUCOSE 180 02/22/2023 07:25 AM    GLUCOSE 140 04/03/2012 11:08 AM       Medical Decision Making-Imaging:     Narrative   EXAMINATION:   CT OF THE CHEST WITHOUT CONTRAST, 2/17/2023 9:29 am       TECHNIQUE:   CT of the chest was performed without the administration of intravenous   contrast. Multiplanar reformatted images are provided for review. Automated   exposure control, iterative reconstruction, and/or weight based adjustment of   the mA/kV was utilized to reduce the radiation dose to as low as reasonably   achievable. COMPARISON:   02/13/2023       HISTORY:   ORDERING SYSTEM PROVIDED HISTORY:  Improvement? TECHNOLOGIST PROVIDED HISTORY:   Improvement? FINDINGS:   Mediastinum: Small bilateral axial lymph nodes are identified not   significantly changed. Small to moderate size mediastinal lymph nodes are   additionally present. Lungs/Pleura: There are multiple loculated effusions within the left chest   which have improved since previous exam.  Small amounts of new air are likely   secondary to the presence of the left thoracostomy tube. Left lower lobe atelectasis is identified. Upper Abdomen: There is a probable small hiatal hernia. Soft Tissues/Bones:  No acute osseous abnormality is appreciated. Old right   rib fractures are noted. Impression   Small to moderate-sized multiloculated left pleural effusions have improved. New small foci of air within the collection is presumably related to the   introduction of the left thoracostomy tube. Stable small to moderate sized mediastinal lymph nodes are nonspecific but   may relate to the patient's history of lymphoma. Additional small bilateral   axial lymph nodes are identified. Small hiatal hernia.              Narrative   EXAMINATION:   ONE XRAY VIEW OF THE CHEST       2/18/2023 3:04 pm       COMPARISON:   CT chest February 17, 2023       HISTORY:   ORDERING SYSTEM PROVIDED HISTORY: worsening   TECHNOLOGIST PROVIDED HISTORY:   worsening       FINDINGS:   Pigtail catheter on the left. Moderate left effusion appears   denser/increased. Mild bilateral interstitial infiltrates or edema. Heart   and mediastinum unremarkable. Bony thorax intact. No pneumothorax noted. Subsegmental atelectasis right lower lung. Impression   Moderate left pleural effusion increased. No change left chest tube/pigtail   catheter. Medical Decision Lvilur-Lamodjlb-Yksvr:       Medical Decision Making-Other:     Note:  Labs, medications, radiologic studies were reviewed with personal review of films  Large amounts of data were reviewed  Discussed with nursing Staff, Discharge planner  Infection Control and Prevention measures reviewed  All prior entries were reviewed  Administer medications as ordered  Prognosis: [address]  Discharge planning reviewed  Follow up as outpatient. Thank you for allowing us to participate in the care of this patient. Please call with questions.     Rosita Hdz MD      Pager: [phone] - Office: [phone]

## 2023-02-22 NOTE — PROGRESS NOTES
[facility]  Progress Note                   Date:   2/22/2023  Patient name: Jeevan Bennett  Date of admission:  2/12/2023 10:33 PM  MRN:   [id number]  YOB: 1955  PCP: Colin Irizarry MD    Reason for Admission: Pneumonia, unspecified organism [J18.9]  Bilateral pleural effusion [J90]    Subjective:       Clinical Changes /Abnormalities:Patient seen and examined. Denies chest pain or increased shortness of breath. Tele/vitals/labs reviewed . Discussed case with RN.   Afib RVR on tele , patient stated he does have history of afib     Review of Systems    Medications:   Scheduled Meds:   metoprolol succinate  25 mg Oral Daily    QUEtiapine  50 mg Oral Nightly    cefTRIAXone (ROCEPHIN) IV  1,000 mg IntraVENous Q24H    docusate sodium  100 mg Oral TID    fentanNYL  100 mcg IntraVENous Once    sodium chloride flush  10 mL IntraCATHeter BID    melatonin  5 mg Oral Nightly    insulin lispro  0-16 Units SubCUTAneous TID WC    insulin lispro  0-4 Units SubCUTAneous Nightly    heparin (porcine)  5,000 Units SubCUTAneous 3 times per day    atorvastatin  80 mg Oral Daily    DULoxetine  60 mg Oral BID    dicyclomine  10 mg Oral 4x Daily AC & HS    finasteride  5 mg Oral Daily    fenofibrate  160 mg Oral Daily    pantoprazole  40 mg Oral QAM AC    repaglinide  1 mg Oral TID AC    brimonidine  1 drop Right Eye BID    And    timolol  1 drop Right Eye BID    insulin glargine  20 Units SubCUTAneous BID    lidocaine  20 mL IntraDERmal Once    sodium chloride flush  5-40 mL IntraVENous 2 times per day     Continuous Infusions:   amiodarone 0.5 mg/min (02/22/23 1002)    dextrose      dextrose      sodium chloride       CBC:   Recent Labs     02/20/23  0233 02/21/23  0622 02/22/23  0725   WBC 19.0* 22.9* 24.0*   HGB 10.0* 11.4* 11.2*    461* 434     BMP:    Recent Labs     02/20/23  0233 02/21/23  0622 02/22/23  0215 02/22/23  0725   * 132*  --  131*   K 5.1 5.5* 5.8* 5.1    98  -- 97*   CO2 23 23  --  23   BUN 63* 47*  --  47*   CREATININE 2.24* 1.96*  --  2.62*   GLUCOSE 151* 150*  --  180*     Hepatic:No results for input(s): AST, ALT, ALB, BILITOT, ALKPHOS in the last 72 hours. Troponin:   Recent Labs     02/21/23  1541 02/21/23  1734   TROPHS 25* 26*     BNP: No results for input(s): BNP in the last 72 hours. Lipids: No results for input(s): CHOL, HDL in the last 72 hours. Invalid input(s): LDLCALCU  INR:   Recent Labs     02/22/23  1157   INR 1.1      EKG:    Date: 02/21/23  Reading:  Sinus tachycardia  Moderate voltage criteria for LVH, may be normal variant  Nonspecific ST and T wave abnormality  Abnormal ECG  When compared with ECG of 16-FEB-2023 23:10,  No significant change was found     LAST ECHO:  Date:  Findings Summary: 10/22     Left Ventricle: Systolic function is normal with an ejection fraction   of 55-60%. Aortic Valve: There is mild regurgitation. Tricuspid Valve: There is trace to mild regurgitation. The right   ventricular systolic pressure normal. RVSP calculated at 24 mmHg. RVSP is   based on RA pressure of 3 mmHg. There is no pulmonary hypertension. Left Ventricle   Systolic function is normal with an ejection fraction of 55-60%. No obvious regional wall motion abnormalities. There is abnormal septal motion. Right Ventricle   Right ventricular size appears normal. Systolic function is normal.     Right Atrium   Right atrium is normal in size. IVC/SVC   The right atrial pressure is estimated at 3 mmHg. IVC appears normal. There is normal collapse with deep inspiration. Tricuspid Valve   Tricuspid valve appears to be normal. There is trace to mild regurgitation. The right ventricular systolic pressure normal. RVSP calculated at 24 mmHg. RVSP is based on RA pressure of 3 mmHg. There is no pulmonary hypertension. Aortic Valve   The aortic valve is trileaflet. The leaflets are mildly thickened. There is mild regurgitation.      Pulmonic Valve   Pulmonic valve structure is grossly normal. There is trace to mild regurgitation. Pericardium   There is no pericardial effusion. ECHO Summary  Left ventricle is normal in size. Global left ventricular systolic function  is difficult to assess due to heart rate but appears normal. Calculated  ejection fraction 51% by Atkinson's method. Aortic valve is trileaflet and opens adequately. Trivial aortic insufficiency. Normal tricuspid valve structure. Trivial tricuspid regurgitation. Signature  ----------------------------------------------------------------------------   Electronically signed by Camila Crouch(Sonographer) on 02/21/2023   02:54 PM  ----------------------------------------------------------------------------     ----------------------------------------------------------------------------   Electronically signed by Robinson Segura(Interpreting physician) on 02/21/2023   02:56 PM  ----------------------------------------------------------------------------  Objective:   Vitals: /85   Pulse (!) 128   Temp 97.6 °F (36.4 °C) (Oral)   Resp 28   Ht 6' 1\" (1.854 m)   Wt 244 lb 4.3 oz (110.8 kg)   SpO2 99%   BMI 32.23 kg/m²   General appearance: alert and cooperative with exam  HEENT: Head: Normocephalic, no lesions, without obvious abnormality. Neck:no JVD, trachea midline, no adenopathy  Lungs: diminished  throughout   Heart: irregular rate and rhythm, s1/s2 auscultated, no murmurs  Abdomen: soft, non-tender, bowel sounds active  Extremities: no edema  Neurologic: not done        Assessment / Acute Cardiac Problems:   Preoperative clearance for VATS procedure. Patient has a revised cardiac index score of . METS greater than 4.   Empyema  Loculated left-sided pleural effusion   Acute hypoxic respiratory failure secondary to pneumonia  Pneumonia  History of sarcoidosis  History of non-Hodgkin's lymphoma  History of nonrheumatic mitral regurgitation  Tricuspid regurgitation  History of dilated aortic root  Type 2 diabetes  Chronic kidney disease stage III/IV  History of Afib       Patient Active Problem List:     CKD (chronic kidney disease), stage III (HCC)     Nephrolithiasis     DM (diabetes mellitus) ([facility] [address]     Non-rheumatic mitral regurgitation     Nonrheumatic aortic valve insufficiency     Pulmonary hypertension (HCC)     Type 2 diabetes mellitus with diabetic chronic kidney disease ([facility] [address]     Sarcoidosis     Non Hodgkin's [facility]     Hyperlipidemia     Family history of abdominal aortic aneurysm (AAA)     AAA (abdominal aortic aneurysm) without rupture     Essential hypertension     Abnormal echocardiogram     Dilated aortic root (HCC)     Acute interstitial nephritis     KELSEA (acute kidney injury) ([facility] [address]     Pneumonia due to infectious organism     Pneumonia, unspecified organism     Pleural effusion     Hyperkalemia     Acute respiratory failure with hypoxia (HCC)     Hyponatremia     Hyperglycemia     History of non-Hodgkin's lymphoma     History of sarcoidosis     Empyema (HCC)  [id number] Score for Atrial Fibrillation Stroke Risk   Risk   Factors  Component Value   C CHF No 0   H HTN Yes 1   A2 Age >= 76 No,  (70 y.o.) 0   D DM Yes 1   S2 Prior Stroke/TIA No 0   V Vascular Disease No 0   A Age 74-69 Yes,  (70 y.o.) 1   Sc Sex male 0    [id number]  Score  3   Score last updated 2/22/23 6:02 PM EST    Click here for a link to the UpToDate guideline \"Atrial Fibrillation: Anticoagulation therapy to prevent embolization    Disclaimer: Risk Score calculation is dependent on accuracy of patient problem list and past encounter diagnosis.      Plan of Treatment:   Afib with RVR - on amio drip , was given dig dose and not effective , will change toprol xl to Lopresor and increase dose , continue PRN lopressor IV , needs to be on [facility] when ok with CTS   CTS consulted -Plan for OR tomorrow for left sided VATS decortication  CKD- creatinine is worsening  , recommend nephro consult   purulent fluid s/p thoracentesis - ID and pulmonary following   Preserved LV function on ECHO       Electronically signed by FLOYD Gabriel NP on 2/22/2023 at 2:20 [address] [facility].  [phone]

## 2023-02-22 NOTE — PROGRESS NOTES
Physical Therapy        Physical Therapy Cancel Note      DATE: 2023    NAME: Lisa Briones  MRN: [id number]   : 1955      Patient not seen this date for Physical Therapy due to:    Patient Declined: Despite max encouragement provided , pt states it will not do any good and he wants to be left alone. Reports that he doesn't trust anyone because the are plotting against him including his wife and daughter.        Electronically signed by Timothy Dallas PTA on 2023 at 10:14 AM

## 2023-02-22 NOTE — CARE COORDINATION
Transitional planning:  Nurse rounds: Scheduled for VATS video thoracic surgery. NPO today. O2 3L nc.       [address] [facility] insurance called to give information and offer assistance with placing. 's name is Tariq Penn, direct line [phone].

## 2023-02-22 NOTE — RT PROTOCOL NOTE
RT Inhaler-Nebulizer Bronchodilator Protocol Note    There is a bronchodilator order in the chart from a provider indicating to follow the RT Bronchodilator Protocol and there is an Initiate RT Inhaler-Nebulizer Bronchodilator Protocol order as well (see protocol at bottom of note). CXR Findings:  No results found. The findings from the last RT Protocol Assessment were as follows:   History Pulmonary Disease: None or smoker <15 pack years  Respiratory Pattern: Regular pattern and RR 12-20 bpm  Breath Sounds: Clear breath sounds  Cough: Strong, spontaneous, non-productive  Indication for Bronchodilator Therapy:    Bronchodilator Assessment Score: 0    Aerosolized bronchodilator medication orders have been revised according to the RT Inhaler-Nebulizer Bronchodilator Protocol below. Respiratory Therapist to perform RT Therapy Protocol Assessment initially then follow the protocol. Repeat RT Therapy Protocol Assessment PRN for score 0-3 or on second treatment, BID, and PRN for scores above 3. No Indications - adjust the frequency to every 6 hours PRN wheezing or bronchospasm, if no treatments needed after 48 hours then discontinue using Per Protocol order mode. If indication present, adjust the RT bronchodilator orders based on the Bronchodilator Assessment Score as indicated below. Use Inhaler orders unless patient has one or more of the following: on home nebulizer, not able to hold breath for 10 seconds, is not alert and oriented, cannot activate and use MDI correctly, or respiratory rate 25 breaths per minute or more, then use the equivalent nebulizer order(s) with same Frequency and PRN reasons based on the score. If a patient is on this medication at home then do not decrease Frequency below that used at home.     0-3 - enter or revise RT bronchodilator order(s) to equivalent RT Bronchodilator order with Frequency of every 4 hours PRN for wheezing or increased work of breathing using Per Protocol order mode. 4-6 - enter or revise RT Bronchodilator order(s) to two equivalent RT bronchodilator orders with one order with BID Frequency and one order with Frequency of every 4 hours PRN wheezing or increased work of breathing using Per Protocol order mode. 7-10 - enter or revise RT Bronchodilator order(s) to two equivalent RT bronchodilator orders with one order with TID Frequency and one order with Frequency of every 4 hours PRN wheezing or increased work of breathing using Per Protocol order mode. 11-13 - enter or revise RT Bronchodilator order(s) to one equivalent RT bronchodilator order with QID Frequency and an Albuterol order with Frequency of every 4 hours PRN wheezing or increased work of breathing using Per Protocol order mode. Greater than 13 - enter or revise RT Bronchodilator order(s) to one equivalent RT bronchodilator order with every 4 hours Frequency and an Albuterol order with Frequency of every 2 hours PRN wheezing or increased work of breathing using Per Protocol order mode. RT to enter RT Home Evaluation for COPD & MDI Assessment order using Per Protocol order mode.     Electronically signed by Clare Dang RCP on 2/22/2023 at 10:29 AM

## 2023-02-22 NOTE — PROGRESS NOTES
Around 0030hr, patient became tachycardic, with a heart rate ranging 120-140bpm, EKG was done showing sinus tachycardia, Cardio- fellow and NP-on call was informed. Patient is asymptomatic, blood pressure is low. EKG was repeated at 0110hr, showing AFIB RVR, cardio-fellow informed, one time dose of Digoxin was given, blood pressure still soft, Lopressor dose unable to give. IV fluid bolus started as ordered. Amiodarone bolus was given and drip is started as ordered.

## 2023-02-23 ENCOUNTER — ANESTHESIA EVENT (OUTPATIENT)
Dept: OPERATING ROOM | Age: 68
End: 2023-02-23
Payer: COMMERCIAL

## 2023-02-23 ENCOUNTER — APPOINTMENT (OUTPATIENT)
Dept: GENERAL RADIOLOGY | Age: 68
DRG: 853 | End: 2023-02-23
Attending: STUDENT IN AN ORGANIZED HEALTH CARE EDUCATION/TRAINING PROGRAM
Payer: COMMERCIAL

## 2023-02-23 ENCOUNTER — ANESTHESIA (OUTPATIENT)
Dept: OPERATING ROOM | Age: 68
End: 2023-02-23
Payer: COMMERCIAL

## 2023-02-23 LAB
ABSOLUTE EOS #: 0 K/UL (ref 0–0.4)
ABSOLUTE IMMATURE GRANULOCYTE: 0 K/UL (ref 0–0.3)
ABSOLUTE LYMPH #: 9.16 K/UL (ref 1–4.8)
ABSOLUTE MONO #: 2.89 K/UL (ref 0.1–0.8)
ALLEN TEST: POSITIVE
ANION GAP SERPL CALCULATED.3IONS-SCNC: 10 MMOL/L (ref 9–17)
ANION GAP SERPL CALCULATED.3IONS-SCNC: 9 MMOL/L (ref 9–17)
BASOPHILS # BLD: 0 % (ref 0–2)
BASOPHILS ABSOLUTE: 0 K/UL (ref 0–0.2)
BUN SERPL-MCNC: 51 MG/DL (ref 8–23)
BUN SERPL-MCNC: 51 MG/DL (ref 8–23)
CALCIUM SERPL-MCNC: 8.7 MG/DL (ref 8.6–10.4)
CALCIUM SERPL-MCNC: 9.4 MG/DL (ref 8.6–10.4)
CASE NUMBER:: NORMAL
CHLORIDE SERPL-SCNC: 96 MMOL/L (ref 98–107)
CHLORIDE SERPL-SCNC: 99 MMOL/L (ref 98–107)
CO2 SERPL-SCNC: 22 MMOL/L (ref 20–31)
CO2 SERPL-SCNC: 25 MMOL/L (ref 20–31)
CREAT SERPL-MCNC: 3.2 MG/DL (ref 0.7–1.2)
CREAT SERPL-MCNC: 3.3 MG/DL (ref 0.7–1.2)
EKG ATRIAL RATE: 29 BPM
EKG Q-T INTERVAL: 290 MS
EKG QRS DURATION: 92 MS
EKG QTC CALCULATION (BAZETT): 428 MS
EKG R AXIS: 72 DEGREES
EKG T AXIS: -21 DEGREES
EKG VENTRICULAR RATE: 131 BPM
EOSINOPHILS RELATIVE PERCENT: 0 % (ref 1–4)
GFR SERPL CREATININE-BSD FRML MDRD: 20 ML/MIN/1.73M2
GFR SERPL CREATININE-BSD FRML MDRD: 21 ML/MIN/1.73M2
GLUCOSE BLD-MCNC: 115 MG/DL (ref 74–100)
GLUCOSE BLD-MCNC: 135 MG/DL (ref 74–100)
GLUCOSE BLD-MCNC: 137 MG/DL (ref 75–110)
GLUCOSE BLD-MCNC: 139 MG/DL (ref 75–110)
GLUCOSE BLD-MCNC: 93 MG/DL (ref 75–110)
GLUCOSE SERPL-MCNC: 102 MG/DL (ref 70–99)
GLUCOSE SERPL-MCNC: 135 MG/DL (ref 70–99)
HCT VFR BLD AUTO: 28.5 % (ref 40.7–50.3)
HCT VFR BLD AUTO: 36.6 % (ref 40.7–50.3)
HGB BLD-MCNC: 11.2 G/DL (ref 13–17)
HGB BLD-MCNC: 8.8 G/DL (ref 13–17)
IMMATURE GRANULOCYTES: 0 %
LYMPHOCYTES # BLD: 38 % (ref 24–44)
MCH RBC QN AUTO: 27.8 PG (ref 25.2–33.5)
MCH RBC QN AUTO: 28.1 PG (ref 25.2–33.5)
MCHC RBC AUTO-ENTMCNC: 30.6 G/DL (ref 28.4–34.8)
MCHC RBC AUTO-ENTMCNC: 30.9 G/DL (ref 28.4–34.8)
MCV RBC AUTO: 90.8 FL (ref 82.6–102.9)
MCV RBC AUTO: 91.1 FL (ref 82.6–102.9)
MICROORGANISM SPEC CULT: ABNORMAL
MICROORGANISM SPEC CULT: NO GROWTH
MICROORGANISM SPEC CULT: NORMAL
MICROORGANISM/AGENT SPEC: ABNORMAL
MICROORGANISM/AGENT SPEC: ABNORMAL
MONOCYTES # BLD: 12 % (ref 1–7)
MORPHOLOGY: ABNORMAL
MORPHOLOGY: ABNORMAL
MRSA, DNA, NASAL: NEGATIVE
NEGATIVE BASE EXCESS, ART: 2 (ref 0–2)
NEGATIVE BASE EXCESS, ART: 4 (ref 0–2)
NRBC AUTOMATED: 0 PER 100 WBC
NRBC AUTOMATED: 0 PER 100 WBC
PDW BLD-RTO: 15.7 % (ref 11.8–14.4)
PDW BLD-RTO: 15.7 % (ref 11.8–14.4)
PLATELET # BLD AUTO: 405 K/UL (ref 138–453)
PLATELET # BLD AUTO: 446 K/UL (ref 138–453)
PMV BLD AUTO: 9.6 FL (ref 8.1–13.5)
PMV BLD AUTO: 9.8 FL (ref 8.1–13.5)
POC HCO3: 21.7 MMOL/L (ref 21–28)
POC HCO3: 22.8 MMOL/L (ref 21–28)
POC HCO3: 26.6 MMOL/L (ref 21–28)
POC HEMATOCRIT: 32 % (ref 41–53)
POC HEMATOCRIT: 35 % (ref 41–53)
POC HEMOGLOBIN: 11 G/DL (ref 13.5–17.5)
POC HEMOGLOBIN: 12 G/DL (ref 13.5–17.5)
POC IONIZED CALCIUM: 1.22 MMOL/L (ref 1.15–1.33)
POC IONIZED CALCIUM: 1.3 MMOL/L (ref 1.15–1.33)
POC O2 SATURATION: 91 % (ref 94–98)
POC O2 SATURATION: 92 % (ref 94–98)
POC O2 SATURATION: 98 % (ref 94–98)
POC PCO2: 36.8 MM HG (ref 35–48)
POC PCO2: 38.8 MM HG (ref 35–48)
POC PCO2: 40.9 MM HG (ref 35–48)
POC PH: 7.36 (ref 7.35–7.45)
POC PH: 7.4 (ref 7.35–7.45)
POC PH: 7.42 (ref 7.35–7.45)
POC PO2: 100.3 MM HG (ref 83–108)
POC PO2: 60.4 MM HG (ref 83–108)
POC PO2: 66.2 MM HG (ref 83–108)
POC POTASSIUM: 4.6 MMOL/L (ref 3.5–4.5)
POC POTASSIUM: 4.7 MMOL/L (ref 3.5–4.5)
POC SODIUM: 131 MMOL/L (ref 138–146)
POC SODIUM: 132 MMOL/L (ref 138–146)
POC TCO2: 21 MMOL/L (ref 22–30)
POC TCO2: 22 MMOL/L (ref 22–30)
POSITIVE BASE EXCESS, ART: 2 (ref 0–3)
POTASSIUM SERPL-SCNC: 4.9 MMOL/L (ref 3.7–5.3)
POTASSIUM SERPL-SCNC: 5.2 MMOL/L (ref 3.7–5.3)
RBC # BLD: 3.13 M/UL (ref 4.21–5.77)
RBC # BLD: 4.03 M/UL (ref 4.21–5.77)
RBC FLUID: NORMAL CELLS/UL
SAMPLE SITE: ABNORMAL
SEG NEUTROPHILS: 50 % (ref 36–66)
SEGMENTED NEUTROPHILS ABSOLUTE COUNT: 12.05 K/UL (ref 1.8–7.7)
SODIUM SERPL-SCNC: 130 MMOL/L (ref 135–144)
SODIUM SERPL-SCNC: 131 MMOL/L (ref 135–144)
SPECIMEN DESCRIPTION: ABNORMAL
SPECIMEN DESCRIPTION: NORMAL
SPECIMEN TYPE: NORMAL
WBC # BLD AUTO: 24.1 K/UL (ref 3.5–11.3)
WBC # BLD AUTO: 28.8 K/UL (ref 3.5–11.3)
WBC FLUID: NORMAL CELLS/UL

## 2023-02-23 PROCEDURE — 2580000003 HC RX 258: Performed by: NURSE PRACTITIONER

## 2023-02-23 PROCEDURE — 6360000002 HC RX W HCPCS: Performed by: NURSE PRACTITIONER

## 2023-02-23 PROCEDURE — 6370000000 HC RX 637 (ALT 250 FOR IP): Performed by: NURSE PRACTITIONER

## 2023-02-23 PROCEDURE — 87255 GENET VIRUS ISOLATE HSV: CPT

## 2023-02-23 PROCEDURE — 3700000001 HC ADD 15 MINUTES (ANESTHESIA): Performed by: THORACIC SURGERY (CARDIOTHORACIC VASCULAR SURGERY)

## 2023-02-23 PROCEDURE — 85014 HEMATOCRIT: CPT

## 2023-02-23 PROCEDURE — 3700000000 HC ANESTHESIA ATTENDED CARE: Performed by: THORACIC SURGERY (CARDIOTHORACIC VASCULAR SURGERY)

## 2023-02-23 PROCEDURE — 6370000000 HC RX 637 (ALT 250 FOR IP): Performed by: PHYSICIAN ASSISTANT

## 2023-02-23 PROCEDURE — 3600000014 HC SURGERY LEVEL 4 ADDTL 15MIN: Performed by: THORACIC SURGERY (CARDIOTHORACIC VASCULAR SURGERY)

## 2023-02-23 PROCEDURE — 87102 FUNGUS ISOLATION CULTURE: CPT

## 2023-02-23 PROCEDURE — 94660 CPAP INITIATION&MGMT: CPT

## 2023-02-23 PROCEDURE — 87206 SMEAR FLUORESCENT/ACID STAI: CPT

## 2023-02-23 PROCEDURE — 5A2204Z RESTORATION OF CARDIAC RHYTHM, SINGLE: ICD-10-PCS | Performed by: INTERNAL MEDICINE

## 2023-02-23 PROCEDURE — 82803 BLOOD GASES ANY COMBINATION: CPT

## 2023-02-23 PROCEDURE — 87300 AG DETECTION POLYVAL IF: CPT

## 2023-02-23 PROCEDURE — P9045 ALBUMIN (HUMAN), 5%, 250 ML: HCPCS | Performed by: NURSE PRACTITIONER

## 2023-02-23 PROCEDURE — 82947 ASSAY GLUCOSE BLOOD QUANT: CPT

## 2023-02-23 PROCEDURE — 2580000003 HC RX 258: Performed by: PHYSICIAN ASSISTANT

## 2023-02-23 PROCEDURE — P9045 ALBUMIN (HUMAN), 5%, 250 ML: HCPCS

## 2023-02-23 PROCEDURE — 99232 SBSQ HOSP IP/OBS MODERATE 35: CPT | Performed by: FAMILY MEDICINE

## 2023-02-23 PROCEDURE — 0BNL4ZZ RELEASE LEFT LUNG, PERCUTANEOUS ENDOSCOPIC APPROACH: ICD-10-PCS | Performed by: THORACIC SURGERY (CARDIOTHORACIC VASCULAR SURGERY)

## 2023-02-23 PROCEDURE — P9047 ALBUMIN (HUMAN), 25%, 50ML: HCPCS | Performed by: PLASTIC SURGERY

## 2023-02-23 PROCEDURE — 6370000000 HC RX 637 (ALT 250 FOR IP)

## 2023-02-23 PROCEDURE — 6370000000 HC RX 637 (ALT 250 FOR IP): Performed by: FAMILY MEDICINE

## 2023-02-23 PROCEDURE — 6360000002 HC RX W HCPCS

## 2023-02-23 PROCEDURE — 93010 ELECTROCARDIOGRAM REPORT: CPT | Performed by: INTERNAL MEDICINE

## 2023-02-23 PROCEDURE — 2500000003 HC RX 250 WO HCPCS

## 2023-02-23 PROCEDURE — B246ZZ4 ULTRASONOGRAPHY OF RIGHT AND LEFT HEART, TRANSESOPHAGEAL: ICD-10-PCS | Performed by: INTERNAL MEDICINE

## 2023-02-23 PROCEDURE — 6360000002 HC RX W HCPCS: Performed by: STUDENT IN AN ORGANIZED HEALTH CARE EDUCATION/TRAINING PROGRAM

## 2023-02-23 PROCEDURE — 87015 SPECIMEN INFECT AGNT CONCNTJ: CPT

## 2023-02-23 PROCEDURE — 99232 SBSQ HOSP IP/OBS MODERATE 35: CPT | Performed by: INTERNAL MEDICINE

## 2023-02-23 PROCEDURE — 36415 COLL VENOUS BLD VENIPUNCTURE: CPT

## 2023-02-23 PROCEDURE — 80048 BASIC METABOLIC PNL TOTAL CA: CPT

## 2023-02-23 PROCEDURE — 71045 X-RAY EXAM CHEST 1 VIEW: CPT

## 2023-02-23 PROCEDURE — 6360000002 HC RX W HCPCS: Performed by: ANESTHESIOLOGY

## 2023-02-23 PROCEDURE — 32652 THORACOSCOPY REM TOTL CORTEX: CPT | Performed by: THORACIC SURGERY (CARDIOTHORACIC VASCULAR SURGERY)

## 2023-02-23 PROCEDURE — 2580000003 HC RX 258: Performed by: ANESTHESIOLOGY

## 2023-02-23 PROCEDURE — 88305 TISSUE EXAM BY PATHOLOGIST: CPT

## 2023-02-23 PROCEDURE — A4217 STERILE WATER/SALINE, 500 ML: HCPCS | Performed by: THORACIC SURGERY (CARDIOTHORACIC VASCULAR SURGERY)

## 2023-02-23 PROCEDURE — 3600000004 HC SURGERY LEVEL 4 BASE: Performed by: THORACIC SURGERY (CARDIOTHORACIC VASCULAR SURGERY)

## 2023-02-23 PROCEDURE — 37799 UNLISTED PX VASCULAR SURGERY: CPT

## 2023-02-23 PROCEDURE — 87116 MYCOBACTERIA CULTURE: CPT

## 2023-02-23 PROCEDURE — 4A133B1 MONITORING OF ARTERIAL PRESSURE, PERIPHERAL, PERCUTANEOUS APPROACH: ICD-10-PCS | Performed by: ANESTHESIOLOGY

## 2023-02-23 PROCEDURE — 2580000003 HC RX 258: Performed by: RADIOLOGY

## 2023-02-23 PROCEDURE — 6360000002 HC RX W HCPCS: Performed by: THORACIC SURGERY (CARDIOTHORACIC VASCULAR SURGERY)

## 2023-02-23 PROCEDURE — 84295 ASSAY OF SERUM SODIUM: CPT

## 2023-02-23 PROCEDURE — 03HY32Z INSERTION OF MONITORING DEVICE INTO UPPER ARTERY, PERCUTANEOUS APPROACH: ICD-10-PCS | Performed by: ANESTHESIOLOGY

## 2023-02-23 PROCEDURE — 6370000000 HC RX 637 (ALT 250 FOR IP): Performed by: INTERNAL MEDICINE

## 2023-02-23 PROCEDURE — 2500000003 HC RX 250 WO HCPCS: Performed by: ANESTHESIOLOGY

## 2023-02-23 PROCEDURE — 6360000002 HC RX W HCPCS: Performed by: PHYSICIAN ASSISTANT

## 2023-02-23 PROCEDURE — 87070 CULTURE OTHR SPECIMN AEROBIC: CPT

## 2023-02-23 PROCEDURE — 2580000003 HC RX 258

## 2023-02-23 PROCEDURE — 87205 SMEAR GRAM STAIN: CPT

## 2023-02-23 PROCEDURE — 87176 TISSUE HOMOGENIZATION CULTR: CPT

## 2023-02-23 PROCEDURE — 87140 CULTURE TYPE IMMUNOFLUORESC: CPT

## 2023-02-23 PROCEDURE — 88112 CYTOPATH CELL ENHANCE TECH: CPT

## 2023-02-23 PROCEDURE — 85027 COMPLETE CBC AUTOMATED: CPT

## 2023-02-23 PROCEDURE — 2709999900 HC NON-CHARGEABLE SUPPLY: Performed by: THORACIC SURGERY (CARDIOTHORACIC VASCULAR SURGERY)

## 2023-02-23 PROCEDURE — C1729 CATH, DRAINAGE: HCPCS | Performed by: THORACIC SURGERY (CARDIOTHORACIC VASCULAR SURGERY)

## 2023-02-23 PROCEDURE — 2500000003 HC RX 250 WO HCPCS: Performed by: THORACIC SURGERY (CARDIOTHORACIC VASCULAR SURGERY)

## 2023-02-23 PROCEDURE — 2580000003 HC RX 258: Performed by: FAMILY MEDICINE

## 2023-02-23 PROCEDURE — 36600 WITHDRAWAL OF ARTERIAL BLOOD: CPT

## 2023-02-23 PROCEDURE — 6360000002 HC RX W HCPCS: Performed by: PLASTIC SURGERY

## 2023-02-23 PROCEDURE — 6370000000 HC RX 637 (ALT 250 FOR IP): Performed by: SURGERY

## 2023-02-23 PROCEDURE — 87075 CULTR BACTERIA EXCEPT BLOOD: CPT

## 2023-02-23 PROCEDURE — 87252 VIRUS INOCULATION TISSUE: CPT

## 2023-02-23 PROCEDURE — 85025 COMPLETE CBC W/AUTO DIFF WBC: CPT

## 2023-02-23 PROCEDURE — 2000000000 HC ICU R&B

## 2023-02-23 PROCEDURE — 05H633Z INSERTION OF INFUSION DEVICE INTO LEFT SUBCLAVIAN VEIN, PERCUTANEOUS APPROACH: ICD-10-PCS | Performed by: ANESTHESIOLOGY

## 2023-02-23 PROCEDURE — P9041 ALBUMIN (HUMAN),5%, 50ML: HCPCS | Performed by: ANESTHESIOLOGY

## 2023-02-23 PROCEDURE — 99233 SBSQ HOSP IP/OBS HIGH 50: CPT | Performed by: INTERNAL MEDICINE

## 2023-02-23 PROCEDURE — 4A133J1 MONITORING OF ARTERIAL PULSE, PERIPHERAL, PERCUTANEOUS APPROACH: ICD-10-PCS | Performed by: ANESTHESIOLOGY

## 2023-02-23 PROCEDURE — 82330 ASSAY OF CALCIUM: CPT

## 2023-02-23 PROCEDURE — 82374 ASSAY BLOOD CARBON DIOXIDE: CPT

## 2023-02-23 PROCEDURE — 84132 ASSAY OF SERUM POTASSIUM: CPT

## 2023-02-23 PROCEDURE — 2580000003 HC RX 258: Performed by: THORACIC SURGERY (CARDIOTHORACIC VASCULAR SURGERY)

## 2023-02-23 DEVICE — CLIP INT L ORNG TI TRNSVRS GRV CHEVRON SHP W/ PRECIS TIP TO: Type: IMPLANTABLE DEVICE | Status: FUNCTIONAL

## 2023-02-23 RX ORDER — SODIUM CHLORIDE 9 MG/ML
INJECTION, SOLUTION INTRAVENOUS PRN
Status: DISCONTINUED | OUTPATIENT
Start: 2023-02-23 | End: 2023-03-06 | Stop reason: HOSPADM

## 2023-02-23 RX ORDER — SODIUM CHLORIDE 0.9 % (FLUSH) 0.9 %
5-40 SYRINGE (ML) INJECTION PRN
Status: DISCONTINUED | OUTPATIENT
Start: 2023-02-23 | End: 2023-03-06 | Stop reason: HOSPADM

## 2023-02-23 RX ORDER — PROPOFOL 10 MG/ML
INJECTION, EMULSION INTRAVENOUS
Status: DISPENSED
Start: 2023-02-23 | End: 2023-02-23

## 2023-02-23 RX ORDER — PROPOFOL 10 MG/ML
INJECTION, EMULSION INTRAVENOUS PRN
Status: DISCONTINUED | OUTPATIENT
Start: 2023-02-23 | End: 2023-02-23 | Stop reason: SDUPTHER

## 2023-02-23 RX ORDER — MAGNESIUM HYDROXIDE 1200 MG/15ML
LIQUID ORAL CONTINUOUS PRN
Status: COMPLETED | OUTPATIENT
Start: 2023-02-23 | End: 2023-02-23

## 2023-02-23 RX ORDER — SODIUM CHLORIDE 0.9 % (FLUSH) 0.9 %
5-40 SYRINGE (ML) INJECTION EVERY 12 HOURS SCHEDULED
Status: DISCONTINUED | OUTPATIENT
Start: 2023-02-23 | End: 2023-03-06 | Stop reason: HOSPADM

## 2023-02-23 RX ORDER — DIPHENHYDRAMINE HYDROCHLORIDE 50 MG/ML
12.5 INJECTION INTRAMUSCULAR; INTRAVENOUS
Status: ACTIVE | OUTPATIENT
Start: 2023-02-23 | End: 2023-02-24

## 2023-02-23 RX ORDER — FUROSEMIDE 10 MG/ML
INJECTION INTRAMUSCULAR; INTRAVENOUS
Status: DISCONTINUED
Start: 2023-02-23 | End: 2023-02-24

## 2023-02-23 RX ORDER — DEXTROSE MONOHYDRATE 100 MG/ML
INJECTION, SOLUTION INTRAVENOUS CONTINUOUS PRN
Status: DISCONTINUED | OUTPATIENT
Start: 2023-02-23 | End: 2023-03-06 | Stop reason: HOSPADM

## 2023-02-23 RX ORDER — OXYCODONE HYDROCHLORIDE AND ACETAMINOPHEN 5; 325 MG/1; MG/1
1 TABLET ORAL EVERY 4 HOURS PRN
Status: DISCONTINUED | OUTPATIENT
Start: 2023-02-23 | End: 2023-02-26

## 2023-02-23 RX ORDER — FENTANYL CITRATE 50 UG/ML
25 INJECTION, SOLUTION INTRAMUSCULAR; INTRAVENOUS EVERY 5 MIN PRN
Status: DISCONTINUED | OUTPATIENT
Start: 2023-02-23 | End: 2023-02-28

## 2023-02-23 RX ORDER — BUPIVACAINE HYDROCHLORIDE AND EPINEPHRINE 5; 5 MG/ML; UG/ML
INJECTION, SOLUTION PERINEURAL
Status: DISCONTINUED
Start: 2023-02-23 | End: 2023-02-23

## 2023-02-23 RX ORDER — WATER 1000 ML/1000ML
INJECTION, SOLUTION INTRAVENOUS
Status: DISCONTINUED
Start: 2023-02-23 | End: 2023-02-23 | Stop reason: WASHOUT

## 2023-02-23 RX ORDER — ALBUMIN, HUMAN INJ 5% 5 %
12.5 SOLUTION INTRAVENOUS ONCE
Status: COMPLETED | OUTPATIENT
Start: 2023-02-23 | End: 2023-02-23

## 2023-02-23 RX ORDER — INSULIN LISPRO 100 [IU]/ML
0-4 INJECTION, SOLUTION INTRAVENOUS; SUBCUTANEOUS NIGHTLY
Status: DISCONTINUED | OUTPATIENT
Start: 2023-02-23 | End: 2023-02-26

## 2023-02-23 RX ORDER — SODIUM CHLORIDE 9 MG/ML
INJECTION, SOLUTION INTRAVENOUS CONTINUOUS
Status: DISCONTINUED | OUTPATIENT
Start: 2023-02-24 | End: 2023-02-24

## 2023-02-23 RX ORDER — SODIUM CHLORIDE 0.9 % (FLUSH) 0.9 %
10 SYRINGE (ML) INJECTION PRN
Status: DISCONTINUED | OUTPATIENT
Start: 2023-02-23 | End: 2023-03-06 | Stop reason: HOSPADM

## 2023-02-23 RX ORDER — INSULIN LISPRO 100 [IU]/ML
0-4 INJECTION, SOLUTION INTRAVENOUS; SUBCUTANEOUS
Status: DISCONTINUED | OUTPATIENT
Start: 2023-02-23 | End: 2023-02-26

## 2023-02-23 RX ORDER — ROCURONIUM BROMIDE 10 MG/ML
INJECTION, SOLUTION INTRAVENOUS PRN
Status: DISCONTINUED | OUTPATIENT
Start: 2023-02-23 | End: 2023-02-23 | Stop reason: SDUPTHER

## 2023-02-23 RX ORDER — WATER 1000 ML/1000ML
INJECTION, SOLUTION INTRAVENOUS
Status: DISPENSED
Start: 2023-02-23 | End: 2023-02-23

## 2023-02-23 RX ORDER — MORPHINE SULFATE 2 MG/ML
2 INJECTION, SOLUTION INTRAMUSCULAR; INTRAVENOUS
Status: DISCONTINUED | OUTPATIENT
Start: 2023-02-23 | End: 2023-02-26

## 2023-02-23 RX ORDER — CHLORHEXIDINE GLUCONATE 0.12 MG/ML
15 RINSE ORAL ONCE
Status: COMPLETED | OUTPATIENT
Start: 2023-02-23 | End: 2023-02-23

## 2023-02-23 RX ORDER — ALBUMIN, HUMAN INJ 5% 5 %
SOLUTION INTRAVENOUS PRN
Status: DISCONTINUED | OUTPATIENT
Start: 2023-02-23 | End: 2023-02-23 | Stop reason: SDUPTHER

## 2023-02-23 RX ORDER — ALBUMIN (HUMAN) 12.5 G/50ML
25 SOLUTION INTRAVENOUS ONCE
Status: COMPLETED | OUTPATIENT
Start: 2023-02-23 | End: 2023-02-24

## 2023-02-23 RX ORDER — SODIUM CHLORIDE 9 MG/ML
INJECTION, SOLUTION INTRAVENOUS CONTINUOUS
Status: DISCONTINUED | OUTPATIENT
Start: 2023-02-23 | End: 2023-02-23

## 2023-02-23 RX ORDER — ONDANSETRON 2 MG/ML
4 INJECTION INTRAMUSCULAR; INTRAVENOUS
Status: ACTIVE | OUTPATIENT
Start: 2023-02-23 | End: 2023-02-24

## 2023-02-23 RX ORDER — FENTANYL CITRATE 50 UG/ML
INJECTION, SOLUTION INTRAMUSCULAR; INTRAVENOUS PRN
Status: DISCONTINUED | OUTPATIENT
Start: 2023-02-23 | End: 2023-02-23 | Stop reason: SDUPTHER

## 2023-02-23 RX ORDER — VANCOMYCIN HYDROCHLORIDE 1 G/20ML
INJECTION, POWDER, LYOPHILIZED, FOR SOLUTION INTRAVENOUS
Status: DISCONTINUED
Start: 2023-02-23 | End: 2023-02-24

## 2023-02-23 RX ORDER — FENTANYL CITRATE 50 UG/ML
50 INJECTION, SOLUTION INTRAMUSCULAR; INTRAVENOUS EVERY 5 MIN PRN
Status: DISCONTINUED | OUTPATIENT
Start: 2023-02-23 | End: 2023-02-26

## 2023-02-23 RX ORDER — BUPIVACAINE HYDROCHLORIDE AND EPINEPHRINE 5; 5 MG/ML; UG/ML
INJECTION, SOLUTION EPIDURAL; INTRACAUDAL; PERINEURAL PRN
Status: DISCONTINUED | OUTPATIENT
Start: 2023-02-23 | End: 2023-02-23 | Stop reason: ALTCHOICE

## 2023-02-23 RX ORDER — OXYCODONE HYDROCHLORIDE AND ACETAMINOPHEN 5; 325 MG/1; MG/1
2 TABLET ORAL EVERY 4 HOURS PRN
Status: DISCONTINUED | OUTPATIENT
Start: 2023-02-23 | End: 2023-02-26

## 2023-02-23 RX ORDER — POLYETHYLENE GLYCOL 3350 17 G/17G
17 POWDER, FOR SOLUTION ORAL DAILY
Status: DISCONTINUED | OUTPATIENT
Start: 2023-02-23 | End: 2023-03-06 | Stop reason: HOSPADM

## 2023-02-23 RX ORDER — VANCOMYCIN HYDROCHLORIDE 1 G/20ML
INJECTION, POWDER, LYOPHILIZED, FOR SOLUTION INTRAVENOUS
Status: DISPENSED
Start: 2023-02-23 | End: 2023-02-23

## 2023-02-23 RX ORDER — MORPHINE SULFATE 4 MG/ML
4 INJECTION, SOLUTION INTRAMUSCULAR; INTRAVENOUS
Status: DISCONTINUED | OUTPATIENT
Start: 2023-02-23 | End: 2023-02-26

## 2023-02-23 RX ORDER — GLYCOPYRROLATE 1 MG/5 ML
SYRINGE (ML) INTRAVENOUS PRN
Status: DISCONTINUED | OUTPATIENT
Start: 2023-02-23 | End: 2023-02-23 | Stop reason: SDUPTHER

## 2023-02-23 RX ORDER — FUROSEMIDE 10 MG/ML
20 INJECTION INTRAMUSCULAR; INTRAVENOUS 2 TIMES DAILY
Status: DISCONTINUED | OUTPATIENT
Start: 2023-02-23 | End: 2023-02-25

## 2023-02-23 RX ORDER — CHLORHEXIDINE GLUCONATE 4 G/100ML
SOLUTION TOPICAL SEE ADMIN INSTRUCTIONS
Status: DISCONTINUED | OUTPATIENT
Start: 2023-02-23 | End: 2023-03-06 | Stop reason: HOSPADM

## 2023-02-23 RX ADMIN — ALBUMIN (HUMAN) 25 G: 12.5 INJECTION, SOLUTION INTRAVENOUS at 12:23

## 2023-02-23 RX ADMIN — OXYCODONE HYDROCHLORIDE AND ACETAMINOPHEN 2 TABLET: 5; 325 TABLET ORAL at 15:34

## 2023-02-23 RX ADMIN — PHENYLEPHRINE HYDROCHLORIDE 200 MCG: 10 INJECTION INTRAVENOUS at 10:31

## 2023-02-23 RX ADMIN — SUGAMMADEX 200 MG: 100 INJECTION, SOLUTION INTRAVENOUS at 13:01

## 2023-02-23 RX ADMIN — PHENYLEPHRINE HYDROCHLORIDE 100 MCG: 10 INJECTION INTRAVENOUS at 12:21

## 2023-02-23 RX ADMIN — ALBUMIN (HUMAN) 12.5 G: 12.5 INJECTION, SOLUTION INTRAVENOUS at 16:59

## 2023-02-23 RX ADMIN — PHENYLEPHRINE HYDROCHLORIDE 100 MCG: 10 INJECTION INTRAVENOUS at 12:25

## 2023-02-23 RX ADMIN — Medication 2000 MG: at 15:39

## 2023-02-23 RX ADMIN — ROCURONIUM BROMIDE 20 MG: 10 INJECTION, SOLUTION INTRAVENOUS at 11:46

## 2023-02-23 RX ADMIN — PROPOFOL 150 MG: 10 INJECTION, EMULSION INTRAVENOUS at 10:25

## 2023-02-23 RX ADMIN — MUPIROCIN: 20 OINTMENT TOPICAL at 15:40

## 2023-02-23 RX ADMIN — PHENYLEPHRINE HYDROCHLORIDE 100 MCG: 10 INJECTION INTRAVENOUS at 11:50

## 2023-02-23 RX ADMIN — MUPIROCIN: 20 OINTMENT TOPICAL at 21:12

## 2023-02-23 RX ADMIN — SUGAMMADEX 200 MG: 100 INJECTION, SOLUTION INTRAVENOUS at 12:53

## 2023-02-23 RX ADMIN — METOPROLOL TARTRATE 12.5 MG: 25 TABLET ORAL at 15:40

## 2023-02-23 RX ADMIN — DULOXETINE HYDROCHLORIDE 60 MG: 30 CAPSULE, DELAYED RELEASE ORAL at 08:46

## 2023-02-23 RX ADMIN — PHENYLEPHRINE HYDROCHLORIDE 50 MCG/MIN: 10 INJECTION INTRAVENOUS at 10:33

## 2023-02-23 RX ADMIN — FENTANYL CITRATE 50 MCG: 0.05 INJECTION, SOLUTION INTRAMUSCULAR; INTRAVENOUS at 12:11

## 2023-02-23 RX ADMIN — ROCURONIUM BROMIDE 50 MG: 10 INJECTION, SOLUTION INTRAVENOUS at 10:45

## 2023-02-23 RX ADMIN — ROCURONIUM BROMIDE 30 MG: 10 INJECTION, SOLUTION INTRAVENOUS at 12:31

## 2023-02-23 RX ADMIN — DOCUSATE SODIUM 100 MG: 100 CAPSULE ORAL at 08:47

## 2023-02-23 RX ADMIN — SODIUM CHLORIDE, PRESERVATIVE FREE 10 ML: 5 INJECTION INTRAVENOUS at 09:06

## 2023-02-23 RX ADMIN — PHENYLEPHRINE HYDROCHLORIDE 200 MCG: 10 INJECTION INTRAVENOUS at 10:32

## 2023-02-23 RX ADMIN — PHENYLEPHRINE HYDROCHLORIDE 100 MCG: 10 INJECTION INTRAVENOUS at 12:16

## 2023-02-23 RX ADMIN — PHENYLEPHRINE HYDROCHLORIDE 100 MCG: 10 INJECTION INTRAVENOUS at 12:46

## 2023-02-23 RX ADMIN — PHENYLEPHRINE HYDROCHLORIDE 200 MCG: 10 INJECTION INTRAVENOUS at 10:30

## 2023-02-23 RX ADMIN — PHENYLEPHRINE HYDROCHLORIDE 100 MCG: 10 INJECTION INTRAVENOUS at 12:30

## 2023-02-23 RX ADMIN — ALBUMIN (HUMAN) 12.5 G: 12.5 INJECTION, SOLUTION INTRAVENOUS at 20:54

## 2023-02-23 RX ADMIN — AMIODARONE HYDROCHLORIDE 0.5 MG/MIN: 50 INJECTION, SOLUTION INTRAVENOUS at 17:41

## 2023-02-23 RX ADMIN — CHLORHEXIDINE GLUCONATE 15 ML: 1.2 RINSE ORAL at 16:00

## 2023-02-23 RX ADMIN — SODIUM CHLORIDE, PRESERVATIVE FREE 10 ML: 5 INJECTION INTRAVENOUS at 08:48

## 2023-02-23 RX ADMIN — FENTANYL CITRATE 50 MCG: 0.05 INJECTION, SOLUTION INTRAMUSCULAR; INTRAVENOUS at 10:25

## 2023-02-23 RX ADMIN — ALBUMIN (HUMAN) 25 G: 12.5 SOLUTION INTRAVENOUS at 23:24

## 2023-02-23 RX ADMIN — FENTANYL CITRATE 50 MCG: 0.05 INJECTION, SOLUTION INTRAMUSCULAR; INTRAVENOUS at 11:19

## 2023-02-23 RX ADMIN — HEPARIN SODIUM 5000 UNITS: 5000 INJECTION INTRAVENOUS; SUBCUTANEOUS at 05:52

## 2023-02-23 RX ADMIN — SODIUM CHLORIDE: 9 INJECTION, SOLUTION INTRAVENOUS at 16:00

## 2023-02-23 RX ADMIN — MORPHINE SULFATE 4 MG: 4 INJECTION INTRAVENOUS at 20:09

## 2023-02-23 RX ADMIN — ATORVASTATIN CALCIUM 80 MG: 80 TABLET, FILM COATED ORAL at 08:47

## 2023-02-23 RX ADMIN — PHENYLEPHRINE HYDROCHLORIDE 100 MCG: 10 INJECTION INTRAVENOUS at 12:35

## 2023-02-23 RX ADMIN — INSULIN GLARGINE 20 UNITS: 100 INJECTION, SOLUTION SUBCUTANEOUS at 09:04

## 2023-02-23 RX ADMIN — ROCURONIUM BROMIDE 50 MG: 10 INJECTION, SOLUTION INTRAVENOUS at 10:25

## 2023-02-23 RX ADMIN — PHENYLEPHRINE HYDROCHLORIDE 100 MCG: 10 INJECTION INTRAVENOUS at 12:09

## 2023-02-23 RX ADMIN — MORPHINE SULFATE 4 MG: 4 INJECTION INTRAVENOUS at 17:15

## 2023-02-23 RX ADMIN — FENTANYL CITRATE 50 MCG: 0.05 INJECTION, SOLUTION INTRAMUSCULAR; INTRAVENOUS at 11:38

## 2023-02-23 RX ADMIN — SODIUM CHLORIDE: 9 INJECTION, SOLUTION INTRAVENOUS at 08:44

## 2023-02-23 RX ADMIN — OXYCODONE HYDROCHLORIDE AND ACETAMINOPHEN 2 TABLET: 5; 325 TABLET ORAL at 21:23

## 2023-02-23 RX ADMIN — Medication 0.2 MG: at 10:21

## 2023-02-23 RX ADMIN — METOPROLOL TARTRATE 25 MG: 25 TABLET, FILM COATED ORAL at 08:47

## 2023-02-23 ASSESSMENT — ENCOUNTER SYMPTOMS
CONSTIPATION: 0
SHORTNESS OF BREATH: 1
SINUS PRESSURE: 0
VOICE CHANGE: 0
VOMITING: 0
ABDOMINAL PAIN: 0
BLOOD IN STOOL: 0
WHEEZING: 0
SORE THROAT: 0
DIARRHEA: 0
NAUSEA: 0
COUGH: 0

## 2023-02-23 ASSESSMENT — PAIN DESCRIPTION - LOCATION
LOCATION: INCISION
LOCATION: INCISION

## 2023-02-23 ASSESSMENT — PAIN SCALES - GENERAL
PAINLEVEL_OUTOF10: 7
PAINLEVEL_OUTOF10: 8
PAINLEVEL_OUTOF10: 7

## 2023-02-23 ASSESSMENT — PAIN DESCRIPTION - ORIENTATION
ORIENTATION: LEFT
ORIENTATION: LEFT

## 2023-02-23 NOTE — ANESTHESIA PRE PROCEDURE
Department of Anesthesiology  Preprocedure Note       Name:  Greg Ruiz   Age:  79 y.o.  :  1955                                          MRN:  [id number]         Date:  2023      Surgeon: Estelita Cunningham):  Leanna Walker MD    Procedure: Procedure(s):  VIDEO ASSISTED THORACOSCOPY, DECORTICATION    Medications prior to admission:   Prior to Admission medications    Medication Sig Start Date End Date Taking? Authorizing Provider   amLODIPine (NORVASC) 5 MG tablet TAKE ONE TABLET BY MOUTH DAILY  Patient taking differently: nightly 22   Abdifatah Kraus MD   omeprazole (PRILOSEC) 20 MG delayed release capsule Take 20 mg by mouth nightly    Historical Provider, MD   fluticasone-salmeterol (ADVAIR DISKUS) 250-50 MCG/ACT AEPB diskus inhaler Inhale 1 puff into the lungs every 12 hours    Historical Provider, MD   metoprolol succinate (TOPROL XL) 25 MG extended release tablet Take 25 mg by mouth at bedtime    Historical Provider, MD   dutasteride (AVODART) 0.5 mg capsule Take 0.5 mg by mouth daily    Historical Provider, MD   finasteride (PROSCAR) 5 MG tablet Take 5 mg by mouth nightly    Historical Provider, MD   Misc. Devices (NASAL SPRAY BOTTLE) MISC by Does not apply route    Historical Provider, MD   CINNAMON PO Take by mouth    Historical Provider, MD   repaglinide (PRANDIN) 1 MG tablet Take 1 mg by mouth with breakfast and with evening meal 18   Historical Provider, MD   pimecrolimus (ELIDEL) 1 % cream Apply topically as needed Apply topically 2 times daily.     Historical Provider, MD   brimonidine-timolol (COMBIGAN) 0.2-0.5 % ophthalmic solution Place 1 drop into the right eye every 12 hours  Patient not taking: Reported on 2023    Historical Provider, MD   albuterol sulfate  (90 BASE) MCG/ACT inhaler Inhale 1 puff into the lungs in the morning and at bedtime    Historical Provider, MD   Liraglutide (VICTOZA SC) Inject 1.8 mg into the skin daily     Historical Provider, MD   fenofibrate (TRICOR) 145 MG tablet Take 145 mg by mouth nightly    Historical Provider, MD   dicyclomine (BENTYL) 10 MG capsule Take 10 mg by mouth 3 times daily (with meals)    Historical Provider, MD   atorvastatin (LIPITOR) 80 MG tablet Take 80 mg by mouth daily.     Historical Provider, MD   DULoxetine (CYMBALTA) 60 MG capsule Take 120 mg by mouth nightly    Historical Provider, MD       Current medications:    Current Facility-Administered Medications   Medication Dose Route Frequency Provider Last Rate Last Admin    amiodarone (CORDARONE) 450 mg in dextrose 5 % 250 mL infusion  0.5 mg/min IntraVENous Continuous Mikael Bowen MD 16.7 mL/hr at 02/22/23 1500 0.5 mg/min at 02/22/23 1500    glucose chewable tablet 16 g  4 tablet Oral PRN Worden Bridge, APRN - CNP        dextrose bolus 10% 125 mL  125 mL IntraVENous PRN Faisal Bridge, APRN - CNP        Or    dextrose bolus 10% 250 mL  250 mL IntraVENous PRN Faisal Bridge, APRN - CNP        glucagon (rDNA) injection 1 mg  1 mg SubCUTAneous PRN Faisal Bridge, APRN - CNP        dextrose 10 % infusion   IntraVENous Continuous PRN Faisal Bridge, APRN - CNP        metoprolol tartrate (LOPRESSOR) tablet 25 mg  25 mg Oral BID Marylene Modest, APRN - NP   25 mg at 02/22/23 2048    QUEtiapine (SEROQUEL) tablet 50 mg  50 mg Oral Nightly Carson Ryan MD   50 mg at 02/22/23 2045    metoprolol (LOPRESSOR) injection 5 mg  5 mg IntraVENous Q6H PRN Mikael Bowen MD   5 mg at 02/21/23 2055    cefTRIAXone (ROCEPHIN) 1,000 mg in sterile water 10 mL IV syringe  1,000 mg IntraVENous Q24H Joanette Schwab, MD   1,000 mg at 02/22/23 1607    docusate sodium (COLACE) capsule 100 mg  100 mg Oral TID Carson Ryan MD   100 mg at 02/22/23 2045    bisacodyl (DULCOLAX) suppository 10 mg  10 mg Rectal Daily PRN Carson Ryan MD        fentaNYL (SUBLIMAZE) injection 100 mcg  100 mcg IntraVENous Once Viviana Mondragon MD        ALPRAZolam Lisa Alejandro) tablet 0.5 mg  0.5 mg Oral Nightly PRN Susanna La Lal MD   0.5 mg at 02/19/23 0117    sodium chloride flush 0.9 % injection 10 mL  10 mL IntraCATHeter BID Denys Billy MD   10 mL at 02/22/23 1135    oxyCODONE (ROXICODONE) immediate release tablet 5 mg  5 mg Oral Q4H PRN Ulus Shone, MD   5 mg at 02/22/23 2045    melatonin tablet 5 mg  5 mg Oral Nightly Jamel Ricks MD   5 mg at 02/22/23 2047    insulin lispro (HUMALOG) injection vial 0-16 Units  0-16 Units SubCUTAneous TID WC Velasquez Galvez MD   8 Units at 02/22/23 1829    insulin lispro (HUMALOG) injection vial 0-4 Units  0-4 Units SubCUTAneous Nightly Velasquez Galvez MD        heparin (porcine) injection 5,000 Units  5,000 Units SubCUTAneous 3 times per day Velasquez Galvez MD   5,000 Units at 02/23/23 3646    glucose chewable tablet 16 g  4 tablet Oral PRN Keyanna Ga MD        dextrose bolus 10% 125 mL  125 mL IntraVENous PRN Keyanna Ga MD        Or    dextrose bolus 10% 250 mL  250 mL IntraVENous PRN Keyanna Ga MD        glucagon (rDNA) injection 1 mg  1 mg SubCUTAneous PRN Keyanna Ga MD        dextrose 10 % infusion   IntraVENous Continuous PRN Keyanna Ga MD        atorvastatin (LIPITOR) tablet 80 mg  80 mg Oral Daily Keyanna Ga MD   80 mg at 02/22/23 1133    DULoxetine (CYMBALTA) extended release capsule 60 mg  60 mg Oral BID Keyanna Ga MD   60 mg at 02/22/23 2045    dicyclomine (BENTYL) capsule 10 mg  10 mg Oral 4x Daily AC & HS Keyanna Ga MD   10 mg at 02/22/23 2045    finasteride (PROSCAR) tablet 5 mg  5 mg Oral Daily Keyanna Ga MD   5 mg at 02/22/23 2045    fenofibrate (TRIGLIDE) tablet 160 mg  160 mg Oral Daily Keyanna Ga MD   160 mg at 02/22/23 2045    pantoprazole (PROTONIX) tablet 40 mg  40 mg Oral QAM AC Keyanna Ga MD   40 mg at 02/22/23 0554    repaglinide (PRANDIN) tablet 1 mg  1 mg Oral TID [facility] Keyanna Ga MD   1 mg at 02/22/23 1608    brimonidine (ALPHAGAN) 0.2 % ophthalmic solution 1 drop  1 drop Right Eye BID Warren Naik MD   1 drop at 02/22/23 2044    And    timolol (TIMOPTIC) 0.5 % ophthalmic solution 1 drop  1 drop Right Eye BID Warren Naik MD   1 drop at 02/22/23 2044    insulin glargine (LANTUS) injection vial 20 Units  20 Units SubCUTAneous BID Nori Posadas MD   20 Units at 02/22/23 2043    lidocaine 1 % injection 20 mL  20 mL IntraDERmal Once Coral Mix MD        sodium chloride flush 0.9 % injection 5-40 mL  5-40 mL IntraVENous 2 times per day FLOYD Haney - CNP   10 mL at 02/22/23 2050    sodium chloride flush 0.9 % injection 5-40 mL  5-40 mL IntraVENous PRN Vicente Diggs APRN - CNP   10 mL at 02/19/23 0116    0.9 % sodium chloride infusion   IntraVENous PRN Vicente Diggs APRN - CNP        ondansetron (ZOFRAN-ODT) disintegrating tablet 4 mg  4 mg Oral Q8H PRN Vicente Diggs APRN - CNP        Or    [facility]ansetron [facility] injection 4 mg  4 mg IntraVENous Q6H PRN Vicente Diggs APRN - CNP   4 mg at 02/21/23 1753    polyethylene glycol (GLYCOLAX) packet 17 g  17 g Oral Daily PRN Vicente Diggs APRN - CNP   17 g at 02/18/23 0907    acetaminophen (TYLENOL) tablet 650 mg  650 mg Oral Q6H PRN Vicente Diggs APRN - CNP   650 mg at 02/20/23 1716    Or    acetaminophen (TYLENOL) suppository 650 mg  650 mg Rectal Q6H PRN Vicente Diggs APRN - CNP        albuterol (PROVENTIL) nebulizer solution 2.5 mg  2.5 mg Nebulization Q2H PRN FLOYD Haney - CNP   2.5 mg at 02/15/23 0845       Allergies:     Allergies   Allergen Reactions    Latex Rash    Gabapentin      Other reaction(s): Vomiting    Meperidine Anaphylaxis    Erythromycin      Other reaction(s): Fever  Had all side effects associated with this medication      Glimepiride      Other reaction(s): Dizziness    Hydrocodone      Other reaction(s): Vomiting    Macrolides And Ketolides Other (See Comments)    Niacin And Related     Trelegy Ellipta [Fluticasone-Umeclidin-Vilant]     Pregabalin Diarrhea    Xanax [Alprazolam] Anxiety     Anxiety, restlessness, insomnia       Problem List:    Patient Active Problem List   Diagnosis Code    CKD (chronic kidney disease), stage III ([facility]) N18.30    Nephrolithiasis N20.0    [facility] mellitus) [facility] E11.9    Non-rheumatic mitral regurgitation I34.0    Nonrheumatic aortic valve insufficiency I35.1    Pulmonary hypertension ([facility]) I27.20    Type 2 diabetes mellitus with diabetic chronic kidney disease ([facility]) E11.22    Sarcoidosis D86.9    Non Hodgkin's lymphoma ([facility] C85.90    Hyperlipidemia E78.5    Family history of abdominal aortic aneurysm (AAA) Z82.49    AAA (abdominal aortic aneurysm) without rupture I71.40    Essential hypertension I10    Abnormal echocardiogram R93.1    Dilated aortic root ([facility]) I77.810    Acute interstitial nephritis N10    KELSEA (acute kidney injury) ([facility] N17.9    Pneumonia due to infectious organism J18.9    Pneumonia, unspecified organism J18.9    Pleural effusion J90    Hyperkalemia E87.5    Acute respiratory failure with hypoxia ([facility]) J96.01    Hyponatremia E87.1    Hyperglycemia R73.9    History of non-Hodgkin's lymphoma Z85.72    History of sarcoidosis Z86.2    Empyema ([facility]) J86.9       Past Medical History:        Diagnosis Date    Acute interstitial nephritis 08/03/2022    Unclear cause. Creat bumped to 2.9, baseline in oct 2020 1.6. Exact description of his kidney biopsy is not available to me however pathologist seems to think there is diabetic glomerosclerosis and acute interstitial nephritis seen on the biopsy specimen. No mention about chronic changes.   Because of acute interstitial nephritis a trial of steroids for 15 days will be given to see if I can impro    Aortic regurgitation     ARF (acute renal failure) ([facility] 08/03/2022 kidney biopsy from June 2022  showing findings of acute interstitial nephritis.  Exact description not available.  Trial of steroids will be given to see if I can improve renal function.  Creatinine in October 2020 was 1.6, creatinine in June 2022 was 2.9.  Trial of steroids will be given to see if renal function can be improved.   • Chronic kidney disease    • Hyperlipidemia    • Hypertension    • Non-Hodgkin lymphoma (HCC)    • Research study patient 02/13/2023    [id number] Completed 2/15/23   • Sarcoidosis    • Type II or unspecified type diabetes mellitus without mention of complication, not stated as uncontrolled        Past Surgical History:        Procedure Laterality Date   • CARPAL TUNNEL RELEASE Left 11/15    • EYE SURGERY Right     shunt and cataract    • EYE SURGERY Right 6/16    laser    • IR CHEST TUBE INSERTION  2/13/2023    IR CHEST TUBE INSERTION 2/13/2023 [facility]   • IR CHEST TUBE INSERTION  2/18/2023    IR CHEST TUBE INSERTION 2/18/2023 Jose SANTACRUZ MD [facility]   • KNEE ARTHROSCOPY     • MALIGNANT SKIN LESION EXCISION     • ROTATOR CUFF REPAIR Right 7/14   • ROTATOR CUFF REPAIR Left 5/15   • TOTAL KNEE ARTHROPLASTY Right 10/2018       Social History:    Social History     Tobacco Use   • Smoking status: Never   • Smokeless tobacco: Former   • Tobacco comments:     minimal and infrequent    Substance Use Topics   • Alcohol use: Yes     Alcohol/week: 0.0 standard drinks     Comment: rare                                Counseling given: Not Answered  Tobacco comments: minimal and infrequent       Vital Signs (Current):   Vitals:    02/23/23 0101 02/23/23 0300 02/23/23 0400 02/23/23 0500   BP:   97/65    Pulse: (!) 118 (!) 107 (!) 109 (!) 113   Resp: 24 25 30 25   Temp:   98.2 °F (36.8 °C)    TempSrc:   Oral    SpO2: 100% 94% 94% 96%   Weight:       Height:                                                  BP Readings from Last 3 Encounters:   02/23/23 97/65  02/12/23 126/87   11/09/22 134/62       NPO Status:                                                                                 BMI:   Wt Readings from Last 3 Encounters:   02/21/23 244 lb 4.3 oz (110.8 kg)   02/12/23 245 lb (111.1 kg)   08/03/22 242 lb 11.2 oz (110.1 kg)     Body mass index is 32.23 kg/m².     CBC:   Lab Results   Component Value Date/Time    WBC 24.1 02/23/2023 06:00 AM    RBC 4.03 02/23/2023 06:00 AM    RBC 5.15 04/03/2012 11:08 AM    HGB 11.2 02/23/2023 06:00 AM    HCT 36.6 02/23/2023 06:00 AM    MCV 90.8 02/23/2023 06:00 AM    RDW 15.7 02/23/2023 06:00 AM     02/23/2023 06:00 AM     04/03/2012 11:08 AM       CMP:   Lab Results   Component Value Date/Time     02/22/2023 07:25 AM    K 5.1 02/22/2023 07:25 AM    CL 97 02/22/2023 07:25 AM    CO2 23 02/22/2023 07:25 AM    BUN 47 02/22/2023 07:25 AM    CREATININE 2.62 02/22/2023 07:25 AM    GFRAA >60 04/03/2012 11:08 AM    LABGLOM 26 02/22/2023 07:25 AM    GLUCOSE 180 02/22/2023 07:25 AM    GLUCOSE 140 04/03/2012 11:08 AM    PROT 6.5 02/13/2023 01:36 PM    PROT 6.4 05/29/2012 10:35 AM    CALCIUM 9.4 02/22/2023 07:25 AM    BILITOT 0.6 02/13/2023 01:36 PM    AST 29 02/13/2023 01:36 PM    ALT 18 02/13/2023 01:36 PM       POC Tests:   Recent Labs     02/22/23 2042   POCGLU 182*       Coags:   Lab Results   Component Value Date/Time    PROTIME 11.7 02/22/2023 11:57 AM    INR 1.1 02/22/2023 11:57 AM       HCG (If Applicable): No results found for: PREGTESTUR, PREGSERUM, HCG, HCGQUANT     ABGs: No results found for: PHART, PO2ART, YAK0PEH, BDN6CRJ, BEART, N0UILYAH     Type & Screen (If Applicable):  No results found for: LABABO, LABRH    Drug/Infectious Status (If Applicable):  Lab Results   Component Value Date/Time    HEPCAB NONREACTIVE 05/29/2012 10:35 AM       COVID-19 Screening (If Applicable):   Lab Results   Component Value Date/Time    COVID19 Not Detected 02/13/2023 01:51 AM           Anesthesia Evaluation  Patient summary reviewed  Airway: Mallampati: II          Dental:          Pulmonary:normal exam  breath sounds clear to auscultation  (+) pneumonia:                            ROS comment: Acute respiratory failure with hypoxia  Sarcoidosis   Cardiovascular:  Exercise tolerance: good (>4 METS),   (+) hypertension:, valvular problems/murmurs: MR and AI, dysrhythmias: atrial fibrillation,         Rhythm: regular  Rate: normal                    Neuro/Psych:   Negative Neuro/Psych ROS              GI/Hepatic/Renal:   (+) renal disease: kidney stones,           Endo/Other:    (+) DiabetesType II DM, , .                  ROS comment: Non Hodgkin's lymphoma Abdominal:             Vascular:   + PVD, aortic or cerebral, . ROS comment: AAA (abdominal aortic aneurysm) without rupture. Other Findings:           Anesthesia Plan      general     ASA 4       Induction: intravenous. arterial line, CVP, BIS and central line  MIPS: Postoperative opioids intended, Prophylactic antiemetics administered and Postoperative trial extubation. Anesthetic plan and risks discussed with patient. Plan discussed with CRNA. Narrative & Impression    Undetermined rhythm  Low voltage QRS  ST elevation consider lateral injury or acute infarct  ** ** ACUTE MI / STEMI ** **  Abnormal ECG  When compared with ECG of 22-FEB-2023 01:20,  Current undetermined rhythm precludes rhythm comparison, needs review  ST no longer elevated in Inferior leads  ST less elevated in Lateral leads      Specimen Collected: 02/22/23 12:20 EST          Summary  Left ventricle is normal in size. Global left ventricular systolic function  is difficult to assess due to heart rate but appears normal. Calculated  ejection fraction 51% by Atkinson's method. Aortic valve is trileaflet and opens adequately. Trivial aortic insufficiency. Normal tricuspid valve structure. Trivial tricuspid regurgitation. Cards  1. Preoperative clearance for VATS procedure.  Patient has a revised cardiac index score of . METS greater than 4.  2. Empyema  3. Loculated left-sided pleural effusion   4. Acute hypoxic respiratory failure secondary to pneumonia  5. Pneumonia  6. History of sarcoidosis  History of non-Hodgkin's lymphoma  7. History of nonrheumatic mitral regurgitation  8. Tricuspid regurgitation  9. History of dilated aortic root  10. Type 2 diabetes  11. Chronic kidney disease stage III/IV  12. History of Afib   Plan of Treatment:   13. Afib with RVR - on amio drip , was given dig dose and not effective , will change toprol xl to Lopresor and increase dose , continue PRN lopressor IV , needs to be on [facility] when ok with CTS   14. CTS consulted -Plan for OR tomorrow for left sided VATS decortication  15. CKD- creatinine is worsening  , recommend nephro consult   16. purulent fluid s/p thoracentesis - ID and pulmonary following   17. Preserved LV function on ECHO     Aorta: Maximum suprarenal aorta is 2.3 cm, without intraluminal content. Conclusions: No evidence of abdominal aortic aneurysm (AAA) or iliac artery aneurysm, bilaterally.   Maximum aortic diameter is 2.3 cm. Recommendations: Any questions prior to finalization, please call the reading physician during normal business hours at the phone number beside their name.      Susan Avelar MD   2/23/2023

## 2023-02-23 NOTE — PROGRESS NOTES
Physical Therapy        Physical Therapy Cancel Note      DATE: 2023    NAME: Ellen Vegas  MRN: [id number]   : 1955      Patient not seen this date for Physical Therapy due to:    Surgery/Procedure:   VATS decortication      Electronically signed by Radha Conrad PTA on 2023 at 10:47 AM

## 2023-02-23 NOTE — PROGRESS NOTES
[facility]  Office: [address], DO, Macmely Shiet, DO, Brenda Pettit, DO, Osvaldo Devaughn Lamar, DO, Marcelino Zhang MD, Abby Richter MD, Conor Tobias MD, Dylan Hernandez MD,  Paz Sanchez MD, Steffanie Molina MD, Kash Molina, DO, Mary Christina MD,  Francheska Lewis MD, Bernice Delgado MD, Chase Abreu, DO, Kristen Mascorro MD, Vincent Carter MD, Robin Heller, DO, Luc Ashford MD, Gaby Redman MD, Haim Ramon MD, David Kennedy MD, Sybil Condon DO, Declan Grubbs MD, Jennifer Delgado MD, Maicol Garcia, CNP,  Melissa Dick, CNP, Mario Richmond, CNP, Adolfo Lisa, CNP,  Olamide Springer, [facility], Angie Carrizales, CNP, Ara Iniguez, CNP, Fernanda Evans, CNP, Estrella Gusman, CNP, Brayan Chavez, CNP, Cookie Bella PA-C, Robson Loomis, CNS, Donna Ham, CNP, Bessie Cabrera, [address] [facility]      Daily Progress Note     Admit Date: 2/12/2023  Bed/Room No.  1007/1007-01  Admitting Physician : Conor Tobias MD  Code Status :Full Code  Hospital Day:  LOS: 11 days   Chief Complaint:     Shortness of breath      Principal Problem:    Pneumonia, unspecified organism  Active Problems:    Pleural effusion    Hyperkalemia    Acute respiratory failure with hypoxia (HCC)    Hyponatremia    Hyperglycemia    History of non-Hodgkin's lymphoma    History of sarcoidosis    Empyema (HCC)    KELSEA (acute kidney injury) [facility])  Resolved Problems:    * No resolved hospital problems. *    Subjective : Interval History/Significant events :  02/23/23    Patient seen after surgery today. Patient underwent VATS with decortication. He has left-sided chest tube in place. Patient is complaining of severe chest pain at surgical site. He is on supplemental oxygen by nonrebreather. He is alert, oriented. Wife and daughter at bedside. Vitals - Stable afebrile, tachycardia  Labs -worsening kidney function 3.2 creatinine, BUN 51.   Kidney US did not show any hydronephrosis . Nursing notes , Consults notes reviewed. Overnight events and updates discussed with Nursing staff . Background History:         Edgar Beverly is 79 y.o. male  Who was admitted to the hospital on 2/12/2023 for treatment of Pneumonia, unspecified organism. Patient presented to ER at [facility] on 2/12/2023 with dyspnea and was found to have left multiloculated pneumonia with large pleural effusion with mediastinal and bilateral axillary lymphadenopathy. Patient has prior history of non-Hodgkin's lymphoma, multiple myeloma. Patient was requiring high flow nasal cannula and had thoracentesis. Pleural fluid was consistent with empyema and patient had chest tube placement by IR. He was treated with broad-spectrum antibiotics. Patient was also given dornase without much improvement. CT surgery was consulted and recommended increasing the size of chest tube and did not recommend any open surgical intervention at this time. Patient had exchange of chest tube on 2/18/2023. He had diffuse ST elevation secondary to pericarditis on 1/21/23. Patient had new onset atrial fibrillation with RVR on 10/22/23 and was given digoxin and amiodarone infusion . PMH:  Past Medical History:   Diagnosis Date    Acute interstitial nephritis 08/03/2022    Unclear cause. Creat bumped to 2.9, baseline in oct 2020 1.6. Exact description of his kidney biopsy is not available to me however pathologist seems to think there is diabetic glomerosclerosis and acute interstitial nephritis seen on the biopsy specimen. No mention about chronic changes. Because of acute interstitial nephritis a trial of steroids for 15 days will be given to see if I can impro    Aortic regurgitation     ARF (acute renal failure) ([facility] [address] 08/03/2022    kidney biopsy from June 2022  showing findings of acute interstitial nephritis. Exact description not available. Trial of steroids will be given to see if I can improve renal function. Creatinine in October 2020 was 1.6, creatinine in June 2022 was 2.9. Trial of steroids will be given to see if renal function can be improved. Chronic kidney disease     Hyperlipidemia     Hypertension     Non-Hodgkin lymphoma ([facility] [address]     Research study patient 02/13/2023    [id number] Completed 2/15/23    Sarcoidosis     Type II or unspecified type diabetes mellitus without mention of complication, not stated as uncontrolled       Allergies: Allergies   Allergen Reactions    Latex Rash    Gabapentin      Other reaction(s): Vomiting    Meperidine Anaphylaxis    Erythromycin      Other reaction(s): Fever  Had all side effects associated with this medication      Glimepiride      Other reaction(s): Dizziness    Lidocaine Swelling     States as a child having reaction to lidocaine where his face swelled. Ever since then he has never had lidocaine    Hydrocodone      Other reaction(s): Vomiting    Macrolides And Ketolides Other (See Comments)    Niacin And Related     Trelegy Ellipta [Fluticasone-Umeclidin-Vilant]     Pregabalin Diarrhea    Xanax [Alprazolam] Anxiety     Anxiety, restlessness, insomnia      Medications :  sodium chloride flush, 5-40 mL, IntraVENous, 2 times per day  mupirocin, , Nasal, BID  chlorhexidine, 15 mL, Mouth/Throat, Once  chlorhexidine, , Topical, See Admin Instructions  sodium chloride flush, 5-40 mL, IntraVENous, 2 times per day  polyethylene glycol, 17 g, Oral, Daily  sodium chloride flush, 5-40 mL, IntraVENous, 2 times per day  vancomycin, , ,   sterile water, , ,   propofol, , ,   vancomycin, , ,   sugammadex, , ,       Review of Systems   Review of Systems   Constitutional:  Positive for activity change, appetite change and fatigue. Negative for chills, fever and unexpected weight change. HENT:  Negative for congestion, mouth sores, postnasal drip, sinus pressure, sore throat and voice change. Eyes:  Negative for visual disturbance.    Respiratory:  Positive for shortness of breath. Negative for cough and wheezing. Cardiovascular:  Negative for chest pain and palpitations. Gastrointestinal:  Negative for abdominal pain, blood in stool, constipation, diarrhea, nausea and vomiting. Endocrine: Negative for polyuria. Genitourinary:  Negative for difficulty urinating, dysuria, frequency and urgency. Musculoskeletal:  Negative for arthralgias, joint swelling and myalgias. Neurological:  Negative for dizziness, tremors, speech difficulty, light-headedness and headaches. Psychiatric/Behavioral:  Negative for confusion. Objective :      Current Vitals : Temp: 98.4 °F (36.9 °C),  Heart Rate: 99, Resp: 17, BP: 111/64, SpO2: 98 %  Last 24 Hrs Vitals   Patient Vitals for the past 24 hrs:   BP Temp Temp src Pulse Resp SpO2 Height Weight   02/23/23 1400 111/64 98.4 °F (36.9 °C) Oral 99 17 98 % 6' 1\" (1.854 m) 236 lb 15.9 oz (107.5 kg)   02/23/23 0730 99/72 98.4 °F (36.9 °C) Oral (!) 110 25 -- -- --   02/23/23 0500 -- -- -- (!) 113 25 96 % -- --   02/23/23 0400 97/65 98.2 °F (36.8 °C) Oral (!) 109 30 94 % -- --   02/23/23 0300 -- -- -- (!) 107 25 94 % -- --   02/23/23 0101 -- -- -- (!) 118 24 100 % -- --   02/23/23 0000 95/76 98.2 °F (36.8 °C) Oral (!) 118 24 96 % -- --   02/22/23 2200 91/75 -- -- (!) 119 28 99 % -- --   02/22/23 2048 -- -- -- (!) 129 -- -- -- --   02/22/23 2004 109/85 98.1 °F (36.7 °C) Oral (!) 134 (!) 31 100 % -- --   02/22/23 1614 91/72 98.2 °F (36.8 °C) Oral (!) 128 25 98 % -- --     Intake / output   02/21 1901 - 02/23 0700  In: 1645.8 [P.O.:300; I.V.:856.9]  Out: 1000 [Urine:1000]  Physical Exam:  Physical Exam  Vitals and nursing note reviewed. Constitutional:       General: He is not in acute distress. Appearance: He is not diaphoretic. Interventions: Nasal cannula in place. HENT:      Head: Normocephalic and atraumatic. Nose:      Right Sinus: No maxillary sinus tenderness or frontal sinus tenderness.       Left Sinus: No maxillary sinus tenderness or frontal sinus tenderness. Mouth/Throat:      Pharynx: No oropharyngeal exudate. Eyes:      General: No scleral icterus. Conjunctiva/sclera: Conjunctivae normal.      Pupils: Pupils are equal, round, and reactive to light. Neck:      Thyroid: No thyromegaly. Vascular: No JVD. Cardiovascular:      Rate and Rhythm: Normal rate and regular rhythm. Pulses:           Dorsalis pedis pulses are 2+ on the right side and 2+ on the left side. Heart sounds: Normal heart sounds. No murmur heard. Pulmonary:      Effort: Pulmonary effort is normal.      Breath sounds: Normal breath sounds. No wheezing or rales. Chest:      Comments: Left chest tube in place. Abdominal:      Palpations: Abdomen is soft. There is no mass. Tenderness: There is no abdominal tenderness. Musculoskeletal:      Cervical back: Full passive range of motion without pain and neck supple. Lymphadenopathy:      Head:      Right side of head: No submandibular adenopathy. Left side of head: No submandibular adenopathy. Cervical: No cervical adenopathy. Skin:     General: Skin is warm. Neurological:      Mental Status: He is alert and oriented to person, place, and time. Motor: No tremor. Psychiatric:         Behavior: Behavior is cooperative.          Laboratory findings:    Recent Labs     02/21/23  0622 02/22/23  0725 02/22/23  1157 02/23/23  0600   WBC 22.9* 24.0*  --  24.1*   HGB 11.4* 11.2*  --  11.2*   HCT 38.1* 36.0*  --  36.6*   * 434  --  446   INR  --   --  1.1  --      Recent Labs     02/21/23  0622 02/22/23  0215 02/22/23  0725 02/23/23  0600   *  --  131* 130*   K 5.5* 5.8* 5.1 4.9   CL 98  --  97* 96*   CO2 23  --  23 25   GLUCOSE 150*  --  180* 135*   BUN 47*  --  47* 51*   CREATININE 1.96*  --  2.62* 3.20*   MG  --  2.2  --   --    CALCIUM 9.8  --  9.4 9.4     No results for input(s): PROT, LABALBU, LABA1C, P7CVCQR, A9FGSJL, FT4, TSH, AST, ALT, LDH, GGT, ALKPHOS, BILITOT, BILIDIR, AMMONIA, AMYLASE, LIPASE, LACTATE, CHOL, HDL, LDLCHOLESTEROL, CHOLHDLRATIO, TRIG, VLDL, BNP, TROPONINI, CKTOTAL, CKMB, CKMBINDEX, RF, SAMANTHA in the last 72 hours. Specific Gravity, UA   Date Value Ref Range Status   02/22/2023 1.020 1.005 - 1.030 Final     Protein, UA   Date Value Ref Range Status   02/22/2023 1+ (A) NEGATIVE Final     RBC, UA   Date Value Ref Range Status   02/22/2023 None 0 - 2 /HPF Final     Bacteria, UA   Date Value Ref Range Status   02/22/2023 MODERATE (A) None Final     Nitrite, Urine   Date Value Ref Range Status   02/22/2023 NEGATIVE NEGATIVE Final     WBC, UA   Date Value Ref Range Status   02/22/2023 2 TO 5 0 - 5 /HPF Final     Leukocyte Esterase, Urine   Date Value Ref Range Status   02/22/2023 NEGATIVE NEGATIVE Final       Imaging / Clinical Data :-   CT CHEST WO CONTRAST    Result Date: 2/17/2023  Small to moderate-sized multiloculated left pleural effusions have improved. New small foci of air within the collection is presumably related to the introduction of the left thoracostomy tube. Stable small to moderate sized mediastinal lymph nodes are nonspecific but may relate to the patient's history of lymphoma. Additional small bilateral axial lymph nodes are identified. Small hiatal hernia. CT CHEST WO CONTRAST    Result Date: 2/13/2023  1. Moderate to large amount of multiloculated left pleural fluid. Assessment for pleural thickening and/or pleural nodularity is limited without IV contrast. 2. Consolidation in the left lower lobe, likely atelectasis, but possibility of superimposed pneumonia would be difficult to exclude. 3. Mediastinal and bilateral axillary lymphadenopathy, which may be related to the patient's history of lymphoma. If available, comparison to any recent imaging may be of use to assess for progression. XR CHEST PORTABLE    Result Date: 2/18/2023  Moderate left pleural effusion increased.   No change left chest tube/pigtail catheter. XR CHEST PORTABLE    Result Date: 2/17/2023  Similar findings when rotation taken into account; left chest tube with unchanged or slightly increased loculated appearing left pleural effusion; atelectatic appearing changes. No pneumothorax. XR CHEST PORTABLE    Result Date: 2/14/2023  Interval placement of a left chest tube with reduced left pleural fluid. XR CHEST PORTABLE    Result Date: 2/13/2023  No significant interval change. Redemonstration of opacification of the mid to lower left lung field which may in part be due to loculated left pleural effusion. Consider further evaluation with CT. XR CHEST PORTABLE    Result Date: 2/12/2023  Probable left-sided pneumonia with loculated pleural effusion. Underlying pathology not excluded, as above. Mild cardiomegaly and venous hypertension. Discoid atelectasis right base. RECOMMENDATION: Consider CT chest.     IR CHEST TUBE INSERTION    Result Date: 2/14/2023  Successful ultrasound guided placement of a left 10 French chest tube        Clinical Course : unchanged  Assessment and Plan  :        Left empyema -   Pleural fluid growing strep viridans gordonii -ID following. Chest tube in place. Cefepime switched to Rocephin. Patient received tPA. Patient had poor improvement and underwent VATS with decortication on 2/23/2023.  s. New onset Afib RVR - continue amiodarone infusion, Digoxin , no AC due to surgery. Cardiology following. Pericarditis -preserved ejection fraction, no pericardial effusion. Acute respiratory failure with hypoxia - continue O2 supplement. H/o non-Hodgkin's lymphoma and multiple myeloma  H/o sarcoidosis -   Acute kidney injury with CKD stage III - baseline creatinine 1.9  -fluid management per nephrology. Hyperkalemia due to decreased renal clearance- Kayexalate. Demand ischemia from acute respiratory failure  Reactive thrombocytosis. Obstructive sleep apnea -CPAP at nighttime.   COPD - albuterol as needed Acute Delirium - continue  Seroquel dose at night     Discussed with patient's wife at bedside. Continue to monitor vitals , Intake / output ,  Cell count , HGB , Kidney function, oxygenation  as indicated . Plan and updates discussed with patient ,  answers  explained to satisfaction.    Plan discussed with Staff Ananda So RN     (Please note that portions of this note were completed with a voice recognition program. Efforts were made to edit the dictations but occasionally words are mis-transcribed.)      Mitesh Nguyen MD  2/23/2023

## 2023-02-23 NOTE — PROGRESS NOTES
[facility] Cardiothoracic Surgery  Pre-op Note    2/23/2023    Simran Dang is scheduled for surgery today. All of the pertinent studies have been reviewed and patient is NPO. I have discussed the procedure with the patient and family, and they have been given opportunity to ask questions. The attendant risks, benefits, and possible outcomes have been discussed with them. They understand and have signed informed consent.       Annalee Hwang MD

## 2023-02-23 NOTE — PLAN OF CARE
Problem: Respiratory - Adult  Goal: Achieves optimal ventilation and oxygenation  Flowsheets  Taken 2/23/2023 0556 by Sara Singletary RCP  Achieves optimal ventilation and oxygenation:   Assess for changes in respiratory status   Position to facilitate oxygenation and minimize respiratory effort   Respiratory therapy support as indicated   Assess for changes in mentation and behavior   Oxygen supplementation based on oxygen saturation or arterial blood gases   Encourage broncho-pulmonary hygiene including cough, deep breathe, incentive spirometry   Assess and instruct to report shortness of breath or any respiratory difficulty  Taken 2/22/2023 2000 by Ariana Bass RN  Achieves optimal ventilation and oxygenation:   Assess for changes in respiratory status   Assess for changes in mentation and behavior   Position to facilitate oxygenation and minimize respiratory effort   Oxygen supplementation based on oxygen saturation or arterial blood gases   Initiate smoking cessation protocol as indicated   Encourage broncho-pulmonary hygiene including cough, deep breathe, incentive spirometry   Assess the need for suctioning and aspirate as needed   Assess and instruct to report shortness of breath or any respiratory difficulty   Respiratory therapy support as indicated

## 2023-02-23 NOTE — ANESTHESIA PROCEDURE NOTES
Central Venous Line:    A central venous line was placed using surface landmarks, in the OR for the following indication(s): central venous access. Sterility preparation included the following: hand hygiene performed prior to procedure, maximum sterile barriers used and sterile technique used to drape from head to toe. The patient was placed in Trendelenburg position. The left subclavian vein was prepped. The site was prepped with Betadine. A 7.5 Fr (size), 16 (length), double lumen was placed. During the procedure, the following specific steps were taken: target vein identified, needle advanced into vein and blood aspirated and guidewire advanced into vein. Intravenous verification was obtained by venous blood return. Post insertion care included: all ports aspirated, all ports flushed easily, guidewire removed intact, Biopatch applied, line sutured in place and dressing applied. During the procedure the patient experienced: patient tolerated procedure well with no complications.       Anesthesia type: general  Staffing  Performed: Anesthesiologist   Anesthesiologist: Susan Avelar MD  Preanesthetic Checklist  Completed: patient identified, IV checked, site marked, risks and benefits discussed, surgical/procedural consents, equipment checked, pre-op evaluation, timeout performed, anesthesia consent given, oxygen available and monitors applied/VS acknowledged

## 2023-02-23 NOTE — PROGRESS NOTES
PULMONARY & CRITICAL CARE MEDICINE PROGRESS NOTE     Patient:  Milla Ochoa  MRN: [id number]  6 [facility] date: 2/12/2023  Primary Care Physician: Anton Adan MD  Consulting Physician: Geoff Villalpando MD  CODE Status: Full Code  LOS: 6     SUBJECTIVE     CHIEF COMPLAINT/REASON FOR CONSULT:  Acute Hypoxic respiratory failure    HISTORY OF PRESENT ILLNESS:  Milla Ochoa is a 74 y.o. male with past medical history significant for type 2 diabetes mellitus, multiple myeloma, non-Hodgkin lymphoma, sarcoidosis, referred from [facility] ER with worsening respiratory status, shortness of breath, nonproductive cough. He was started on BiPAP and given antibiotics. Chest x-ray was concerning for loculated left-sided pleural effusion. Transfer was made and accepted by hospitalist, further work-up. Critical care was consulted for worsening respiratory status. Patient was on high flow and respiratory rate was in 38-40. Patient was very dyspneic and having difficulty breathing with accessory muscle use. Initially he was unable to keep BiPAP but later on able to keep BiPAP. He improved symptomatically with BiPAP and respiratory rate was in 26. Patient is transferred to the ICU for higher level of care. 2/13: Attempted to perform bedside thoracentesis, no clear pocket identified, sent to IR, chest tube placed, appears that an empyema was encountered and chest tube was placed growing gram positive cocci in pairs, on cefepime and vanco     2/14: Placed chest tube yesterday, weaned off high flow, feeling \"100 x better\".       2/15: Continued TPA/Dornase chest tube flush, Got anxious overnight requiring xanax, 1200 cc of purulent chest tube output in the last 24 hours, patient did not sleep last night     2/16: Added seroquel to help sleep and agitation at night, continued Dornase/TPA, 700 cc of chest tube output that was less purulent, afebrile, WBC downtrending, continue cefepime     2/17: WBC uptrended, sent cultures, tachypneic, restless overnight, given xanax, more tachycardic overnight as well, plan for CT chest today, will discuss possible CTA to evaluate for PE.    02/18: Cardiothoracic surgery consulted and they increased the size of the chest tube. Will likely need decortication. 02/20: Cardiothoracic surgery determined to review imaging and diagnostics with on-call surgeon, plan to repeat CT scan at later date, hold off on plan for decortication. Infectious Disease discontinued IV cefepime 02/20/23.    02/21: Patient more confused, oriented to self. Trying to get out of bed. Denies agitation but feeling overall week. Kidney US did not show hydronephrosis. Infectious disease recommends starting IV Rocephin 1 gm q24 hrs until 03/12/23. [id number] [address] [facility] for outpatient therapy. Echo performed. Pt declined PT.    02/22: Pt is paranoid and declined PT. Cardiothoracic planning for left-sided VATS decortication on 02/23/23. Breathing stable, 99% O2 saturation on 3L NC 40% FiO2. INTERVAL HISTORY:  2/23/2023  Patient s/p VATS/decortication today  Hemodynamically stable  Chest tube currently to suction  On supplemental oxygen  Remains on amiodarone infusion for A-fib    REVIEW OF SYSTEMS:  Review of Systems   Constitutional:  Positive for activity change, appetite change and fatigue. Negative for chills, diaphoresis, fever and unexpected weight change. HENT:  Negative for congestion. Respiratory:  Positive for cough and shortness of breath. Negative for apnea, choking, chest tightness, wheezing and stridor. Cardiovascular:  Positive for chest pain (at the site of chest tube insertion). Negative for leg swelling. Gastrointestinal:  Negative for abdominal distention, diarrhea, nausea and vomiting. Genitourinary:  Negative for difficulty urinating. Neurological:  Negative for dizziness and numbness. Psychiatric/Behavioral:  Negative for agitation.     OBJECTIVE     PaO2/FiO2 RATIO:  Recent Labs 23  1243   POCPO2 66.2*      FiO2 : 40 %     VITAL SIGNS:   LAST:  /64   Pulse 99   Temp 98.4 °F (36.9 °C) (Oral)   Resp 17   Ht 6' 1\" (1.854 m)   Wt 236 lb 15.9 oz (107.5 kg)   SpO2 98%   BMI 31.27 kg/m²   8-24 HR RANGE:  TEMP Temp  Av.3 °F (36.8 °C)  Min: 98.1 °F (36.7 °C)  Max: 98.4 °F (63.2 °C)   BP Systolic (23GEQ), CFR:62 , Min:91 , AGO:916      Diastolic (03FCT), UNH:32, Min:64, Max:85     PULSE Pulse  Av.7  Min: 99  Max: 134   RR Resp  Av  Min: 17  Max: 17   O2 SAT SpO2  Av %  Min: 98 %  Max: 98 %   OXYGEN DELIVERY No data recorded        SYSTEMIC EXAMINATION:   Constitutional:  Alert, cooperative and no distress. Mental Status:  Oriented to person, place and time and normal affect. Lungs: Decreased air entry on the left side with chest tube in place  Heart:  Regular rate and rhythm, no murmur. Abdomen:  Soft, nontender, nondistended, normal bowel sounds. Extremities:  No edema, redness, tenderness in the calves. Skin:  Warm, dry, no gross lesions or rashes. DATA REVIEW     Medications: Current Inpatient  Scheduled Meds:   sodium chloride flush  5-40 mL IntraVENous 2 times per day    mupirocin   Nasal BID    chlorhexidine  15 mL Mouth/Throat Once    chlorhexidine   Topical See Admin Instructions    metoprolol tartrate  12.5 mg Oral Once    sodium chloride flush  5-40 mL IntraVENous 2 times per day    polyethylene glycol  17 g Oral Daily    sodium chloride flush  5-40 mL IntraVENous 2 times per day    vancomycin        sterile water        propofol        vancomycin        sugammadex        ceFAZolin (ANCEF) IVPB  2,000 mg IntraVENous On Call to OR     Continuous Infusions:   [START ON 2023] sodium chloride      sodium chloride      sodium chloride      sodium chloride      sodium chloride         INPUT/OUTPUT:  In: 1526.3 [P.O.:100;  I.V.:926.3]  Out: Leeann Monk [[id number]]  Date 23 0000 - 23 2359   Shift 5279-6791 3932-8635 2940-0345 24 Hour Total INTAKE   I.V.(mL/kg) 66.7(0.6) 550(5.1)  616.7(5.7)   IV Piggyback(mL/kg)  500(4.7)  500(4.7)   Shift Total(mL/kg) 66.7(0.6) 1050(9.8)  1116.7(10.4)   OUTPUT   Urine(mL/kg/hr) 150(0.2) 245  395   Blood(mL/kg)  500(4.7)  500(4.7)   Chest Tube  180  180   Shift Total(mL/kg) 150(1.4) 925(8.6)  1075(10)   Weight (kg) 110.8 107.5 107.5 107.5          LABS:  ABGs:   Recent Labs     02/23/23  0534 02/23/23  1144 02/23/23  1243   POCPH 7.421 7.401 7. 356   POCPCO2 40.9 36.8 38.8   POCPO2 60.4* 100.3 66.2*   POCHCO3 26.6 22.8 21.7   ELSY1ZXN 91* 98 92*     CBC:   Recent Labs     02/21/23  0622 02/22/23  0725 02/23/23  0600   WBC 22.9* 24.0* 24.1*   HGB 11.4* 11.2* 11.2*   HCT 38.1* 36.0* 36.6*   MCV 92.3 88.5 90.8   * 434 446   LYMPHOPCT 28 29 38   RBC 4.13* 4.07* 4.03*   MCH 27.6 27.5 27.8   MCHC 29.9 31.1 30.6   RDW 15.8* 15.7* 15.7*       CRP:   No results for input(s): CRP in the last 72 hours. LDH:   No results for input(s): LDH in the last 72 hours. BMP:   Recent Labs     02/21/23  0622 02/22/23  0215 02/22/23  0725 02/23/23  0600   *  --  131* 130*   K 5.5* 5.8* 5.1 4.9   CL 98  --  97* 96*   CO2 23  --  23 25   BUN 47*  --  47* 51*   CREATININE 1.96*  --  2.62* 3.20*   GLUCOSE 150*  --  180* 135*       Liver Function Test:   No results for input(s): PROT, LABALBU, ALT, AST, GGT, ALKPHOS, BILITOT in the last 72 hours. Coagulation Profile:   Recent Labs     02/22/23  1157   INR 1.1   PROTIME 11.7       D-Dimer:  No results for input(s): DDIMER in the last 72 hours. Lactic Acid:  No results for input(s): LACTA in the last 72 hours. Cardiac Enzymes:  No results for input(s): CKTOTAL, CKMB, CKMBINDEX, TROPONINI in the last 72 hours. Invalid input(s): TROPONIN, HSTROP  BNP/ProBNP:   No results for input(s): BNP, PROBNP in the last 72 hours. Triglycerides:  No results for input(s): TRIG in the last 72 hours. Microbiology:  Urine Culture:  No components found for: COLBY  Blood Culture:   No components found for: CBLOOD, CFUNGUSBL  Sputum Culture:  No components found for: CSPUTUM  Recent Labs     02/23/23  1133   SPECDESC . PLEURAL FLUID LEFT  . PLEURAL FLUID  . PLEURAL FLUID LEFT   CULTURE PENDING  DUPLICATE ORDER SEE ANAEROBIC, AEROBIC CULTURE     Recent Labs     02/22/23  1316 02/23/23  1133   CULTURE NO GROWTH  WRONG TEST ORDERED  DUE TO THE SPECIMEN TYPE, THE ORDER WAS CANCELED AND REORDERED. PLEASE REFER TO: MOLECULAR NASAL TEST. PENDING  DUPLICATE ORDER SEE ANAEROBIC, AEROBIC CULTURE          Radiology Reports:  XR CHEST PORTABLE   Final Result   Status post removal single small bore and placement 2 large-bore chest tubes   left lung with reduced opacity, presumably multiloculated left pleural   effusion and associated atelectasis. No pneumothorax. Slightly greater   atelectasis right base. US RETROPERITONEAL COMPLETE   Final Result   1. Simple cortical cyst at the upper pole left kidney measuring 6 mm. 2. Limited renal ultrasound. No hydronephrosis. 3. Nonvisualization of ureteral jets. Minimal distention of the urinary   bladder. Patient no urge to void during the exam.      RECOMMENDATIONS:   Unavailable         IR CHEST TUBE INSERTION   Final Result   Successful fluoroscopic up sizing of left sided chest tube with 14 [place]   tube placed. XR CHEST PORTABLE   Final Result   Moderate left pleural effusion increased. No change left chest tube/pigtail   catheter. VL DUP LOWER EXTREMITY VENOUS BILATERAL   Final Result      CT CHEST WO CONTRAST   Final Result   Small to moderate-sized multiloculated left pleural effusions have improved. New small foci of air within the collection is presumably related to the   introduction of the left thoracostomy tube. Stable small to moderate sized mediastinal lymph nodes are nonspecific but   may relate to the patient's history of lymphoma. Additional small bilateral   axial lymph nodes are identified.       Small hiatal hernia. XR CHEST PORTABLE   Final Result   Similar findings when rotation taken into account; left chest tube with   unchanged or slightly increased loculated appearing left pleural effusion;   atelectatic appearing changes. No pneumothorax. XR CHEST PORTABLE   Final Result   Interval placement of a left chest tube with reduced left pleural fluid. IR CHEST TUBE INSERTION   Final Result   Successful ultrasound guided placement of a left 10 [place] chest tube         CT CHEST WO CONTRAST   Final Result   1. Moderate to large amount of multiloculated left pleural fluid. Assessment   for pleural thickening and/or pleural nodularity is limited without IV   contrast.   2. Consolidation in the left lower lobe, likely atelectasis, but possibility   of superimposed pneumonia would be difficult to exclude. 3. Mediastinal and bilateral axillary lymphadenopathy, which may be related   to the patient's history of lymphoma. If available, comparison to any recent   imaging may be of use to assess for progression. XR CHEST PORTABLE   Final Result   No significant interval change. Redemonstration of opacification of the mid   to lower left lung field which may in part be due to loculated left pleural   effusion. Consider further evaluation with CT. Echocardiogram:   No results found for this or any previous visit.        ASSESSMENT AND PLAN     Assessment:    Acute hypoxic respiratory failure  Community acquired pneumonia  Empyema left side s/p VATS/decortication (1/23/2023)  Mediastinal and bilateral axillary lymphadenopathy  Hx of lymphoma  Hypertension  Hx of AAA  Hx of mitral regurgitation    Plan:    Patient s/p VATS this morning  Keep supplemental oxygen to keep oxygen saturation above 92%  Chest tube management as per cardiothoracic surgery  On Rocephin  Bronchodilator therapy  Encourage incentive spirometry/Acapella  Maintain bronchopulmonary hygiene  We will continue to follow    We will continue to follow. I would like to thank you for allowing me to participate in the care of this patient. Please feel free to call with any further questions or concerns.       Delfino De La Rosa MD  Pulmonary and critical care medicine  2/23/2023, 3:35 PM

## 2023-02-23 NOTE — BRIEF OP NOTE
Brief Postoperative Note      Patient: Phyllis Horner  YOB: 1955  MRN: [id number]    Date of Procedure: 2/23/2023    Pre-Op Diagnosis: EMPYEMA    Post-Op Diagnosis: Same       Procedure(s):  VIDEO ASSISTED THORACOSCOPY, DECORTICATION    Surgeon(s):  Chika Sharma MD    Assistant:  Surgical Assistant: Steve Carrillo    Anesthesia: General    Estimated Blood Loss (mL): 702    Complications: None    Specimens:   ID Type Source Tests Collected by Time Destination   1 : LEFT PLEURAL PEEL FOR CULTURES Tissue Lung CULTURE, FUNGUS, CULTURE, TISSUE, FUNGAL STAIN, CULTURE WITH SMEAR, ACID FAST BACILLIUS, CULTURE, ANAEROBIC AND AEROBIC (Canceled), CULTURE, VIRUS, NON RESPIRATORY Chika Sharma MD 2/23/2023 1257    A : LEFT PLEURAL FLUID Body Fluid Fluid CYTOLOGY, NON-GYN, BODY FLUID CELL COUNT, CULTURE, FUNGUS, CULTURE, BODY FLUID, FUNGAL STAIN, CULTURE WITH SMEAR, ACID FAST BACILLIUS, CULTURE, ANAEROBIC AND AEROBIC Chika Sharma MD 2/23/2023 1133    B : LEFT PLEURAL PEEL Tissue Tissue SURGICAL PATHOLOGY Chika Sharma MD 2/23/2023 1156    C : LEFT PLEURAL PEEL #2 Tissue Lung SURGICAL PATHOLOGY Chika Sharma MD 2/23/2023 1258        Implants:  * No implants in log *      Drains:   Chest Tube Left Pleural (Active)   Chest Tube Airleak No 02/23/23 0815   Status Continuous Suction 02/23/23 0815   Suction -20 cm H2O 02/22/23 0800   Drainage Description Serosanguinous 02/23/23 0815   Dressing Status Clean, dry & intact 02/23/23 0815   Chest Tube Dressing Petroleum Jelly 02/21/23 1800   Site Assessment Clean, dry & intact 02/23/23 0815   Surrounding Skin Clean, dry & intact 02/23/23 0815   Output (ml) 0 ml 02/23/23 0815       Chest Tube Left Pleural 1 (Active)       Chest Tube Left Pleural 2 (Active)       Urinary Catheter 02/23/23 [place] (Active)       [REMOVED] Chest Tube Left (Removed)   Chest Tube Airleak No 02/18/23 1830   Status Continuous Suction 02/18/23 0800   Suction -20 cm H2O 02/18/23 0800   Y Connector Used No 02/18/23 1830   Drainage Description Serosanguinous 02/18/23 1830   Dressing Status Clean, dry & intact 02/18/23 1830   Chest Tube Dressing Petroleum Jelly 02/18/23 0400   Site Assessment Clean, dry & intact 02/18/23 1830   Surrounding Skin Clean, dry & intact 02/18/23 1830   Output (ml) 50 ml 02/18/23 1600       Electronically signed by Brion Kocher, MD on 2/23/2023 at 1:39 PM

## 2023-02-23 NOTE — PROGRESS NOTES
[facility] - Progress Note  Today's Date and Time: 2/23/2023, 10:26 AM    Impression :   Left-sided empyema  Streptococci Gordonii on pleural fluid culture from 2/14/23  Repeat culture 2/18/23 with no bacterial growth   Shortness of breath  KELSEA on CKD  Hyponatremia  Leukocytosis  Hx non-Hodgkin's lymphoma  Hx multiple myeloma  Sarcoidosis  DM II  Interstitial nephritis  COPD  Hx partial right lung lobectomy  Allergies to macrolides and ketolides.    Recommendations:   2/20/23: Discontinued IV Cefepime on 2-20-23  IV Rocephin 1 gm q 24 hrs until 3/12/23  Ok for midline for outpatient therapy  Plan for decortication on 2/23/23    Medical Decision Making/Summary/Discussion:2/23/2023     Patient with Hx of non-Hodgkin's lymphoma and multiple myeloma  Admitted with Shortness of breath  Found to have Left-sided empyema  Streptococci Gordonii on pleural fluid culture from 2/14/23  Repeat culture 2/18/23 with no bacterial growth   Empyema partially drained by chest tube.   Decortication planned 2/23/23  Confusion with associated hyponatremia  KELSEA on CKD 3  On treatment with ceftriaxone.  Infection Control Recommendations   [place] Precautions    Antimicrobial Stewardship Recommendations     Simplification of therapy  Targeted therapy  PK dosing    Coordination of Outpatient Care:   Estimated Length of IV antimicrobials:3/12/23  Patient will need Midline Catheter Insertion: TBD  Patient will need PICC line Insertion:TBD  Patient will need: Home IV , Infusion Center,  SNF,  LTAC: TBD  Patient will need outpatient wound care:No    Chief complaint/reason for consultation:   Empyema-streptococcus Gordonii      History of Present Illness:   Cruzito Pittman is a 68 y.o.-year-old male who was initially admitted on 2/12/2023. Patient seen at the request of Dr. Aguirre    INITIAL HISTORY:    This patient, with a history of COPD, CKD, sarcoidosis, partial right lung lobectomy, multiple myeloma,  non-hodgkin's lymphoma, acute interstitial nephritis and DM II, [facility] with complaints of worsening shortness of breath over the previous week with a non-productive cough. He has has allergies to macrolides and ketolides. He was experiencing increased work of breathing upon evaluation, initially requiring CPAP. ABG values were grossly unremarkable. He denied any recent history of chemotherapy, fever, chills, N/V/D, abnormal bleeding, weight-loss or night sweats. Chest x-ray showed probable left-sided pneumonia with loculated pleural effusion which was confirmed via CT chest, which revealed a moderate to large amount of multiloculated left pleural fluid in the left lung. Mediastinal and bilateral axillary lymphadenopathy was also noted. He was transferred to [facility] for a higher level of care and was initiated on broad spectrum antibiotics with IV cefepime and vancomycin. Pulmonology was consulted and a thoracentesis was attempted, but was unable to be completed as no clear pocket was visualized via [address]. IR was consulted and a 10 [place], left-sided chest tube was placed on 2/14/23.  10 ml of purulent fluid was initially drained and sent for culture. TPA and dornase were administered with improvement in output. MRSA Nares was not detected and the pleural fluid culture grew PCN sensitive Streptococcus Gordonii. Vancomycin was discontinued on 2/14/23 and cefepime continued. A repeat CT of the patient's chest  on 2/17/23 revealed some improvement in the multiloculated left pleural effusions. A larger bore chest tube exchange was completed on 2/18/23 in IR. A repeat pleural fluid culture has not had any bacterial growth to date.      Chest tube output to date:   2/14 - 800 cc  2/15- 600 cc  2/16 - 700 cc  2/17 - 900 cc  2/18- 200 cc  2/19 - 910 cc  2/20 - 275 cc    CT sugery was consulted for possible decortication, but they have no plans for decortication at this time. Abnormal labs at the time of consult include:   Na:133  Cr:2.24  WBC:32.4-->19.0  Hgb:10.0    There is no growth on urine or blood cultures. Imaging/Diagonstics:  CT CHEST WO CONTRAST     Result Date: 2/17/2023  Small to moderate-sized multiloculated left pleural effusions have improved. New small foci of air within the collection is presumably related to the introduction of the left thoracostomy tube. Stable small to moderate sized mediastinal lymph nodes are nonspecific but may relate to the patient's history of lymphoma. Additional small bilateral axial lymph nodes are identified. Small hiatal hernia. XR CHEST PORTABLE     Result Date: 2/18/2023  Moderate left pleural effusion increased. No change left chest tube/pigtail catheter. XR CHEST PORTABLE     Result Date: 2/17/2023  Similar findings when rotation taken into account; left chest tube with unchanged or slightly increased loculated appearing left pleural effusion; atelectatic appearing changes. No pneumothorax. XR CHEST PORTABLE     Result Date: 2/14/2023  Interval placement of a left chest tube with reduced left pleural fluid. IR CHEST TUBE INSERTION     Result Date: 2/14/2023  Successful ultrasound guided placement of a left 10 [place] chest tube      US RETROPERITONEAL COMPLETE     Result Date: 2/19/2023  1. Simple cortical cyst at the upper pole left kidney measuring 6 mm. 2. Limited renal ultrasound. No hydronephrosis. 3. Nonvisualization of ureteral jets. Minimal distention of the urinary bladder. Patient no urge to void during the exam. RECOMMENDATIONS: Unavailable        Infectious disease was consulted for continued empyema.       CURRENT EVALUATION 2/23/2023  BP 99/72   Pulse (!) 110   Temp 98.4 °F (36.9 °C) (Oral)   Resp 25   Ht 6' 1\" (1.854 m)   Wt 244 lb 4.3 oz (110.8 kg)   SpO2 96%   BMI 32.23 kg/m²     Afebrile  VS stable, tachycardic   Afib    On 3 L NC  RR 18  02 sat 98    The patient is stable today  He is scheduled to undergo decortication with CT surgery today. He is experiencing some confusion  Wife indicates he had some memory lapses at home before admission but that he is more confused at present. Na 130 which may be contributing to confusion. Left sided-chest tube is in place and hooked to suction. Serosanguinous drainage present. Discussed with patient and wife at length on 2-20-23  CT scans reviewed with wife and daughter on 2-20-23  Indicated that he will need about a month of antibiotics. He may need additional drainage procedures. Wife indicated similar empyema a few years ago on Rt chest. He had S aureus back then and was severely ill. Medications reviewed: On IV Ceftriaxone    Leukocytosis increased to 22.9-->24.1  Worsening KELSEA  Nephrology is following    Labs, X rays reviewed: 2/23/2023    BUN:63-->47-->51  Cr:2.24-->1.96-->2.62-->3.2  Na 131-->130  K 5.1-->4.9    WBC:32.4-->19.0-->22.9-->24.0-->24.1  Hb:10.0-->11.2-->11.2  Plat: 425-->461-->434-->446    Cultures:  Urine:  2/17/23: No growth  2/22/23 :pending  Blood:  2/17/23: No growth  MRSA Nares:  2/13/23: Not detected  Pleural Fluid:  2/14/23: Streptococci Gordonii  2/18/23: No growth thus far    Discussed with patient, RN, IM. I have personally reviewed the past medical history, past surgical history, medications, social history, and family history, and I have updated the database accordingly. Past Medical History:     Past Medical History:   Diagnosis Date    Acute interstitial nephritis 08/03/2022    Unclear cause. Creat bumped to 2.9, baseline in oct 2020 1.6. Exact description of his kidney biopsy is not available to me however pathologist seems to think there is diabetic glomerosclerosis and acute interstitial nephritis seen on the biopsy specimen. No mention about chronic changes.   Because of acute interstitial nephritis a trial of steroids for 15 days will be given to see if I can impro    Aortic regurgitation     ARF (acute renal failure) ([facility] [address] 08/03/2022    kidney biopsy from June 2022  showing findings of acute interstitial nephritis. Exact description not available. Trial of steroids will be given to see if I can improve renal function. Creatinine in October 2020 was 1.6, creatinine in June 2022 was 2.9. Trial of steroids will be given to see if renal function can be improved.     Chronic kidney disease     Hyperlipidemia     Hypertension     Non-Hodgkin lymphoma ([facility] [address]     Research study patient 02/13/2023    [id number] Completed 2/15/23    Sarcoidosis     Type II or unspecified type diabetes mellitus without mention of complication, not stated as uncontrolled        Past Surgical  History:     Past Surgical History:   Procedure Laterality Date    CARPAL TUNNEL RELEASE Left 11/15     EYE SURGERY Right     shunt and cataract     EYE SURGERY Right 6/16    laser     IR CHEST TUBE INSERTION  2/13/2023    IR CHEST TUBE INSERTION 2/13/2023 [facility]    IR CHEST TUBE INSERTION  2/18/2023    IR CHEST TUBE INSERTION 2/18/2023 Soha Cueto MD [facility]    KNEE ARTHROSCOPY      MALIGNANT SKIN LESION EXCISION      ROTATOR CUFF REPAIR Right 7/14    ROTATOR CUFF REPAIR Left 5/15    TOTAL KNEE ARTHROPLASTY Right 10/2018       Medications:      talc        bupivacaine-EPINEPHrine        metoprolol tartrate  25 mg Oral BID    QUEtiapine  50 mg Oral Nightly    cefTRIAXone (ROCEPHIN) IV  1,000 mg IntraVENous Q24H    docusate sodium  100 mg Oral TID    fentanNYL  100 mcg IntraVENous Once    sodium chloride flush  10 mL IntraCATHeter BID    melatonin  5 mg Oral Nightly    insulin lispro  0-16 Units SubCUTAneous TID WC    insulin lispro  0-4 Units SubCUTAneous Nightly    heparin (porcine)  5,000 Units SubCUTAneous 3 times per day    atorvastatin  80 mg Oral Daily    DULoxetine  60 mg Oral BID    dicyclomine  10 mg Oral 4x Daily AC & HS    finasteride  5 mg Oral Daily    fenofibrate  160 mg Oral Daily    pantoprazole  40 mg Oral QAM AC    repaglinide  1 mg Oral TID AC    brimonidine  1 drop Right Eye BID    And    timolol  1 drop Right Eye BID    insulin glargine  20 Units SubCUTAneous BID    lidocaine  20 mL IntraDERmal Once    sodium chloride flush  5-40 mL IntraVENous 2 times per day       Social History:     Social History     Socioeconomic History    Marital status:      Spouse name: Not on file    Number of children: Not on file    Years of education: Not on file    Highest education level: Not on file   Occupational History    Not on file   Tobacco Use    Smoking status: Never    Smokeless tobacco: Former    Tobacco comments:     minimal and infrequent    Substance and Sexual Activity    Alcohol use: Yes     Alcohol/week: 0.0 standard drinks     Comment: rare    Drug use: No    Sexual activity: Not on file   Other Topics Concern    Not on file   Social History Narrative    Not on file     Social Determinants of Health     Financial Resource Strain: Low Risk     Difficulty of Paying Living Expenses: Not very hard   Food Insecurity: No Food Insecurity    Worried About Running Out of Food in the Last Year: Never true    Ran Out of Food in the Last Year: Never true   Transportation Needs: No Transportation Needs    Lack of Transportation (Medical): No    Lack of Transportation (Non-Medical): No   Physical Activity: Not on file   Stress: Stress Concern Present    Feeling of Stress : To some extent   Social Connections: Socially Integrated    Frequency of Communication with Friends and Family: Three times a week    Frequency of Social Gatherings with Friends and Family:  Three times a week    Attends Faith Services: 1 to 4 times per year    Active Member of Clubs or Organizations: No    Attends Club or Organization Meetings: 1 to 4 times per year    Marital Status:    Intimate Partner Violence: Not At Risk    Fear of Current or Ex-Partner: No Emotionally Abused: No    Physically Abused: No    Sexually Abused: No   Housing Stability: Low Risk     Unable to Pay for Housing in the Last Year: No    Number of Places Lived in the Last Year: 1    Unstable Housing in the Last Year: No       Family History:     Family History   Problem Relation Age of Onset    High Blood Pressure Father     Other Father         AAA    Heart Disease Other         uncle x 2         Allergies:   Latex, Gabapentin, Meperidine, Erythromycin, Glimepiride, Lidocaine, Hydrocodone, Macrolides and ketolides, Niacin and related, Trelegy ellipta [fluticasone-umeclidin-vilant], Pregabalin, and Xanax [alprazolam]     Review of Systems:   Constitutional: No fevers or chills. No systemic complaints  Head: No headaches  Eyes: No double vision or blurry vision. No conjunctival inflammation. ENT: No sore throat or runny nose. . No hearing loss, tinnitus or vertigo. Cardiovascular: No chest pain or palpitations. shortness of breath. WANG  Lung: Left sided chest tube with some shortness of breath with dry cough. No sputum production  Abdomen: No nausea, vomiting, diarrhea, or abdominal pain. Kang Amend No cramps. Genitourinary: No increased urinary frequency, or dysuria. No hematuria. No suprapubic or CVA pain  Musculoskeletal: No muscle aches or pains. No joint effusions, swelling or deformities  Hematologic: No bleeding or bruising. Neurologic: No headache, weakness, numbness, or tingling. Integument: No rash, no ulcers. Psychiatric: No depression. Endocrine: No polyuria, no polydipsia, no polyphagia.     Physical Examination :   Patient Vitals for the past 8 hrs:   BP Temp Temp src Pulse Resp SpO2   02/23/23 0730 99/72 98.4 °F (36.9 °C) Oral (!) 110 25 --   02/23/23 0500 -- -- -- (!) 113 25 96 %   02/23/23 0400 97/65 98.2 °F (36.8 °C) Oral (!) 109 30 94 %   02/23/23 0300 -- -- -- (!) 107 25 94 %     General Appearance: Awake, alert, and in no apparent distress  Head:  Normocephalic, no trauma  Eyes: Pupils equal, round, reactive to light and accommodation; extraocular movements intact; sclera anicteric; conjunctivae pink. No embolic phenomena. ENT: Oropharynx clear, without erythema, exudate, or thrush. No tenderness of sinuses. Mouth/throat: mucosa pink and moist. No lesions. Dentition in good repair. Neck:Supple, without lymphadenopathy. Thyroid normal, No bruits. Pulmonary/Chest: Left sided chest tube in place to suction with serosanguinous drainage. Diminished to auscultation. No dullness to percussion. Cardiovascular: Regular rate and rhythm without murmurs, rubs, or gallops. Abdomen: Soft, non tender. Bowel sounds normal. No organomegaly  All four Extremities: No cyanosis, clubbing, edema, or effusions. Neurologic: No gross sensory or motor deficits. Skin: Warm and dry with good turgor. No signs of peripheral arterial or venous insufficiency. No ulcerations. Left sided-chest tube site CDI    Medical Decision Making -Laboratory:   I have independently reviewed/ordered the following labs:    CBC with Differential:   Recent Labs     02/22/23  0725 02/23/23  0600   WBC 24.0* 24.1*   HGB 11.2* 11.2*   HCT 36.0* 36.6*    446   LYMPHOPCT 29 38   MONOPCT 9* 12*     BMP:   Recent Labs     02/22/23  0215 02/22/23  0725 02/23/23  0600   NA  --  131* 130*   K 5.8* 5.1 4.9   CL  --  97* 96*   CO2  --  23 25   BUN  --  47* 51*   CREATININE  --  2.62* 3.20*   MG 2.2  --   --      Hepatic Function Panel: No results for input(s): PROT, LABALBU, BILIDIR, IBILI, BILITOT, ALKPHOS, ALT, AST in the last 72 hours. No results for input(s): RPR in the last 72 hours. No results for input(s): HIV in the last 72 hours. No results for input(s): BC in the last 72 hours.   Lab Results   Component Value Date/Time    MUCUS 1+ 02/13/2023 02:17 AM    RBC 4.03 02/23/2023 06:00 AM    RBC 5.15 04/03/2012 11:08 AM    WBC 24.1 02/23/2023 06:00 AM    TURBIDITY Cloudy 02/22/2023 01:17 PM     Lab Results   Component Value Date/Time    CREATININE 3.20 02/23/2023 06:00 AM    GLUCOSE 135 02/23/2023 06:00 AM    GLUCOSE 140 04/03/2012 11:08 AM       Medical Decision Making-Imaging:     Narrative   EXAMINATION:   CT OF THE CHEST WITHOUT CONTRAST, 2/17/2023 9:29 am       TECHNIQUE:   CT of the chest was performed without the administration of intravenous   contrast. Multiplanar reformatted images are provided for review. Automated   exposure control, iterative reconstruction, and/or weight based adjustment of   the mA/kV was utilized to reduce the radiation dose to as low as reasonably   achievable. COMPARISON:   02/13/2023       HISTORY:   ORDERING SYSTEM PROVIDED HISTORY:  Improvement? TECHNOLOGIST PROVIDED HISTORY:   Improvement? FINDINGS:   Mediastinum: Small bilateral axial lymph nodes are identified not   significantly changed. Small to moderate size mediastinal lymph nodes are   additionally present. Lungs/Pleura: There are multiple loculated effusions within the left chest   which have improved since previous exam.  Small amounts of new air are likely   secondary to the presence of the left thoracostomy tube. Left lower lobe atelectasis is identified. Upper Abdomen: There is a probable small hiatal hernia. Soft Tissues/Bones:  No acute osseous abnormality is appreciated. Old right   rib fractures are noted. Impression   Small to moderate-sized multiloculated left pleural effusions have improved. New small foci of air within the collection is presumably related to the   introduction of the left thoracostomy tube. Stable small to moderate sized mediastinal lymph nodes are nonspecific but   may relate to the patient's history of lymphoma. Additional small bilateral   axial lymph nodes are identified. Small hiatal hernia.              Narrative   EXAMINATION:   ONE XRAY VIEW OF THE CHEST       2/18/2023 3:04 pm       COMPARISON:   CT chest February 17, 2023       HISTORY: ORDERING SYSTEM PROVIDED HISTORY: worsening   TECHNOLOGIST PROVIDED HISTORY:   worsening       FINDINGS:   Pigtail catheter on the left. Moderate left effusion appears   denser/increased. Mild bilateral interstitial infiltrates or edema. Heart   and mediastinum unremarkable. Bony thorax intact. No pneumothorax noted. Subsegmental atelectasis right lower lung. Impression   Moderate left pleural effusion increased. No change left chest tube/pigtail   catheter. Medical Decision [id number]:       Medical Decision Making-Other:     Note:  Labs, medications, radiologic studies were reviewed with personal review of films  Large amounts of data were reviewed  Discussed with nursing Staff, Discharge planner  Infection Control and Prevention measures reviewed  All prior entries were reviewed  Administer medications as ordered  [facility]  Discharge planning reviewed  Follow up as outpatient. Thank you for allowing us to participate in the care of this patient. Please call with questions. FLOYD Bond - CNP    ATTESTATION:    I have discussed the case, including pertinent history and exam findings with the APRN. I have evaluated the  History, physical findings and pictures of the patient and the key elements of the encounter have been performed by me. I have reviewed the laboratory data, other diagnostic studies and discussed them with the APRN. I have updated the medical record where necessary. I agree with the assessment, plan and orders as documented by the APRN.     Delmy Hoang MD.        Pager: [phone] - Office: [phone]

## 2023-02-24 LAB
ABO/RH: NORMAL
ABSOLUTE EOS #: 0 K/UL (ref 0–0.4)
ABSOLUTE IMMATURE GRANULOCYTE: 0.26 K/UL (ref 0–0.3)
ABSOLUTE LYMPH #: 6.24 K/UL (ref 1–4.8)
ABSOLUTE MONO #: 1.56 K/UL (ref 0.1–0.8)
ANION GAP SERPL CALCULATED.3IONS-SCNC: 11 MMOL/L (ref 9–17)
ANION GAP SERPL CALCULATED.3IONS-SCNC: 13 MMOL/L (ref 9–17)
ANTIBODY SCREEN: NEGATIVE
ARM BAND NUMBER: NORMAL
BASOPHILS # BLD: 0 % (ref 0–2)
BASOPHILS ABSOLUTE: 0 K/UL (ref 0–0.2)
BLD PROD TYP BPU: NORMAL
BPU ID: NORMAL
BUN SERPL-MCNC: 50 MG/DL (ref 8–23)
BUN SERPL-MCNC: 52 MG/DL (ref 8–23)
CALCIUM SERPL-MCNC: 8.8 MG/DL (ref 8.6–10.4)
CALCIUM SERPL-MCNC: 9.1 MG/DL (ref 8.6–10.4)
CHLORIDE SERPL-SCNC: 100 MMOL/L (ref 98–107)
CHLORIDE SERPL-SCNC: 99 MMOL/L (ref 98–107)
CO2 SERPL-SCNC: 20 MMOL/L (ref 20–31)
CO2 SERPL-SCNC: 21 MMOL/L (ref 20–31)
CREAT SERPL-MCNC: 3.19 MG/DL (ref 0.7–1.2)
CREAT SERPL-MCNC: 3.48 MG/DL (ref 0.7–1.2)
CROSSMATCH RESULT: NORMAL
DISPENSE STATUS BLOOD BANK: NORMAL
EKG ATRIAL RATE: 264 BPM
EKG Q-T INTERVAL: 266 MS
EKG QRS DURATION: 100 MS
EKG QTC CALCULATION (BAZETT): 375 MS
EKG R AXIS: 35 DEGREES
EKG T AXIS: -21 DEGREES
EKG VENTRICULAR RATE: 120 BPM
EOSINOPHILS RELATIVE PERCENT: 0 % (ref 1–4)
EST. AVERAGE GLUCOSE BLD GHB EST-MCNC: 183 MG/DL
EXPIRATION DATE: NORMAL
GFR SERPL CREATININE-BSD FRML MDRD: 19 ML/MIN/1.73M2
GFR SERPL CREATININE-BSD FRML MDRD: 21 ML/MIN/1.73M2
GLUCOSE BLD-MCNC: 143 MG/DL (ref 75–110)
GLUCOSE BLD-MCNC: 77 MG/DL (ref 75–110)
GLUCOSE BLD-MCNC: 81 MG/DL (ref 75–110)
GLUCOSE SERPL-MCNC: 85 MG/DL (ref 70–99)
GLUCOSE SERPL-MCNC: 87 MG/DL (ref 70–99)
HBA1C MFR BLD: 8 % (ref 4–6)
HCT VFR BLD AUTO: 29.2 % (ref 40.7–50.3)
HCT VFR BLD AUTO: 30.4 % (ref 40.7–50.3)
HGB BLD-MCNC: 9.2 G/DL (ref 13–17)
HGB BLD-MCNC: 9.2 G/DL (ref 13–17)
IMMATURE GRANULOCYTES: 1 %
LYMPHOCYTES # BLD: 24 % (ref 24–44)
MCH RBC QN AUTO: 28 PG (ref 25.2–33.5)
MCH RBC QN AUTO: 28.2 PG (ref 25.2–33.5)
MCHC RBC AUTO-ENTMCNC: 30.3 G/DL (ref 28.4–34.8)
MCHC RBC AUTO-ENTMCNC: 31.5 G/DL (ref 28.4–34.8)
MCV RBC AUTO: 89.6 FL (ref 82.6–102.9)
MCV RBC AUTO: 92.4 FL (ref 82.6–102.9)
MICROORGANISM/AGENT SPEC: NORMAL
MICROORGANISM/AGENT SPEC: NORMAL
MONOCYTES # BLD: 6 % (ref 1–7)
MORPHOLOGY: ABNORMAL
NRBC AUTOMATED: 0 PER 100 WBC
NRBC AUTOMATED: 0 PER 100 WBC
PARTIAL THROMBOPLASTIN TIME: 26.1 SEC (ref 20.5–30.5)
PDW BLD-RTO: 15.7 % (ref 11.8–14.4)
PDW BLD-RTO: 15.9 % (ref 11.8–14.4)
PLATELET # BLD AUTO: 394 K/UL (ref 138–453)
PLATELET # BLD AUTO: 433 K/UL (ref 138–453)
PMV BLD AUTO: 9.2 FL (ref 8.1–13.5)
PMV BLD AUTO: 9.3 FL (ref 8.1–13.5)
POTASSIUM SERPL-SCNC: 4.9 MMOL/L (ref 3.7–5.3)
POTASSIUM SERPL-SCNC: 5.1 MMOL/L (ref 3.7–5.3)
RBC # BLD: 3.26 M/UL (ref 4.21–5.77)
RBC # BLD: 3.29 M/UL (ref 4.21–5.77)
SEG NEUTROPHILS: 69 % (ref 36–66)
SEGMENTED NEUTROPHILS ABSOLUTE COUNT: 17.94 K/UL (ref 1.8–7.7)
SERVICE CMNT-IMP: NORMAL
SODIUM SERPL-SCNC: 132 MMOL/L (ref 135–144)
SODIUM SERPL-SCNC: 132 MMOL/L (ref 135–144)
SPECIMEN DESCRIPTION: NORMAL
SPECIMEN DESCRIPTION: NORMAL
SURGICAL PATHOLOGY REPORT: NORMAL
TRANSFUSION STATUS: NORMAL
TROPONIN I SERPL DL<=0.01 NG/ML-MCNC: 50 NG/L (ref 0–22)
UNIT DIVISION: 0
WBC # BLD AUTO: 25 K/UL (ref 3.5–11.3)
WBC # BLD AUTO: 26 K/UL (ref 3.5–11.3)

## 2023-02-24 PROCEDURE — 2000000000 HC ICU R&B

## 2023-02-24 PROCEDURE — 2580000003 HC RX 258: Performed by: STUDENT IN AN ORGANIZED HEALTH CARE EDUCATION/TRAINING PROGRAM

## 2023-02-24 PROCEDURE — 97530 THERAPEUTIC ACTIVITIES: CPT

## 2023-02-24 PROCEDURE — 6360000002 HC RX W HCPCS: Performed by: PLASTIC SURGERY

## 2023-02-24 PROCEDURE — 99024 POSTOP FOLLOW-UP VISIT: CPT | Performed by: NURSE PRACTITIONER

## 2023-02-24 PROCEDURE — 93005 ELECTROCARDIOGRAM TRACING: CPT

## 2023-02-24 PROCEDURE — 6360000002 HC RX W HCPCS: Performed by: ANESTHESIOLOGY

## 2023-02-24 PROCEDURE — 97535 SELF CARE MNGMENT TRAINING: CPT

## 2023-02-24 PROCEDURE — 85027 COMPLETE CBC AUTOMATED: CPT

## 2023-02-24 PROCEDURE — 80048 BASIC METABOLIC PNL TOTAL CA: CPT

## 2023-02-24 PROCEDURE — 6370000000 HC RX 637 (ALT 250 FOR IP): Performed by: STUDENT IN AN ORGANIZED HEALTH CARE EDUCATION/TRAINING PROGRAM

## 2023-02-24 PROCEDURE — 97164 PT RE-EVAL EST PLAN CARE: CPT

## 2023-02-24 PROCEDURE — 2500000003 HC RX 250 WO HCPCS: Performed by: STUDENT IN AN ORGANIZED HEALTH CARE EDUCATION/TRAINING PROGRAM

## 2023-02-24 PROCEDURE — 6370000000 HC RX 637 (ALT 250 FOR IP): Performed by: PHYSICIAN ASSISTANT

## 2023-02-24 PROCEDURE — 84484 ASSAY OF TROPONIN QUANT: CPT

## 2023-02-24 PROCEDURE — 99233 SBSQ HOSP IP/OBS HIGH 50: CPT | Performed by: INTERNAL MEDICINE

## 2023-02-24 PROCEDURE — 99232 SBSQ HOSP IP/OBS MODERATE 35: CPT | Performed by: INTERNAL MEDICINE

## 2023-02-24 PROCEDURE — 6360000002 HC RX W HCPCS

## 2023-02-24 PROCEDURE — 85025 COMPLETE CBC W/AUTO DIFF WBC: CPT

## 2023-02-24 PROCEDURE — 82947 ASSAY GLUCOSE BLOOD QUANT: CPT

## 2023-02-24 PROCEDURE — 6360000002 HC RX W HCPCS: Performed by: STUDENT IN AN ORGANIZED HEALTH CARE EDUCATION/TRAINING PROGRAM

## 2023-02-24 PROCEDURE — 2580000003 HC RX 258: Performed by: ANESTHESIOLOGY

## 2023-02-24 PROCEDURE — 99232 SBSQ HOSP IP/OBS MODERATE 35: CPT | Performed by: FAMILY MEDICINE

## 2023-02-24 PROCEDURE — 2580000003 HC RX 258

## 2023-02-24 PROCEDURE — 6360000002 HC RX W HCPCS: Performed by: PHYSICIAN ASSISTANT

## 2023-02-24 PROCEDURE — 85730 THROMBOPLASTIN TIME PARTIAL: CPT

## 2023-02-24 PROCEDURE — 2580000003 HC RX 258: Performed by: INTERNAL MEDICINE

## 2023-02-24 RX ORDER — PANTOPRAZOLE SODIUM 40 MG/1
40 TABLET, DELAYED RELEASE ORAL
Status: DISCONTINUED | OUTPATIENT
Start: 2023-02-25 | End: 2023-03-06 | Stop reason: HOSPADM

## 2023-02-24 RX ORDER — METOPROLOL TARTRATE 5 MG/5ML
5 INJECTION INTRAVENOUS EVERY 6 HOURS PRN
Status: DISCONTINUED | OUTPATIENT
Start: 2023-02-24 | End: 2023-03-06 | Stop reason: HOSPADM

## 2023-02-24 RX ORDER — COLCHICINE 0.6 MG/1
0.3 TABLET ORAL DAILY
Status: DISCONTINUED | OUTPATIENT
Start: 2023-02-24 | End: 2023-03-06 | Stop reason: HOSPADM

## 2023-02-24 RX ORDER — METOPROLOL TARTRATE 5 MG/5ML
2.5 INJECTION INTRAVENOUS EVERY 6 HOURS PRN
Status: DISCONTINUED | OUTPATIENT
Start: 2023-02-24 | End: 2023-02-24

## 2023-02-24 RX ORDER — SODIUM CHLORIDE 9 MG/ML
INJECTION, SOLUTION INTRAVENOUS CONTINUOUS
Status: DISCONTINUED | OUTPATIENT
Start: 2023-02-24 | End: 2023-02-28

## 2023-02-24 RX ORDER — 0.9 % SODIUM CHLORIDE 0.9 %
1000 INTRAVENOUS SOLUTION INTRAVENOUS ONCE
Status: COMPLETED | OUTPATIENT
Start: 2023-02-24 | End: 2023-02-24

## 2023-02-24 RX ORDER — METOPROLOL TARTRATE 5 MG/5ML
2.5 INJECTION INTRAVENOUS ONCE
Status: COMPLETED | OUTPATIENT
Start: 2023-02-24 | End: 2023-02-24

## 2023-02-24 RX ADMIN — FUROSEMIDE 20 MG: 10 INJECTION, SOLUTION INTRAMUSCULAR; INTRAVENOUS at 17:24

## 2023-02-24 RX ADMIN — FUROSEMIDE 20 MG: 10 INJECTION, SOLUTION INTRAMUSCULAR; INTRAVENOUS at 02:02

## 2023-02-24 RX ADMIN — OXYCODONE HYDROCHLORIDE AND ACETAMINOPHEN 2 TABLET: 5; 325 TABLET ORAL at 03:57

## 2023-02-24 RX ADMIN — METOPROLOL TARTRATE 2.5 MG: 5 INJECTION, SOLUTION INTRAVENOUS at 20:04

## 2023-02-24 RX ADMIN — METOPROLOL TARTRATE 2.5 MG: 5 INJECTION, SOLUTION INTRAVENOUS at 13:31

## 2023-02-24 RX ADMIN — AMIODARONE HYDROCHLORIDE 150 MG: 50 INJECTION, SOLUTION INTRAVENOUS at 16:00

## 2023-02-24 RX ADMIN — CEFTRIAXONE SODIUM 1000 MG: 10 INJECTION, POWDER, FOR SOLUTION INTRAVENOUS at 10:33

## 2023-02-24 RX ADMIN — SODIUM CHLORIDE: 9 INJECTION, SOLUTION INTRAVENOUS at 20:26

## 2023-02-24 RX ADMIN — SODIUM CHLORIDE, PRESERVATIVE FREE 10 ML: 5 INJECTION INTRAVENOUS at 20:30

## 2023-02-24 RX ADMIN — MUPIROCIN: 20 OINTMENT TOPICAL at 20:30

## 2023-02-24 RX ADMIN — OXYCODONE HYDROCHLORIDE AND ACETAMINOPHEN 2 TABLET: 5; 325 TABLET ORAL at 21:57

## 2023-02-24 RX ADMIN — METOPROLOL TARTRATE 2.5 MG: 5 INJECTION, SOLUTION INTRAVENOUS at 21:03

## 2023-02-24 RX ADMIN — SODIUM CHLORIDE: 9 INJECTION, SOLUTION INTRAVENOUS at 12:45

## 2023-02-24 RX ADMIN — AMIODARONE HYDROCHLORIDE 1 MG/MIN: 150 INJECTION, SOLUTION INTRAVENOUS at 16:24

## 2023-02-24 RX ADMIN — SODIUM CHLORIDE 1000 ML: 9 INJECTION, SOLUTION INTRAVENOUS at 15:57

## 2023-02-24 RX ADMIN — MORPHINE SULFATE 2 MG: 2 INJECTION, SOLUTION INTRAMUSCULAR; INTRAVENOUS at 23:45

## 2023-02-24 RX ADMIN — ASPIRIN 650 MG: 325 TABLET, COATED ORAL at 21:09

## 2023-02-24 RX ADMIN — MUPIROCIN: 20 OINTMENT TOPICAL at 10:37

## 2023-02-24 RX ADMIN — FUROSEMIDE 20 MG: 10 INJECTION, SOLUTION INTRAMUSCULAR; INTRAVENOUS at 10:29

## 2023-02-24 RX ADMIN — COLCHICINE 0.3 MG: 0.6 TABLET, FILM COATED ORAL at 21:09

## 2023-02-24 RX ADMIN — FENTANYL CITRATE 50 MCG: 50 INJECTION INTRAMUSCULAR; INTRAVENOUS at 20:15

## 2023-02-24 ASSESSMENT — PAIN SCALES - GENERAL
PAINLEVEL_OUTOF10: 8
PAINLEVEL_OUTOF10: 10
PAINLEVEL_OUTOF10: 8
PAINLEVEL_OUTOF10: 10
PAINLEVEL_OUTOF10: 1

## 2023-02-24 ASSESSMENT — PAIN DESCRIPTION - FREQUENCY: FREQUENCY: CONTINUOUS

## 2023-02-24 ASSESSMENT — PAIN - FUNCTIONAL ASSESSMENT: PAIN_FUNCTIONAL_ASSESSMENT: PREVENTS OR INTERFERES SOME ACTIVE ACTIVITIES AND ADLS

## 2023-02-24 ASSESSMENT — ENCOUNTER SYMPTOMS
SHORTNESS OF BREATH: 1
WHEEZING: 0
COUGH: 0
NAUSEA: 0
VOICE CHANGE: 0
BLOOD IN STOOL: 0
SORE THROAT: 0
VOMITING: 0
CONSTIPATION: 0
SINUS PRESSURE: 0
DIARRHEA: 0
ABDOMINAL PAIN: 0

## 2023-02-24 ASSESSMENT — PAIN DESCRIPTION - DESCRIPTORS: DESCRIPTORS: ACHING;DISCOMFORT

## 2023-02-24 ASSESSMENT — PAIN DESCRIPTION - PAIN TYPE: TYPE: SURGICAL PAIN

## 2023-02-24 ASSESSMENT — PAIN DESCRIPTION - ORIENTATION: ORIENTATION: LEFT

## 2023-02-24 ASSESSMENT — PAIN DESCRIPTION - ONSET: ONSET: ON-GOING

## 2023-02-24 ASSESSMENT — PAIN DESCRIPTION - LOCATION: LOCATION: RIB CAGE

## 2023-02-24 NOTE — PROGRESS NOTES
[facility]   Progress Note                   Date:   2/24/2023  Patient name: Marva Chavis  Date of admission:  2/12/2023 10:33 PM  MRN:   [id number]  YOB: 1955  PCP: Scott Borges MD    Reason for Admission:      Subjective: There were no acute events overnight, remained hemodynamically stable, denies chest pain, dyspnea, orthopnea or palpitations. On 3L NC. On lasix. Telemetry showed patient in afib RVR this AM.     Medications:   Scheduled Meds:   cefTRIAXone (ROCEPHIN) IV  1,000 mg IntraVENous Q24H    sodium chloride flush  5-40 mL IntraVENous 2 times per day    mupirocin   Nasal BID    chlorhexidine   Topical See Admin Instructions    sodium chloride flush  5-40 mL IntraVENous 2 times per day    polyethylene glycol  17 g Oral Daily    sodium chloride flush  5-40 mL IntraVENous 2 times per day    insulin lispro  0-4 Units SubCUTAneous TID WC    insulin lispro  0-4 Units SubCUTAneous Nightly    furosemide  20 mg IntraVENous BID       Continuous Infusions:   sodium chloride      sodium chloride      sodium chloride      sodium chloride      amiodarone 450mg/250ml D5W infusion 0.5 mg/min (02/23/23 2000)    dextrose         CBC:   Recent Labs     02/23/23  2308 02/24/23  0605 02/24/23  0928   WBC 28.8* 26.0* 25.0*   HGB 8.8* 9.2* 9.2*    433 394     BMP:    Recent Labs     02/23/23  0600 02/23/23  2308 02/24/23  0605   * 131* 132*   K 4.9 5.2 4.9   CL 96* 99 100   CO2 25 22 21   BUN 51* 51* 50*   CREATININE 3.20* 3.30* 3.48*   GLUCOSE 135* 102* 85     Hepatic: No results for input(s): AST, ALT, ALB, BILITOT, ALKPHOS in the last 72 hours. Troponin: No results for input(s): TROPONINI in the last 72 hours. BNP: No results for input(s): BNP in the last 72 hours. Lipids: No results for input(s): CHOL, HDL in the last 72 hours.     Invalid input(s): LDLCALCU  INR:   Recent Labs     02/22/23  1157   INR 1.1       Objective:   Vitals: BP 92/62   Pulse (!) 120 Temp 98 °F (36.7 °C) (Oral)   Resp 18   Ht 6' 1\" (1.854 m)   Wt 236 lb 15.9 oz (107.5 kg)   SpO2 96%   BMI 31.27 kg/m²     General appearance: awake, alert, in no apparent respiratory distress   HEENT: Head: Normocephalic, no lesions, without obvious abnormality  Neck: no JVD  Lungs: clear to auscultation bilaterally, no basilar rales, no wheezing   Heart: regular rate and rhythm, S1, S2 normal, no murmur, click, rub or gallop  Abdomen: soft, non-tender; bowel sounds normal  Extremities: No LE edema  Neurologic: Mental status: Alert, oriented. Motor and sensory not done. DATA  EKG:    Date: 02/21/23  Reading:  Sinus tachycardia  Moderate voltage criteria for LVH, may be normal variant  Nonspecific ST and T wave abnormality  Abnormal ECG  When compared with ECG of 16-FEB-2023 23:10,  No significant change was found     LAST ECHO:  2/21/23  Summary  Left ventricle is normal in size. Global left ventricular systolic function  is difficult to assess due to heart rate but appears normal. Calculated  ejection fraction 51% by Atkinson's method. Aortic valve is trileaflet and opens adequately. Trivial aortic insufficiency. Normal tricuspid valve structure. Trivial tricuspid regurgitation. Date:  Findings Summary: 10/22     Left Ventricle: Systolic function is normal with an ejection fraction   of 55-60%. Aortic Valve: There is mild regurgitation. Tricuspid Valve: There is trace to mild regurgitation. The right   ventricular systolic pressure normal. RVSP calculated at 24 mmHg. RVSP is   based on RA pressure of 3 mmHg. There is no pulmonary hypertension. Left Ventricle   Systolic function is normal with an ejection fraction of 55-60%. No obvious regional wall motion abnormalities. There is abnormal septal motion. Right Ventricle   Right ventricular size appears normal. Systolic function is normal.     Right Atrium   Right atrium is normal in size.      IVC/SVC   The right atrial pressure is estimated at 3 mmHg. IVC appears normal. There is normal collapse with deep inspiration. Tricuspid Valve   Tricuspid valve appears to be normal. There is trace to mild regurgitation. The right ventricular systolic pressure normal. RVSP calculated at 24 mmHg. RVSP is based on RA pressure of 3 mmHg. There is no pulmonary hypertension. Aortic Valve   The aortic valve is trileaflet. The leaflets are mildly thickened. There is mild regurgitation. Pulmonic Valve   Pulmonic valve structure is grossly normal. There is trace to mild regurgitation. Pericardium   There is no pericardial effusion. LAST Stress Test:   Date of last ST:  Major Findings:     LAST Cardiac Angiography:.  Date:  Findings:      Assessment / Acute Cardiac Problems:     Empyema s/p VATS with left sided decorticaiton  Loculated left-sided pleural effusion   Acute hypoxic respiratory failure secondary to pneumonia  Pneumonia  Atrial fibrillation with RVR  History of sarcoidosis  History of non-Hodgkin's lymphoma  History of nonrheumatic mitral regurgitation  Tricuspid regurgitation  History of dilated aortic root  Type 2 diabetes  KELSEA on Chronic kidney disease stage III/IV  Acute pericardicarditis        Plan of Treatment:   Patient in atrial fibrillation with RVR. Patient on amiodarone 0.5 mg IV infusion for rate control  On empiric abx as per primary  Hold home bp meds due to hypotension(amlodipine and toprol)  Continue on lipitor 80  Lopressor 2.5 mg IV q6h PRN for >100      Discussed with patient and nursing. Larisa Kerr MD MD  Fellow, Cardiovascular Diseases   [address] [facility]   Pager - [phone]    I performed a history and physical examination of the patient and discussed management with the resident. I reviewed the residents note and agree with the documented findings and plan of care. Any areas of disagreement are noted on the chart. I was personally present for the key portions of any procedures.  I have documented in the chart those procedures where I was not present during the key portions. I have personally evaluated this patient and have completed at least one if not all key elements of the E/M (history, physical exam, and MDM). Additional findings are as noted. Rebolus IV amiodarone followed by drip. Unable to start BB/CCB due to low BP. Add colchicine 0.3mg po qday (will discuss with pharmacy).  Add ROBIN Estrella MD MD

## 2023-02-24 NOTE — CARE COORDINATION
No chest tube, no drips, no victoza and 20% co insurance will be required per day (approx $200) per Lexie. No opening until mid week next week. Pts spouse notified, she will call Lexie at [facility] to discuss along with VIctoza options and to notify them to hold bed or not.      Call to [facility] to check status of referral as per request of pts spouse, Spoke with Lorraine Mcbride in [address] who deferrs to  for further information ML for Brandan Lee to return call

## 2023-02-24 NOTE — PROGRESS NOTES
[facility]  Progress Note    2/24/2023 9:58 AM  Surgeon: Surgeon CTS: Dr. Rachell Perdue MD   POD # 1  S/P: Left sided decortication     Subjective:  Mr. Enrique Lentz   Pt shows distress. No SOB or chest pains. Pt has mild disorientation that waxes wanes which has been chronic since admission. Objective:  BP 92/62   Pulse (!) 120   Temp 98 °F (36.7 °C) (Oral)   Resp 18   Ht 6' 1\" (1.854 m)   Wt 236 lb 15.9 oz (107.5 kg)   SpO2 96%   BMI 31.27 kg/m²   Chest: pacing wires: no, chest tubes:Yes, air leak no, 2 +  CV: no murmur noted, AFIB RVR  Lungs: clear to auscultation, no wheezes, rales, or rhonchi  Abd: normal bowel sounds   Lower Extremities: Trace edema  CXR-await AM CXR-Ydays CXR stable. Improved left effusion. No pneumothorax  Labs: Labs reviewed today  CBC:   Recent Labs     02/23/23  2308 02/24/23  0605 02/24/23  0928   WBC 28.8* 26.0* 25.0*   HGB 8.8* 9.2* 9.2*   HCT 28.5* 30.4* 29.2*   MCV 91.1 92.4 89.6    433 394     BMP:   Recent Labs     02/23/23  0600 02/23/23  2308 02/24/23  0605   * 131* 132*   K 4.9 5.2 4.9   CL 96* 99 100   CO2 25 22 21   BUN 51* 51* 50*   CREATININE 3.20* 3.30* 3.48*       I/O: I/O last 3 completed shifts: In: 2692.6 [P.O.:250;  I.V.:1192.6; IV Piggyback:1250]  Out: 2225 [Urine:1300; Blood:500; Chest Tube:745]  Scheduled Meds:   cefTRIAXone (ROCEPHIN) IV  1,000 mg IntraVENous Q24H    sodium chloride flush  5-40 mL IntraVENous 2 times per day    mupirocin   Nasal BID    chlorhexidine   Topical See Admin Instructions    sodium chloride flush  5-40 mL IntraVENous 2 times per day    polyethylene glycol  17 g Oral Daily    sodium chloride flush  5-40 mL IntraVENous 2 times per day    insulin lispro  0-4 Units SubCUTAneous TID WC    insulin lispro  0-4 Units SubCUTAneous Nightly    furosemide  20 mg IntraVENous BID     Continuous Infusions:   sodium chloride      sodium chloride      sodium chloride      sodium chloride      amiodarone 450mg/250ml D5W infusion 0.5 mg/min (02/23/23 2000)    dextrose       PRN Meds:sodium chloride flush, sodium chloride, sodium chloride flush, sodium chloride, oxyCODONE-acetaminophen **OR** oxyCODONE-acetaminophen, morphine **OR** morphine, sodium chloride flush, sodium chloride, fentanNYL, fentanNYL, ondansetron, diphenhydrAMINE, glucose, dextrose bolus **OR** dextrose bolus, glucagon (rDNA), dextrose        Daily Nursing Care:  Please keep SCDS in place as DVT prophylaxis  If not intubated, Please have patient use IS and acapella 10X/hr or during commercial breaks  Please continue BM management  Daily labs and CXR  Daily PT/OT and ambulation  Continue with Case Management for DC planning      Assessment/ Plan:  Stable CXR Yday await am CXR  Still has significant leukocytosis-ID follow for antibiotics Switched to rocephin  Nephrology following regarding renal function. Eval if patient may need dialysis. Please make sure he is still making urine. Bladder scan Q4hrs once moon removed. Confirm with nephrology before removing moon. Remove art line today   Please keep chest tubes to suction. Advance diet slowly today   Regarding afib RVR-cardiology managing-currently on amiodarone Gtt  Once chest tubes removed In a few days-CTS will sign off.     FLOYD Philip - NP

## 2023-02-24 NOTE — PROGRESS NOTES
Nephrology Progress Note      SUBJECTIVE      Kaya Hair, date of birth [birth year], is a patient seen here at [place] on 2023 for acute on chronic kidney injury. Baseline creatinine 1.92 following 4. Creatinine went up following bypass procedure. Creatinine peaked at 3.48 earlier today and is now down to 3.4. The patient has a history of chronic kidney disease stage IIIB in the setting of diabetic nephrosclerosis and has obstructive sleep apnea on CPAP, mitral valve regurgitation, AAA repair, COPD and questionable history of right lung lobectomy. The patient was seen by our service earlier in the admission for CJD stage IIIB with baseline creatinine 1.9-2.4. The patient has had some recent deterioration. Patient has a low intake. He does not have a chest tube in place following the VATS procedure for a complicated pleural effusion. No particular nausea or vomiting. No significant swelling of the extremities. The patient does have some chest discomfort following VATS procedure. Other past medical history includes non-Hodgkin's lymphoma, type 2 diabetes mellitus, sarcoidosis, chronic kidney disease stage IIIB secondary to diuretic nephrosclerosis, obstructive sleep apnea on CPAP, mitral valve regurgitation, AAA, COPD and questionable history of right lung lobectomy. See original nephrology consultation note by my partner, Dr. Ericka Stafford, from 2023.          OBJECTIVE      CURRENT TEMPERATURE:  Temp: 97.8 °F (36.6 °C)  MAXIMUM TEMPERATURE OVER 24HRS:  Temp (24hrs), Av °F (36.7 °C), Min:97.8 °F (36.6 °C), Max:98.4 °F (36.9 °C)    CURRENT RESPIRATORY RATE:  Resp: 18  CURRENT PULSE:  Heart Rate: (!) 120  CURRENT BLOOD PRESSURE:  BP: 92/62  24HR BLOOD PRESSURE RANGE:  Systolic (32GTO), DCU:95 , Min:86 , CXI:349   ; Diastolic (39XLU), EGB:25, Min:62, Max:73    24HR INTAKE/OUTPUT:    Intake/Output Summary (Last 24 hours) at 2023 1228  Last data filed at 2023 0600  Gross per 24 hour   Intake 1925.82 ml   Output 2195 ml   Net -269.18 ml     WEIGHT :Patient Vitals for the past 96 hrs (Last 3 readings):   Weight   02/24/23 1030 224 lb 13.9 oz (102 kg)   02/23/23 1400 236 lb 15.9 oz (107.5 kg)   02/21/23 0535 244 lb 4.3 oz (110.8 kg)     PHYSICAL EXAM      GENERAL APPEARANCE:Awake, alert, in no acute distress  SKIN: warm and dry, no rash or erythema  EYES: conjunctivae pale and sclera anicteric  ENT: moist mucous membranes   NECK:   No JVD. No carotid bruits and no carotid lymphadenopathy . PULMONARY: good bilateral air entry and clear to auscultation bilaterally with no wheezes or rales or rhonchi   CARDIOVASCULAR: irregular rhythm  ABDOMEN: soft nontender, bowel sounds present, no organomegaly, no ascites.      EXTREMITIES: trace lower extremity edema  CURRENT MEDICATIONS      cefTRIAXone (ROCEPHIN) 1,000 mg in sterile water 10 mL IV syringe, Q24H  0.9 % sodium chloride infusion, Continuous  sodium chloride flush 0.9 % injection 5-40 mL, 2 times per day  sodium chloride flush 0.9 % injection 10 mL, PRN  0.9 % sodium chloride infusion, PRN  mupirocin (BACTROBAN) 2 % ointment, BID  chlorhexidine (HIBICLENS) 4 % liquid, See Admin Instructions  sodium chloride flush 0.9 % injection 5-40 mL, 2 times per day  sodium chloride flush 0.9 % injection 5-40 mL, PRN  0.9 % sodium chloride infusion, PRN  oxyCODONE-acetaminophen (PERCOCET) 5-325 MG per tablet 1 tablet, Q4H PRN   Or  oxyCODONE-acetaminophen (PERCOCET) 5-325 MG per tablet 2 tablet, Q4H PRN  morphine (PF) injection 2 mg, Q2H PRN   Or  morphine injection 4 mg, Q2H PRN  polyethylene glycol (GLYCOLAX) packet 17 g, Daily  sodium chloride flush 0.9 % injection 5-40 mL, 2 times per day  sodium chloride flush 0.9 % injection 5-40 mL, PRN  0.9 % sodium chloride infusion, PRN  fentaNYL (SUBLIMAZE) injection 25 mcg, Q5 Min PRN  fentaNYL (SUBLIMAZE) injection 50 mcg, Q5 Min PRN  ondansetron (ZOFRAN) injection 4 mg, Once PRN  diphenhydrAMINE (BENADRYL) injection 12.5 mg, Once PRN  amiodarone (CORDARONE) 450 mg in dextrose 5 % 250 mL infusion, Continuous  insulin lispro (HUMALOG) injection vial 0-4 Units, TID WC  insulin lispro (HUMALOG) injection vial 0-4 Units, Nightly  glucose chewable tablet 16 g, PRN  dextrose bolus 10% 125 mL, PRN   Or  dextrose bolus 10% 250 mL, PRN  glucagon (rDNA) injection 1 mg, PRN  dextrose 10 % infusion, Continuous PRN  furosemide (LASIX) injection 20 mg, BID          LABS      CBC:   Recent Labs     02/23/23 2308 02/24/23  0605 02/24/23  0928   WBC 28.8* 26.0* 25.0*   RBC 3.13* 3.29* 3.26*   HGB 8.8* 9.2* 9.2*   HCT 28.5* 30.4* 29.2*   MCV 91.1 92.4 89.6   MCH 28.1 28.0 28.2   MCHC 30.9 30.3 31.5   RDW 15.7* 15.7* 15.9*    433 394   MPV 9.8 9.2 9.3      BMP:   Recent Labs     02/23/23 2308 02/24/23  0605 02/24/23  0928   * 132* 132*   K 5.2 4.9 5.1   CL 99 100 99   CO2 22 21 20   BUN 51* 50* 52*   CREATININE 3.30* 3.48* 3.19*   GLUCOSE 102* 85 87   CALCIUM 8.7 8.8 9.1      BNP:No results found for: BNP  PHOSPHORUS:  No results for input(s): PHOS in the last 72 hours. MAGNESIUM:   Recent Labs     02/22/23  0215   MG 2.2     ALBUMIN: No results for input(s): LABALBU in the last 72 hours. IRON:  No results found for: IRON  IRON SATURATION:  No results found for: LABIRON  TIBC:  No results found for: TIBC  FERRITIN:  No results found for: FERRITIN  SAMANTHA:   No results found for: SAMANTHA    SPEP:   Lab Results   Component Value Date/Time    PROT 6.5 02/13/2023 01:36 PM    PROT 6.4 05/29/2012 10:35 AM    ALBCAL 2.6 02/13/2023 03:07 AM    ALBPCT 50 02/13/2023 03:07 AM    LABALPH 0.5 02/13/2023 03:07 AM    LABALPH 1.0 02/13/2023 03:07 AM    A1PCT 8 02/13/2023 03:07 AM    A2PCT 20 02/13/2023 03:07 AM    LABBETA 0.7 02/13/2023 03:07 AM    BETAPCT 13 02/13/2023 03:07 AM    GAMGLOB 0.5 02/13/2023 03:07 AM    GGPCT 9 02/13/2023 03:07 AM    PATH ELECTRONICALLY SIGNED.  Ynag Lauren M.D. 02/13/2023 03:07 AM    PATH ELECTRONICALLY SIGNED. Jamal Caruso M.D. 02/13/2023 03:07 AM     UPEP:   Lab Results   Component Value Date/Time    TPU 6 04/03/2012 11:08 AM      HEPBSAG:  Lab Results   Component Value Date/Time    HEPBSAG NONREACTIVE 05/29/2012 10:35 AM     HEPCAB:  Lab Results   Component Value Date/Time    HEPCAB NONREACTIVE 05/29/2012 10:35 AM     C3:   Lab Results   Component Value Date    C3 102 05/29/2012     C4:   Lab Results   Component Value Date    C4 14 05/29/2012     MPO ANCA: No results found for: MPO . PR3 ANCA:  No results found for: PR3  URINE SODIUM:    Lab Results   Component Value Date/Time    RUBY 43 02/13/2023 10:18 AM      URINE CREATININE:    Lab Results   Component Value Date/Time    LABCREA 45.3 02/13/2023 10:18 AM     URINE EOSINOPHILS: No results found for: UREO  URINE PROTEIN:    Lab Results   Component Value Date/Time    TPU 6 04/03/2012 11:08 AM     URINALYSIS:  U/A:   Lab Results   Component Value Date/Time    NITRU NEGATIVE 02/22/2023 01:17 PM    COLORU Yellow 02/22/2023 01:17 PM    PHUR 5.0 02/22/2023 01:17 PM    WBCUA 2 TO 5 02/22/2023 01:17 PM    RBCUA None 02/22/2023 01:17 PM    MUCUS 1+ 02/13/2023 02:17 AM    BACTERIA MODERATE 02/22/2023 01:17 PM    SPECGRAV 1.020 02/22/2023 01:17 PM    LEUKOCYTESUR NEGATIVE 02/22/2023 01:17 PM    UROBILINOGEN Normal 02/22/2023 01:17 PM    BILIRUBINUR NEGATIVE 02/22/2023 01:17 PM    BILIRUBINUR NEGATIVE 04/03/2012 11:08 AM    GLUCOSEU 1+ 02/22/2023 01:17 PM    GLUCOSEU NEGATIVE 04/03/2012 11:08 AM    KETUA NEGATIVE 02/22/2023 01:17 PM    AMORPHOUS 1+ 02/22/2023 01:17 PM     ANTIGBM:No results found for: [address] as available. ASSESSMENT      1. Acute kidney injury on top of chronic kidney disease stage IIIB, likely related to the patient's trip to surgery  2. Status post vats procedure  3. No significant fluid overload        PLAN      1. Continue to monitor lab data and intake and output   2.  No dialysis needed at this time  3. Follow up labs ordered. Please do not hesitate to call with questions.     This note is created with the assistance of a speech-recognition program. While intending to generate a document that actually reflects the content of the visit, no guarantees can be provided that every mistake has been identified and corrected by editing    Electronically signed by Suresh Lema MD on 2/24/2023 at 12:28 PM

## 2023-02-24 NOTE — OP NOTE
[address]                  [place] [id number]                                OPERATIVE REPORT    PATIENT NAME: Eris David                       :        1955  MED REC NO:   [id number]                             ROOM:       [address] [place] [address]  ACCOUNT NO:   [de-identified]                           ADMIT DATE: 2023  PROVIDER:     Jenny Mccormick MD    DATE OF PROCEDURE:  2023    PREOPERATIVE DIAGNOSIS:  Left empyema. POSTOPERATIVE DIAGNOSIS:  Left empyema. PROCEDURES:  1. Left VATS. 2.  Decortication. 3.  Platelet gel. SURGEON:  Jenny Mccormick MD    ASSISTANT:  Gabbie Miranda. EBL:  500 mL. SPECIMENS:  1. Pleural peel. 2.  Fluid for cytology. DRAINS:  Two 36-Icelandic chest tubes. ANESTHESIA:  General anesthesia. OPERATIVE PROCEDURE:  The patient had the risks, benefits, intentions  discussed, signed consent, taken back to the operating room and placed  in a supine position. Anesthesia was induced. The patient then had a  double-lumen endotracheal tube, ET tube and central line placed, placed  in a right lateral decubitus position. All the appropriate padding and  precautions were taken. At this point, the patient was washed, prepped, and draped in a normal  sterile fashion. Time-out was performed. The patient had the excisions  made in the following fashion. 1 cm incision was made anterior to the  tip of the scapula, continued in depth with electrocautery. The thorax  was entered. At this time, the patient has a Thoracoport placed there. There was copious amounts of purulent fluid, which was sucked out  through the Thoracoport. Two additional Thoracoport sites were placed,  one at the anterior to the seventh intercostal space and one in the  ninth intercostal space mid axillary line. At this point, the patient has the thorax entered and a few Thoracoports  were placed.   The copious amounts of purulent fluid was suctioned out  and the peel was removed from the chest wall. This was done using the  induction clamps, Roane Min and Jhonny Schein and then bluntly dissected out  the chest wall. The peel was then removed from the lung on the anterior  and posterior aspect through the diaphragm _____. After all the peel  has been removed as possible, the incision was irrigated and the chest  irrigated with 3 liters of vancomycin irrigation. The platelet gel  applied to the cut surface and to the bleeding points platelet gel  applied. Two 36-Mont[place]grin chest tubes were placed at the inferior anterior  most Thoracoport sites. The lung was then expanded and the remaining  port site was closed using a 2 and a 4-0 Vicryl suture. The patient was  extubated in the operating room, taken to the recovery area in stable  condition. This procedure could not have been performed without the assistance of Kiya Scott.   He helped with operation of camera and closure of incisions      Lizandro Wood MD    D: 02/23/2023 19:34:14       T: 02/23/2023 22:16:33     [email or web address]  Job#: [id number]     Doc#: [id number]    CC:

## 2023-02-24 NOTE — CARE COORDINATION
Received call from [facility] from [facility] at [facility].  They are out of network with insurance

## 2023-02-24 NOTE — ADT AUTH CERT
Pneumonia - Care Day 10 (2/21/2023) by Janene Harp RN       Review Status Review Entered   Completed 2/23/2023 1706       Created By   Janene Harp RN      Criteria Review      Care Day: 10 Care Date: 2/21/2023 Level of Care: Intermediate Care    Guideline Day 2    Level Of Care    (X) Floor    2/23/2023 5:06 PM EST by Neto Patel      intermediate    Clinical Status    ( ) * No CO2 retention or acidosis    (X) * No requirement for mechanical ventilation    2/23/2023 5:06 PM EST by Neto Patel      02 3l nc    (X) * Hypotension absent    2/23/2023 5:06 PM EST by Neto Patel      133/85    (X) * Afebrile or fever improved    2/23/2023 5:06 PM EST by Neto Patel      08.6    ( ) * No hypoxia on room air or oxygenation improved    ( ) * Mental status improved or at baseline    Activity    (X) * Increased activity    2/23/2023 5:06 PM EST by Neto Patel      up as mariann    Routes    (X) Oral hydration    (X) Oral medications    (X) Usual diet    2/23/2023 5:06 PM EST by Neto Patel      carb control diet    Interventions    (X) Incentive spirometry    2/23/2023 5:06 PM EST by Neto Patel      is q2h    (X) Pulse oximetry    2/23/2023 5:06 PM EST by Neto Patel      cont pulse ox    (X) Head of bed at 30 degrees    2/23/2023 5:06 PM EST by Neto Patel      hob 30    (X) Possible oxygen    2/23/2023 5:06 PM EST by Neto Patel      02 3l nc    Medications    (X) IV or oral antibiotics    2/23/2023 5:06 PM EST by Neto Patel      rocephin 1 gm iv  q24h    * Milestone   Additional Notes   DATE: 2.21.23          VITALS:   98.6 117   23   133.95  map 99   Sp02 98%  3L nc         ABNL/PERTINENT LABS/RADIOLOGY/DIAGNOSTIC STUDIES:   Na 132   K 5.5   Bun 47   Cr 1.96   Gfr 37   Glcuose 150   Hs trop 25   Wbc 22.9   H/h 11.4 / 38.1       Sinus tachycardia   ST elevation consider inferolateral injury or acute infarct   ** ** ACUTE MI / STEMI ** **   Abnormal ECG   When compared with ECG of 16-FEB-2023 23:11,   ST elevation now present in Inferior leads   ST elevation now present in Lateral lead         Echo   Left ventricle is normal in size. Global left ventricular systolic function   is difficult to assess due to heart rate but appears normal. Calculated   ejection fraction 51% by Atkinson's method. Aortic valve is trileaflet and opens adequately. Trivial aortic insufficiency. Normal tricuspid valve structure. Trivial tricuspid regurgitation. PHYSICAL EXAM:      Head: Normocephalic and atraumatic. Nose:       Right Sinus: No maxillary sinus tenderness or frontal sinus tenderness. Left Sinus: No maxillary sinus tenderness or frontal sinus tenderness. Mouth/Throat:       Pharynx: No oropharyngeal exudate. Eyes:       General: No scleral icterus. Conjunctiva/sclera: Conjunctivae normal.       Pupils: Pupils are equal, round, and reactive to light. Neck:       Thyroid: No thyromegaly. Vascular: No JVD. Cardiovascular:       Rate and Rhythm: Normal rate and regular rhythm. Pulses:            Dorsalis pedis pulses are 2+ on the right side and 2+ on the left side. Heart sounds: Normal heart sounds. No murmur heard. Pulmonary:       Effort: Pulmonary effort is normal.       Breath sounds: Normal breath sounds. No wheezing or rales. Chest:       Comments: Left chest tube in place. Abdominal:       Palpations: Abdomen is soft. There is no mass. Tenderness: There is no abdominal tenderness. Musculoskeletal:       Cervical back: Full passive range of motion without pain and neck supple. Lymphadenopathy:       Head:       Right side of head: No submandibular adenopathy. Left side of head: No submandibular adenopathy. Cervical: No cervical adenopathy. Skin:      General: Skin is warm. Neurological:       Mental Status: He is alert and oriented to person, place, and time. Motor: No tremor.     Psychiatric: Behavior: Behavior is cooperative. MD CONSULTS/ASSESSMENT AND PLAN:   Medicine    Patient has been more confused. He has been trying to get out of the bed. Patient is oriented to self. He is redirectable. Denies any agitation. Patient is feeling overall weak. Vitals - Stable afebrile, tachycardia   Labs -hyperkalemia 5.5, BUN 47, creatinine 1.96. Kidney US did not show any hydronephrosis . 1. Left empyema -   Pleural fluid growing strep viridans gordonii -ID following. Chest tube in place. Cefepime switched to Rocephin. Patient getting tPA. CT surgery and pulmonary following. Plan for decortication in few days. 2. Acute respiratory failure with hypoxia -continue O2 supplement. 3. H/o non-Hodgkin's lymphoma and multiple myeloma   4. H/o sarcoidosis -    5. Acute kidney injury with CKD stage III - baseline creatinine 1.5 - hold Lasix. Continue IVF. 6. Hyperkalemia due to decreased renal clearance-Kayexalate. 7. Demand ischemia from acute respiratory failure   8. Reactive thrombocytosis. 9. Obstructive sleep apnea -CPAP at nighttime. 10. COPD - albuterol as needed    11. Acute Delirium -increase Seroquel dose at night          Infectious Diseases    · Left-sided empyema   º Streptococci Gordonii on pleural fluid culture from 2/14/23   º Repeat culture 2/18/23 with no bacterial growth thus far   · Shortness of breath   · KELSEA on CKD   · Hyponatremia   · Leukocytosis   · Hx non-Hodgkin's lymphoma   · Hx multiple myeloma   · Sarcoidosis   · DM II   · Interstitial nephritis   · COPD   · Hx partial right lung lobectomy   · Allergies to macrolides and ketolides. Recommendations:   · 2/20/23: Discontinued IV Cefepime on 2-20-23   · IV Rocephin 1 gm q 24 hrs until 3/12/23   · Isabela [facility] for outpatient therapy         Cardiology   Respiratory:   · Normal excursion and expansion without use of accessory muscles   · Resp Auscultation: Good respiratory effort.  No for increased work of breathing. Absent breath sounds in the right lower lobe    · On 3L of O2   · Left sided chest tube present with purulent contents    Cardiovascular:   · The apical impulse is not displaced   · Heart tones are crisp and normal. regular S1 and S2.   · Jugular venous pulsation Normal   · The carotid upstroke is normal in amplitude and contour without delay or bruit   · Peripheral pulses are symmetrical and full    Abdomen:    · No masses or tenderness   · Bowel sounds present   Extremities:   · No Cyanosis or Clubbing   · Lower extremity edema: No      Impression:   Preoperative clearance for VATS procedure. Patient has a revised cardiac index score of . METS greater than 4. Empyema   Loculated left-sided pleural effusion    Acute hypoxic respiratory failure secondary to pneumonia   Pneumonia   History of sarcoidosis   History of non-Hodgkin's lymphoma   History of nonrheumatic mitral regurgitation   Tricuspid regurgitation   History of dilated aortic root   Type 2 diabetes   Chronic kidney disease stage III/IV       Recommendations: Follow-up echocardiogram   Follow up EKG             Cardiothoracic Surgery   Patient has mild anxiety no SOb. Bedside he was upset regarding communication with significant other. Pt is paranoid. No SOb or CP       Chest: pacing wires: no, chest tubes:yes, air leak no, 1 +   CV: no murmur noted, Normal S1, S2, NSR-concerns for EKG changes on monitor in room   Lungs: clear to auscultation, no wheezes, rales, or rhonchi   Abd: normal bowel sounds    Lower Extremities: Trace edema   Saph Incison: no sign of drainage or infection   Sternal Incison: dressing applied-no excessive drainage or signs of infection noted   Reviewed Ct chest which shows many loculated pockets with chest tube in place      Patient has no known cardiac Hx. Family Hx noted.     Noted tachycardia present today    Hx of sarcoidosis   At this time we would like cardiac risk stratification and clearance. Echocardiogram ordered. Cardiology consulted. EKG ordered. Pt has many loculated pockets therefore will likely need decortication. No surgical date at this time.        MEDICATIONS:    lipitor 80 mg daily,  alphagan and timoptic  ophthalmic soln  1 drop right eye 2xd,  bentyl 10 mg   4xd, triglade 160 mg daily,  hepairn  5000 units sc  3xd,  duoneb neb  q4h,  toprol 25 mg   daily,   tylenol 650 mg   q6hrpn x1,       oxycodone 5 mg   q4hprn x2    kayexalate  15 g m  x1    Lopressor 5 mg iv   q6hprn x1, zofran  4 mg iv  q6hprn x1           ORDERS:   CT  care and mgmt,  suction  to wall low continuous ,    glucose  4xd,  dailywt ,  iand o q8h,   epc,  increase act level as mariann keep  02 sat  >92%,  cont pulse ox , telemetry,   vs             PT/OT/SLP/CM ASSESSMENT OR NOTES:    Dc plan  snf         Pneumonia - Care Day 9 (2/20/2023) by Jai Sanders RN       Review Status Review Entered   Completed 2/23/2023 1654       Created By   Jai Sanders RN      Criteria Review      Care Day: 9 Care Date: 2/20/2023 Level of Care: Intermediate Care    Guideline Day 2    Level Of Care    (X) Floor    2/23/2023 4:54 PM EST by Mich Carlson      intermediate    Clinical Status    ( ) * No CO2 retention or acidosis    (X) * No requirement for mechanical ventilation    2/23/2023 4:54 PM EST by Mihc Carlson      o2 3lnc    (X) * Hypotension absent    2/23/2023 4:54 PM EST by Freraoulich Aldo      112/99    (X) * Afebrile or fever improved    2/23/2023 4:54 PM EST by Frederich Shallow      98.4    ( ) * No hypoxia on room air or oxygenation improved    2/23/2023 4:54 PM EST by Frederich Shallow      92 3l nc    ( ) * Mental status improved or at baseline    Activity    (X) * Increased activity    Routes    (X) Oral hydration    2/23/2023 4:54 PM EST by Freneha Carlson      po and iv    ivf 75 mlhr    (X) Oral medications    (X) Usual diet    2/23/2023 4:54 PM EST by Freneha Carlson      carb control diet    Interventions (X) Incentive spirometry    2/23/2023 4:54 PM EST by Darwinarsenio Olivaresers      is q2h    (X) Pulse oximetry    2/23/2023 4:54 PM EST by Darwinarsenio Olivaresers      cont pulse ox    (X) Head of bed at 30 degrees    2/23/2023 4:54 PM EST by Darwin Senters      hob 30    (X) Possible oxygen    2/23/2023 4:54 PM EST by Darwin Senters      3l nc    Medications    (X) IV or oral antibiotics    2/23/2023 4:54 PM EST by Darwin Senters      maxipime  2 gm iv  q12 h x1  rocephin  1 gm iv  q24h    * Milestone   Additional Notes   DATE: 2.20.23          VITALS:   09.4  104   32   112/99   map 105   sp02 100%  3lnc       ABNL/PERTINENT LABS/RADIOLOGY/DIAGNOSTIC STUDIES:   Na 133   Bun 63   Cr 2.24   Gfr 32   Glcuose 151   Wbc 19.0   H/h 10.0 / 32.1          PHYSICAL EXAM:      Head: Normocephalic and atraumatic. Nose:       Right Sinus: No maxillary sinus tenderness or frontal sinus tenderness. Left Sinus: No maxillary sinus tenderness or frontal sinus tenderness. Mouth/Throat:       Pharynx: No oropharyngeal exudate. Eyes:       General: No scleral icterus. Conjunctiva/sclera: Conjunctivae normal.       Pupils: Pupils are equal, round, and reactive to light. Neck:       Thyroid: No thyromegaly. Vascular: No JVD. Cardiovascular:       Rate and Rhythm: Normal rate and regular rhythm. Pulses:            Dorsalis pedis pulses are 2+ on the right side and 2+ on the left side. Heart sounds: Normal heart sounds. No murmur heard. Pulmonary:       Effort: Pulmonary effort is normal.       Breath sounds: Normal breath sounds. No wheezing or rales. Chest:       Comments: Left chest tube in place. Abdominal:       Palpations: Abdomen is soft. There is no mass. Tenderness: There is no abdominal tenderness. Musculoskeletal:       Cervical back: Full passive range of motion without pain and neck supple. Lymphadenopathy:       Head:       Right side of head: No submandibular adenopathy. Left side of head: No submandibular adenopathy. Cervical: No cervical adenopathy. Skin:      General: Skin is warm. Neurological:       Mental Status: He is alert and oriented to person, place, and time. Motor: No tremor. Psychiatric:          Behavior: Behavior is cooperative. MD CONSULTS/ASSESSMENT AND PLAN:   Medicine    Patient is confused today. He is on the edge trying to get up. Patient has chest tube in place and drained 275 ml in last 24 Hrs . He is on supplemental oxygen. Denies any cough , congestion, chest pain. No focal weakness or swallowing or speech difficulty. Vitals - Stable afebrile   Labs - worsened creatinine 2.24 , BUN 63 , improving leucocytosis    Kidney US did not show any hydronephrosis . 1. Left empyema - on cefepime. Pleural fluid growing strep viridans gordonii -ID consultation pending . Chest tube in place. Patient getting tPA. CT surgery and pulmonary following. 2. Acute respiratory failure with hypoxia -continue O2 supplement. 3. H/o non-Hodgkin's lymphoma and multiple myeloma   4. H/o sarcoidosis -    5. Acute kidney injury with CKD stage III - baseline creatinine 1.5 - hold Lasix. Continue IVF. 6. Demand ischemia from acute respiratory failure   7. Reactive thrombocytosis. 8. Obstructive sleep apnea -    9. COPD - albuterol as needed    10. Acute Delirium - Seroquel at night             PULMONARY   No acute events overnight   Patient is currently on BiPAP with FiO2 40% and saturating 100%   Patient is mildly tachycardic with heart rate of 103 and vitals are otherwise stable   Chest tube have to 275 mL of output in 24 hours      Mental Status:  Oriented to person, place and time and normal affect. Lungs: Decreased air entry on the left side with chest tube in place   Heart:  Regular rate and rhythm, no murmur. Abdomen:  Soft, nontender, nondistended, normal bowel sounds.    Extremities:  No edema, redness, tenderness in the calves. Skin:  Warm, dry, no gross lesions or rashes. 1. Patient is currently on BiPAP and saturating 100%   2. Keep supplemental oxygen to keep oxygen saturation above 92%   3. Chest tube management as per cardiothoracic surgery   4. Patient will likely need decortication as per the CTS   5. Patient is on cefepime -we will suggest infectious disease consult   6. We suggest hematology/oncology consult due to background hx of lymphoma   7. Bronchodilator therapy   8. Encourage incentive spirometry/Acapella   9. Bronchopulmonary hygiene   10. Patient is getting heparin for DVT prophylaxis   11. Fasting blood glucose 120, management of diabetes per the primary team   12. Rest of the management as per urinary team            Cardiothoracic Surgery   Chest tube output:       2/20 - 275cc      Mental status: oriented to person, place, and time with normal affect   Head:  normocephalic, atraumatic. Eye: no icterus, redness, pupils equal and reactive, extraocular eye movements intact, conjunctiva clear   Ear: normal external ear, no discharge, hearing intact   Nose:  no drainage noted   Mouth: mucous membranes moist   Neck: supple, no carotid bruits, thyroid not palpable   Lungs: Bilateral equal air entry, clear to ausculation, no wheezing, rales or rhonchi, normal effort   Cardiovascular: tachycardic, regular rhythm, no murmur, gallop, rub.    Abdomen: Soft, nontender, nondistended, normal bowel sounds, no hepatomegaly or splenomegaly   Neurologic: There are no new focal motor or sensory deficits, normal muscle tone and bulk, no abnormal sensation, normal speech, cranial nerves II through XII grossly intact   Skin: No gross lesions, rashes, bruising or bleeding on exposed skin area   Extremities:  peripheral pulses palpable, no pedal edema or calf pain with palpation      - Monitor chest tube output    - No plan for decortication at this time    - Will plan to repeat CT scan at a later date    - Continue use of incentive spirometry and ambulate as able to help optimize lung function          Nutrition Recommendations/Plan:    1. Liberalize diet to allow for 75 gm CHO per meal   2. Encourage PO intake as tolerated. If PO does not consistently improve, will consider trial of ONS (available liquid ONS are lactose-free). Infectious Diseases    · Left-sided empyema   º Streptococci Gordonii on pleural fluid culture from 2/14/23   º Repeat culture 2/18/23 with no bacterial growth thus far   · Shortness of breath   · KELSEA on CKD   · Hyponatremia   · Leukocytosis   · Hx non-Hodgkin's lymphoma   · Hx multiple myeloma   · Sarcoidosis   · DM II   · Interstitial nephritis   · COPD   · Hx partial right lung lobectomy   · Allergies to macrolides and ketolides. Recommendations:   · Continue IV Cefepime while inpatient      MEDICATIONS:   norvasc  5mg   daily,  lipitor 80 mg daily,  alphagan and timoptic  ophthalmic soln  1 drop right eye 2xd,  bentyl 10 mg   4xd, triglade 160 mg daily,  hepairn  5000 units sc  3xd,  duoneb neb  q4h,  toprol 25 mg   daily,   tylenol 650 mg   q6hrpn x1,       oxycodone 5 mg   q4hprn x2    percocet 5-325 1 tab  q4hprn x1        ORDERS:   CT  care and mgmt,  suction  to wall low continuous ,    glucose  4xd,  dailywt ,  iand o q8h,   epc,  increase act level as mariann keep  02 sat  >92%,  cont pulse ox , telemetry,   vs                   PT/OT/SLP/CM ASSESSMENT OR NOTES:    Physical Therapy   Patient would benefit from continued therapy after discharge    Body Structures, Functions, Activity Limitations Requiring Skilled Therapeutic Intervention: Decreased functional mobility ; Decreased ROM; Decreased strength;Decreased safe awareness;Decreased endurance;Decreased balance   Assessment: Pt sup to sit with increased cues and CGA, pt sat EOB x15 without support, pt midly confused, increased time for processing, CGA grossly for mobility with 2nd staff SBA.  Pt with mobility deficits requiring CGA to ambulate 6 feet with a RW. Pt limited this date secondary to decreased endurance, BLE strength and standing balance. Pt would benefit from additional PT upon discharge to maximize functional independence. Pt will require 24 hour assistance with mobility upon discharge.    Therapy Prognosis: Good   Decision Making: High Complexity      Dc plan  snf

## 2023-02-24 NOTE — PLAN OF CARE
Problem: Discharge Planning  Goal: Discharge to home or other facility with appropriate resources  Outcome: Progressing  Flowsheets (Taken 2/23/2023 1957)  Discharge to home or other facility with appropriate resources:   Identify barriers to discharge with patient and caregiver   Arrange for needed discharge resources and transportation as appropriate   Identify discharge learning needs (meds, wound care, etc)   Arrange for interpreters to assist at discharge as needed   Refer to discharge planning if patient needs post-hospital services based on physician order or complex needs related to functional status, cognitive ability or social support system     Problem: Safety - Adult  Goal: Free from fall injury  Outcome: Progressing     Problem: ABCDS Injury Assessment  Goal: Absence of physical injury  Outcome: Progressing     Problem: Skin/Tissue Integrity  Goal: Absence of new skin breakdown  Description: 1. Monitor for areas of redness and/or skin breakdown  2. Assess vascular access sites hourly  3. Every 4-6 hours minimum:  Change oxygen saturation probe site  4. Every 4-6 hours:  If on nasal continuous positive airway pressure, respiratory therapy assess nares and determine need for appliance change or resting period.   Outcome: Progressing     Problem: Chronic Conditions and Co-morbidities  Goal: Patient's chronic conditions and co-morbidity symptoms are monitored and maintained or improved  Outcome: Progressing  Flowsheets (Taken 2/23/2023 1957)  Care Plan - Patient's Chronic Conditions and Co-Morbidity Symptoms are Monitored and Maintained or Improved:   Monitor and assess patient's chronic conditions and comorbid symptoms for stability, deterioration, or improvement   Collaborate with multidisciplinary team to address chronic and comorbid conditions and prevent exacerbation or deterioration   Update acute care plan with appropriate goals if chronic or comorbid symptoms are exacerbated and prevent overall improvement and discharge     Problem: Respiratory - Adult  Goal: Achieves optimal ventilation and oxygenation  Outcome: Progressing  Flowsheets (Taken 2/23/2023 1957)  Achieves optimal ventilation and oxygenation:   Assess for changes in respiratory status   Assess for changes in mentation and behavior   Position to facilitate oxygenation and minimize respiratory effort   Oxygen supplementation based on oxygen saturation or arterial blood gases   Encourage broncho-pulmonary hygiene including cough, deep breathe, incentive spirometry   Initiate smoking cessation protocol as indicated     Problem: Pain  Goal: Verbalizes/displays adequate comfort level or baseline comfort level  Outcome: Progressing  Flowsheets (Taken 2/24/2023 0400)  Verbalizes/displays adequate comfort level or baseline comfort level:   Encourage patient to monitor pain and request assistance   Assess pain using appropriate pain scale   Administer analgesics based on type and severity of pain and evaluate response   Implement non-pharmacological measures as appropriate and evaluate response     Problem: Neurosensory - Adult  Goal: Achieves stable or improved neurological status  Outcome: Progressing  Flowsheets (Taken 2/23/2023 1957)  Achieves stable or improved neurological status:   Assess for and report changes in neurological status   Initiate measures to prevent increased intracranial pressure   Maintain blood pressure and fluid volume within ordered parameters to optimize cerebral perfusion and minimize risk of hemorrhage   Monitor temperature, glucose, and sodium. Initiate appropriate interventions as ordered  Goal: Absence of seizures  Outcome: Progressing  Flowsheets (Taken 2/23/2023 1957)  Absence of seizures:   Monitor for seizure activity.   If seizure occurs, document type and location of movements and any associated apnea   If seizure occurs, turn head to side and suction secretions as needed   Administer anticonvulsants as ordered Support airway/breathing, administer oxygen as needed   Diagnostic studies as ordered  Goal: Remains free of injury related to seizures activity  Outcome: Progressing  Flowsheets (Taken 2/23/2023 1957)  Remains free of injury related to seizure activity:   Maintain airway, patient safety  and administer oxygen as ordered   Monitor patient for seizure activity, document and report duration and description of seizure to Licensed Independent Practitioner   If seizure occurs, turn patient to side and suction secretions as needed   Reorient patient post seizure   Seizure pads on all 4 side rails   Instruct patient/family to notify RN of any seizure activity  Goal: Achieves maximal functionality and self care  Outcome: Progressing  Flowsheets (Taken 2/23/2023 1957)  Achieves maximal functionality and self care:   Monitor swallowing and airway patency with patient fatigue and changes in neurological status   Encourage and assist patient to increase activity and self care with guidance from physical therapy/occupational therapy   Encourage visually impaired, hearing impaired and aphasic patients to use assistive/communication devices     Problem: Cardiovascular - Adult  Goal: Maintains optimal cardiac output and hemodynamic stability  Outcome: Progressing  Flowsheets (Taken 2/23/2023 1957)  Maintains optimal cardiac output and hemodynamic stability:   Monitor blood pressure and heart rate   Monitor urine output and notify Licensed Independent Practitioner for values outside of normal range   Assess for signs of decreased cardiac output   Administer fluid and/or volume expanders as ordered   Administer vasoactive medications as ordered  Goal: Absence of cardiac dysrhythmias or at baseline  Outcome: Progressing  Flowsheets (Taken 2/23/2023 1957)  Absence of cardiac dysrhythmias or at baseline:   Monitor cardiac rate and rhythm   Assess for signs of decreased cardiac output   Administer antiarrhythmia medication and electrolyte replacement as ordered     Problem: Skin/Tissue Integrity - Adult  Goal: Skin integrity remains intact  Outcome: Progressing  Flowsheets (Taken 2/23/2023 1957)  Skin Integrity Remains Intact:   Monitor for areas of redness and/or skin breakdown   Assess vascular access sites hourly   Every 4-6 hours minimum: Change oxygen saturation probe site  Goal: Incisions, wounds, or drain sites healing without S/S of infection  Outcome: Progressing  Flowsheets (Taken 2/23/2023 1957)  Incisions, Wounds, or Drain Sites Healing Without Sign and Symptoms of Infection: Implement wound care per orders  Goal: Oral mucous membranes remain intact  Outcome: Progressing  Flowsheets (Taken 2/23/2023 1957)  Oral Mucous Membranes Remain Intact:   Assess oral mucosa and hygiene practices   Implement preventative oral hygiene regimen   Implement oral medicated treatments as ordered     Problem: Musculoskeletal - Adult  Goal: Return mobility to safest level of function  Outcome: Progressing  Flowsheets (Taken 2/23/2023 1957)  Return Mobility to Safest Level of Function:   Assess patient stability and activity tolerance for standing, transferring and ambulating with or without assistive devices   Assist with transfers and ambulation using safe patient handling equipment as needed   Ensure adequate protection for wounds/incisions during mobilization   Obtain physical therapy/occupational therapy consults as needed   Apply continuous passive motion per provider or physical therapy orders to increase flexion toward goal  Goal: Maintain proper alignment of affected body part  Outcome: Progressing  Flowsheets (Taken 2/23/2023 1957)  Maintain proper alignment of affected body part:   Support and protect limb and body alignment per provider's orders   Instruct and reinforce with patient and family use of appropriate assistive device and precautions (e.g. spinal or hip dislocation precautions)  Goal: Return ADL status to a safe level of function  Outcome: Progressing  Flowsheets (Taken 2/23/2023 1957)  Return ADL Status to a Safe Level of Function:   Administer medication as ordered   Assess activities of daily living deficits and provide assistive devices as needed   Obtain physical therapy/occupational therapy consults as needed   Assist and instruct patient to increase activity and self care as tolerated     Problem: Gastrointestinal - Adult  Goal: Minimal or absence of nausea and vomiting  Outcome: Progressing  Flowsheets (Taken 2/23/2023 1957)  Minimal or absence of nausea and vomiting:   Administer IV fluids as ordered to ensure adequate hydration   Maintain NPO status until nausea and vomiting are resolved   Nasogastric tube to low intermittent suction as ordered   Administer ordered antiemetic medications as needed   Provide nonpharmacologic comfort measures as appropriate   Advance diet as tolerated, if ordered  Goal: Maintains or returns to baseline bowel function  Outcome: Progressing  Flowsheets (Taken 2/23/2023 1957)  Maintains or returns to baseline bowel function:   Assess bowel function   Encourage oral fluids to ensure adequate hydration   Administer IV fluids as ordered to ensure adequate hydration   Administer ordered medications as needed   Encourage mobilization and activity   Nutrition consult to assist patient with appropriate food choices  Goal: Maintains adequate nutritional intake  Outcome: Progressing  Flowsheets (Taken 2/23/2023 1957)  Maintains adequate nutritional intake:   Monitor percentage of each meal consumed   Identify factors contributing to decreased intake, treat as appropriate   Assist with meals as needed   Monitor intake and output, weight and lab values   Obtain nutritional consult as needed  Goal: Establish and maintain optimal ostomy function  Outcome: Progressing  Flowsheets (Taken 2/23/2023 1957)  Establish and maintain optimal ostomy function:   Monitor output from ostomies   Administer IV fluids and  TPN as ordered   Introduce and advance enteral feedings as ordered   Nutrition consult   Infuse IV Fluids/TPN as ordered     Problem: Genitourinary - Adult  Goal: Absence of urinary retention  Outcome: Progressing  Flowsheets (Taken 2/23/2023 1957)  Absence of urinary retention:   Assess patients ability to void and empty bladder   Monitor intake/output and perform bladder scan as needed   Place urinary catheter per Licensed Independent Practitioner order if needed   Discuss with Licensed Independent Practitioner  medications to alleviate retention as needed   Discuss catheterization for long term situations as appropriate  Goal: Urinary catheter remains patent  Outcome: Progressing  Flowsheets (Taken 2/23/2023 1957)  Urinary catheter remains patent:   Assess patency of urinary catheter   Irrigate catheter per Licensed Independent Practitioner order if indicated and notify Licensed Independent Practitioner if unable to irrigate   Assess need for a larger catheter size or a 3-way catheter for continuous bladder irrigation     Problem: Infection - Adult  Goal: Absence of infection at discharge  Outcome: Progressing  Flowsheets (Taken 2/23/2023 1957)  Absence of infection at discharge:   Assess and monitor for signs and symptoms of infection   Monitor lab/diagnostic results   Monitor all insertion sites i.e., indwelling lines, tubes and drains   Monitor endotracheal (as able) and nasal secretions for changes in amount and color   [place] appropriate cooling/warming therapies per order   Instruct and encourage patient and family to use good hand hygiene technique  Goal: Absence of infection during hospitalization  Outcome: Progressing  Flowsheets (Taken 2/23/2023 1957)  Absence of infection during hospitalization:   Assess and monitor for signs and symptoms of infection   Monitor lab/diagnostic results   Monitor all insertion sites i.e., indwelling lines, tubes and drains   Monitor endotracheal (as able) and nasal secretions for changes in amount and color   [place] appropriate cooling/warming therapies per order   Administer medications as ordered   Instruct and encourage patient and family to use good hand hygiene technique  Goal: Absence of fever/infection during anticipated neutropenic period  Outcome: Progressing  Flowsheets (Taken 2/23/2023 1957)  Absence of fever/infection during anticipated neutropenic period:   Monitor white blood cell count   Administer growth factors as ordered   Implement neutropenic guidelines     Problem: Metabolic/Fluid and Electrolytes - Adult  Goal: Electrolytes maintained within normal limits  Outcome: Progressing  Flowsheets (Taken 2/23/2023 1957)  Electrolytes maintained within normal limits:   Monitor labs and assess patient for signs and symptoms of electrolyte imbalances   Administer electrolyte replacement as ordered   Monitor response to electrolyte replacements, including repeat lab results as appropriate   Fluid restriction as ordered   Instruct patient on fluid and nutrition restrictions as appropriate  Goal: Hemodynamic stability and optimal renal function maintained  Outcome: Progressing  Flowsheets (Taken 2/23/2023 1957)  Hemodynamic stability and optimal renal function maintained:   Monitor labs and assess for signs and symptoms of volume excess or deficit   Monitor intake, output and patient weight   Monitor urine specific gravity, serum osmolarity and serum sodium as indicated or ordered   Monitor response to interventions for patient's volume status, including labs, urine output, blood pressure (other measures as available)   Encourage oral intake as appropriate   Instruct patient on fluid and nutrition restrictions as appropriate  Goal: Glucose maintained within prescribed range  Outcome: Progressing  Flowsheets (Taken 2/23/2023 1957)  Glucose maintained within prescribed range:   Administer ordered medications to maintain glucose within target range   Assess barriers to adequate nutritional intake and initiate nutrition consult as needed   Instruct patient on self management of diabetes and initiate consult as needed   Assess for signs and symptoms of hyperglycemia and hypoglycemia   Monitor blood glucose as ordered     Problem: Hematologic - Adult  Goal: Maintains hematologic stability  Outcome: Progressing  Flowsheets (Taken 2/23/2023 1957)  Maintains hematologic stability:   Assess for signs and symptoms of bleeding or hemorrhage   Administer blood products/factors as ordered   Monitor labs for bleeding or clotting disorders     Problem: Nutrition Deficit:  Goal: Optimize nutritional status  Outcome: Progressing

## 2023-02-24 NOTE — CARE COORDINATION
Transitional plan:    VATS yesterday, remains on amnio gtt, CTube, O2 3L, moon. Plan is SNF referrals in place.

## 2023-02-24 NOTE — PROGRESS NOTES
[facility]  Office: [address], DO, Coalejandraharry Katlin, DO, Charlotte Benton, DO, Eugenio Baugh Blood, DO, Sophia Lou MD, Keiko Ramos MD, Juliette Contreras MD, Edwin Wadsworth MD,  Sheldon Salazar MD, Lorna Russell MD, Payam Pelayo, DO, Mone Shearer MD,  Radha Mcduffie MD, Viviana Trinidad MD, Fritz Tatum, DO, Tesha Banuelos MD, Eunice Zeng MD, Estephania Sheets, DO, Wade Diop MD, Eliseo Perez MD, Arabella Finney MD, Mumtaz Rebolledo MD, Yari Stafford DO, Lazaro Roger MD, Nan Oates MD, Emma Devine, [facility],  Stanford Hinojosa, [facility], Rmoario Page, [facility], Andrew Francis, CNP,  Marian Howe, [facility], Zena Lombardi, CNP, Jazzmine Abarca, CNP, Claudeen Quarry, CNP, Lauri Malone, CNP, Prakash Mcgill, CNP, Rowan Oppenheim, PAMURPHY, Siddhartha Arteaga, CNS, Suzanne Guerra, CNP, Solitario Dillon, [address]      Daily Progress Note     Admit Date: 2/12/2023  Bed/Room No.  1007/1007-01  Admitting Physician : Juliette Contreras MD  Code Status :Full Code  Hospital Day:  LOS: 12 days   Chief Complaint:     Shortness of breath      Principal Problem:    Pneumonia, unspecified organism  Active Problems:    Pleural effusion    Hyperkalemia    Acute respiratory failure with hypoxia (HCC)    Hyponatremia    Hyperglycemia    History of non-Hodgkin's lymphoma    History of sarcoidosis    Empyema (HCC)    KELSEA (acute kidney injury) [facility])  Resolved Problems:    * No resolved hospital problems. *    Subjective : Interval History/Significant events :  02/24/23    Patient is sitting up in the chair. He is alert, oriented. Patient is stable on supplemental oxygen by nasal cannula. Left chest tube in place. Patient has pain at tube site. Pain is controlled with medication. He remains in atrial fibrillation with a heart rate uncontrolled 122. Patient is on amiodarone infusion. Vitals - Unstable afebrile, heart rate 122.   Labs -hyponatremia 132, BUN 52, creatinine 3.19  Leukocytosis 25.0, hemoglobin 9.2. Nursing notes , Consults notes reviewed. Overnight events and updates discussed with Nursing staff . Background History:         Karol Pérez is 79 y.o. male  Who was admitted to the hospital on 2/12/2023 for treatment of Pneumonia, unspecified organism. Patient presented to ER at [facility] on 2/12/2023 with dyspnea and was found to have left multiloculated pneumonia with large pleural effusion with mediastinal and bilateral axillary lymphadenopathy. Patient has prior history of non-Hodgkin's lymphoma, multiple myeloma. Patient was requiring high flow nasal cannula and had thoracentesis. Pleural fluid was consistent with empyema and patient had chest tube placement by IR. He was treated with broad-spectrum antibiotics. Patient was also given dornase without much improvement. CT surgery was consulted and recommended increasing the size of chest tube and did not recommend any open surgical intervention at this time. Patient had exchange of chest tube on 2/18/2023. He had diffuse ST elevation secondary to pericarditis on 1/21/23. Patient had new onset atrial fibrillation with RVR on 10/22/23 and was given digoxin and amiodarone infusion . Patient underwent VATS with decortication on 2/23/2023. He was extubated postoperatively on 2/23/2023. PMH:  Past Medical History:   Diagnosis Date    Acute interstitial nephritis 08/03/2022    Unclear cause. Creat bumped to 2.9, baseline in oct 2020 1.6. Exact description of his kidney biopsy is not available to me however pathologist seems to think there is diabetic glomerosclerosis and acute interstitial nephritis seen on the biopsy specimen. No mention about chronic changes.   Because of acute interstitial nephritis a trial of steroids for 15 days will be given to see if I can impro    Aortic regurgitation     ARF (acute renal failure) ([facility] [address] 08/03/2022    kidney biopsy from June 2022  showing findings of acute interstitial nephritis. Exact description not available. Trial of steroids will be given to see if I can improve renal function. Creatinine in October 2020 was 1.6, creatinine in June 2022 was 2.9. Trial of steroids will be given to see if renal function can be improved. Chronic kidney disease     Hyperlipidemia     Hypertension     Non-Hodgkin lymphoma ([facility] [address]     Research study patient 02/13/2023    [id number] Completed 2/15/23    Sarcoidosis     Type II or unspecified type diabetes mellitus without mention of complication, not stated as uncontrolled       Allergies: Allergies   Allergen Reactions    Latex Rash    Gabapentin      Other reaction(s): Vomiting    Meperidine Anaphylaxis    Erythromycin      Other reaction(s): Fever  Had all side effects associated with this medication      Glimepiride      Other reaction(s): Dizziness    Lidocaine Swelling     States as a child having reaction to lidocaine where his face swelled. Ever since then he has never had lidocaine    Hydrocodone      Other reaction(s): Vomiting    Macrolides And Ketolides Other (See Comments)    Niacin And Related     Trelegy Ellipta [Fluticasone-Umeclidin-Vilant]     Pregabalin Diarrhea    Xanax [Alprazolam] Anxiety     Anxiety, restlessness, insomnia      Medications :  sodium chloride flush, 5-40 mL, IntraVENous, 2 times per day  mupirocin, , Nasal, BID  chlorhexidine, , Topical, See Admin Instructions  sodium chloride flush, 5-40 mL, IntraVENous, 2 times per day  polyethylene glycol, 17 g, Oral, Daily  sodium chloride flush, 5-40 mL, IntraVENous, 2 times per day  insulin lispro, 0-4 Units, SubCUTAneous, TID WC  insulin lispro, 0-4 Units, SubCUTAneous, Nightly  furosemide, 20 mg, IntraVENous, BID      Review of Systems   Review of Systems   Constitutional:  Positive for activity change, appetite change and fatigue. Negative for chills, fever and unexpected weight change.    HENT:  Negative for congestion, mouth sores, postnasal drip, sinus pressure, sore throat and voice change. Eyes:  Negative for visual disturbance. Respiratory:  Positive for shortness of breath. Negative for cough and wheezing. Cardiovascular:  Negative for chest pain and palpitations. Gastrointestinal:  Negative for abdominal pain, blood in stool, constipation, diarrhea, nausea and vomiting. Endocrine: Negative for polyuria. Genitourinary:  Negative for difficulty urinating, dysuria, frequency and urgency. Musculoskeletal:  Negative for arthralgias, joint swelling and myalgias. Neurological:  Negative for dizziness, tremors, speech difficulty, light-headedness and headaches. Psychiatric/Behavioral:  Negative for confusion.     Objective :      Current Vitals : Temp: 98 °F (36.7 °C),  Heart Rate: (!) 120, Resp: 18, BP: 92/62, SpO2: 96 %  Last 24 Hrs Vitals   Patient Vitals for the past 24 hrs:   BP Temp Temp src Pulse Resp SpO2 Height Weight   02/24/23 0729 92/62 98 °F (36.7 °C) Oral (!) 120 -- 96 % -- --   02/24/23 0600 86/62 -- -- (!) 114 -- -- -- --   02/24/23 0500 102/72 -- -- (!) 114 -- 95 % -- --   02/24/23 0400 103/72 97.9 °F (36.6 °C) Oral (!) 119 -- 98 % -- --   02/24/23 0300 100/71 -- -- (!) 118 -- 96 % -- --   02/24/23 0200 102/73 -- -- (!) 114 -- 96 % -- --   02/24/23 0100 97/66 -- -- (!) 111 -- 96 % -- --   02/24/23 0000 95/63 97.9 °F (36.6 °C) Oral (!) 109 -- 97 % -- --   02/23/23 2300 -- -- -- (!) 106 -- 96 % -- --   02/23/23 2200 -- -- -- (!) 104 -- 98 % -- --   02/23/23 2123 96/65 -- -- (!) 105 -- 96 % -- --   02/23/23 2100 -- -- -- (!) 101 -- 96 % -- --   02/23/23 2009 -- -- -- -- 18 -- -- --   02/23/23 2000 -- 97.9 °F (36.6 °C) Oral 93 -- 97 % -- --   02/23/23 1900 -- -- -- 100 -- 98 % -- --   02/23/23 1700 -- -- -- (!) 101 -- 100 % -- --   02/23/23 1600 -- 98.2 °F (36.8 °C) Oral (!) 105 16 96 % -- --   02/23/23 1500 -- -- -- (!) 107 -- 98 % -- --   02/23/23 1400 111/64 98.4 °F (36.9 °C) Oral 99 17 98 % 6' 1\" (1.854 m) 236 lb 15.9 oz (107.5 kg) Intake / output   02/22 1901 - 02/24 0700  In: 2692.6 [P.O.:250; I.V.:1192.6]  Out: 2545 [Urine:1300]  Physical Exam:  Physical Exam  Vitals and nursing note reviewed. Constitutional:       General: He is not in acute distress. Appearance: He is not diaphoretic. Interventions: Nasal cannula in place. HENT:      Head: Normocephalic and atraumatic. Nose:      Right Sinus: No maxillary sinus tenderness or frontal sinus tenderness. Left Sinus: No maxillary sinus tenderness or frontal sinus tenderness. Mouth/Throat:      Pharynx: No oropharyngeal exudate. Eyes:      General: No scleral icterus. Conjunctiva/sclera: Conjunctivae normal.      Pupils: Pupils are equal, round, and reactive to light. Neck:      Thyroid: No thyromegaly. Vascular: No JVD. Comments: Left subclavian central line in place. Cardiovascular:      Rate and Rhythm: Tachycardia present. Rhythm irregular. Pulses:           Dorsalis pedis pulses are 2+ on the right side and 2+ on the left side. Heart sounds: Normal heart sounds. No murmur heard. Pulmonary:      Effort: Pulmonary effort is normal.      Breath sounds: Normal breath sounds. No wheezing or rales. Chest:      Comments: Left chest tube in place. Abdominal:      Palpations: Abdomen is soft. There is no mass. Tenderness: There is no abdominal tenderness. Musculoskeletal:      Cervical back: Full passive range of motion without pain and neck supple. Lymphadenopathy:      Head:      Right side of head: No submandibular adenopathy. Left side of head: No submandibular adenopathy. Cervical: No cervical adenopathy. Skin:     General: Skin is warm. Neurological:      Mental Status: He is alert and oriented to person, place, and time. Motor: No tremor. Psychiatric:         Behavior: Behavior is cooperative.          Laboratory findings:    Recent Labs     02/22/23  1157 02/23/23  0600 02/23/23  2308 02/24/23  0605   WBC  --  24.1* 28.8* 26.0*   HGB  --  11.2* 8.8* 9.2*   HCT  --  36.6* 28.5* 30.4*   PLT  --  446 405 433   INR 1.1  --   --   --        Recent Labs     02/22/23  0215 02/22/23  0725 02/23/23  0600 02/23/23  2308 02/24/23  0605   NA  --    < > 130* 131* 132*   K 5.8*   < > 4.9 5.2 4.9   CL  --    < > 96* 99 100   CO2  --    < > 25 22 21   GLUCOSE  --    < > 135* 102* 85   BUN  --    < > 51* 51* 50*   CREATININE  --    < > 3.20* 3.30* 3.48*   MG 2.2  --   --   --   --    CALCIUM  --    < > 9.4 8.7 8.8    < > = values in this interval not displayed. No results for input(s): PROT, LABALBU, LABA1C, R6LLKWO, S2XYPAR, FT4, TSH, AST, ALT, LDH, GGT, ALKPHOS, BILITOT, BILIDIR, AMMONIA, AMYLASE, LIPASE, LACTATE, CHOL, HDL, LDLCHOLESTEROL, CHOLHDLRATIO, TRIG, VLDL, BNP, TROPONINI, CKTOTAL, CKMB, CKMBINDEX, RF, SAMANTHA in the last 72 hours. Specific Gravity, UA   Date Value Ref Range Status   02/22/2023 1.020 1.005 - 1.030 Final     Protein, UA   Date Value Ref Range Status   02/22/2023 1+ (A) NEGATIVE Final     RBC, UA   Date Value Ref Range Status   02/22/2023 None 0 - 2 /HPF Final     Bacteria, UA   Date Value Ref Range Status   02/22/2023 MODERATE (A) None Final     Nitrite, Urine   Date Value Ref Range Status   02/22/2023 NEGATIVE NEGATIVE Final     WBC, UA   Date Value Ref Range Status   02/22/2023 2 TO 5 0 - 5 /HPF Final     Leukocyte Esterase, Urine   Date Value Ref Range Status   02/22/2023 NEGATIVE NEGATIVE Final       Imaging / Clinical Data :-   CT CHEST WO CONTRAST    Result Date: 2/17/2023  Small to moderate-sized multiloculated left pleural effusions have improved. New small foci of air within the collection is presumably related to the introduction of the left thoracostomy tube. Stable small to moderate sized mediastinal lymph nodes are nonspecific but may relate to the patient's history of lymphoma. Additional small bilateral axial lymph nodes are identified. Small hiatal hernia. CT CHEST WO CONTRAST    Result Date: 2/13/2023  1. Moderate to large amount of multiloculated left pleural fluid. Assessment for pleural thickening and/or pleural nodularity is limited without IV contrast. 2. Consolidation in the left lower lobe, likely atelectasis, but possibility of superimposed pneumonia would be difficult to exclude. 3. Mediastinal and bilateral axillary lymphadenopathy, which may be related to the patient's history of lymphoma. If available, comparison to any recent imaging may be of use to assess for progression. XR CHEST PORTABLE    Result Date: 2/18/2023  Moderate left pleural effusion increased. No change left chest tube/pigtail catheter. XR CHEST PORTABLE    Result Date: 2/17/2023  Similar findings when rotation taken into account; left chest tube with unchanged or slightly increased loculated appearing left pleural effusion; atelectatic appearing changes. No pneumothorax. XR CHEST PORTABLE    Result Date: 2/14/2023  Interval placement of a left chest tube with reduced left pleural fluid. XR CHEST PORTABLE    Result Date: 2/13/2023  No significant interval change. Redemonstration of opacification of the mid to lower left lung field which may in part be due to loculated left pleural effusion. Consider further evaluation with CT. XR CHEST PORTABLE    Result Date: 2/12/2023  Probable left-sided pneumonia with loculated pleural effusion. Underlying pathology not excluded, as above. Mild cardiomegaly and venous hypertension. Discoid atelectasis right base. RECOMMENDATION: Consider CT chest.     IR CHEST TUBE INSERTION    Result Date: 2/14/2023  Successful ultrasound guided placement of a left 10 [place] chest tube        Clinical Course : unchanged  Assessment and Plan  :        Left empyema -   Pleural fluid growing strep viridans gordonii -ID following. Chest tube in place. Cefepime switched to Rocephin. Patient received tPA.   Patient had poor improvement and underwent VATS with decortication on 2/23/2023. New onset Afib RVR - continue amiodarone infusion, Digoxin ,  Cardiology following. Anticoagulation when okay with cardiovascular surgery. Pericarditis - Preserved ejection fraction, no pericardial effusion. Acute respiratory failure with hypoxia - continue O2 supplement. H/o non-Hodgkin's lymphoma and multiple myeloma  H/o sarcoidosis -   Acute kidney injury with CKD stage III -kidney function worsening. Baseline creatinine 1.9  -May need renal replacement. Hyperkalemia due to decreased renal clearance- Kayexalate. Demand ischemia from acute respiratory failure  Reactive thrombocytosis. Obstructive sleep apnea -CPAP at nighttime. COPD - albuterol as needed   Acute Delirium - continue  Seroquel dose at night     No family at bedside. Continue to monitor vitals , Intake / output ,  Cell count , HGB , Kidney function, oxygenation  as indicated . Plan and updates discussed with patient ,  answers  explained to satisfaction.    Plan discussed with Staff Eladia Tavarez RN     (Please note that portions of this note were completed with a voice recognition program. Efforts were made to edit the dictations but occasionally words are mis-transcribed.)      Nella Ho MD  2/24/2023

## 2023-02-24 NOTE — PROGRESS NOTES
[facility] [place] - Progress Note  Today's Date and Time: 2/24/2023, 9:12 AM    Impression :   Left-sided empyema  Streptococci Gordonii on pleural fluid culture from 2/14/23  Repeat culture 2/18/23 with no bacterial growth   Shortness of breath  KELSEA on CKD  Hyponatremia  Leukocytosis  Hx non-Hodgkin's lymphoma  Hx multiple myeloma  Sarcoidosis  DM II  Interstitial nephritis  COPD  Hx partial right lung lobectomy  Allergies to macrolides and ketolides. Recommendations:   2/20/23: Discontinued IV Cefepime on 2-20-23  IV Rocephin 1 gm q 24 hrs until 3/12/23  Ok for midline for outpatient therapy  S/p decortication on 2/23/23    Medical Decision Making/Summary/Discussion:2/24/2023     Patient with Hx of non-Hodgkin's lymphoma and multiple myeloma  Admitted with Shortness of breath  Found to have Left-sided empyema  Streptococci Gordonii on pleural fluid culture from 2/14/23  Repeat culture 2/18/23 with no bacterial growth   Empyema partially drained by chest tube. Decortication planned 2/23/23  Confusion with associated hyponatremia  KELSEA on CKD 3  On treatment with ceftriaxone. Infection Control Recommendations   [place] Precautions    Antimicrobial Stewardship Recommendations     Simplification of therapy  Targeted therapy  PK dosing    Coordination of Outpatient Care:   Estimated Length of IV antimicrobials:3/12/23  Patient will need Midline Catheter Insertion: TBD  Patient will need PICC line Insertion:TBD  Patient will need: Home IV , [place],  SNF,  LTAC: TBD  Patient will need outpatient wound care:No    Chief complaint/reason for consultation:   Empyema-streptococcus Gordonii      History of Present Illness:   Aiyana Villafana is a 79y.o.-year-old male who was initially admitted on 2/12/2023.  Patient seen at the request of Dr. Dayanara Aragon:    This patient, with a history of COPD, CKD, sarcoidosis, partial right lung lobectomy, multiple myeloma, non-hodgkin's lymphoma, acute interstitial nephritis and DM II, initially presented to [facility] ED on 2/12/23 with complaints of worsening shortness of breath over the previous week with a non-productive cough. He has has allergies to macrolides and ketolides. He was experiencing increased work of breathing upon evaluation, initially requiring CPAP. ABG values were grossly unremarkable. He denied any recent history of chemotherapy, fever, chills, N/V/D, abnormal bleeding, weight-loss or night sweats. Chest x-ray showed probable left-sided pneumonia with loculated pleural effusion which was confirmed via CT chest, which revealed a moderate to large amount of multiloculated left pleural fluid in the left lung. Mediastinal and bilateral axillary lymphadenopathy was also noted. He was transferred to [facility] for a higher level of care and was initiated on broad spectrum antibiotics with IV cefepime and vancomycin. Pulmonology was consulted and a thoracentesis was attempted, but was unable to be completed as no clear pocket was visualized via [address]. IR was consulted and a 10 [place], left-sided chest tube was placed on 2/14/23.  10 ml of purulent fluid was initially drained and sent for culture. TPA and dornase were administered with improvement in output. MRSA Nares was not detected and the pleural fluid culture grew PCN sensitive Streptococcus Gordonii. Vancomycin was discontinued on 2/14/23 and cefepime continued. A repeat CT of the patient's chest  on 2/17/23 revealed some improvement in the multiloculated left pleural effusions. A larger bore chest tube exchange was completed on 2/18/23 in IR. A repeat pleural fluid culture has not had any bacterial growth to date.      Chest tube output to date:   2/14 - 800 cc  2/15- 600 cc  2/16 - 700 cc  2/17 - 900 cc  2/18- 200 cc  2/19 - 910 cc  2/20 - 275 cc    CT sugery was consulted for possible decortication, but they have no plans for decortication at this time. Abnormal labs at the time of consult include:   Na:133  Cr:2.24  WBC:32.4-->19.0  Hgb:10.0    There is no growth on urine or blood cultures. Imaging/Diagonstics:  CT CHEST WO CONTRAST     Result Date: 2/17/2023  Small to moderate-sized multiloculated left pleural effusions have improved. New small foci of air within the collection is presumably related to the introduction of the left thoracostomy tube. Stable small to moderate sized mediastinal lymph nodes are nonspecific but may relate to the patient's history of lymphoma. Additional small bilateral axial lymph nodes are identified. Small hiatal hernia. XR CHEST PORTABLE     Result Date: 2/18/2023  Moderate left pleural effusion increased. No change left chest tube/pigtail catheter. XR CHEST PORTABLE     Result Date: 2/17/2023  Similar findings when rotation taken into account; left chest tube with unchanged or slightly increased loculated appearing left pleural effusion; atelectatic appearing changes. No pneumothorax. XR CHEST PORTABLE     Result Date: 2/14/2023  Interval placement of a left chest tube with reduced left pleural fluid. IR CHEST TUBE INSERTION     Result Date: 2/14/2023  Successful ultrasound guided placement of a left 10 [place] chest tube      US RETROPERITONEAL COMPLETE     Result Date: 2/19/2023  1. Simple cortical cyst at the upper pole left kidney measuring 6 mm. 2. Limited renal ultrasound. No hydronephrosis. 3. Nonvisualization of ureteral jets. Minimal distention of the urinary bladder. Patient no urge to void during the exam. RECOMMENDATIONS: Unavailable        Infectious disease was consulted for continued empyema.       CURRENT EVALUATION 2/24/2023  BP 92/62   Pulse (!) 120   Temp 98 °F (36.7 °C) (Oral)   Resp 18   Ht 6' 1\" (1.854 m)   Wt 236 lb 15.9 oz (107.5 kg)   SpO2 96%   BMI 31.27 kg/m²     Patient evaluated in the ICU     Afebrile  VS stable, tachycardic Afib    The patient is stable today on 3 L NC  He is doing well s/p decortication with CT surgery on 2/23/23  Cultures pending    He is experiencing some confusion  Wife indicates he had some memory lapses at home before admission but that he is more confused at present. Na 130 which may be contributing to confusion. Left sided-chest tube is in place and hooked to suction. Serosanguinous drainage present. Discussed with patient and wife at length on 2-20-23  CT scans reviewed with wife and daughter on 2-20-23  Indicated that he will need about a month of antibiotics. He may need additional drainage procedures. Wife indicated similar empyema a few years ago on Rt chest. He had S aureus back then and was severely ill. Medications reviewed: On IV Ceftriaxone    Leukocytosis increased to 22.9-->24.1-->26.0  Worsening KELSEA  Nephrology is following    Labs, X rays reviewed: 2/24/2023    BUN:63-->47-->51-->50  Cr:2.24-->1.96-->2.62-->3.2-->3.48  Na 131-->130-->132  K 5.1-->4.9    WBC:32.4-->19.0-->22.9-->24.0-->24.1-->23.0  Hb:10.0-->11.2-->11.2-->9.2  Plat: 425-->461-->434-->446-->433    Cultures:  Urine:  2/17/23: No growth  2/22/23 :pending  Blood:  2/17/23: No growth  MRSA Nares:  2/13/23: Not detected  Pleural Fluid:  2/14/23: Streptococci Gordonii  2/18/23: No growth thus far  2-23-23: No growth    Discussed with patient, RN, IM. I have personally reviewed the past medical history, past surgical history, medications, social history, and family history, and I have updated the database accordingly. Past Medical History:     Past Medical History:   Diagnosis Date    Acute interstitial nephritis 08/03/2022    Unclear cause. Creat bumped to 2.9, baseline in oct 2020 1.6. Exact description of his kidney biopsy is not available to me however pathologist seems to think there is diabetic glomerosclerosis and acute interstitial nephritis seen on the biopsy specimen. No mention about chronic changes. Because of acute interstitial nephritis a trial of steroids for 15 days will be given to see if I can impro    Aortic regurgitation     ARF (acute renal failure) ([facility] [place] [address] 08/03/2022    kidney biopsy from June 2022  showing findings of acute interstitial nephritis. Exact description not available. Trial of steroids will be given to see if I can improve renal function. Creatinine in October 2020 was 1.6, creatinine in June 2022 was 2.9. Trial of steroids will be given to see if renal function can be improved.     Chronic kidney disease     Hyperlipidemia     Hypertension     Non-Hodgkin lymphoma ([facility] [place] [address]     Research study patient 02/13/2023    [id number] Completed 2/15/23    Sarcoidosis     Type II or unspecified type diabetes mellitus without mention of complication, not stated as uncontrolled        Past Surgical  History:     Past Surgical History:   Procedure Laterality Date    CARPAL TUNNEL RELEASE Left 11/15     EYE SURGERY Right     shunt and cataract     EYE SURGERY Right 6/16    laser     IR CHEST TUBE INSERTION  2/13/2023    IR CHEST TUBE INSERTION 2/13/2023 [facility]    IR CHEST TUBE INSERTION  2/18/2023    IR CHEST TUBE INSERTION 2/18/2023 Edith Ballard MD [facility]    KNEE ARTHROSCOPY      MALIGNANT SKIN LESION EXCISION      ROTATOR CUFF REPAIR Right 7/14    ROTATOR CUFF REPAIR Left 5/15    TOTAL KNEE ARTHROPLASTY Right 10/2018       Medications:      cefTRIAXone (ROCEPHIN) IV  1,000 mg IntraVENous Q24H    sodium chloride flush  5-40 mL IntraVENous 2 times per day    mupirocin   Nasal BID    chlorhexidine   Topical See Admin Instructions    sodium chloride flush  5-40 mL IntraVENous 2 times per day    polyethylene glycol  17 g Oral Daily    sodium chloride flush  5-40 mL IntraVENous 2 times per day    insulin lispro  0-4 Units SubCUTAneous TID WC    insulin lispro  0-4 Units SubCUTAneous Nightly    furosemide  20 mg IntraVENous BID       Social History:     Social History Socioeconomic History    Marital status:      Spouse name: Not on file    Number of children: Not on file    Years of education: Not on file    Highest education level: Not on file   Occupational History    Not on file   Tobacco Use    Smoking status: Never    Smokeless tobacco: Former    Tobacco comments:     minimal and infrequent    Substance and Sexual Activity    Alcohol use: Yes     Alcohol/week: 0.0 standard drinks     Comment: rare    Drug use: No    Sexual activity: Not on file   Other Topics Concern    Not on file   Social History Narrative    Not on file     Social Determinants of Health     Financial Resource Strain: Low Risk     Difficulty of Paying Living Expenses: Not very hard   Food Insecurity: No Food Insecurity    Worried About Running Out of Food in the Last Year: Never true    Ran Out of Food in the Last Year: Never true   Transportation Needs: No Transportation Needs    Lack of Transportation (Medical): No    Lack of Transportation (Non-Medical): No   Physical Activity: Not on file   Stress: Stress Concern Present    Feeling of Stress : To some extent   Social Connections: Socially Integrated    Frequency of Communication with Friends and Family: Three times a week    Frequency of Social Gatherings with Friends and Family:  Three times a week    Attends Christian Services: 1 to 4 times per year    Active Member of Clubs or Organizations: No    Attends Club or Organization Meetings: 1 to 4 times per year    Marital Status:    Intimate Partner Violence: Not At Risk    Fear of Current or Ex-Partner: No    Emotionally Abused: No    Physically Abused: No    Sexually Abused: No   Housing Stability: Low Risk     Unable to Pay for Housing in the Last Year: No    Number of Places Lived in the Last Year: 1    Unstable Housing in the Last Year: No       Family History:     Family History   Problem Relation Age of Onset    High Blood Pressure Father     Other Father         AAA    Heart Disease Other         uncle x 2         Allergies:   Latex, Gabapentin, Meperidine, Erythromycin, Glimepiride, Lidocaine, Hydrocodone, Macrolides and ketolides, Niacin and related, Trelegy ellipta [fluticasone-umeclidin-vilant], Pregabalin, and Xanax [alprazolam]     Review of Systems:   Constitutional: No fevers or chills. No systemic complaints  Head: No headaches  Eyes: No double vision or blurry vision. No conjunctival inflammation. ENT: No sore throat or runny nose. . No hearing loss, tinnitus or vertigo. Cardiovascular: No chest pain or palpitations. shortness of breath. WANG  Lung: Left sided chest tube with some shortness of breath with dry cough. No sputum production  Abdomen: No nausea, vomiting, diarrhea, or abdominal pain. Barnetta Watson No cramps. Genitourinary: No increased urinary frequency, or dysuria. No hematuria. No suprapubic or CVA pain  Musculoskeletal: No muscle aches or pains. No joint effusions, swelling or deformities  Hematologic: No bleeding or bruising. Neurologic: No headache, weakness, numbness, or tingling. Integument: No rash, no ulcers. Psychiatric: No depression. Endocrine: No polyuria, no polydipsia, no polyphagia. Physical Examination :   Patient Vitals for the past 8 hrs:   BP Temp Temp src Pulse SpO2   02/24/23 0729 92/62 98 °F (36.7 °C) Oral (!) 120 96 %   02/24/23 0600 86/62 -- -- (!) 114 --   02/24/23 0500 102/72 -- -- (!) 114 95 %   02/24/23 0400 103/72 97.9 °F (36.6 °C) Oral (!) 119 98 %   02/24/23 0300 100/71 -- -- (!) 118 96 %   02/24/23 0200 102/73 -- -- (!) 114 96 %     General Appearance: Awake, alert, and in no apparent distress  Head:  Normocephalic, no trauma  Eyes: Pupils equal, round, reactive to light and accommodation; extraocular movements intact; sclera anicteric; conjunctivae pink. No embolic phenomena. ENT: Oropharynx clear, without erythema, exudate, or thrush. No tenderness of sinuses. Mouth/throat: mucosa pink and moist. No lesions.  Dentition in good repair. Neck:Supple, without lymphadenopathy. Thyroid normal, No bruits. Pulmonary/Chest: Left sided chest tube in place to suction with serosanguinous drainage. Diminished to auscultation. No dullness to percussion. Cardiovascular: Regular rate and rhythm without murmurs, rubs, or gallops. Abdomen: Soft, non tender. Bowel sounds normal. No organomegaly  All four Extremities: No cyanosis, clubbing, edema, or effusions. Neurologic: No gross sensory or motor deficits. Skin: Warm and dry with good turgor. No signs of peripheral arterial or venous insufficiency. No ulcerations. Left sided-chest tube site CDI    Medical Decision Making -Laboratory:   I have independently reviewed/ordered the following labs:    CBC with Differential:   Recent Labs     02/23/23  0600 02/23/23  2308 02/24/23  0605   WBC 24.1* 28.8* 26.0*   HGB 11.2* 8.8* 9.2*   HCT 36.6* 28.5* 30.4*    405 433   LYMPHOPCT 38  --  24   MONOPCT 12*  --  6     BMP:   Recent Labs     02/22/23  0215 02/22/23  0725 02/23/23  2308 02/24/23  0605   NA  --    < > 131* 132*   K 5.8*   < > 5.2 4.9   CL  --    < > 99 100   CO2  --    < > 22 21   BUN  --    < > 51* 50*   CREATININE  --    < > 3.30* 3.48*   MG 2.2  --   --   --     < > = values in this interval not displayed. Hepatic Function Panel: No results for input(s): PROT, LABALBU, BILIDIR, IBILI, BILITOT, ALKPHOS, ALT, AST in the last 72 hours. No results for input(s): RPR in the last 72 hours. No results for input(s): HIV in the last 72 hours. No results for input(s): BC in the last 72 hours.   Lab Results   Component Value Date/Time    MUCUS 1+ 02/13/2023 02:17 AM    RBC 3.29 02/24/2023 06:05 AM    RBC 5.15 04/03/2012 11:08 AM    WBC 26.0 02/24/2023 06:05 AM    TURBIDITY Cloudy 02/22/2023 01:17 PM     Lab Results   Component Value Date/Time    CREATININE 3.48 02/24/2023 06:05 AM    GLUCOSE 85 02/24/2023 06:05 AM    GLUCOSE 140 04/03/2012 11:08 AM       Medical Decision Making-Imaging: Narrative   EXAMINATION:   CT OF THE CHEST WITHOUT CONTRAST, 2/17/2023 9:29 am       TECHNIQUE:   CT of the chest was performed without the administration of intravenous   contrast. Multiplanar reformatted images are provided for review. Automated   exposure control, iterative reconstruction, and/or weight based adjustment of   the mA/kV was utilized to reduce the radiation dose to as low as reasonably   achievable. COMPARISON:   02/13/2023       HISTORY:   ORDERING SYSTEM PROVIDED HISTORY:  Improvement? TECHNOLOGIST PROVIDED HISTORY:   Improvement? FINDINGS:   Mediastinum: Small bilateral axial lymph nodes are identified not   significantly changed. Small to moderate size mediastinal lymph nodes are   additionally present. Lungs/Pleura: There are multiple loculated effusions within the left chest   which have improved since previous exam.  Small amounts of new air are likely   secondary to the presence of the left thoracostomy tube. Left lower lobe atelectasis is identified. Upper Abdomen: There is a probable small hiatal hernia. Soft Tissues/Bones:  No acute osseous abnormality is appreciated. Old right   rib fractures are noted. Impression   Small to moderate-sized multiloculated left pleural effusions have improved. New small foci of air within the collection is presumably related to the   introduction of the left thoracostomy tube. Stable small to moderate sized mediastinal lymph nodes are nonspecific but   may relate to the patient's history of lymphoma. Additional small bilateral   axial lymph nodes are identified. Small hiatal hernia. Narrative   EXAMINATION:   ONE XRAY VIEW OF THE CHEST       2/18/2023 3:04 pm       COMPARISON:   CT chest February 17, 2023       HISTORY:   ORDERING SYSTEM PROVIDED HISTORY: worsening   TECHNOLOGIST PROVIDED HISTORY:   worsening       FINDINGS:   Pigtail catheter on the left.   Moderate left effusion appears   denser/increased. Mild bilateral interstitial infiltrates or edema. Heart   and mediastinum unremarkable. Bony thorax intact. No pneumothorax noted. Subsegmental atelectasis right lower lung. Impression   Moderate left pleural effusion increased. No change left chest tube/pigtail   catheter. Medical Decision [id number]:       Medical Decision Making-Other:     Note:  Labs, medications, radiologic studies were reviewed with personal review of films  Large amounts of data were reviewed  Discussed with nursing Staff, Discharge planner  Infection Control and Prevention measures reviewed  All prior entries were reviewed  Administer medications as ordered  Prognosis: [facility]  Discharge planning reviewed  Follow up as outpatient. Thank you for allowing us to participate in the care of this patient. Please call with questions. Charleen Harris, APRN - CNP    ATTESTATION:    I have discussed the case, including pertinent history and exam findings with the APRN. I have evaluated the  History, physical findings and pictures of the patient and the key elements of the encounter have been performed by me. I have reviewed the laboratory data, other diagnostic studies and discussed them with the APRN. I have updated the medical record where necessary. I agree with the assessment, plan and orders as documented by the APRN.     Misty Burgos MD.      Pager: [phone] - Office: [phone]

## 2023-02-24 NOTE — PROGRESS NOTES
Physical Therapy  Facility/Department: [facility] CAR 1- SICU  Physical Therapy RE-Assessment  Pt with change in functional status following OR, re-eval performed. Name: Karol Pérez  : 1955  MRN: [id number]  Date of Service: 2023    Karol Pérez is 79 y.o. male  Who was admitted to the hospital on 2023 for treatment of Pneumonia, unspecified organism. Patient presented to ER at [facility] on 2023 with dyspnea and was found to have left multiloculated pneumonia with large pleural effusion with mediastinal and bilateral axillary lymphadenopathy. Patient has prior history of non-Hodgkin's lymphoma, multiple myeloma. Discharge Recommendations:  Patient would benefit from continued therapy after discharge          Patient Diagnosis(es): [facility] Past Medical History:  has a past medical history of Acute interstitial nephritis, Aortic regurgitation, ARF (acute renal failure) ([facility] [address] Chronic kidney disease, Hyperlipidemia, Hypertension, Non-Hodgkin lymphoma ([facility] [address] Research study patient, Sarcoidosis, and Type II or unspecified type diabetes mellitus without mention of complication, not stated as uncontrolled. Past Surgical History:  has a past surgical history that includes eye surgery (Right); Knee arthroscopy; Rotator cuff repair (Right, ); Rotator cuff repair (Left, 5/15); Carpal tunnel release (Left, 11/15 ); Eye surgery (Right, ); Total knee arthroplasty (Right, 10/2018); malignant skin lesion excision; IR CHEST TUBE INSERTION (2023); and IR CHEST TUBE INSERTION (2023). Assessment   Assessment: pt continues to be with impaired cognition and is with decreased strength in B LEs and decreased balance, requiring Mod A x2 for STS and ambulation with WW this date. Pt unsafe to ambulate without skilled assist. Recommend cont therapy in acute setting to address deficits.   Therapy Prognosis: Good  Decision Making: High Complexity  Exam: strength, balance, sensation, mobility  Requires PT Follow-Up: Yes  Activity Tolerance  Activity Tolerance: Treatment limited secondary to decreased cognition;Patient tolerated treatment well     Plan   Physcial Therapy Plan  General Plan: 5-7 times per week  Current Treatment Recommendations: Strengthening, ROM, Functional mobility training, Transfer training, Endurance training, Gait training, Stair training, Balance training, Neuromuscular re-education, Home exercise program, Safety education & training, Patient/Caregiver education & training, Therapeutic activities, Equipment evaluation, education, & procurement  Safety Devices  Type of Devices: All fall risk precautions in place, Call light within reach, Chair alarm in place, Gait belt, Left in chair  Restraints  Restraints Initially in Place: No     Restrictions  Restrictions/Precautions  Restrictions/Precautions: Up as Tolerated, Fall Risk  Required Braces or Orthoses?: No  Position Activity Restriction  Other position/activity restrictions: Can increase activity level as tolerated as long as O2 saturation maintained above 92% and as tolerated.      Subjective   Pain: 5/10 at chest tube  General  Chart Reviewed: Yes  Patient assessed for rehabilitation services?: Yes  Other (Comment): L chest tube, cath  Subjective  Subjective: Pt up in chair and he and RN agreeable to therapy         Social/Functional History  Social/Functional History  Lives With: Spouse  Type of Home: Apartment  Home Layout: One level  Home Access: Stairs to enter with rails  Entrance Stairs - Number of Steps: 1+1  Entrance Stairs - Rails: Left  Bathroom Shower/Tub: Tub/Shower unit  Bathroom Toilet: Handicap height  Bathroom Equipment: Grab bars in shower  Bathroom Accessibility: Accessible  Home Equipment: Walker, rolling, Cane  Has the patient had two or more falls in the past year or any fall with injury in the past year?: No  Receives Help From: Family  ADL Assistance: [email or web address] [facility] Assistance: Independent  Homemaking Responsibilities: Yes  Ambulation Assistance: Independent  Transfer Assistance: Independent  Active : Yes  Mode of Transportation: Truck  Occupation: On disability  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 Daughter lives next door and can assist PRN  Vision/Hearing  Vision  Vision: Impaired  Vision Exceptions: Wears glasses at all times  Hearing  Hearing: Exceptions to [facility]  Hearing Exceptions: Hard of hearing/hearing concerns    Cognition   Orientation  Overall Orientation Status: Impaired  Orientation Level: Oriented to place;Oriented to person;Disoriented to situation;Disoriented to time  Cognition  Arousal/Alertness: Delayed responses to stimuli  Following Commands: Follows one step commands with increased time; Follows one step commands with repetition  Attention Span: Difficulty attending to directions  Memory: Decreased recall of biographical Information;Decreased recall of recent events  Safety Judgement: Decreased awareness of need for assistance;Decreased awareness of need for safety  Insights: Decreased awareness of deficits  Initiation: Requires cues for some  Sequencing: Requires cues for some  Cognition Comment: pt with some confusion/delirium, unsure if worse than initial eval     Objective   Heart Rate: (!) 119-137   Heart Rate Source: Monitor  BP: 91/68  BP Location: Left upper arm  BP Method: Automatic  Patient Position: Up in chair  MAP (Calculated): 76  SpO2: 94 %  O2 Device: Nasal cannula  Comment: 109/26 following mobility, no reports of dizziness or SOB, RN aware or heart rate and afib       Observation/Palpation  Posture: Fair  Gross Assessment  AROM: Generally decreased, functional  PROM: Generally decreased, functional  Strength: Grossly decreased, non-functional  Coordination: Generally decreased, functional  Tone: Normal  Sensation: Intact        Strength RLE  Strength RLE:  (3/5 grossly)  Strength LLE  Strength LLE:  (3/5 grossly)  Tone RLE  RLE Tone: Normotonic  Tone LLE  LLE Tone: Normotonic  Motor Control  Gross Motor?:  (decreased fluidity of movement)  Coordination  Rapid Alternating Movements:  (very slow and requires demonstration and cues)  Sensation  Overall Sensation Status: WFL     Bed mobility  Bed Mobility Comments: pt sitting up in chair  Transfers  Sit to Stand: Moderate Assistance;2 Person Assistance  Stand to Sit: Moderate Assistance  Bed to Chair: Moderate assistance;2 Person Assistance  Comment: performed STS c WW with max VC And TC for hand placement and sequencing. Increased time to complete due to confusion and weakness  Ambulation  Surface: Level tile  Device: Rolling Walker  Assistance: Moderate assistance;2 Person assistance  Gait Deviations: Slow Portia;Decreased step length;Decreased step height (requires Therapist placing foot up on scale (1 inch step up))  Distance: 2ft  Comments: pt with decreased endurance with mobility and decreased stability, requires Mod A x1-2 for balance and cueing gait  Stairs/Curb  Stairs?:  (1 in step up to scale)     Balance  Posture: Fair  Sitting - Static: Good  Sitting - Dynamic: Fair;+  Standing - Static: Fair  Standing - Dynamic: Fair;-  Exercise Treatment: performed transfer and ambulation this date with [employer], see above.                  AM-PAC Score  AM-PAC Inpatient Mobility Raw Score : 8 (02/24/23 1259)  AM-PAC Inpatient T-Scale Score : 28.52 (02/24/23 1259)  Mobility Inpatient CMS 0-100% Score: 86.62 (02/24/23 1259)  Mobility Inpatient CMS G-Code Modifier : CM (02/24/23 1259)         Goals  Short Term Goals  Time Frame for Short Term Goals: 14 visits  Short Term Goal 1: pt to perform bed mobility with Min A  Short Term Goal 2: pt to ambulate 50ft with [employer] with Min A x1  Short Term Goal 3: pt to transfer c [employer] c Min A x1  Short Term Goal 4: pt to ascend and descend 1 step with MOd A  Short Term Goal 5: pt to be indep with HEP  Patient Goals   Patient Goals : to be able to go home       Education  Patient Education  Education Given To: Patient  Education Provided: Role of Therapy;Plan of Care;Transfer Training  Education Method: Demonstration;Verbal  Barriers to Learning: Cognition  Education Outcome: Verbalized understanding;Continued education needed      Therapy Time   Individual Concurrent Group Co-treatment   Time In 1029         Time Out 1054         Minutes 25         Timed Code Treatment Minutes: 8 Minutes       Eladia Evans, PT

## 2023-02-24 NOTE — PROGRESS NOTES
PULMONARY & CRITICAL CARE MEDICINE PROGRESS NOTE     Patient:  Ayad Fuentes  MRN: [id number]  [facility] date: 2/12/2023  Primary Care Physician: Amrita Montano MD  Consulting Physician: Conor Tobias MD  CODE Status: Full Code  LOS: 12     SUBJECTIVE     CHIEF COMPLAINT/REASON FOR CONSULT:  Acute Hypoxic respiratory failure    HISTORY OF PRESENT ILLNESS:  Ayad Fuentes is a 74 y.o. male with past medical history significant for type 2 diabetes mellitus, multiple myeloma, non-Hodgkin lymphoma, sarcoidosis, referred from [facility] ER with worsening respiratory status, shortness of breath, nonproductive cough. He was started on BiPAP and given antibiotics. Chest x-ray was concerning for loculated left-sided pleural effusion. Transfer was made and accepted by hospitalist, further work-up. Critical care was consulted for worsening respiratory status. Patient was on high flow and respiratory rate was in 38-40. Patient was very dyspneic and having difficulty breathing with accessory muscle use. Initially he was unable to keep BiPAP but later on able to keep BiPAP. He improved symptomatically with BiPAP and respiratory rate was in 26. Patient is transferred to the ICU for higher level of care. 2/13: Attempted to perform bedside thoracentesis, no clear pocket identified, sent to IR, chest tube placed, appears that an empyema was encountered and chest tube was placed growing gram positive cocci in pairs, on cefepime and vanco     2/14: Placed chest tube yesterday, weaned off high flow, feeling \"100 x better\".       2/15: Continued TPA/Dornase chest tube flush, Got anxious overnight requiring xanax, 1200 cc of purulent chest tube output in the last 24 hours, patient did not sleep last night     2/16: Added seroquel to help sleep and agitation at night, continued Dornase/TPA, 700 cc of chest tube output that was less purulent, afebrile, WBC downtrending, continue cefepime     2/17: WBC uptrended, sent cultures, tachypneic, restless overnight, given xanax, more tachycardic overnight as well, plan for CT chest today, will discuss possible CTA to evaluate for PE.    02/18: Cardiothoracic surgery consulted and they increased the size of the chest tube. Will likely need decortication.    02/20: Cardiothoracic surgery determined to review imaging and diagnostics with on-call surgeon, plan to repeat CT scan at later date, hold off on plan for decortication. Infectious Disease discontinued IV cefepime 02/20/23.    02/21: Patient more confused, oriented to self. Trying to get out of bed. Denies agitation but feeling overall week. Kidney US did not show hydronephrosis. Infectious disease recommends starting IV Rocephin 1 gm q24 hrs until 03/12/23. Ok for midline for outpatient therapy. Echo performed. Pt declined PT.    02/22: Pt is paranoid and declined PT. Cardiothoracic planning for left-sided VATS decortication on 02/23/23.  Breathing stable, 99% O2 saturation on 3L NC 40% FiO2.      INTERVAL HISTORY:  2/24/2023  Patient s/p VATS/decortication on 02/23/2023.  Seen in CVICU  Overnight patient did well postop.  Chest tube in place.  Hemodynamically he had been stable, his heart rate is currently 110-118 with atrial fibrillation systolic blood pressure is in 90s.  He is on 3 L nasal cannula saturation 97%.  He is afebrile overnight Tmax in last 24 hours was 98.2.  According to patient feeling better does not complain of shortness of breath at rest but on ambulation positive mild cough.  Chest pain is controlled at the chest tube site and surgery site.    He is on amiodarone infusion.  Currently he is on Lasix IV 20 mg twice daily.  Urine output is 950 in last 24 hours.  Chest tube 745 mL.  Chest tube there is no leak present.    Labs shows creatinine is 3.48 BUN is 50 bicarbonate 21 sodium 132 WBC count is 26 hemoglobin 9.2 platelet count is 433.      REVIEW OF SYSTEMS:  Review of Systems   Constitutional:  Positive for  activity change, appetite change and fatigue. Negative for chills, diaphoresis, fever and unexpected weight change. HENT:  Negative for congestion. Respiratory:  Positive for cough and shortness of breath. Negative for apnea, choking, chest tightness, wheezing and stridor. Cardiovascular:  Positive for chest pain (at the site of chest tube insertion). Negative for leg swelling. Gastrointestinal:  Negative for abdominal distention, diarrhea, nausea and vomiting. Genitourinary:  Negative for difficulty urinating. Neurological:  Negative for dizziness and numbness. Psychiatric/Behavioral:  Negative for agitation. OBJECTIVE     PaO2/FiO2 RATIO:  Recent Labs     23  1243   POCPO2 66.2*      FiO2 : 40 %     VITAL SIGNS:   LAST:  BP 92/62   Pulse (!) 120   Temp 98 °F (36.7 °C) (Oral)   Resp 18   Ht 6' 1\" (1.854 m)   Wt 236 lb 15.9 oz (107.5 kg)   SpO2 96%   BMI 31.27 kg/m²   8-24 HR RANGE:  TEMP Temp  Av.1 °F (36.7 °C)  Min: 97.9 °F (36.6 °C)  Max: 98.4 °F (94.8 °C)   BP Systolic (81BNQ), RLP:30 , Min:86 , DNM:259      Diastolic (17VED), ZCZ:73, Min:62, Max:73     PULSE Pulse  Av.8  Min: 93  Max: 120   RR No data recorded   O2 SAT SpO2  Av.2 %  Min: 95 %  Max: 98 %   OXYGEN DELIVERY O2 Flow Rate (L/min)  Avg: 3 L/min  Min: 3 L/min  Max: 3 L/min        SYSTEMIC EXAMINATION:   Constitutional:  Alert, cooperative and no distress. Mental Status:  Oriented to person, place and time and normal affect. Chest: Left-sided chest tube is present  Lungs: Decreased air entry on the left side with chest tube in place  Heart: Irregularly irregular rhythm, no murmur. Abdomen:  Soft, nontender, nondistended, normal bowel sounds. Extremities:  No edema, redness, tenderness in the calves. Skin:  Warm, dry, no gross lesions or rashes.   DATA REVIEW     Medications: Current Inpatient  Scheduled Meds:   cefTRIAXone (ROCEPHIN) IV  1,000 mg IntraVENous Q24H    sodium chloride flush  5-40 mL IntraVENous 2 times per day    mupirocin   Nasal BID    chlorhexidine   Topical See Admin Instructions    sodium chloride flush  5-40 mL IntraVENous 2 times per day    polyethylene glycol  17 g Oral Daily    sodium chloride flush  5-40 mL IntraVENous 2 times per day    insulin lispro  0-4 Units SubCUTAneous TID WC    insulin lispro  0-4 Units SubCUTAneous Nightly    furosemide  20 mg IntraVENous BID     Continuous Infusions:   sodium chloride      sodium chloride      sodium chloride      sodium chloride      amiodarone 450mg/250ml D5W infusion 0.5 mg/min (02/23/23 2000)    dextrose         INPUT/OUTPUT:  In: 2425.8 [P.O.:150; I.V.:1025.8]  Out: 2195 [Urine:950]  Date 02/24/23 0000 - 02/24/23 2359   Shift 3068-4779 6752-1237 1876-5784 24 Hour Total   INTAKE   P.O.(mL/kg/hr) 100(0.1)   100   I. V.(mL/kg) 133.2(1.2)   133.2(1.2)   IV Piggyback(mL/kg) 250(2.3)   250(2.3)   Shift Total(mL/kg) 483.2(4.5)   483.2(4.5)   OUTPUT   Urine(mL/kg/hr) 470(0.5)   470   Chest Tube 295   295   Shift Total(mL/kg) 765(7.1)   765(7.1)   Weight (kg) 107.5 107.5 107.5 107.5        LABS:  ABGs:   Recent Labs     02/23/23  0534 02/23/23  1144 02/23/23  1243   POCPH 7.421 7.401 7. 356   POCPCO2 40.9 36.8 38.8   POCPO2 60.4* 100.3 66.2*   POCHCO3 26.6 22.8 21.7   JQHT7VNU 91* 98 92*     CBC:   Recent Labs     02/22/23  0725 02/23/23  0600 02/23/23  2308 02/24/23  0605   WBC 24.0* 24.1* 28.8* 26.0*   HGB 11.2* 11.2* 8.8* 9.2*   HCT 36.0* 36.6* 28.5* 30.4*   MCV 88.5 90.8 91.1 92.4    446 405 433   LYMPHOPCT 29 38  --  24   RBC 4.07* 4.03* 3.13* 3.29*   MCH 27.5 27.8 28.1 28.0   MCHC 31.1 30.6 30.9 30.3   RDW 15.7* 15.7* 15.7* 15.7*     CRP:   No results for input(s): CRP in the last 72 hours. LDH:   No results for input(s): LDH in the last 72 hours.   BMP:   Recent Labs     02/22/23  0215 02/22/23  0725 02/23/23  0600 02/23/23  2308 02/24/23  0605   NA  --  131* 130* 131* 132*   K 5.8* 5.1 4.9 5.2 4.9   CL  --  97* 96* 99 100   CO2  -- 23 25 22 21   BUN  --  47* 51* 51* 50*   CREATININE  --  2.62* 3.20* 3.30* 3.48*   GLUCOSE  --  180* 135* 102* 85     Liver Function Test:   No results for input(s): PROT, LABALBU, ALT, AST, GGT, ALKPHOS, BILITOT in the last 72 hours. Coagulation Profile:   Recent Labs     02/22/23  1157 02/24/23  0605   INR 1.1  --    PROTIME 11.7  --    APTT  --  26.1     D-Dimer:  No results for input(s): DDIMER in the last 72 hours. Lactic Acid:  No results for input(s): LACTA in the last 72 hours. Cardiac Enzymes:  No results for input(s): CKTOTAL, CKMB, CKMBINDEX, TROPONINI in the last 72 hours. Invalid input(s): TROPONIN, HSTROP  BNP/ProBNP:   No results for input(s): BNP, PROBNP in the last 72 hours. Triglycerides:  No results for input(s): TRIG in the last 72 hours. Microbiology:  Urine Culture:  No components found for: CURINE  Blood Culture:  No components found for: CBLOOD, CFUNGUSBL  Sputum Culture:  No components found for: CSPUTUM  Recent Labs     02/23/23  1200   SPECDESC . LUNG  . LUNG  . LUNG  . LUNG   SPECIAL LEFT PLURAL PEEL  LEFT PLEURAL PEEL   LEFT PLEURAL PEEL  LEFT PLEURAL PEEL   CULTURE PENDING  PENDING  PENDING     Recent Labs     02/22/23  1316 02/23/23  1133 02/23/23  1200   SPECIAL  --   --  LEFT PLURAL PEEL  LEFT PLEURAL PEEL   LEFT PLEURAL PEEL  LEFT PLEURAL PEEL   CULTURE NO GROWTH  WRONG TEST ORDERED  DUE TO THE SPECIMEN TYPE, THE ORDER WAS CANCELED AND REORDERED. PLEASE REFER TO: MOLECULAR NASAL TEST. PENDING  PENDING  DUPLICATE ORDER SEE ANAEROBIC, AEROBIC CULTURE PENDING  PENDING  PENDING          Radiology Reports:  XR CHEST PORTABLE   Final Result   Status post removal single small bore and placement 2 large-bore chest tubes   left lung with reduced opacity, presumably multiloculated left pleural   effusion and associated atelectasis. No pneumothorax. Slightly greater   atelectasis right base. US RETROPERITONEAL COMPLETE   Final Result   1.  Simple cortical cyst at the upper pole left kidney measuring 6 mm. 2. Limited renal ultrasound. No hydronephrosis. 3. Nonvisualization of ureteral jets. Minimal distention of the urinary   bladder. Patient no urge to void during the exam.      RECOMMENDATIONS:   Unavailable         IR CHEST TUBE INSERTION   Final Result   Successful fluoroscopic up sizing of left sided chest tube with 14 Guinean   tube placed. XR CHEST PORTABLE   Final Result   Moderate left pleural effusion increased. No change left chest tube/pigtail   catheter. VL DUP LOWER EXTREMITY VENOUS BILATERAL   Final Result      CT CHEST WO CONTRAST   Final Result   Small to moderate-sized multiloculated left pleural effusions have improved. New small foci of air within the collection is presumably related to the   introduction of the left thoracostomy tube. Stable small to moderate sized mediastinal lymph nodes are nonspecific but   may relate to the patient's history of lymphoma. Additional small bilateral   axial lymph nodes are identified. Small hiatal hernia. XR CHEST PORTABLE   Final Result   Similar findings when rotation taken into account; left chest tube with   unchanged or slightly increased loculated appearing left pleural effusion;   atelectatic appearing changes. No pneumothorax. XR CHEST PORTABLE   Final Result   Interval placement of a left chest tube with reduced left pleural fluid. IR CHEST TUBE INSERTION   Final Result   Successful ultrasound guided placement of a left 10 Guinean chest tube         CT CHEST WO CONTRAST   Final Result   1. Moderate to large amount of multiloculated left pleural fluid. Assessment   for pleural thickening and/or pleural nodularity is limited without IV   contrast.   2. Consolidation in the left lower lobe, likely atelectasis, but possibility   of superimposed pneumonia would be difficult to exclude.    3. Mediastinal and bilateral axillary lymphadenopathy, which may be related   to the patient's history of lymphoma. If available, comparison to any recent   imaging may be of use to assess for progression. XR CHEST PORTABLE   Final Result   No significant interval change. Redemonstration of opacification of the mid   to lower left lung field which may in part be due to loculated left pleural   effusion. Consider further evaluation with CT. XR CHEST PORTABLE    (Results Pending)        Echocardiogram:   No results found for this or any previous visit. ASSESSMENT AND PLAN     Assessment:    Acute hypoxic respiratory failure  Community acquired pneumonia  Empyema left side s/p VATS/decortication (1/23/2023)  Mediastinal and bilateral axillary lymphadenopathy  Hx of lymphoma  Hypertension  Hx of AAA  Hx of mitral regurgitation  Atrial fibrillation. KELSEA      Plan:    Patient s/p VATS on 02/23/2023. Chest tube is in place chest tube management per CT surgery. Chest x-ray reviewed showed better aeration of left lower lung field postop changes in bibasilar atelectasis present  Patient is having worsening renal function he is on Lasix and tachycardic blood pressure borderline would suggest to hold Lasix as this may be contributing to his tachycardia. Follow-up with nephrology and follow-up renal function  Keep supplemental oxygen to keep oxygen saturation above 92%  DVT prophylaxis with heparin subcu when okay with CT surgery  On Rocephin and follow-up with infectious disease  Continue to encourage incentive spirometry Acapella deep breathing  Bronchodilator therapy as needed  Encourage incentive spirometry/Acapella  We will continue to follow    We will continue to follow. I would like to thank you for allowing me to participate in the care of this patient. Please feel free to call with any further questions or concerns.       Yumiko Johnston MD  Pulmonary and critical care medicine  2/24/2023, 8:47 AM

## 2023-02-24 NOTE — CARE COORDINATION
Call to [facility] with Brendon Leif to obtain list of SNF in network with their insurance plan, and to inquire if she has attempted to help with placement as per 2/22 CM note, ML awaiting return call    [id number] Spoke with pts spouse Melissa Turcios and choices obtained:  #1 [facility]  #2 [facility]  #3 [facility]  Referrals sent

## 2023-02-24 NOTE — PROGRESS NOTES
Occupational Therapy  Facility/Department: [facility]- SICU  Occupational Daily Treatment Note    Name: Soraya Serna  : 1955  MRN: [id number]  Date of Service: 2023    Discharge Recommendations:  Patient would benefit from continued therapy after discharge    Patient Diagnosis(es): The encounter diagnosis [facility] Past Medical History:  has a past medical history of Acute interstitial nephritis, Aortic regurgitation, ARF (acute renal failure) ([facility] [address] Chronic kidney disease, Hyperlipidemia, Hypertension, Non-Hodgkin lymphoma ([facility] [address] Research study patient, Sarcoidosis, and Type II or unspecified type diabetes mellitus without mention of complication, not stated as uncontrolled. Past Surgical History:  has a past surgical history that includes eye surgery (Right); Knee arthroscopy; Rotator cuff repair (Right, ); Rotator cuff repair (Left, 5/15); Carpal tunnel release (Left, 11/15 ); Eye surgery (Right, ); Total knee arthroplasty (Right, 10/2018); malignant skin lesion excision; IR CHEST TUBE INSERTION (2023); and IR CHEST TUBE INSERTION (2023). Assessment   Performance deficits / Impairments: Decreased functional mobility ; Decreased ADL status; Decreased cognition;Decreased endurance;Decreased balance;Decreased high-level IADLs;Decreased strength;Decreased safe awareness  Prognosis: Good  Activity Tolerance  Activity Tolerance: Patient Tolerated treatment well;Patient limited by fatigue;Treatment limited secondary to decreased cognition  Activity Tolerance Comments: Elevated HR/SOB.         Plan   Occupational Therapy Plan  Times Per Week: 3-5 x/wk  Current Treatment Recommendations: Balance training, Functional mobility training, Endurance training, Cognitive reorientation, Safety education & training, Patient/Caregiver education & training, Equipment evaluation, education, & procurement, Self-Care / ADL, Home management training     Restrictions  Restrictions/Precautions  Restrictions/Precautions: Up as Tolerated, Fall Risk  Required Braces or Orthoses?: No  Position Activity Restriction  Other position/activity restrictions: Chest tube. Bullock catheter. O2 NC 3 Lm.  Pain assessment: Pt denies pain this day.  Subjective   General  Patient assessed for rehabilitation services?: Yes  Family / Caregiver Present:  (spouse)  Safety Devices  Type of Devices: All fall risk precautions in place;Call light within reach;Gait belt;Nurse notified;Left in chair    Balance  Sitting: Without support (Supervision seated in chair.)  Standing: With support (CGA static standing at chair using RW, march in place. Total time 3 minutes. Pt SOB needing to sit.)  Gait  Overall Level of Assistance:  (LEANN d/t SOB/fatigue.)  ADL  Grooming: Setup;Verbal cueing;Increased time to complete;Contact guard assistance (Oral care seated in chair.)  UE Bathing: Setup;Verbal cueing;Increased time to complete;Stand by assistance (Wash face seated in chair.)  Additional Comments: Pt seated in chair upon therapist arrival. Pt pleasantly confused throughout session, easily distracted. Pt set with oral supplies on tray table. Verbal cues for initiation with fair return. Pt attempting to apply toothpaste to finger instead of tooth brush. Verbal cues to correct with good return. Pt required occassional verbal cues for sequencing throughout activity with good return. Sit/stand transfer from chair using RW for static standing at chair. March in place. Pt L knee in mild flexion, no buckling noted. Pt fatigued quickly needing to sit. Pt educated on pusred lip breathing after returning to seated in chair with fair return. Pt -134 throught session, RN notified and ok'd to continue with session. Pt educated on use of incentive spirometer, pt verbalized/demo understanding with fair return d/t cognition.     Transfers  Sit to stand: Minimal assistance  Stand to sit: Minimal  assistance  Transfer Comments: Transfers completed x2. Cognition  Overall Cognitive Status: Exceptions  Arousal/Alertness: Delayed responses to stimuli  Following Commands: Follows one step commands with repetition; Follows one step commands with increased time  Attention Span: Attends with cues to redirect  Memory: Decreased recall of recent events  Safety Judgement: Decreased awareness of need for assistance;Decreased awareness of need for safety  Problem Solving: Assistance required to identify errors made;Assistance required to correct errors made  Insights: Decreased awareness of deficits  Initiation: Requires cues for some  Sequencing: Requires cues for some  Cognition Comment: Pt looking to wife for answers. Easily distracted  Orientation  Overall Orientation Status: Impaired  Orientation Level: Oriented to person;Disoriented to place; Disoriented to time;Disoriented to situation     Education Given To: Patient; Family  Education Provided Comments: OT POC, transfer/walker safety, importance of participation in therapy, pursed lip breathing, incentive spirometer with fair return.   Barriers to Learning: Cognition  AM-PAC Score     AM-[facility] Inpatient Daily Activity Raw Score: 18 (02/24/23 1613)  AM-PAC Inpatient ADL T-Scale Score : 38.66 (02/24/23 1613)  ADL Inpatient CMS 0-100% Score: 46.65 (02/24/23 1613)  ADL Inpatient CMS G-Code Modifier : CK (02/24/23 1613)    Goals  Short Term Goals  Time Frame for Short Term Goals: By discharge, pt will:  Short Term Goal 1: Demo I with bed mobility to increase independence with ADLs and to decrease risk for pressure injury  Short Term Goal 2: Demo Mod I for functional transfers and functional mobility with use of LRD for engagement in ADLs/IADLs  Short Term Goal 3: Demo I with all UB ADLs  Short Term Goal 4: Demo Mod I for LB ADLs and toileting, utilizing AE and adaptive tech PRN  Short Term Goal 5: Demo 8 min dynamic standing and reaching outside LI with unilateral hand release and SUP for improved standing balance during ADL/IADL participation       Therapy Time   Individual Concurrent Group Co-treatment   Time In 1500         Time Out 1549         Minutes 49         Timed Code Treatment Minutes: 52 Minutes    Pt seated in chair upon therapist arrival. Pleasant and agreeable to therapy. See above for LOF for all tasks. Pt retired to seated in chair at end of session with call light within reach.       MELODY Flower/WINTER

## 2023-02-24 NOTE — ANESTHESIA POSTPROCEDURE EVALUATION
Department of Anesthesiology  Postprocedure Note    Patient: Bryan Pettit  MRN: [id number]  YOB: 1955  Date of evaluation: 2/23/2023      Procedure Summary     Date: 02/23/23 Room / Location: [address]    Anesthesia Start: 9417 Anesthesia Stop: 8062    Procedure: [address] (Left: Chest) Diagnosis:       Empyema ([address]      (EMPYEMA)    Surgeons: Finesse Oates MD Responsible Provider: Estefanía Garay MD    Anesthesia Type: general ASA Status: 4          Anesthesia Type: No value filed.     Edel Phase I:      Edel Phase II:    POST-OP ANESTHESIA NOTE       /64   Pulse 100   Temp 98.2 °F (36.8 °C) (Oral)   Resp 18   Ht 6' 1\" (1.854 m)   Wt 236 lb 15.9 oz (107.5 kg)   SpO2 98%   BMI 31.27 kg/m²    Pain Assessment: Critical Care Pain Observation Tool (CPOT)  Pain Level: 7           Anesthesia Post Evaluation    Patient location during evaluation: ICU  Patient participation: complete - patient participated  Level of consciousness: awake  Pain score: 7  Airway patency: patent  Complications: no  Cardiovascular status: hemodynamically stable  Respiratory status: acceptable  Hydration status: stable

## 2023-02-24 NOTE — PROGRESS NOTES
Comprehensive Nutrition Assessment    Type and Reason for Visit:  Reassessment    Nutrition Recommendations/Plan:   Continue current diet and add Glucerna ONS to breakfast, lunch, and dinner. Monitor diet and supplement tolerance, adjust as needed. Monitor weights, meds, labs, and plan of care. Malnutrition Assessment:  Malnutrition Status: At risk for malnutrition (Comment) (02/24/23 4003)    Context:  Acute Illness     Findings of the 6 clinical characteristics of malnutrition:  Energy Intake:  75% or less of estimated energy requirements for 7 or more days  Weight Loss:  1% to 2% over 1 week     Body Fat Loss:  No significant body fat loss     Muscle Mass Loss:  No significant muscle mass loss    Fluid Accumulation:  No significant fluid accumulation     Strength:  Not Performed    Nutrition Assessment:    Pt seen for reassessment. RN in the room at time of visit. Pt explained that his appetite has been good but he doesnt like the food here. Pt also states that if he eats too much or too fast his stomach hurts. Pt states that he is hungry between meals but only eats about 25% of meals. RN reports pt finished a hamburger today, but has not been eating much of meals. 3% WL w/in 4 days. Pt reports that he has not had a BM since last sunday. RN reports may start Mirilax. PMH: DM,HLD,Lymphoma, CKD. Labs reviewed: HGB: 9.2, Glu: 87. Meds reviewed. Nutrition Related Findings:    meds/labs reviewed Wound Type: None       Current Nutrition Intake & Therapies:    Average Meal Intake: 1-25%, 26-50% (Per pt and RN)  Average Supplements Intake: None Ordered  ADULT DIET; Regular    Anthropometric Measures:  Height: 6' 1\" (185.4 cm)  Ideal Body Weight (IBW): 184 lbs (84 kg)    Admission Body Weight: 224 lb 13.9 oz (102 kg)  Current Body Weight: 240 lb 15.4 oz (109.3 kg), 131 % IBW.     Current BMI (kg/m2): 31.8        Weight Adjustment For: No Adjustment                 BMI Categories: Overweight (BMI 25.0-29. 9)    Estimated Daily Nutrient Needs:  Energy Requirements Based On: Formula  Weight Used for Energy Requirements: Admission  Energy (kcal/day): 2100 kcals/day  Weight Used for Protein Requirements: Ideal  Protein (g/day): 110-120 gm/day  Method Used for Fluid Requirements: Other (Comment)  Fluid (ml/day): per MD    Nutrition Diagnosis:   Inadequate oral intake related to  (appetite, current condition) as evidenced by  (variable PO intake)    Nutrition Interventions:   Food and/or Nutrient Delivery: Continue Current Diet, Start Oral Nutrition Supplement (Glucerna ONS 3 x day)  Nutrition Education/Counseling: No recommendation at this time  Coordination of Nutrition Care: Continue to monitor while inpatient  Plan of Care discussed with: Pt and RN    Goals:  Previous Goal Met: No Progress toward Goal(s)  Goals: PO intake 50% or greater, prior to discharge       Nutrition Monitoring and Evaluation:   Behavioral-Environmental Outcomes: None Identified  Food/Nutrient Intake Outcomes: Supplement Intake, Food and Nutrient Intake  Physical Signs/Symptoms Outcomes: Weight, GI Status, Constipation, Biochemical Data, Nutrition Focused Physical Findings    Discharge Planning:     Too soon to determine     [facility]: [phone]

## 2023-02-25 ENCOUNTER — APPOINTMENT (OUTPATIENT)
Dept: GENERAL RADIOLOGY | Age: 68
DRG: 853 | End: 2023-02-25
Attending: STUDENT IN AN ORGANIZED HEALTH CARE EDUCATION/TRAINING PROGRAM
Payer: COMMERCIAL

## 2023-02-25 PROBLEM — Z86.79 HISTORY OF ATRIAL FIBRILLATION: Status: ACTIVE | Noted: 2023-02-25

## 2023-02-25 PROBLEM — N18.9 ACUTE KIDNEY INJURY SUPERIMPOSED ON CHRONIC KIDNEY DISEASE (HCC): Status: ACTIVE | Noted: 2022-08-03

## 2023-02-25 PROBLEM — Z86.39 HISTORY OF TYPE 2 DIABETES MELLITUS: Status: ACTIVE | Noted: 2023-02-25

## 2023-02-25 LAB
ANION GAP SERPL CALCULATED.3IONS-SCNC: 14 MMOL/L (ref 9–17)
BUN SERPL-MCNC: 53 MG/DL (ref 8–23)
CALCIUM SERPL-MCNC: 9.2 MG/DL (ref 8.6–10.4)
CHLORIDE SERPL-SCNC: 100 MMOL/L (ref 98–107)
CO2 SERPL-SCNC: 19 MMOL/L (ref 20–31)
CREAT SERPL-MCNC: 4.38 MG/DL (ref 0.7–1.2)
GFR SERPL CREATININE-BSD FRML MDRD: 14 ML/MIN/1.73M2
GLUCOSE BLD-MCNC: 141 MG/DL (ref 75–110)
GLUCOSE BLD-MCNC: 180 MG/DL (ref 75–110)
GLUCOSE BLD-MCNC: 207 MG/DL (ref 75–110)
GLUCOSE BLD-MCNC: 218 MG/DL (ref 75–110)
GLUCOSE SERPL-MCNC: 197 MG/DL (ref 70–99)
POTASSIUM SERPL-SCNC: 5 MMOL/L (ref 3.7–5.3)
SODIUM SERPL-SCNC: 133 MMOL/L (ref 135–144)

## 2023-02-25 PROCEDURE — 2700000000 HC OXYGEN THERAPY PER DAY

## 2023-02-25 PROCEDURE — 6360000002 HC RX W HCPCS: Performed by: PLASTIC SURGERY

## 2023-02-25 PROCEDURE — 2500000003 HC RX 250 WO HCPCS: Performed by: STUDENT IN AN ORGANIZED HEALTH CARE EDUCATION/TRAINING PROGRAM

## 2023-02-25 PROCEDURE — 99233 SBSQ HOSP IP/OBS HIGH 50: CPT | Performed by: INTERNAL MEDICINE

## 2023-02-25 PROCEDURE — 2580000003 HC RX 258: Performed by: ANESTHESIOLOGY

## 2023-02-25 PROCEDURE — 2580000003 HC RX 258

## 2023-02-25 PROCEDURE — 82947 ASSAY GLUCOSE BLOOD QUANT: CPT

## 2023-02-25 PROCEDURE — 6370000000 HC RX 637 (ALT 250 FOR IP): Performed by: FAMILY MEDICINE

## 2023-02-25 PROCEDURE — 99232 SBSQ HOSP IP/OBS MODERATE 35: CPT | Performed by: INTERNAL MEDICINE

## 2023-02-25 PROCEDURE — 6360000002 HC RX W HCPCS: Performed by: STUDENT IN AN ORGANIZED HEALTH CARE EDUCATION/TRAINING PROGRAM

## 2023-02-25 PROCEDURE — 80048 BASIC METABOLIC PNL TOTAL CA: CPT

## 2023-02-25 PROCEDURE — 2580000003 HC RX 258: Performed by: PHYSICIAN ASSISTANT

## 2023-02-25 PROCEDURE — 6370000000 HC RX 637 (ALT 250 FOR IP): Performed by: PHYSICIAN ASSISTANT

## 2023-02-25 PROCEDURE — 2000000000 HC ICU R&B

## 2023-02-25 PROCEDURE — 71045 X-RAY EXAM CHEST 1 VIEW: CPT

## 2023-02-25 PROCEDURE — 6360000002 HC RX W HCPCS

## 2023-02-25 PROCEDURE — 6370000000 HC RX 637 (ALT 250 FOR IP): Performed by: STUDENT IN AN ORGANIZED HEALTH CARE EDUCATION/TRAINING PROGRAM

## 2023-02-25 PROCEDURE — 6370000000 HC RX 637 (ALT 250 FOR IP)

## 2023-02-25 PROCEDURE — 99232 SBSQ HOSP IP/OBS MODERATE 35: CPT | Performed by: FAMILY MEDICINE

## 2023-02-25 PROCEDURE — 2580000003 HC RX 258: Performed by: STUDENT IN AN ORGANIZED HEALTH CARE EDUCATION/TRAINING PROGRAM

## 2023-02-25 PROCEDURE — 94761 N-INVAS EAR/PLS OXIMETRY MLT: CPT

## 2023-02-25 PROCEDURE — 36415 COLL VENOUS BLD VENIPUNCTURE: CPT

## 2023-02-25 PROCEDURE — 6360000002 HC RX W HCPCS: Performed by: NURSE PRACTITIONER

## 2023-02-25 PROCEDURE — 2580000003 HC RX 258: Performed by: INTERNAL MEDICINE

## 2023-02-25 RX ORDER — FUROSEMIDE 10 MG/ML
20 INJECTION INTRAMUSCULAR; INTRAVENOUS 2 TIMES DAILY
Status: DISCONTINUED | OUTPATIENT
Start: 2023-02-25 | End: 2023-02-27

## 2023-02-25 RX ORDER — MIDODRINE HYDROCHLORIDE 2.5 MG/1
2.5 TABLET ORAL
Status: DISCONTINUED | OUTPATIENT
Start: 2023-02-25 | End: 2023-02-25

## 2023-02-25 RX ORDER — SENNA PLUS 8.6 MG/1
1 TABLET ORAL NIGHTLY
Status: DISCONTINUED | OUTPATIENT
Start: 2023-02-25 | End: 2023-02-28

## 2023-02-25 RX ORDER — MIDODRINE HYDROCHLORIDE 5 MG/1
5 TABLET ORAL
Status: DISCONTINUED | OUTPATIENT
Start: 2023-02-25 | End: 2023-02-26

## 2023-02-25 RX ORDER — DOCUSATE SODIUM 100 MG/1
100 CAPSULE, LIQUID FILLED ORAL 3 TIMES DAILY
Status: DISCONTINUED | OUTPATIENT
Start: 2023-02-25 | End: 2023-03-06 | Stop reason: HOSPADM

## 2023-02-25 RX ORDER — FUROSEMIDE 10 MG/ML
20 INJECTION INTRAMUSCULAR; INTRAVENOUS ONCE
Status: COMPLETED | OUTPATIENT
Start: 2023-02-25 | End: 2023-02-25

## 2023-02-25 RX ADMIN — CEFTRIAXONE SODIUM 1000 MG: 10 INJECTION, POWDER, FOR SOLUTION INTRAVENOUS at 08:57

## 2023-02-25 RX ADMIN — POLYETHYLENE GLYCOL 3350 17 G: 17 POWDER, FOR SOLUTION ORAL at 07:42

## 2023-02-25 RX ADMIN — ASPIRIN 650 MG: 325 TABLET, COATED ORAL at 07:42

## 2023-02-25 RX ADMIN — ASPIRIN 650 MG: 325 TABLET, COATED ORAL at 23:00

## 2023-02-25 RX ADMIN — FUROSEMIDE 20 MG: 10 INJECTION, SOLUTION INTRAMUSCULAR; INTRAVENOUS at 17:02

## 2023-02-25 RX ADMIN — MUPIROCIN: 20 OINTMENT TOPICAL at 23:00

## 2023-02-25 RX ADMIN — FUROSEMIDE 20 MG: 10 INJECTION, SOLUTION INTRAMUSCULAR; INTRAVENOUS at 07:41

## 2023-02-25 RX ADMIN — METOPROLOL TARTRATE 5 MG: 5 INJECTION, SOLUTION INTRAVENOUS at 07:33

## 2023-02-25 RX ADMIN — SODIUM CHLORIDE, PRESERVATIVE FREE 25 ML: 5 INJECTION INTRAVENOUS at 07:38

## 2023-02-25 RX ADMIN — OXYCODONE HYDROCHLORIDE AND ACETAMINOPHEN 2 TABLET: 5; 325 TABLET ORAL at 07:30

## 2023-02-25 RX ADMIN — OXYCODONE HYDROCHLORIDE AND ACETAMINOPHEN 2 TABLET: 5; 325 TABLET ORAL at 13:38

## 2023-02-25 RX ADMIN — SENNOSIDES 8.6 MG: 8.6 TABLET, COATED ORAL at 23:00

## 2023-02-25 RX ADMIN — AMIODARONE HYDROCHLORIDE 0.5 MG/MIN: 50 INJECTION, SOLUTION INTRAVENOUS at 17:40

## 2023-02-25 RX ADMIN — INSULIN LISPRO 1 UNITS: 100 INJECTION, SOLUTION INTRAVENOUS; SUBCUTANEOUS at 17:37

## 2023-02-25 RX ADMIN — FUROSEMIDE 20 MG: 10 INJECTION, SOLUTION INTRAMUSCULAR; INTRAVENOUS at 14:03

## 2023-02-25 RX ADMIN — MIDODRINE HYDROCHLORIDE 5 MG: 5 TABLET ORAL at 11:36

## 2023-02-25 RX ADMIN — COLCHICINE 0.3 MG: 0.6 TABLET, FILM COATED ORAL at 07:41

## 2023-02-25 RX ADMIN — ASPIRIN 650 MG: 325 TABLET, COATED ORAL at 13:58

## 2023-02-25 RX ADMIN — MIDODRINE HYDROCHLORIDE 5 MG: 5 TABLET ORAL at 17:00

## 2023-02-25 RX ADMIN — SODIUM CHLORIDE: 9 INJECTION, SOLUTION INTRAVENOUS at 10:08

## 2023-02-25 RX ADMIN — MUPIROCIN: 20 OINTMENT TOPICAL at 07:42

## 2023-02-25 RX ADMIN — METOPROLOL TARTRATE 12.5 MG: 25 TABLET ORAL at 23:00

## 2023-02-25 RX ADMIN — SODIUM CHLORIDE, PRESERVATIVE FREE 10 ML: 5 INJECTION INTRAVENOUS at 23:00

## 2023-02-25 RX ADMIN — PANTOPRAZOLE SODIUM 40 MG: 40 TABLET, DELAYED RELEASE ORAL at 07:41

## 2023-02-25 RX ADMIN — DOCUSATE SODIUM 100 MG: 100 CAPSULE, LIQUID FILLED ORAL at 12:15

## 2023-02-25 RX ADMIN — METOPROLOL TARTRATE 12.5 MG: 25 TABLET ORAL at 09:01

## 2023-02-25 RX ADMIN — METOPROLOL TARTRATE 5 MG: 5 INJECTION, SOLUTION INTRAVENOUS at 18:07

## 2023-02-25 RX ADMIN — DOCUSATE SODIUM 100 MG: 100 CAPSULE, LIQUID FILLED ORAL at 23:00

## 2023-02-25 ASSESSMENT — PAIN DESCRIPTION - LOCATION
LOCATION: RIB CAGE
LOCATION: LEG

## 2023-02-25 ASSESSMENT — ENCOUNTER SYMPTOMS
VOMITING: 0
SORE THROAT: 0
RESPIRATORY NEGATIVE: 1
SINUS PRESSURE: 0
NAUSEA: 0
SHORTNESS OF BREATH: 1
CONSTIPATION: 0
BLOOD IN STOOL: 0
VOICE CHANGE: 0
COUGH: 0
GASTROINTESTINAL NEGATIVE: 1
WHEEZING: 0
DIARRHEA: 0
ABDOMINAL PAIN: 0

## 2023-02-25 ASSESSMENT — PAIN DESCRIPTION - DESCRIPTORS
DESCRIPTORS: BURNING;SHARP
DESCRIPTORS: SHARP

## 2023-02-25 ASSESSMENT — PAIN SCALES - GENERAL
PAINLEVEL_OUTOF10: 5
PAINLEVEL_OUTOF10: 10
PAINLEVEL_OUTOF10: 8
PAINLEVEL_OUTOF10: 5
PAINLEVEL_OUTOF10: 8
PAINLEVEL_OUTOF10: 3

## 2023-02-25 ASSESSMENT — PAIN DESCRIPTION - PAIN TYPE
TYPE: SURGICAL PAIN
TYPE: SURGICAL PAIN

## 2023-02-25 ASSESSMENT — PAIN - FUNCTIONAL ASSESSMENT
PAIN_FUNCTIONAL_ASSESSMENT: PREVENTS OR INTERFERES SOME ACTIVE ACTIVITIES AND ADLS
PAIN_FUNCTIONAL_ASSESSMENT: PREVENTS OR INTERFERES SOME ACTIVE ACTIVITIES AND ADLS

## 2023-02-25 ASSESSMENT — PAIN DESCRIPTION - ONSET
ONSET: ON-GOING
ONSET: GRADUAL

## 2023-02-25 ASSESSMENT — PAIN DESCRIPTION - ORIENTATION
ORIENTATION: RIGHT;LEFT;UPPER
ORIENTATION: LEFT

## 2023-02-25 ASSESSMENT — PAIN DESCRIPTION - FREQUENCY
FREQUENCY: CONTINUOUS
FREQUENCY: INTERMITTENT

## 2023-02-25 NOTE — PROGRESS NOTES
[facility]  Office: [phone]  Bernardo Morales DO, Ronny Cruz DO, Mason García DO, Sher Lamar DO, Enrique Pickett MD, Sumi Ferro MD, Bandar Aguirre MD, Farzana Trammell MD,  Garcia Berrios MD, Buzz Penn MD, Steven Raymundo DO, René Maloney MD,  Laura Melvin DO, Shereen Sagastume MD, Lucas Ambrose MD, Jose Morales DO, Dona Smith MD, Bandar Grier MD, Carlos A Selby DO, Meche Golden MD, Anastacia Farah MD, Jing Dodge MD, Regi Timmons MD, Adarsh Alvarado DO, Travis Rubio MD, Yariel Miranda MD, Shirley Waterhouse, CNP,  Mariel Esteban, CNP, Harleen Mccain, CNP, Yogesh Lopez, CNP,  Muriel Potts, [facility], Vibha Padilla, CNP, Gia Francisco, CNP, Camila Buenrostro, CNP, Amanda Persaud, CNP, Aide Ferguson, CNP, Mesfin Tovar PA-C, Carina David, CNS, Demetra Mcrae, CNP, Keira Arguelles, CNP       [facility]      Daily Progress Note     Admit Date: 2/12/2023  Bed/Room No.  1007/1007-01  Admitting Physician : Bandar Aguirre MD  Code Status :Full Code  Hospital Day:  LOS: 13 days   Chief Complaint:     Shortness of breath      Principal Problem:    Pneumonia, unspecified organism  Active Problems:    Pleural effusion    Hyperkalemia    Acute respiratory failure with hypoxia (HCC)    Hyponatremia    Hyperglycemia    History of non-Hodgkin's lymphoma    History of sarcoidosis    Empyema (HCC)    KELSEA (acute kidney injury) (HCC)  Resolved Problems:    * No resolved hospital problems. *    Subjective :        Interval History/Significant events :  02/25/23    Patient is sitting up in the chair.  He is on supplemental oxygen.  Patient remains in a flutter with tachycardia.  Blood pressure is low.  He has left chest tube in place and had significant drainage overnight(1560)   Vitals - Unstable afebrile, heart rate 122.  Labs -kidney function worsening.  Low hemoglobin 9.2.  Leukocytosis 25.0.  Nursing notes , Consults notes reviewed. Overnight events and updates  discussed with Nursing staff . Background History:         Quinten Garvin is 79 y.o. male  Who was admitted to the hospital on 2/12/2023 for treatment of Pneumonia, unspecified organism. Patient presented to ER at [facility] on 2/12/2023 with dyspnea and was found to have left multiloculated pneumonia with large pleural effusion with mediastinal and bilateral axillary lymphadenopathy. Patient has prior history of non-Hodgkin's lymphoma, multiple myeloma. Patient was requiring high flow nasal cannula and had thoracentesis. Pleural fluid was consistent with empyema and patient had chest tube placement by IR. He was treated with broad-spectrum antibiotics. Patient was also given dornase without much improvement. CT surgery was consulted and recommended increasing the size of chest tube and did not recommend any open surgical intervention at this time. Patient had exchange of chest tube on 2/18/2023. He had diffuse ST elevation secondary to pericarditis on 1/21/23. Patient had new onset atrial fibrillation with RVR on 10/22/23 and was given digoxin and amiodarone infusion . Patient underwent VATS with decortication on 2/23/2023. He was extubated postoperatively on 2/23/2023. PMH:  Past Medical History:   Diagnosis Date    Acute interstitial nephritis 08/03/2022    Unclear cause. Creat bumped to 2.9, baseline in oct 2020 1.6. Exact description of his kidney biopsy is not available to me however pathologist seems to think there is diabetic glomerosclerosis and acute interstitial nephritis seen on the biopsy specimen. No mention about chronic changes. Because of acute interstitial nephritis a trial of steroids for 15 days will be given to see if I can impro    Aortic regurgitation     ARF (acute renal failure) ([facility] [address] 08/03/2022    kidney biopsy from June 2022  showing findings of acute interstitial nephritis. Exact description not available.   Trial of steroids will be given to see if I can improve renal function.  Creatinine in October 2020 was 1.6, creatinine in June 2022 was 2.9.  Trial of steroids will be given to see if renal function can be improved.    Chronic kidney disease     Hyperlipidemia     Hypertension     Non-Hodgkin lymphoma (HCC)     Research study patient 02/13/2023    [id number] Completed 2/15/23    Sarcoidosis     Type II or unspecified type diabetes mellitus without mention of complication, not stated as uncontrolled       Allergies:   Allergies   Allergen Reactions    Latex Rash    Gabapentin      Other reaction(s): Vomiting    Meperidine Anaphylaxis    Erythromycin      Other reaction(s): Fever  Had all side effects associated with this medication      Glimepiride      Other reaction(s): Dizziness    Lidocaine Swelling     States as a child having reaction to lidocaine where his face swelled.   Ever since then he has never had lidocaine    Hydrocodone      Other reaction(s): Vomiting    Macrolides And Ketolides Other (See Comments)    Niacin And Related     Trelegy Ellipta [Fluticasone-Umeclidin-Vilant]     Pregabalin Diarrhea    Xanax [Alprazolam] Anxiety     Anxiety, restlessness, insomnia      Medications :  metoprolol tartrate, 12.5 mg, Oral, BID  cefTRIAXone (ROCEPHIN) IV, 1,000 mg, IntraVENous, Q24H  aspirin, 650 mg, Oral, TID  pantoprazole, 40 mg, Oral, QAM AC  colchicine, 0.3 mg, Oral, Daily  sodium chloride flush, 5-40 mL, IntraVENous, 2 times per day  mupirocin, , Nasal, BID  chlorhexidine, , Topical, See Admin Instructions  sodium chloride flush, 5-40 mL, IntraVENous, 2 times per day  polyethylene glycol, 17 g, Oral, Daily  sodium chloride flush, 5-40 mL, IntraVENous, 2 times per day  insulin lispro, 0-4 Units, SubCUTAneous, TID WC  insulin lispro, 0-4 Units, SubCUTAneous, Nightly  furosemide, 20 mg, IntraVENous, BID      Review of Systems   Review of Systems   Constitutional:  Positive for activity change, appetite change and fatigue. Negative for chills, fever and unexpected  weight change. HENT:  Negative for congestion, mouth sores, postnasal drip, sinus pressure, sore throat and voice change. Eyes:  Negative for visual disturbance. Respiratory:  Positive for shortness of breath. Negative for cough and wheezing. Cardiovascular:  Negative for chest pain and palpitations. Gastrointestinal:  Negative for abdominal pain, blood in stool, constipation, diarrhea, nausea and vomiting. Endocrine: Negative for polyuria. Genitourinary:  Negative for difficulty urinating, dysuria, frequency and urgency. Musculoskeletal:  Negative for arthralgias, joint swelling and myalgias. Neurological:  Negative for dizziness, tremors, speech difficulty, light-headedness and headaches. Psychiatric/Behavioral:  Negative for confusion.     Objective :      Current Vitals : Temp: 99.1 °F (37.3 °C),  Heart Rate: (!) 106, Resp: 21, BP: 103/63, SpO2: 100 %  Last 24 Hrs Vitals   Patient Vitals for the past 24 hrs:   BP Temp Temp src Pulse Resp SpO2 Weight   02/25/23 0600 103/63 -- -- (!) 106 21 100 % --   02/25/23 0500 116/66 -- -- (!) 110 25 100 % --   02/25/23 0400 97/60 99.1 °F (37.3 °C) Oral (!) 111 19 99 % --   02/25/23 0300 113/65 -- -- (!) 115 19 100 % --   02/25/23 0200 106/63 -- -- (!) 111 23 97 % --   02/25/23 0100 108/63 -- -- (!) 114 19 98 % --   02/25/23 0015 107/63 -- -- (!) 118 24 97 % --   02/25/23 0000 104/69 99 °F (37.2 °C) Oral (!) 117 19 97 % --   02/24/23 2345 109/64 -- -- (!) 119 30 98 % --   02/24/23 2300 117/61 -- -- (!) 126 26 97 % --   02/24/23 2215 123/73 -- -- (!) 121 24 97 % --   02/24/23 2200 121/69 98.8 °F (37.1 °C) Oral (!) 110 (!) 33 97 % --   02/24/23 2143 112/66 -- -- (!) 114 30 97 % --   02/24/23 2117 110/76 -- -- (!) 110 24 94 % --   02/24/23 2100 120/68 -- -- (!) 120 30 96 % --   02/24/23 2030 118/71 -- -- (!) 110 30 97 % --   02/24/23 2000 124/75 98.7 °F (37.1 °C) Oral (!) 133 (!) 37 97 % --   02/24/23 1900 122/75 -- -- (!) 124 -- -- --   02/24/23 1800 112/72 98.8 °F (37.1 °C) Oral (!) 123 -- -- --   02/24/23 1700 116/72 -- -- (!) 121 -- -- --   02/24/23 1500 114/64 -- -- (!) 114 -- -- --   02/24/23 1400 102/63 -- -- (!) 111 -- -- --   02/24/23 1300 100/76 -- -- (!) 128 -- -- --   02/24/23 1200 95/71 -- -- (!) 124 -- -- --   02/24/23 1119 -- 97.8 °F (36.6 °C) Oral -- -- -- --   02/24/23 1030 -- -- -- -- -- -- 224 lb 13.9 oz (102 kg)   02/24/23 1029 91/68 -- -- (!) 119 -- 94 % --   02/24/23 0900 99/68 -- -- -- -- -- --   02/24/23 0800 94/68 -- -- (!) 113 -- -- --       Intake / output   02/23 1901 - 02/25 0700  In: 4066.2 [P.O.:750; I.V.:1378.3]  Out: 6561 [Urine:1965]  Physical Exam:  Physical Exam  Vitals and nursing note reviewed. Constitutional:       General: He is not in acute distress. Appearance: He is not diaphoretic. Interventions: Nasal cannula in place. HENT:      Head: Normocephalic and atraumatic. Nose:      Right Sinus: No maxillary sinus tenderness or frontal sinus tenderness. Left Sinus: No maxillary sinus tenderness or frontal sinus tenderness. Mouth/Throat:      Pharynx: No oropharyngeal exudate. Eyes:      General: No scleral icterus. Conjunctiva/sclera: Conjunctivae normal.      Pupils: Pupils are equal, round, and reactive to light. Neck:      Thyroid: No thyromegaly. Vascular: No JVD. Comments: Left subclavian central line in place. Cardiovascular:      Rate and Rhythm: Tachycardia present. Rhythm irregular. Pulses:           Dorsalis pedis pulses are 2+ on the right side and 2+ on the left side. Heart sounds: Normal heart sounds. No murmur heard. Pulmonary:      Effort: Pulmonary effort is normal.      Breath sounds: Normal breath sounds. No wheezing or rales. Chest:      Comments: Left chest tube in place. Abdominal:      Palpations: Abdomen is soft. There is no mass. Tenderness: There is no abdominal tenderness.    Musculoskeletal:      Cervical back: Full passive range of motion without pain and neck supple. Lymphadenopathy:      Head:      Right side of head: No submandibular adenopathy. Left side of head: No submandibular adenopathy. Cervical: No cervical adenopathy. Skin:     General: Skin is warm. Neurological:      Mental Status: He is alert and oriented to person, place, and time. Motor: No tremor. Psychiatric:         Behavior: Behavior is cooperative. Laboratory findings:    Recent Labs     02/22/23  1157 02/23/23  0600 02/23/23  2308 02/24/23  0605 02/24/23  0928   WBC  --    < > 28.8* 26.0* 25.0*   HGB  --    < > 8.8* 9.2* 9.2*   HCT  --    < > 28.5* 30.4* 29.2*   PLT  --    < > 405 433 394   INR 1.1  --   --   --   --     < > = values in this interval not displayed. Recent Labs     02/23/23  2308 02/24/23  0605 02/24/23  0928   * 132* 132*   K 5.2 4.9 5.1   CL 99 100 99   CO2 22 21 20   GLUCOSE 102* 85 87   BUN 51* 50* 52*   CREATININE 3.30* 3.48* 3.19*   CALCIUM 8.7 8.8 9.1       Recent Labs     02/22/23  1157   LABA1C 8.0*          Specific Gravity, UA   Date Value Ref Range Status   02/22/2023 1.020 1.005 - 1.030 Final     Protein, UA   Date Value Ref Range Status   02/22/2023 1+ (A) NEGATIVE Final     RBC, UA   Date Value Ref Range Status   02/22/2023 None 0 - 2 /HPF Final     Bacteria, UA   Date Value Ref Range Status   02/22/2023 MODERATE (A) None Final     Nitrite, Urine   Date Value Ref Range Status   02/22/2023 NEGATIVE NEGATIVE Final     WBC, UA   Date Value Ref Range Status   02/22/2023 2 TO 5 0 - 5 /HPF Final     Leukocyte Esterase, Urine   Date Value Ref Range Status   02/22/2023 NEGATIVE NEGATIVE Final       Imaging / Clinical Data :-   CT CHEST WO CONTRAST    Result Date: 2/17/2023  Small to moderate-sized multiloculated left pleural effusions have improved. New small foci of air within the collection is presumably related to the introduction of the left thoracostomy tube.  Stable small to moderate sized mediastinal lymph nodes are nonspecific but may relate to the patient's history of lymphoma. Additional small bilateral axial lymph nodes are identified. Small hiatal hernia. CT CHEST WO CONTRAST    Result Date: 2/13/2023  1. Moderate to large amount of multiloculated left pleural fluid. Assessment for pleural thickening and/or pleural nodularity is limited without IV contrast. 2. Consolidation in the left lower lobe, likely atelectasis, but possibility of superimposed pneumonia would be difficult to exclude. 3. Mediastinal and bilateral axillary lymphadenopathy, which may be related to the patient's history of lymphoma. If available, comparison to any recent imaging may be of use to assess for progression. XR CHEST PORTABLE    Result Date: 2/18/2023  Moderate left pleural effusion increased. No change left chest tube/pigtail catheter. XR CHEST PORTABLE    Result Date: 2/17/2023  Similar findings when rotation taken into account; left chest tube with unchanged or slightly increased loculated appearing left pleural effusion; atelectatic appearing changes. No pneumothorax. XR CHEST PORTABLE    Result Date: 2/14/2023  Interval placement of a left chest tube with reduced left pleural fluid. XR CHEST PORTABLE    Result Date: 2/13/2023  No significant interval change. Redemonstration of opacification of the mid to lower left lung field which may in part be due to loculated left pleural effusion. Consider further evaluation with CT. XR CHEST PORTABLE    Result Date: 2/12/2023  Probable left-sided pneumonia with loculated pleural effusion. Underlying pathology not excluded, as above. Mild cardiomegaly and venous hypertension. Discoid atelectasis right base.  RECOMMENDATION: Consider CT chest.     IR CHEST TUBE INSERTION    Result Date: 2/14/2023  Successful ultrasound guided placement of a left 10 Japanese chest tube        Clinical Course : unchanged  Assessment and Plan  :        Left empyema - Pleural fluid growing strep viridans gordonii -not much improvement with tPA in chest tube. -underwent decortication on 2/23/2023. Continue Rocephin. CT surgery following. New onset Afib RVR - continue amiodarone infusion, Cardiology following. Anticoagulation when okay with cardiovascular surgery. Pericarditis - Preserved ejection fraction, no pericardial effusion. On colchicine and aspirin. Acute respiratory failure with hypoxia - continue O2 supplement. H/o non-Hodgkin's lymphoma and multiple myeloma  H/o sarcoidosis -   Acute kidney injury with CKD stage III -kidney function worsening. Baseline creatinine 1.9  -high risk of renal replacement. Hyperkalemia due to decreased renal clearance- Kayexalate. Demand ischemia from acute respiratory failure  Reactive thrombocytosis. Obstructive sleep apnea -CPAP at nighttime. COPD - albuterol as needed   Acute Delirium -resolved. Continue  Seroquel dose at night     No family at bedside. Continue to monitor vitals , Intake / output ,  Cell count , HGB , Kidney function, oxygenation  as indicated . Plan and updates discussed with patient ,  answers  explained to satisfaction.    Plan discussed with Staff Amanda BROWNLEE     (Please note that portions of this note were completed with a voice recognition program. Efforts were made to edit the dictations but occasionally words are mis-transcribed.)      Rambo Adams MD  2/25/2023

## 2023-02-25 NOTE — PROGRESS NOTES
[facility] Cardiothoracic Surgical   Progress Note    2/25/2023 2:57 PM  Surgeon:  Crow Cervantes          POD# 1    S/P :  thoracomy decorticaion    Subjective:  Mr. Olsen Been no complaints    Vital Signs: /73   Pulse (!) 108   Temp 97.7 °F (36.5 °C) (Axillary)   Resp 24   Ht 6' 1\" (1.854 m)   Wt 224 lb 13.9 oz (102 kg)   SpO2 93%   BMI 29.67 kg/m²  O2 Flow Rate (L/min): 1 L/min   Admit Weight: Weight: 231 lb 7.7 oz (105 kg)       Labs and Studies:  CBC:   Recent Labs     02/23/23  2308 02/24/23  0605 02/24/23  0928   WBC 28.8* 26.0* 25.0*   HGB 8.8* 9.2* 9.2*   HCT 28.5* 30.4* 29.2*   MCV 91.1 92.4 89.6    433 394     BMP:   Recent Labs     02/24/23  0605 02/24/23  0928 02/25/23  1002   * 132* 133*   K 4.9 5.1 5.0    99 100   CO2 21 20 19*   BUN 50* 52* 53*   CREATININE 3.48* 3.19* 4.38*     PT/INR: No results for input(s): PROTIME, INR in the last 72 hours. APTT:   Recent Labs     02/24/23  0605   APTT 26.1     I/O:  I/O last 3 completed shifts: In: 4066.2 [P.O.:750;  I.V.:1378.3; IV Piggyback:1938]  Out: 6402 [Urine:1965; Chest Tube:590]      Scheduled Meds:    metoprolol tartrate  12.5 mg Oral BID    docusate sodium  100 mg Oral TID    senna  1 tablet Oral Nightly    midodrine  5 mg Oral TID WC    furosemide  20 mg IntraVENous BID    cefTRIAXone (ROCEPHIN) IV  1,000 mg IntraVENous Q24H    aspirin  650 mg Oral TID    pantoprazole  40 mg Oral QAM AC    colchicine  0.3 mg Oral Daily    sodium chloride flush  5-40 mL IntraVENous 2 times per day    mupirocin   Nasal BID    chlorhexidine   Topical See Admin Instructions    sodium chloride flush  5-40 mL IntraVENous 2 times per day    polyethylene glycol  17 g Oral Daily    sodium chloride flush  5-40 mL IntraVENous 2 times per day    insulin lispro  0-4 Units SubCUTAneous TID     insulin lispro  0-4 Units SubCUTAneous Nightly     Continuous Infusions:    sodium chloride 30 mL/hr at 02/25/23 1211    amiodarone 0.5 mg/min (02/25/23 1200)    sodium chloride      sodium chloride      sodium chloride      dextrose       A/P:  POD # 1  Continue chest tubes to suction.  Manasgement per medicine    JOSEPH Bhakta

## 2023-02-25 NOTE — PROGRESS NOTES
[facility]  Progress Note    Lisa Briones  MRN: [id number]  Date: 2/25/2023  LOS: 15     Reason for F/U :   Empyema    Impression :   Left-sided empyema  Streptococci Gordonii on pleural fluid culture from 2/14/23  Repeat culture 2/18/23 with no bacterial growth   Leukocytosis  Acute kidney injury on chronic kidney disease  Atrial fibrillation on amiodarone drip  Hx non-Hodgkin's lymphoma  Hx multiple myeloma  Sarcoidosis  DM II  Interstitial nephritis  COPD  Hx partial right lung lobectomy  Allergies to macrolides and ketolides. Recommendations: The patient continues on intravenous antimicrobial therapy with Rocephin and is tolerating it well  The patient continues on amiodarone drip for the atrial fibrillation and plans are for potential AFIA cardioversion on Monday  The patient continues with chest tube drainage and does continue to have good output    Infection Control Recommendations:   Universal precautions    Discharge Planning:   Estimated Length of IV antimicrobials: To be determined  Patient will need Midline Catheter Insertion/ PICC line Insertion: No  [facility]  [facility],  LTAC: Undetermined  Patient willneed outpatient wound care: No    Medical Decision making / Summary of Stay:   Lisa Briones is a 79y.o.-year-old male who was initially admitted on 2/12/2023. Patient seen at the request of Dr. Wilma Pablo:     This patient, with a history of COPD, CKD, sarcoidosis, partial right lung lobectomy, multiple myeloma, non-hodgkin's lymphoma, acute interstitial nephritis and DM II, initially presented to [facility] ED on 2/12/23 with complaints of worsening shortness of breath over the previous week with a non-productive cough. He has has allergies to macrolides and ketolides. He was experiencing increased work of breathing upon evaluation, initially requiring CPAP. ABG values were grossly unremarkable.      He denied any recent history of chemotherapy, fever, chills, N/V/D, abnormal bleeding, weight-loss or night sweats. Chest x-ray showed probable left-sided pneumonia with loculated pleural effusion which was confirmed via CT chest, which revealed a moderate to large amount of multiloculated left pleural fluid in the left lung. Mediastinal and bilateral axillary lymphadenopathy was also noted. He was transferred to [facility] for a higher level of care and was initiated on broad spectrum antibiotics with IV cefepime and vancomycin. Pulmonology was consulted and a thoracentesis was attempted, but was unable to be completed as no clear pocket was visualized via [address]. IR was consulted and a 10 [place], left-sided chest tube was placed on 2/14/23.  10 ml of purulent fluid was initially drained and sent for culture. TPA and dornase were administered with improvement in output. MRSA Nares was not detected and the pleural fluid culture grew PCN sensitive Streptococcus Gordonii. Vancomycin was discontinued on 2/14/23 and cefepime continued. A repeat CT of the patient's chest  on 2/17/23 revealed some improvement in the multiloculated left pleural effusions. A larger bore chest tube exchange was completed on 2/18/23 in IR. A repeat pleural fluid culture has not had any bacterial growth to date. Chest tube output to date:   2/14 - 800 cc  2/15- 600 cc  2/16 - 700 cc  2/17 - 900 cc  2/18- 200 cc  2/19 - 910 cc  2/20 - 275 cc     CT sugery was consulted for possible decortication, but they have no plans for decortication at this time. Abnormal labs at the time of consult include:   Na:133  Cr:2.24  WBC:32.4-->19.0  Hgb:10.0     There is no growth on urine or blood cultures.     Patient with Hx of non-Hodgkin's lymphoma and multiple myeloma  Admitted with Shortness of breath  Found to have Left-sided empyema  Streptococci Gordonii on pleural fluid culture from 2/14/23  Repeat culture 2/18/23 with no bacterial growth   Empyema partially drained by chest tube. Decortication planned 23  Confusion with associated hyponatremia  KELSEA on CKD 3  On treatment with ceftriaxone. Current evaluation:2023    /68   Pulse 98   Temp 98.6 °F (37 °C) (Oral)   Resp 23   Ht 6' 1\" (1.854 m)   Wt 224 lb 13.9 oz (102 kg)   SpO2 94%   BMI 29.67 kg/m²     Temperature Range: Temp: 98.6 °F (37 °C) Temp  Av.3 °F (36.8 °C)  Min: 97.3 °F (36.3 °C)  Max: 99.1 °F (37.3 °C)  The patient is seen and evaluated at bedside he is awake and alert in no acute distress. He does continue to report some pain. The patient continues to have good output from the left-sided chest tubes. Review of Systems   Constitutional: Negative. HENT: Negative. Respiratory: Negative. Cardiovascular: Negative. Gastrointestinal: Negative. Genitourinary: Negative. Musculoskeletal: Negative. Neurological: Negative. Psychiatric/Behavioral: Negative. Physical Examination :     Physical Exam  Constitutional:       Appearance: He is well-developed. HENT:      Head: Normocephalic and atraumatic. Cardiovascular:      Rate and Rhythm: Normal rate. Heart sounds: Normal heart sounds. No friction rub. No gallop. Pulmonary:      Effort: Pulmonary effort is normal.      Breath sounds: Normal breath sounds. No wheezing. Comments: Left-sided chest tubes in place  Abdominal:      General: Bowel sounds are normal.      Palpations: Abdomen is soft. There is no mass. Tenderness: There is no abdominal tenderness. Musculoskeletal:         General: Normal range of motion. Cervical back: Normal range of motion and neck supple. Lymphadenopathy:      Cervical: No cervical adenopathy. Skin:     General: Skin is warm and dry. Neurological:      Mental Status: He is alert and oriented to person, place, and time.        Laboratory data:   I have independently reviewed the followinglabs:  CBC with Differential:   Recent Labs     02/23/23  0600 02/23/23  2308 02/24/23  0605 02/24/23  0928   WBC 24.1*   < > 26.0* 25.0*   HGB 11.2*   < > 9.2* 9.2*   HCT 36.6*   < > 30.4* 29.2*      < > 433 394   LYMPHOPCT 38  --  24  --    MONOPCT 12*  --  6  --     < > = values in this interval not displayed. BMP:   Recent Labs     02/24/23  0928 02/25/23  1002   * 133*   K 5.1 5.0   CL 99 100   CO2 20 19*   BUN 52* 53*   CREATININE 3.19* 4.38*     Hepatic Function Panel: No results for input(s): PROT, LABALBU, BILIDIR, IBILI, BILITOT, ALKPHOS, ALT, AST in the last 72 hours. Lab Results   Component Value Date/Time    PROCAL 0.94 02/17/2023 09:15 AM     No results found for: CRP  Lab Results   Component Value Date    SEDRATE 114 (H) 02/13/2023         Lab Results   Component Value Date/Time    DDIMER 3.88 02/12/2023 12:30 PM     No results found for: FERRITIN  Lab Results   Component Value Date/Time     02/13/2023 01:36 PM     02/13/2023 03:07 AM     No results found for: FIBRINOGEN    Results in Past 30 Days  Result Component Current Result Ref Range Previous Result Ref Range   SARS-CoV-2, PCR Not Detected (2/13/2023) Not Detected Not Detected (2/12/2023) Not Detected     Lab Results   Component Value Date/Time    COVID19 Not Detected 02/13/2023 01:51 AM    COVID19 Not Detected 02/12/2023 01:23 PM       No results for input(s): VANCOTROUGH in the last 72 hours. Imaging Studies:   ONE XRAY VIEW OF THE CHEST 2/25/2023 6:31 am  Impression   No significant change from prior exam.     Cultures:     Culture, Virus, Non Respiratory [id number] Collected: 02/23/23 1200   Order Status: Completed Specimen: Tissue from Lung Updated: 02/25/23 2874    Specimen Description . LUNG    Special Requests LEFT PLEURAL PEEL    Culture NEGATIVE. No Herpes Simplex Virus detected by Enzyme Linked Virus Inducible System culture. at day 2     NEGATIVE for Influenza A and B, Para influenza 1,2,3, Adenovirus and RSV. at day 2     Negative results do not preclude respiratory virus infection and should not be used as the sole basis for diagnosis, treatment or other management decisions. NEGATIVE for Cytomegalovirus at day 1   Culture, Anaerobic and Aerobic [id number] (Abnormal) Collected: 02/23/23 1133   Order Status: Completed Specimen: Body Fluid Updated: 02/25/23 0816    Specimen Description . PLEURAL FLUID LEFT    Direct Exam MANY NEUTROPHILS Abnormal      NO BACTERIA SEEN    Culture NO GROWTH 2 DAYS   Culture, Tissue [id number] Collected: 02/23/23 1200   Order Status: Completed Specimen: Tissue from Lung Updated: 02/25/23 0813    Specimen Description . LUNG    Special Requests  LEFT PLEURAL PEEL    Direct Exam NO NEUTROPHILS SEEN     NO BACTERIA SEEN    Culture NO GROWTH 2 DAYS   Culture with Smear, Acid Fast Robyn Lara [id number] Collected: 02/23/23 1133   Order Status: Completed Specimen: Body Fluid Updated: 02/24/23 0715    Specimen Description . PLEURAL FLUID LEFT    Direct Exam NO ACID FAST BACILLI SEEN (CONCENTRATED SMEAR)    Culture PENDING   Culture with Smear, Acid Fast Robyn Lara [id number] Collected: 02/23/23 1200   Order Status: Completed Specimen: Tissue from Lung Updated: 02/24/23 0713    Specimen Description . LUNG    Special Requests LEFT PLURAL PEEL    Direct Exam NO ACID FAST BACILLI SEEN (CONCENTRATED SMEAR)    Culture PENDING   Fungal stain [id number] Collected: 02/23/23 1200   Order Status: Completed Specimen: Tissue from Lung Updated: 02/24/23 0704    Specimen Description . LUNG    Special Requests LEFT PLEURAL PEEL    Direct Exam NO FUNGAL ELEMENTS SEEN   Fungal stain [id number] Collected: 02/23/23 1133   Order Status: Completed Specimen: Body Fluid Updated: 02/24/23 0703    Specimen Description . PLEURAL FLUID LEFT    Direct Exam NO FUNGAL ELEMENTS SEEN   Culture, Fungus [id number] Collected: 02/23/23 1200   Order Status: Completed Specimen: Tissue from Lung Updated: 02/23/23 1631    Specimen Description .LUNG    Special Requests LEFT PLEURAL PEEL    Culture PENDING   Culture, Fungus [id number] Collected: 02/23/23 1133   Order Status: Sent Specimen: Body Fluid Updated: 02/23/23 1631   Urine Culture [id number] Collected: 02/22/23 1316   Order Status: Completed Specimen: Urine, clean catch Updated: 02/23/23 1402    Specimen Description . CLEAN CATCH URINE    Culture NO GROWTH   MRSA DNA Probe, Nasal [id number] Collected: 02/22/23 1318   Order Status: Completed Specimen: Nasal Updated: 02/23/23 1246    Specimen Description . NASAL SWAB    MRSA, DNA, Nasal NEGATIVE    Comment: NEGATIVE:  MRSA DNA not detected by nucleic acid amplification. Results should be used as an adjunct to nosocomial control efforts to identify patients   needing enhanced precautions. The test is not intended to identify patients with staphylococcal infections. Results   should not be used to guide or monitor treatment for MRSA infections. Culture, Body Fluid [id number] Collected: 02/23/23 1133   Order Status: Completed Specimen: Body Fluid Updated: 02/23/23 1212    Specimen Description . PLEURAL FLUID LEFT    Culture DUPLICATE ORDER SEE ANAEROBIC, AEROBIC CULTURE   Culture, Anaerobic and Aerobic [id number] Collected: 02/23/23 1200   Order Status: Canceled Specimen: Tissue from Lung    Culture, Anaerobic and Aerobic [id number] (Abnormal) Collected: 02/18/23 1810   Order Status: Completed Specimen: Chest Updated: 02/23/23 0757    Specimen Description . CHEST    Direct Exam MANY NEUTROPHILS Abnormal      NO ORGANISMS SEEN    Culture NO GROWTH 5 DAYS   MRSA Screening Culture Only [id number] Collected: 02/22/23 1316   Order Status: Completed Specimen: Nares Updated: 02/22/23 1321    Specimen Description . NARES    Culture WRONG TEST ORDERED     DUE TO THE SPECIMEN TYPE, THE ORDER WAS CANCELED AND REORDERED. PLEASE REFER TO: MOLECULAR NASAL TEST.    Culture, Blood 7 [id number] Collected: 02/17/23 7376   Order Status: Completed Specimen: Blood Updated: 02/22/23 0945    Specimen Description . BLOOD    Culture NO GROWTH 5 DAYS   Culture, Blood 6 [id number] Collected: 02/17/23 0915   Order Status: Completed Specimen: Blood Updated: 02/22/23 0945    Specimen Description . BLOOD    Culture NO GROWTH 5 DAYS   Infectious Disease Intervention [id number] Collected: 02/20/23 1430   Order Status: Completed Updated: 02/21/23 1408    Intervention De-escalation   Culture, Urine [id number] Collected: 02/17/23 0615   Order Status: Completed Specimen: Urine Updated: 02/18/23 1354    Specimen Description . URINE    Culture NO GROWTH   Culture, Respiratory [id number]    Order Status: Canceled Specimen: Sputum Expectorated    Culture, Body Fluid [id number] (Abnormal)  Collected: 02/13/23 1637   Order Status: Completed Specimen: Pleural Fluid Updated: 02/16/23 0749    Specimen Description . PLEURAL FLUID    Direct Exam MANY NEUTROPHILS Abnormal      MANY GRAM POSITIVE COCCI IN PAIRS Abnormal      Gram stain made from cytocentrifuged specimen. Organisms and cells will be concentrated. (NOTE) Direct Gram Stain result called to and read back by: TORRIE CROSS AT 1818 ON 2.13.2023    Culture POSITIVE Fluid Culture     DIRECT GRAM STAIN FROM BOTTLE: GRAM POSITIVE COCCI IN PAIRS AND IN CHAINS     VIRIDANS STREPTOCOCCUS GROUP FURTHER IDENTIFIED AS STREPTOCOCCUS GORDONII Abnormal    Respiratory Culture [id number] Collected: 02/13/23 1149   Order Status: Completed Specimen: Sputum Expectorated Updated: 02/15/23 1057    Specimen Description . EXPECTORATED SPUTUM    Direct Exam >25 NEUTROPHILS/LPF     < 10 EPITHELIAL CELLS/LPF     NO ORGANISMS SEEN    Culture NORMAL RESPIRATORY ROMEO LIGHT GROWTH   Sputum gram stain [id number] Collected: 02/13/23 1149   Order Status: Completed Specimen: Sputum Expectorated Updated: 02/13/23 1204    Specimen Description . EXPECTORATED SPUTUM    Direct Exam DUPLICATE ORDER GRAM STAIN INCLUDED WITH RESPIRATORY CULTURE   MRSA DNA Probe, Nasal [id number] Collected: 02/13/23 0152   Order Status: Completed Specimen: Nasal Updated: 02/13/23 1148    Specimen Description . NASAL SWAB    MRSA, DNA, Nasal NEGATIVE    Comment: NEGATIVE:  MRSA DNA not detected by nucleic acid amplification. Results should be used as an adjunct to nosocomial control efforts to identify patients   needing enhanced precautions. The test is not intended to identify patients with staphylococcal infections. Results   should not be used to guide or monitor treatment for MRSA infections. LEGIONELLA ANTIGEN, URINE [id number] Collected: 02/13/23 0216   Order Status: Completed Specimen: Urine Updated: 02/13/23 0756    Legionella Pneumophilia Ag, Urine NEGATIVE    Comment: L. pneumophila serogroup 1 antigen not detected. A negative result does not exclude infection with Leginella pnemophila serogroup 1 nor does   it rule out other microbial-caused respiratory infections of disease caused by other   serogroups of Legionella pneumophila. Strep Pneumoniae Antigen [id number] Collected: 02/13/23 0216   Order Status: Completed Specimen: Urine, clean catch Updated: 02/13/23 0748    Source . URINE    Strep pneumo Ag NEGATIVE    Comment: Strep pneumoniae antigen not detected      Respiratory Panel, Molecular, with COVID-19 (Restricted: peds pts or suitable admitted adults) [id number] Collected: 02/13/23 0151   Order Status: Completed Specimen: Nasopharyngeal Swab Updated: 02/13/23 0320    Specimen Description . NASOPHARYNGEAL SWAB    Adenovirus PCR Not Detected    Coronavirus 229E PCR Not Detected    Coronavirus HKU1 PCR Not Detected    Coronavirus NL63 PCR Not Detected    Coronavirus OC43 PCR Not Detected    SARS-CoV-2, PCR Not Detected    Human Metapneumovirus PCR Not Detected    Rhino/Enterovirus PCR Not Detected    Influenza A by PCR Not Detected    Influenza B by PCR Not Detected    Parainfluenza 1 PCR Not Detected    Parainfluenza 2 PCR Not Detected    Parainfluenza 3 PCR Not Detected    Parainfluenza 4 PCR Not Detected    Resp Syncytial Virus PCR Not Detected    Bordetella Parapertussis Not Detected    B Pertussis by PCR Not Detected    Chlamydia pneumoniae By PCR Not Detected    Mycoplasma pneumo by PCR Not Detected    Comment: Performed by multiplexed nucleic acid assay. Medications:      metoprolol tartrate  12.5 mg Oral BID    docusate sodium  100 mg Oral TID    senna  1 tablet Oral Nightly    midodrine  5 mg Oral TID WC    furosemide  20 mg IntraVENous BID    cefTRIAXone (ROCEPHIN) IV  1,000 mg IntraVENous Q24H    aspirin  650 mg Oral TID    pantoprazole  40 mg Oral QAM AC    colchicine  0.3 mg Oral Daily    sodium chloride flush  5-40 mL IntraVENous 2 times per day    mupirocin   Nasal BID    chlorhexidine   Topical See Admin Instructions    sodium chloride flush  5-40 mL IntraVENous 2 times per day    polyethylene glycol  17 g Oral Daily    sodium chloride flush  5-40 mL IntraVENous 2 times per day    insulin lispro  0-4 Units SubCUTAneous TID WC    insulin lispro  0-4 Units SubCUTAneous Nightly       Electronically signed by Cam Alan MD on 2/25/2023 at 6:43 PM      [facility]  Cam Alan MD  [employer]  OFFICE: [phone]    Thank you for allowing us to participate in the care of this patient. Please call with questions. This note is created with the assistance of a speech recognition program.  While intending to generate a document that actually reflects the content of the visit, the document can still have some errors including those of syntax and sound a like substitutions which may escape proof reading. In such instances, actual meaning can be extrapolated by contextual diversion.

## 2023-02-25 NOTE — PLAN OF CARE
Problem: Discharge Planning  Goal: Discharge to home or other facility with appropriate resources  Outcome: Progressing  Flowsheets (Taken 2/25/2023 0800)  Discharge to home or other facility with appropriate resources: Identify barriers to discharge with patient and caregiver     Problem: Safety - Adult  Goal: Free from fall injury  2/25/2023 0839 by Sean Betancur RN  Outcome: Progressing  2/24/2023 2224 by Carri Flores RN  Outcome: Not Progressing     Problem: ABCDS Injury Assessment  Goal: Absence of physical injury  2/25/2023 0839 by Sean Betancur RN  Outcome: Progressing  2/24/2023 2224 by Carri Flores RN  Outcome: Progressing     Problem: Skin/Tissue Integrity  Goal: Absence of new skin breakdown  Description: 1. Monitor for areas of redness and/or skin breakdown  2. Assess vascular access sites hourly  3. Every 4-6 hours minimum:  Change oxygen saturation probe site  4. Every 4-6 hours:  If on nasal continuous positive airway pressure, respiratory therapy assess nares and determine need for appliance change or resting period.   2/25/2023 0839 by Sean Betancur RN  Outcome: Progressing  2/24/2023 2224 by Carri Flores RN  Outcome: Progressing     Problem: Chronic Conditions and Co-morbidities  Goal: Patient's chronic conditions and co-morbidity symptoms are monitored and maintained or improved  Outcome: Progressing  Flowsheets (Taken 2/25/2023 0800)  Care Plan - Patient's Chronic Conditions and Co-Morbidity Symptoms are Monitored and Maintained or Improved:   Monitor and assess patient's chronic conditions and comorbid symptoms for stability, deterioration, or improvement   Collaborate with multidisciplinary team to address chronic and comorbid conditions and prevent exacerbation or deterioration   Update acute care plan with appropriate goals if chronic or comorbid symptoms are exacerbated and prevent overall improvement and discharge     Problem: Respiratory - Adult  Goal: Achieves optimal ventilation and oxygenation  Outcome: Progressing  Flowsheets (Taken 2/25/2023 0800)  Achieves optimal ventilation and oxygenation:   Assess for changes in respiratory status   Assess for changes in mentation and behavior   Position to facilitate oxygenation and minimize respiratory effort   Encourage broncho-pulmonary hygiene including cough, deep breathe, incentive spirometry   Assess the need for suctioning and aspirate as needed   Assess and instruct to report shortness of breath or any respiratory difficulty     Problem: Pain  Goal: Verbalizes/displays adequate comfort level or baseline comfort level  2/25/2023 0839 by Tomasa White RN  Outcome: Progressing  2/24/2023 2224 by Ish Matos RN  Outcome: Not Progressing     Problem: Neurosensory - Adult  Goal: Achieves stable or improved neurological status  Outcome: Progressing  Flowsheets (Taken 2/25/2023 0800)  Achieves stable or improved neurological status:   Assess for and report changes in neurological status   Monitor temperature, glucose, and sodium.  Initiate appropriate interventions as ordered   Maintain blood pressure and fluid volume within ordered parameters to optimize cerebral perfusion and minimize risk of hemorrhage  Goal: Absence of seizures  Outcome: Progressing  Goal: Remains free of injury related to seizures activity  Outcome: Progressing  Goal: Achieves maximal functionality and self care  Outcome: Progressing  Flowsheets (Taken 2/25/2023 0800)  Achieves maximal functionality and self care:   Monitor swallowing and airway patency with patient fatigue and changes in neurological status   Encourage and assist patient to increase activity and self care with guidance from physical therapy/occupational therapy   Encourage visually impaired, hearing impaired and aphasic patients to use assistive/communication devices     Problem: Cardiovascular - Adult  Goal: Maintains optimal cardiac output and hemodynamic stability  Outcome: Progressing  Flowsheets (Taken 2/25/2023 0800)  Maintains optimal cardiac output and hemodynamic stability:   Monitor blood pressure and heart rate   Monitor urine output and notify Licensed Independent Practitioner for values outside of normal range   Assess for signs of decreased cardiac output  Goal: Absence of cardiac dysrhythmias or at baseline  Outcome: Progressing  Flowsheets (Taken 2/25/2023 0800)  Absence of cardiac dysrhythmias or at baseline:   Monitor cardiac rate and rhythm   Assess for signs of decreased cardiac output   Administer antiarrhythmia medication and electrolyte replacement as ordered     Problem: Skin/Tissue Integrity - Adult  Goal: Skin integrity remains intact  Outcome: Progressing  Flowsheets (Taken 2/25/2023 0800)  Skin Integrity Remains Intact:   Monitor for areas of redness and/or skin breakdown   Assess vascular access sites hourly   Every 4-6 hours: If on nasal continuous positive airway pressure, respiratory therapy assesses nares and determine need for appliance change or resting period  Goal: Incisions, wounds, or drain sites healing without S/S of infection  Outcome: Progressing  Flowsheets (Taken 2/25/2023 0800)  Incisions, Wounds, or Drain Sites Healing Without Sign and Symptoms of Infection:   ADMISSION and DAILY: Assess and document risk factors for pressure ulcer development   TWICE DAILY: Assess and document skin integrity   TWICE DAILY: Assess and document dressing/incision, wound bed, drain sites and surrounding tissue   Initiate pressure ulcer prevention bundle as indicated  Goal: Oral mucous membranes remain intact  Outcome: Progressing  Flowsheets (Taken 2/25/2023 0800)  Oral Mucous Membranes Remain Intact:   Assess oral mucosa and hygiene practices   Implement preventative oral hygiene regimen     Problem: Musculoskeletal - Adult  Goal: Return mobility to safest level of function  Outcome: Progressing  Flowsheets (Taken 2/25/2023 0800)  Return Mobility to Safest Level of Function:   Assess patient stability and activity tolerance for standing, transferring and ambulating with or without assistive devices   Assist with transfers and ambulation using safe patient handling equipment as needed   Ensure adequate protection for wounds/incisions during mobilization   Obtain physical therapy/occupational therapy consults as needed   Instruct patient/family in ordered activity level  Goal: Maintain proper alignment of affected body part  Outcome: Progressing  Goal: Return ADL status to a safe level of function  Outcome: Progressing  Flowsheets (Taken 2/25/2023 0800)  Return ADL Status to a Safe Level of Function:   Administer medication as ordered   Assess activities of daily living deficits and provide assistive devices as needed   Assist and instruct patient to increase activity and self care as tolerated     Problem: Gastrointestinal - Adult  Goal: Minimal or absence of nausea and vomiting  Outcome: Progressing  Flowsheets (Taken 2/25/2023 0800)  Minimal or absence of nausea and vomiting: Administer IV fluids as ordered to ensure adequate hydration  Goal: Maintains or returns to baseline bowel function  Outcome: Progressing  Flowsheets (Taken 2/25/2023 0800)  Maintains or returns to baseline bowel function:   Assess bowel function   Encourage oral fluids to ensure adequate hydration   Administer IV fluids as ordered to ensure adequate hydration   Administer ordered medications as needed   Encourage mobilization and activity  Goal: Maintains adequate nutritional intake  Outcome: Progressing  Flowsheets (Taken 2/25/2023 0800)  Maintains adequate nutritional intake:   Monitor percentage of each meal consumed   Identify factors contributing to decreased intake, treat as appropriate   Assist with meals as needed   Monitor intake and output, weight and lab values  Goal: Establish and maintain optimal ostomy function  Outcome: Progressing  Flowsheets (Taken 2/25/2023 0800)  Establish and maintain optimal ostomy function:   Administer IV fluids and TPN as ordered   Infuse IV Fluids/TPN as ordered     Problem: Genitourinary - Adult  Goal: Absence of urinary retention  Outcome: Progressing  Goal: Urinary catheter remains patent  Outcome: Progressing  Flowsheets (Taken 2/25/2023 0800)  Urinary catheter remains patent: Assess patency of urinary catheter     Problem: Infection - Adult  Goal: Absence of infection at discharge  Outcome: Progressing  Flowsheets (Taken 2/25/2023 0800)  Absence of infection at discharge:   Assess and monitor for signs and symptoms of infection   Monitor lab/diagnostic results   Monitor all insertion sites i.e., indwelling lines, tubes and drains   Administer medications as ordered   Instruct and encourage patient and family to use good hand hygiene technique  Goal: Absence of infection during hospitalization  Outcome: Progressing  Flowsheets (Taken 2/25/2023 0800)  Absence of infection during hospitalization:   Assess and monitor for signs and symptoms of infection   Monitor lab/diagnostic results   Monitor all insertion sites i.e., indwelling lines, tubes and drains   Administer medications as ordered   Instruct and encourage patient and family to use good hand hygiene technique  Goal: Absence of fever/infection during anticipated neutropenic period  Outcome: Progressing     Problem: Metabolic/Fluid and Electrolytes - Adult  Goal: Electrolytes maintained within normal limits  Outcome: Progressing  Flowsheets (Taken 2/25/2023 0800)  Electrolytes maintained within normal limits: Monitor labs and assess patient for signs and symptoms of electrolyte imbalances  Goal: Hemodynamic stability and optimal renal function maintained  Outcome: Progressing  Flowsheets (Taken 2/25/2023 0800)  Hemodynamic stability and optimal renal function maintained:   Monitor labs and assess for signs and symptoms of volume excess or deficit   Monitor intake, output and patient weight   Monitor response to interventions for patient's volume status, including labs, urine output, blood pressure (other measures as available)   Encourage oral intake as appropriate  Goal: Glucose maintained within prescribed range  Outcome: Progressing  Flowsheets (Taken 2/25/2023 0800)  Glucose maintained within prescribed range:   Monitor blood glucose as ordered   Assess for signs and symptoms of hyperglycemia and hypoglycemia   Administer ordered medications to maintain glucose within target range   Assess barriers to adequate nutritional intake and initiate nutrition consult as needed     Problem: Hematologic - Adult  Goal: Maintains hematologic stability  Outcome: Progressing  Flowsheets (Taken 2/25/2023 0800)  Maintains hematologic stability: Assess for signs and symptoms of bleeding or hemorrhage     Problem: Nutrition Deficit:  Goal: Optimize nutritional status  Outcome: Progressing     Problem: Coping  Goal: Pt/Family able to verbalize concerns and demonstrate effective coping strategies  Description: INTERVENTIONS:  1. Assist patient/family to identify coping skills, available support systems and cultural and spiritual values  2. Provide emotional support, including active listening and acknowledgement of concerns of patient and caregivers  3. Reduce environmental stimuli, as able  4. Instruct patient/family in relaxation techniques, as appropriate  5. Assess for spiritual pain/suffering and initiate Spiritual Care, Psychosocial Clinical Specialist consults as needed  Outcome: Progressing     Problem: Confusion  Goal: Confusion, delirium, dementia, or psychosis is improved or at baseline  Description: INTERVENTIONS:  1. Assess for possible contributors to thought disturbance, including medications, impaired vision or hearing, underlying metabolic abnormalities, dehydration, psychiatric diagnoses, and notify attending LIP  2. [place] high risk fall precautions, as indicated  3.  Provide frequent short contacts to provide reality reorientation, refocusing and direction  4. Decrease environmental stimuli, including noise as appropriate  5. Monitor and intervene to maintain adequate nutrition, hydration, elimination, sleep and activity  6. If unable to ensure safety without constant attention obtain sitter and review sitter guidelines with assigned personnel  7.  Initiate Psychosocial CNS and Spiritual Care consult, as indicated  Outcome: Progressing     Problem: Safety - Adult  Goal: Free from fall injury  2/25/2023 0839 by Sima Stevenson RN  Outcome: Progressing  2/24/2023 2224 by Vanessa Venegas RN  Outcome: Not Progressing     Problem: Pain  Goal: Verbalizes/displays adequate comfort level or baseline comfort level  2/25/2023 0839 by Sima Stevenson RN  Outcome: Progressing  2/24/2023 2224 by Vanessa Venegas RN  Outcome: Not Progressing

## 2023-02-25 NOTE — PROGRESS NOTES
Renal Progress Note    Patient :  Jeevan Bennett; 79 y.o. MRN# [id number]  Location:  1007/1007-01  Attending:  Ángela Gonzalez MD  Admit Date:  2/12/2023   Hospital Day: 13      Subjective: Following for KELSEA on CKD secondary to diabetic nephrosclerosis, with baseline 1.9-2.4, s/p VATS, POD #2, for left pleural effusion  Patient is on amiodarone drip for persistent atrial fibrillation. Nephrology re-consulted yesterday after creatinine peaked to 3.48. He did have tachycardia with atrial fibrillation post op, but no significant hypotension. Currently sitting up in chair. Has a lot of pain at chest tube site. No crepitus. He is on nasal canula. Alert and oriented. On IV amiodarone. Remains in atrial fibrillation with elevated heart rates. Blood pressure low 204 systolic. Not on Midodrine  Urine output 1390 last 24 hours. He is negative 9.6 liters since admission. He is on Lasix 20mg IV BID, along with Normal saline at 75/hr. Labs this morning show worsening renal function with creatinine now 4.38, increased from 3.19, potassium 5.0  Appetite poor. CXR this morning without pulmonary congestion  He is on aspirin 650 TID, along with colchicine renal dose for pericarditis. Outpatient Medications:     Medications Prior to Admission: amLODIPine (NORVASC) 5 MG tablet, TAKE ONE TABLET BY MOUTH DAILY (Patient taking differently: nightly)  omeprazole (PRILOSEC) 20 MG delayed release capsule, Take 20 mg by mouth nightly  fluticasone-salmeterol (ADVAIR DISKUS) 250-50 MCG/ACT AEPB diskus inhaler, Inhale 1 puff into the lungs every 12 hours  metoprolol succinate (TOPROL XL) 25 MG extended release tablet, Take 25 mg by mouth at bedtime  dutasteride (AVODART) 0.5 mg capsule, Take 0.5 mg by mouth daily  finasteride (PROSCAR) 5 MG tablet, Take 5 mg by mouth nightly  Misc.  Devices (NASAL SPRAY BOTTLE) MISC, by Does not apply route  CINNAMON PO, Take by mouth  repaglinide (PRANDIN) 1 MG tablet, Take 1 mg by mouth with breakfast and with evening meal  pimecrolimus (ELIDEL) 1 % cream, Apply topically as needed Apply topically 2 times daily. albuterol sulfate  (90 BASE) MCG/ACT inhaler, Inhale 1 puff into the lungs in the morning and at bedtime  Liraglutide (VICTOZA SC), Inject 1.8 mg into the skin daily   fenofibrate (TRICOR) 145 MG tablet, Take 145 mg by mouth nightly  dicyclomine (BENTYL) 10 MG capsule, Take 10 mg by mouth 3 times daily (with meals)  atorvastatin (LIPITOR) 80 MG tablet, Take 80 mg by mouth daily. DULoxetine (CYMBALTA) 60 MG capsule, Take 120 mg by mouth nightly    Current Medications:     Scheduled Meds:    metoprolol tartrate  12.5 mg Oral BID    docusate sodium  100 mg Oral TID    senna  1 tablet Oral Nightly    midodrine  5 mg Oral TID WC    cefTRIAXone (ROCEPHIN) IV  1,000 mg IntraVENous Q24H    aspirin  650 mg Oral TID    pantoprazole  40 mg Oral QAM AC    colchicine  0.3 mg Oral Daily    sodium chloride flush  5-40 mL IntraVENous 2 times per day    mupirocin   Nasal BID    chlorhexidine   Topical See Admin Instructions    sodium chloride flush  5-40 mL IntraVENous 2 times per day    polyethylene glycol  17 g Oral Daily    sodium chloride flush  5-40 mL IntraVENous 2 times per day    insulin lispro  0-4 Units SubCUTAneous TID WC    insulin lispro  0-4 Units SubCUTAneous Nightly    furosemide  20 mg IntraVENous BID     Continuous Infusions:    sodium chloride 75 mL/hr at 23 1008    amiodarone 0.5 mg/min (23)    sodium chloride      sodium chloride      sodium chloride      dextrose         Input/Output:       I/O last 3 completed shifts: In: 4066.2 [P.O.:750; I.V.:1378.3; IV Piggyback:1938]  Out: 9924 [Urine:1965; Chest Tube:590].     Patient Vitals for the past 96 hrs (Last 3 readings):   Weight   23 1030 224 lb 13.9 oz (102 kg)   23 1400 236 lb 15.9 oz (107.5 kg)       Vital Signs:   Temperature:  Temp: 97.5 °F (36.4 °C)  TMax:   Temp (24hrs), Av.4 °F (36.9 °C), Min:97.3 °F (36.3 °C), Max:99.1 °F (37.3 °C)    Respirations:  Resp: 27  Pulse:   Heart Rate: 93  BP:    BP: 89/63 (Dr. Champion Chin aware, orders placed)  BP Range: Systolic (76XMR), FHH:972 , Min:89 , VDC:758       Diastolic (91HMH), SWN:78, Min:60, Max:78      Physical Examination:     General:  AAO x 3, speaking in full sentences, no accessory muscle use. HEENT: Atraumatic, normocephalic, no throat congestion, moist mucosa. Eyes:   Pupils equal, round and reactive to light, EOMI. Neck:   No JVD, no thyromegaly, no lymphadenopathy. Chest:              Diminished breath sounds. Left side double chest tube  Cardiac:  S1 S2 irregular, no murmurs, gallops or rubs, JVP not raised. Abdomen: Soft, non-tender, no masses or organomegaly, BS audible. :   No suprapubic or flank tenderness. Neuro:              AAO x 3, No FND. SKIN:  No rashes, good skin turgor. Extremities:  3+edema bilateral thighs    Labs:       Recent Labs     02/23/23  2308 02/24/23  0605 02/24/23  0928   WBC 28.8* 26.0* 25.0*   RBC 3.13* 3.29* 3.26*   HGB 8.8* 9.2* 9.2*   HCT 28.5* 30.4* 29.2*   MCV 91.1 92.4 89.6   MCH 28.1 28.0 28.2   MCHC 30.9 30.3 31.5   RDW 15.7* 15.7* 15.9*    433 394   MPV 9.8 9.2 9.3      BMP:   Recent Labs     02/24/23  0605 02/24/23  0928 02/25/23  1002   * 132* 133*   K 4.9 5.1 5.0    99 100   CO2 21 20 19*   BUN 50* 52* 53*   CREATININE 3.48* 3.19* 4.38*   GLUCOSE 85 87 197*   CALCIUM 8.8 9.1 9.2      SPEP:  Lab Results   Component Value Date/Time    PROT 6.5 02/13/2023 01:36 PM    PROT 6.4 05/29/2012 10:35 AM    ALBCAL 2.6 02/13/2023 03:07 AM    ALBPCT 50 02/13/2023 03:07 AM    LABALPH 0.5 02/13/2023 03:07 AM    LABALPH 1.0 02/13/2023 03:07 AM    A1PCT 8 02/13/2023 03:07 AM    A2PCT 20 02/13/2023 03:07 AM    LABBETA 0.7 02/13/2023 03:07 AM    BETAPCT 13 02/13/2023 03:07 AM    GAMGLOB 0.5 02/13/2023 03:07 AM    GGPCT 9 02/13/2023 03:07 AM    PATH ELECTRONICALLY SIGNED.  Jmai Henson M.D. 02/13/2023 03:07 AM    PATH ELECTRONICALLY SIGNED. Lindsey Alston M.D. 02/13/2023 03:07 AM     C3:     Lab Results   Component Value Date/Time    C3 102 05/29/2012 10:35 AM     C4:     Lab Results   Component Value Date/Time    C4 14 05/29/2012 10:35 AM     Hep BsAg:         Lab Results   Component Value Date/Time    HEPBSAG NONREACTIVE 05/29/2012 10:35 AM     Hep C AB:          Lab Results   Component Value Date/Time    HEPCAB NONREACTIVE 05/29/2012 10:35 AM       Urinalysis/Chemistries:      Lab Results   Component Value Date/Time    NITRU NEGATIVE 02/22/2023 01:17 PM    COLORU Yellow 02/22/2023 01:17 PM    PHUR 5.0 02/22/2023 01:17 PM    WBCUA 2 TO 5 02/22/2023 01:17 PM    RBCUA None 02/22/2023 01:17 PM    MUCUS 1+ 02/13/2023 02:17 AM    BACTERIA MODERATE 02/22/2023 01:17 PM    SPECGRAV 1.020 02/22/2023 01:17 PM    LEUKOCYTESUR NEGATIVE 02/22/2023 01:17 PM    UROBILINOGEN Normal 02/22/2023 01:17 PM    BILIRUBINUR NEGATIVE 02/22/2023 01:17 PM    BILIRUBINUR NEGATIVE 04/03/2012 11:08 AM    GLUCOSEU 1+ 02/22/2023 01:17 PM    GLUCOSEU NEGATIVE 04/03/2012 11:08 AM    KETUA NEGATIVE 02/22/2023 01:17 PM    AMORPHOUS 1+ 02/22/2023 01:17 PM     Urine Sodium:     Lab Results   Component Value Date/Time    RUBY 43 02/13/2023 10:18 AM     Urine Potassium:    Lab Results   Component Value Date/Time    KUR 22.8 02/13/2023 10:18 AM     Urine Chloride:    Lab Results   Component Value Date/Time    CLUR 54 02/13/2023 10:18 AM     Urine Osmolarity:   Lab Results   Component Value Date/Time    OSMOU 468 02/13/2023 10:18 AM     Urine Creatinine:     Lab Results   Component Value Date/Time    LABCREA 45.3 02/13/2023 10:18 AM       Radiology:     CXR: Reviewed    Assessment:     1. Acute Kidney Injury: ATN. Creatinine continues to worsen and now at 4.38. Likely a combination of causes including hypotension, atrial fibrillation, and recent surgery/VATS.   2. CKD IIIB secondary to diabetic nephrosclerosis with baseline 1.9-2.4, managed by Dr Ronald Petit. 3. Pneumonia  4. Left side multiloculated pleural effusion s/p thoracentesis 2/13/23 with +BC with S. Viridians gordonii. Now s/p Left VATS and decortication 2/23/23.  5. Multiple Myeloma  6. Diabetes  7. Atrial Fibrillation. EF preserved on recent ECHO  8. History of Sarcoidosis     Plan:   1. Derease Normal saline to 30/hr  2. Renal function worsening. High risk for RRT  3. Encourage oral intake  4. Daily Labs  5. Add Midodrine  6. Continue Lasix 20mg IV BID but give an additional dose this afternoon, as he does have lower extremity edema  7. At risk for RRT  8. Keep moon    Nutrition   Please ensure that patient is on a renal diet/TF. Avoid nephrotoxic drugs/contrast exposure. We will continue to follow along with you. Emperatriz Beavers CNP   Attending Physician Statement  I have discussed the care of Uday Hong, including pertinent history and exam findings with the resident/fellow. I have reviewed the key elements of all parts of the encounter with the resident/fellow. I have seen and examined the patient with the resident/fellow. I agree with the assessment and plan and status of the problem list as documented.       Yanet Reaves MD , MD

## 2023-02-25 NOTE — PLAN OF CARE
Problem: Safety - Adult  Goal: Free from fall injury  Outcome: Not Progressing     Problem: ABCDS Injury Assessment  Goal: Absence of physical injury  Outcome: Progressing     Problem: Skin/Tissue Integrity  Goal: Absence of new skin breakdown  Description: 1. Monitor for areas of redness and/or skin breakdown  2. Assess vascular access sites hourly  3. Every 4-6 hours minimum:  Change oxygen saturation probe site  4. Every 4-6 hours:  If on nasal continuous positive airway pressure, respiratory therapy assess nares and determine need for appliance change or resting period.   Outcome: Progressing     Problem: Pain  Goal: Verbalizes/displays adequate comfort level or baseline comfort level  Outcome: Not Progressing     Problem: Safety - Adult  Goal: Free from fall injury  Outcome: Not Progressing     Problem: Pain  Goal: Verbalizes/displays adequate comfort level or baseline comfort level  Outcome: Not Progressing

## 2023-02-25 NOTE — PROGRESS NOTES
[facility]   Progress Note                 Date:   2/24/2023  Patient name: Sangita Pena  Date of admission:  2/12/2023 10:33 PM  MRN:   [id number]  YOB: 1955  PCP: Jethro Foster MD    Reason for Admission:      Subjective:   Overnight issues noted. Continues to be in A-fib with fluctuating hear rate. Blood pressure is stable. On 3 L nasal cannula oxygen saturating well. Continue to have a chest tube. Medications:   Scheduled Meds:   cefTRIAXone (ROCEPHIN) IV  1,000 mg IntraVENous Q24H    aspirin  650 mg Oral TID    [START ON 2/25/2023] pantoprazole  40 mg Oral QAM AC    colchicine  0.3 mg Oral Daily    sodium chloride flush  5-40 mL IntraVENous 2 times per day    mupirocin   Nasal BID    chlorhexidine   Topical See Admin Instructions    sodium chloride flush  5-40 mL IntraVENous 2 times per day    polyethylene glycol  17 g Oral Daily    sodium chloride flush  5-40 mL IntraVENous 2 times per day    insulin lispro  0-4 Units SubCUTAneous TID WC    insulin lispro  0-4 Units SubCUTAneous Nightly    furosemide  20 mg IntraVENous BID       Continuous Infusions:   sodium chloride 75 mL/hr at 02/24/23 1630    amiodarone 1 mg/min (02/24/23 1630)    Followed by    amiodarone      amiodarone 1 mg/min (02/24/23 1624)    Followed by    amiodarone      sodium chloride      sodium chloride      sodium chloride      dextrose         CBC:   Recent Labs     02/23/23  2308 02/24/23  0605 02/24/23  0928   WBC 28.8* 26.0* 25.0*   HGB 8.8* 9.2* 9.2*    433 394       BMP:    Recent Labs     02/23/23  2308 02/24/23  0605 02/24/23  0928   * 132* 132*   K 5.2 4.9 5.1   CL 99 100 99   CO2 22 21 20   BUN 51* 50* 52*   CREATININE 3.30* 3.48* 3.19*   GLUCOSE 102* 85 87       Hepatic: No results for input(s): AST, ALT, ALB, BILITOT, ALKPHOS in the last 72 hours. Troponin: No results for input(s): TROPONINI in the last 72 hours.   BNP: No results for input(s): BNP in the last 72 hours. Lipids: No results for input(s): CHOL, HDL in the last 72 hours. Invalid input(s): LDLCALCU  INR:   Recent Labs     02/22/23  1157   INR 1.1         Objective:   Vitals: BP 91/68   Pulse (!) 119   Temp 97.8 °F (36.6 °C) (Oral)   Resp 18   Ht 6' 1\" (1.854 m)   Wt 224 lb 13.9 oz (102 kg)   SpO2 94%   BMI 29.67 kg/m²     General appearance: awake, alert, in no apparent respiratory distress   HEENT: Head: Normocephalic, no lesions, without obvious abnormality  Neck: no JVD  Lungs: Decreased air entry on the left side with chest tube in place. Heart: Irregular heart rate. Abdomen: soft, non-tender; bowel sounds normal  Extremities: No LE edema  Neurologic: Mental status: Alert, oriented. Motor and sensory not done. DATA  EKG:    Date: 02/21/23  Reading:  Sinus tachycardia  Moderate voltage criteria for LVH, may be normal variant  Nonspecific ST and T wave abnormality  Abnormal ECG  When compared with ECG of 16-FEB-2023 23:10,  No significant change was found     LAST ECHO:  2/21/23  Summary  Left ventricle is normal in size. Global left ventricular systolic function  is difficult to assess due to heart rate but appears normal. Calculated  ejection fraction 51% by Atkinson's method. Aortic valve is trileaflet and opens adequately. Trivial aortic insufficiency. Normal tricuspid valve structure. Trivial tricuspid regurgitation. Date:  Findings Summary: 10/22     Left Ventricle: Systolic function is normal with an ejection fraction   of 55-60%. Aortic Valve: There is mild regurgitation. Tricuspid Valve: There is trace to mild regurgitation. The right   ventricular systolic pressure normal. RVSP calculated at 24 mmHg. RVSP is   based on RA pressure of 3 mmHg. There is no pulmonary hypertension. Left Ventricle   Systolic function is normal with an ejection fraction of 55-60%. No obvious regional wall motion abnormalities. There is abnormal septal motion.      Right Ventricle   Right ventricular size appears normal. Systolic function is normal.     Right Atrium   Right atrium is normal in size. IVC/SVC   The right atrial pressure is estimated at 3 mmHg. IVC appears normal. There is normal collapse with deep inspiration. Tricuspid Valve   Tricuspid valve appears to be normal. There is trace to mild regurgitation. The right ventricular systolic pressure normal. RVSP calculated at 24 mmHg. RVSP is based on RA pressure of 3 mmHg. There is no pulmonary hypertension. Aortic Valve   The aortic valve is trileaflet. The leaflets are mildly thickened. There is mild regurgitation. Pulmonic Valve   Pulmonic valve structure is grossly normal. There is trace to mild regurgitation. Pericardium   There is no pericardial effusion. LAST Stress Test:   Date of last ST:  Major Findings:     LAST Cardiac Angiography:.  Date:  Findings:      Assessment / Acute Cardiac Problems:   Left sided Empyema s/p VATS/decortication 1/23/2023  Community acquired pneumonia  Acute hypoxic respiratory failure due to above  New Onset Atrial fibrillation with RVR  Acute Pericarditis   History of non-Hodgkin's lymphoma  History of nonrheumatic mitral regurgitation/Tricuspid regurgitation  History of dilated aortic root  Type 2 diabetes  KELSEA on Chronic kidney disease stage III/IV  CLAY on CPAP    Plan of Treatment:   Continue to be in Afib with controlled heart rate. Continue amiodarone at current rate   Continue aspirin 650 mg tid and Colchicin 0.3 qday for pericarditis (renal dose)   On antibiotics as per ID  Will start low dose lopressor since BP is more stable now   Lopressor 5 mg IV q6h PRN for >110  On IV Lasix as per primary. Will continue to follow along       Plan to be discussed with rounding attending       eHrnán Vazquez MD.  Fellow, cardiovascular disease   [facility], [place], [place]      I performed a history and physical examination of the patient and discussed management with the resident. I reviewed the residents note and agree with the documented findings and plan of care. Any areas of disagreement are noted on the chart. I was personally present for the key portions of any procedures. I have documented in the chart those procedures where I was not present during the key portions. I have personally evaluated this patient and have completed at least one if not all key elements of the E/M (history, physical exam, and MDM). Additional findings are as noted. Will plan AFIA/CV on Monday. Continue IV amiodarone with low dose BB. Nephrology is on board for KELSEA.      Chris Cox MD MD

## 2023-02-25 NOTE — PROGRESS NOTES
PULMONARY & CRITICAL CARE MEDICINE PROGRESS NOTE     Patient:  Walden Gosselin  MRN: [id number]  [facility] date: 2/12/2023  Primary Care Physician: Arcelia Cuevas MD  Consulting Physician: Bettye Acuña MD  CODE Status: Full Code  LOS: 15     SUBJECTIVE     CHIEF COMPLAINT/REASON FOR CONSULT:  Acute Hypoxic respiratory failure    HISTORY OF PRESENT ILLNESS:  Walden Gosselin is a 74 y.o. male with past medical history significant for type 2 diabetes mellitus, multiple myeloma, non-Hodgkin lymphoma, sarcoidosis, referred from [facility] ER with worsening respiratory status, shortness of breath, nonproductive cough. He was started on BiPAP and given antibiotics. Chest x-ray was concerning for loculated left-sided pleural effusion. Transfer was made and accepted by hospitalist, further work-up. Critical care was consulted for worsening respiratory status. Patient was on high flow and respiratory rate was in 38-40. Patient was very dyspneic and having difficulty breathing with accessory muscle use. Initially he was unable to keep BiPAP but later on able to keep BiPAP. He improved symptomatically with BiPAP and respiratory rate was in 26. Patient is transferred to the ICU for higher level of care. 2/13: Attempted to perform bedside thoracentesis, no clear pocket identified, sent to IR, chest tube placed, appears that an empyema was encountered and chest tube was placed growing gram positive cocci in pairs, on cefepime and vanco     2/14: Placed chest tube yesterday, weaned off high flow, feeling \"100 x better\".       2/15: Continued TPA/Dornase chest tube flush, Got anxious overnight requiring xanax, 1200 cc of purulent chest tube output in the last 24 hours, patient did not sleep last night     2/16: Added seroquel to help sleep and agitation at night, continued Dornase/TPA, 700 cc of chest tube output that was less purulent, afebrile, WBC downtrending, continue cefepime     2/17: WBC uptrended, sent cultures, tachypneic, restless overnight, given xanax, more tachycardic overnight as well, plan for CT chest today, will discuss possible CTA to evaluate for PE.    02/18: Cardiothoracic surgery consulted and they increased the size of the chest tube. Will likely need decortication. 02/20: Cardiothoracic surgery determined to review imaging and diagnostics with on-call surgeon, plan to repeat CT scan at later date, hold off on plan for decortication. Infectious Disease discontinued IV cefepime 02/20/23.    02/21: Patient more confused, oriented to self. Trying to get out of bed. Denies agitation but feeling overall week. Kidney US did not show hydronephrosis. Infectious disease recommends starting IV Rocephin 1 gm q24 hrs until 03/12/23. [id number] Rufina helms for outpatient therapy. Echo performed. Pt declined PT.    02/22: Pt is paranoid and declined PT. Cardiothoracic planning for left-sided VATS decortication on 02/23/23. Breathing stable, 99% O2 saturation on 3L NC 40% FiO2. INTERVAL HISTORY:  2/25/2023  Patient s/p VATS/decortication on 02/23/2023. Seen in CVICU  Overnight patient did well postop. Chest tube in place. Hemodynamically he had been stable  He is on 1 L nasal cannula saturation 97%. He is afebrile overnight   According to patient feeling better does not complain of shortness of breath at rest, but on ambulation positive mild cough. Chest pain is controlled at the chest tube site and surgery site. He is on amiodarone infusion. Chest tube drainage is improving and was only 170 mL in 24 hours  Chest tube-there is no leak present. REVIEW OF SYSTEMS:  Review of Systems   Constitutional:  Positive for activity change, appetite change and fatigue. Negative for chills, diaphoresis, fever and unexpected weight change. HENT:  Negative for congestion. Respiratory:  Positive for cough and shortness of breath. Negative for apnea, choking, chest tightness, wheezing and stridor. Cardiovascular:  Positive for chest pain (at the site of chest tube insertion). Negative for leg swelling. Gastrointestinal:  Negative for abdominal distention, diarrhea, nausea and vomiting. Genitourinary:  Negative for difficulty urinating. Neurological:  Negative for dizziness and numbness. Psychiatric/Behavioral:  Negative for agitation. OBJECTIVE     PaO2/FiO2 RATIO:  Recent Labs     23  1243   POCPO2 66.2*        FiO2 : 40 %     VITAL SIGNS:   LAST:  /65   Pulse 99   Temp 97.3 °F (36.3 °C) (Axillary)   Resp 19   Ht 6' 1\" (1.854 m)   Wt 224 lb 13.9 oz (102 kg)   SpO2 99%   BMI 29.67 kg/m²   8-24 HR RANGE:  TEMP Temp  Av.5 °F (36.9 °C)  Min: 97.3 °F (36.3 °C)  Max: 99.1 °F (68.7 °C)   BP Systolic (22YKH), HJB:273 , Min:91 , KRL:056      Diastolic (48HRN), PEQ:37, Min:60, Max:78     PULSE Pulse  Av.9  Min: 91  Max: 133   RR Resp  Av  Min: 19  Max: 25   O2 SAT SpO2  Av.9 %  Min: 97 %  Max: 100 %   OXYGEN DELIVERY O2 Flow Rate (L/min)  Avg: 3 L/min  Min: 3 L/min  Max: 3 L/min        SYSTEMIC EXAMINATION:   Constitutional:  Alert, cooperative and no distress. Mental Status:  Oriented to person, place and time and normal affect. Chest: Left-sided chest tube is present  Lungs: Decreased air entry on the left side with chest tube in place  Heart: Irregularly irregular rhythm, no murmur. Abdomen:  Soft, nontender, nondistended, normal bowel sounds. Extremities:  No edema, redness, tenderness in the calves. Skin:  Warm, dry, no gross lesions or rashes.   DATA REVIEW     Medications: Current Inpatient  Scheduled Meds:   metoprolol tartrate  12.5 mg Oral BID    cefTRIAXone (ROCEPHIN) IV  1,000 mg IntraVENous Q24H    aspirin  650 mg Oral TID    pantoprazole  40 mg Oral QAM AC    colchicine  0.3 mg Oral Daily    sodium chloride flush  5-40 mL IntraVENous 2 times per day    mupirocin   Nasal BID    chlorhexidine   Topical See Admin Instructions    sodium chloride flush 5-40 mL IntraVENous 2 times per day    polyethylene glycol  17 g Oral Daily    sodium chloride flush  5-40 mL IntraVENous 2 times per day    insulin lispro  0-4 Units SubCUTAneous TID WC    insulin lispro  0-4 Units SubCUTAneous Nightly    furosemide  20 mg IntraVENous BID     Continuous Infusions:   sodium chloride 75 mL/hr at 02/24/23 2122    amiodarone 0.5 mg/min (02/24/23 2120)    sodium chloride      sodium chloride      sodium chloride      dextrose         INPUT/OUTPUT:  In: 3342.5 [P.O.:720; I.V.:1184.6]  Out: 0385 [Urine:1455]  Date 02/25/23 0000 - 02/25/23 2359   Shift 8194-8088 8160-2130 1706-6920 24 Hour Total   INTAKE   P.O.(mL/kg/hr)  120  120   Shift Total(mL/kg)  120(1.2)  120(1.2)   OUTPUT   Urine(mL/kg/hr) 375(0.5) 65  440   Chest Tube 0 30  30   Shift Total(mL/kg) 375(3.7) 95(0.9)  470(4.6)   Weight (kg) 102 102 102 102          LABS:  ABGs:   Recent Labs     02/23/23  0534 02/23/23  1144 02/23/23  1243   POCPH 7.421 7.401 7. 356   POCPCO2 40.9 36.8 38.8   POCPO2 60.4* 100.3 66.2*   POCHCO3 26.6 22.8 21.7   FYVG7GFL 91* 98 92*       CBC:   Recent Labs     02/23/23  0600 02/23/23  2308 02/24/23  0605 02/24/23  0928   WBC 24.1* 28.8* 26.0* 25.0*   HGB 11.2* 8.8* 9.2* 9.2*   HCT 36.6* 28.5* 30.4* 29.2*   MCV 90.8 91.1 92.4 89.6    405 433 394   LYMPHOPCT 38  --  24  --    RBC 4.03* 3.13* 3.29* 3.26*   MCH 27.8 28.1 28.0 28.2   MCHC 30.6 30.9 30.3 31.5   RDW 15.7* 15.7* 15.7* 15.9*       CRP:   No results for input(s): CRP in the last 72 hours. LDH:   No results for input(s): LDH in the last 72 hours. BMP:   Recent Labs     02/23/23  0600 02/23/23  2308 02/24/23  0605 02/24/23  0928   * 131* 132* 132*   K 4.9 5.2 4.9 5.1   CL 96* 99 100 99   CO2 25 22 21 20   BUN 51* 51* 50* 52*   CREATININE 3.20* 3.30* 3.48* 3.19*   GLUCOSE 135* 102* 85 87       Liver Function Test:   No results for input(s): PROT, LABALBU, ALT, AST, GGT, ALKPHOS, BILITOT in the last 72 hours.   Coagulation Profile: Recent Labs     02/22/23  1157 02/24/23  0605   INR 1.1  --    PROTIME 11.7  --    APTT  --  26.1       D-Dimer:  No results for input(s): DDIMER in the last 72 hours. Lactic Acid:  No results for input(s): LACTA in the last 72 hours. Cardiac Enzymes:  No results for input(s): CKTOTAL, CKMB, CKMBINDEX, TROPONINI in the last 72 hours. Invalid input(s): TROPONIN, HSTROP  BNP/ProBNP:   No results for input(s): BNP, PROBNP in the last 72 hours. Triglycerides:  No results for input(s): TRIG in the last 72 hours. Microbiology:  Urine Culture:  No components found for: CURINE  Blood Culture:  No components found for: CBLOOD, CFUNGUSBL  Sputum Culture:  No components found for: CSPUTUM  Recent Labs     02/23/23  1200   SPECDESC . LUNG  . LUNG  . LUNG  . LUNG  . LUNG   SPECIAL  LEFT PLEURAL PEEL  LEFT PLEURAL PEEL  LEFT PLURAL PEEL  LEFT PLEURAL PEEL  LEFT PLEURAL PEEL   CULTURE NO GROWTH 2 DAYS  NEGATIVE. No Herpes Simplex Virus detected by Enzyme Linked Virus Inducible System culture. at day 1  NEGATIVE for Influenza A and B, Para influenza 1,2,3, Adenovirus and RSV. at day 1  Negative results do not preclude respiratory virus infection and should not be used as the sole basis for diagnosis, treatment or other management decisions. NEGATIVE for Cytomegalovirus at day 1  PENDING  PENDING       Recent Labs     02/22/23  1316 02/23/23  1133 02/23/23  1200   SPECIAL  --   --   LEFT PLEURAL PEEL  LEFT PLEURAL PEEL  LEFT PLURAL PEEL  LEFT PLEURAL PEEL  LEFT PLEURAL PEEL   CULTURE NO GROWTH  WRONG TEST ORDERED  DUE TO THE SPECIMEN TYPE, THE ORDER WAS CANCELED AND REORDERED. PLEASE REFER TO: MOLECULAR NASAL TEST. NO GROWTH 2 DAYS  PENDING  DUPLICATE ORDER SEE ANAEROBIC, AEROBIC CULTURE NO GROWTH 2 DAYS  NEGATIVE. No Herpes Simplex Virus detected by Enzyme Linked Virus Inducible System culture. at day 1  NEGATIVE for Influenza A and B, Para influenza 1,2,3, Adenovirus and RSV.  at day 1  Negative results do not preclude respiratory virus infection and should not be used as the sole basis for diagnosis, treatment or other management decisions. NEGATIVE for Cytomegalovirus at day 1  PENDING  PENDING            Radiology Reports:  XR CHEST PORTABLE   Final Result   Status post removal single small bore and placement 2 large-bore chest tubes   left lung with reduced opacity, presumably multiloculated left pleural   effusion and associated atelectasis. No pneumothorax. Slightly greater   atelectasis right base. US RETROPERITONEAL COMPLETE   Final Result   1. Simple cortical cyst at the upper pole left kidney measuring 6 mm. 2. Limited renal ultrasound. No hydronephrosis. 3. Nonvisualization of ureteral jets. Minimal distention of the urinary   bladder. Patient no urge to void during the exam.      RECOMMENDATIONS:   Unavailable         IR CHEST TUBE INSERTION   Final Result   Successful fluoroscopic up sizing of left sided chest tube with 14 Spanish   tube placed. XR CHEST PORTABLE   Final Result   Moderate left pleural effusion increased. No change left chest tube/pigtail   catheter. VL DUP LOWER EXTREMITY VENOUS BILATERAL   Final Result      CT CHEST WO CONTRAST   Final Result   Small to moderate-sized multiloculated left pleural effusions have improved. New small foci of air within the collection is presumably related to the   introduction of the left thoracostomy tube. Stable small to moderate sized mediastinal lymph nodes are nonspecific but   may relate to the patient's history of lymphoma. Additional small bilateral   axial lymph nodes are identified. Small hiatal hernia. XR CHEST PORTABLE   Final Result   Similar findings when rotation taken into account; left chest tube with   unchanged or slightly increased loculated appearing left pleural effusion;   atelectatic appearing changes. No pneumothorax.          XR CHEST PORTABLE   Final Result Interval placement of a left chest tube with reduced left pleural fluid. IR CHEST TUBE INSERTION   Final Result   Successful ultrasound guided placement of a left 10 Norwegian chest tube         CT CHEST WO CONTRAST   Final Result   1. Moderate to large amount of multiloculated left pleural fluid. Assessment   for pleural thickening and/or pleural nodularity is limited without IV   contrast.   2. Consolidation in the left lower lobe, likely atelectasis, but possibility   of superimposed pneumonia would be difficult to exclude. 3. Mediastinal and bilateral axillary lymphadenopathy, which may be related   to the patient's history of lymphoma. If available, comparison to any recent   imaging may be of use to assess for progression. XR CHEST PORTABLE   Final Result   No significant interval change. Redemonstration of opacification of the mid   to lower left lung field which may in part be due to loculated left pleural   effusion. Consider further evaluation with CT. XR CHEST PORTABLE    (Results Pending)   XR CHEST PORTABLE    (Results Pending)        Echocardiogram:   No results found for this or any previous visit. ASSESSMENT AND PLAN     Assessment:    Acute hypoxic respiratory failure  Community acquired pneumonia  Empyema left side s/p VATS/decortication (1/23/2023)  Mediastinal and bilateral axillary lymphadenopathy  Hx of lymphoma  Hypertension  Hx of AAA  Hx of mitral regurgitation  Atrial fibrillation. KELSEA      Plan:    Patient s/p VATS on 02/23/2023. Chest tube is in place chest tube management per CT surgery. Chest x-ray reviewed and did not show significant change from 2/23/2023.   Patient with mild bibasilar atelectasis with trace bilateral pleural effusions and no pneumothorax  Patient is having worsening renal function and is being followed by nephrology  Keep supplemental oxygen to keep oxygen saturation above 92%  DVT prophylaxis with heparin subcu when okay with CT surgery. EPC cuffs until then  On Rocephin and follow-up with infectious disease  Continue to encourage incentive spirometry Acapella deep breathing  Bronchodilator therapy as needed  Increase activity and have PT and OT involved in patient's care    We will continue to follow. I would like to thank you for allowing me to participate in the care of this patient. Please feel free to call with any further questions or concerns.       Abdulaziz Strong MD  Pulmonary and critical care medicine  2/25/2023, 8:47 AM

## 2023-02-26 ENCOUNTER — APPOINTMENT (OUTPATIENT)
Dept: GENERAL RADIOLOGY | Age: 68
DRG: 853 | End: 2023-02-26
Attending: STUDENT IN AN ORGANIZED HEALTH CARE EDUCATION/TRAINING PROGRAM
Payer: COMMERCIAL

## 2023-02-26 LAB
ANION GAP SERPL CALCULATED.3IONS-SCNC: 12 MMOL/L (ref 9–17)
BUN SERPL-MCNC: 60 MG/DL (ref 8–23)
CALCIUM SERPL-MCNC: 8.8 MG/DL (ref 8.6–10.4)
CHLORIDE SERPL-SCNC: 103 MMOL/L (ref 98–107)
CO2 SERPL-SCNC: 20 MMOL/L (ref 20–31)
CREAT SERPL-MCNC: 4.72 MG/DL (ref 0.7–1.2)
GFR SERPL CREATININE-BSD FRML MDRD: 13 ML/MIN/1.73M2
GLUCOSE BLD-MCNC: 222 MG/DL (ref 75–110)
GLUCOSE BLD-MCNC: 228 MG/DL (ref 75–110)
GLUCOSE BLD-MCNC: 299 MG/DL (ref 75–110)
GLUCOSE BLD-MCNC: 414 MG/DL (ref 75–110)
GLUCOSE BLD-MCNC: 467 MG/DL (ref 75–110)
GLUCOSE SERPL-MCNC: 218 MG/DL (ref 70–99)
POTASSIUM SERPL-SCNC: 4.7 MMOL/L (ref 3.7–5.3)
SODIUM SERPL-SCNC: 135 MMOL/L (ref 135–144)

## 2023-02-26 PROCEDURE — 6370000000 HC RX 637 (ALT 250 FOR IP)

## 2023-02-26 PROCEDURE — 6370000000 HC RX 637 (ALT 250 FOR IP): Performed by: NURSE PRACTITIONER

## 2023-02-26 PROCEDURE — 2500000003 HC RX 250 WO HCPCS: Performed by: STUDENT IN AN ORGANIZED HEALTH CARE EDUCATION/TRAINING PROGRAM

## 2023-02-26 PROCEDURE — 99232 SBSQ HOSP IP/OBS MODERATE 35: CPT | Performed by: FAMILY MEDICINE

## 2023-02-26 PROCEDURE — 2580000003 HC RX 258: Performed by: NURSE PRACTITIONER

## 2023-02-26 PROCEDURE — 2580000003 HC RX 258: Performed by: ANESTHESIOLOGY

## 2023-02-26 PROCEDURE — 2580000003 HC RX 258

## 2023-02-26 PROCEDURE — 99232 SBSQ HOSP IP/OBS MODERATE 35: CPT | Performed by: INTERNAL MEDICINE

## 2023-02-26 PROCEDURE — 6360000002 HC RX W HCPCS: Performed by: STUDENT IN AN ORGANIZED HEALTH CARE EDUCATION/TRAINING PROGRAM

## 2023-02-26 PROCEDURE — 6370000000 HC RX 637 (ALT 250 FOR IP): Performed by: FAMILY MEDICINE

## 2023-02-26 PROCEDURE — 6360000002 HC RX W HCPCS

## 2023-02-26 PROCEDURE — 6370000000 HC RX 637 (ALT 250 FOR IP): Performed by: PHYSICIAN ASSISTANT

## 2023-02-26 PROCEDURE — 6370000000 HC RX 637 (ALT 250 FOR IP): Performed by: STUDENT IN AN ORGANIZED HEALTH CARE EDUCATION/TRAINING PROGRAM

## 2023-02-26 PROCEDURE — 82947 ASSAY GLUCOSE BLOOD QUANT: CPT

## 2023-02-26 PROCEDURE — 80048 BASIC METABOLIC PNL TOTAL CA: CPT

## 2023-02-26 PROCEDURE — 6360000002 HC RX W HCPCS: Performed by: NURSE PRACTITIONER

## 2023-02-26 PROCEDURE — 71045 X-RAY EXAM CHEST 1 VIEW: CPT

## 2023-02-26 PROCEDURE — 2000000000 HC ICU R&B

## 2023-02-26 PROCEDURE — 99233 SBSQ HOSP IP/OBS HIGH 50: CPT | Performed by: INTERNAL MEDICINE

## 2023-02-26 PROCEDURE — 2580000003 HC RX 258: Performed by: PHYSICIAN ASSISTANT

## 2023-02-26 PROCEDURE — 2580000003 HC RX 258: Performed by: STUDENT IN AN ORGANIZED HEALTH CARE EDUCATION/TRAINING PROGRAM

## 2023-02-26 PROCEDURE — 99024 POSTOP FOLLOW-UP VISIT: CPT | Performed by: PHYSICIAN ASSISTANT

## 2023-02-26 PROCEDURE — 36415 COLL VENOUS BLD VENIPUNCTURE: CPT

## 2023-02-26 RX ORDER — MIDODRINE HYDROCHLORIDE 5 MG/1
10 TABLET ORAL
Status: DISCONTINUED | OUTPATIENT
Start: 2023-02-26 | End: 2023-03-06 | Stop reason: HOSPADM

## 2023-02-26 RX ORDER — OXYCODONE HYDROCHLORIDE AND ACETAMINOPHEN 5; 325 MG/1; MG/1
1 TABLET ORAL EVERY 4 HOURS PRN
Status: DISCONTINUED | OUTPATIENT
Start: 2023-02-26 | End: 2023-03-06 | Stop reason: HOSPADM

## 2023-02-26 RX ORDER — FUROSEMIDE 10 MG/ML
20 INJECTION INTRAMUSCULAR; INTRAVENOUS ONCE
Status: COMPLETED | OUTPATIENT
Start: 2023-02-26 | End: 2023-02-26

## 2023-02-26 RX ORDER — METHYLPREDNISOLONE SODIUM SUCCINATE 40 MG/ML
30 INJECTION, POWDER, LYOPHILIZED, FOR SOLUTION INTRAMUSCULAR; INTRAVENOUS EVERY 12 HOURS
Status: DISCONTINUED | OUTPATIENT
Start: 2023-02-26 | End: 2023-03-02

## 2023-02-26 RX ORDER — INSULIN LISPRO 100 [IU]/ML
10 INJECTION, SOLUTION INTRAVENOUS; SUBCUTANEOUS ONCE
Status: COMPLETED | OUTPATIENT
Start: 2023-02-26 | End: 2023-02-26

## 2023-02-26 RX ORDER — INSULIN LISPRO 100 [IU]/ML
4 INJECTION, SOLUTION INTRAVENOUS; SUBCUTANEOUS ONCE
Status: COMPLETED | OUTPATIENT
Start: 2023-02-26 | End: 2023-02-26

## 2023-02-26 RX ORDER — INSULIN LISPRO 100 [IU]/ML
0-16 INJECTION, SOLUTION INTRAVENOUS; SUBCUTANEOUS
Status: DISCONTINUED | OUTPATIENT
Start: 2023-02-27 | End: 2023-02-27

## 2023-02-26 RX ORDER — INSULIN LISPRO 100 [IU]/ML
0-4 INJECTION, SOLUTION INTRAVENOUS; SUBCUTANEOUS NIGHTLY
Status: DISCONTINUED | OUTPATIENT
Start: 2023-02-26 | End: 2023-02-27

## 2023-02-26 RX ADMIN — MIDODRINE HYDROCHLORIDE 10 MG: 5 TABLET ORAL at 17:21

## 2023-02-26 RX ADMIN — MUPIROCIN: 20 OINTMENT TOPICAL at 07:56

## 2023-02-26 RX ADMIN — MUPIROCIN: 20 OINTMENT TOPICAL at 20:17

## 2023-02-26 RX ADMIN — POLYETHYLENE GLYCOL 3350 17 G: 17 POWDER, FOR SOLUTION ORAL at 07:34

## 2023-02-26 RX ADMIN — FUROSEMIDE 20 MG: 10 INJECTION, SOLUTION INTRAMUSCULAR; INTRAVENOUS at 07:35

## 2023-02-26 RX ADMIN — SODIUM CHLORIDE, PRESERVATIVE FREE 25 ML: 5 INJECTION INTRAVENOUS at 07:34

## 2023-02-26 RX ADMIN — SODIUM CHLORIDE: 9 INJECTION, SOLUTION INTRAVENOUS at 17:20

## 2023-02-26 RX ADMIN — METOPROLOL TARTRATE 5 MG: 5 INJECTION, SOLUTION INTRAVENOUS at 12:45

## 2023-02-26 RX ADMIN — DOCUSATE SODIUM 100 MG: 100 CAPSULE, LIQUID FILLED ORAL at 07:34

## 2023-02-26 RX ADMIN — METOPROLOL TARTRATE 5 MG: 5 INJECTION, SOLUTION INTRAVENOUS at 01:21

## 2023-02-26 RX ADMIN — SODIUM CHLORIDE, PRESERVATIVE FREE 10 ML: 5 INJECTION INTRAVENOUS at 20:19

## 2023-02-26 RX ADMIN — DOCUSATE SODIUM 100 MG: 100 CAPSULE, LIQUID FILLED ORAL at 13:55

## 2023-02-26 RX ADMIN — DOCUSATE SODIUM 100 MG: 100 CAPSULE, LIQUID FILLED ORAL at 20:18

## 2023-02-26 RX ADMIN — METOPROLOL TARTRATE 12.5 MG: 25 TABLET ORAL at 07:34

## 2023-02-26 RX ADMIN — FUROSEMIDE 20 MG: 10 INJECTION, SOLUTION INTRAMUSCULAR; INTRAVENOUS at 17:23

## 2023-02-26 RX ADMIN — INSULIN LISPRO 10 UNITS: 100 INJECTION, SOLUTION INTRAVENOUS; SUBCUTANEOUS at 22:22

## 2023-02-26 RX ADMIN — INSULIN LISPRO 4 UNITS: 100 INJECTION, SOLUTION INTRAVENOUS; SUBCUTANEOUS at 20:14

## 2023-02-26 RX ADMIN — METOPROLOL TARTRATE 5 MG: 5 INJECTION, SOLUTION INTRAVENOUS at 18:52

## 2023-02-26 RX ADMIN — INSULIN LISPRO 1 UNITS: 100 INJECTION, SOLUTION INTRAVENOUS; SUBCUTANEOUS at 11:55

## 2023-02-26 RX ADMIN — METHYLPREDNISOLONE SODIUM SUCCINATE 30 MG: 40 INJECTION, POWDER, FOR SOLUTION INTRAMUSCULAR; INTRAVENOUS at 20:23

## 2023-02-26 RX ADMIN — MIDODRINE HYDROCHLORIDE 5 MG: 5 TABLET ORAL at 07:34

## 2023-02-26 RX ADMIN — OXYCODONE HYDROCHLORIDE AND ACETAMINOPHEN 1 TABLET: 5; 325 TABLET ORAL at 21:31

## 2023-02-26 RX ADMIN — METOPROLOL TARTRATE 12.5 MG: 25 TABLET ORAL at 20:19

## 2023-02-26 RX ADMIN — SENNOSIDES 8.6 MG: 8.6 TABLET, COATED ORAL at 20:17

## 2023-02-26 RX ADMIN — METHYLPREDNISOLONE SODIUM SUCCINATE 30 MG: 40 INJECTION, POWDER, FOR SOLUTION INTRAMUSCULAR; INTRAVENOUS at 10:57

## 2023-02-26 RX ADMIN — CEFTRIAXONE SODIUM 1000 MG: 10 INJECTION, POWDER, FOR SOLUTION INTRAVENOUS at 09:29

## 2023-02-26 RX ADMIN — MIDODRINE HYDROCHLORIDE 10 MG: 5 TABLET ORAL at 11:50

## 2023-02-26 RX ADMIN — SODIUM CHLORIDE, PRESERVATIVE FREE 10 ML: 5 INJECTION INTRAVENOUS at 20:20

## 2023-02-26 RX ADMIN — AMIODARONE HYDROCHLORIDE 0.5 MG/MIN: 50 INJECTION, SOLUTION INTRAVENOUS at 09:31

## 2023-02-26 RX ADMIN — FUROSEMIDE 20 MG: 10 INJECTION, SOLUTION INTRAMUSCULAR; INTRAVENOUS at 11:51

## 2023-02-26 RX ADMIN — COLCHICINE 0.3 MG: 0.6 TABLET, FILM COATED ORAL at 07:36

## 2023-02-26 RX ADMIN — INSULIN LISPRO 1 UNITS: 100 INJECTION, SOLUTION INTRAVENOUS; SUBCUTANEOUS at 07:59

## 2023-02-26 RX ADMIN — INSULIN LISPRO 2 UNITS: 100 INJECTION, SOLUTION INTRAVENOUS; SUBCUTANEOUS at 18:18

## 2023-02-26 RX ADMIN — PANTOPRAZOLE SODIUM 40 MG: 40 TABLET, DELAYED RELEASE ORAL at 07:35

## 2023-02-26 RX ADMIN — ASPIRIN 650 MG: 325 TABLET, COATED ORAL at 07:34

## 2023-02-26 ASSESSMENT — ENCOUNTER SYMPTOMS
BLOOD IN STOOL: 0
COUGH: 0
CONSTIPATION: 0
VOICE CHANGE: 0
RESPIRATORY NEGATIVE: 1
GASTROINTESTINAL NEGATIVE: 1
VOMITING: 0
NAUSEA: 0
DIARRHEA: 0
SINUS PRESSURE: 0
WHEEZING: 0
ABDOMINAL PAIN: 0
SORE THROAT: 0
SHORTNESS OF BREATH: 0

## 2023-02-26 ASSESSMENT — PAIN SCALES - GENERAL: PAINLEVEL_OUTOF10: 8

## 2023-02-26 NOTE — PROGRESS NOTES
Renal Progress Note    Patient :  Soraya Serna; 79 y.o. MRN# [id number]  Location:  [facility] [place]/1007-01  Attending:  Theresa Ibrahim MD  Admit Date:  2/12/2023   Hospital Day: 14      Subjective: Following for KELSEA on CKD secondary to diabetic nephrosclerosis, with baseline 1.9-2.4, s/p VATS, POD #3, for left pleural effusion  Patient is on amiodarone drip for persistent atrial fibrillation. Blood pressure low 986 systolic. Started on midodrine yesterday. Not on pressor support. Urine output 1475 last 24 hours. He is negative 7.9 liters since admission. He is on Lasix 20mg IV BID. He did receive one extra dose yesterday. Creatinine continues to worsen and now up to 4.72, from 4.38. He is on aspirin 650 TID, along with colchicine renal dose for pericarditis. He is awake and alert, but weak. At night when sleeping he requires nasal canula however during day, room air. Outpatient Medications:     Medications Prior to Admission: amLODIPine (NORVASC) 5 MG tablet, TAKE ONE TABLET BY MOUTH DAILY (Patient taking differently: nightly)  omeprazole (PRILOSEC) 20 MG delayed release capsule, Take 20 mg by mouth nightly  fluticasone-salmeterol (ADVAIR DISKUS) 250-50 MCG/ACT AEPB diskus inhaler, Inhale 1 puff into the lungs every 12 hours  metoprolol succinate (TOPROL XL) 25 MG extended release tablet, Take 25 mg by mouth at bedtime  dutasteride (AVODART) 0.5 mg capsule, Take 0.5 mg by mouth daily  finasteride (PROSCAR) 5 MG tablet, Take 5 mg by mouth nightly  Misc. Devices (NASAL SPRAY BOTTLE) MISC, by Does not apply route  CINNAMON PO, Take by mouth  repaglinide (PRANDIN) 1 MG tablet, Take 1 mg by mouth with breakfast and with evening meal  pimecrolimus (ELIDEL) 1 % cream, Apply topically as needed Apply topically 2 times daily.   albuterol sulfate  (90 BASE) MCG/ACT inhaler, Inhale 1 puff into the lungs in the morning and at bedtime  Liraglutide (VICTOZA SC), Inject 1.8 mg into the skin daily   fenofibrate (TRICOR) 145 MG tablet, Take 145 mg by mouth nightly  dicyclomine (BENTYL) 10 MG capsule, Take 10 mg by mouth 3 times daily (with meals)  atorvastatin (LIPITOR) 80 MG tablet, Take 80 mg by mouth daily.  DULoxetine (CYMBALTA) 60 MG capsule, Take 120 mg by mouth nightly    Current Medications:     Scheduled Meds:    metoprolol tartrate  12.5 mg Oral BID    docusate sodium  100 mg Oral TID    senna  1 tablet Oral Nightly    midodrine  5 mg Oral TID WC    furosemide  20 mg IntraVENous BID    cefTRIAXone (ROCEPHIN) IV  1,000 mg IntraVENous Q24H    aspirin  650 mg Oral TID    pantoprazole  40 mg Oral QAM AC    colchicine  0.3 mg Oral Daily    sodium chloride flush  5-40 mL IntraVENous 2 times per day    mupirocin   Nasal BID    chlorhexidine   Topical See Admin Instructions    sodium chloride flush  5-40 mL IntraVENous 2 times per day    polyethylene glycol  17 g Oral Daily    sodium chloride flush  5-40 mL IntraVENous 2 times per day    insulin lispro  0-4 Units SubCUTAneous TID WC    insulin lispro  0-4 Units SubCUTAneous Nightly     Continuous Infusions:    sodium chloride 30 mL/hr at 23 0423    amiodarone 0.5 mg/min (23 0423)    sodium chloride      sodium chloride      sodium chloride      dextrose         Input/Output:       I/O last 3 completed shifts:  In: 3833.6 [P.O.:1020; I.V.:2813.6]  Out: 2530 [Urine:2380; Chest Tube:150].    Patient Vitals for the past 96 hrs (Last 3 readings):   Weight   23 1030 224 lb 13.9 oz (102 kg)   23 1400 236 lb 15.9 oz (107.5 kg)         Vital Signs:   Temperature:  Temp: 98.3 °F (36.8 °C)  TMax:   Temp (24hrs), Av.1 °F (36.7 °C), Min:97.3 °F (36.3 °C), Max:98.7 °F (37.1 °C)    Respirations:  Resp: 25  Pulse:   Heart Rate: (!) 118  BP:    BP: 122/77  BP Range: Systolic (24hrs), Av , Min:89 , Max:122       Diastolic (24hrs), Av, Min:60, Max:86      Physical Examination:     General:  AAO x 3, speaking in full sentences, no accessory muscle  use.  HEENT: Atraumatic, normocephalic, no throat congestion, moist mucosa. Eyes:   Pupils equal, round and reactive to light, EOMI. Neck:   No JVD, no thyromegaly, no lymphadenopathy. Chest:              Diminished breath sounds, rales in bases. Left side double chest tube  Cardiac:  S1 S2 irregular, no murmurs, gallops or rubs, JVP not raised. Abdomen: Soft, non-tender, no masses or organomegaly, BS audible. :   No suprapubic or flank tenderness. Neuro:              AAO x 3, No FND. SKIN:  No rashes, good skin turgor. Extremities:  ++edema bilateral thighs    Labs:       Recent Labs     02/23/23  2308 02/24/23  0605 02/24/23  0928   WBC 28.8* 26.0* 25.0*   RBC 3.13* 3.29* 3.26*   HGB 8.8* 9.2* 9.2*   HCT 28.5* 30.4* 29.2*   MCV 91.1 92.4 89.6   MCH 28.1 28.0 28.2   MCHC 30.9 30.3 31.5   RDW 15.7* 15.7* 15.9*    433 394   MPV 9.8 9.2 9.3        BMP:   Recent Labs     02/24/23  0928 02/25/23  1002 02/26/23  0343   * 133* 135   K 5.1 5.0 4.7   CL 99 100 103   CO2 20 19* 20   BUN 52* 53* 60*   CREATININE 3.19* 4.38* 4.72*   GLUCOSE 87 197* 218*   CALCIUM 9.1 9.2 8.8        SPEP:  Lab Results   Component Value Date/Time    PROT 6.5 02/13/2023 01:36 PM    PROT 6.4 05/29/2012 10:35 AM    ALBCAL 2.6 02/13/2023 03:07 AM    ALBPCT 50 02/13/2023 03:07 AM    LABALPH 0.5 02/13/2023 03:07 AM    LABALPH 1.0 02/13/2023 03:07 AM    A1PCT 8 02/13/2023 03:07 AM    A2PCT 20 02/13/2023 03:07 AM    LABBETA 0.7 02/13/2023 03:07 AM    BETAPCT 13 02/13/2023 03:07 AM    GAMGLOB 0.5 02/13/2023 03:07 AM    GGPCT 9 02/13/2023 03:07 AM    PATH ELECTRONICALLY SIGNED. Nafisa Romeo M.D. 02/13/2023 03:07 AM    PATH ELECTRONICALLY SIGNED.  Nafisa Romeo M.D. 02/13/2023 03:07 AM     C3:     Lab Results   Component Value Date/Time    C3 102 05/29/2012 10:35 AM     C4:     Lab Results   Component Value Date/Time    C4 14 05/29/2012 10:35 AM     Hep BsAg:         Lab Results   Component Value Date/Time    HEPBSAG NONREACTIVE 05/29/2012 10:35 AM     Hep C AB:          Lab Results   Component Value Date/Time    HEPCAB NONREACTIVE 05/29/2012 10:35 AM       Urinalysis/Chemistries:      Lab Results   Component Value Date/Time    NITRU NEGATIVE 02/22/2023 01:17 PM    COLORU Yellow 02/22/2023 01:17 PM    PHUR 5.0 02/22/2023 01:17 PM    WBCUA 2 TO 5 02/22/2023 01:17 PM    RBCUA None 02/22/2023 01:17 PM    MUCUS 1+ 02/13/2023 02:17 AM    BACTERIA MODERATE 02/22/2023 01:17 PM    SPECGRAV 1.020 02/22/2023 01:17 PM    LEUKOCYTESUR NEGATIVE 02/22/2023 01:17 PM    UROBILINOGEN Normal 02/22/2023 01:17 PM    BILIRUBINUR NEGATIVE 02/22/2023 01:17 PM    BILIRUBINUR NEGATIVE 04/03/2012 11:08 AM    GLUCOSEU 1+ 02/22/2023 01:17 PM    GLUCOSEU NEGATIVE 04/03/2012 11:08 AM    KETUA NEGATIVE 02/22/2023 01:17 PM    AMORPHOUS 1+ 02/22/2023 01:17 PM     Urine Sodium:     Lab Results   Component Value Date/Time    RUBY 43 02/13/2023 10:18 AM     Urine Potassium:    Lab Results   Component Value Date/Time    KUR 22.8 02/13/2023 10:18 AM     Urine Chloride:    Lab Results   Component Value Date/Time    CLUR 54 02/13/2023 10:18 AM     Urine Osmolarity:   Lab Results   Component Value Date/Time    OSMOU 468 02/13/2023 10:18 AM     Urine Creatinine:     Lab Results   Component Value Date/Time    LABCREA 45.3 02/13/2023 10:18 AM       Radiology:     CXR: Reviewed    Assessment:     1. Acute Kidney Injury: ATN. Creatinine continues to worsen and now at 4.72. Likely a combination of causes including hypotension, atrial fibrillation, and recent surgery/VATS. Patient is also on aspirin 650mg TID. 2. CKD IIIB secondary to diabetic nephrosclerosis with baseline 1.9-2.4, managed by Dr Esperanza Minaya. 3. Pneumonia  4. Left side multiloculated pleural effusion s/p thoracentesis 2/13/23 with +BC with S. Viridians gordonii. Now s/p Left VATS and decortication 2/23/23.  5. Multiple Myeloma  6. Diabetes  7. Atrial Fibrillation. EF preserved on recent ECHO  8.  History of Sarcoidosis     Plan:   1. Continue Normal saline to 30/hr  2. Renal function worsening. High risk for RRT  3. Encourage oral intake  4. Daily Labs  5. Increase Midodrine to 10mg TID  6. Continue Lasix 20mg IV BID   7. Discontinue high dose aspirin  8. Start IV solumedrol 30mg IV BID  9. Give an additional 20mg Lasix this afternoon      Nutrition   Please ensure that patient is on a renal diet/TF. Avoid nephrotoxic drugs/contrast exposure. We will continue to follow along with you. Davidson Jiménez CNP   Attending Physician Statement  I have discussed the care of Jeevan Bennett, including pertinent history and exam findings with the resident/fellow. I have reviewed the key elements of all parts of the encounter with the resident/fellow. I have seen and examined the patient with the resident/fellow. I agree with the assessment and plan and status of the problem list as documented. Patient seen and examined. Discussion with patient conducted at bedside. Did have a decent urine output yesterday with the extra diuretic however serum creatinine has not necessarily altered much. He was also taking pretty high doses of aspirin because of his pericarditis. Suggest to leave him on the colchicine, try IV steroids and discontinue high-dose aspirin particularly in light of his KELSEA. Discussed with patient about the possibility of him needing to receive temporary dialysis yesterday if things do not necessarily turn around. Discussed with cardiology as well. We will be getting 1 additional dose of Lasix this afternoon as well.     Reinier Davidson MD , MD

## 2023-02-26 NOTE — PROGRESS NOTES
[facility]  Office: [address], DO, Kamilah Bazan, DO, Kaia Gay, DO, Kathy Iniguez Willard, DO, Meño Montejo MD, Shaylee Medrano MD, Rambo Adams MD, Skylar Gill MD,  Lul Sutherland MD, Henrik Frey MD, Fadia Topete, DO, Deborah Prince MD,  Jf Hawkins MD, Selwyn Morel MD, Asia Tao, DO, Janes Steen MD, Nayeli Mancera MD, Sheldon Medrano DO, Kevan Aj MD, Elio Lind MD, Kasey Venegas MD, Charlene Lea MD, Mariana Rivas DO, Daniel Woo MD, Jeff Santiago MD, Elena Renae, [facility],  Savannah Silveira, CNP, Luis Sandoval, CNP, Patricio Alcala, CNP,  Regine Kemp, [facility], Zach Henderson, CNP, Gia Hsu, CNP, Dylan Tracy, CNP, Jolynn Lux, [facility], [facility], CNP, Itzel Castro PA-C, Mia Rivas, CNS, Hanna Wagner, CNP, Deepali Bains, [facility]      Daily Progress Note     Admit Date: 2/12/2023  Bed/Room No.  1007/1007-01  Admitting Physician : Rambo Adams MD  Code Status :Full Code  Hospital Day:  LOS: 14 days   Chief Complaint:     Shortness of breath      Principal Problem:    Pneumonia, unspecified organism  Active Problems:    Pleural effusion    Hyperkalemia    Acute respiratory failure with hypoxia (HCC)    Hyponatremia    Hyperglycemia    History of non-Hodgkin's lymphoma    History of sarcoidosis    Empyema [facility])    History of atrial fibrillation    History of type 2 diabetes mellitus    Acute kidney injury superimposed on chronic kidney disease [facility])  Resolved Problems:    * No resolved hospital problems. *    Subjective : Interval History/Significant events :  02/26/23    Patient is having intermittent confusion. Patient is alert, awake but confused. He denies any breathing difficulty and is on room air. He denies any chest pain. Patient is eating less and has poor appetite. He remains in atrial fibrillation rhythm however heart rate is fairly controlled.   Current heart rate 101. Patient is on amiodarone infusion. Patient had 130 mL out from chest tube in last 24 hours. Urine output is adequate. Vitals - Unstable afebrile, heart rate 101. Labs -kidney function worsening. Creatinine 4.7  Low hemoglobin 9.2. Leukocytosis 25.0. Nursing notes , Consults notes reviewed. Overnight events and updates discussed with Nursing staff . Background History:         Charlene Hwang is 79 y.o. male  Who was admitted to the hospital on 2/12/2023 for treatment of Pneumonia, unspecified organism. Patient presented to ER at [facility] on 2/12/2023 with dyspnea and was found to have left multiloculated pneumonia with large pleural effusion with mediastinal and bilateral axillary lymphadenopathy. Patient has prior history of non-Hodgkin's lymphoma, multiple myeloma. Patient was requiring high flow nasal cannula and had thoracentesis. Pleural fluid was consistent with empyema and patient had chest tube placement by IR. He was treated with broad-spectrum antibiotics. Patient was also given dornase without much improvement. CT surgery was consulted and recommended increasing the size of chest tube and did not recommend any open surgical intervention at this time. Patient had exchange of chest tube on 2/18/2023. He had diffuse ST elevation secondary to pericarditis on 1/21/23. Patient was was started on colchicine. Patient had new onset atrial fibrillation with RVR on 10/22/23 and was given digoxin and amiodarone infusion . Patient underwent VATS with decortication on 2/23/2023. He was extubated postoperatively on 2/23/2023. PMH:  Past Medical History:   Diagnosis Date    Acute interstitial nephritis 08/03/2022    Unclear cause. Creat bumped to 2.9, baseline in oct 2020 1.6. Exact description of his kidney biopsy is not available to me however pathologist seems to think there is diabetic glomerosclerosis and acute interstitial nephritis seen on the biopsy specimen.   No mention about chronic changes. Because of acute interstitial nephritis a trial of steroids for 15 days will be given to see if I can impro    Aortic regurgitation     ARF (acute renal failure) ([facility] [address] 08/03/2022    kidney biopsy from June 2022  showing findings of acute interstitial nephritis. Exact description not available. Trial of steroids will be given to see if I can improve renal function. Creatinine in October 2020 was 1.6, creatinine in June 2022 was 2.9. Trial of steroids will be given to see if renal function can be improved. Chronic kidney disease     Hyperlipidemia     Hypertension     Non-Hodgkin lymphoma ([facility] [address]     Research study patient 02/13/2023    [id number] Completed 2/15/23    Sarcoidosis     Type II or unspecified type diabetes mellitus without mention of complication, not stated as uncontrolled       Allergies: Allergies   Allergen Reactions    Latex Rash    Gabapentin      Other reaction(s): Vomiting    Meperidine Anaphylaxis    Erythromycin      Other reaction(s): Fever  Had all side effects associated with this medication      Glimepiride      Other reaction(s): Dizziness    Lidocaine Swelling     States as a child having reaction to lidocaine where his face swelled.    Ever since then he has never had lidocaine    Hydrocodone      Other reaction(s): Vomiting    Macrolides And Ketolides Other (See Comments)    Niacin And Related     Trelegy Ellipta [Fluticasone-Umeclidin-Vilant]     Pregabalin Diarrhea    Xanax [Alprazolam] Anxiety     Anxiety, restlessness, insomnia      Medications :  metoprolol tartrate, 12.5 mg, Oral, BID  docusate sodium, 100 mg, Oral, TID  senna, 1 tablet, Oral, Nightly  midodrine, 5 mg, Oral, TID WC  furosemide, 20 mg, IntraVENous, BID  cefTRIAXone (ROCEPHIN) IV, 1,000 mg, IntraVENous, Q24H  aspirin, 650 mg, Oral, TID  pantoprazole, 40 mg, Oral, QAM AC  colchicine, 0.3 mg, Oral, Daily  sodium chloride flush, 5-40 mL, IntraVENous, 2 times per day  mupirocin, , Nasal, BID  chlorhexidine, , Topical, See Admin Instructions  sodium chloride flush, 5-40 mL, IntraVENous, 2 times per day  polyethylene glycol, 17 g, Oral, Daily  sodium chloride flush, 5-40 mL, IntraVENous, 2 times per day  insulin lispro, 0-4 Units, SubCUTAneous, TID WC  insulin lispro, 0-4 Units, SubCUTAneous, Nightly      Review of Systems   Review of Systems   Constitutional:  Positive for activity change, appetite change and fatigue. Negative for chills, fever and unexpected weight change. HENT:  Negative for congestion, mouth sores, postnasal drip, sinus pressure, sore throat and voice change. Eyes:  Negative for visual disturbance. Respiratory:  Negative for cough, shortness of breath and wheezing. Cardiovascular:  Negative for chest pain and palpitations. Gastrointestinal:  Negative for abdominal pain, blood in stool, constipation, diarrhea, nausea and vomiting. Endocrine: Negative for polyuria. Genitourinary:  Negative for difficulty urinating, dysuria, frequency and urgency. Musculoskeletal:  Negative for arthralgias, joint swelling and myalgias. Neurological:  Negative for dizziness, tremors, speech difficulty, light-headedness and headaches. Psychiatric/Behavioral:  Positive for confusion.     Objective :      Current Vitals : Temp: 98.3 °F (36.8 °C),  Heart Rate: (!) 118, Resp: 25, BP: 122/77, SpO2: 96 %  Last 24 Hrs Vitals   Patient Vitals for the past 24 hrs:   BP Temp Temp src Pulse Resp SpO2   02/26/23 0710 122/77 -- -- (!) 118 25 96 %   02/26/23 0600 107/69 -- -- 100 17 100 %   02/26/23 0500 117/79 -- -- (!) 110 19 99 %   02/26/23 0400 102/71 98.3 °F (36.8 °C) Oral (!) 109 22 99 %   02/26/23 0300 116/74 -- -- (!) 117 (!) 32 97 %   02/26/23 0200 114/69 -- -- (!) 115 30 97 %   02/26/23 0130 122/75 -- -- (!) 111 (!) 41 94 %   02/26/23 0121 117/86 -- -- (!) 138 25 95 %   02/26/23 0100 106/70 -- -- (!) 121 24 97 %   02/26/23 0000 106/72 98.4 °F (36.9 °C) Oral (!) 115 27 91 %   02/25/23 2300 118/70 -- -- (!) 132 22 94 %   02/25/23 2200 100/65 98.4 °F (36.9 °C) Oral (!) 119 22 91 %   02/25/23 2100 105/68 -- -- (!) 122 29 90 %   02/25/23 2000 113/64 98 °F (36.7 °C) Oral (!) 117 22 91 %   02/25/23 1900 94/60 -- -- (!) 108 24 93 %   02/25/23 1830 101/68 -- -- 98 23 94 %   02/25/23 1815 107/68 -- -- 98 22 97 %   02/25/23 1800 104/68 98.6 °F (37 °C) Oral (!) 122 20 96 %   02/25/23 1718 -- -- -- -- -- 95 %   02/25/23 1700 113/69 -- -- (!) 105 16 96 %   02/25/23 1600 105/73 98.7 °F (37.1 °C) Oral (!) 107 22 97 %   02/25/23 1502 116/78 -- -- 100 24 98 %   02/25/23 1400 113/73 97.7 °F (36.5 °C) Axillary (!) 108 24 93 %   02/25/23 1300 97/60 -- -- (!) 108 22 100 %   02/25/23 1200 99/64 97.7 °F (36.5 °C) Axillary 97 26 100 %   02/25/23 1138 -- -- -- -- -- 97 %   02/25/23 1100 89/63 -- -- 93 27 99 %   02/25/23 1000 90/62 97.5 °F (36.4 °C) Axillary (!) 114 22 96 %   02/25/23 0905 -- -- -- (!) 108 22 96 %   02/25/23 0900 111/76 -- -- (!) 112 24 100 %   02/25/23 0855 -- -- -- -- -- 100 %   02/25/23 0800 110/65 97.3 °F (36.3 °C) Axillary 99 19 99 %   02/25/23 0745 107/78 -- -- 91 22 --   02/25/23 0730 112/69 -- -- (!) 124 20 99 %       Intake / output   02/24 1901 - 02/26 0700  In: 3833.6 [P.O.:1020; I.V.:2813.6]  Out: 2530 [Urine:2380]  Physical Exam:  Physical Exam  Vitals and nursing note reviewed. Constitutional:       General: He is not in acute distress. Appearance: He is not diaphoretic. HENT:      Head: Normocephalic and atraumatic. Nose:      Right Sinus: No maxillary sinus tenderness or frontal sinus tenderness. Left Sinus: No maxillary sinus tenderness or frontal sinus tenderness. Mouth/Throat:      Pharynx: No oropharyngeal exudate. Eyes:      General: No scleral icterus. Conjunctiva/sclera: Conjunctivae normal.      Pupils: Pupils are equal, round, and reactive to light. Neck:      Thyroid: No thyromegaly. Vascular: No JVD.       Comments: Left subclavian central line in place. Cardiovascular:      Rate and Rhythm: Tachycardia present. Rhythm irregular. Pulses:           Dorsalis pedis pulses are 2+ on the right side and 2+ on the left side. Heart sounds: Normal heart sounds. No murmur heard. Pulmonary:      Effort: Pulmonary effort is normal.      Breath sounds: Normal breath sounds. No wheezing or rales. Chest:      Comments: Left chest tube in place. Abdominal:      Palpations: Abdomen is soft. There is no mass. Tenderness: There is no abdominal tenderness. Musculoskeletal:      Cervical back: Full passive range of motion without pain and neck supple. Lymphadenopathy:      Head:      Right side of head: No submandibular adenopathy. Left side of head: No submandibular adenopathy. Cervical: No cervical adenopathy. Skin:     General: Skin is warm. Neurological:      Mental Status: He is alert and oriented to person, place, and time. Motor: No tremor. Psychiatric:         Behavior: Behavior is cooperative. Oriented to self, person    Laboratory findings:    Recent Labs     02/23/23  2308 02/24/23  0605 02/24/23  0928   WBC 28.8* 26.0* 25.0*   HGB 8.8* 9.2* 9.2*   HCT 28.5* 30.4* 29.2*    433 394       Recent Labs     02/24/23  0928 02/25/23  1002 02/26/23  0343   * 133* 135   K 5.1 5.0 4.7   CL 99 100 103   CO2 20 19* 20   GLUCOSE 87 197* 218*   BUN 52* 53* 60*   CREATININE 3.19* 4.38* 4.72*   CALCIUM 9.1 9.2 8.8       No results for input(s): PROT, LABALBU, LABA1C, K4LLNHB, C0YCSES, FT4, TSH, AST, ALT, LDH, GGT, ALKPHOS, BILITOT, BILIDIR, AMMONIA, AMYLASE, LIPASE, LACTATE, CHOL, HDL, LDLCHOLESTEROL, CHOLHDLRATIO, TRIG, VLDL, BNP, TROPONINI, CKTOTAL, CKMB, CKMBINDEX, RF, SAMANTHA in the last 72 hours.          Specific Gravity, UA   Date Value Ref Range Status   02/22/2023 1.020 1.005 - 1.030 Final     Protein, UA   Date Value Ref Range Status   02/22/2023 1+ (A) NEGATIVE Final     RBC, UA   Date Value Ref Range Status 02/22/2023 None 0 - 2 /HPF Final     Bacteria, UA   Date Value Ref Range Status   02/22/2023 MODERATE (A) None Final     Nitrite, Urine   Date Value Ref Range Status   02/22/2023 NEGATIVE NEGATIVE Final     WBC, UA   Date Value Ref Range Status   02/22/2023 2 TO 5 0 - 5 /HPF Final     Leukocyte Esterase, Urine   Date Value Ref Range Status   02/22/2023 NEGATIVE NEGATIVE Final       Imaging / Clinical Data :-   CT CHEST WO CONTRAST    Result Date: 2/17/2023  Small to moderate-sized multiloculated left pleural effusions have improved. New small foci of air within the collection is presumably related to the introduction of the left thoracostomy tube. Stable small to moderate sized mediastinal lymph nodes are nonspecific but may relate to the patient's history of lymphoma. Additional small bilateral axial lymph nodes are identified. Small hiatal hernia. CT CHEST WO CONTRAST    Result Date: 2/13/2023  1. Moderate to large amount of multiloculated left pleural fluid. Assessment for pleural thickening and/or pleural nodularity is limited without IV contrast. 2. Consolidation in the left lower lobe, likely atelectasis, but possibility of superimposed pneumonia would be difficult to exclude. 3. Mediastinal and bilateral axillary lymphadenopathy, which may be related to the patient's history of lymphoma. If available, comparison to any recent imaging may be of use to assess for progression. XR CHEST PORTABLE    Result Date: 2/18/2023  Moderate left pleural effusion increased. No change left chest tube/pigtail catheter. XR CHEST PORTABLE    Result Date: 2/17/2023  Similar findings when rotation taken into account; left chest tube with unchanged or slightly increased loculated appearing left pleural effusion; atelectatic appearing changes. No pneumothorax. XR CHEST PORTABLE    Result Date: 2/14/2023  Interval placement of a left chest tube with reduced left pleural fluid.      XR CHEST PORTABLE    Result Date: 2/13/2023  No significant interval change. Redemonstration of opacification of the mid to lower left lung field which may in part be due to loculated left pleural effusion. Consider further evaluation with CT. XR CHEST PORTABLE    Result Date: 2/12/2023  Probable left-sided pneumonia with loculated pleural effusion. Underlying pathology not excluded, as above. Mild cardiomegaly and venous hypertension. Discoid atelectasis right base. RECOMMENDATION: Consider CT chest.     IR CHEST TUBE INSERTION    Result Date: 2/14/2023  Successful ultrasound guided placement of a left 10 French chest tube        Clinical Course : unchanged  Assessment and Plan  :        Left empyema - Pleural fluid growing strep viridans gordonii -not much improvement with tPA in chest tube. -underwent decortication on 2/23/2023. Continue Rocephin. CT surgery following. New onset Afib RVR - continue amiodarone infusion, Cardiology following. Anticoagulation when okay with cardiovascular surgery. Pericarditis - Preserved ejection fraction, no pericardial effusion. On colchicine and aspirin. Acute respiratory failure with hypoxia -comfortable on room air. H/o non-Hodgkin's lymphoma and multiple myeloma  H/o sarcoidosis -   Acute kidney injury with CKD stage III -kidney function worsening. Baseline creatinine 1.9  -high risk of renal replacement. Nephrology following. Hyperkalemia due to decreased renal clearance- Kayexalate. Demand ischemia from acute respiratory failure  Reactive thrombocytosis. Obstructive sleep apnea -CPAP at nighttime. COPD - albuterol as needed   Acute Delirium -Continue  Seroquel dose at night, reorientation. No family at bedside. Continue to monitor vitals , Intake / output ,  Cell count , HGB , Kidney function, oxygenation  as indicated . Plan and updates discussed with patient ,  answers  explained to satisfaction.    Plan discussed with Staff Amanda BROWNLEE     (Please note that portions of this note were completed with a voice recognition program. Efforts were made to edit the dictations but occasionally words are mis-transcribed.)      Nella Ho MD  2/26/2023

## 2023-02-26 NOTE — PROGRESS NOTES
PULMONARY & CRITICAL CARE MEDICINE PROGRESS NOTE     Patient:  Marylene Monaco  MRN: [id number]  6 [facility] date: 2/12/2023  Primary Care Physician: Clifford Huffman MD  Consulting Physician: Kennis Osler, MD  CODE Status: Full Code  LOS: 15     SUBJECTIVE     CHIEF COMPLAINT/REASON FOR CONSULT:  Acute Hypoxic respiratory failure    HISTORY OF PRESENT ILLNESS:  Marylene Monaco is a 74 y.o. male with past medical history significant for type 2 diabetes mellitus, multiple myeloma, non-Hodgkin lymphoma, sarcoidosis, referred from [facility] ER with worsening respiratory status, shortness of breath, nonproductive cough. He was started on BiPAP and given antibiotics. Chest x-ray was concerning for loculated left-sided pleural effusion. Transfer was made and accepted by hospitalist, further work-up. Critical care was consulted for worsening respiratory status. Patient was on high flow and respiratory rate was in 38-40. Patient was very dyspneic and having difficulty breathing with accessory muscle use. Initially he was unable to keep BiPAP but later on able to keep BiPAP. He improved symptomatically with BiPAP and respiratory rate was in 26. Patient is transferred to the ICU for higher level of care. 2/13: Attempted to perform bedside thoracentesis, no clear pocket identified, sent to IR, chest tube placed, appears that an empyema was encountered and chest tube was placed growing gram positive cocci in pairs, on cefepime and vanco     2/14: Placed chest tube yesterday, weaned off high flow, feeling \"100 x better\".       2/15: Continued TPA/Dornase chest tube flush, Got anxious overnight requiring xanax, 1200 cc of purulent chest tube output in the last 24 hours, patient did not sleep last night     2/16: Added seroquel to help sleep and agitation at night, continued Dornase/TPA, 700 cc of chest tube output that was less purulent, afebrile, WBC downtrending, continue cefepime     2/17: WBC uptrended, sent cultures, tachypneic, restless overnight, given xanax, more tachycardic overnight as well, plan for CT chest today, will discuss possible CTA to evaluate for PE.    02/18: Cardiothoracic surgery consulted and they increased the size of the chest tube. Will likely need decortication. 02/20: Cardiothoracic surgery determined to review imaging and diagnostics with on-call surgeon, plan to repeat CT scan at later date, hold off on plan for decortication. Infectious Disease discontinued IV cefepime 02/20/23.    02/21: Patient more confused, oriented to self. Trying to get out of bed. Denies agitation but feeling overall week. Kidney US did not show hydronephrosis. Infectious disease recommends starting IV Rocephin 1 gm q24 hrs until 03/12/23. [id number] Rufina helms for outpatient therapy. Echo performed. Pt declined PT.    02/22: Pt is paranoid and declined PT. Cardiothoracic planning for left-sided VATS decortication on 02/23/23. Breathing stable, 99% O2 saturation on 3L NC 40% FiO2. INTERVAL HISTORY:  2/26/2023  Patient s/p VATS/decortication on 02/23/2023. Seen in CVICU  Overnight patient did well postop. Chest tube in place. Hemodynamically he had been stable  He is on room air and saturation 97%. He is afebrile overnight   According to patient feeling better does not complain of shortness of breath at rest, but on ambulation positive mild cough. Chest pain is controlled at the chest tube site and surgery site. He is on amiodarone infusion. Chest tube drainage is improving and was only 110 mL in 24 hours  Chest tube-there is no leak present. REVIEW OF SYSTEMS:  Review of Systems   Constitutional:  Positive for activity change, appetite change and fatigue. Negative for chills, diaphoresis, fever and unexpected weight change. HENT:  Negative for congestion. Respiratory:  Positive for cough and shortness of breath. Negative for apnea, choking, chest tightness, wheezing and stridor.     Cardiovascular: Positive for chest pain (at the site of chest tube insertion). Negative for leg swelling. Gastrointestinal:  Negative for abdominal distention, diarrhea, nausea and vomiting. Genitourinary:  Negative for difficulty urinating. Neurological:  Negative for dizziness and numbness. Psychiatric/Behavioral:  Negative for agitation. OBJECTIVE     PaO2/FiO2 RATIO:  Recent Labs     23  1243   POCPO2 66.2*        FiO2 : 40 %     VITAL SIGNS:   LAST:  /72   Pulse (!) 105   Temp 98.6 °F (37 °C) (Oral)   Resp 23   Ht 6' 1\" (1.854 m)   Wt 224 lb 13.9 oz (102 kg)   SpO2 94%   BMI 29.67 kg/m²   8-24 HR RANGE:  TEMP Temp  Av.2 °F (36.8 °C)  Min: 97.5 °F (36.4 °C)  Max: 98.7 °F (88.5 °C)   BP Systolic (96IPR), VGW:584 , Min:89 , KYF:019      Diastolic (43YQC), ZTU:87, Min:60, Max:86     PULSE Pulse  Av.4  Min: 93  Max: 138   RR Resp  Av  Min: 17  Max: 41   O2 SAT SpO2  Av.8 %  Min: 94 %  Max: 100 %   OXYGEN DELIVERY No data recorded        SYSTEMIC EXAMINATION:   Constitutional:  Alert, cooperative and no distress. Mental Status:  Oriented to person, place and time and normal affect. Chest: Left-sided chest tube is present  Lungs: Decreased air entry on the left side with chest tube in place  Heart: Irregularly irregular rhythm, no murmur. Abdomen:  Soft, nontender, nondistended, normal bowel sounds. Extremities:  No edema, redness, tenderness in the calves. Skin:  Warm, dry, no gross lesions or rashes.   DATA REVIEW     Medications: Current Inpatient  Scheduled Meds:   midodrine  10 mg Oral TID WC    furosemide  20 mg IntraVENous Once    methylPREDNISolone  30 mg IntraVENous Q12H    metoprolol tartrate  12.5 mg Oral BID    docusate sodium  100 mg Oral TID    senna  1 tablet Oral Nightly    furosemide  20 mg IntraVENous BID    cefTRIAXone (ROCEPHIN) IV  1,000 mg IntraVENous Q24H    pantoprazole  40 mg Oral QAM AC    colchicine  0.3 mg Oral Daily    sodium chloride flush  5-40 mL IntraVENous 2 times per day    mupirocin   Nasal BID    chlorhexidine   Topical See Admin Instructions    sodium chloride flush  5-40 mL IntraVENous 2 times per day    polyethylene glycol  17 g Oral Daily    sodium chloride flush  5-40 mL IntraVENous 2 times per day    insulin lispro  0-4 Units SubCUTAneous TID WC    insulin lispro  0-4 Units SubCUTAneous Nightly     Continuous Infusions:   sodium chloride 30 mL/hr at 02/26/23 0423    amiodarone 0.5 mg/min (02/26/23 0423)    sodium chloride      sodium chloride      sodium chloride      dextrose         INPUT/OUTPUT:  In: 3509.1 [P.O.:1220; I.V.:2289.1]  Out: 1814 [Urine:1665]  Date 02/26/23 0000 - 02/26/23 2359   Shift 3169-2229 6525-1888 1179-9165 24 Hour Total   INTAKE   P.O.(mL/kg/hr)  200  200   I. V.(mL/kg) 576. 9(5.7)   576. 9(5.7)   Shift Total(mL/kg) 576. 9(5.7) 200(2)  776.9(7.6)   OUTPUT   Urine(mL/kg/hr) 645(0.8) 90  735   Chest Tube 20 50  70   Shift Total(mL/kg) 665(6.5) 140(1.4)  805(7.9)   Weight (kg) 102 102 102 102          LABS:  ABGs:   Recent Labs     02/23/23  1144 02/23/23  1243   POCPH 7.401 7. 356   POCPCO2 36.8 38.8   POCPO2 100.3 66.2*   POCHCO3 22.8 21.7   EDGZ0WCB 98 92*       CBC:   Recent Labs     02/23/23  2308 02/24/23  0605 02/24/23  0928   WBC 28.8* 26.0* 25.0*   HGB 8.8* 9.2* 9.2*   HCT 28.5* 30.4* 29.2*   MCV 91.1 92.4 89.6    433 394   LYMPHOPCT  --  24  --    RBC 3.13* 3.29* 3.26*   MCH 28.1 28.0 28.2   MCHC 30.9 30.3 31.5   RDW 15.7* 15.7* 15.9*       CRP:   No results for input(s): CRP in the last 72 hours. LDH:   No results for input(s): LDH in the last 72 hours.   BMP:   Recent Labs     02/23/23  2308 02/24/23  0605 02/24/23  0928 02/25/23  1002 02/26/23  0343   * 132* 132* 133* 135   K 5.2 4.9 5.1 5.0 4.7   CL 99 100 99 100 103   CO2 22 21 20 19* 20   BUN 51* 50* 52* 53* 60*   CREATININE 3.30* 3.48* 3.19* 4.38* 4.72*   GLUCOSE 102* 85 87 197* 218*       Liver Function Test:   No results for input(s): PROT, LABALBU, ALT, AST, GGT, ALKPHOS, BILITOT in the last 72 hours. Coagulation Profile:   Recent Labs     02/24/23  0605   APTT 26.1       D-Dimer:  No results for input(s): DDIMER in the last 72 hours. Lactic Acid:  No results for input(s): LACTA in the last 72 hours. Cardiac Enzymes:  No results for input(s): CKTOTAL, CKMB, CKMBINDEX, TROPONINI in the last 72 hours. Invalid input(s): TROPONIN, HSTROP  BNP/ProBNP:   No results for input(s): BNP, PROBNP in the last 72 hours. Triglycerides:  No results for input(s): TRIG in the last 72 hours. Microbiology:  Urine Culture:  No components found for: CURINE  Blood Culture:  No components found for: CBLOOD, CFUNGUSBL  Sputum Culture:  No components found for: CSPUTUM  Recent Labs     02/23/23  1200   SPECDESC . LUNG  . LUNG  . LUNG  . LUNG  . LUNG   SPECIAL  LEFT PLEURAL PEEL  LEFT PLEURAL PEEL  LEFT PLURAL PEEL  LEFT PLEURAL PEEL  LEFT PLEURAL PEEL   CULTURE NO GROWTH 3 DAYS  NEGATIVE. No Herpes Simplex Virus detected by Enzyme Linked Virus Inducible System culture. at day 2  NEGATIVE for Influenza A and B, Para influenza 1,2,3, Adenovirus and RSV. at day 2  Negative results do not preclude respiratory virus infection and should not be used as the sole basis for diagnosis, treatment or other management decisions. NEGATIVE for Cytomegalovirus at day 1  PENDING  PENDING       Recent Labs     02/23/23  1133 02/23/23  1200   SPECIAL  --   LEFT PLEURAL PEEL  LEFT PLEURAL PEEL  LEFT PLURAL PEEL  LEFT PLEURAL PEEL  LEFT PLEURAL PEEL   CULTURE NO GROWTH 3 DAYS  PENDING  DUPLICATE ORDER SEE ANAEROBIC, AEROBIC CULTURE NO GROWTH 3 DAYS  NEGATIVE. No Herpes Simplex Virus detected by Enzyme Linked Virus Inducible System culture. at day 2  NEGATIVE for Influenza A and B, Para influenza 1,2,3, Adenovirus and RSV.  at day 2  Negative results do not preclude respiratory virus infection and should not be used as the sole basis for diagnosis, treatment or other management decisions.  NEGATIVE for Cytomegalovirus at day 1  PENDING  PENDING            Radiology Reports:  XR CHEST PORTABLE   Preliminary Result   No significant change from prior exam.      Left chest tubes with left basilar airspace disease and small amount of   pleural fluid.      Linear left retrocardiac lucency could represent small pneumothorax.         XR CHEST PORTABLE   Final Result   No significant change from prior exam.         XR CHEST PORTABLE   Final Result   Status post removal single small bore and placement 2 large-bore chest tubes   left lung with reduced opacity, presumably multiloculated left pleural   effusion and associated atelectasis.  No pneumothorax.  Slightly greater   atelectasis right base.         US RETROPERITONEAL COMPLETE   Final Result   1. Simple cortical cyst at the upper pole left kidney measuring 6 mm.   2. Limited renal ultrasound.  No hydronephrosis.   3. Nonvisualization of ureteral jets.  Minimal distention of the urinary   bladder.  Patient no urge to void during the exam.      RECOMMENDATIONS:   Unavailable         IR CHEST TUBE INSERTION   Final Result   Successful fluoroscopic up sizing of left sided chest tube with 14 Chinese   tube placed.         XR CHEST PORTABLE   Final Result   Moderate left pleural effusion increased.  No change left chest tube/pigtail   catheter.         VL DUP LOWER EXTREMITY VENOUS BILATERAL   Final Result      CT CHEST WO CONTRAST   Final Result   Small to moderate-sized multiloculated left pleural effusions have improved.   New small foci of air within the collection is presumably related to the   introduction of the left thoracostomy tube.      Stable small to moderate sized mediastinal lymph nodes are nonspecific but   may relate to the patient's history of lymphoma.  Additional small bilateral   axial lymph nodes are identified.      Small hiatal hernia.         XR CHEST PORTABLE   Final Result   Similar findings when rotation taken  into account; left chest tube with   unchanged or slightly increased loculated appearing left pleural effusion;   atelectatic appearing changes. No pneumothorax. XR CHEST PORTABLE   Final Result   Interval placement of a left chest tube with reduced left pleural fluid. IR CHEST TUBE INSERTION   Final Result   Successful ultrasound guided placement of a left 10 Turkmen chest tube         CT CHEST WO CONTRAST   Final Result   1. Moderate to large amount of multiloculated left pleural fluid. Assessment   for pleural thickening and/or pleural nodularity is limited without IV   contrast.   2. Consolidation in the left lower lobe, likely atelectasis, but possibility   of superimposed pneumonia would be difficult to exclude. 3. Mediastinal and bilateral axillary lymphadenopathy, which may be related   to the patient's history of lymphoma. If available, comparison to any recent   imaging may be of use to assess for progression. XR CHEST PORTABLE   Final Result   No significant interval change. Redemonstration of opacification of the mid   to lower left lung field which may in part be due to loculated left pleural   effusion. Consider further evaluation with CT. XR CHEST PORTABLE    (Results Pending)        Echocardiogram:   No results found for this or any previous visit. ASSESSMENT AND PLAN     Assessment:    Acute hypoxic respiratory failure  Community acquired pneumonia  Empyema left side s/p VATS/decortication (1/23/2023)  Mediastinal and bilateral axillary lymphadenopathy  Hx of lymphoma  Hypertension  Hx of AAA  Hx of mitral regurgitation  Atrial fibrillation. KELSEA      Plan:    Patient s/p VATS on 02/23/2023. Chest tube is in place chest tube management per CT surgery. Chest x-ray reviewed and did not show significant change from 2/23/2023.     Patient is having worsening renal function and is being followed by nephrology  Keep supplemental oxygen to keep oxygen saturation above 92%  DVT prophylaxis with heparin subcu when okay with CT surgery. EPC cuffs until then  On Rocephin and follow-up with infectious disease  Continue to encourage incentive spirometry Acapella deep breathing  Bronchodilator therapy as needed  Increase activity and have PT and OT involved in patient's care    We will continue to follow. I would like to thank you for allowing me to participate in the care of this patient. Please feel free to call with any further questions or concerns.       Caitlin Mora MD  Pulmonary and critical care medicine  2/26/2023, 9:16 AM

## 2023-02-26 NOTE — PROGRESS NOTES
[facility] Cardiothoracic Surgical   Progress Note    2/26/2023 10:43 AM  Surgeon:  Rosalba Gomes          POD# 2  S/P :  thoracomy decorticaion    Subjective:  Mr. PATIENTS' [facility] no complaints    Vital Signs: /84   Pulse (!) 108   Temp 98.6 °F (37 °C) (Oral)   Resp 29   Ht 6' 1\" (1.854 m)   Wt 224 lb 13.9 oz (102 kg)   SpO2 91%   BMI 29.67 kg/m²  O2 Flow Rate (L/min): 1 L/min   Admit Weight: Weight: 231 lb 7.7 oz (105 kg)       Labs and Studies:  CBC:   Recent Labs     02/23/23  2308 02/24/23  0605 02/24/23  0928   WBC 28.8* 26.0* 25.0*   HGB 8.8* 9.2* 9.2*   HCT 28.5* 30.4* 29.2*   MCV 91.1 92.4 89.6    433 394     BMP:   Recent Labs     02/24/23  0928 02/25/23  1002 02/26/23  0343   * 133* 135   K 5.1 5.0 4.7   CL 99 100 103   CO2 20 19* 20   BUN 52* 53* 60*   CREATININE 3.19* 4.38* 4.72*     PT/INR: No results for input(s): PROTIME, INR in the last 72 hours. APTT:   Recent Labs     02/24/23  0605   APTT 26.1     I/O:  I/O last 3 completed shifts: In: 3833.6 [P.O.:1020; I.V.:2813.6]  Out: 9468 [Urine:2380;  Chest Tube:150]      Scheduled Meds:    midodrine  10 mg Oral TID WC    furosemide  20 mg IntraVENous Once    methylPREDNISolone  30 mg IntraVENous Q12H    metoprolol tartrate  12.5 mg Oral BID    docusate sodium  100 mg Oral TID    senna  1 tablet Oral Nightly    furosemide  20 mg IntraVENous BID    cefTRIAXone (ROCEPHIN) IV  1,000 mg IntraVENous Q24H    pantoprazole  40 mg Oral QAM AC    colchicine  0.3 mg Oral Daily    sodium chloride flush  5-40 mL IntraVENous 2 times per day    mupirocin   Nasal BID    chlorhexidine   Topical See Admin Instructions    sodium chloride flush  5-40 mL IntraVENous 2 times per day    polyethylene glycol  17 g Oral Daily    sodium chloride flush  5-40 mL IntraVENous 2 times per day    insulin lispro  0-4 Units SubCUTAneous TID     insulin lispro  0-4 Units SubCUTAneous Nightly     Continuous Infusions:    sodium chloride 30 mL/hr at 02/26/23 0800    amiodarone 0.5 mg/min (02/26/23 0931)    sodium chloride      sodium chloride      sodium chloride      dextrose       A/P:  POD # 2  Continue chest tubes to suction. Forced air leak.   Creatine climbing neprhology following  Pericarditis, cardiolgy following    JOSEPH Draper

## 2023-02-26 NOTE — PROGRESS NOTES
[facility]  Progress Note    Vidhya Vo  MRN: [id number]  Date: 2/26/2023  LOS: 15     Reason for F/U :   Empyema    Impression :   Left-sided empyema  Streptococci Gordonii on pleural fluid culture from 2/14/23  Repeat culture 2/18/23 with no bacterial growth   Leukocytosis  Acute kidney injury on chronic kidney disease  Atrial fibrillation on amiodarone drip  Hx non-Hodgkin's lymphoma  Hx multiple myeloma  Sarcoidosis  DM II  Interstitial nephritis  COPD  Hx partial right lung lobectomy  Allergies to macrolides and ketolides. Recommendations:   Continue intravenous antimicrobial therapy with Rocephin  The rate is currently controlled for the atrial fibrillation  Plans are for potential AFIA cardioversion on Monday  Clinically the patient continues to do well and we will continue to monitor his progress    Infection Control Recommendations:   Universal precautions    Discharge Planning:   Estimated Length of IV antimicrobials: To be determined  Patient will need Midline Catheter Insertion/ PICC line Insertion: No  Patient will need: Home IV , [place],  SNF,  LTAC: Undetermined  Patient willneed outpatient wound care: No    Medical Decision making / Summary of Stay:   Vidhya Vo is a 79y.o.-year-old male who was initially admitted on 2/12/2023. Patient seen at the request of Dr. Justin Jules:     This patient, with a history of COPD, CKD, sarcoidosis, partial right lung lobectomy, multiple myeloma, non-hodgkin's lymphoma, acute interstitial nephritis and DM II, initially presented to [facility] ED on 2/12/23 with complaints of worsening shortness of breath over the previous week with a non-productive cough. He has has allergies to macrolides and ketolides. He was experiencing increased work of breathing upon evaluation, initially requiring CPAP. ABG values were grossly unremarkable.      He denied any recent history of chemotherapy, fever, chills, N/V/D, abnormal bleeding, weight-loss or night sweats. Chest x-ray showed probable left-sided pneumonia with loculated pleural effusion which was confirmed via CT chest, which revealed a moderate to large amount of multiloculated left pleural fluid in the left lung. Mediastinal and bilateral axillary lymphadenopathy was also noted. He was transferred to [facility] for a higher level of care and was initiated on broad spectrum antibiotics with IV cefepime and vancomycin. Pulmonology was consulted and a thoracentesis was attempted, but was unable to be completed as no clear pocket was visualized via [address]. IR was consulted and a 10 [place], left-sided chest tube was placed on 2/14/23.  10 ml of purulent fluid was initially drained and sent for culture. TPA and dornase were administered with improvement in output. MRSA Nares was not detected and the pleural fluid culture grew PCN sensitive Streptococcus Gordonii. Vancomycin was discontinued on 2/14/23 and cefepime continued. A repeat CT of the patient's chest  on 2/17/23 revealed some improvement in the multiloculated left pleural effusions. A larger bore chest tube exchange was completed on 2/18/23 in IR. A repeat pleural fluid culture has not had any bacterial growth to date. Chest tube output to date:   2/14 - 800 cc  2/15- 600 cc  2/16 - 700 cc  2/17 - 900 cc  2/18- 200 cc  2/19 - 910 cc  2/20 - 275 cc     CT sugery was consulted for possible decortication, but they have no plans for decortication at this time. Abnormal labs at the time of consult include:   Na:133  Cr:2.24  WBC:32.4-->19.0  Hgb:10.0     There is no growth on urine or blood cultures.     Patient with Hx of non-Hodgkin's lymphoma and multiple myeloma  Admitted with Shortness of breath  Found to have Left-sided empyema  Streptococci Gordonii on pleural fluid culture from 2/14/23  Repeat culture 2/18/23 with no bacterial growth   Empyema partially drained by chest tube.   Decortication planned 23  Confusion with associated hyponatremia  KELSEA on CKD 3  On treatment with ceftriaxone. Current evaluation:2023    /77   Pulse (!) 106   Temp 98.7 °F (37.1 °C) (Oral)   Resp 23   Ht 6' 1\" (1.854 m)   Wt 224 lb 13.9 oz (102 kg)   SpO2 95%   BMI 29.67 kg/m²     Temperature Range: Temp: 98.7 °F (37.1 °C) Temp  Av.4 °F (36.9 °C)  Min: 97.7 °F (36.5 °C)  Max: 98.7 °F (37.1 °C)  The patient is seen and evaluated at bedside and he is sitting up in the chair awake and alert. He does not report any new complaints reports that he has felt the best in the last 2 days. No subjective fever or chills  The nurse reports that the patient does have some mild episodes of confusion    Review of Systems   Constitutional: Negative. HENT: Negative. Respiratory: Negative. Cardiovascular: Negative. Gastrointestinal: Negative. Genitourinary: Negative. Musculoskeletal: Negative. Neurological: Negative. Psychiatric/Behavioral: Negative. Physical Examination :     Physical Exam  Constitutional:       Appearance: He is well-developed. HENT:      Head: Normocephalic and atraumatic. Cardiovascular:      Rate and Rhythm: Normal rate. Heart sounds: Normal heart sounds. No friction rub. No gallop. Pulmonary:      Effort: Pulmonary effort is normal.      Breath sounds: Normal breath sounds. No wheezing. Comments: Left-sided chest tubes in place  Abdominal:      General: Bowel sounds are normal.      Palpations: Abdomen is soft. There is no mass. Tenderness: There is no abdominal tenderness. Musculoskeletal:         General: Normal range of motion. Cervical back: Normal range of motion and neck supple. Lymphadenopathy:      Cervical: No cervical adenopathy. Skin:     General: Skin is warm and dry. Neurological:      Mental Status: He is alert and oriented to person, place, and time.        Laboratory data:   I have independently reviewed the followinglabs:  CBC with Differential:   Recent Labs     02/24/23  0605 02/24/23  0928   WBC 26.0* 25.0*   HGB 9.2* 9.2*   HCT 30.4* 29.2*    394   LYMPHOPCT 24  --    MONOPCT 6  --        BMP:   Recent Labs     02/25/23  1002 02/26/23  0343   * 135   K 5.0 4.7    103   CO2 19* 20   BUN 53* 60*   CREATININE 4.38* 4.72*       Hepatic Function Panel: No results for input(s): PROT, LABALBU, BILIDIR, IBILI, BILITOT, ALKPHOS, ALT, AST in the last 72 hours. Lab Results   Component Value Date/Time    PROCAL 0.94 02/17/2023 09:15 AM     No results found for: CRP  Lab Results   Component Value Date    SEDRATE 114 (H) 02/13/2023         Lab Results   Component Value Date/Time    DDIMER 3.88 02/12/2023 12:30 PM     No results found for: FERRITIN  Lab Results   Component Value Date/Time     02/13/2023 01:36 PM     02/13/2023 03:07 AM     No results found for: FIBRINOGEN    Results in Past 30 Days  Result Component Current Result Ref Range Previous Result Ref Range   SARS-CoV-2, PCR Not Detected (2/13/2023) Not Detected Not Detected (2/12/2023) Not Detected     Lab Results   Component Value Date/Time    COVID19 Not Detected 02/13/2023 01:51 AM    COVID19 Not Detected 02/12/2023 01:23 PM       No results for input(s): VANCOTROUGH in the last 72 hours. Imaging Studies:   ONE XRAY VIEW OF THE CHEST 2/25/2023 6:31 am  Impression   No significant change from prior exam.     Cultures:     Culture, Virus, Non Respiratory [id number] Collected: 02/23/23 1200   Order Status: Completed Specimen: Tissue from Lung Updated: 02/25/23 1544    Specimen Description . LUNG    Special Requests LEFT PLEURAL PEEL    Culture NEGATIVE. No Herpes Simplex Virus detected by Enzyme Linked Virus Inducible System culture. at day 2     NEGATIVE for Influenza A and B, Para influenza 1,2,3, Adenovirus and RSV.  at day 2     Negative results do not preclude respiratory virus infection and should not be used as the sole basis for diagnosis, treatment or other management decisions. NEGATIVE for Cytomegalovirus at day 1   Culture, Anaerobic and Aerobic [id number] (Abnormal) Collected: 02/23/23 1133   Order Status: Completed Specimen: Body Fluid Updated: 02/25/23 0816    Specimen Description . PLEURAL FLUID LEFT    Direct Exam MANY NEUTROPHILS Abnormal      NO BACTERIA SEEN    Culture NO GROWTH 2 DAYS   Culture, Tissue [id number] Collected: 02/23/23 1200   Order Status: Completed Specimen: Tissue from Lung Updated: 02/25/23 0813    Specimen Description . LUNG    Special Requests  LEFT PLEURAL PEEL    Direct Exam NO NEUTROPHILS SEEN     NO BACTERIA SEEN    Culture NO GROWTH 2 DAYS   Culture with Smear, Acid Fast [place] Oh [id number] Collected: 02/23/23 1133   Order Status: Completed Specimen: Body Fluid Updated: 02/24/23 0715    Specimen Description . PLEURAL FLUID LEFT    Direct Exam NO ACID FAST BACILLI SEEN (CONCENTRATED SMEAR)    Culture PENDING   Culture with Smear, Acid Fast [place] Oh [id number] Collected: 02/23/23 1200   Order Status: Completed Specimen: Tissue from Lung Updated: 02/24/23 0713    Specimen Description . LUNG    Special Requests LEFT PLURAL PEEL    Direct Exam NO ACID FAST BACILLI SEEN (CONCENTRATED SMEAR)    Culture PENDING   Fungal stain [id number] Collected: 02/23/23 1200   Order Status: Completed Specimen: Tissue from Lung Updated: 02/24/23 0704    Specimen Description . LUNG    Special Requests LEFT PLEURAL PEEL    Direct Exam NO FUNGAL ELEMENTS SEEN   Fungal stain [id number] Collected: 02/23/23 1133   Order Status: Completed Specimen: Body Fluid Updated: 02/24/23 0703    Specimen Description . PLEURAL FLUID LEFT    Direct Exam NO FUNGAL ELEMENTS SEEN   Culture, Fungus [id number] Collected: 02/23/23 1200   Order Status: Completed Specimen: Tissue from Lung Updated: 02/23/23 1631    Specimen Description . LUNG    Special Requests LEFT PLEURAL PEEL    Culture PENDING   Culture, Fungus [id number] Collected: 02/23/23 1133   Order Status: Sent Specimen: Body Fluid Updated: 02/23/23 1631   Urine Culture [id number] Collected: 02/22/23 1316   Order Status: Completed Specimen: Urine, clean catch Updated: 02/23/23 1402    Specimen Description . CLEAN CATCH URINE    Culture NO GROWTH   MRSA DNA Probe, Nasal [id number] Collected: 02/22/23 1318   Order Status: Completed Specimen: Nasal Updated: 02/23/23 1246    Specimen Description . NASAL SWAB    MRSA, DNA, Nasal NEGATIVE    Comment: NEGATIVE:  MRSA DNA not detected by nucleic acid amplification. Results should be used as an adjunct to nosocomial control efforts to identify patients   needing enhanced precautions. The test is not intended to identify patients with staphylococcal infections. Results   should not be used to guide or monitor treatment for MRSA infections. Culture, Body Fluid [id number] Collected: 02/23/23 1133   Order Status: Completed Specimen: Body Fluid Updated: 02/23/23 1212    Specimen Description . PLEURAL FLUID LEFT    Culture DUPLICATE ORDER SEE ANAEROBIC, AEROBIC CULTURE   Culture, Anaerobic and Aerobic [id number] Collected: 02/23/23 1200   Order Status: Canceled Specimen: Tissue from Lung    Culture, Anaerobic and Aerobic [id number] (Abnormal) Collected: 02/18/23 1810   Order Status: Completed Specimen: Chest Updated: 02/23/23 0757    Specimen Description . CHEST    Direct Exam MANY NEUTROPHILS Abnormal      NO ORGANISMS SEEN    Culture NO GROWTH 5 DAYS   MRSA Screening Culture Only [id number] Collected: 02/22/23 1316   Order Status: Completed Specimen: Nares Updated: 02/22/23 1321    Specimen Description . NARES    Culture WRONG TEST ORDERED     DUE TO THE SPECIMEN TYPE, THE ORDER WAS CANCELED AND REORDERED. PLEASE REFER TO: MOLECULAR NASAL TEST.    Culture, Blood 6 [id number] Collected: 02/17/23 0915   Order Status: Completed Specimen: Blood Updated: 02/22/23 0912 Specimen Description . BLOOD    Culture NO GROWTH 5 DAYS   Culture, Blood 8 [id number] Collected: 02/17/23 0915   Order Status: Completed Specimen: Blood Updated: 02/22/23 0945    Specimen Description . BLOOD    Culture NO GROWTH 5 DAYS   Infectious Disease Intervention [id number] Collected: 02/20/23 1430   Order Status: Completed Updated: 02/21/23 1408    Intervention De-escalation   Culture, Urine [id number] Collected: 02/17/23 0615   Order Status: Completed Specimen: Urine Updated: 02/18/23 1354    Specimen Description . URINE    Culture NO GROWTH   Culture, Respiratory [id number]    Order Status: Canceled Specimen: Sputum Expectorated    Culture, Body Fluid [id number] (Abnormal)  Collected: 02/13/23 1637   Order Status: Completed Specimen: Pleural Fluid Updated: 02/16/23 0749    Specimen Description . PLEURAL FLUID    Direct Exam MANY NEUTROPHILS Abnormal      MANY GRAM POSITIVE COCCI IN PAIRS Abnormal      Gram stain made from cytocentrifuged specimen. Organisms and cells will be concentrated. (NOTE) Direct Gram Stain result called to and read back by: TORRIE CROSS AT 1818 ON 2.13.2023    Culture POSITIVE Fluid Culture     DIRECT GRAM STAIN FROM BOTTLE: GRAM POSITIVE COCCI IN PAIRS AND IN CHAINS     VIRIDANS STREPTOCOCCUS GROUP FURTHER IDENTIFIED AS STREPTOCOCCUS GORDONII Abnormal    Respiratory Culture [id number] Collected: 02/13/23 1149   Order Status: Completed Specimen: Sputum Expectorated Updated: 02/15/23 1057    Specimen Description . EXPECTORATED SPUTUM    Direct Exam >25 NEUTROPHILS/LPF     < 10 EPITHELIAL CELLS/LPF     NO ORGANISMS SEEN    Culture NORMAL RESPIRATORY ROMEO LIGHT GROWTH   Sputum gram stain [id number] Collected: 02/13/23 1149   Order Status: Completed Specimen: Sputum Expectorated Updated: 02/13/23 1204    Specimen Description . EXPECTORATED SPUTUM    Direct Exam DUPLICATE ORDER GRAM STAIN INCLUDED WITH RESPIRATORY CULTURE   MRSA DNA Probe, Nasal [id number] Collected: 02/13/23 0152   Order Status: Completed Specimen: Nasal Updated: 02/13/23 1148    Specimen Description . NASAL SWAB    MRSA, DNA, Nasal NEGATIVE    Comment: NEGATIVE:  MRSA DNA not detected by nucleic acid amplification. Results should be used as an adjunct to nosocomial control efforts to identify patients   needing enhanced precautions. The test is not intended to identify patients with staphylococcal infections. Results   should not be used to guide or monitor treatment for MRSA infections. LEGIONELLA ANTIGEN, URINE [id number] Collected: 02/13/23 0216   Order Status: Completed Specimen: Urine Updated: 02/13/23 0756    Legionella Pneumophilia Ag, Urine NEGATIVE    Comment: L. pneumophila serogroup 1 antigen not detected. A negative result does not exclude infection with Leginella pnemophila serogroup 1 nor does   it rule out other microbial-caused respiratory infections of disease caused by other   serogroups of Legionella pneumophila. Strep Pneumoniae Antigen [id number] Collected: 02/13/23 0216   Order Status: Completed Specimen: Urine, clean catch Updated: 02/13/23 0748    Source . URINE    Strep pneumo Ag NEGATIVE    Comment: Strep pneumoniae antigen not detected      Respiratory Panel, Molecular, with COVID-19 (Restricted: peds pts or suitable admitted adults) [id number] Collected: 02/13/23 0151   Order Status: Completed Specimen: Nasopharyngeal Swab Updated: 02/13/23 0320    Specimen Description . NASOPHARYNGEAL SWAB    Adenovirus PCR Not Detected    Coronavirus 229E PCR Not Detected    Coronavirus HKU1 PCR Not Detected    Coronavirus NL63 PCR Not Detected    Coronavirus OC43 PCR Not Detected    SARS-CoV-2, PCR Not Detected    Human Metapneumovirus PCR Not Detected    Rhino/Enterovirus PCR Not Detected    Influenza A by PCR Not Detected    Influenza B by PCR Not Detected    Parainfluenza 1 PCR Not Detected    Parainfluenza 2 PCR Not Detected    Parainfluenza 3 PCR Not Detected    Parainfluenza 4 PCR Not Detected    Resp Syncytial Virus PCR Not Detected    Bordetella Parapertussis Not Detected    B Pertussis by PCR Not Detected    Chlamydia pneumoniae By PCR Not Detected    Mycoplasma pneumo by PCR Not Detected    Comment: Performed by multiplexed nucleic acid assay. Medications:      midodrine  10 mg Oral TID WC    methylPREDNISolone  30 mg IntraVENous Q12H    metoprolol tartrate  12.5 mg Oral BID    docusate sodium  100 mg Oral TID    senna  1 tablet Oral Nightly    furosemide  20 mg IntraVENous BID    cefTRIAXone (ROCEPHIN) IV  1,000 mg IntraVENous Q24H    pantoprazole  40 mg Oral QAM AC    colchicine  0.3 mg Oral Daily    sodium chloride flush  5-40 mL IntraVENous 2 times per day    mupirocin   Nasal BID    chlorhexidine   Topical See Admin Instructions    sodium chloride flush  5-40 mL IntraVENous 2 times per day    polyethylene glycol  17 g Oral Daily    sodium chloride flush  5-40 mL IntraVENous 2 times per day    insulin lispro  0-4 Units SubCUTAneous TID WC    insulin lispro  0-4 Units SubCUTAneous Nightly       Electronically signed by Nette Hinojosa MD on 2/26/2023 at 12:18 PM      [facility]  Nette Hinojosa MD  [employer] messaging  OFFICE: [phone]    Thank you for allowing us to participate in the care of this patient. Please call with questions. This note is created with the assistance of a speech recognition program.  While intending to generate a document that actually reflects the content of the visit, the document can still have some errors including those of syntax and sound a like substitutions which may escape proof reading. In such instances, actual meaning can be extrapolated by contextual diversion.

## 2023-02-26 NOTE — PROGRESS NOTES
[facility]   Progress Note                 Date:   2/26/2023  Patient name: Deedee Ortega  Date of admission:  2/12/2023 10:33 PM  MRN:   [id number]  YOB: 1955  PCP: Francisca Delaney MD    Reason for Admission:      Subjective:   Overnight issues noted. Continues to be in A-fib with fluctuating heart rate. Blood pressure is stable. On 3 L nasal cannula oxygen saturating well. Continue to have a chest tube with output. At risk of going on hemodialysis as per nephrology. Creatinine worsened to 4.7 today.      Medications:   Scheduled Meds:   metoprolol tartrate  12.5 mg Oral BID    docusate sodium  100 mg Oral TID    senna  1 tablet Oral Nightly    midodrine  5 mg Oral TID WC    furosemide  20 mg IntraVENous BID    cefTRIAXone (ROCEPHIN) IV  1,000 mg IntraVENous Q24H    aspirin  650 mg Oral TID    pantoprazole  40 mg Oral QAM AC    colchicine  0.3 mg Oral Daily    sodium chloride flush  5-40 mL IntraVENous 2 times per day    mupirocin   Nasal BID    chlorhexidine   Topical See Admin Instructions    sodium chloride flush  5-40 mL IntraVENous 2 times per day    polyethylene glycol  17 g Oral Daily    sodium chloride flush  5-40 mL IntraVENous 2 times per day    insulin lispro  0-4 Units SubCUTAneous TID WC    insulin lispro  0-4 Units SubCUTAneous Nightly       Continuous Infusions:   sodium chloride 30 mL/hr at 02/26/23 0423    amiodarone 0.5 mg/min (02/26/23 0423)    sodium chloride      sodium chloride      sodium chloride      dextrose         CBC:   Recent Labs     02/23/23  2308 02/24/23  0605 02/24/23  0928   WBC 28.8* 26.0* 25.0*   HGB 8.8* 9.2* 9.2*    433 394       BMP:    Recent Labs     02/24/23  0928 02/25/23  1002 02/26/23  0343   * 133* 135   K 5.1 5.0 4.7   CL 99 100 103   CO2 20 19* 20   BUN 52* 53* 60*   CREATININE 3.19* 4.38* 4.72*   GLUCOSE 87 197* 218*       Hepatic: No results for input(s): AST, ALT, ALB, BILITOT, ALKPHOS in the last 72 hours.  Troponin: No results for input(s): TROPONINI in the last 72 hours. BNP: No results for input(s): BNP in the last 72 hours. Lipids: No results for input(s): CHOL, HDL in the last 72 hours. Invalid input(s): LDLCALCU  INR:   No results for input(s): INR in the last 72 hours. Objective:   Vitals: /79   Pulse (!) 110   Temp 98.3 °F (36.8 °C) (Oral)   Resp 19   Ht 6' 1\" (1.854 m)   Wt 224 lb 13.9 oz (102 kg)   SpO2 99%   BMI 29.67 kg/m²     General appearance: awake, alert, in no apparent respiratory distress   HEENT: Head: Normocephalic, no lesions, without obvious abnormality  Neck: no JVD  Lungs: Decreased air entry on the left side with chest tube in place. Heart: Irregular heart rate. Abdomen: soft, non-tender; bowel sounds normal  Extremities: No LE edema  Neurologic: Mental status: Alert, oriented. Motor and sensory not done. DATA  EKG:    Date: 02/21/23  Reading:  Sinus tachycardia  Moderate voltage criteria for LVH, may be normal variant  Nonspecific ST and T wave abnormality  Abnormal ECG  When compared with ECG of 16-FEB-2023 23:10,  No significant change was found     LAST ECHO:  2/21/23  Summary  Left ventricle is normal in size. Global left ventricular systolic function  is difficult to assess due to heart rate but appears normal. Calculated  ejection fraction 51% by Atkinson's method. Aortic valve is trileaflet and opens adequately. Trivial aortic insufficiency. Normal tricuspid valve structure. Trivial tricuspid regurgitation. Date:  Findings Summary: 10/22     Left Ventricle: Systolic function is normal with an ejection fraction   of 55-60%. Aortic Valve: There is mild regurgitation. Tricuspid Valve: There is trace to mild regurgitation. The right   ventricular systolic pressure normal. RVSP calculated at 24 mmHg. RVSP is   based on RA pressure of 3 mmHg. There is no pulmonary hypertension.      Left Ventricle   Systolic function is normal with an ejection fraction of 55-60%. No obvious regional wall motion abnormalities. There is abnormal septal motion. Right Ventricle   Right ventricular size appears normal. Systolic function is normal.     Right Atrium   Right atrium is normal in size. IVC/SVC   The right atrial pressure is estimated at 3 mmHg. IVC appears normal. There is normal collapse with deep inspiration. Tricuspid Valve   Tricuspid valve appears to be normal. There is trace to mild regurgitation. The right ventricular systolic pressure normal. RVSP calculated at 24 mmHg. RVSP is based on RA pressure of 3 mmHg. There is no pulmonary hypertension. Aortic Valve   The aortic valve is trileaflet. The leaflets are mildly thickened. There is mild regurgitation. Pulmonic Valve   Pulmonic valve structure is grossly normal. There is trace to mild regurgitation. Pericardium   There is no pericardial effusion. LAST Stress Test:   Date of last ST:  Major Findings:     LAST Cardiac Angiography:.  Date:  Findings:    Assessment / Acute Cardiac Problems:   Left sided Empyema s/p VATS/decortication 1/23/2023  Community acquired pneumonia  Acute hypoxic respiratory failure due to above  New Onset Atrial fibrillation with RVR  Acute Pericarditis   History of non-Hodgkin's lymphoma  History of nonrheumatic mitral regurgitation/Tricuspid regurgitation  History of dilated aortic root  Type 2 diabetes  KELSEA on Chronic kidney disease stage III/IV  CLAY on CPAP    Plan of Treatment:   Continue to be in Afib with controlled heart rate. Continue amiodarone at current rate   Continue aspirin 650 mg tid and Colchicin 0.3 qday for pericarditis (renal dose)   On antibiotics as per ID  Continue Lopressor 12.5 twice daily, uptitrate as HR/BP tolerate. Lopressor 5 mg IV q6h PRN for >110  On IV Lasix as per nephro, at risk for RRT.   Will continue to follow along       Plan to be discussed with rounding attending       Kannan Brandt MD.  Fellow, cardiovascular disease   [address], [place], [place]    I performed a history and physical examination of the patient and discussed management with the resident. I reviewed the residents note and agree with the documented findings and plan of care. Any areas of disagreement are noted on the chart. I was personally present for the key portions of any procedures. I have documented in the chart those procedures where I was not present during the key portions. I have personally evaluated this patient and have completed at least one if not all key elements of the E/M (history, physical exam, and MDM). Additional findings are as noted. On steroid per nephrology. ASA stopped.      Salud Amor MD

## 2023-02-26 NOTE — PLAN OF CARE
Problem: Discharge Planning  Goal: Discharge to home or other facility with appropriate resources  Outcome: Progressing  Flowsheets (Taken 2/26/2023 0800)  Discharge to home or other facility with appropriate resources: Identify barriers to discharge with patient and caregiver     Problem: Safety - Adult  Goal: Free from fall injury  Outcome: Progressing     Problem: ABCDS Injury Assessment  Goal: Absence of physical injury  Outcome: Progressing     Problem: Skin/Tissue Integrity  Goal: Absence of new skin breakdown  Description: 1. Monitor for areas of redness and/or skin breakdown  2. Assess vascular access sites hourly  3. Every 4-6 hours minimum:  Change oxygen saturation probe site  4. Every 4-6 hours:  If on nasal continuous positive airway pressure, respiratory therapy assess nares and determine need for appliance change or resting period.   Outcome: Progressing     Problem: Chronic Conditions and Co-morbidities  Goal: Patient's chronic conditions and co-morbidity symptoms are monitored and maintained or improved  Outcome: Progressing  Flowsheets (Taken 2/26/2023 0800)  Care Plan - Patient's Chronic Conditions and Co-Morbidity Symptoms are Monitored and Maintained or Improved:   Monitor and assess patient's chronic conditions and comorbid symptoms for stability, deterioration, or improvement   Collaborate with multidisciplinary team to address chronic and comorbid conditions and prevent exacerbation or deterioration   Update acute care plan with appropriate goals if chronic or comorbid symptoms are exacerbated and prevent overall improvement and discharge     Problem: Respiratory - Adult  Goal: Achieves optimal ventilation and oxygenation  Outcome: Progressing  Flowsheets (Taken 2/26/2023 0800)  Achieves optimal ventilation and oxygenation:   Assess for changes in respiratory status   Assess for changes in mentation and behavior   Position to facilitate oxygenation and minimize respiratory effort Oxygen supplementation based on oxygen saturation or arterial blood gases   Encourage broncho-pulmonary hygiene including cough, deep breathe, incentive spirometry   Assess the need for suctioning and aspirate as needed   Assess and instruct to report shortness of breath or any respiratory difficulty     Problem: Pain  Goal: Verbalizes/displays adequate comfort level or baseline comfort level  Outcome: Progressing     Problem: Neurosensory - Adult  Goal: Achieves stable or improved neurological status  Outcome: Progressing  Flowsheets (Taken 2/26/2023 0800)  Achieves stable or improved neurological status:   Assess for and report changes in neurological status   Maintain blood pressure and fluid volume within ordered parameters to optimize cerebral perfusion and minimize risk of hemorrhage   Monitor temperature, glucose, and sodium.  Initiate appropriate interventions as ordered  Goal: Absence of seizures  Outcome: Progressing  Goal: Remains free of injury related to seizures activity  Outcome: Progressing  Goal: Achieves maximal functionality and self care  Outcome: Progressing  Flowsheets (Taken 2/26/2023 0800)  Achieves maximal functionality and self care:   Monitor swallowing and airway patency with patient fatigue and changes in neurological status   Encourage and assist patient to increase activity and self care with guidance from physical therapy/occupational therapy   Encourage visually impaired, hearing impaired and aphasic patients to use assistive/communication devices     Problem: Cardiovascular - Adult  Goal: Maintains optimal cardiac output and hemodynamic stability  Outcome: Progressing  Flowsheets (Taken 2/26/2023 0800)  Maintains optimal cardiac output and hemodynamic stability:   Monitor blood pressure and heart rate   Monitor urine output and notify Licensed Independent Practitioner for values outside of normal range   Administer fluid and/or volume expanders as ordered   Assess for signs of decreased cardiac output  Goal: Absence of cardiac dysrhythmias or at baseline  Outcome: Progressing     Problem: Skin/Tissue Integrity - Adult  Goal: Skin integrity remains intact  Outcome: Progressing  Flowsheets (Taken 2/26/2023 0800)  Skin Integrity Remains Intact:   Monitor for areas of redness and/or skin breakdown   Assess vascular access sites hourly   Every 4-6 hours minimum: Change oxygen saturation probe site  Goal: Incisions, wounds, or drain sites healing without S/S of infection  Outcome: Progressing  Flowsheets (Taken 2/26/2023 0800)  Incisions, Wounds, or Drain Sites Healing Without Sign and Symptoms of Infection:   ADMISSION and DAILY: Assess and document risk factors for pressure ulcer development   TWICE DAILY: Assess and document skin integrity   TWICE DAILY: Assess and document dressing/incision, wound bed, drain sites and surrounding tissue   Implement wound care per orders   Initiate pressure ulcer prevention bundle as indicated  Goal: Oral mucous membranes remain intact  Outcome: Progressing  Flowsheets (Taken 2/26/2023 0800)  Oral Mucous Membranes Remain Intact: Assess oral mucosa and hygiene practices     Problem: Musculoskeletal - Adult  Goal: Return mobility to safest level of function  Outcome: Progressing  Flowsheets (Taken 2/26/2023 0800)  Return Mobility to Safest Level of Function:   Assess patient stability and activity tolerance for standing, transferring and ambulating with or without assistive devices   Assist with transfers and ambulation using safe patient handling equipment as needed   Ensure adequate protection for wounds/incisions during mobilization   Instruct patient/family in ordered activity level  Goal: Maintain proper alignment of affected body part  Outcome: Progressing  Goal: Return ADL status to a safe level of function  Outcome: Progressing  Flowsheets (Taken 2/26/2023 0800)  Return ADL Status to a Safe Level of Function:   Administer medication as ordered   Assess activities of daily living deficits and provide assistive devices as needed   Assist and instruct patient to increase activity and self care as tolerated     Problem: Gastrointestinal - Adult  Goal: Minimal or absence of nausea and vomiting  Outcome: Progressing  Goal: Maintains or returns to baseline bowel function  Outcome: Progressing  Flowsheets (Taken 2/26/2023 0800)  Maintains or returns to baseline bowel function:   Assess bowel function   Encourage oral fluids to ensure adequate hydration   Administer IV fluids as ordered to ensure adequate hydration   Encourage mobilization and activity  Goal: Maintains adequate nutritional intake  Outcome: Progressing  Flowsheets (Taken 2/26/2023 0800)  Maintains adequate nutritional intake:   Monitor percentage of each meal consumed   Assist with meals as needed   Monitor intake and output, weight and lab values  Goal: Establish and maintain optimal ostomy function  Outcome: Progressing     Problem: Genitourinary - Adult  Goal: Absence of urinary retention  Outcome: Progressing  Flowsheets (Taken 2/26/2023 0800)  Absence of urinary retention:   Assess patients ability to void and empty bladder   Monitor intake/output and perform bladder scan as needed  Goal: Urinary catheter remains patent  Outcome: Progressing  Flowsheets (Taken 2/26/2023 0800)  Urinary catheter remains patent: Assess patency of urinary catheter     Problem: Infection - Adult  Goal: Absence of infection at discharge  Outcome: Progressing  Flowsheets (Taken 2/26/2023 0800)  Absence of infection at discharge:   Assess and monitor for signs and symptoms of infection   Monitor lab/diagnostic results   Monitor all insertion sites i.e., indwelling lines, tubes and drains   Administer medications as ordered   Instruct and encourage patient and family to use good hand hygiene technique   Identify and instruct in appropriate isolation precautions for identified infection/condition  Goal: Absence of infection during hospitalization  Outcome: Progressing  Flowsheets (Taken 2/26/2023 0800)  Absence of infection during hospitalization:   Assess and monitor for signs and symptoms of infection   Monitor lab/diagnostic results   Monitor all insertion sites i.e., indwelling lines, tubes and drains   Administer medications as ordered   Instruct and encourage patient and family to use good hand hygiene technique   Identify and instruct in appropriate isolation precautions for identified infection/condition  Goal: Absence of fever/infection during anticipated neutropenic period  Outcome: Progressing     Problem: Metabolic/Fluid and Electrolytes - Adult  Goal: Electrolytes maintained within normal limits  Outcome: Progressing  Flowsheets (Taken 2/26/2023 0800)  Electrolytes maintained within normal limits:   Monitor labs and assess patient for signs and symptoms of electrolyte imbalances   Fluid restriction as ordered   Instruct patient on fluid and nutrition restrictions as appropriate  Goal: Hemodynamic stability and optimal renal function maintained  Outcome: Progressing  Flowsheets (Taken 2/26/2023 0800)  Hemodynamic stability and optimal renal function maintained:   Monitor labs and assess for signs and symptoms of volume excess or deficit   Monitor intake, output and patient weight   Monitor response to interventions for patient's volume status, including labs, urine output, blood pressure (other measures as available)   Encourage oral intake as appropriate   Instruct patient on fluid and nutrition restrictions as appropriate  Goal: Glucose maintained within prescribed range  Outcome: Progressing  Flowsheets (Taken 2/26/2023 0800)  Glucose maintained within prescribed range:   Monitor blood glucose as ordered   Assess for signs and symptoms of hyperglycemia and hypoglycemia   Administer ordered medications to maintain glucose within target range     Problem: Hematologic - Adult  Goal: Maintains hematologic stability  Outcome: Progressing  Flowsheets (Taken 2/26/2023 0800)  Maintains hematologic stability: Assess for signs and symptoms of bleeding or hemorrhage     Problem: Nutrition Deficit:  Goal: Optimize nutritional status  Outcome: Progressing     Problem: Coping  Goal: Pt/Family able to verbalize concerns and demonstrate effective coping strategies  Description: INTERVENTIONS:  1. Assist patient/family to identify coping skills, available support systems and cultural and spiritual values  2. Provide emotional support, including active listening and acknowledgement of concerns of patient and caregivers  3. Reduce environmental stimuli, as able  4. Instruct patient/family in relaxation techniques, as appropriate  5. Assess for spiritual pain/suffering and initiate Spiritual Care, Psychosocial Clinical Specialist consults as needed  Outcome: Progressing     Problem: Confusion  Goal: Confusion, delirium, dementia, or psychosis is improved or at baseline  Description: INTERVENTIONS:  1. Assess for possible contributors to thought disturbance, including medications, impaired vision or hearing, underlying metabolic abnormalities, dehydration, psychiatric diagnoses, and notify attending LIP  2. [place] high risk fall precautions, as indicated  3. Provide frequent short contacts to provide reality reorientation, refocusing and direction  4. Decrease environmental stimuli, including noise as appropriate  5. Monitor and intervene to maintain adequate nutrition, hydration, elimination, sleep and activity  6. If unable to ensure safety without constant attention obtain sitter and review sitter guidelines with assigned personnel  7.  Initiate Psychosocial CNS and Spiritual Care consult, as indicated  Outcome: Progressing

## 2023-02-27 ENCOUNTER — APPOINTMENT (OUTPATIENT)
Dept: CARDIAC CATH/INVASIVE PROCEDURES | Age: 68
DRG: 853 | End: 2023-02-27
Attending: STUDENT IN AN ORGANIZED HEALTH CARE EDUCATION/TRAINING PROGRAM
Payer: COMMERCIAL

## 2023-02-27 ENCOUNTER — APPOINTMENT (OUTPATIENT)
Dept: GENERAL RADIOLOGY | Age: 68
DRG: 853 | End: 2023-02-27
Attending: STUDENT IN AN ORGANIZED HEALTH CARE EDUCATION/TRAINING PROGRAM
Payer: COMMERCIAL

## 2023-02-27 LAB
ABSOLUTE EOS #: <0.03 K/UL (ref 0–0.44)
ABSOLUTE IMMATURE GRANULOCYTE: 0.36 K/UL (ref 0–0.3)
ABSOLUTE LYMPH #: 4.35 K/UL (ref 1.1–3.7)
ABSOLUTE MONO #: 0.8 K/UL (ref 0.1–1.2)
ANION GAP SERPL CALCULATED.3IONS-SCNC: 13 MMOL/L (ref 9–17)
BASOPHILS # BLD: 0 % (ref 0–2)
BASOPHILS ABSOLUTE: 0.03 K/UL (ref 0–0.2)
BUN SERPL-MCNC: 63 MG/DL (ref 8–23)
CALCIUM SERPL-MCNC: 9.1 MG/DL (ref 8.6–10.4)
CHLORIDE SERPL-SCNC: 98 MMOL/L (ref 98–107)
CO2 SERPL-SCNC: 21 MMOL/L (ref 20–31)
CREAT SERPL-MCNC: 4.27 MG/DL (ref 0.7–1.2)
CRP SERPL HS-MCNC: 202.8 MG/L (ref 0–5)
EOSINOPHILS RELATIVE PERCENT: 0 % (ref 1–4)
GFR SERPL CREATININE-BSD FRML MDRD: 15 ML/MIN/1.73M2
GLUCOSE BLD-MCNC: 232 MG/DL (ref 75–110)
GLUCOSE BLD-MCNC: 265 MG/DL (ref 75–110)
GLUCOSE BLD-MCNC: 283 MG/DL (ref 75–110)
GLUCOSE BLD-MCNC: 298 MG/DL (ref 75–110)
GLUCOSE BLD-MCNC: 375 MG/DL (ref 75–110)
GLUCOSE SERPL-MCNC: 328 MG/DL (ref 70–99)
HCT VFR BLD AUTO: 29.8 % (ref 40.7–50.3)
HGB BLD-MCNC: 9 G/DL (ref 13–17)
IMMATURE GRANULOCYTES: 2 %
LV EF: 50 %
LVEF MODALITY: NORMAL
LYMPHOCYTES # BLD: 27 % (ref 24–43)
MCH RBC QN AUTO: 27.9 PG (ref 25.2–33.5)
MCHC RBC AUTO-ENTMCNC: 30.2 G/DL (ref 28.4–34.8)
MCV RBC AUTO: 92.3 FL (ref 82.6–102.9)
MONOCYTES # BLD: 5 % (ref 3–12)
NRBC AUTOMATED: 0 PER 100 WBC
PDW BLD-RTO: 16.1 % (ref 11.8–14.4)
PLATELET # BLD AUTO: 440 K/UL (ref 138–453)
PMV BLD AUTO: 9.4 FL (ref 8.1–13.5)
POTASSIUM SERPL-SCNC: 5 MMOL/L (ref 3.7–5.3)
RBC # BLD: 3.23 M/UL (ref 4.21–5.77)
RBC # BLD: ABNORMAL 10*6/UL
SEG NEUTROPHILS: 66 % (ref 36–65)
SEGMENTED NEUTROPHILS ABSOLUTE COUNT: 10.7 K/UL (ref 1.5–8.1)
SODIUM SERPL-SCNC: 132 MMOL/L (ref 135–144)
SURGICAL PATHOLOGY REPORT: NORMAL
WBC # BLD AUTO: 16.2 K/UL (ref 3.5–11.3)

## 2023-02-27 PROCEDURE — 93325 DOPPLER ECHO COLOR FLOW MAPG: CPT

## 2023-02-27 PROCEDURE — 99232 SBSQ HOSP IP/OBS MODERATE 35: CPT | Performed by: INTERNAL MEDICINE

## 2023-02-27 PROCEDURE — 6360000002 HC RX W HCPCS: Performed by: INTERNAL MEDICINE

## 2023-02-27 PROCEDURE — 93312 ECHO TRANSESOPHAGEAL: CPT

## 2023-02-27 PROCEDURE — 2709999900 HC NON-CHARGEABLE SUPPLY

## 2023-02-27 PROCEDURE — 6370000000 HC RX 637 (ALT 250 FOR IP): Performed by: FAMILY MEDICINE

## 2023-02-27 PROCEDURE — 85025 COMPLETE CBC W/AUTO DIFF WBC: CPT

## 2023-02-27 PROCEDURE — 99233 SBSQ HOSP IP/OBS HIGH 50: CPT | Performed by: INTERNAL MEDICINE

## 2023-02-27 PROCEDURE — 6360000002 HC RX W HCPCS

## 2023-02-27 PROCEDURE — 94761 N-INVAS EAR/PLS OXIMETRY MLT: CPT

## 2023-02-27 PROCEDURE — 97535 SELF CARE MNGMENT TRAINING: CPT

## 2023-02-27 PROCEDURE — 80048 BASIC METABOLIC PNL TOTAL CA: CPT

## 2023-02-27 PROCEDURE — 6370000000 HC RX 637 (ALT 250 FOR IP): Performed by: STUDENT IN AN ORGANIZED HEALTH CARE EDUCATION/TRAINING PROGRAM

## 2023-02-27 PROCEDURE — 99232 SBSQ HOSP IP/OBS MODERATE 35: CPT | Performed by: FAMILY MEDICINE

## 2023-02-27 PROCEDURE — 6360000002 HC RX W HCPCS: Performed by: FAMILY MEDICINE

## 2023-02-27 PROCEDURE — 99153 MOD SED SAME PHYS/QHP EA: CPT

## 2023-02-27 PROCEDURE — 92960 CARDIOVERSION ELECTRIC EXT: CPT

## 2023-02-27 PROCEDURE — 71045 X-RAY EXAM CHEST 1 VIEW: CPT

## 2023-02-27 PROCEDURE — 10701563 HC NON-CHARGEABLE SUPPLY

## 2023-02-27 PROCEDURE — 36415 COLL VENOUS BLD VENIPUNCTURE: CPT

## 2023-02-27 PROCEDURE — 2580000003 HC RX 258

## 2023-02-27 PROCEDURE — 6370000000 HC RX 637 (ALT 250 FOR IP): Performed by: PHYSICIAN ASSISTANT

## 2023-02-27 PROCEDURE — 2580000003 HC RX 258: Performed by: NURSE PRACTITIONER

## 2023-02-27 PROCEDURE — 2700000000 HC OXYGEN THERAPY PER DAY

## 2023-02-27 PROCEDURE — 2000000000 HC ICU R&B

## 2023-02-27 PROCEDURE — 6370000000 HC RX 637 (ALT 250 FOR IP): Performed by: NURSE PRACTITIONER

## 2023-02-27 PROCEDURE — 99152 MOD SED SAME PHYS/QHP 5/>YRS: CPT

## 2023-02-27 PROCEDURE — 2500000003 HC RX 250 WO HCPCS: Performed by: STUDENT IN AN ORGANIZED HEALTH CARE EDUCATION/TRAINING PROGRAM

## 2023-02-27 PROCEDURE — 82947 ASSAY GLUCOSE BLOOD QUANT: CPT

## 2023-02-27 PROCEDURE — 2580000003 HC RX 258: Performed by: PHYSICIAN ASSISTANT

## 2023-02-27 PROCEDURE — 6360000002 HC RX W HCPCS: Performed by: NURSE PRACTITIONER

## 2023-02-27 PROCEDURE — 2580000003 HC RX 258: Performed by: STUDENT IN AN ORGANIZED HEALTH CARE EDUCATION/TRAINING PROGRAM

## 2023-02-27 PROCEDURE — 2580000003 HC RX 258: Performed by: ANESTHESIOLOGY

## 2023-02-27 PROCEDURE — 6360000002 HC RX W HCPCS: Performed by: STUDENT IN AN ORGANIZED HEALTH CARE EDUCATION/TRAINING PROGRAM

## 2023-02-27 PROCEDURE — 86140 C-REACTIVE PROTEIN: CPT

## 2023-02-27 RX ORDER — SODIUM CHLORIDE 0.9 % (FLUSH) 0.9 %
5-40 SYRINGE (ML) INJECTION PRN
Status: DISCONTINUED | OUTPATIENT
Start: 2023-02-27 | End: 2023-03-06 | Stop reason: HOSPADM

## 2023-02-27 RX ORDER — HEPARIN SODIUM 5000 [USP'U]/ML
5000 INJECTION, SOLUTION INTRAVENOUS; SUBCUTANEOUS EVERY 8 HOURS SCHEDULED
Status: DISCONTINUED | OUTPATIENT
Start: 2023-02-27 | End: 2023-03-06 | Stop reason: HOSPADM

## 2023-02-27 RX ORDER — SODIUM CHLORIDE 0.9 % (FLUSH) 0.9 %
5-40 SYRINGE (ML) INJECTION EVERY 12 HOURS SCHEDULED
Status: DISCONTINUED | OUTPATIENT
Start: 2023-02-27 | End: 2023-03-06 | Stop reason: HOSPADM

## 2023-02-27 RX ORDER — INSULIN LISPRO 100 [IU]/ML
0-16 INJECTION, SOLUTION INTRAVENOUS; SUBCUTANEOUS
Status: DISCONTINUED | OUTPATIENT
Start: 2023-02-28 | End: 2023-02-27

## 2023-02-27 RX ORDER — INSULIN GLARGINE 100 [IU]/ML
20 INJECTION, SOLUTION SUBCUTANEOUS DAILY
Status: DISCONTINUED | OUTPATIENT
Start: 2023-02-27 | End: 2023-03-01

## 2023-02-27 RX ORDER — SODIUM CHLORIDE 9 MG/ML
INJECTION, SOLUTION INTRAVENOUS PRN
Status: DISCONTINUED | OUTPATIENT
Start: 2023-02-27 | End: 2023-03-06 | Stop reason: HOSPADM

## 2023-02-27 RX ORDER — FUROSEMIDE 10 MG/ML
40 INJECTION INTRAMUSCULAR; INTRAVENOUS 2 TIMES DAILY
Status: DISCONTINUED | OUTPATIENT
Start: 2023-02-27 | End: 2023-02-28

## 2023-02-27 RX ORDER — INSULIN GLARGINE 100 [IU]/ML
10 INJECTION, SOLUTION SUBCUTANEOUS NIGHTLY
Status: DISCONTINUED | OUTPATIENT
Start: 2023-02-27 | End: 2023-03-01

## 2023-02-27 RX ORDER — INSULIN LISPRO 100 [IU]/ML
0-16 INJECTION, SOLUTION INTRAVENOUS; SUBCUTANEOUS
Status: DISCONTINUED | OUTPATIENT
Start: 2023-02-27 | End: 2023-03-03

## 2023-02-27 RX ADMIN — METOPROLOL TARTRATE 25 MG: 25 TABLET ORAL at 21:29

## 2023-02-27 RX ADMIN — HEPARIN SODIUM 5000 UNITS: 5000 INJECTION INTRAVENOUS; SUBCUTANEOUS at 15:59

## 2023-02-27 RX ADMIN — HEPARIN SODIUM 5000 UNITS: 5000 INJECTION INTRAVENOUS; SUBCUTANEOUS at 21:30

## 2023-02-27 RX ADMIN — INSULIN LISPRO 16 UNITS: 100 INJECTION, SOLUTION INTRAVENOUS; SUBCUTANEOUS at 17:41

## 2023-02-27 RX ADMIN — METOPROLOL TARTRATE 5 MG: 5 INJECTION, SOLUTION INTRAVENOUS at 17:41

## 2023-02-27 RX ADMIN — MUPIROCIN: 20 OINTMENT TOPICAL at 07:51

## 2023-02-27 RX ADMIN — DOCUSATE SODIUM 100 MG: 100 CAPSULE, LIQUID FILLED ORAL at 07:51

## 2023-02-27 RX ADMIN — AMIODARONE HYDROCHLORIDE 0.5 MG/MIN: 50 INJECTION, SOLUTION INTRAVENOUS at 17:08

## 2023-02-27 RX ADMIN — INSULIN LISPRO 8 UNITS: 100 INJECTION, SOLUTION INTRAVENOUS; SUBCUTANEOUS at 21:28

## 2023-02-27 RX ADMIN — DOCUSATE SODIUM 100 MG: 100 CAPSULE, LIQUID FILLED ORAL at 21:29

## 2023-02-27 RX ADMIN — SODIUM CHLORIDE, PRESERVATIVE FREE 10 ML: 5 INJECTION INTRAVENOUS at 08:12

## 2023-02-27 RX ADMIN — DOCUSATE SODIUM 100 MG: 100 CAPSULE, LIQUID FILLED ORAL at 15:59

## 2023-02-27 RX ADMIN — FUROSEMIDE 20 MG: 10 INJECTION, SOLUTION INTRAMUSCULAR; INTRAVENOUS at 07:51

## 2023-02-27 RX ADMIN — INSULIN GLARGINE 20 UNITS: 100 INJECTION, SOLUTION SUBCUTANEOUS at 12:42

## 2023-02-27 RX ADMIN — INSULIN GLARGINE 10 UNITS: 100 INJECTION, SOLUTION SUBCUTANEOUS at 21:27

## 2023-02-27 RX ADMIN — SODIUM CHLORIDE, PRESERVATIVE FREE 5 ML: 5 INJECTION INTRAVENOUS at 21:00

## 2023-02-27 RX ADMIN — CEFTRIAXONE SODIUM 1000 MG: 10 INJECTION, POWDER, FOR SOLUTION INTRAVENOUS at 08:12

## 2023-02-27 RX ADMIN — INSULIN LISPRO 8 UNITS: 100 INJECTION, SOLUTION INTRAVENOUS; SUBCUTANEOUS at 07:51

## 2023-02-27 RX ADMIN — METHYLPREDNISOLONE SODIUM SUCCINATE 30 MG: 40 INJECTION, POWDER, FOR SOLUTION INTRAMUSCULAR; INTRAVENOUS at 09:53

## 2023-02-27 RX ADMIN — FUROSEMIDE 40 MG: 10 INJECTION, SOLUTION INTRAMUSCULAR; INTRAVENOUS at 17:41

## 2023-02-27 RX ADMIN — METHYLPREDNISOLONE SODIUM SUCCINATE 30 MG: 40 INJECTION, POWDER, FOR SOLUTION INTRAMUSCULAR; INTRAVENOUS at 21:29

## 2023-02-27 RX ADMIN — POLYETHYLENE GLYCOL 3350 17 G: 17 POWDER, FOR SOLUTION ORAL at 07:51

## 2023-02-27 RX ADMIN — INSULIN LISPRO 4 UNITS: 100 INJECTION, SOLUTION INTRAVENOUS; SUBCUTANEOUS at 12:39

## 2023-02-27 RX ADMIN — METOPROLOL TARTRATE 12.5 MG: 25 TABLET ORAL at 07:50

## 2023-02-27 RX ADMIN — COLCHICINE 0.3 MG: 0.6 TABLET, FILM COATED ORAL at 07:51

## 2023-02-27 RX ADMIN — PANTOPRAZOLE SODIUM 40 MG: 40 TABLET, DELAYED RELEASE ORAL at 07:50

## 2023-02-27 RX ADMIN — AMIODARONE HYDROCHLORIDE 0.5 MG/MIN: 50 INJECTION, SOLUTION INTRAVENOUS at 00:39

## 2023-02-27 RX ADMIN — SENNOSIDES 8.6 MG: 8.6 TABLET, COATED ORAL at 21:29

## 2023-02-27 RX ADMIN — MUPIROCIN: 20 OINTMENT TOPICAL at 21:29

## 2023-02-27 ASSESSMENT — ENCOUNTER SYMPTOMS
CONSTIPATION: 0
SHORTNESS OF BREATH: 0
NAUSEA: 0
VOICE CHANGE: 0
BLOOD IN STOOL: 0
DIARRHEA: 0
WHEEZING: 0
VOMITING: 0
SORE THROAT: 0
SINUS PRESSURE: 0
COUGH: 0
ABDOMINAL PAIN: 0

## 2023-02-27 ASSESSMENT — PAIN SCALES - GENERAL: PAINLEVEL_OUTOF10: 3

## 2023-02-27 NOTE — PROGRESS NOTES
[facility] Cardioth[facility]acic [facility]   Progress Note    2/27/2023 9:33 AM  Surgeon:  Blanca Espinal          POD# 3  S/P :  thoracomy decorticaion    Subjective:  Mr. PATIENTS' [facility] no complaints, still in AF this AM, HDS    Vital Signs: BP (!) 141/81   Pulse (!) 105   Temp 98.3 °F (36.8 °C) (Oral)   Resp 20   Ht 6' 1\" (1.854 m)   Wt 230 lb 9.6 oz (104.6 kg)   SpO2 95%   BMI 30.42 kg/m²  O2 Flow Rate (L/min): 1 L/min   Admit Weight: Weight: 231 lb 7.7 oz (105 kg)       Labs and Studies:  CBC:   No results for input(s): WBC, HGB, HCT, MCV, PLT in the last 72 hours. BMP:   Recent Labs     02/25/23  1002 02/26/23  0343 02/27/23  0327   * 135 132*   K 5.0 4.7 5.0    103 98   CO2 19* 20 21   BUN 53* 60* 63*   CREATININE 4.38* 4.72* 4.27*       PT/INR: No results for input(s): PROTIME, INR in the last 72 hours. APTT:   No results for input(s): APTT in the last 72 hours. I/O:  I/O last 3 completed shifts: In: 2745.1 [P.O.:1490; I.V.:1255.1]  Out: 3820 [Urine:3660; Chest Tube:160]    CXR 2/27/2023  FINDINGS:   Left-sided chest tubes are stable. Partially loculated left pleural effusion   is again demonstrated with left basilar consolidation. Central line is   stable. Right lung is clear. Cardiomediastinal silhouette and bony thorax   are unchanged. Stable cardiomegaly. Impression   Left chest tubes present with partially loculated left pleural effusion and   left basilar opacity unchanged.        Scheduled Meds:    midodrine  10 mg Oral TID WC    methylPREDNISolone  30 mg IntraVENous Q12H    insulin lispro  0-16 Units SubCUTAneous TID WC    insulin lispro  0-4 Units SubCUTAneous Nightly    metoprolol tartrate  12.5 mg Oral BID    docusate sodium  100 mg Oral TID    senna  1 tablet Oral Nightly    furosemide  20 mg IntraVENous BID    cefTRIAXone (ROCEPHIN) IV  1,000 mg IntraVENous Q24H    pantoprazole  40 mg Oral QAM AC    colchicine  0.3 mg Oral Daily    sodium chloride flush  5-40 mL IntraVENous 2 times per day    mupirocin   Nasal BID    chlorhexidine   Topical See Admin Instructions    sodium chloride flush  5-40 mL IntraVENous 2 times per day    polyethylene glycol  17 g Oral Daily    sodium chloride flush  5-40 mL IntraVENous 2 times per day     Continuous Infusions:    sodium chloride 30 mL/hr at 02/26/23 2042    amiodarone 0.5 mg/min (02/27/23 0039)    sodium chloride      sodium chloride      sodium chloride      dextrose       A/P:  2/27/2023  POD3   CT to water seal today   On RA, continue IS and pulmonary toileting   AF in the setting of pericarditis, cardiology to manage, currently on amio gtt, planning for AFIA with cardioversion today   Cr downtrending today, nephrology following, hx of CKD   D/c planning: rehab vs SNF     POD # 2  Continue chest tubes to suction. Forced air leak.   Creatine climbing neprhology following  Pericarditis, cardiolgy following    Cedric Carreno, APRN - CNP

## 2023-02-27 NOTE — OP NOTE
[facility]  Transesophageal Echocardiogram       Today's Date:  2/27/2023  Ordering Physician:  Dr Camille Granda  Indication:   Afib    Operators:  Primary:   Dr Camille Granda MD (Attending Physician)  Assistant:   Crys Finnegan MD (Cardiovascular Fellow)      Pre Procedure Conscious Sedation Data:    ASA Class:    [] I [] II [x] III [] IV    Mallampati Class:  [] I [x] II [] III [] IV    Procedure:    Patient seen and examined. History and Physical reviewed. Labs reviewed. After informed consent was obtained with explanation of the risks and benefits, the patient was brought to cardiac cath lab. All sedation was administered by the cardiologist. The oropharynx was pre-anesthesized with viscous lidocaine and cetacaine spray. The ultrasound probe was passed without any difficulty. AFIA findings:    LA:  Normal  CAMILLA:  No thrombus  RA:  Normal  RV:   Normal  LV:  Normal  Estimated LVEF:  50%  Aorta:   Mild atheromatous disease arch  Pericardium: No pericardial effusion  Septum:  No intracardiac shunt via color Doppler. Valves:    Mitral Valve: Structurally normal. No regurgitation is identified. Aortic Valve: The aortic valve is trileaflet and opens adequately. Mild regurgitiation is identified. Tricuspid valve: Structurally normal. No regurgitation is identified. Pulmonary valve: Normal. No significant regurgitation    No valvular vegetations or thrombus identified. Summary:     1. A AFIA was performed without complications. 2. LVEF 50%. 3. Mild AI. 4. No CAMILLA clot. 5. No thrombus or valvular vegetation identified  6. Proceed with Cardioversion. CARDIOVERSION:    After an adequate level of sedation was achieved  200J in biphasic synchronized delivery was administered. conversion to normal sinus rhythm. The patient awoke without complications. A post procedure 12 L ECG was ordered and reviewed.       Impression:  Successful Consious Sedation - safely  Successful Cardioversion      Complications: There were no complications encountered. The patient will continue with the discharge meds and has been instructed to follow-up with Primary cardiologist for continued long term care and cardiovascular management. There were no complications encountered. Bello Fernández MD       Cardiovascular Fellow    I have reviewed the case / procedure with resident / fellow  I have examined the patient personally  Patient agree with treatment plan as discussed before, final arrangement based on my evaluation and exam.    Risk and benefit of procedure planned were explained in details. Procedure was performed by me personally, with all aspect of the procedure being done using standard protocol. Note was modified based on my own assessment and treatment.     Grecia Villanueva MD  [facility]

## 2023-02-27 NOTE — PROGRESS NOTES
[facility]  Office: [address], DO, Mike Briseno, DO, Juan Mgiuel Roca, DO, Rc Hanks Willard, DO, Ani Lara MD, Aisha Trinidad MD, Cristian Demarco MD, Gabi Rojas MD,  Reece Calhoun MD, Sushila Awan MD, Walter Law, DO, Kendell Rodríguez MD,  Rula Shipman MD, Kit Baker MD, Melissa Steven, DO, Jessica Beltran MD, Cindi Hurley MD, Hailee Fairbanks DO, Cody Duggan MD, Cherelle Jenkins MD, Rachael Juares MD, August Chavez MD, Ranjan George DO, Yue Demarco MD, Silvia Turner MD, Anastacia Hall, CNP,  Daiana Roberts, CNP, Megan Dodge, CNP, Willi Guillory, CNP,  Sindy Hancock, DNP, Marva Box, CNP, Destini Cooley, CNP, Deng Presley, CNP, Nallely Rees, CNP, Karlatheresa Diamond, CNP, Colin Lester PA-C, Carmita Gomez, CNS, Claudette Hill, CNP, Chuyita Canjenny, [facility]      Daily Progress Note     Admit Date: 2/12/2023  Bed/Room No.  1007/1007-01  Admitting Physician : Cristian Demarco MD  Code Status :Full Code  Hospital Day:  LOS: 15 days   Chief Complaint:     Shortness of breath      Principal Problem:    Pneumonia, unspecified organism  Active Problems:    Pleural effusion    Hyperkalemia    Acute respiratory failure with hypoxia (HCC)    Hyponatremia    Hyperglycemia    History of non-Hodgkin's lymphoma    History of sarcoidosis    Empyema [facility])    History of atrial fibrillation    History of type 2 diabetes mellitus    KELSEA (acute kidney injury) [facility])  Resolved Problems:    * No resolved hospital problems. *    Subjective : Interval History/Significant events :  02/27/23    Patient is alert, oriented. He underwent AFIA cardioversion today and is in normal sinus rhythm. Breathing is stable on room air. .  Urine output is adequate. Patient continues to have elevated creatinine. He is on IV diuretics. Patient remains on amiodarone infusion.   He denies any chest pain  Vitals - Stable normal sinus rhythm, afebrile, heart rate 92. Labs -elevated creatinine 4.27, BUN of 63. Low hemoglobin 9.2. Leukocytosis 25.0. Nursing notes , Consults notes reviewed. Overnight events and updates discussed with Nursing staff . Background History:         Charlene Hwang is 79 y.o. male  Who was admitted to the hospital on 2/12/2023 for treatment of Pneumonia, unspecified organism. Patient presented [facility] ER at [facility] on 2/12/2023 with dyspnea and was found to have left multiloculated pneumonia with large pleural effusion with mediastinal and bilateral axillary lymphadenopathy. Patient has prior history of non-Hodgkin's lymphoma, multiple myeloma. Patient was requiring high flow nasal cannula and had thoracentesis. Pleural fluid was consistent with empyema and patient had chest tube placement by IR. He was treated with broad-spectrum antibiotics. Patient was also given dornase without much improvement. CT surgery was consulted and recommended increasing the size of chest tube and did not recommend any open surgical intervention at this time. Patient had exchange of chest tube on 2/18/2023. He had diffuse ST elevation secondary to pericarditis on 1/21/23. Patient was was started on colchicine. Patient had new onset atrial fibrillation with RVR on 10/22/23 and was given digoxin and amiodarone infusion . Patient also given colchicine and high-dose aspirin for pericarditis which was later changed to Solu-Medrol. Patient underwent VATS with decortication on 2/23/2023. He was extubated postoperatively on 2/23/2023. Patient underwent AFIA cardioversion on 2/27/2023 for rate uncontrolled atrial fibrillation. PMH:  Past Medical History:   Diagnosis Date    Acute interstitial nephritis 08/03/2022    Unclear cause. Creat bumped to 2.9, baseline in oct 2020 1.6.   Exact description of his kidney biopsy is not available to me however pathologist seems to think there is diabetic glomerosclerosis and acute interstitial nephritis seen on the biopsy specimen. No mention about chronic changes. Because of acute interstitial nephritis a trial of steroids for 15 days will be given to see if I can impro    Aortic regurgitation     ARF (acute renal failure) ([facility] 08/03/2022    kidney biopsy from June 2022  showing findings of acute interstitial nephritis. Exact description not available. Trial of steroids will be given to see if I can improve renal function. Creatinine in October 2020 was 1.6, creatinine in June 2022 was 2.9. Trial of steroids will be given to see if renal function can be improved. Chronic kidney disease     Hyperlipidemia     Hypertension     Non-Hodgkin lymphoma ([facility]     Research study patient 02/13/2023    [id number] Completed 2/15/23    Sarcoidosis     Type II or unspecified type diabetes mellitus without mention of complication, not stated as uncontrolled       Allergies: Allergies   Allergen Reactions    Latex Rash    Gabapentin      Other reaction(s): Vomiting    Meperidine Anaphylaxis    Erythromycin      Other reaction(s): Fever  Had all side effects associated with this medication      Glimepiride      Other reaction(s): Dizziness    Lidocaine Swelling     States as a child having reaction to lidocaine where his face swelled.    Ever since then he has never had lidocaine    Hydrocodone      Other reaction(s): Vomiting    Macrolides And Ketolides Other (See Comments)    Niacin And Related     Trelegy Ellipta [Fluticasone-Umeclidin-Vilant]     Pregabalin Diarrhea    Xanax [Alprazolam] Anxiety     Anxiety, restlessness, insomnia      Medications :  midodrine, 10 mg, Oral, TID WC  methylPREDNISolone, 30 mg, IntraVENous, Q12H  insulin lispro, 0-16 Units, SubCUTAneous, TID WC  insulin lispro, 0-4 Units, SubCUTAneous, Nightly  metoprolol tartrate, 12.5 mg, Oral, BID  docusate sodium, 100 mg, Oral, TID  senna, 1 tablet, Oral, Nightly  furosemide, 20 mg, IntraVENous, BID  cefTRIAXone (ROCEPHIN) IV, 1,000 mg, IntraVENous, Q24H  pantoprazole, 40 mg, Oral, QAM AC  colchicine, 0.3 mg, Oral, Daily  sodium chloride flush, 5-40 mL, IntraVENous, 2 times per day  mupirocin, , Nasal, BID  chlorhexidine, , Topical, See Admin Instructions  sodium chloride flush, 5-40 mL, IntraVENous, 2 times per day  polyethylene glycol, 17 g, Oral, Daily  sodium chloride flush, 5-40 mL, IntraVENous, 2 times per day      Review of Systems   Review of Systems   Constitutional:  Positive for activity change, appetite change and fatigue. Negative for chills, fever and unexpected weight change. HENT:  Negative for congestion, mouth sores, postnasal drip, sinus pressure, sore throat and voice change. Eyes:  Negative for visual disturbance. Respiratory:  Negative for cough, shortness of breath and wheezing. Cardiovascular:  Negative for chest pain and palpitations. Gastrointestinal:  Negative for abdominal pain, blood in stool, constipation, diarrhea, nausea and vomiting. Endocrine: Negative for polyuria. Genitourinary:  Negative for difficulty urinating, dysuria, frequency and urgency. Musculoskeletal:  Negative for arthralgias, joint swelling and myalgias. Neurological:  Negative for dizziness, tremors, speech difficulty, light-headedness and headaches. Psychiatric/Behavioral:  Positive for sleep disturbance. Negative for confusion. The patient is not nervous/anxious.     Objective :      Current Vitals : Temp: 98.2 °F (36.8 °C),  Heart Rate: (!) 105, Resp: 20, BP: (!) 141/81, SpO2: 95 %  Last 24 Hrs Vitals   Patient Vitals for the past 24 hrs:   BP Temp Temp src Pulse Resp SpO2 Weight   02/27/23 0700 (!) 141/81 -- -- (!) 105 20 95 % --   02/27/23 0600 139/83 -- -- 99 21 95 % 230 lb 9.6 oz (104.6 kg)   02/27/23 0500 127/76 -- -- (!) 106 25 94 % --   02/27/23 0400 124/69 98.2 °F (36.8 °C) Oral 87 18 95 % --   02/27/23 0300 114/71 -- -- 85 17 94 % --   02/27/23 0200 110/71 -- -- 77 17 96 % --   02/27/23 0100 107/77 -- -- 79 17 96 % --   02/27/23 0000 106/73 98.4 °F (36.9 °C) Oral 79 18 97 % --   02/26/23 2300 137/82 -- -- 89 19 92 % --   02/26/23 2200 114/69 -- -- 97 21 94 % --   02/26/23 2100 126/85 -- -- (!) 115 (!) 31 95 % --   02/26/23 2000 122/68 98.3 °F (36.8 °C) Oral (!) 117 23 -- --   02/26/23 1900 131/84 -- -- 97 27 94 % --   02/26/23 1850 -- -- -- (!) 132 -- -- --   02/26/23 1800 122/77 98.3 °F (36.8 °C) Oral (!) 102 20 93 % --   02/26/23 1700 127/84 -- -- 97 24 95 % --   02/26/23 1600 117/72 98.3 °F (36.8 °C) Oral 97 20 92 % --   02/26/23 1500 125/73 -- -- (!) 104 24 94 % --   02/26/23 1400 124/80 98.6 °F (37 °C) Oral 95 26 94 % --   02/26/23 1330 117/84 -- -- (!) 107 28 94 % --   02/26/23 1300 107/71 -- -- 95 25 96 % --   02/26/23 1240 111/88 -- -- (!) 130 27 92 % --   02/26/23 1200 100/71 98.4 °F (36.9 °C) Oral (!) 114 22 93 % --   02/26/23 1100 115/77 -- -- (!) 106 23 95 % --   02/26/23 1000 103/75 98.7 °F (37.1 °C) Oral (!) 106 24 94 % --   02/26/23 0900 122/84 -- -- (!) 108 29 91 % --   02/26/23 0800 108/72 98.6 °F (37 °C) Oral (!) 105 23 94 % --       Intake / output   02/25 1901 - 02/27 0700  In: 2745.1 [P.O.:1490; I.V.:1255.1]  Out: 3820 [Urine:3660]  Physical Exam:  Physical Exam  Vitals and nursing note reviewed. Constitutional:       General: He is not in acute distress. Appearance: He is not diaphoretic. HENT:      Head: Normocephalic and atraumatic. Nose:      Right Sinus: No maxillary sinus tenderness or frontal sinus tenderness. Left Sinus: No maxillary sinus tenderness or frontal sinus tenderness. Mouth/Throat:      Pharynx: No oropharyngeal exudate. Eyes:      General: No scleral icterus. Conjunctiva/sclera: Conjunctivae normal.      Pupils: Pupils are equal, round, and reactive to light. Neck:      Thyroid: No thyromegaly. Vascular: No JVD. Comments: Left subclavian central line in place. Cardiovascular:      Rate and Rhythm: Tachycardia present. Rhythm irregular. Pulses:           Dorsalis pedis pulses are 2+ on the right side and 2+ on the left side. Heart sounds: Normal heart sounds. No murmur heard. Pulmonary:      Effort: Pulmonary effort is normal.      Breath sounds: Normal breath sounds. No wheezing or rales. Chest:      Comments: Left chest tube in place. Abdominal:      Palpations: Abdomen is soft. There is no mass. Tenderness: There is no abdominal tenderness. Musculoskeletal:      Cervical back: Full passive range of motion without pain and neck supple. Lymphadenopathy:      Head:      Right side of head: No submandibular adenopathy. Left side of head: No submandibular adenopathy. Cervical: No cervical adenopathy. Skin:     General: Skin is warm. Neurological:      Mental Status: He is alert and oriented to person, place, and time. Motor: No tremor. Psychiatric:         Behavior: Behavior is cooperative. Oriented to self, person    Laboratory findings:    Recent Labs     02/24/23  0928   WBC 25.0*   HGB 9.2*   HCT 29.2*          Recent Labs     02/25/23  1002 02/26/23  0343 02/27/23  0327   * 135 132*   K 5.0 4.7 5.0    103 98   CO2 19* 20 21   GLUCOSE 197* 218* 328*   BUN 53* 60* 63*   CREATININE 4.38* 4.72* 4.27*   CALCIUM 9.2 8.8 9.1       No results for input(s): PROT, LABALBU, LABA1C, S4USXQU, A1QBSXV, FT4, TSH, AST, ALT, LDH, GGT, ALKPHOS, BILITOT, BILIDIR, AMMONIA, AMYLASE, LIPASE, LACTATE, CHOL, HDL, LDLCHOLESTEROL, CHOLHDLRATIO, TRIG, VLDL, BNP, TROPONINI, CKTOTAL, CKMB, CKMBINDEX, RF, SAMANTHA in the last 72 hours.          Specific Gravity, UA   Date Value Ref Range Status   02/22/2023 1.020 1.005 - 1.030 Final     Protein, UA   Date Value Ref Range Status   02/22/2023 1+ (A) NEGATIVE Final     RBC, UA   Date Value Ref Range Status   02/22/2023 None 0 - 2 /HPF Final     Bacteria, UA   Date Value Ref Range Status   02/22/2023 MODERATE (A) None Final     Nitrite, Urine   Date Value Ref Range Status 02/22/2023 NEGATIVE NEGATIVE Final     WBC, UA   Date Value Ref Range Status   02/22/2023 2 TO 5 0 - 5 /HPF Final     Leukocyte Esterase, Urine   Date Value Ref Range Status   02/22/2023 NEGATIVE NEGATIVE Final     Hemoglobin A1C   Date Value Ref Range Status   02/22/2023 8.0 (H) 4.0 - 6.0 % Final       Imaging / Clinical Data :-   CT CHEST WO CONTRAST    Result Date: 2/17/2023  Small to moderate-sized multiloculated left pleural effusions have improved. New small foci of air within the collection is presumably related to the introduction of the left thoracostomy tube. Stable small to moderate sized mediastinal lymph nodes are nonspecific but may relate to the patient's history of lymphoma. Additional small bilateral axial lymph nodes are identified. Small hiatal hernia. CT CHEST WO CONTRAST    Result Date: 2/13/2023  1. Moderate to large amount of multiloculated left pleural fluid. Assessment for pleural thickening and/or pleural nodularity is limited without IV contrast. 2. Consolidation in the left lower lobe, likely atelectasis, but possibility of superimposed pneumonia would be difficult to exclude. 3. Mediastinal and bilateral axillary lymphadenopathy, which may be related to the patient's history of lymphoma. If available, comparison to any recent imaging may be of use to assess for progression. XR CHEST PORTABLE    Result Date: 2/18/2023  Moderate left pleural effusion increased. No change left chest tube/pigtail catheter. XR CHEST PORTABLE    Result Date: 2/17/2023  Similar findings when rotation taken into account; left chest tube with unchanged or slightly increased loculated appearing left pleural effusion; atelectatic appearing changes. No pneumothorax. XR CHEST PORTABLE    Result Date: 2/14/2023  Interval placement of a left chest tube with reduced left pleural fluid. XR CHEST PORTABLE    Result Date: 2/13/2023  No significant interval change.   Redemonstration of opacification of the mid to lower left lung field which may in part be due to loculated left pleural effusion. Consider further evaluation with CT. XR CHEST PORTABLE    Result Date: 2/12/2023  Probable left-sided pneumonia with loculated pleural effusion. Underlying pathology not excluded, as above. Mild cardiomegaly and venous hypertension. Discoid atelectasis right base. RECOMMENDATION: Consider CT chest.     IR CHEST TUBE INSERTION    Result Date: 2/14/2023  Successful ultrasound guided placement of a left 10 French chest tube        Clinical Course : unchanged  Assessment and Plan  :        Left empyema - Pleural fluid growing strep viridans gordonii -not much improvement with tPA in chest tube. -underwent decortication on 2/23/2023. Continue Rocephin. CT surgery following. Chest tube to waterseal today. New onset Afib RVR - continue amiodarone infusion, underwent AFIA cardioversion on 2/27/2023. Veronique Bloodgood Anticoagulation when okay with cardiovascular surgery. Lopressor 25 mg twice daily. Pericarditis - Preserved ejection fraction, no pericardial effusion. On colchicine and Solu-Medrol. Acute respiratory failure with hypoxia -comfortable on room air. H/o non-Hodgkin's lymphoma and multiple myeloma  H/o sarcoidosis -   Acute kidney injury with CKD stage III -kidney function worsening. Baseline creatinine 1.9  -high risk of renal replacement. Nephrology following. No plan for hemodialysis today. Urine output adequate. Kidney function slowly improving. Repeat kidney function tomorrow. Hyperkalemia due to decreased renal clearance - Kayexalate. Demand ischemia from acute respiratory failure  Reactive thrombocytosis. Obstructive sleep apnea - CPAP at nighttime. COPD - albuterol as needed   Acute Delirium -Continue  Seroquel dose at night, reorientation. Type 2 diabetes mellitus -last A1C8.0. Hyperglycemia secondary to steroids. Patient on Humalog sliding scale.   Start Lantus 20 units daily.    Discussed with patient's wife at bedside. .  Continue to monitor vitals , Intake / output ,  Cell count , HGB , Kidney function, oxygenation  as indicated . Plan and updates discussed with patient ,  answers  explained to satisfaction.    Plan discussed with Staff Av Sandra RN     (Please note that portions of this note were completed with a voice recognition program. Efforts were made to edit the dictations but occasionally words are mis-transcribed.)      Nupur Bustamante MD  2/27/2023

## 2023-02-27 NOTE — PROGRESS NOTES
SPIRITUAL CARE DEPARTMENT - [facility] [address]  PROGRESS NOTE    Shift date: 2/27/2023  Shift day: Monday   Shift # 1    Room # 1007/1007-01   Name: Brittani Parr                Buddhist: Unknown   Place of Nondenominational: Unknown    Referral: Routine Visit    Admit Date & Time: 2/12/2023 10:33 PM    Assessment:  Brittani Parr is a 79 y.o. male in the hospital because of pneumonia. Upon entering the room writer observes patient sitting up in bed and awake. Patient's wife and daughter were bedside. Intervention:  Writer introduced self and title as  Writer offered space for patient and family  to express feelings, needs, and concerns and provided a ministry presence. Patient shared details on his health status. Patient [facility] about [facility]. Patient and family welcomed prayer from [place]. Outcome:  Writer prayed for patient and family. Patient and family were thankful for visit and invited chaplains to visit again. Plan:  Chaplains will remain available to offer spiritual and emotional support as needed. Electronically signed by Pratik Friend on 2/27/2023 at 12:38 PM.  [facility]  [phone]       02/27/23 1236   Encounter Summary   Service Provided For: Patient and family together   Referral/Consult From: [address] [place] [address] Drive; Children   Last Encounter  02/27/23   Complexity of Encounter Low   Begin Time 1411   End Time  1427   Total Time Calculated 16 min   Assessment/Intervention/Outcome   Assessment Calm;Coping   Intervention Active listening;Discussed belief system/Holiness practices/pinky;Discussed illness injury and its impact;Prayer (assurance of)/[place]   Outcome Comfort;Encouraged;Engaged in conversation

## 2023-02-27 NOTE — PLAN OF CARE
Problem: Discharge Planning  Goal: Discharge to home or other facility with appropriate resources  2/27/2023 0855 by Jon Escamilla RN  Outcome: Progressing  Flowsheets (Taken 2/27/2023 0800)  Discharge to home or other facility with appropriate resources:   Identify barriers to discharge with patient and caregiver   Arrange for needed discharge resources and transportation as appropriate   Identify discharge learning needs (meds, wound care, etc)   Arrange for interpreters to assist at discharge as needed   Refer to discharge planning if patient needs post-hospital services based on physician order or complex needs related to functional status, cognitive ability or social support system  2/27/2023 0457 by Yordy Reis RN  Outcome: Progressing     Problem: Safety - Adult  Goal: Free from fall injury  2/27/2023 0855 by Jon Escamilla RN  Outcome: Progressing  Flowsheets (Taken 2/27/2023 0853)  Free From Fall Injury:   Instruct family/caregiver on patient safety   Based on caregiver fall risk screen, instruct family/caregiver to ask for assistance with transferring infant if caregiver noted to have fall risk factors  2/27/2023 0457 by Yordy Reis RN  Outcome: Progressing     Problem: ABCDS Injury Assessment  Goal: Absence of physical injury  2/27/2023 0855 by Jon Escamilla RN  Outcome: Progressing  Flowsheets (Taken 2/27/2023 0853)  Absence of Physical Injury: Implement safety measures based on patient assessment  2/27/2023 0457 by Yordy Reis RN  Outcome: Progressing     Problem: Skin/Tissue Integrity  Goal: Absence of new skin breakdown  Description: 1.  Monitor for areas of redness and/or skin breakdown  2.  Assess vascular access sites hourly  3.  Every 4-6 hours minimum:  Change oxygen saturation probe site  4.  Every 4-6 hours:  If on nasal continuous positive airway pressure, respiratory therapy assess nares and determine need for appliance change or resting period.  2/27/2023 0855 by Jon  Corie Kendrick RN  Outcome: Progressing  2/27/2023 0457 by Chacorta Sousa RN  Outcome: Progressing     Problem: Chronic Conditions and Co-morbidities  Goal: Patient's chronic conditions and co-morbidity symptoms are monitored and maintained or improved  2/27/2023 0855 by Netta Wilson RN  Outcome: Progressing  Flowsheets (Taken 2/27/2023 0800)  Care Plan - Patient's Chronic Conditions and Co-Morbidity Symptoms are Monitored and Maintained or Improved:   Monitor and assess patient's chronic conditions and comorbid symptoms for stability, deterioration, or improvement   Collaborate with multidisciplinary team to address chronic and comorbid conditions and prevent exacerbation or deterioration   Update acute care plan with appropriate goals if chronic or comorbid symptoms are exacerbated and prevent overall improvement and discharge  2/27/2023 0457 by Chacorta Sousa RN  Outcome: Progressing     Problem: Respiratory - Adult  Goal: Achieves optimal ventilation and oxygenation  2/27/2023 0855 by Netta Wilson RN  Outcome: Progressing  Flowsheets (Taken 2/27/2023 0800)  Achieves optimal ventilation and oxygenation:   Assess for changes in respiratory status   Assess for changes in mentation and behavior   Position to facilitate oxygenation and minimize respiratory effort   Oxygen supplementation based on oxygen saturation or arterial blood gases   Initiate smoking cessation protocol as indicated   Encourage broncho-pulmonary hygiene including cough, deep breathe, incentive spirometry   Assess and instruct to report shortness of breath or any respiratory difficulty   Assess the need for suctioning and aspirate as needed   Respiratory therapy support as indicated  2/27/2023 0457 by Chacorta Sousa RN  Outcome: Progressing     Problem: Pain  Goal: Verbalizes/displays adequate comfort level or baseline comfort level  2/27/2023 0855 by Netta Wilson RN  Outcome: Progressing  2/27/2023 0457 by Chacorta Sousa RN  Outcome: Progressing     Problem: Neurosensory - Adult  Goal: Achieves stable or improved neurological status  2/27/2023 0855 by Kayley Clark RN  Outcome: Progressing  Flowsheets (Taken 2/27/2023 0800)  Achieves stable or improved neurological status:   Assess for and report changes in neurological status   Initiate measures to prevent increased intracranial pressure   Maintain blood pressure and fluid volume within ordered parameters to optimize cerebral perfusion and minimize risk of hemorrhage   Monitor temperature, glucose, and sodium. Initiate appropriate interventions as ordered  2/27/2023 0457 by Kiel Figueroa RN  Outcome: Progressing  Goal: Absence of seizures  2/27/2023 0855 by Kayley Clark RN  Outcome: Progressing  Flowsheets (Taken 2/27/2023 0800)  Absence of seizures:   Monitor for seizure activity.   If seizure occurs, document type and location of movements and any associated apnea   If seizure occurs, turn head to side and suction secretions as needed   Administer anticonvulsants as ordered   Support airway/breathing, administer oxygen as needed   Diagnostic studies as ordered  2/27/2023 0457 by Kiel Figueroa RN  Outcome: Progressing  Goal: Remains free of injury related to seizures activity  2/27/2023 0855 by Kayley Clark RN  Outcome: Progressing  Flowsheets (Taken 2/27/2023 0800)  Remains free of injury related to seizure activity:   Maintain airway, patient safety  and administer oxygen as ordered   Monitor patient for seizure activity, document and report duration and description of seizure to Licensed Independent Practitioner   Reorient patient post seizure   If seizure occurs, turn patient to side and suction secretions as needed   Seizure pads on all 4 side rails   Instruct patient/family to call for assistance with activity based on assessment   Instruct patient/family to notify RN of any seizure activity  2/27/2023 0457 by Kiel Figueroa RN  Outcome: Progressing  Goal: Achieves maximal functionality and self care  2/27/2023 0855 by Prakash George RN  Outcome: Progressing  Flowsheets (Taken 2/27/2023 0800)  Achieves maximal functionality and self care:   Monitor swallowing and airway patency with patient fatigue and changes in neurological status   Encourage visually impaired, hearing impaired and aphasic patients to use assistive/communication devices   Encourage and assist patient to increase activity and self care with guidance from physical therapy/occupational therapy  2/27/2023 0457 by Shelby Landry RN  Outcome: Progressing     Problem: Neurosensory - Adult  Goal: Absence of seizures  2/27/2023 0855 by Prakash George RN  Outcome: Progressing  Flowsheets (Taken 2/27/2023 0800)  Absence of seizures:   Monitor for seizure activity.   If seizure occurs, document type and location of movements and any associated apnea   If seizure occurs, turn head to side and suction secretions as needed   Administer anticonvulsants as ordered   Support airway/breathing, administer oxygen as needed   Diagnostic studies as ordered  2/27/2023 0457 by Shelby Landry RN  Outcome: Progressing     Problem: Cardiovascular - Adult  Goal: Maintains optimal cardiac output and hemodynamic stability  2/27/2023 0855 by Prakash George RN  Outcome: Progressing  Flowsheets (Taken 2/27/2023 0800)  Maintains optimal cardiac output and hemodynamic stability:   Monitor blood pressure and heart rate   Assess for signs of decreased cardiac output   Monitor urine output and notify Licensed Independent Practitioner for values outside of normal range   Administer fluid and/or volume expanders as ordered   Administer vasoactive medications as ordered  2/27/2023 0457 by Shelby Landry RN  Outcome: Progressing  Goal: Absence of cardiac dysrhythmias or at baseline  2/27/2023 0855 by Prakash George RN  Outcome: Progressing  Flowsheets (Taken 2/27/2023 0800)  Absence of cardiac dysrhythmias or at baseline:   Monitor cardiac rate and rhythm Assess for signs of decreased cardiac output   Administer antiarrhythmia medication and electrolyte replacement as ordered  2/27/2023 0457 by Price Ho RN  Outcome: Progressing     Problem: Skin/Tissue Integrity - Adult  Goal: Skin integrity remains intact  2/27/2023 0855 by Zuly Cade RN  Outcome: Progressing  Flowsheets  Taken 2/27/2023 0903  Skin Integrity Remains Intact:   Assess vascular access sites hourly   Monitor for areas of redness and/or skin breakdown   Every 4-6 hours minimum: Change oxygen saturation probe site   Every 4-6 hours: If on nasal continuous positive airway pressure, respiratory therapy assesses nares and determine need for appliance change or resting period  Taken 2/27/2023 0800  Skin Integrity Remains Intact:   Monitor for areas of redness and/or skin breakdown   Assess vascular access sites hourly   Every 4-6 hours minimum: Change oxygen saturation probe site   Every 4-6 hours: If on nasal continuous positive airway pressure, respiratory therapy assesses nares and determine need for appliance change or resting period  2/27/2023 0457 by Price Ho RN  Outcome: Progressing  Goal: Incisions, wounds, or drain sites healing without S/S of infection  2/27/2023 0855 by Zuly Cade RN  Outcome: Progressing  Flowsheets  Taken 2/27/2023 0853  Incisions, Wounds, or Drain Sites Healing Without Sign and Symptoms of Infection:   ADMISSION and DAILY: Assess and document risk factors for pressure ulcer development   TWICE DAILY: Assess and document skin integrity   TWICE DAILY: Assess and document dressing/incision, wound bed, drain sites and surrounding tissue   Implement wound care per orders   Initiate isolation precautions as appropriate   Initiate pressure ulcer prevention bundle as indicated  Taken 2/27/2023 0800  Incisions, Wounds, or Drain Sites Healing Without Sign and Symptoms of Infection:   ADMISSION and DAILY: Assess and document risk factors for pressure ulcer development   TWICE DAILY: Assess and document skin integrity   TWICE DAILY: Assess and document dressing/incision, wound bed, drain sites and surrounding tissue   Implement wound care per orders   Initiate isolation precautions as appropriate   Initiate pressure ulcer prevention bundle as indicated  2/27/2023 0457 by Leah Barrientos RN  Outcome: Progressing  Goal: Oral mucous membranes remain intact  2/27/2023 0855 by Mari Perdue RN  Outcome: Progressing  Flowsheets  Taken 2/27/2023 0853  Oral Mucous Membranes Remain Intact:   Assess oral mucosa and hygiene practices   Implement preventative oral hygiene regimen   Implement oral medicated treatments as ordered  Taken 2/27/2023 0800  Oral Mucous Membranes Remain Intact:   Assess oral mucosa and hygiene practices   Implement oral medicated treatments as ordered   Implement preventative oral hygiene regimen  2/27/2023 0457 by Leah Barrientos RN  Outcome: Progressing     Problem: Musculoskeletal - Adult  Goal: Return mobility to safest level of function  2/27/2023 0855 by Mari Perdue RN  Outcome: Progressing  Flowsheets (Taken 2/27/2023 0800)  Return Mobility to Safest Level of Function:   Assess patient stability and activity tolerance for standing, transferring and ambulating with or without assistive devices   Assist with transfers and ambulation using safe patient handling equipment as needed   Ensure adequate protection for wounds/incisions during mobilization   Apply continuous passive motion per provider or physical therapy orders to increase flexion toward goal   Obtain physical therapy/occupational therapy consults as needed   Instruct patient/family in ordered activity level  2/27/2023 0457 by Leah Barrientos RN  Outcome: Progressing  Goal: Maintain proper alignment of affected body part  2/27/2023 0855 by Mari Perdue RN  Outcome: Progressing  Flowsheets (Taken 2/27/2023 0800)  Maintain proper alignment of affected body part:   Support and protect limb and body alignment per provider's orders   Instruct and reinforce with patient and family use of appropriate assistive device and precautions (e.g. spinal or hip dislocation precautions)  2/27/2023 0457 by Aneesh Ordoñez RN  Outcome: Progressing  Goal: Return ADL status to a safe level of function  2/27/2023 0855 by Jovanni Beauchamp RN  Outcome: Progressing  Flowsheets (Taken 2/27/2023 0800)  Return ADL Status to a Safe Level of Function:   Administer medication as ordered   Assess activities of daily living deficits and provide assistive devices as needed   Obtain physical therapy/occupational therapy consults as needed   Assist and instruct patient to increase activity and self care as tolerated  2/27/2023 0457 by Aneesh Ordoñez RN  Outcome: Progressing     Problem: Gastrointestinal - Adult  Goal: Minimal or absence of nausea and vomiting  2/27/2023 0855 by Jovanni Beauchamp RN  Outcome: Progressing  Flowsheets (Taken 2/27/2023 0800)  Minimal or absence of nausea and vomiting:   Administer IV fluids as ordered to ensure adequate hydration   Maintain NPO status until nausea and vomiting are resolved   Nasogastric tube to low intermittent suction as ordered   Administer ordered antiemetic medications as needed   Provide nonpharmacologic comfort measures as appropriate   Advance diet as tolerated, if ordered   Nutrition consult to assist patient with adequate nutrition and appropriate food choices  2/27/2023 0457 by Aneesh Ordoñez RN  Outcome: Progressing  Goal: Maintains or returns to baseline bowel function  2/27/2023 0855 by Jovanni Beauchamp RN  Outcome: Progressing  Flowsheets (Taken 2/27/2023 0800)  Maintains or returns to baseline bowel function:   Assess bowel function   Encourage oral fluids to ensure adequate hydration   Administer IV fluids as ordered to ensure adequate hydration   Administer ordered medications as needed   Encourage mobilization and activity   Nutrition consult to assist patient with appropriate food choices  2/27/2023 0457 by Price Ho RN  Outcome: Progressing  Goal: Maintains adequate nutritional intake  2/27/2023 0855 by Zuly Cade RN  Outcome: Progressing  Flowsheets (Taken 2/27/2023 0800)  Maintains adequate nutritional intake:   Monitor percentage of each meal consumed   Identify factors contributing to decreased intake, treat as appropriate   Assist with meals as needed   Monitor intake and output, weight and lab values   Obtain nutritional consult as needed  2/27/2023 0457 by Price Ho RN  Outcome: Progressing  Goal: Establish and maintain optimal ostomy function  2/27/2023 0855 by Zuly Cade RN  Outcome: Progressing  Flowsheets (Taken 2/27/2023 0800)  Establish and maintain optimal ostomy function:   Monitor output from ostomies   Nutrition consult   Administer IV fluids and TPN as ordered   Introduce and advance enteral feedings as ordered   Gastric suctioning as ordered   Infuse IV Fluids/TPN as ordered  2/27/2023 0457 by Price Ho RN  Outcome: Progressing     Problem: Genitourinary - Adult  Goal: Absence of urinary retention  2/27/2023 0855 by Zuly Cade RN  Outcome: Progressing  Flowsheets (Taken 2/27/2023 0800)  Absence of urinary retention:   Assess patients ability to void and empty bladder   Monitor intake/output and perform bladder scan as needed   Place urinary catheter per Licensed Independent Practitioner order if needed   Discuss with Licensed Independent Practitioner  medications to alleviate retention as needed   Discuss catheterization for long term situations as appropriate  2/27/2023 0457 by Price Ho RN  Outcome: Progressing  Goal: Urinary catheter remains patent  2/27/2023 0855 by Zuly Cade RN  Outcome: Progressing  Flowsheets (Taken 2/27/2023 0800)  Urinary catheter remains patent:   Assess patency of urinary catheter   Irrigate catheter per Licensed Independent Practitioner order if indicated and notify Licensed Independent Practitioner if unable to irrigate   Assess need for a larger catheter size or a 3-way catheter for continuous bladder irrigation  2/27/2023 0457 by Brittany Corbett RN  Outcome: Progressing     Problem: Metabolic/Fluid and Electrolytes - Adult  Goal: Electrolytes maintained within normal limits  2/27/2023 0855 by Liban Chiu RN  Outcome: Progressing  Flowsheets (Taken 2/27/2023 0800)  Electrolytes maintained within normal limits:   Monitor labs and assess patient for signs and symptoms of electrolyte imbalances   Administer electrolyte replacement as ordered   Monitor response to electrolyte replacements, including repeat lab results as appropriate   Fluid restriction as ordered   Instruct patient on fluid and nutrition restrictions as appropriate  2/27/2023 0457 by Brittany Corbett RN  Outcome: Progressing  Goal: Hemodynamic stability and optimal renal function maintained  2/27/2023 0855 by Liban Chiu RN  Outcome: Progressing  Flowsheets (Taken 2/27/2023 0800)  Hemodynamic stability and optimal renal function maintained:   Monitor labs and assess for signs and symptoms of volume excess or deficit   Monitor intake, output and patient weight   Monitor urine specific gravity, serum osmolarity and serum sodium as indicated or ordered   Monitor response to interventions for patient's volume status, including labs, urine output, blood pressure (other measures as available)   Encourage oral intake as appropriate   Instruct patient on fluid and nutrition restrictions as appropriate  2/27/2023 0457 by Brittany Corbett RN  Outcome: Progressing  Goal: Glucose maintained within prescribed range  2/27/2023 0855 by Liban Chiu RN  Outcome: Progressing  Flowsheets (Taken 2/27/2023 0800)  Glucose maintained within prescribed range:   Monitor blood glucose as ordered   Assess for signs and symptoms of hyperglycemia and hypoglycemia   Administer ordered medications to maintain glucose within target range   Assess barriers to adequate nutritional intake and initiate nutrition consult as needed   Instruct patient on self management of diabetes and initiate consult as needed  2/27/2023 0457 by Brittany Corbett RN  Outcome: Progressing     Problem: Hematologic - Adult  Goal: Maintains hematologic stability  2/27/2023 0855 by Liban Chiu RN  Outcome: Progressing  Flowsheets (Taken 2/27/2023 0800)  Maintains hematologic stability:   Assess for signs and symptoms of bleeding or hemorrhage   Monitor labs for bleeding or clotting disorders   Administer blood products/factors as ordered  2/27/2023 0457 by Brittany Corbett RN  Outcome: Progressing     Problem: Nutrition Deficit:  Goal: Optimize nutritional status  2/27/2023 0855 by Liban Chiu RN  Outcome: Progressing  2/27/2023 0457 by Brittany Corbett RN  Outcome: Progressing     Problem: Coping  Goal: Pt/Family able to verbalize concerns and demonstrate effective coping strategies  Description: INTERVENTIONS:  1. Assist patient/family to identify coping skills, available support systems and cultural and spiritual values  2. Provide emotional support, including active listening and acknowledgement of concerns of patient and caregivers  3. Reduce environmental stimuli, as able  4. Instruct patient/family in relaxation techniques, as appropriate  5. Assess for spiritual pain/suffering and initiate Spiritual Care, Psychosocial Clinical Specialist consults as needed  2/27/2023 0855 by Liban Chiu RN  Outcome: Progressing  2/27/2023 0457 by Brittany Corbett RN  Outcome: Progressing     Problem: Confusion  Goal: Confusion, delirium, dementia, or psychosis is improved or at baseline  Description: INTERVENTIONS:  1. Assess for possible contributors to thought disturbance, including medications, impaired vision or hearing, underlying metabolic abnormalities, dehydration, psychiatric diagnoses, and notify attending LIP  2. [employer] high risk fall precautions, as indicated  3.  Provide frequent short contacts to provide reality reorientation, refocusing and direction  4. Decrease environmental stimuli, including noise as appropriate  5. Monitor and intervene to maintain adequate nutrition, hydration, elimination, sleep and activity  6. If unable to ensure safety without constant attention obtain sitter and review sitter guidelines with assigned personnel  7.  Initiate Psychosocial CNS and Spiritual Care consult, as indicated  2/27/2023 0855 by Therman Harada, RN  Outcome: Progressing  2/27/2023 0457 by Caden Stanford RN  Outcome: Progressing

## 2023-02-27 NOTE — CARE COORDINATION
TRansitional planning    Called [facility]. [phone] and left VM requesting return phone call. [id number] and spoke to [place]. He did receive referral. It is under review. Still in process of running benefits also. [address] to patient's wife, Trisha Hatfield, at bedside about plan for discharge. Plan is for SNF. She tried to call [facility] to talk about medication issues (victoza) and no one has returned her call. She relates she has a month supply of it at home. She also relates she should not have     [facility] [phone] and left message requesting return phone call. [facility] with [facility] [phone] and told her no one has returned phone calls. She will reach out to co worker and try and get phone call returned    [address] [facility] from [place] called and relates she is on her way in and can talk to patient's wife and answer her questions. [address] to Trisha Hatfield at bedside and told her that Piedad Avendano with [facility] is on her way in and will speak to her at bedside and answer her questions. 1603 called [facility] and spoke to [facility]. She relates they are in network with patient and he is within 50$ of meeting deductible and OOP max of $2000. MARBIN explained patient takes Victoza and other SNf unable to accept d/t cost. She relates primary payor is [employer]. If  medication is too expensive, they will not fill it and it is okay for family to provide medication to facility. MARBIN asked if this is still okay if Medicare is secondary payor and she said yes. Patient will need IV Rocephin 1 gram IV until 3/12/23. Cannot accept patient until chest tubes are out. Anticipate accepting patient pending auth from insurance. 1630 Spoke to wife and she spoke with Piedad Avendano from [place] and she does not want patient to go to [facility]. Spoke to patient and his wife about conversation with [employer].  They are agreeable for him to go to [facility]. Wife relates kidney doctor wants his numbers to be better before discharge. Need to review patient status in am to see if ready to start pre cert. Chest tubes have been to water seal today. Anticipate chest tubes out today or tomorrow with f/u xray.

## 2023-02-27 NOTE — PROGRESS NOTES
Renal Progress Note    Patient :  Avelino Colon; 79 y.o. MRN# [id number]  Location:  1007/1007-01  Attending:  Thony Santiago MD  Admit Date:  2/12/2023   Hospital Day: 15    Subjective: Following for KELSEA on CKD secondary to diabetic nephrosclerosis, with baseline 1.9-2.4, s/p VATS, 2/20/2023, for left pleural effusion    Patient was seen and examined. No new issues reported overnight. Status post cardioversion today. Today reviewed sodium 132, potassium 5.0, chloride 98, chloride 21, calcium 9.1, BUN 63, creatinine 4.27 mg/dl. Creatinine yesterday was 4.7 to mg/dl. Blood pressure okay. Chest x-ray 2/27/2023 showed a left-sided chest tube present with partially loculated left pleural effusion and left bibasilar opacity unchanged. Urine output documented as about 2.5 L in the last 24 hours. Patient is currently on Lasix 20 mg IV twice daily. Also on gentle hydration with normal saline at 30 cc an hour. Outpatient Medications:     Medications Prior to Admission: amLODIPine (NORVASC) 5 MG tablet, TAKE ONE TABLET BY MOUTH DAILY (Patient taking differently: nightly)  omeprazole (PRILOSEC) 20 MG delayed release capsule, Take 20 mg by mouth nightly  fluticasone-salmeterol (ADVAIR DISKUS) 250-50 MCG/ACT AEPB diskus inhaler, Inhale 1 puff into the lungs every 12 hours  metoprolol succinate (TOPROL XL) 25 MG extended release tablet, Take 25 mg by mouth at bedtime  dutasteride (AVODART) 0.5 mg capsule, Take 0.5 mg by mouth daily  finasteride (PROSCAR) 5 MG tablet, Take 5 mg by mouth nightly  Misc. Devices (NASAL SPRAY BOTTLE) MISC, by Does not apply route  CINNAMON PO, Take by mouth  repaglinide (PRANDIN) 1 MG tablet, Take 1 mg by mouth with breakfast and with evening meal  pimecrolimus (ELIDEL) 1 % cream, Apply topically as needed Apply topically 2 times daily.   albuterol sulfate  (90 BASE) MCG/ACT inhaler, Inhale 1 puff into the lungs in the morning and at bedtime  Liraglutide (VICTOZA SC), Inject 1.8 mg into the skin daily   fenofibrate (TRICOR) 145 MG tablet, Take 145 mg by mouth nightly  dicyclomine (BENTYL) 10 MG capsule, Take 10 mg by mouth 3 times daily (with meals)  atorvastatin (LIPITOR) 80 MG tablet, Take 80 mg by mouth daily.  DULoxetine (CYMBALTA) 60 MG capsule, Take 120 mg by mouth nightly    Current Medications:     Scheduled Meds:    metoprolol tartrate  25 mg Oral BID    sodium chloride flush  5-40 mL IntraVENous 2 times per day    insulin glargine  20 Units SubCUTAneous Daily    heparin (porcine)  5,000 Units SubCUTAneous 3 times per day    midodrine  10 mg Oral TID WC    methylPREDNISolone  30 mg IntraVENous Q12H    insulin lispro  0-16 Units SubCUTAneous TID WC    insulin lispro  0-4 Units SubCUTAneous Nightly    docusate sodium  100 mg Oral TID    senna  1 tablet Oral Nightly    furosemide  20 mg IntraVENous BID    cefTRIAXone (ROCEPHIN) IV  1,000 mg IntraVENous Q24H    pantoprazole  40 mg Oral QAM AC    colchicine  0.3 mg Oral Daily    sodium chloride flush  5-40 mL IntraVENous 2 times per day    mupirocin   Nasal BID    chlorhexidine   Topical See Admin Instructions    sodium chloride flush  5-40 mL IntraVENous 2 times per day    polyethylene glycol  17 g Oral Daily    sodium chloride flush  5-40 mL IntraVENous 2 times per day     Continuous Infusions:    sodium chloride      sodium chloride 30 mL/hr at 02/26/23 2042    amiodarone 0.5 mg/min (02/27/23 0039)    sodium chloride      sodium chloride      sodium chloride      dextrose       PRN Meds:  sodium chloride flush, sodium chloride, oxyCODONE-acetaminophen **OR** [DISCONTINUED] oxyCODONE-acetaminophen, metoprolol, sodium chloride flush, sodium chloride, sodium chloride flush, sodium chloride, sodium chloride flush, sodium chloride, fentanNYL, glucose, dextrose bolus **OR** dextrose bolus, glucagon (rDNA), dextrose    Input/Output:       I/O last 3 completed shifts:  In: 2745.1 [P.O.:1490; I.V.:1255.1]  Out: 3820 [Urine:3660; Chest  Tube:160]. Patient Vitals for the past 96 hrs (Last 3 readings):   Weight   23 0600 230 lb 9.6 oz (104.6 kg)   23 1030 224 lb 13.9 oz (102 kg)   23 1400 236 lb 15.9 oz (107.5 kg)       Vital Signs:   Temperature:  Temp: 98.3 °F (36.8 °C)  TMax:   Temp (24hrs), Av.3 °F (36.8 °C), Min:98.2 °F (36.8 °C), Max:98.6 °F (37 °C)    Respirations:  Resp: 20  Pulse:   Heart Rate: (!) 105  BP:    BP: (!) 141/81  BP Range: Systolic (72XNJ), VXZ:842 , Min:106 , NANCY:033       Diastolic (30NMV), EV, Min:68, Max:85      Physical Examination:     General:  AAO x 3, speaking in full sentences, no accessory muscle use. HEENT: Atraumatic, normocephalic, no throat congestion, moist mucosa. Eyes:   Pupils equal, round and reactive to light, EOMI. Neck:   Supple  Chest:   Bilateral vesicular breath sounds, no rales or wheezes. Positive chest tube. Cardiac:  S1 S2 RR, no murmurs, gallops or rubs. Abdomen: Soft, non-tender, no masses or organomegaly, BS audible. :   No suprapubic or flank tenderness. Neuro:  AAO x 3, No FND. SKIN:  No rashes, good skin turgor. Extremities:  No edema. Labs:     No results for input(s): WBC, RBC, HGB, HCT, MCV, MCH, MCHC, RDW, PLT, MPV in the last 72 hours. BMP:   Recent Labs     23  1002 23  0343 23  0327   * 135 132*   K 5.0 4.7 5.0    103 98   CO2 19* 20 21   BUN 53* 60* 63*   CREATININE 4.38* 4.72* 4.27*   GLUCOSE 197* 218* 328*   CALCIUM 9.2 8.8 9.1     SPEP:  Lab Results   Component Value Date/Time    PROT 6.5 2023 01:36 PM    PROT 6.4 2012 10:35 AM    ALBCAL 2.6 2023 03:07 AM    ALBPCT 50 2023 03:07 AM    LABALPH 0.5 2023 03:07 AM    LABALPH 1.0 2023 03:07 AM    A1PCT 8 2023 03:07 AM    A2PCT 20 2023 03:07 AM    LABBETA 0.7 2023 03:07 AM    BETAPCT 13 2023 03:07 AM    GAMGLOB 0.5 2023 03:07 AM    GGPCT 9 2023 03:07 AM    PATH ELECTRONICALLY SIGNED.  BARBARA ALBA Garcia M.D. 02/13/2023 03:07 AM    PATH ELECTRONICALLY SIGNED. Norma Cervantes M.D. 02/13/2023 03:07 AM     C3:     Lab Results   Component Value Date/Time    C3 102 05/29/2012 10:35 AM     C4:     Lab Results   Component Value Date/Time    C4 14 05/29/2012 10:35 AM     Hep BsAg:         Lab Results   Component Value Date/Time    HEPBSAG NONREACTIVE 05/29/2012 10:35 AM     Hep C AB:          Lab Results   Component Value Date/Time    HEPCAB NONREACTIVE 05/29/2012 10:35 AM     Urinalysis/Chemistries:      Lab Results   Component Value Date/Time    NITRU NEGATIVE 02/22/2023 01:17 PM    COLORU Yellow 02/22/2023 01:17 PM    PHUR 5.0 02/22/2023 01:17 PM    WBCUA 2 TO 5 02/22/2023 01:17 PM    RBCUA None 02/22/2023 01:17 PM    MUCUS 1+ 02/13/2023 02:17 AM    BACTERIA MODERATE 02/22/2023 01:17 PM    SPECGRAV 1.020 02/22/2023 01:17 PM    LEUKOCYTESUR NEGATIVE 02/22/2023 01:17 PM    UROBILINOGEN Normal 02/22/2023 01:17 PM    BILIRUBINUR NEGATIVE 02/22/2023 01:17 PM    BILIRUBINUR NEGATIVE 04/03/2012 11:08 AM    GLUCOSEU 1+ 02/22/2023 01:17 PM    GLUCOSEU NEGATIVE 04/03/2012 11:08 AM    KETUA NEGATIVE 02/22/2023 01:17 PM    AMORPHOUS 1+ 02/22/2023 01:17 PM     Urine Sodium:     Lab Results   Component Value Date/Time    RUBY 43 02/13/2023 10:18 AM     Urine Potassium:    Lab Results   Component Value Date/Time    KUR 22.8 02/13/2023 10:18 AM     Urine Chloride:    Lab Results   Component Value Date/Time    CLUR 54 02/13/2023 10:18 AM     Urine Osmolarity:   Lab Results   Component Value Date/Time    OSMOU 468 02/13/2023 10:18 AM     Urine Creatinine:     Lab Results   Component Value Date/Time    LABCREA 45.3 02/13/2023 10:18 AM     Radiology:     Reviewed. Assessment:     Acute Kidney Injury nonoliguric likely due to ATN from hypotension requiring pressor support, A-fib, NSAID exposure and recent surgery. Creatinine still evolving. Shortness of breath.     Left-sided empyema Streptococcus Gordonii on pleural fluid culture 2/14/2023. Status post VATS 2/23/2023. Hyponatremia. Acute pericarditis. New onset A-fib with RVR. Acute proximal respiratory failure-stable. History of non-Hodgkin lymphoma. History of multiple myeloma. Sarcoidosis. Diabetes type 2. History of acute/nephritis in June 2022. History of partial right lung lobectomy. CKD 3B secondary to diabetic and hypertensive nephrosclerosis with baseline creatinine of around 1.9-2.4 mg/dl and follows up with Dr. Gia Sanders. Plan: Will change Lasix to 40 mg IV twice daily. Currently on gentle hydration as well. Antibiotics as per renal dosing per infectious diseases. Monitor strict I's and O's and renal function. At risk for requiring RRT. For acute pericarditis, now changed to steroids from NSAIDs. Keep renal diet. BMP in AM.  Will follow. Nutrition   Please ensure that patient is on a renal diet/TF. Avoid nephrotoxic drugs/contrast exposure. Harris Johnson MD  [facility]     This note is created with the assistance of a speech-recognition program. While intending to generate a document that actually reflects the content of the visit, no guarantees can be provided that every mistake has been identified and corrected by editing.

## 2023-02-27 NOTE — PROGRESS NOTES
Occupational Therapy  Facility/Department: [facility]- [facility]  Occupational Therapy Daily Treatment Note      Name: Caity Miranda  : 1955  MRN: [id number]  Date of Service: 2023    Discharge Recommendations:  Patient would benefit from continued therapy after discharge  OT Equipment Recommendations  ADL Assistive Devices: Shower Chair with back;Sock-Aid Hard;Reacher;Long-handled Shoe Horn;Long-handled Sponge       Patient Diagnosis(es): The encounter diagnosis was Empyema ([address] Past Medical History:  has a past medical history of Acute interstitial nephritis, Aortic regurgitation, ARF (acute renal failure) ([address] Chronic kidney disease, Hyperlipidemia, Hypertension, Non-Hodgkin lymphoma ([address] Research study patient, Sarcoidosis, and Type II or unspecified type diabetes mellitus without mention of complication, not stated as uncontrolled. Past Surgical History:  has a past surgical history that includes eye surgery (Right); Knee arthroscopy; Rotator cuff repair (Right, ); Rotator cuff repair (Left, 5/15); Carpal tunnel release (Left, 11/15 ); Eye surgery (Right, ); Total knee arthroplasty (Right, 10/2018); malignant skin lesion excision; IR CHEST TUBE INSERTION (2023); IR CHEST TUBE INSERTION (2023); and Thoracoscopy (Left, 2023). Treatment Diagnosis: PNA      Assessment   Performance deficits / Impairments: Decreased functional mobility ; Decreased ADL status; Decreased cognition;Decreased endurance;Decreased balance;Decreased high-level IADLs;Decreased strength;Decreased safe awareness  Assessment: Pt will require continued OT services to address deficits listed through use of therapeutic interventions for improved independence and safety with ADLs/IADLs and functional transfers/mobility.   Treatment Diagnosis: PNA  Prognosis: Good  Activity Tolerance  Activity Tolerance: Patient Tolerated treatment well;Patient limited by fatigue;Treatment limited secondary to decreased cognition;Patient limited by pain  Activity Tolerance Comments: Elevated HR/SOB        Plan   Occupational Therapy Plan  Times Per Week: 3-5 x/wk  Current Treatment Recommendations: Balance training, Functional mobility training, Endurance training, Cognitive reorientation, Safety education & training, Patient/Caregiver education & training, Equipment evaluation, education, & procurement, Self-Care / ADL, Home management training     Restrictions  Restrictions/Precautions  Restrictions/Precautions: Up as Tolerated, Fall Risk, Surgical Protocols  Required Braces or Orthoses?: No  Position Activity Restriction  Other position/activity restrictions: Chest tube. Bullock catheter. O2 NC 3 Lm. Subjective   General  Patient assessed for rehabilitation services?: Yes  Family / Caregiver Present:  (spouse and daughter)  General Comment  Comments: RN ok'd pt for OT tx this date and was present throughout session. Pt agreeable to session and very pleasant/cooperative and motivated to participate. Pt C/O very little pain this date 2/10. Objective   O2 @3L NC  SpO2: 95 %  Safety Devices  Type of Devices: All fall risk precautions in place;Call light within reach;Gait belt;Nurse notified; Left in bed;Patient at risk for falls  Bed Mobility Training  Bed Mobility Training: Yes  Overall Level of Assistance: Minimum assistance; Adaptive equipment; Additional time  Interventions: Safety awareness training  Supine to Sit: Minimum assistance; Adaptive equipment; Additional time  Sit to Supine: Minimum assistance; Adaptive equipment; Additional time  Scooting: Stand-by assistance  Balance  Sitting: With support (seated EOB ~20 min supported by BUE. Pt leaning to left w/vc's to correct posture w/SBA. )  Standing: With support (stood for ~5 min at EOB w/RW and CGA)  Transfer Training  Transfer Training: Yes  Overall Level of Assistance: Contact-guard assistance  Interventions: Safety awareness training;Visual cues  Sit to Stand: Contact-guard assistance; Additional time; Adaptive equipment  Stand to Sit: Contact-guard assistance; Adaptive equipment; Additional time  Interventions: Verbal cues; Safety awareness training  Assistive Device: Walker, rolling        ADL  Feeding: Modified independent ;Setup; Increased time to complete (assist to open some containers, able to feed self)  Grooming: Setup;Verbal cueing; Increased time to complete;Stand by assistance (wash face seated at EOB)  UE Bathing: Setup;Verbal cueing; Increased time to complete;Stand by assistance (assist to wash back)  UE Dressing: Stand by assistance;Setup; Increased time to complete;Verbal cueing (to doff/don gown)  LE Dressing: Setup; Increased time to complete;Maximum assistance;Verbal cueing (assist to doff footies)  Toileting: Dependent/Total (moon cath)  Additional Comments: Pt transfered to EOB and setup for grooming and UB self care (see above for LOF). Pt sat at EOB to complete functional reaching activity in multi plane 10 reps x2 w/BUE. Pt needed frequent rest breaks d/t fatigue and SOB. Pt needs increased time and assist to complete all tasks d/t fatigue, pain and overall weakness. Cognition  Overall Cognitive Status: Exceptions  Arousal/Alertness: Delayed responses to stimuli  Following Commands: Follows one step commands with repetition; Follows one step commands with increased time  Attention Span: Attends with cues to redirect  Memory: Decreased recall of recent events  Safety Judgement: Decreased awareness of need for assistance;Decreased awareness of need for safety  Problem Solving: Assistance required to identify errors made;Assistance required to correct errors made  Insights: Decreased awareness of deficits  Initiation: Requires cues for some  Sequencing: Requires cues for some  Orientation  Overall Orientation Status: Impaired  Orientation Level: Oriented to person;Disoriented to place; Disoriented to time;Disoriented to situation Education Given To: Patient; Family  Education Provided: Role of Therapy;Plan of Care;ADL Adaptive Strategies;Transfer Training;Energy Conservation;IADL Safety; Family Education  Education Provided Comments: OT POC, transfer/walker safety, importance of participation in therapy, pursed lip breathing, use of IS/Acapella with fair return.   Education Method: Demonstration;Verbal  Barriers to Learning: Cognition  Education Outcome: Verbalized understanding;Demonstrated understanding;Continued education needed    AM-PAC Score  AM-PAC Inpatient Daily Activity Raw Score: 14 (02/27/23 1729)  AM-PAC Inpatient ADL T-Scale Score : 33.39 (02/27/23 1729)  ADL Inpatient CMS 0-100% Score: 59.67 (02/27/23 1729)  ADL Inpatient CMS G-Code Modifier : CK (02/27/23 1729)       Goals  Short Term Goals  Time Frame for Short Term Goals: By discharge, pt will:  Short Term Goal 1: Demo I with bed mobility to increase independence with ADLs and to decrease risk for pressure injury  Short Term Goal 2: Demo Mod I for functional transfers and functional mobility with use of LRD for engagement in ADLs/IADLs  Short Term Goal 3: Demo I with all UB ADLs  Short Term Goal 4: Demo Mod I for LB ADLs and toileting, utilizing AE and adaptive tech PRN  Short Term Goal 5: Demo 8 min dynamic standing and reaching outside LI with unilateral hand release and SUP for improved standing balance during ADL/IADL participation       Therapy Time   Individual Concurrent Group Co-treatment   Time In 1440         Time Out 1533         Minutes 53         Timed Code Treatment Minutes: [address] Cl[address] Rivers, OLIVER/WINTER

## 2023-02-27 NOTE — PROGRESS NOTES
Comprehensive Nutrition Assessment    Type and Reason for Visit:  Reassess    Nutrition Recommendations/Plan:   Resume oral diet; Start ONS. Encourage PO intake as tolerated. Will continue to monitor PO tolerance/adequacy of intake. Malnutrition Assessment:  Malnutrition Status: At risk for malnutrition    Context:  Acute Illness     Findings of the 6 clinical characteristics of malnutrition:  Energy Intake:  75% or less of estimated energy requirements for 7 or more days  Weight Loss:  1% to 2% over 1 week     Body Fat Loss:  No significant body fat loss     Muscle Mass Loss:  No significant muscle mass loss    Fluid Accumulation:  No significant fluid accumulation     Strength:  Not Performed    Nutrition Assessment:    Chart reviewed. Pt was NPO this morning for AFIA; diet just resumed. Noted variable meal intake of 0-100% prior to NPO status. Labs reviewed: Na 132 mmol/L, K 5 mmol/L, Glu 232-328 mg/dL. Meds reviewed: Solumedrol, Lantus, Humalog SS, Lasix, Colace, Glycolax, Senokot. Last BM noted: 2/18/23. Nutrition Related Findings:    Last BM noted: 2/18/23 Wound Type: None       Current Nutrition Intake & Therapies:    Average Meal Intake:0-100% of meals per RN documentation; NPO this morning  Average Supplements Intake: None Ordered  ADULT DIET; NPO--->Regular; 4 carb choices (60 gm/meal); Low Potassium (Less than 3000 mg/day); Low Phosphorus (Less than 1000 mg)    Anthropometric Measures:  Height: 6' 1\" (185.4 cm)  Ideal Body Weight (IBW): 184 lbs (84 kg)    Admission Body Weight: 224 lb 13.9 oz (102 kg)  Current Body Weight: 230 lb 9.6 oz (104.6 kg), 131 % IBW. Weight Source: Standing Scale  Current BMI (kg/m2): 30.4        Weight Adjustment For: No Adjustment                 BMI Categories: Obese Class 1 (BMI 30.0-34. 9)    Estimated Daily Nutrient Needs:  Energy Requirements Based On: Formula  Weight Used for Energy Requirements: Current  Energy (kcal/day): 7254-2142 kcal/day  Weight Used for Protein Requirements: Ideal  Protein (g/day): 110-120 gm/day  Method Used for Fluid Requirements: Other (Comment)  Fluid (ml/day): per MD    Nutrition Diagnosis:   Inadequate oral intake related to appetite, current condition as evidenced by  variable PO intake    Nutrition Interventions:   Food and/or Nutrient Delivery:Resume oral diet; Start ONS. Encourage PO intake as tolerated. Nutrition Education/Counseling: No recommendation at this time  Coordination of Nutrition Care: Continue to monitor while inpatient  Plan of Care discussed with: RN    Goals:  Previous Goal Met: Progressing toward Goal(s)  Goals: Meet at least 75% of estimated needs, prior to discharge       Nutrition Monitoring and Evaluation:   Behavioral-Environmental Outcomes: None Identified  Food/Nutrient Intake Outcomes: Diet Advancement/Tolerance, Food and Nutrient Intake, Supplement Intake  Physical Signs/Symptoms Outcomes: Biochemical Data, Nutrition Focused Physical Findings, Skin, Weight, Fluid Status or Edema, Constipation    Discharge Planning:     Too soon to determine     [address], [place]  Contact: [phone]

## 2023-02-27 NOTE — PROGRESS NOTES
[facility]   Progress Note                 Date:   2/27/2023  Patient name: Keila Powell  Date of admission:  2/12/2023 10:33 PM  MRN:   [id number]  YOB: 1955  PCP: Vazquez Davis MD    Reason for Admission:      Subjective:     Seen and examined by bedside. No acute overnight events. Remains in afib RVR on amiodarone at 0.5 mg Infusion. Telemetry showing rafib RVR rate 100's with blood pressure 141/81 on exam this AM.     Medications:   Scheduled Meds:   midodrine  10 mg Oral TID WC    methylPREDNISolone  30 mg IntraVENous Q12H    insulin lispro  0-16 Units SubCUTAneous TID WC    insulin lispro  0-4 Units SubCUTAneous Nightly    metoprolol tartrate  12.5 mg Oral BID    docusate sodium  100 mg Oral TID    senna  1 tablet Oral Nightly    furosemide  20 mg IntraVENous BID    cefTRIAXone (ROCEPHIN) IV  1,000 mg IntraVENous Q24H    pantoprazole  40 mg Oral QAM AC    colchicine  0.3 mg Oral Daily    sodium chloride flush  5-40 mL IntraVENous 2 times per day    mupirocin   Nasal BID    chlorhexidine   Topical See Admin Instructions    sodium chloride flush  5-40 mL IntraVENous 2 times per day    polyethylene glycol  17 g Oral Daily    sodium chloride flush  5-40 mL IntraVENous 2 times per day       Continuous Infusions:   sodium chloride 30 mL/hr at 02/26/23 2042    amiodarone 0.5 mg/min (02/27/23 0039)    sodium chloride      sodium chloride      sodium chloride      dextrose         CBC:   No results for input(s): WBC, HGB, PLT in the last 72 hours. BMP:    Recent Labs     02/25/23  1002 02/26/23  0343 02/27/23  0327   * 135 132*   K 5.0 4.7 5.0    103 98   CO2 19* 20 21   BUN 53* 60* 63*   CREATININE 4.38* 4.72* 4.27*   GLUCOSE 197* 218* 328*       Hepatic: No results for input(s): AST, ALT, ALB, BILITOT, ALKPHOS in the last 72 hours. Troponin: No results for input(s): TROPONINI in the last 72 hours. BNP: No results for input(s): BNP in the last 72 hours.   Lipids: No results for input(s): CHOL, HDL in the last 72 hours. Invalid input(s): LDLCALCU  INR:   No results for input(s): INR in the last 72 hours. Objective:   Vitals: BP (!) 141/81   Pulse (!) 105   Temp 98.3 °F (36.8 °C) (Oral)   Resp 20   Ht 6' 1\" (1.854 m)   Wt 230 lb 9.6 oz (104.6 kg)   SpO2 95%   BMI 30.42 kg/m²     General appearance: awake, alert, in no apparent respiratory distress   HEENT: Head: Normocephalic, no lesions, without obvious abnormality  Neck: no JVD  Lungs: Decreased air entry on the left side with chest tube in place. Heart: Irregular heart rate. Abdomen: soft, non-tender; bowel sounds normal  Extremities: No LE edema  Neurologic: Mental status: Alert, oriented. Motor and sensory not done. DATA  EKG:    Date: 02/21/23  Reading:  Sinus tachycardia  Moderate voltage criteria for LVH, may be normal variant  Nonspecific ST and T wave abnormality  Abnormal ECG  When compared with ECG of 16-FEB-2023 23:10,  No significant change was found     LAST ECHO:  2/21/23  Summary  Left ventricle is normal in size. Global left ventricular systolic function  is difficult to assess due to heart rate but appears normal. Calculated  ejection fraction 51% by Atkinson's method. Aortic valve is trileaflet and opens adequately. Trivial aortic insufficiency. Normal tricuspid valve structure. Trivial tricuspid regurgitation. Date:  Findings Summary: 10/22     Left Ventricle: Systolic function is normal with an ejection fraction   of 55-60%. Aortic Valve: There is mild regurgitation. Tricuspid Valve: There is trace to mild regurgitation. The right   ventricular systolic pressure normal. RVSP calculated at 24 mmHg. RVSP is   based on RA pressure of 3 mmHg. There is no pulmonary hypertension. Left Ventricle   Systolic function is normal with an ejection fraction of 55-60%. No obvious regional wall motion abnormalities. There is abnormal septal motion.      Right Ventricle   Right ventricular size appears normal. Systolic function is normal.     Right Atrium   Right atrium is normal in size. IVC/SVC   The right atrial pressure is estimated at 3 mmHg. IVC appears normal. There is normal collapse with deep inspiration. Tricuspid Valve   Tricuspid valve appears to be normal. There is trace to mild regurgitation. The right ventricular systolic pressure normal. RVSP calculated at 24 mmHg. RVSP is based on RA pressure of 3 mmHg. There is no pulmonary hypertension. Aortic Valve   The aortic valve is trileaflet. The leaflets are mildly thickened. There is mild regurgitation. Pulmonic Valve   Pulmonic valve structure is grossly normal. There is trace to mild regurgitation. Pericardium   There is no pericardial effusion. LAST Stress Test:   No prior     LAST Cardiac Angiography:. No prior    Assessment / Acute Cardiac Problems:   Left sided Empyema s/p VATS/decortication 1/23/2023  Acute pericarditis on renal dose colchicine and steroids  Community acquired pneumonia  Acute hypoxic respiratory failure due to above  New Onset Atrial fibrillation with RVR - on amiodarone  History of non-Hodgkin's lymphoma  History of nonrheumatic mitral regurgitation/Tricuspid regurgitation  History of dilated aortic root  Type 2 diabetes  KELSEA on Chronic kidney disease stage III/IV  CLAY on CPAP    Plan of Treatment:   Continue to be in Afib with controlled heart rate. Continue amiodarone at current rate   ASA held per nephrology. On Colchicin 0.3 qday for pericarditis (renal dose). Follow up CRP   On methylprednisolone 3 mg q12h  On antibiotics as per ID  increase Lopressor 25 twice daily, uptitrate as HR/BP tolerate. Lopressor 5 mg IV q6h PRN for >110  On IV Lasix as per nephro, at risk for RRT.   On Midodrine 10 mg TID  On lasix 20 mg BID  Not a candidate for HD at this time as per nephrology   AFIA/CV  Will continue to follow along       Plan to be discussed with rounding attending       [facility] AND [facility] Sara DOMÍNGUEZ  Fellow, cardiovascular disease   [facility], [place]    I performed a history and physical examination of the patient and discussed management with the resident. I reviewed the residents note and agree with the documented findings and plan of care. Any areas of disagreement are noted on the chart. I was personally present for the key portions of any procedures. I have documented in the chart those procedures where I was not present during the key portions. I have personally evaluated this patient and have completed at least one if not all key elements of the E/M (history, physical exam, and MDM). Additional findings are as noted.     Rebeca Rodney MD

## 2023-02-27 NOTE — OP NOTE
[facility]  Transesophageal Echocardiogram       Today's Date:  2/27/2023  Ordering Physician:  Dr Nona Belcher  Indication:   Afib    Operators:  Primary:   Dr Nona Belcher (Attending Physician)  Assistant:   Jackie Mcdonnell MD (Cardiovascular Fellow)      Pre Procedure Conscious Sedation Data:    ASA Class:    [] I [] II [x] III [] IV    Mallampati Class:  [] I [x] II [] III [] IV    Procedure:    Patient seen and examined. History and Physical reviewed. Labs reviewed. After informed consent was obtained with explanation of the risks and benefits, the patient was brought to cardiac cath lab. All sedation was administered by the cardiologist. The oropharynx was pre-anesthesized with viscous lidocaine and cetacaine spray. The ultrasound probe was passed without any difficulty. AFIA findings:    LA:  Normal  CAMILLA:  No thrombus  RA:  Normal  RV:   Normal  LV:  Normal  Estimated LVEF:  50%. Aorta:   Mild atheromatous disease arch  Pericardium: No pericardial effusion  Septum:  No intracardiac shunt via color Doppler. Valves:    Mitral Valve: Structurally normal. No regurgitation is identified. Aortic Valve: The aortic valve is trileaflet and opens adequately. Mild regurgitiation is identified. Tricuspid valve: Structurally normal. No regurgitation is identified. Pulmonary valve: Normal. No significant regurgitation    No valvular vegetations or thrombus identified. Summary:     1. A AFIA was performed without complications. 2. LVEF 50%. 3. Mild AI. 4. No CAMILLA clot. 3. No thrombus or valvular vegetation identified  5. Proceed with Cardioversion. CARDIOVERSION:    After an adequate level of sedation was achieved  200J in biphasic synchronized delivery was administered. conversion to normal sinus rhythm. The patient awoke without complications. A post procedure 12 L ECG was ordered and reviewed.       Impression:  Successful Consious Sedation - safely  Successful Cardioversion      Complications: There were no complications encountered. The patient will continue with the discharge meds and has been instructed to follow-up with Primary cardiologist for continued long term care and cardiovascular management. There were no complications encountered. Tyronne Cooks, MD       Cardiovascular Fellow      I have reviewed the case / procedure with resident / fellow  I have examined the patient personally  Patient agree with treatment plan as discussed before, final arrangement based on my evaluation and exam.    Risk and benefit of procedure planned were explained in details. Procedure was performed by me personally, with all aspect of the procedure being done using standard protocol. Note was modified based on my own assessment and treatment.     Yessenia Kraus MD  [facility]

## 2023-02-27 NOTE — PLAN OF CARE
Problem: Discharge Planning  Goal: Discharge to home or other facility with appropriate resources  Outcome: Progressing     Problem: Safety - Adult  Goal: Free from fall injury  Outcome: Progressing     Problem: ABCDS Injury Assessment  Goal: Absence of physical injury  Outcome: Progressing     Problem: Skin/Tissue Integrity  Goal: Absence of new skin breakdown  Description: 1. Monitor for areas of redness and/or skin breakdown  2. Assess vascular access sites hourly  3. Every 4-6 hours minimum:  Change oxygen saturation probe site  4. Every 4-6 hours:  If on nasal continuous positive airway pressure, respiratory therapy assess nares and determine need for appliance change or resting period.   Outcome: Progressing     Problem: Chronic Conditions and Co-morbidities  Goal: Patient's chronic conditions and co-morbidity symptoms are monitored and maintained or improved  Outcome: Progressing     Problem: Respiratory - Adult  Goal: Achieves optimal ventilation and oxygenation  Outcome: Progressing     Problem: Pain  Goal: Verbalizes/displays adequate comfort level or baseline comfort level  Outcome: Progressing     Problem: Neurosensory - Adult  Goal: Achieves stable or improved neurological status  Outcome: Progressing  Goal: Absence of seizures  Outcome: Progressing  Goal: Remains free of injury related to seizures activity  Outcome: Progressing  Goal: Achieves maximal functionality and self care  Outcome: Progressing     Problem: Cardiovascular - Adult  Goal: Maintains optimal cardiac output and hemodynamic stability  Outcome: Progressing  Goal: Absence of cardiac dysrhythmias or at baseline  Outcome: Progressing     Problem: Skin/Tissue Integrity - Adult  Goal: Skin integrity remains intact  Outcome: Progressing  Goal: Incisions, wounds, or drain sites healing without S/S of infection  Outcome: Progressing  Goal: Oral mucous membranes remain intact  Outcome: Progressing     Problem: Musculoskeletal - Adult  Goal: Return mobility to safest level of function  Outcome: Progressing  Goal: Maintain proper alignment of affected body part  Outcome: Progressing  Goal: Return ADL status to a safe level of function  Outcome: Progressing     Problem: Gastrointestinal - Adult  Goal: Minimal or absence of nausea and vomiting  Outcome: Progressing  Goal: Maintains or returns to baseline bowel function  Outcome: Progressing  Goal: Maintains adequate nutritional intake  Outcome: Progressing  Goal: Establish and maintain optimal ostomy function  Outcome: Progressing     Problem: Genitourinary - Adult  Goal: Absence of urinary retention  Outcome: Progressing  Goal: Urinary catheter remains patent  Outcome: Progressing     Problem: Infection - Adult  Goal: Absence of infection at discharge  Outcome: Progressing  Goal: Absence of infection during hospitalization  Outcome: Progressing  Goal: Absence of fever/infection during anticipated neutropenic period  Outcome: Progressing     Problem: Metabolic/Fluid and Electrolytes - Adult  Goal: Electrolytes maintained within normal limits  Outcome: Progressing  Goal: Hemodynamic stability and optimal renal function maintained  Outcome: Progressing  Goal: Glucose maintained within prescribed range  Outcome: Progressing     Problem: Hematologic - Adult  Goal: Maintains hematologic stability  Outcome: Progressing     Problem: Nutrition Deficit:  Goal: Optimize nutritional status  Outcome: Progressing     Problem: Coping  Goal: Pt/Family able to verbalize concerns and demonstrate effective coping strategies  Description: INTERVENTIONS:  1. Assist patient/family to identify coping skills, available support systems and cultural and spiritual values  2. Provide emotional support, including active listening and acknowledgement of concerns of patient and caregivers  3. Reduce environmental stimuli, as able  4. Instruct patient/family in relaxation techniques, as appropriate  5.  Assess for spiritual pain/suffering and initiate Spiritual Care, Psychosocial Clinical Specialist consults as needed  Outcome: Progressing     Problem: Confusion  Goal: Confusion, delirium, dementia, or psychosis is improved or at baseline  Description: INTERVENTIONS:  1. Assess for possible contributors to thought disturbance, including medications, impaired vision or hearing, underlying metabolic abnormalities, dehydration, psychiatric diagnoses, and notify attending LIP  2. [place] high risk fall precautions, as indicated  3. Provide frequent short contacts to provide reality reorientation, refocusing and direction  4. Decrease environmental stimuli, including noise as appropriate  5. Monitor and intervene to maintain adequate nutrition, hydration, elimination, sleep and activity  6. If unable to ensure safety without constant attention obtain sitter and review sitter guidelines with assigned personnel  7.  Initiate Psychosocial CNS and Spiritual Care consult, as indicated  Outcome: Progressing

## 2023-02-27 NOTE — PROGRESS NOTES
[facility] - Progress Note  Today's Date and Time: 2/27/2023, 11:43 AM    Impression :   Left-sided empyema  Streptococci Gordonii on pleural fluid culture from 2/14/23  Repeat culture 2/18/23 with no bacterial growth   Shortness of breath  KELSEA on CKD  Hyponatremia  Leukocytosis  Hx non-Hodgkin's lymphoma  Hx multiple myeloma  Sarcoidosis  DM II  Interstitial nephritis  COPD  Hx partial right lung lobectomy  Allergies to macrolides and ketolides. Recommendations:   2/20/23: Discontinued IV Cefepime on 2-20-23  IV Rocephin 1 gm q 24 hrs until 3/12/23  Ok for midline for outpatient therapy when ready for discharge  S/p decortication on 2/23/23  Cardioversion completed 2/27/23 for continued AFIB    Medical Decision Making/Summary/Discussion:2/27/2023     Patient with Hx of non-Hodgkin's lymphoma and multiple myeloma  Admitted with Shortness of breath  Found to have Left-sided empyema  Streptococci Gordonii on pleural fluid culture from 2/14/23  Repeat culture 2/18/23 with no bacterial growth   Empyema partially drained by chest tube. Decortication planned 2/23/23  Confusion with associated hyponatremia  KELSEA on CKD 3  On treatment with ceftriaxone. Infection Control Recommendations   Drain Precautions    Antimicrobial Stewardship Recommendations     Simplification of therapy  Targeted therapy  PK dosing    Coordination of Outpatient Care:   Estimated Length of IV antimicrobials:3/12/23  Patient will need Midline Catheter Insertion: TBD  Patient will need PICC line Insertion:TBD  Patient will need: Home IV , [place],  SNF,  LTAC: TBD  Patient will need outpatient wound care:No    Chief complaint/reason for consultation:   Empyema-streptococcus Gordonii      History of Present Illness:   Keila Powell is a 79y.o.-year-old male who was initially admitted on 2/12/2023.  Patient seen at the request of Dr. Eva Ho:    This patient, with a history of COPD, CKD, sarcoidosis, partial right lung lobectomy, multiple myeloma, non-hodgkin's lymphoma, acute interstitial nephritis and DM II, initially presented to [facility] ED on 2/12/23 with complaints of worsening shortness of breath over the previous week with a non-productive cough. He has has allergies to macrolides and ketolides. He was experiencing increased work of breathing upon evaluation, initially requiring CPAP. ABG values were grossly unremarkable. He denied any recent history of chemotherapy, fever, chills, N/V/D, abnormal bleeding, weight-loss or night sweats. Chest x-ray showed probable left-sided pneumonia with loculated pleural effusion which was confirmed via CT chest, which revealed a moderate to large amount of multiloculated left pleural fluid in the left lung. Mediastinal and bilateral axillary lymphadenopathy was also noted. He was transferred to [facility] for a higher level of care and was initiated on broad spectrum antibiotics with IV cefepime and vancomycin. Pulmonology was consulted and a thoracentesis was attempted, but was unable to be completed as no clear pocket was visualized via [address]. IR was consulted and a 10 [place], left-sided chest tube was placed on 2/14/23.  10 ml of purulent fluid was initially drained and sent for culture. TPA and dornase were administered with improvement in output. MRSA Nares was not detected and the pleural fluid culture grew PCN sensitive Streptococcus Gordonii. Vancomycin was discontinued on 2/14/23 and cefepime continued. A repeat CT of the patient's chest  on 2/17/23 revealed some improvement in the multiloculated left pleural effusions. A larger bore chest tube exchange was completed on 2/18/23 in IR. A repeat pleural fluid culture has not had any bacterial growth to date.      Chest tube output to date:   2/14 - 800 cc  2/15- 600 cc  2/16 - 700 cc  2/17 - 900 cc  2/18- 200 cc  2/19 - 910 cc  2/20 - 275 cc    CT delon was consulted for possible decortication, but they have no plans for decortication at this time. Abnormal labs at the time of consult include:   Na:133  Cr:2.24  WBC:32.4-->19.0  Hgb:10.0    There is no growth on urine or blood cultures. Imaging/Diagonstics:  CT CHEST WO CONTRAST     Result Date: 2/17/2023  Small to moderate-sized multiloculated left pleural effusions have improved. New small foci of air within the collection is presumably related to the introduction of the left thoracostomy tube. Stable small to moderate sized mediastinal lymph nodes are nonspecific but may relate to the patient's history of lymphoma. Additional small bilateral axial lymph nodes are identified. Small hiatal hernia. XR CHEST PORTABLE     Result Date: 2/18/2023  Moderate left pleural effusion increased. No change left chest tube/pigtail catheter. XR CHEST PORTABLE     Result Date: 2/17/2023  Similar findings when rotation taken into account; left chest tube with unchanged or slightly increased loculated appearing left pleural effusion; atelectatic appearing changes. No pneumothorax. XR CHEST PORTABLE     Result Date: 2/14/2023  Interval placement of a left chest tube with reduced left pleural fluid. IR CHEST TUBE INSERTION     Result Date: 2/14/2023  Successful ultrasound guided placement of a left 10 [place] chest tube      US RETROPERITONEAL COMPLETE     Result Date: 2/19/2023  1. Simple cortical cyst at the upper pole left kidney measuring 6 mm. 2. Limited renal ultrasound. No hydronephrosis. 3. Nonvisualization of ureteral jets. Minimal distention of the urinary bladder. Patient no urge to void during the exam. RECOMMENDATIONS: Unavailable        Infectious disease was consulted for continued empyema.       CURRENT EVALUATION 2/27/2023  BP (!) 141/81   Pulse (!) 105   Temp 98.3 °F (36.8 °C) (Oral)   Resp 20   Ht 6' 1\" (1.854 m)   Wt 230 lb 9.6 oz (104.6 kg)   SpO2 95%   BMI 30.42 kg/m²     Patient evaluated in the ICU     Afebrile  VS stable    The patient is stable today on room air  He is doing well s/p decortication with CT surgery on 2/23/23    He is alert and sitting up in bed upon evaluation. Family is at bedside    Left sided-chest tube is in place and is to water seal.  Serosanguinous drainage present. Discussed with patient and wife at length on 2-20-23  CT scans reviewed with wife and daughter on 2-20-23  Indicated that he will need about a month of antibiotics. He may need additional drainage procedures. Wife indicated similar empyema a few years ago on Rt chest. He had S aureus back then and was severely ill. Medications reviewed: On IV Ceftriaxone    No growth from VATS culture from 2/23/23 so far. Leukocytosis continues  Steroids initiated 2/26/23    Worsening KELSEA  Nephrology is following with the possibility of need for temporary hemodialysis     Discussed with RN    Labs, X rays reviewed: 2/27/2023    BUN:63-->47-->51-->50--->63  Cr:2.24-->1.96-->2.62-->3.2-->3.48--->4.27  Na 131-->130-->132-->132  K 5.1-->4.9--->5.0    WBC: 22.9--->24.1--->26.0---->25.0  Hb:10.0-->11.2-->11.2-->9.2  Plat: 425-->461-->434-->446-->433    CRP: 202.8     Cultures:  Urine:  2/17/23: No growth  2/22/23 : No growth   Blood:  2/17/23: No growth  MRSA Nares:  2/13/23: Not detected  Pleural Fluid:  2/14/23: Streptococci Gordonii  2/18/23: No growth thus far  2-23-23: No growth    Discussed with patient, RN, IM. I have personally reviewed the past medical history, past surgical history, medications, social history, and family history, and I have updated the database accordingly. Past Medical History:     Past Medical History:   Diagnosis Date    Acute interstitial nephritis 08/03/2022    Unclear cause. Creat bumped to 2.9, baseline in oct 2020 1.6.   Exact description of his kidney biopsy is not available to me however pathologist seems to think there is diabetic glomerosclerosis and acute interstitial nephritis seen on the biopsy specimen. No mention about chronic changes. Because of acute interstitial nephritis a trial of steroids for 15 days will be given to see if I can impro    Aortic regurgitation     ARF (acute renal failure) ([facility] 08/03/2022    kidney biopsy from June 2022  showing findings of acute interstitial nephritis. Exact description not available. Trial of steroids will be given to see if I can improve renal function. Creatinine in October 2020 was 1.6, creatinine in June 2022 was 2.9. Trial of steroids will be given to see if renal function can be improved.     Chronic kidney disease     Hyperlipidemia     Hypertension     Non-Hodgkin lymphoma ([facility] [address]     Research study patient 02/13/2023    [id number] Completed 2/15/23    Sarcoidosis     Type II or unspecified type diabetes mellitus without mention of complication, not stated as uncontrolled        Past Surgical  History:     Past Surgical History:   Procedure Laterality Date    CARPAL TUNNEL RELEASE Left 11/15     EYE SURGERY Right     shunt and cataract     EYE SURGERY Right 6/16    laser     IR CHEST TUBE INSERTION  2/13/2023    IR CHEST TUBE INSERTION 2/13/2023 [facility]    IR CHEST TUBE INSERTION  2/18/2023    IR CHEST TUBE INSERTION 2/18/2023 Courtney Mcarthur MD [facility]    KNEE ARTHROSCOPY      MALIGNANT SKIN LESION EXCISION      ROTATOR CUFF REPAIR Right 7/14    ROTATOR CUFF REPAIR Left 5/15    THORACOSCOPY Left 2/23/2023    VIDEO ASSISTED THORACOSCOPY, DECORTICATION performed by Brock Lilly MD at [facility] Right 10/2018       Medications:      metoprolol tartrate  25 mg Oral BID    insulin glargine  20 Units SubCUTAneous Daily    midodrine  10 mg Oral TID WC    methylPREDNISolone  30 mg IntraVENous Q12H    insulin lispro  0-16 Units SubCUTAneous TID WC    insulin lispro  0-4 Units SubCUTAneous Nightly    docusate sodium  100 mg Oral TID    senna  1 tablet Oral Nightly    furosemide  20 mg IntraVENous BID    cefTRIAXone (ROCEPHIN) IV  1,000 mg IntraVENous Q24H    pantoprazole  40 mg Oral QAM AC    colchicine  0.3 mg Oral Daily    sodium chloride flush  5-40 mL IntraVENous 2 times per day    mupirocin   Nasal BID    chlorhexidine   Topical See Admin Instructions    sodium chloride flush  5-40 mL IntraVENous 2 times per day    polyethylene glycol  17 g Oral Daily    sodium chloride flush  5-40 mL IntraVENous 2 times per day       Social History:     Social History     Socioeconomic History    Marital status:      Spouse name: Not on file    Number of children: Not on file    Years of education: Not on file    Highest education level: Not on file   Occupational History    Not on file   Tobacco Use    Smoking status: Never    Smokeless tobacco: Former    Tobacco comments:     minimal and infrequent    Substance and Sexual Activity    Alcohol use: Yes     Alcohol/week: 0.0 standard drinks     Comment: rare    Drug use: No    Sexual activity: Not on file   Other Topics Concern    Not on file   Social History Narrative    Not on file     Social Determinants of Health     Financial Resource Strain: Low Risk     Difficulty of Paying Living Expenses: Not very hard   Food Insecurity: No Food Insecurity    Worried About Running Out of Food in the Last Year: Never true    Ran Out of Food in the Last Year: Never true   Transportation Needs: No Transportation Needs    Lack of Transportation (Medical): No    Lack of Transportation (Non-Medical): No   Physical Activity: Not on file   Stress: Stress Concern Present    Feeling of Stress : To some extent   Social Connections: Socially Integrated    Frequency of Communication with Friends and Family: Three times a week    Frequency of Social Gatherings with Friends and Family:  Three times a week    Attends [employer] Services: 1 to 4 times per year    Active Member of Clubs or Organizations: No    Attends Club or Organization Meetings: 1 to 4 times per year    Marital Status:    Intimate Partner Violence: Not At Risk    Fear of Current or Ex-Partner: No    Emotionally Abused: No    Physically Abused: No    Sexually Abused: No   Housing Stability: Low Risk     Unable to Pay for Housing in the Last Year: No    Number of Places Lived in the Last Year: 1    Unstable Housing in the Last Year: No       Family History:     Family History   Problem Relation Age of Onset    High Blood Pressure Father     Other Father         AAA    Heart Disease Other         uncle x 2         Allergies:   Latex, Gabapentin, Meperidine, Erythromycin, Glimepiride, Lidocaine, Hydrocodone, Macrolides and ketolides, Niacin and related, Trelegy ellipta [fluticasone-umeclidin-vilant], Pregabalin, and Xanax [alprazolam]     Review of Systems:   Constitutional: No fevers or chills. No systemic complaints  Head: No headaches  Eyes: No double vision or blurry vision. No conjunctival inflammation. ENT: No sore throat or runny nose. . No hearing loss, tinnitus or vertigo. Cardiovascular: No chest pain or palpitations. shortness of breath. WANG  Lung: Left sided chest tube with some shortness of breath with dry cough. No sputum production  Abdomen: No nausea, vomiting, diarrhea, or abdominal pain. [place] No cramps. Genitourinary: No increased urinary frequency, or dysuria. No hematuria. No suprapubic or CVA pain  Musculoskeletal: No muscle aches or pains. No joint effusions, swelling or deformities  Hematologic: No bleeding or bruising. Neurologic: No headache, weakness, numbness, or tingling. Integument: No rash, no ulcers. Psychiatric: No depression. Endocrine: No polyuria, no polydipsia, no polyphagia.     Physical Examination :   Patient Vitals for the past 8 hrs:   BP Temp Temp src Pulse Resp SpO2 Weight   02/27/23 0800 -- 98.3 °F (36.8 °C) Oral -- -- -- --   02/27/23 0700 (!) 141/81 -- -- (!) 105 20 95 % --   02/27/23 0600 139/83 -- -- 99 21 95 % 230 lb 9.6 oz (104.6 kg)   02/27/23 0500 127/76 -- -- (!) 106 25 94 % --   02/27/23 0400 124/69 98.2 °F (36.8 °C) Oral 87 18 95 % --     General Appearance: Awake, alert, and in no apparent distress  Head:  Normocephalic, no trauma  Eyes: Pupils equal, round, reactive to light and accommodation; extraocular movements intact; sclera anicteric; conjunctivae pink. No embolic phenomena. ENT: Oropharynx clear, without erythema, exudate, or thrush. No tenderness of sinuses. Mouth/throat: mucosa pink and moist. No lesions. Dentition in good repair. Neck:Supple, without lymphadenopathy. Thyroid normal, No bruits. Pulmonary/Chest: Left sided chest tube in place to suction with serosanguinous drainage. Diminished to auscultation. No dullness to percussion. Cardiovascular: Regular rate and rhythm without murmurs, rubs, or gallops. Abdomen: Soft, non tender. Bowel sounds normal. No organomegaly  All four Extremities: No cyanosis, clubbing, edema, or effusions. Neurologic: No gross sensory or motor deficits. Skin: Warm and dry with good turgor. No signs of peripheral arterial or venous insufficiency. No ulcerations. Left sided-chest tube site CDI    Medical Decision Making -Laboratory:   I have independently reviewed/ordered the following labs:    CBC with Differential:   No results for input(s): WBC, HGB, HCT, PLT, SEGSPCT, BANDSPCT, LYMPHOPCT, MONOPCT, EOSPCT in the last 72 hours. BMP:   Recent Labs     02/26/23  0343 02/27/23  0327    132*   K 4.7 5.0    98   CO2 20 21   BUN 60* 63*   CREATININE 4.72* 4.27*     Hepatic Function Panel: No results for input(s): PROT, LABALBU, BILIDIR, IBILI, BILITOT, ALKPHOS, ALT, AST in the last 72 hours. No results for input(s): RPR in the last 72 hours. No results for input(s): HIV in the last 72 hours. No results for input(s): BC in the last 72 hours.   Lab Results   Component Value Date/Time    MUCUS 1+ 02/13/2023 02:17 AM    RBC 3.26 02/24/2023 09:28 AM    RBC 5.15 04/03/2012 11:08 AM    WBC 25.0 02/24/2023 09:28 AM    TURBIDITY Cloudy 02/22/2023 01:17 PM     Lab Results   Component Value Date/Time    CREATININE 4.27 02/27/2023 03:27 AM    GLUCOSE 328 02/27/2023 03:27 AM    GLUCOSE 140 04/03/2012 11:08 AM       Medical Decision Making-Imaging:     Narrative   EXAMINATION:   CT OF THE CHEST WITHOUT CONTRAST, 2/17/2023 9:29 am       TECHNIQUE:   CT of the chest was performed without the administration of intravenous   contrast. Multiplanar reformatted images are provided for review. Automated   exposure control, iterative reconstruction, and/or weight based adjustment of   the mA/kV was utilized to reduce the radiation dose to as low as reasonably   achievable. COMPARISON:   02/13/2023       HISTORY:   ORDERING SYSTEM PROVIDED HISTORY:  Improvement? TECHNOLOGIST PROVIDED HISTORY:   Improvement? FINDINGS:   Mediastinum: Small bilateral axial lymph nodes are identified not   significantly changed. Small to moderate size mediastinal lymph nodes are   additionally present. Lungs/Pleura: There are multiple loculated effusions within the left chest   which have improved since previous exam.  Small amounts of new air are likely   secondary to the presence of the left thoracostomy tube. Left lower lobe atelectasis is identified. Upper Abdomen: There is a probable small hiatal hernia. Soft Tissues/Bones:  No acute osseous abnormality is appreciated. Old right   rib fractures are noted. Impression   Small to moderate-sized multiloculated left pleural effusions have improved. New small foci of air within the collection is presumably related to the   introduction of the left thoracostomy tube. Stable small to moderate sized mediastinal lymph nodes are nonspecific but   may relate to the patient's history of lymphoma. Additional small bilateral   axial lymph nodes are identified. Small hiatal hernia.              Narrative   EXAMINATION: ONE XRAY VIEW OF THE CHEST       2/18/2023 3:04 pm       COMPARISON:   CT chest February 17, 2023       HISTORY:   ORDERING SYSTEM PROVIDED HISTORY: worsening   TECHNOLOGIST PROVIDED HISTORY:   worsening       FINDINGS:   Pigtail catheter on the left. Moderate left effusion appears   denser/increased. Mild bilateral interstitial infiltrates or edema. Heart   and mediastinum unremarkable. Bony thorax intact. No pneumothorax noted. Subsegmental atelectasis right lower lung. Impression   Moderate left pleural effusion increased. No change left chest tube/pigtail   catheter. Medical Decision [id number]:       Medical Decision Making-Other:     Note:  Labs, medications, radiologic studies were reviewed with personal review of films  Large amounts of data were reviewed  Discussed with nursing Staff, Discharge planner  Infection Control and Prevention measures reviewed  All prior entries were reviewed  Administer medications as ordered  Prognosis: [address]  Discharge planning reviewed  Follow up as outpatient. Thank you for allowing us to participate in the care of this patient. Please call with questions. FLOYD Mark - CNP    ATTESTATION:    I have discussed the case, including pertinent history and exam findings with the APRN. I have evaluated the  History, physical findings and pictures of the patient and the key elements of the encounter have been performed by me. I have reviewed the laboratory data, other diagnostic studies and discussed them with the APRN. I have updated the medical record where necessary. I agree with the assessment, plan and orders as documented by the APRN.     Juan F Baker MD.    Pager: [phone] - Office: [phone]

## 2023-02-27 NOTE — PROGRESS NOTES
PULMONARY & CRITICAL CARE MEDICINE PROGRESS NOTE     Patient:  Brittani Parr  MRN: [id number]  6 [facility] date: 2/12/2023  Primary Care Physician: Cheryl Miranda MD  Consulting Physician: Jolene Garcia MD  CODE Status: Full Code  LOS: 13     SUBJECTIVE     CHIEF COMPLAINT/REASON FOR CONSULT:  Acute Hypoxic respiratory failure    HISTORY OF PRESENT ILLNESS:  Brittani Parr is a 74 y.o. male with past medical history significant for type 2 diabetes mellitus, multiple myeloma, non-Hodgkin lymphoma, sarcoidosis, referred from [facility] with worsening respiratory status, shortness of breath, nonproductive cough. He was started on BiPAP and given antibiotics. Chest x-ray was concerning for loculated left-sided pleural effusion. Transfer was made and accepted by hospitalist, further work-up. Critical care was consulted for worsening respiratory status. Patient was on high flow and respiratory rate was in 38-40. Patient was very dyspneic and having difficulty breathing with accessory muscle use. Initially he was unable to keep BiPAP but later on able to keep BiPAP. He improved symptomatically with BiPAP and respiratory rate was in 26. Patient is transferred to the ICU for higher level of care. 2/13: Attempted to perform bedside thoracentesis, no clear pocket identified, sent to IR, chest tube placed, appears that an empyema was encountered and chest tube was placed growing gram positive cocci in pairs, on cefepime and vanco     2/14: Placed chest tube yesterday, weaned off high flow, feeling \"100 x better\".       2/15: Continued TPA/Dornase chest tube flush, Got anxious overnight requiring xanax, 1200 cc of purulent chest tube output in the last 24 hours, patient did not sleep last night     2/16: Added seroquel to help sleep and agitation at night, continued Dornase/TPA, 700 cc of chest tube output that was less purulent, afebrile, WBC downtrending, continue cefepime     2/17: WBC uptrended, sent cultures, tachypneic, restless overnight, given xanax, more tachycardic overnight as well, plan for CT chest today, will discuss possible CTA to evaluate for PE.    02/18: Cardiothoracic surgery consulted and they increased the size of the chest tube. Will likely need decortication. 02/20: Cardiothoracic surgery determined to review imaging and diagnostics with on-call surgeon, plan to repeat CT scan at later date, hold off on plan for decortication. Infectious Disease discontinued IV cefepime 02/20/23.    02/21: Patient more confused, oriented to self. Trying to get out of bed. Denies agitation but feeling overall week. Kidney US did not show hydronephrosis. Infectious disease recommends starting IV Rocephin 1 gm q24 hrs until 03/12/23. [id number] [address]ms for outpatient therapy. Echo performed. Pt declined PT.    02/22: Pt is paranoid and declined PT. Cardiothoracic planning for left-sided VATS decortication on 02/23/23. Breathing stable, 99% O2 saturation on 3L NC 40% FiO2. INTERVAL HISTORY:  2/27/2023  Patient seen in CVICU. He is S/p VATS/decortication on 02/23/2023. Overnight events noted chart reviewed his heart rate is in 90s currently he is comfortable not in distress Tmax is 98.3 and respiratory rate is in low 20s. He is on room air maintaining saturation above 94%. Patient is atrial fibrillation seen by cardiology and is scheduled for cardioversion this morning by cardiology. He is on amiodarone infusion on Lopressor as needed and he is on Lopressor. Coronary patient is feeling better he has mild cough he does bring bloody tinged sputum intermittently. Does not complain of shortness of breath except when he moves. Chest tube is in place and had 140 mL out in last 24 hours. This morning chest tube was placed on waterseal by CT surgery. He is currently on Lasix 20 mg IV twice daily.   Urine output was reported to be 2565 in last 24 hours improving and creatinine is getting better  Labs shows creatinine is 4.27 BUN is 63 bicarbonate 21 sodium 132 potassium 5.0    Chest x-ray this morning similar changes with low lung volume as compared to chest x-ray on 2023 left pleural parenchymal change/small pleural effusion with pleural thickening and postop changes chest tube is in place. REVIEW OF SYSTEMS:  Review of Systems   Constitutional:  Positive for activity change, appetite change. Negative for chills, diaphoresis, fever and unexpected weight change. HENT:  Negative for congestion. Respiratory:  Positive for cough and shortness of breath. Negative for apnea, choking, chest tightness, wheezing and stridor. Cardiovascular: Negative for palpitation, dizziness lightheadedness, positive for chest pain (at the site of chest tube insertion). Negative for leg swelling. Gastrointestinal:  Negative for abdominal distention, diarrhea, nausea and vomiting. Genitourinary:  Negative for difficulty urinating. Neurological:  Negative for dizziness and numbness. Psychiatric/Behavioral:  Negative for agitation. OBJECTIVE     PaO2/FiO2 RATIO:  No results for input(s): POCPO2 in the last 72 hours. FiO2 : 40 %     VITAL SIGNS:   LAST:  BP (!) 141/81   Pulse (!) 105   Temp 98.3 °F (36.8 °C) (Oral)   Resp 20   Ht 6' 1\" (1.854 m)   Wt 230 lb 9.6 oz (104.6 kg)   SpO2 95%   BMI 30.42 kg/m²   8-24 HR RANGE:  TEMP Temp  Av.4 °F (36.9 °C)  Min: 98.2 °F (36.8 °C)  Max: 98.6 °F (37 °C)   BP Systolic (63XIO), IJA:859 , Min:100 , ELVER:817      Diastolic (78UMY), VOX:27, Min:68, Max:88     PULSE Pulse  Av.5  Min: 77  Max: 132   RR Resp  Av.2  Min: 17  Max: 25   O2 SAT SpO2  Av.6 %  Min: 94 %  Max: 95 %   OXYGEN DELIVERY No data recorded        SYSTEMIC EXAMINATION:   Constitutional:  Alert, cooperative and no distress. Mental Status:  Oriented to person, place and time and normal affect. Chest: Left-sided chest tube is present. Dressing is present.   Lungs: Air entry is good on the right side. Decreased air entry on the left side with chest tube in place. No crackles present  Heart: Irregularly irregular rhythm, no murmur. Abdomen:  Soft, nontender, nondistended, normal bowel sounds. Extremities:  No edema, redness, tenderness in the calves. Skin:  Warm, dry, no gross lesions or rashes. DATA REVIEW     Medications: Current Inpatient  Scheduled Meds:   midodrine  10 mg Oral TID WC    methylPREDNISolone  30 mg IntraVENous Q12H    insulin lispro  0-16 Units SubCUTAneous TID WC    insulin lispro  0-4 Units SubCUTAneous Nightly    metoprolol tartrate  12.5 mg Oral BID    docusate sodium  100 mg Oral TID    senna  1 tablet Oral Nightly    furosemide  20 mg IntraVENous BID    cefTRIAXone (ROCEPHIN) IV  1,000 mg IntraVENous Q24H    pantoprazole  40 mg Oral QAM AC    colchicine  0.3 mg Oral Daily    sodium chloride flush  5-40 mL IntraVENous 2 times per day    mupirocin   Nasal BID    chlorhexidine   Topical See Admin Instructions    sodium chloride flush  5-40 mL IntraVENous 2 times per day    polyethylene glycol  17 g Oral Daily    sodium chloride flush  5-40 mL IntraVENous 2 times per day     Continuous Infusions:   sodium chloride 30 mL/hr at 02/26/23 2042    amiodarone 0.5 mg/min (02/27/23 0039)    sodium chloride      sodium chloride      sodium chloride      dextrose         INPUT/OUTPUT:  In: 2168.2 [P.O.:1490; I.V.:678.2]  Out: 3982 [Urine:2935]  Date 02/27/23 0000 - 02/27/23 2359   Shift 6442-8394 5300-6411 6270-1326 24 Hour Total   INTAKE   P.O.(mL/kg/hr) 240(0.3)   240   Shift Total(mL/kg) 240(2.3)   240(2.3)   OUTPUT   Urine(mL/kg/hr) 675(0.8) 370  1045   Chest Tube 40   40   Shift Total(mL/kg) 715(6.8) 370(3.5)  1085(10.4)   Weight (kg) 104.6 104.6 104.6 104.6        LABS:  ABGs:   No results for input(s): POCPH, POCPCO2, POCPO2, POCHCO3, LFJB3UMC in the last 72 hours.     CBC:   No results for input(s): WBC, HGB, HCT, MCV, PLT, LABLYMP, MID, GRAN, LYMPHOPCT, MIDPERCENT, GRANULOCYTES, RBC, MCH, MCHC, RDW in the last 72 hours. Invalid input(s): RELATIVEPERCENT    CRP:   No results for input(s): CRP in the last 72 hours. LDH:   No results for input(s): LDH in the last 72 hours. BMP:   Recent Labs     02/25/23  1002 02/26/23  0343 02/27/23  0327   * 135 132*   K 5.0 4.7 5.0    103 98   CO2 19* 20 21   BUN 53* 60* 63*   CREATININE 4.38* 4.72* 4.27*   GLUCOSE 197* 218* 328*     Liver Function Test:   No results for input(s): PROT, LABALBU, ALT, AST, GGT, ALKPHOS, BILITOT in the last 72 hours. Coagulation Profile:   No results for input(s): INR, PROTIME, APTT in the last 72 hours. D-Dimer:  No results for input(s): DDIMER in the last 72 hours. Lactic Acid:  No results for input(s): LACTA in the last 72 hours. Cardiac Enzymes:  No results for input(s): CKTOTAL, CKMB, CKMBINDEX, TROPONINI in the last 72 hours. Invalid input(s): TROPONIN, HSTROP  BNP/ProBNP:   No results for input(s): BNP, PROBNP in the last 72 hours. Triglycerides:  No results for input(s): TRIG in the last 72 hours. Microbiology:  Urine Culture:  No components found for: CURINE  Blood Culture:  No components found for: CBLOOD, CFUNGUSBL  Sputum Culture:  No components found for: CSPUTUM  No results for input(s): SPECDESC, SPECIAL, CULTURE, STATUS, ORG, CDIFFTOXPCR, CAMPYLOBPCR, SALMONELLAPC, SHIGAPCR, SHIGELLAPCR, MPNEUG, MPNEUM, LACTOQL in the last 72 hours. No results for input(s): SPUTUM, SPECIAL, CULTURE, STATUS, ORG, CDIFFTOXPCR, MPNEUM, MPNEUG in the last 72 hours. Invalid input(s): Jo Carbon, CFUNGUSBL,  [address] [facility]         Radiology Reports:  XR CHEST PORTABLE   Preliminary Result   Left chest tubes present with partially loculated left pleural effusion and   left basilar opacity unchanged. XR CHEST PORTABLE   Final Result   No significant change from prior exam.      Left chest tubes with left basilar airspace disease and small amount of   pleural fluid.       Linear left retrocardiac lucency could represent small pneumothorax. XR CHEST PORTABLE   Final Result   No significant change from prior exam.         XR CHEST PORTABLE   Final Result   Status post removal single small bore and placement 2 large-bore chest tubes   left lung with reduced opacity, presumably multiloculated left pleural   effusion and associated atelectasis. No pneumothorax. Slightly greater   atelectasis right base. US RETROPERITONEAL COMPLETE   Final Result   1. Simple cortical cyst at the upper pole left kidney measuring 6 mm. 2. Limited renal ultrasound. No hydronephrosis. 3. Nonvisualization of ureteral jets. Minimal distention of the urinary   bladder. Patient no urge to void during the exam.      RECOMMENDATIONS:   Unavailable         IR CHEST TUBE INSERTION   Final Result   Successful fluoroscopic up sizing of left sided chest tube with 14 [place]   tube placed. XR CHEST PORTABLE   Final Result   Moderate left pleural effusion increased. No change left chest tube/pigtail   catheter. VL DUP LOWER EXTREMITY VENOUS BILATERAL   Final Result      CT CHEST WO CONTRAST   Final Result   Small to moderate-sized multiloculated left pleural effusions have improved. New small foci of air within the collection is presumably related to the   introduction of the left thoracostomy tube. Stable small to moderate sized mediastinal lymph nodes are nonspecific but   may relate to the patient's history of lymphoma. Additional small bilateral   axial lymph nodes are identified. Small hiatal hernia. XR CHEST PORTABLE   Final Result   Similar findings when rotation taken into account; left chest tube with   unchanged or slightly increased loculated appearing left pleural effusion;   atelectatic appearing changes. No pneumothorax. XR CHEST PORTABLE   Final Result   Interval placement of a left chest tube with reduced left pleural fluid.          IR CHEST TUBE INSERTION   Final Result   Successful ultrasound guided placement of a left 10 Armenian chest tube         CT CHEST WO CONTRAST   Final Result   1. Moderate to large amount of multiloculated left pleural fluid. Assessment   for pleural thickening and/or pleural nodularity is limited without IV   contrast.   2. Consolidation in the left lower lobe, likely atelectasis, but possibility   of superimposed pneumonia would be difficult to exclude. 3. Mediastinal and bilateral axillary lymphadenopathy, which may be related   to the patient's history of lymphoma. If available, comparison to any recent   imaging may be of use to assess for progression. XR CHEST PORTABLE   Final Result   No significant interval change. Redemonstration of opacification of the mid   to lower left lung field which may in part be due to loculated left pleural   effusion. Consider further evaluation with CT. XR CHEST PORTABLE    (Results Pending)        Echocardiogram:   No results found for this or any previous visit. ASSESSMENT AND PLAN     Assessment:    Acute hypoxic respiratory failure  Community acquired pneumonia  Empyema left side s/p VATS/decortication (1/23/2023)  Mediastinal and bilateral axillary lymphadenopathy  Hx of lymphoma  Hypertension  Hx of AAA  Hx of mitral regurgitation  Atrial fibrillation. KELSEA      Plan:    Patient s/p VATS on 02/23/2023. Chest tube is in place chest tube management per CT surgery. He had 140 mL out in last 24 hours. There is no leak present from chest tube. Chest tube was placed on waterseal this morning  Chest x-ray reviewed showed left basilar pleuroparenchymal change with residual pleural effusion/and possibly mild atelectasis low lung volumes bilaterally. Patient is currently on Solu-Medrol 30 mg every 12 hours possibly started by primary service. No need for steroids from pulmonary standpoint.   If there is no other reason for him to be on Solu-Medrol I would recommend to discontinue Solu-Medrol especially with empyema/infection and chest tube in place  Patient is currently on Lasix IV twice daily creatinine is better today urine output is improving. Follow-up with nephrology and follow-up renal function  Keep supplemental oxygen to keep oxygen saturation above 92%  DVT prophylaxis with heparin subcu when okay with CT surgery if there is no contraindication  On Rocephin and follow-up with infectious disease  Continue to encourage incentive spirometry Acapella deep breathing and ambulation  Bronchodilator therapy as needed  Encourage incentive spirometry/Acapella  We will continue to follow    Discussed with wife and updated questions answered. Discussed with nursing staff recommendations/plan discussed    We will continue to follow. I would like to thank you for allowing me to participate in the care of this patient. Please feel free to call with any further questions or concerns.       Shawn Sousa MD  Pulmonary and critical care medicine  2/27/2023, 10:08 AM

## 2023-02-27 NOTE — OP NOTE
[facility]  Transesophageal Echocardiogram       Today's Date:  2/27/2023  Ordering Physician:  Dr Rosales  Indication:   Afib    Operators:  Primary:   Dr Rosales (Attending Physician)  Assistant:   Joshua Penn MD (Cardiovascular Fellow)      Pre Procedure Conscious Sedation Data:    ASA Class:    [] I [] II [x] III [] IV    Mallampati Class:  [] I [] II [x] III [] IV    Procedure:    Patient seen and examined. History and Physical reviewed. Labs reviewed.    After informed consent was obtained with explanation of the risks and benefits, the patient was brought to cardiac cath lab. All sedation was administered by the cardiologist. The oropharynx was pre-anesthesized with viscous lidocaine and cetacaine spray. The ultrasound probe was passed without any difficulty.      AFIA findings:    LA:  Normal  CAMILLA:  No thrombus  RA:  Normal  RV:   Normal  LV:  Normal  Estimated LVEF:  50%.   Aorta:   Mild atheromatous disease arch  Pericardium: No pericardial effusion  Septum:  No intracardiac shunt via color Doppler.       Valves:    Mitral Valve: Structurally normal. No regurgitation is identified.  Aortic Valve: The aortic valve is trileaflet and opens adequately. Mild regurgitiation is identified.  Tricuspid valve: Structurally normal. No regurgitation is identified.  Pulmonary valve: Normal. No significant regurgitation    No valvular vegetations or thrombus identified.      Summary:     1. A AFIA was performed without complications.  2. LVEF 50%.   3. Mild AI.   4. No CAMILLA clot.   5. No thrombus or valvular vegetation identified  6. Proceed with cardioversion.  7. All Conscious Sedation was administered via the Cardiologist.     CARDIOVERSION:    After an adequate level of sedation was achieved  200J in biphasic synchronized delivery was administered.   conversion to normal sinus rhythm.     The patient awoke without complications. A post procedure 12 L ECG was ordered and  reviewed. Impression:  Successful Consious Sedation - safely  Successful Cardioversion      Complications: There were no complications encountered. The patient will continue with the discharge meds and has been instructed to follow-up with primary cardiologist for continued long term care and cardiovascular management. There were no complications encountered. Marry Hernadez MD       Cardiovascular Fellow      I have reviewed the case / procedure with resident / fellow  I have examined the patient personally  Patient agree with treatment plan as discussed before, final arrangement based on my evaluation and exam.    Risk and benefit of procedure planned were explained in details. Procedure was performed by me personally, with all aspect of the procedure being done using standard protocol. Note was modified based on my own assessment and treatment.     Cassidy Avila MD  [facility]

## 2023-02-27 NOTE — PROGRESS NOTES
[facility]  Transesophageal Echocardiogram       Today's Date:  2/27/2023  Ordering Physician:  Dr Robin Guevara  Indication:   Afib    Operators:  Primary:   Dr Robin Guevara (Attending Physician)  Assistant:   Nate Almazan MD (Cardiovascular Fellow)      Pre Procedure Conscious Sedation Data:    ASA Class:    [] I [] II [x] III [] IV    Mallampati Class:  [] I [] II [x] III [] IV    Procedure:    Patient seen and examined. History and Physical reviewed. Labs reviewed. After informed consent was obtained with explanation of the risks and benefits, the patient was brought to cardiac cath lab. All sedation was administered by the cardiologist. The oropharynx was pre-anesthesized with viscous lidocaine and cetacaine spray. The ultrasound probe was passed without any difficulty. AFIA findings:    LA:  Normal  CAMILLA:  No thrombus  RA:  Normal  RV:   Normal  LV:  Normal  Estimated LVEF:  55%  Aorta:   Mild atheromatous disease arch  Pericardium: No pericardial effusion  Septum:  No intracardiac shunt via color Doppler. Valves:    Mitral Valve: Structurally normal. No regurgitation is identified. Aortic Valve: The aortic valve is trileaflet and opens adequately. Mild  regurgitiation is identified. Tricuspid valve: Structurally normal. No regurgitation is identified. Pulmonary valve: Normal. No significant regurgitation    No valvular vegetations or thrombus identified. Summary:     1. A AFIA was performed without complications. 2. LVEF 55%. Mild AI. 3. No CAMILLA clot. 3. No thrombus or valvular vegetation identified  4. There were no complications encountered. 5. Proceed with Cardioversion. CARDIOVERSION:    After an adequate level of sedation was achieved  200J in biphasic synchronized delivery was administered. conversion to normal sinus rhythm. The patient awoke without complications. A post procedure 12 L ECG was ordered and reviewed.       Impression:  Successful Consious Sedation - safely  Successful Cardioversion      Complications: There were no complications encountered. The patient will continue with the discharge meds and has been instructed to follow-up with Primary cardiologist for continued long term care and cardiovascular management. There were no complications encountered. Leeann Romero MD       Cardiovascular Fellow    I have reviewed the case / procedure with resident / fellow  I have examined the patient personally  Patient agree with treatment plan as discussed before, final arrangement based on my evaluation and exam.    Risk and benefit of procedure planned were explained in details. Procedure was performed by me personally, with all aspect of the procedure being done using standard protocol. Note was modified based on my own assessment and treatment.     Farideh Thompson MD  [facility]

## 2023-02-28 ENCOUNTER — APPOINTMENT (OUTPATIENT)
Dept: GENERAL RADIOLOGY | Age: 68
DRG: 853 | End: 2023-02-28
Attending: STUDENT IN AN ORGANIZED HEALTH CARE EDUCATION/TRAINING PROGRAM
Payer: COMMERCIAL

## 2023-02-28 LAB
ANION GAP SERPL CALCULATED.3IONS-SCNC: 12 MMOL/L (ref 9–17)
ANION GAP SERPL CALCULATED.3IONS-SCNC: 13 MMOL/L (ref 9–17)
BUN SERPL-MCNC: 63 MG/DL (ref 8–23)
BUN SERPL-MCNC: 64 MG/DL (ref 8–23)
CALCIUM SERPL-MCNC: 8.9 MG/DL (ref 8.6–10.4)
CALCIUM SERPL-MCNC: 9.1 MG/DL (ref 8.6–10.4)
CHLORIDE SERPL-SCNC: 100 MMOL/L (ref 98–107)
CHLORIDE SERPL-SCNC: 102 MMOL/L (ref 98–107)
CO2 SERPL-SCNC: 22 MMOL/L (ref 20–31)
CO2 SERPL-SCNC: 22 MMOL/L (ref 20–31)
CREAT SERPL-MCNC: 3.79 MG/DL (ref 0.7–1.2)
CREAT SERPL-MCNC: 4.14 MG/DL (ref 0.7–1.2)
GFR SERPL CREATININE-BSD FRML MDRD: 15 ML/MIN/1.73M2
GFR SERPL CREATININE-BSD FRML MDRD: 17 ML/MIN/1.73M2
GLUCOSE BLD-MCNC: 148 MG/DL (ref 75–110)
GLUCOSE BLD-MCNC: 200 MG/DL (ref 75–110)
GLUCOSE BLD-MCNC: 212 MG/DL (ref 75–110)
GLUCOSE BLD-MCNC: 256 MG/DL (ref 75–110)
GLUCOSE BLD-MCNC: 269 MG/DL (ref 75–110)
GLUCOSE BLD-MCNC: 299 MG/DL (ref 75–110)
GLUCOSE BLD-MCNC: 316 MG/DL (ref 75–110)
GLUCOSE SERPL-MCNC: 177 MG/DL (ref 70–99)
GLUCOSE SERPL-MCNC: 287 MG/DL (ref 70–99)
HCT VFR BLD AUTO: 27 % (ref 40.7–50.3)
HGB BLD-MCNC: 8.3 G/DL (ref 13–17)
MCH RBC QN AUTO: 28 PG (ref 25.2–33.5)
MCHC RBC AUTO-ENTMCNC: 30.7 G/DL (ref 28.4–34.8)
MCV RBC AUTO: 91.2 FL (ref 82.6–102.9)
MICROORGANISM SPEC CULT: ABNORMAL
MICROORGANISM SPEC CULT: NORMAL
MICROORGANISM/AGENT SPEC: ABNORMAL
MICROORGANISM/AGENT SPEC: ABNORMAL
MICROORGANISM/AGENT SPEC: NORMAL
MICROORGANISM/AGENT SPEC: NORMAL
NRBC AUTOMATED: 0.1 PER 100 WBC
PDW BLD-RTO: 16.4 % (ref 11.8–14.4)
PLATELET # BLD AUTO: 435 K/UL (ref 138–453)
PMV BLD AUTO: 9.6 FL (ref 8.1–13.5)
POTASSIUM SERPL-SCNC: 4.5 MMOL/L (ref 3.7–5.3)
POTASSIUM SERPL-SCNC: 5 MMOL/L (ref 3.7–5.3)
RBC # BLD: 2.96 M/UL (ref 4.21–5.77)
SERVICE CMNT-IMP: NORMAL
SERVICE CMNT-IMP: NORMAL
SODIUM SERPL-SCNC: 134 MMOL/L (ref 135–144)
SODIUM SERPL-SCNC: 137 MMOL/L (ref 135–144)
SPECIMEN DESCRIPTION: ABNORMAL
SPECIMEN DESCRIPTION: NORMAL
SPECIMEN DESCRIPTION: NORMAL
WBC # BLD AUTO: 16.3 K/UL (ref 3.5–11.3)

## 2023-02-28 PROCEDURE — 2580000003 HC RX 258: Performed by: NURSE PRACTITIONER

## 2023-02-28 PROCEDURE — 6360000002 HC RX W HCPCS: Performed by: FAMILY MEDICINE

## 2023-02-28 PROCEDURE — 99232 SBSQ HOSP IP/OBS MODERATE 35: CPT | Performed by: INTERNAL MEDICINE

## 2023-02-28 PROCEDURE — 6360000002 HC RX W HCPCS: Performed by: STUDENT IN AN ORGANIZED HEALTH CARE EDUCATION/TRAINING PROGRAM

## 2023-02-28 PROCEDURE — 97530 THERAPEUTIC ACTIVITIES: CPT

## 2023-02-28 PROCEDURE — 2580000003 HC RX 258: Performed by: PHYSICIAN ASSISTANT

## 2023-02-28 PROCEDURE — 6370000000 HC RX 637 (ALT 250 FOR IP): Performed by: STUDENT IN AN ORGANIZED HEALTH CARE EDUCATION/TRAINING PROGRAM

## 2023-02-28 PROCEDURE — 2709999900 HC NON-CHARGEABLE SUPPLY

## 2023-02-28 PROCEDURE — 6370000000 HC RX 637 (ALT 250 FOR IP): Performed by: NURSE PRACTITIONER

## 2023-02-28 PROCEDURE — 76937 US GUIDE VASCULAR ACCESS: CPT

## 2023-02-28 PROCEDURE — 2580000003 HC RX 258: Performed by: STUDENT IN AN ORGANIZED HEALTH CARE EDUCATION/TRAINING PROGRAM

## 2023-02-28 PROCEDURE — 85027 COMPLETE CBC AUTOMATED: CPT

## 2023-02-28 PROCEDURE — 6360000002 HC RX W HCPCS: Performed by: INTERNAL MEDICINE

## 2023-02-28 PROCEDURE — 97116 GAIT TRAINING THERAPY: CPT

## 2023-02-28 PROCEDURE — 6360000002 HC RX W HCPCS: Performed by: NURSE PRACTITIONER

## 2023-02-28 PROCEDURE — 71045 X-RAY EXAM CHEST 1 VIEW: CPT

## 2023-02-28 PROCEDURE — 36415 COLL VENOUS BLD VENIPUNCTURE: CPT

## 2023-02-28 PROCEDURE — 2580000003 HC RX 258: Performed by: ANESTHESIOLOGY

## 2023-02-28 PROCEDURE — 6370000000 HC RX 637 (ALT 250 FOR IP): Performed by: INTERNAL MEDICINE

## 2023-02-28 PROCEDURE — 94761 N-INVAS EAR/PLS OXIMETRY MLT: CPT

## 2023-02-28 PROCEDURE — 6360000002 HC RX W HCPCS

## 2023-02-28 PROCEDURE — 05HC33Z INSERTION OF INFUSION DEVICE INTO LEFT BASILIC VEIN, PERCUTANEOUS APPROACH: ICD-10-PCS | Performed by: INTERNAL MEDICINE

## 2023-02-28 PROCEDURE — 80048 BASIC METABOLIC PNL TOTAL CA: CPT

## 2023-02-28 PROCEDURE — 97110 THERAPEUTIC EXERCISES: CPT

## 2023-02-28 PROCEDURE — 6370000000 HC RX 637 (ALT 250 FOR IP): Performed by: PHYSICIAN ASSISTANT

## 2023-02-28 PROCEDURE — 2580000003 HC RX 258

## 2023-02-28 PROCEDURE — 6370000000 HC RX 637 (ALT 250 FOR IP): Performed by: FAMILY MEDICINE

## 2023-02-28 PROCEDURE — 2000000000 HC ICU R&B

## 2023-02-28 RX ORDER — AMIODARONE HYDROCHLORIDE 200 MG/1
200 TABLET ORAL 2 TIMES DAILY
Status: DISCONTINUED | OUTPATIENT
Start: 2023-03-01 | End: 2023-03-06 | Stop reason: HOSPADM

## 2023-02-28 RX ORDER — SENNA PLUS 8.6 MG/1
2 TABLET ORAL NIGHTLY
Status: DISCONTINUED | OUTPATIENT
Start: 2023-02-28 | End: 2023-03-06 | Stop reason: HOSPADM

## 2023-02-28 RX ORDER — BISACODYL 10 MG
10 SUPPOSITORY, RECTAL RECTAL DAILY PRN
Status: DISCONTINUED | OUTPATIENT
Start: 2023-02-28 | End: 2023-03-06 | Stop reason: HOSPADM

## 2023-02-28 RX ORDER — METOPROLOL TARTRATE 50 MG/1
50 TABLET, FILM COATED ORAL 2 TIMES DAILY
Status: DISCONTINUED | OUTPATIENT
Start: 2023-02-28 | End: 2023-03-06 | Stop reason: HOSPADM

## 2023-02-28 RX ORDER — FUROSEMIDE 10 MG/ML
40 INJECTION INTRAMUSCULAR; INTRAVENOUS DAILY
Status: DISCONTINUED | OUTPATIENT
Start: 2023-03-01 | End: 2023-03-02

## 2023-02-28 RX ADMIN — POLYETHYLENE GLYCOL 3350 17 G: 17 POWDER, FOR SOLUTION ORAL at 09:20

## 2023-02-28 RX ADMIN — INSULIN GLARGINE 20 UNITS: 100 INJECTION, SOLUTION SUBCUTANEOUS at 09:18

## 2023-02-28 RX ADMIN — INSULIN GLARGINE 10 UNITS: 100 INJECTION, SOLUTION SUBCUTANEOUS at 21:27

## 2023-02-28 RX ADMIN — MUPIROCIN: 20 OINTMENT TOPICAL at 09:32

## 2023-02-28 RX ADMIN — SODIUM CHLORIDE, PRESERVATIVE FREE 10 ML: 5 INJECTION INTRAVENOUS at 09:21

## 2023-02-28 RX ADMIN — AMIODARONE HYDROCHLORIDE 0.5 MG/MIN: 50 INJECTION, SOLUTION INTRAVENOUS at 23:03

## 2023-02-28 RX ADMIN — METOPROLOL TARTRATE 50 MG: 25 TABLET ORAL at 20:16

## 2023-02-28 RX ADMIN — DOCUSATE SODIUM 100 MG: 100 CAPSULE, LIQUID FILLED ORAL at 09:20

## 2023-02-28 RX ADMIN — METHYLPREDNISOLONE SODIUM SUCCINATE 30 MG: 40 INJECTION, POWDER, FOR SOLUTION INTRAMUSCULAR; INTRAVENOUS at 11:21

## 2023-02-28 RX ADMIN — INSULIN LISPRO 4 UNITS: 100 INJECTION, SOLUTION INTRAVENOUS; SUBCUTANEOUS at 21:19

## 2023-02-28 RX ADMIN — MIDODRINE HYDROCHLORIDE 10 MG: 5 TABLET ORAL at 11:21

## 2023-02-28 RX ADMIN — SODIUM CHLORIDE, PRESERVATIVE FREE 10 ML: 5 INJECTION INTRAVENOUS at 09:22

## 2023-02-28 RX ADMIN — DOCUSATE SODIUM 100 MG: 100 CAPSULE, LIQUID FILLED ORAL at 20:16

## 2023-02-28 RX ADMIN — METHYLPREDNISOLONE SODIUM SUCCINATE 30 MG: 40 INJECTION, POWDER, FOR SOLUTION INTRAMUSCULAR; INTRAVENOUS at 20:17

## 2023-02-28 RX ADMIN — SODIUM CHLORIDE, PRESERVATIVE FREE 10 ML: 5 INJECTION INTRAVENOUS at 22:16

## 2023-02-28 RX ADMIN — MIDODRINE HYDROCHLORIDE 10 MG: 5 TABLET ORAL at 09:19

## 2023-02-28 RX ADMIN — INSULIN LISPRO 8 UNITS: 100 INJECTION, SOLUTION INTRAVENOUS; SUBCUTANEOUS at 11:17

## 2023-02-28 RX ADMIN — AMIODARONE HYDROCHLORIDE 0.5 MG/MIN: 50 INJECTION, SOLUTION INTRAVENOUS at 07:33

## 2023-02-28 RX ADMIN — SODIUM CHLORIDE: 9 INJECTION, SOLUTION INTRAVENOUS at 03:11

## 2023-02-28 RX ADMIN — SODIUM CHLORIDE, PRESERVATIVE FREE 10 ML: 5 INJECTION INTRAVENOUS at 20:17

## 2023-02-28 RX ADMIN — HEPARIN SODIUM 5000 UNITS: 5000 INJECTION INTRAVENOUS; SUBCUTANEOUS at 06:14

## 2023-02-28 RX ADMIN — CEFTRIAXONE SODIUM 1000 MG: 10 INJECTION, POWDER, FOR SOLUTION INTRAVENOUS at 09:21

## 2023-02-28 RX ADMIN — MUPIROCIN: 20 OINTMENT TOPICAL at 20:17

## 2023-02-28 RX ADMIN — HEPARIN SODIUM 5000 UNITS: 5000 INJECTION INTRAVENOUS; SUBCUTANEOUS at 21:32

## 2023-02-28 RX ADMIN — HEPARIN SODIUM 5000 UNITS: 5000 INJECTION INTRAVENOUS; SUBCUTANEOUS at 14:16

## 2023-02-28 RX ADMIN — METOPROLOL TARTRATE 50 MG: 25 TABLET ORAL at 09:20

## 2023-02-28 RX ADMIN — FUROSEMIDE 40 MG: 10 INJECTION, SOLUTION INTRAMUSCULAR; INTRAVENOUS at 09:21

## 2023-02-28 RX ADMIN — COLCHICINE 0.3 MG: 0.6 TABLET, FILM COATED ORAL at 09:20

## 2023-02-28 RX ADMIN — SENNOSIDES 17.2 MG: 8.6 TABLET, COATED ORAL at 20:16

## 2023-02-28 RX ADMIN — DOCUSATE SODIUM 100 MG: 100 CAPSULE, LIQUID FILLED ORAL at 14:16

## 2023-02-28 NOTE — PLAN OF CARE
Problem: Discharge Planning  Goal: Discharge to home or other facility with appropriate resources  2/28/2023 1011 by Nicholas Bland RN  Outcome: Progressing  Flowsheets (Taken 2/28/2023 0800)  Discharge to home or other facility with appropriate resources: Identify barriers to discharge with patient and caregiver  2/28/2023 1011 by Nicholas Bland RN  Outcome: Progressing  Flowsheets (Taken 2/28/2023 0800)  Discharge to home or other facility with appropriate resources: Identify barriers to discharge with patient and caregiver  2/28/2023 0134 by Cristina Melgoza RN  Outcome: Progressing     Problem: Safety - Adult  Goal: Free from fall injury  2/28/2023 1011 by Nicholas Bland RN  Outcome: Progressing  2/28/2023 1011 by Nicholas Bland RN  Outcome: Progressing  2/28/2023 0134 by Cristina Melgoza RN  Outcome: Progressing     Problem: ABCDS Injury Assessment  Goal: Absence of physical injury  2/28/2023 1011 by Nicholas Bland RN  Outcome: Progressing  2/28/2023 1011 by Nicholas Bland RN  Outcome: Progressing  2/28/2023 0134 by Cristina Melgoza RN  Outcome: Progressing     Problem: Skin/Tissue Integrity  Goal: Absence of new skin breakdown  Description: 1. Monitor for areas of redness and/or skin breakdown  2. Assess vascular access sites hourly  3. Every 4-6 hours minimum:  Change oxygen saturation probe site  4. Every 4-6 hours:  If on nasal continuous positive airway pressure, respiratory therapy assess nares and determine need for appliance change or resting period.   2/28/2023 1011 by Nicholas Bland RN  Outcome: Progressing  2/28/2023 1011 by Nicholas Bland RN  Outcome: Progressing  2/28/2023 0134 by Cristina Melgoza RN  Outcome: Progressing     Problem: Chronic Conditions and Co-morbidities  Goal: Patient's chronic conditions and co-morbidity symptoms are monitored and maintained or improved  2/28/2023 1011 by Nicholas Bland RN  Outcome: Progressing  Flowsheets (Taken 2/28/2023 0800)  Care Plan - Patient's Chronic Conditions and Co-Morbidity Symptoms are Monitored and Maintained or Improved: Monitor and assess patient's chronic conditions and comorbid symptoms for stability, deterioration, or improvement  2/28/2023 1011 by Jose Arndt RN  Outcome: Progressing  Flowsheets (Taken 2/28/2023 0800)  Care Plan - Patient's Chronic Conditions and Co-Morbidity Symptoms are Monitored and Maintained or Improved: Monitor and assess patient's chronic conditions and comorbid symptoms for stability, deterioration, or improvement  2/28/2023 0134 by Ammy Mckinnon RN  Outcome: Progressing     Problem: Respiratory - Adult  Goal: Achieves optimal ventilation and oxygenation  2/28/2023 1011 by Jose Arndt RN  Outcome: Progressing  2/28/2023 1011 by Jose Arndt RN  Outcome: Progressing  2/28/2023 0134 by Ammy Mckinnon RN  Outcome: Progressing     Problem: Pain  Goal: Verbalizes/displays adequate comfort level or baseline comfort level  2/28/2023 1011 by Jose Arndt RN  Outcome: Progressing  2/28/2023 1011 by Jose Arndt RN  Outcome: Progressing  2/28/2023 0134 by Ammy Mckinnon RN  Outcome: Progressing     Problem: Neurosensory - Adult  Goal: Achieves stable or improved neurological status  2/28/2023 1011 by Jose Arndt RN  Outcome: Progressing  Flowsheets (Taken 2/28/2023 0800)  Achieves stable or improved neurological status: Assess for and report changes in neurological status  2/28/2023 1011 by Jose Arndt RN  Outcome: Progressing  Flowsheets (Taken 2/28/2023 0800)  Achieves stable or improved neurological status: Assess for and report changes in neurological status  2/28/2023 0134 by Ammy Mckinnon RN  Outcome: Progressing  Goal: Absence of seizures  2/28/2023 1011 by Jose Arndt RN  Outcome: Progressing  Flowsheets (Taken 2/28/2023 0800)  Absence of seizures: Monitor for seizure activity.   If seizure occurs, document type and location of movements and any associated apnea  2/28/2023 1011 by Jay Jay Meza RN  Outcome: Progressing  Flowsheets (Taken 2/28/2023 0800)  Absence of seizures: Monitor for seizure activity.   If seizure occurs, document type and location of movements and any associated apnea  Goal: Remains free of injury related to seizures activity  2/28/2023 1011 by Jay Jay Meza RN  Outcome: Progressing  Flowsheets (Taken 2/28/2023 0800)  Remains free of injury related to seizure activity: Maintain airway, patient safety  and administer oxygen as ordered  2/28/2023 1011 by Jay Jay Meza RN  Outcome: Progressing  Flowsheets (Taken 2/28/2023 0800)  Remains free of injury related to seizure activity: Maintain airway, patient safety  and administer oxygen as ordered  Goal: Achieves maximal functionality and self care  2/28/2023 1011 by Jay Jay Meza RN  Outcome: Progressing  Flowsheets (Taken 2/28/2023 0800)  Achieves maximal functionality and self care: Monitor swallowing and airway patency with patient fatigue and changes in neurological status  2/28/2023 1011 by Jay Jay Meza RN  Outcome: Progressing  Flowsheets (Taken 2/28/2023 0800)  Achieves maximal functionality and self care: Monitor swallowing and airway patency with patient fatigue and changes in neurological status     Problem: Cardiovascular - Adult  Goal: Maintains optimal cardiac output and hemodynamic stability  2/28/2023 1011 by Jay Jay Meza RN  Outcome: Progressing  Flowsheets (Taken 2/28/2023 0800)  Maintains optimal cardiac output and hemodynamic stability: Monitor blood pressure and heart rate  2/28/2023 1011 by Jay Jay Meza RN  Outcome: Progressing  Flowsheets (Taken 2/28/2023 0800)  Maintains optimal cardiac output and hemodynamic stability: Monitor blood pressure and heart rate  Goal: Absence of cardiac dysrhythmias or at baseline  2/28/2023 1011 by Jay Jay Meza RN  Outcome: Progressing  Flowsheets (Taken 2/28/2023 0800)  Absence of cardiac dysrhythmias or at baseline: Monitor cardiac rate and rhythm  2/28/2023 1011 by Sherley Tan RN  Outcome: Progressing  Flowsheets (Taken 2/28/2023 0800)  Absence of cardiac dysrhythmias or at baseline: Monitor cardiac rate and rhythm  2/28/2023 0134 by Toribio Fields RN  Outcome: Progressing     Problem: Skin/Tissue Integrity - Adult  Goal: Skin integrity remains intact  2/28/2023 1011 by Sherley Tan RN  Outcome: Progressing  Flowsheets (Taken 2/28/2023 0800)  Skin Integrity Remains Intact: Monitor for areas of redness and/or skin breakdown  2/28/2023 1011 by Sherley Tan RN  Outcome: Progressing  Flowsheets (Taken 2/28/2023 0800)  Skin Integrity Remains Intact: Monitor for areas of redness and/or skin breakdown  2/28/2023 0134 by Toribio Fields RN  Outcome: Progressing  Goal: Incisions, wounds, or drain sites healing without S/S of infection  2/28/2023 1011 by Sherley Tan RN  Outcome: Progressing  Flowsheets (Taken 2/28/2023 0800)  Incisions, Wounds, or Drain Sites Healing Without Sign and Symptoms of Infection: ADMISSION and DAILY: Assess and document risk factors for pressure ulcer development  2/28/2023 1011 by Sherley Tan RN  Outcome: Progressing  Flowsheets (Taken 2/28/2023 0800)  Incisions, Wounds, or Drain Sites Healing Without Sign and Symptoms of Infection: ADMISSION and DAILY: Assess and document risk factors for pressure ulcer development  2/28/2023 0134 by Toribio Fields RN  Outcome: Progressing  Goal: Oral mucous membranes remain intact  2/28/2023 1011 by Sherley Tan RN  Outcome: Progressing  Flowsheets (Taken 2/28/2023 0800)  Oral Mucous Membranes Remain Intact: Assess oral mucosa and hygiene practices  2/28/2023 1011 by Sherley Tan RN  Outcome: Progressing  Flowsheets (Taken 2/28/2023 0800)  Oral Mucous Membranes Remain Intact: Assess oral mucosa and hygiene practices     Problem: Musculoskeletal - Adult  Goal: Return mobility to safest level of function  2/28/2023 1011 by Vladislav Ansari RN  Outcome: Progressing  Flowsheets (Taken 2/28/2023 0800)  Return Mobility to Safest Level of Function: Assess patient stability and activity tolerance for standing, transferring and ambulating with or without assistive devices  2/28/2023 1011 by Vladislav Ansari RN  Outcome: Progressing  Flowsheets (Taken 2/28/2023 0800)  Return Mobility to Safest Level of Function: Assess patient stability and activity tolerance for standing, transferring and ambulating with or without assistive devices  2/28/2023 0134 by Jasmine Medina RN  Outcome: Progressing  Goal: Maintain proper alignment of affected body part  2/28/2023 1011 by Vladislav Ansari RN  Outcome: Progressing  Flowsheets (Taken 2/28/2023 0800)  Maintain proper alignment of affected body part: Support and protect limb and body alignment per provider's orders  2/28/2023 1011 by Vladislav Ansari RN  Outcome: Progressing  Flowsheets (Taken 2/28/2023 0800)  Maintain proper alignment of affected body part: Support and protect limb and body alignment per provider's orders  Goal: Return ADL status to a safe level of function  Outcome: Progressing  Flowsheets (Taken 2/28/2023 0800)  Return ADL Status to a Safe Level of Function: Administer medication as ordered     Problem: Gastrointestinal - Adult  Goal: Minimal or absence of nausea and vomiting  2/28/2023 1011 by Vladislav Ansari RN  Outcome: Progressing  Flowsheets (Taken 2/28/2023 0800)  Minimal or absence of nausea and vomiting: Administer IV fluids as ordered to ensure adequate hydration  2/28/2023 0134 by Jasmine Medina RN  Outcome: Progressing  Goal: Maintains or returns to baseline bowel function  Outcome: Progressing  Flowsheets (Taken 2/28/2023 0800)  Maintains or returns to baseline bowel function: Assess bowel function  Goal: Maintains adequate nutritional intake  Outcome: Progressing  Flowsheets (Taken 2/28/2023 0800)  Maintains adequate nutritional intake: Monitor percentage of each meal consumed  Goal: Establish and maintain optimal ostomy function  Outcome: Progressing  Flowsheets (Taken 2/28/2023 0800)  Establish and maintain optimal ostomy function: Monitor output from ostomies     Problem: Genitourinary - Adult  Goal: Absence of urinary retention  Outcome: Progressing  Flowsheets (Taken 2/28/2023 0800)  Absence of urinary retention: Assess patients ability to void and empty bladder  Goal: Urinary catheter remains patent  2/28/2023 1011 by Ivana Ambrosio RN  Outcome: Progressing  Flowsheets (Taken 2/28/2023 0800)  Urinary catheter remains patent: Assess patency of urinary catheter  2/28/2023 0134 by Jose Reyes RN  Outcome: Progressing     Problem: Infection - Adult  Goal: Absence of infection at discharge  Outcome: Progressing  Flowsheets (Taken 2/28/2023 0800)  Absence of infection at discharge: Assess and monitor for signs and symptoms of infection  Goal: Absence of infection during hospitalization  Outcome: Progressing  Flowsheets (Taken 2/28/2023 0800)  Absence of infection during hospitalization: Assess and monitor for signs and symptoms of infection  Goal: Absence of fever/infection during anticipated neutropenic period  Outcome: Progressing  Flowsheets (Taken 2/28/2023 0800)  Absence of fever/infection during anticipated neutropenic period: Monitor white blood cell count     Problem: Metabolic/Fluid and Electrolytes - Adult  Goal: Electrolytes maintained within normal limits  Outcome: Progressing  Flowsheets (Taken 2/28/2023 0800)  Electrolytes maintained within normal limits: Monitor labs and assess patient for signs and symptoms of electrolyte imbalances  Goal: Hemodynamic stability and optimal renal function maintained  2/28/2023 1011 by Ivana Ambrosio RN  Outcome: Progressing  Flowsheets (Taken 2/28/2023 0800)  Hemodynamic stability and optimal renal function maintained: Monitor labs and assess for signs and symptoms of volume excess or deficit  2/28/2023 0134 by Ivan Baker RN  Outcome: Progressing  Goal: Glucose maintained within prescribed range  2/28/2023 1011 by Marti Closs, RN  Outcome: Progressing  Flowsheets (Taken 2/28/2023 0800)  Glucose maintained within prescribed range: Monitor blood glucose as ordered  2/28/2023 0134 by Ivan Baker RN  Outcome: Progressing     Problem: Hematologic - Adult  Goal: Maintains hematologic stability  2/28/2023 1011 by Marti Closs, RN  Outcome: Progressing  Flowsheets (Taken 2/28/2023 0800)  Maintains hematologic stability: Assess for signs and symptoms of bleeding or hemorrhage  2/28/2023 0134 by Ivan Baker RN  Outcome: Progressing     Problem: Nutrition Deficit:  Goal: Optimize nutritional status  2/28/2023 1011 by Marti Closs, RN  Outcome: Progressing  2/28/2023 0134 by Ivan Baker RN  Outcome: Progressing  Flowsheets (Taken 2/27/2023 1203 by Mary Whipple, RD, LD)  Nutrient intake appropriate for improving, restoring, or maintaining nutritional needs:   Recommend appropriate diets, oral nutritional supplements, and vitamin/mineral supplements   Monitor oral intake, labs, and treatment plans     Problem: Coping  Goal: Pt/Family able to verbalize concerns and demonstrate effective coping strategies  Description: INTERVENTIONS:  1. Assist patient/family to identify coping skills, available support systems and cultural and spiritual values  2. Provide emotional support, including active listening and acknowledgement of concerns of patient and caregivers  3. Reduce environmental stimuli, as able  4. Instruct patient/family in relaxation techniques, as appropriate  5.  Assess for spiritual pain/suffering and initiate Spiritual Care, Psychosocial Clinical Specialist consults as needed  Outcome: Progressing  Flowsheets (Taken 2/28/2023 0800)  Patient/family able to verbalize anxieties, fears, and concerns, and demonstrate effective coping: Assist patient/family to identify coping skills, available support systems and cultural and spiritual values     Problem: Confusion  Goal: Confusion, delirium, dementia, or psychosis is improved or at baseline  Description: INTERVENTIONS:  1. Assess for possible contributors to thought disturbance, including medications, impaired vision or hearing, underlying metabolic abnormalities, dehydration, psychiatric diagnoses, and notify attending LIP  2. [place] high risk fall precautions, as indicated  3. Provide frequent short contacts to provide reality reorientation, refocusing and direction  4. Decrease environmental stimuli, including noise as appropriate  5. Monitor and intervene to maintain adequate nutrition, hydration, elimination, sleep and activity  6. If unable to ensure safety without constant attention obtain sitter and review sitter guidelines with assigned personnel  7.  Initiate Psychosocial CNS and Spiritual Care consult, as indicated  2/28/2023 1011 by Heloise Alpers, RN  Outcome: Progressing  Flowsheets (Taken 2/28/2023 0800)  Effect of thought disturbance (confusion, delirium, dementia, or psychosis) are managed with adequate functional status: Assess for contributors to thought disturbance, including medications, impaired vision or hearing, underlying metabolic abnormalities, dehydration, psychiatric diagnoses, notify LIP  2/28/2023 0134 by Phil Perez RN  Outcome: Progressing

## 2023-02-28 NOTE — PLAN OF CARE
Problem: Discharge Planning  Goal: Discharge to home or other facility with appropriate resources  Outcome: Progressing     Problem: Safety - Adult  Goal: Free from fall injury  Outcome: Progressing     Problem: ABCDS Injury Assessment  Goal: Absence of physical injury  Outcome: Progressing     Problem: Skin/Tissue Integrity  Goal: Absence of new skin breakdown  Description: 1. Monitor for areas of redness and/or skin breakdown  2. Assess vascular access sites hourly  3. Every 4-6 hours minimum:  Change oxygen saturation probe site  4. Every 4-6 hours:  If on nasal continuous positive airway pressure, respiratory therapy assess nares and determine need for appliance change or resting period.   Outcome: Progressing     Problem: Chronic Conditions and Co-morbidities  Goal: Patient's chronic conditions and co-morbidity symptoms are monitored and maintained or improved  Outcome: Progressing     Problem: Respiratory - Adult  Goal: Achieves optimal ventilation and oxygenation  Outcome: Progressing     Problem: Pain  Goal: Verbalizes/displays adequate comfort level or baseline comfort level  Outcome: Progressing     Problem: Neurosensory - Adult  Goal: Achieves stable or improved neurological status  Outcome: Progressing     Problem: Cardiovascular - Adult  Goal: Absence of cardiac dysrhythmias or at baseline  Outcome: Progressing     Problem: Skin/Tissue Integrity - Adult  Goal: Skin integrity remains intact  Outcome: Progressing     Problem: Skin/Tissue Integrity - Adult  Goal: Incisions, wounds, or drain sites healing without S/S of infection  Outcome: Progressing     Problem: Musculoskeletal - Adult  Goal: Return mobility to safest level of function  Outcome: Progressing     Problem: Gastrointestinal - Adult  Goal: Minimal or absence of nausea and vomiting  Outcome: Progressing     Problem: Genitourinary - Adult  Goal: Urinary catheter remains patent  Outcome: Progressing     Problem: Metabolic/Fluid and Electrolytes - Adult  Goal: Hemodynamic stability and optimal renal function maintained  Outcome: Progressing     Problem: Metabolic/Fluid and Electrolytes - Adult  Goal: Glucose maintained within prescribed range  Outcome: Progressing     Problem: Hematologic - Adult  Goal: Maintains hematologic stability  Outcome: Progressing     Problem: Nutrition Deficit:  Goal: Optimize nutritional status  Outcome: Progressing  Flowsheets (Taken 2/27/2023 1203 by Edison Pastrana RD, LD)  Nutrient intake appropriate for improving, restoring, or maintaining nutritional needs:   Recommend appropriate diets, oral nutritional supplements, and vitamin/mineral supplements   Monitor oral intake, labs, and treatment plans     Problem: Confusion  Goal: Confusion, delirium, dementia, or psychosis is improved or at baseline  Description: INTERVENTIONS:  1. Assess for possible contributors to thought disturbance, including medications, impaired vision or hearing, underlying metabolic abnormalities, dehydration, psychiatric diagnoses, and notify attending LIP  2. [place] high risk fall precautions, as indicated  3. Provide frequent short contacts to provide reality reorientation, refocusing and direction  4. Decrease environmental stimuli, including noise as appropriate  5. Monitor and intervene to maintain adequate nutrition, hydration, elimination, sleep and activity  6. If unable to ensure safety without constant attention obtain sitter and review sitter guidelines with assigned personnel  7.  Initiate Psychosocial CNS and Spiritual Care consult, as indicated  Outcome: Progressing

## 2023-02-28 NOTE — PROGRESS NOTES
[facility]  Office: [address], DO, Bharat García, DO, Shari Dry, DO, Chavez Ramsey Blood, DO, Jennifer Guerrero MD, Apollo Giordano MD, Lilian Arboleda MD, Ela Walton MD,  Gómez Corral MD, Luma Trejo MD, Geraldo Shcerer, DO, Claude Hare MD,  Victor M Bergeron MD, Destin Whitlock MD, Patricia Alan, DO, Janett Noble MD, Shanelle Grant MD, Prudencio Villarreal, DO, Betty Serrano MD, Layla Love MD, Regis Green MD, Tonia Hernandez MD, Vincent Herrera, DO, Lalit Avila MD, Celestino Gifford MD, Letitia Kolb, Suleman Rodriguez, CNP, Marii Pisano, CNP, Herson Glover, CNP,  Francisca Valentine, [facility], Phyllistine Tyrell, CNP, Melody Starr, CNP, Adeola Pollen, CNP, Belynda Maria Eugenia, CNP, Kirti Rueda, CNP, Peterson Desai PA-C, Elena Galindo, CNS, Nayely Hicks, CNP, Hay Curran, [address] [place] [address]    Progress Note    2/28/2023    8:37 AM    Name:   Tomasa Amaral  MRN:     [id number]     [place]:      [de-identified]   Room:   [address] [place] [address] Day:  12  Admit Date:  2/12/2023 10:33 PM    PCP:   Tiffanie Brennan MD  Code Status:  Full Code    Subjective:     C/C: SOB    Interval History Status: improved. Patient is s/p chest tube removal.  His wife and daughter are in the room. He is still confused, but oriented to name and place, not date. + constipation. He is still coughing up thick mucus, no complaints of pain    Brief History:     Per my partner:  Giuliana Francois is 79 y.o. male  Who was admitted to the hospital on 2/12/2023 for treatment of Pneumonia, unspecified organism. Patient presented to ER at [facility] on 2/12/2023 with dyspnea and was found to have left multiloculated pneumonia with large pleural effusion with mediastinal and bilateral axillary lymphadenopathy. Patient has prior history of non-Hodgkin's lymphoma, multiple myeloma.   Patient was requiring high flow nasal cannula and had thoracentesis. Pleural fluid was consistent with empyema and patient had chest tube placement by IR. He was treated with broad-spectrum antibiotics. Patient was also given dornase without much improvement. CT surgery was consulted and recommended increasing the size of chest tube and did not recommend any open surgical intervention at this time. Patient had exchange of chest tube on 2/18/2023. He had diffuse ST elevation secondary to pericarditis on 1/21/23. Patient was was started on colchicine. Patient had new onset atrial fibrillation with RVR on 10/22/23 and was given digoxin and amiodarone infusion . Patient also given colchicine and high-dose aspirin for pericarditis which was later changed to Solu-Medrol. Patient underwent VATS with decortication on 2/23/2023. He was extubated postoperatively on 2/23/2023. Patient underwent AFIA cardioversion on 2/27/2023 for rate uncontrolled atrial fibrillation. \"    Review of Systems:     Constitutional:  negative for chills, fevers, sweats  Respiratory:  Positive for cough, no dyspnea on exertion, shortness of breath, wheezing  Cardiovascular:  negative for chest pain, chest pressure/discomfort, lower extremity edema, palpitations  Gastrointestinal:  negative for abdominal pain, + constipation, no diarrhea, nausea, vomiting  Neurological:  negative for dizziness, headache    Medications: Allergies: Allergies   Allergen Reactions    Latex Rash    Gabapentin      Other reaction(s): Vomiting    Meperidine Anaphylaxis    Erythromycin      Other reaction(s): Fever  Had all side effects associated with this medication      Glimepiride      Other reaction(s): Dizziness    Lidocaine Swelling     States as a child having reaction to lidocaine where his face swelled.    Ever since then he has never had lidocaine    Hydrocodone      Other reaction(s): Vomiting    Macrolides And Ketolides Other (See Comments)    Niacin And Related     Trelegy Ellipta [Fluticasone-Umeclidin-Vilant]     Pregabalin Diarrhea    Xanax [Alprazolam] Anxiety     Anxiety, restlessness, insomnia       Current Meds:   Scheduled Meds:    metoprolol tartrate  50 mg Oral BID    sodium chloride flush  5-40 mL IntraVENous 2 times per day    insulin glargine  20 Units SubCUTAneous Daily    heparin (porcine)  5,000 Units SubCUTAneous 3 times per day    furosemide  40 mg IntraVENous BID    insulin glargine  10 Units SubCUTAneous Nightly    insulin lispro  0-16 Units SubCUTAneous 4x Daily AC & HS    midodrine  10 mg Oral TID WC    methylPREDNISolone  30 mg IntraVENous Q12H    docusate sodium  100 mg Oral TID    senna  1 tablet Oral Nightly    cefTRIAXone (ROCEPHIN) IV  1,000 mg IntraVENous Q24H    pantoprazole  40 mg Oral QAM AC    colchicine  0.3 mg Oral Daily    sodium chloride flush  5-40 mL IntraVENous 2 times per day    mupirocin   Nasal BID    chlorhexidine   Topical See Admin Instructions    sodium chloride flush  5-40 mL IntraVENous 2 times per day    polyethylene glycol  17 g Oral Daily    sodium chloride flush  5-40 mL IntraVENous 2 times per day     Continuous Infusions:    sodium chloride      sodium chloride 30 mL/hr at 02/28/23 0404    amiodarone 0.5 mg/min (02/28/23 0733)    sodium chloride      sodium chloride      sodium chloride      dextrose       PRN Meds: sodium chloride flush, sodium chloride, oxyCODONE-acetaminophen **OR** [DISCONTINUED] oxyCODONE-acetaminophen, metoprolol, sodium chloride flush, sodium chloride, sodium chloride flush, sodium chloride, sodium chloride flush, sodium chloride, fentanNYL, glucose, dextrose bolus **OR** dextrose bolus, glucagon (rDNA), dextrose    Data:     Past Medical History:   has a past medical history of Acute interstitial nephritis, Aortic regurgitation, ARF (acute renal failure) ([facility] [address] Chronic kidney disease, Hyperlipidemia, Hypertension, Non-Hodgkin lymphoma ([facility] [address] Research study patient, Sarcoidosis, and Type II or unspecified type diabetes mellitus without mention of complication, not stated as uncontrolled. Social History:   reports that he has never smoked. He has quit using smokeless tobacco. He reports current alcohol use. He reports that he does not use drugs. Family History:   Family History   Problem Relation Age of Onset    High Blood Pressure Father     Other Father         AAA    Heart Disease Other         uncle x 2        Vitals:  /72   Pulse 77   Temp 98.3 °F (36.8 °C) (Oral)   Resp 24   Ht 6' 1\" (1.854 m)   Wt 230 lb 9.6 oz (104.6 kg)   SpO2 98%   BMI 30.42 kg/m²   Temp (24hrs), Av.5 °F (36.9 °C), Min:97.7 °F (36.5 °C), Max:98.9 °F (37.2 °C)    Recent Labs     23  1639 23  1851 23  2039 23  0529   POCGLU 375* 316* 283* 256*       I/O (24Hr):     Intake/Output Summary (Last 24 hours) at 2023 0837  Last data filed at 2023 0600  Gross per 24 hour   Intake 1629.87 ml   Output 2885 ml   Net -1255.13 ml       Labs:  Hematology:  Recent Labs     23  0327 23  0413   WBC 16.2* 16.3*   RBC 3.23* 2.96*   HGB 9.0* 8.3*   HCT 29.8* 27.0*   MCV 92.3 91.2   MCH 27.9 28.0   MCHC 30.2 30.7   RDW 16.1* 16.4*    435   MPV 9.4 9.6   .8*  --      Chemistry:  Recent Labs     23  0343 23  0327 23  0413    132* 137   K 4.7 5.0 5.0    98 102   CO2 20 21 22   GLUCOSE 218* 328* 287*   BUN 60* 63* 63*   CREATININE 4.72* 4.27* 4.14*   ANIONGAP 12 13 13   LABGLOM 13* 15* 15*   CALCIUM 8.8 9.1 8.9     Recent Labs     23  0737 23  1132 23  1639 23  1851 23  0529   POCGLU 265* 232* 375* 316* 283* 256*     ABG:  Lab Results   Component Value Date/Time    POCPH 7.356 2023 12:43 PM    POCPCO2 38.8 2023 12:43 PM    POCPO2 66.2 2023 12:43 PM    POCHCO3 21.7 2023 12:43 PM    NBEA 4 2023 12:43 PM    PBEA 2 2023 05:34 AM    SGOK5JRB 92 2023 12:43 PM    FIO2 50.0 2023 03:06 AM     Lab Results   Component Value Date/Time    SPECIAL LEFT PLEURAL PEEL 02/23/2023 12:00 PM    SPECIAL  LEFT PLEURAL PEEL 02/23/2023 12:00 PM    SPECIAL LEFT PLEURAL PEEL 02/23/2023 12:00 PM    SPECIAL LEFT PLURAL PEEL 02/23/2023 12:00 PM    SPECIAL LEFT PLEURAL PEEL 02/23/2023 12:00 PM     Lab Results   Component Value Date/Time    CULTURE NO GROWTH 4 DAYS 02/23/2023 12:00 PM    CULTURE NO GROWTH 5 DAYS 02/23/2023 12:00 PM    CULTURE NO GROWTH 3 DAYS 02/23/2023 12:00 PM    CULTURE  02/23/2023 12:00 PM     NEGATIVE. No Herpes Simplex Virus detected by Enzyme Linked Virus Inducible System culture. at day 2    CULTURE  02/23/2023 12:00 PM     NEGATIVE for Influenza A and B, Para influenza 1,2,3, Adenovirus and RSV. at day 2    CULTURE  02/23/2023 12:00 PM     Negative results do not preclude respiratory virus infection and should not be used as the sole basis for diagnosis, treatment or other management decisions. CULTURE NEGATIVE for Cytomegalovirus at day 3 02/23/2023 12:00 PM       Radiology:  XR CHEST PORTABLE    Result Date: 2/27/2023  Left chest tubes present with partially loculated left pleural effusion and left basilar opacity unchanged. XR CHEST PORTABLE    Result Date: 2/26/2023  No significant change from prior exam. Left chest tubes with left basilar airspace disease and small amount of pleural fluid. Linear left retrocardiac lucency could represent small pneumothorax. XR CHEST PORTABLE    Result Date: 2/25/2023  No significant change from prior exam.     XR CHEST PORTABLE    Result Date: 2/23/2023  Status post removal single small bore and placement 2 large-bore chest tubes left lung with reduced opacity, presumably multiloculated left pleural effusion and associated atelectasis. No pneumothorax. Slightly greater atelectasis right base.        Physical Examination:        General appearance:  alert, cooperative and no distress  Mental Status:  oriented to person, place but not time,  normal affect  Lungs:  + crackles bibasilar, normal effort  Heart:  regular rate and rhythm, no murmur  Abdomen:  soft, nontender, nondistended, normal bowel sounds, no masses, hepatomegaly, splenomegaly  Extremities:  no edema, redness, tenderness in the calves  Skin:  no gross lesions, rashes, induration    Assessment:        Hospital Problems             Last Modified POA    * (Principal) Pneumonia, unspecified organism 2/12/2023 Yes    Pleural effusion 2/13/2023 Yes    Hyperkalemia 2/13/2023 Yes    Acute respiratory failure with hypoxia (HCC) 2/13/2023 Yes    Hyponatremia 2/13/2023 Yes    Hyperglycemia 2/13/2023 Yes    History of non-Hodgkin's lymphoma 2/13/2023 Yes    History of sarcoidosis 2/13/2023 Yes    Empyema ([facility] [address] 2/18/2023 Yes    History of atrial fibrillation 2/25/2023 Yes    History of type 2 diabetes mellitus 2/25/2023 Yes    KELSEA (acute kidney injury) ([facility] [address] 2/26/2023 Yes    Overview Addendum 8/24/2022  8:56 AM by Mireya Soto MD     kidney biopsy from June 2022  showing findings of acute interstitial nephritis. Exact description not available. Trial of steroids will be given to see if I can improve renal function. Creatinine in October 2020 was 1.6, creatinine in June 2022 was 2.9. Trial of steroids will be given to see if renal function can be improved. After a 12-day course of prednisone creatinine has come down to 2.0, calcium is improved as well. Interstitial nephritis appears to be related to sarcoidosis.             Plan:        Left empyema- s/p decortication 2/23/23, con't IV Rocephin, CTS following and ID  A.fib with RVR- s/p cardioversion yesterday, he flipped back into A.fib overnight, Cardiology changing IV Amiodarone to po pills, question of AC  Pericarditis- Colchicine, IV Solumedrol, wife states that he gets confused on steriods  KELSEA on CKD 3- slowly improving, Nephrology stopped IVF, decreased Lasix to daily, moon catheter in for strict I/Os, repeat BMP tomorrow, baseline Cr 1.8-2  CLAY- Cpap  COPD- stable  Acute delirium- seroquel at night, DM2- SSI, Lantus started since he's on steriods  Gi/ DVT proph  PT/OT    Anthony signed    Sumi Ferro MD  2/28/2023  8:37 AM

## 2023-02-28 NOTE — PROGRESS NOTES
[facility]   Progress Note                 Date:   2/28/2023  Patient name: Keila Powell  Date of admission:  2/12/2023 10:33 PM  MRN:   [id number]  YOB: 1955  PCP: Vazquez Davis MD    Reason for Admission:      Subjective:     Seen and examined by bedside. No acute overnight events. No reported chest pain, palpitations SOB. Telemetry showing afib overnight with RVR with conversion to sinus rhythm between 5-6 am today.  Blood pressure 133/54 on exam. On amiodarone infusion 0.5 mg.      Medications:   Scheduled Meds:   metoprolol tartrate  50 mg Oral BID    sodium chloride flush  5-40 mL IntraVENous 2 times per day    insulin glargine  20 Units SubCUTAneous Daily    heparin (porcine)  5,000 Units SubCUTAneous 3 times per day    furosemide  40 mg IntraVENous BID    insulin glargine  10 Units SubCUTAneous Nightly    insulin lispro  0-16 Units SubCUTAneous 4x Daily AC & HS    midodrine  10 mg Oral TID WC    methylPREDNISolone  30 mg IntraVENous Q12H    docusate sodium  100 mg Oral TID    senna  1 tablet Oral Nightly    cefTRIAXone (ROCEPHIN) IV  1,000 mg IntraVENous Q24H    pantoprazole  40 mg Oral QAM AC    colchicine  0.3 mg Oral Daily    sodium chloride flush  5-40 mL IntraVENous 2 times per day    mupirocin   Nasal BID    chlorhexidine   Topical See Admin Instructions    sodium chloride flush  5-40 mL IntraVENous 2 times per day    polyethylene glycol  17 g Oral Daily    sodium chloride flush  5-40 mL IntraVENous 2 times per day       Continuous Infusions:   sodium chloride      sodium chloride 30 mL/hr at 02/28/23 0404    amiodarone 0.5 mg/min (02/28/23 0733)    sodium chloride      sodium chloride      sodium chloride      dextrose         CBC:   Recent Labs     02/27/23 0327 02/28/23 0413   WBC 16.2* 16.3*   HGB 9.0* 8.3*    435     BMP:    Recent Labs     02/26/23  0343 02/27/23 0327 02/28/23 0413    132* 137   K 4.7 5.0 5.0    98 102   CO2 20 21 22   BUN 60* 63* 63*   CREATININE 4.72* 4.27* 4.14*   GLUCOSE 218* 328* 287*           Objective:   Vitals: /72   Pulse 77   Temp 98.3 °F (36.8 °C) (Oral)   Resp 24   Ht 6' 1\" (1.854 m)   Wt 230 lb 9.6 oz (104.6 kg)   SpO2 98%   BMI 30.42 kg/m²     General appearance: awake, alert, in no apparent respiratory distress   HEENT: Head: Normocephalic, no lesions, without obvious abnormality  Neck: no JVD  Lungs: Decreased air entry on the left side with chest tube in place.  Heart: Irregular heart rate.  Abdomen: soft, non-tender; bowel sounds normal  Extremities: No LE edema  Neurologic: Mental status: Alert, oriented. Motor and sensory not done.     DATA  EKG:    Date: 02/21/23  Reading:  Sinus tachycardia  Moderate voltage criteria for LVH, may be normal variant  Nonspecific ST and T wave abnormality  Abnormal ECG  When compared with ECG of 16-FEB-2023 23:10,  No significant change was found     LAST ECHO:  2/21/23  Summary  Left ventricle is normal in size. Global left ventricular systolic function  is difficult to assess due to heart rate but appears normal. Calculated  ejection fraction 51% by Atknison's method.  Aortic valve is trileaflet and opens adequately.  Trivial aortic insufficiency.  Normal tricuspid valve structure.  Trivial tricuspid regurgitation.      Date:  Findings Summary: 10/22     Left Ventricle: Systolic function is normal with an ejection fraction   of 55-60%.     Aortic Valve: There is mild regurgitation.     Tricuspid Valve: There is trace to mild regurgitation. The right   ventricular systolic pressure normal. RVSP calculated at 24 mmHg. RVSP is   based on RA pressure of 3 mmHg. There is no pulmonary hypertension.     Left Ventricle   Systolic function is normal with an ejection fraction of 55-60%. No obvious regional wall motion abnormalities. There is abnormal septal motion.     Right Ventricle   Right ventricular size appears normal. Systolic function is normal.     Right Atrium  Right atrium is normal in size. IVC/SVC   The right atrial pressure is estimated at 3 mmHg. IVC appears normal. There is normal collapse with deep inspiration. Tricuspid Valve   Tricuspid valve appears to be normal. There is trace to mild regurgitation. The right ventricular systolic pressure normal. RVSP calculated at 24 mmHg. RVSP is based on RA pressure of 3 mmHg. There is no pulmonary hypertension. Aortic Valve   The aortic valve is trileaflet. The leaflets are mildly thickened. There is mild regurgitation. Pulmonic Valve   Pulmonic valve structure is grossly normal. There is trace to mild regurgitation. Pericardium   There is no pericardial effusion. LAST Stress Test:   No prior     LAST Cardiac Angiography:. No prior    Assessment / Acute Cardiac Problems:   Left sided Empyema s/p VATS/decortication 1/23/2023  Acute pericarditis on renal dose colchicine and steroids  Community acquired pneumonia  Acute hypoxic respiratory failure due to above  New Onset Atrial fibrillation with RVR - on amiodarone s/p AFIA/CV on 2/27  History of non-Hodgkin's lymphoma  History of nonrheumatic mitral regurgitation/Tricuspid regurgitation  History of dilated aortic root  Type 2 diabetes  KELSEA on Chronic kidney disease stage III/IV  CLAY on CPAP    Plan of Treatment:   Continue to be in Afib with controlled heart rate. Continue amiodarone and switch to oral.   ASA held per nephrology. On Colchicine 0.3 qday for pericarditis (renal dose). On methylprednisolone 30 mg q12h  On antibiotics as per ID  Lopressor 50 twice daily, uptitrate as HR/BP tolerate. Lopressor 5 mg IV q6h PRN for >110  On IV Lasix as per nephro, at risk for RRT.   On Midodrine 10 mg TID  On lasix 40 mg BID  Not a candidate for HD at this time as per nephrology   Will continue to follow along       Plan to be discussed with rounding attending       Adan Carver MD  Fellow, cardiovascular disease   [facility], [place], [place]      Attending Physician Statement  I have discussed the case of Demetra Hinojosa including pertinent history and exam findings with the student/resident/fellow. I have seen and examined the patient and the key elements of the encounter have been performed by me. I agree with the assessment, plan and orders as documented by the resident With changes made to the note.      Electronically signed by Sandy King MD on 2/28/2023 at 5:20 PM.    [facility]      [phone]

## 2023-02-28 NOTE — PROCEDURES
Midline catheter insertion note:    Reason for placement:  Device inserted - 20g 8cm extended dwell Catheter  Ultrasound preassessment done. Target vessel is left basilic vein. Vessel depth = 1 cm. Vein measures 0.35 cm. CVR is 9.5%. (Preffered area based CVR is less than 20%). Placed using ANTT fashion, US prepared with sterile probe cover. Visualization of needle entry into vessel, 1 attempt using AST technique. Total 8cm inserted, 0cm external.   Easy aspiration of dark non-pulsating blood. Flushes easily with 10ml of 0.9 NS.   CHG-TSM dressing placed. Injection cap and swab cap applied. No bleeding or hematoma noted. Estimated blood loss = 0ml. Instructions given on insertion and care. RN aware no lab draws without a physician order, line is power injectable, keep clamped when not in use, pulsatile 10ml NS flush every 8 hours when not in use or after intermittent use with medication. Line ready for use.     Lot # = [id number]  Expires = 2024-09-30      - Brant Hugo VAT  Time out - 1550  Start time - 2193  End time - 1600

## 2023-02-28 NOTE — PROGRESS NOTES
[facility] - Progress Note  Today's Date and Time: 2/28/2023, 11:13 AM    Impression :   Left-sided empyema  Streptococci Gordonii on pleural fluid culture from 2/14/23  Repeat culture 2/18/23 with no bacterial growth   Shortness of breath  KELSEA on CKD  Hyponatremia  Leukocytosis  Hx non-Hodgkin's lymphoma  Hx multiple myeloma  Sarcoidosis  DM II  Interstitial nephritis  COPD  Hx partial right lung lobectomy  Allergies to macrolides and ketolides. Recommendations:   2/20/23: Discontinued IV Cefepime on 2-20-23  IV Rocephin 1 gm q 24 hrs until 3/12/23  Ok for midline for outpatient therapy when ready for discharge  S/p decortication on 2/23/23  Cardioversion completed 2/27/23 for continued AFIB    Medical Decision Making/Summary/Discussion:2/28/2023     Patient with Hx of non-Hodgkin's lymphoma and multiple myeloma  Admitted with Shortness of breath  Found to have Left-sided empyema  Streptococci Gordonii on pleural fluid culture from 2/14/23  Repeat culture 2/18/23 with no bacterial growth   Empyema partially drained by chest tube. Decortication planned 2/23/23  Confusion with associated hyponatremia  KELSEA on CKD 3  On treatment with ceftriaxone. Infection Control Recommendations   [place] Precautions    Antimicrobial Stewardship Recommendations     Simplification of therapy  Targeted therapy  PK dosing    Coordination of Outpatient Care:   Estimated Length of IV antimicrobials:3/12/23  Patient will need Midline Catheter Insertion: TBD  Patient will need PICC line Insertion:TBD  Patient will need: Home IV , [place],  SNF,  LTAC: TBD  Patient will need outpatient wound care:No    Chief complaint/reason for consultation:   Empyema-streptococcus Gordonii      History of Present Illness:   Anaya Dhillon is a 79y.o.-year-old male who was initially admitted on 2/12/2023.  Patient seen at the request of Dr. Valdo Ashton:    This patient, with a history of COPD, CKD, sarcoidosis, partial right lung lobectomy, multiple myeloma, non-hodgkin's lymphoma, acute interstitial nephritis and DM II, initially presented to [facility] ED on 2/12/23 with complaints of worsening shortness of breath over the previous week with a non-productive cough. He has has allergies to macrolides and ketolides. He was experiencing increased work of breathing upon evaluation, initially requiring CPAP. ABG values were grossly unremarkable. He denied any recent history of chemotherapy, fever, chills, N/V/D, abnormal bleeding, weight-loss or night sweats. Chest x-ray showed probable left-sided pneumonia with loculated pleural effusion which was confirmed via CT chest, which revealed a moderate to large amount of multiloculated left pleural fluid in the left lung. Mediastinal and bilateral axillary lymphadenopathy was also noted. He was transferred to [facility] for a higher level of care and was initiated on broad spectrum antibiotics with IV cefepime and vancomycin. Pulmonology was consulted and a thoracentesis was attempted, but was unable to be completed as no clear pocket was visualized via [address]. IR was consulted and a 10 Louann Rily, left-sided chest tube was placed on 2/14/23.  10 ml of purulent fluid was initially drained and sent for culture. TPA and dornase were administered with improvement in output. MRSA Nares was not detected and the pleural fluid culture grew PCN sensitive Streptococcus Gordonii. Vancomycin was discontinued on 2/14/23 and cefepime continued. A repeat CT of the patient's chest  on 2/17/23 revealed some improvement in the multiloculated left pleural effusions. A larger bore chest tube exchange was completed on 2/18/23 in IR. A repeat pleural fluid culture has not had any bacterial growth to date.      Chest tube output to date:   2/14 - 800 cc  2/15- 600 cc  2/16 - 700 cc  2/17 - 900 cc  2/18- 200 cc  2/19 - 910 cc  2/20 - 275 cc    CT delon was consulted for possible decortication, but they have no plans for decortication at this time.     Abnormal labs at the time of consult include:   Na:133  Cr:2.24  WBC:32.4-->19.0  Hgb:10.0    There is no growth on urine or blood cultures.      Imaging/Diagonstics:  CT CHEST WO CONTRAST     Result Date: 2/17/2023  Small to moderate-sized multiloculated left pleural effusions have improved. New small foci of air within the collection is presumably related to the introduction of the left thoracostomy tube. Stable small to moderate sized mediastinal lymph nodes are nonspecific but may relate to the patient's history of lymphoma.  Additional small bilateral axial lymph nodes are identified. Small hiatal hernia.      XR CHEST PORTABLE     Result Date: 2/18/2023  Moderate left pleural effusion increased.  No change left chest tube/pigtail catheter.      XR CHEST PORTABLE     Result Date: 2/17/2023  Similar findings when rotation taken into account; left chest tube with unchanged or slightly increased loculated appearing left pleural effusion; atelectatic appearing changes.  No pneumothorax.      XR CHEST PORTABLE     Result Date: 2/14/2023  Interval placement of a left chest tube with reduced left pleural fluid.      IR CHEST TUBE INSERTION     Result Date: 2/14/2023  Successful ultrasound guided placement of a left 10 Thai chest tube      US RETROPERITONEAL COMPLETE     Result Date: 2/19/2023  1. Simple cortical cyst at the upper pole left kidney measuring 6 mm. 2. Limited renal ultrasound.  No hydronephrosis. 3. Nonvisualization of ureteral jets.  Minimal distention of the urinary bladder.  Patient no urge to void during the exam. RECOMMENDATIONS: Unavailable        Infectious disease was consulted for continued empyema.      CURRENT EVALUATION 2/28/2023  BP (!) 146/74   Pulse 91   Temp 98.3 °F (36.8 °C) (Oral)   Resp 24   Ht 6' 1\" (1.854 m)   Wt 230 lb 9.6 oz (104.6 kg)   SpO2 94%   BMI 30.42 kg/m²  Patient evaluated in the ICU     Afebrile  Hypertensive     The patient is stable today on room air  He is doing well s/p decortication with CT surgery on 2/23/23    He is alert and sitting up in bed upon evaluation. Family is at bedside    Left sided-chest tube is in place and is to water seal.  Serosanguinous drainage present. Discussed with patient and wife at length on 2-27-23  CT scans reviewed with wife and daughter on 2-20-23  Indicated that he will need about a month of antibiotics. Wife indicated similar empyema a few years ago on Rt chest. He had S aureus back then and was severely ill. Medications reviewed: On IV Ceftriaxone    No growth from VATS culture from 2/23/23 so far. Leukocytosis continues  Steroids initiated 2/26/23    Worsening KELSEA  Nephrology is following with the possibility of need for temporary hemodialysis     Discussed with RN    Labs, X rays reviewed: 2/28/2023    BUN:63-->47-->51-->50--->63-->63  Cr:2.24-->1.96-->2.62-->3.2-->3.48--->4.27--->4.14  Na 131-->130-->132-->132--->137  K 5.1-->4.9--->5.0--->5.0    WBC: 22.9--->24.1--->26.0---->25.0--->16.3  Hb:10.0-->11.2-->11.2-->9.2--->8.3  Plat: 425-->461-->434-->446-->433--->835    CRP: 202.8     Cultures:  Urine:  2/17/23: No growth  2/22/23 : No growth   Blood:  2/17/23: No growth  MRSA Nares:  2/13/23: Not detected  Pleural Fluid:  2/14/23: Streptococci Gordonii  2/18/23: No growth thus far  2-23-23: No growth    Discussed with patient, RN, IM. I have personally reviewed the past medical history, past surgical history, medications, social history, and family history, and I have updated the database accordingly. Past Medical History:     Past Medical History:   Diagnosis Date    Acute interstitial nephritis 08/03/2022    Unclear cause. Creat bumped to 2.9, baseline in oct 2020 1.6.   Exact description of his kidney biopsy is not available to me however pathologist seems to think there is diabetic glomerosclerosis and acute interstitial nephritis seen on the biopsy specimen. No mention about chronic changes. Because of acute interstitial nephritis a trial of steroids for 15 days will be given to see if I can impro    Aortic regurgitation     ARF (acute renal failure) ([facility] [address] 08/03/2022    kidney biopsy from June 2022  showing findings of acute interstitial nephritis. Exact description not available. Trial of steroids will be given to see if I can improve renal function. Creatinine in October 2020 was 1.6, creatinine in June 2022 was 2.9. Trial of steroids will be given to see if renal function can be improved.     Chronic kidney disease     Hyperlipidemia     Hypertension     Non-Hodgkin lymphoma ([facility] [address]     Research study patient 02/13/2023    [id number] Completed 2/15/23    Sarcoidosis     Type II or unspecified type diabetes mellitus without mention of complication, not stated as uncontrolled        Past Surgical  History:     Past Surgical History:   Procedure Laterality Date    CARPAL TUNNEL RELEASE Left 11/15     EYE SURGERY Right     shunt and cataract     EYE SURGERY Right 6/16    laser     IR CHEST TUBE INSERTION  2/13/2023    IR CHEST TUBE INSERTION 2/13/2023 [facility]    IR CHEST TUBE INSERTION  2/18/2023    IR CHEST TUBE INSERTION 2/18/2023 Carlo Bower MD [facility]    KNEE ARTHROSCOPY      MALIGNANT SKIN LESION EXCISION      ROTATOR CUFF REPAIR Right 7/14    ROTATOR CUFF REPAIR Left 5/15    THORACOSCOPY Left 2/23/2023    VIDEO ASSISTED THORACOSCOPY, DECORTICATION performed by Giacomo Dee MD at [address] [facility] Right 10/2018       Medications:      metoprolol tartrate  50 mg Oral BID    [START ON 3/1/2023] furosemide  40 mg IntraVENous Daily    sodium chloride flush  5-40 mL IntraVENous 2 times per day    insulin glargine  20 Units SubCUTAneous Daily    heparin (porcine)  5,000 Units SubCUTAneous 3 times per day    insulin glargine  10 Units SubCUTAneous Nightly    insulin lispro  0-16 Units SubCUTAneous 4x Daily AC & HS    midodrine  10 mg Oral TID WC    methylPREDNISolone  30 mg IntraVENous Q12H    docusate sodium  100 mg Oral TID    senna  1 tablet Oral Nightly    cefTRIAXone (ROCEPHIN) IV  1,000 mg IntraVENous Q24H    pantoprazole  40 mg Oral QAM AC    colchicine  0.3 mg Oral Daily    sodium chloride flush  5-40 mL IntraVENous 2 times per day    mupirocin   Nasal BID    chlorhexidine   Topical See Admin Instructions    sodium chloride flush  5-40 mL IntraVENous 2 times per day    polyethylene glycol  17 g Oral Daily    sodium chloride flush  5-40 mL IntraVENous 2 times per day       Social History:     Social History     Socioeconomic History    Marital status:      Spouse name: Not on file    Number of children: Not on file    Years of education: Not on file    Highest education level: Not on file   Occupational History    Not on file   Tobacco Use    Smoking status: Never    Smokeless tobacco: Former    Tobacco comments:     minimal and infrequent    Substance and Sexual Activity    Alcohol use: Yes     Alcohol/week: 0.0 standard drinks     Comment: rare    Drug use: No    Sexual activity: Not on file   Other Topics Concern    Not on file   Social History Narrative    Not on file     Social Determinants of Health     Financial Resource Strain: Low Risk     Difficulty of Paying Living Expenses: Not very hard   Food Insecurity: No Food Insecurity    Worried About Running Out of Food in the Last Year: Never true    Ran Out of Food in the Last Year: Never true   Transportation Needs: No Transportation Needs    Lack of Transportation (Medical): No    Lack of Transportation (Non-Medical): No   Physical Activity: Not on file   Stress: Stress Concern Present    Feeling of Stress : To some extent   Social Connections: Socially Integrated    Frequency of Communication with Friends and Family:  Three times a week    Frequency of Social Gatherings with Friends and Family: Three times a week    Attends Hinduism Services: 1 to 4 times per year    Active Member of Clubs or Organizations: No    Attends Club or Organization Meetings: 1 to 4 times per year    Marital Status:    Intimate Partner Violence: Not At Risk    Fear of Current or Ex-Partner: No    Emotionally Abused: No    Physically Abused: No    Sexually Abused: No   Housing Stability: Low Risk     Unable to Pay for Housing in the Last Year: No    Number of [place] in the Last Year: 1    Unstable Housing in the Last Year: No       Family History:     Family History   Problem Relation Age of Onset    High Blood Pressure Father     Other Father         AAA    Heart Disease Other         uncle x 2         Allergies:   Latex, Gabapentin, Meperidine, Erythromycin, Glimepiride, Lidocaine, Hydrocodone, Macrolides and ketolides, Niacin and related, Trelegy ellipta [fluticasone-umeclidin-vilant], Pregabalin, and Xanax [alprazolam]     Review of Systems:   Constitutional: No fevers or chills. No systemic complaints  Head: No headaches  Eyes: No double vision or blurry vision. No conjunctival inflammation. ENT: No sore throat or runny nose. . No hearing loss, tinnitus or vertigo. Cardiovascular: No chest pain or palpitations. shortness of breath. WANG  Lung: Left sided chest tube with some shortness of breath with dry cough. No sputum production  Abdomen: No nausea, vomiting, diarrhea, or abdominal pain. Pinky Angles No cramps. Genitourinary: No increased urinary frequency, or dysuria. No hematuria. No suprapubic or CVA pain  Musculoskeletal: No muscle aches or pains. No joint effusions, swelling or deformities  Hematologic: No bleeding or bruising. Neurologic: No headache, weakness, numbness, or tingling. Integument: No rash, no ulcers. Psychiatric: No depression. Endocrine: No polyuria, no polydipsia, no polyphagia.     Physical Examination :   Patient Vitals for the past 8 hrs:   BP Temp Temp src Pulse Resp SpO2   02/28/23 1002 -- -- -- 91 24 94 %   02/28/23 1001 -- -- -- 93 21 (!) 86 %   02/28/23 1000 (!) 146/74 -- -- 94 22 93 %   02/28/23 0932 -- -- -- 94 18 93 %   02/28/23 0900 (!) 143/77 -- -- 85 21 95 %   02/28/23 0821 -- 98.3 °F (36.8 °C) Oral -- -- --   02/28/23 0800 138/77 98.6 °F (37 °C) Oral 81 16 96 %   02/28/23 0700 132/72 -- -- 77 24 98 %   02/28/23 0600 136/73 -- -- 82 (!) 9 97 %   02/28/23 0500 130/86 -- -- 82 21 96 %   02/28/23 0400 127/81 97.7 °F (36.5 °C) Oral 78 23 96 %     General Appearance: Awake, alert, and in no apparent distress  Head:  Normocephalic, no trauma  Eyes: Pupils equal, round, reactive to light and accommodation; extraocular movements intact; sclera anicteric; conjunctivae pink. No embolic phenomena. ENT: Oropharynx clear, without erythema, exudate, or thrush. No tenderness of sinuses. Mouth/throat: mucosa pink and moist. No lesions. Dentition in good repair. Neck:Supple, without lymphadenopathy. Thyroid normal, No bruits. Pulmonary/Chest: Left sided chest tube in place to suction with serosanguinous drainage. Diminished to auscultation. No dullness to percussion. Cardiovascular: Regular rate and rhythm without murmurs, rubs, or gallops. Abdomen: Soft, non tender. Bowel sounds normal. No organomegaly  All four Extremities: No cyanosis, clubbing, edema, or effusions. Neurologic: No gross sensory or motor deficits. Skin: Warm and dry with good turgor. No signs of peripheral arterial or venous insufficiency. No ulcerations.  Left sided-chest tube site CDI    Medical Decision Making -Laboratory:   I have independently reviewed/ordered the following labs:    CBC with Differential:   Recent Labs     02/27/23  0327 02/28/23  0413   WBC 16.2* 16.3*   HGB 9.0* 8.3*   HCT 29.8* 27.0*    435   LYMPHOPCT 27  --    MONOPCT 5  --        BMP:   Recent Labs     02/27/23  0327 02/28/23  0413   * 137   K 5.0 5.0   CL 98 102   CO2 21 22   BUN 63* 63*   CREATININE 4.27* 4.14*     Hepatic Function Panel: No results for input(s): PROT, LABALBU, BILIDIR, IBILI, BILITOT, ALKPHOS, ALT, AST in the last 72 hours. No results for input(s): RPR in the last 72 hours. No results for input(s): HIV in the last 72 hours. No results for input(s): BC in the last 72 hours. Lab Results   Component Value Date/Time    MUCUS 1+ 02/13/2023 02:17 AM    RBC 2.96 02/28/2023 04:13 AM    RBC 5.15 04/03/2012 11:08 AM    WBC 16.3 02/28/2023 04:13 AM    TURBIDITY Cloudy 02/22/2023 01:17 PM     Lab Results   Component Value Date/Time    CREATININE 4.14 02/28/2023 04:13 AM    GLUCOSE 287 02/28/2023 04:13 AM    GLUCOSE 140 04/03/2012 11:08 AM       Medical Decision Making-Imaging:     Narrative   EXAMINATION:   CT OF THE CHEST WITHOUT CONTRAST, 2/17/2023 9:29 am       TECHNIQUE:   CT of the chest was performed without the administration of intravenous   contrast. Multiplanar reformatted images are provided for review. Automated   exposure control, iterative reconstruction, and/or weight based adjustment of   the mA/kV was utilized to reduce the radiation dose to as low as reasonably   achievable. COMPARISON:   02/13/2023       HISTORY:   ORDERING SYSTEM PROVIDED HISTORY:  Improvement? TECHNOLOGIST PROVIDED HISTORY:   Improvement? FINDINGS:   Mediastinum: Small bilateral axial lymph nodes are identified not   significantly changed. Small to moderate size mediastinal lymph nodes are   additionally present. Lungs/Pleura: There are multiple loculated effusions within the left chest   which have improved since previous exam.  Small amounts of new air are likely   secondary to the presence of the left thoracostomy tube. Left lower lobe atelectasis is identified. Upper Abdomen: There is a probable small hiatal hernia. Soft Tissues/Bones:  No acute osseous abnormality is appreciated. Old right   rib fractures are noted.            Impression   Small to moderate-sized multiloculated left pleural effusions have improved. New small foci of air within the collection is presumably related to the   introduction of the left thoracostomy tube. Stable small to moderate sized mediastinal lymph nodes are nonspecific but   may relate to the patient's history of lymphoma. Additional small bilateral   axial lymph nodes are identified. Small hiatal hernia. Narrative   EXAMINATION:   ONE XRAY VIEW OF THE CHEST       2/18/2023 3:04 pm       COMPARISON:   CT chest February 17, 2023       HISTORY:   ORDERING SYSTEM PROVIDED HISTORY: worsening   TECHNOLOGIST PROVIDED HISTORY:   worsening       FINDINGS:   Pigtail catheter on the left. Moderate left effusion appears   denser/increased. Mild bilateral interstitial infiltrates or edema. Heart   and mediastinum unremarkable. Bony thorax intact. No pneumothorax noted. Subsegmental atelectasis right lower lung. Impression   Moderate left pleural effusion increased. No change left chest tube/pigtail   catheter. Medical Decision [id number]:       Medical Decision Making-Other:     Note:  Labs, medications, radiologic studies were reviewed with personal review of films  Large amounts of data were reviewed  Discussed with nursing Staff, Discharge planner  Infection Control and Prevention measures reviewed  All prior entries were reviewed  Administer medications as ordered  Prognosis: [facility]  Discharge planning reviewed  Follow up as outpatient. Thank you for allowing us to participate in the care of this patient. Please call with questions. Katerin Maurer participated in the evaluation of this patient, under supervision. ATTESTATION:    I have discussed the case, including pertinent history and exam findings with the residents and students. I have seen and examined the patient and the key elements of the encounter have been performed by me. I was present when the student obtained his information or examined the patient.  I have reviewed the laboratory data, other diagnostic studies and discussed them with the residents. I have updated the medical record where necessary. I agree with the assessment, plan and orders as documented by the resident/ student.     Trinity Arzate MD.        Pager: [phone] - Office: [phone]

## 2023-02-28 NOTE — PROGRESS NOTES
NEPHROLOGY PROGRESS NOTE      ASSESSMENT     #1 acute kidney injury secondary to ischemic ATN (hypotension/pneumonia-empyema/NSAID and A-fib) -improving  #2 chronic kidney disease stage IIIb secondary to biopsy-proven diabetic nephropathy with baseline creatinine 1.8-2 and follows up with Dr. Osiel Blackburn  #3 left-sided empyema cultures positive for Streptococcus status post VATS  #4 acute pericarditis  #5 atrial fibrillation with RVR  #6 type 2 diabetes  #7 history of multiple myeloma  #8 history of non-Hodgkin's lymphoma  #9 history of sarcoidosis      PLAN     #1 DC IV fluids and reduce Lasix to once a day  #2 antibiotics as per estimated GFR  #3 avoid nephrotoxins  #4 we will follow      SUBJECTIVE   Urine output excellent. Overall in negative cumulative balance. On IV Lasix 40 mg twice a day. No acute hemodynamic issues overnight. Plans to remove chest tube today  Receiving antibiotics  Marginal improvement in creatinine noted  Tolerating oral intake well    OBJECTIVE     Vitals:    02/28/23 0932 02/28/23 1000 02/28/23 1001 02/28/23 1002   BP:  (!) 146/74     Pulse: 94 94 93 91   Resp: 18 22 21 24   Temp:       TempSrc:       SpO2: 93% 93% (!) 86% 94%   Weight:       Height:         24HR INTAKE/OUTPUT:    Intake/Output Summary (Last 24 hours) at 2/28/2023 1003  Last data filed at 2/28/2023 0941  Gross per 24 hour   Intake 1629.87 ml   Output 2815 ml   Net -1185.13 ml       General appearance:Awake, alert, in no acute distress  HEENT: PERRLA  Respiratory::vesicular breath sounds, present wheezing  Cardiovascular:S1 S2 normal,no gallop or organic murmur. Abdomen:Non tender/non distended. Bowel sounds present  Extremities: No Cyanosis or Clubbing,Lower extremity edema  Neurological:Alert and oriented. No abnormalities of mood, affect, memory, mentation, or behavior are noted      MEDICATIONS     Scheduled Meds:    metoprolol tartrate  50 mg Oral BID    [START ON 3/1/2023] furosemide  40 mg IntraVENous Daily    sodium chloride flush  5-40 mL IntraVENous 2 times per day    insulin glargine  20 Units SubCUTAneous Daily    heparin (porcine)  5,000 Units SubCUTAneous 3 times per day    insulin glargine  10 Units SubCUTAneous Nightly    insulin lispro  0-16 Units SubCUTAneous 4x Daily AC & HS    midodrine  10 mg Oral TID WC    methylPREDNISolone  30 mg IntraVENous Q12H    docusate sodium  100 mg Oral TID    senna  1 tablet Oral Nightly    cefTRIAXone (ROCEPHIN) IV  1,000 mg IntraVENous Q24H    pantoprazole  40 mg Oral QAM AC    colchicine  0.3 mg Oral Daily    sodium chloride flush  5-40 mL IntraVENous 2 times per day    mupirocin   Nasal BID    chlorhexidine   Topical See Admin Instructions    sodium chloride flush  5-40 mL IntraVENous 2 times per day    polyethylene glycol  17 g Oral Daily    sodium chloride flush  5-40 mL IntraVENous 2 times per day     Continuous Infusions:    sodium chloride      amiodarone 0.5 mg/min (02/28/23 0733)    sodium chloride      sodium chloride      sodium chloride      dextrose       PRN Meds:  sodium chloride flush, sodium chloride, oxyCODONE-acetaminophen **OR** [DISCONTINUED] oxyCODONE-acetaminophen, metoprolol, sodium chloride flush, sodium chloride, sodium chloride flush, sodium chloride, sodium chloride flush, sodium chloride, fentanNYL, glucose, dextrose bolus **OR** dextrose bolus, glucagon (rDNA), dextrose  Home Meds:                Medications Prior to Admission: amLODIPine (NORVASC) 5 MG tablet, TAKE ONE TABLET BY MOUTH DAILY (Patient taking differently: nightly)  omeprazole (PRILOSEC) 20 MG delayed release capsule, Take 20 mg by mouth nightly  fluticasone-salmeterol (ADVAIR DISKUS) 250-50 MCG/ACT AEPB diskus inhaler, Inhale 1 puff into the lungs every 12 hours  metoprolol succinate (TOPROL XL) 25 MG extended release tablet, Take 25 mg by mouth at bedtime  dutasteride (AVODART) 0.5 mg capsule, Take 0.5 mg by mouth daily  finasteride (PROSCAR) 5 MG tablet, Take 5 mg by mouth nightly  Misc. Devices (NASAL SPRAY BOTTLE) MISC, by Does not apply route  CINNAMON PO, Take by mouth  repaglinide (PRANDIN) 1 MG tablet, Take 1 mg by mouth with breakfast and with evening meal  pimecrolimus (ELIDEL) 1 % cream, Apply topically as needed Apply topically 2 times daily. albuterol sulfate  (90 BASE) MCG/ACT inhaler, Inhale 1 puff into the lungs in the morning and at bedtime  Liraglutide (VICTOZA SC), Inject 1.8 mg into the skin daily   fenofibrate (TRICOR) 145 MG tablet, Take 145 mg by mouth nightly  dicyclomine (BENTYL) 10 MG capsule, Take 10 mg by mouth 3 times daily (with meals)  atorvastatin (LIPITOR) 80 MG tablet, Take 80 mg by mouth daily.   DULoxetine (CYMBALTA) 60 MG capsule, Take 120 mg by mouth nightly    INVESTIGATIONS     Last 3 CMP:    Recent Labs     02/26/23  0343 02/27/23  0327 02/28/23  0413    132* 137   K 4.7 5.0 5.0    98 102   CO2 20 21 22   BUN 60* 63* 63*   CREATININE 4.72* 4.27* 4.14*   CALCIUM 8.8 9.1 8.9       Last 3 CBC:  Recent Labs     02/27/23  0327 02/28/23  0413   WBC 16.2* 16.3*   RBC 3.23* 2.96*   HGB 9.0* 8.3*   HCT 29.8* 27.0*   MCV 92.3 91.2   MCH 27.9 28.0   MCHC 30.2 30.7   RDW 16.1* 16.4*    435   MPV 9.4 9.6       Please do not hesitate to call with questions    This note is created with the assistance of a speech-recognition program. While intending to generate a document that actually reflects the content of the visit, no guarantees can be provided that every mistake has been identified and corrected by editing    Rashawn Hernandez MD MD, [facility] Florentin Livingston   2/28/2023 10:03 AM  [facility] [place] [facility]

## 2023-02-28 NOTE — PROGRESS NOTES
PULMONARY & CRITICAL CARE MEDICINE PROGRESS NOTE     Patient:  Cruzito Pittman  MRN: [id number]  Admit date: 2/12/2023  Primary Care Physician: DAVID RAYA MD  Consulting Physician: Sumi Ferro MD  CODE Status: Full Code  LOS: 16     SUBJECTIVE     CHIEF COMPLAINT/REASON FOR CONSULT:  Acute Hypoxic respiratory failure    HISTORY OF PRESENT ILLNESS:  Cruzito Pittman is a 67 y.o. male with past medical history significant for type 2 diabetes mellitus, multiple myeloma, non-Hodgkin lymphoma, sarcoidosis, referred from [facility] ER with worsening respiratory status, shortness of breath, nonproductive cough.  He was started on BiPAP and given antibiotics.  Chest x-ray was concerning for loculated left-sided pleural effusion.  Transfer was made and accepted by hospitalist, further work-up.  Critical care was consulted for worsening respiratory status.  Patient was on high flow and respiratory rate was in 38-40.  Patient was very dyspneic and having difficulty breathing with accessory muscle use.  Initially he was unable to keep BiPAP but later on able to keep BiPAP.  He improved symptomatically with BiPAP and respiratory rate was in 26.  Patient is transferred to the ICU for higher level of care.       2/13: Attempted to perform bedside thoracentesis, no clear pocket identified, sent to IR, chest tube placed, appears that an empyema was encountered and chest tube was placed growing gram positive cocci in pairs, on cefepime and vanco     2/14: Placed chest tube yesterday, weaned off high flow, feeling \"100 x better\".      2/15: Continued TPA/Dornase chest tube flush, Got anxious overnight requiring xanax, 1200 cc of purulent chest tube output in the last 24 hours, patient did not sleep last night     2/16: Added seroquel to help sleep and agitation at night, continued Dornase/TPA, 700 cc of chest tube output that was less purulent, afebrile, WBC downtrending, continue cefepime     2/17: WBC uptrended,  sent cultures, tachypneic, restless overnight, given xanax, more tachycardic overnight as well, plan for CT chest today, will discuss possible CTA to evaluate for PE.    02/18: Cardiothoracic surgery consulted and they increased the size of the chest tube. Will likely need decortication. 02/20: Cardiothoracic surgery determined to review imaging and diagnostics with on-call surgeon, plan to repeat CT scan at later date, hold off on plan for decortication. Infectious Disease discontinued IV cefepime 02/20/23.    02/21: Patient more confused, oriented to self. Trying to get out of bed. Denies agitation but feeling overall week. Kidney US did not show hydronephrosis. Infectious disease recommends starting IV Rocephin 1 gm q24 hrs until 03/12/23. [id number] Rufina helms for outpatient therapy. Echo performed. Pt declined PT.    02/22: Pt is paranoid and declined PT. Cardiothoracic planning for left-sided VATS decortication on 02/23/23. Breathing stable, 99% O2 saturation on 3L NC 40% FiO2. INTERVAL HISTORY:  2/28/2023  Patient seen in CVICU. He is S/p VATS/decortication on 02/23/2023. He is hemodynamically stable overnight no acute hypoxic events were reported. His heart rate is in 80s and he is on amiodarone drip he is a status post cardioversion on 02/27/2023. He is on room air maintaining saturation and weaned off oxygen on room air is saturating 94 to 98%. He does have cough he does have sputum production sometimes darker sometimes more pale color denies hemoptysis. Chest pain is better since the chest tube was removed. Does not complain of shortness of breath at rest but on ambulation  He had chest tube in place this morning chest tube output was reported to be 70 mL in last 24 hours. Chest x-ray this morning shows chest tube in place with postop changes left pleuroparenchymal change/possible effusion.   Chest tube removed by CT surgery after removal of the chest tube chest x-ray shows a similar finding without pneumothorax  He is currently on Lasix 40 mg IV once daily urine output is 2.9 L in last 24 hours. Creatinine is better 4.12 today. Other labs shows BUN is 63 bicarbonate 22 potassium is 5.0 WBC 16.3 hemoglobin 8.3 platelet 839. REVIEW OF SYSTEMS:  Review of Systems   Constitutional:  Positive for activity change, appetite change. Negative for chills, diaphoresis, fever and unexpected weight change. HENT:  Negative for congestion. Respiratory:  Positive for cough and shortness of breath. Negative for apnea, choking, chest tightness, wheezing and stridor. Cardiovascular: Negative for palpitation, dizziness lightheadedness, positive for chest pain (at the site of surgery/chest tube). Negative for leg swelling or leg pain. Gastrointestinal:  Negative for abdominal distention, diarrhea, nausea and vomiting. Genitourinary:  Negative for difficulty urinating. Neurological:  Negative for dizziness and numbness. Psychiatric/Behavioral:  Negative for agitation. OBJECTIVE     PaO2/FiO2 RATIO:  No results for input(s): POCPO2 in the last 72 hours. FiO2 : 40 %     VITAL SIGNS:   LAST:  /71   Pulse 81   Temp 98.3 °F (36.8 °C) (Oral)   Resp 26   Ht 6' 1\" (1.854 m)   Wt 230 lb 9.6 oz (104.6 kg)   SpO2 97%   BMI 30.42 kg/m²   8-24 HR RANGE:  TEMP Temp  Av.5 °F (36.9 °C)  Min: 97.7 °F (36.5 °C)  Max: 98.9 °F (30.5 °C)   BP Systolic (13UXP), QOO:798 , Min:120 , LWT:780      Diastolic (16WET), PSX:25, Min:71, Max:96     PULSE Pulse  Av.9  Min: 70  Max: 122   RR Resp  Av.8  Min: 16  Max: 26   O2 SAT SpO2  Av.7 %  Min: 86 %  Max: 98 %   OXYGEN DELIVERY No data recorded        SYSTEMIC EXAMINATION:   Constitutional:  Alert, cooperative and no distress. Mental Status:  Oriented to person, place and time and normal affect. Chest: Left-sided dressing is present. No drainage seen  Lungs: Air entry is good on the right side.   Decreased air entry on the left side with chest tube in place. No crackles present  Heart: regular rhythm, no murmur. Abdomen:  Soft, nontender, nondistended, normal bowel sounds. Extremities:  No edema, redness, tenderness in the calves. Skin:  Warm, dry, no gross lesions or rashes.     DATA REVIEW     Medications: Current Inpatient  Scheduled Meds:   metoprolol tartrate  50 mg Oral BID    [START ON 3/1/2023] furosemide  40 mg IntraVENous Daily    sodium chloride flush  5-40 mL IntraVENous 2 times per day    insulin glargine  20 Units SubCUTAneous Daily    heparin (porcine)  5,000 Units SubCUTAneous 3 times per day    insulin glargine  10 Units SubCUTAneous Nightly    insulin lispro  0-16 Units SubCUTAneous 4x Daily AC & HS    midodrine  10 mg Oral TID WC    methylPREDNISolone  30 mg IntraVENous Q12H    docusate sodium  100 mg Oral TID    senna  1 tablet Oral Nightly    cefTRIAXone (ROCEPHIN) IV  1,000 mg IntraVENous Q24H    pantoprazole  40 mg Oral QAM AC    colchicine  0.3 mg Oral Daily    sodium chloride flush  5-40 mL IntraVENous 2 times per day    mupirocin   Nasal BID    chlorhexidine   Topical See Admin Instructions    sodium chloride flush  5-40 mL IntraVENous 2 times per day    polyethylene glycol  17 g Oral Daily    sodium chloride flush  5-40 mL IntraVENous 2 times per day     Continuous Infusions:   sodium chloride      amiodarone 0.5 mg/min (02/28/23 0733)    sodium chloride      sodium chloride      sodium chloride      dextrose         INPUT/OUTPUT:  In: 1629.9 [P.O.:180; I.V.:1449.9]  Out: 0638 [Urine:3265]  Date 02/28/23 0000 - 02/28/23 2359   Shift 3237-2283 3853-7064 5791-9798 24 Hour Total   INTAKE   I.V.(mL/kg) 351.8(3.4)   351.8(3.4)   Shift Total(mL/kg) 351.8(3.4)   351.8(3.4)   OUTPUT   Urine(mL/kg/hr) 1000(1.2) 300  1300   Chest Tube 20   20   Shift Total(mL/kg) 1020(9.8) 300(2.9)  1320(12.6)   Weight (kg) 104.6 104.6 104.6 104.6        LABS:  ABGs:   No results for input(s): POCPH, POCPCO2, POCPO2, POCHCO3, WTHL9TWI in the last 72 hours.    CBC:   Recent Labs     02/27/23 0327 02/28/23 0413   WBC 16.2* 16.3*   HGB 9.0* 8.3*   HCT 29.8* 27.0*   MCV 92.3 91.2    435   LYMPHOPCT 27  --    RBC 3.23* 2.96*   MCH 27.9 28.0   MCHC 30.2 30.7   RDW 16.1* 16.4*       CRP:   Recent Labs     02/27/23 0327   .8*     LDH:   No results for input(s): LDH in the last 72 hours.  BMP:   Recent Labs     02/26/23  0343 02/27/23 0327 02/28/23 0413    132* 137   K 4.7 5.0 5.0    98 102   CO2 20 21 22   BUN 60* 63* 63*   CREATININE 4.72* 4.27* 4.14*   GLUCOSE 218* 328* 287*     Liver Function Test:   No results for input(s): PROT, LABALBU, ALT, AST, GGT, ALKPHOS, BILITOT in the last 72 hours.  Coagulation Profile:   No results for input(s): INR, PROTIME, APTT in the last 72 hours.    D-Dimer:  No results for input(s): DDIMER in the last 72 hours.  Lactic Acid:  No results for input(s): LACTA in the last 72 hours.  Cardiac Enzymes:  No results for input(s): CKTOTAL, CKMB, CKMBINDEX, TROPONINI in the last 72 hours.    Invalid input(s): TROPONIN, HSTROP  BNP/ProBNP:   No results for input(s): BNP, PROBNP in the last 72 hours.  Triglycerides:  No results for input(s): TRIG in the last 72 hours.     Microbiology:  Urine Culture:  No components found for: CURINE  Blood Culture:  No components found for: CBLOOD, CFUNGUSBL  Sputum Culture:  No components found for: CSPUTUM  No results for input(s): SPECDESC, SPECIAL, CULTURE, STATUS, ORG, CDIFFTOXPCR, CAMPYLOBPCR, SALMONELLAPC, SHIGAPCR, SHIGELLAPCR, MPNEUG, MPNEUM, LACTOQL in the last 72 hours.    No results for input(s): SPUTUM, SPECIAL, CULTURE, STATUS, ORG, CDIFFTOXPCR, MPNEUM, MPNEUG in the last 72 hours.    Invalid input(s): CURINE, CBLOOD, CFUNGUSBL,  SPECDESC         Radiology Reports:  XR CHEST PORTABLE   Final Result   Interval removal of left-sided chest tube.  No pneumothorax.      Continued small left pleural effusion, unchanged.  Left basilar airspace   disease is unchanged.  XR CHEST PORTABLE   Preliminary Result   No significant interval change. XR CHEST PORTABLE   Final Result   Left chest tubes present with partially loculated left pleural effusion and   left basilar opacity unchanged. XR CHEST PORTABLE   Final Result   No significant change from prior exam.      Left chest tubes with left basilar airspace disease and small amount of   pleural fluid. Linear left retrocardiac lucency could represent small pneumothorax. XR CHEST PORTABLE   Final Result   No significant change from prior exam.         XR CHEST PORTABLE   Final Result   Status post removal single small bore and placement 2 large-bore chest tubes   left lung with reduced opacity, presumably multiloculated left pleural   effusion and associated atelectasis. No pneumothorax. Slightly greater   atelectasis right base. US RETROPERITONEAL COMPLETE   Final Result   1. Simple cortical cyst at the upper pole left kidney measuring 6 mm. 2. Limited renal ultrasound. No hydronephrosis. 3. Nonvisualization of ureteral jets. Minimal distention of the urinary   bladder. Patient no urge to void during the exam.      RECOMMENDATIONS:   Unavailable         IR CHEST TUBE INSERTION   Final Result   Successful fluoroscopic up sizing of left sided chest tube with 14 [place]   tube placed. XR CHEST PORTABLE   Final Result   Moderate left pleural effusion increased. No change left chest tube/pigtail   catheter. VL DUP LOWER EXTREMITY VENOUS BILATERAL   Final Result      CT CHEST WO CONTRAST   Final Result   Small to moderate-sized multiloculated left pleural effusions have improved. New small foci of air within the collection is presumably related to the   introduction of the left thoracostomy tube. Stable small to moderate sized mediastinal lymph nodes are nonspecific but   may relate to the patient's history of lymphoma.   Additional small bilateral   axial lymph nodes are identified. Small hiatal hernia. XR CHEST PORTABLE   Final Result   Similar findings when rotation taken into account; left chest tube with   unchanged or slightly increased loculated appearing left pleural effusion;   atelectatic appearing changes. No pneumothorax. XR CHEST PORTABLE   Final Result   Interval placement of a left chest tube with reduced left pleural fluid. IR CHEST TUBE INSERTION   Final Result   Successful ultrasound guided placement of a left 10 [place] chest tube         CT CHEST WO CONTRAST   Final Result   1. Moderate to large amount of multiloculated left pleural fluid. Assessment   for pleural thickening and/or pleural nodularity is limited without IV   contrast.   2. Consolidation in the left lower lobe, likely atelectasis, but possibility   of superimposed pneumonia would be difficult to exclude. 3. Mediastinal and bilateral axillary lymphadenopathy, which may be related   to the patient's history of lymphoma. If available, comparison to any recent   imaging may be of use to assess for progression. XR CHEST PORTABLE   Final Result   No significant interval change. Redemonstration of opacification of the mid   to lower left lung field which may in part be due to loculated left pleural   effusion. Consider further evaluation with CT. XR CHEST PORTABLE    (Results Pending)        Echocardiogram:   No results found for this or any previous visit. ASSESSMENT AND PLAN     Assessment:    Acute hypoxic respiratory failure  Community acquired pneumonia  Empyema left side s/p VATS/decortication (1/23/2023)  Mediastinal and bilateral axillary lymphadenopathy  Hx of lymphoma  Hypertension  Hx of AAA  Hx of mitral regurgitation  Atrial fibrillation. KELSEA      Plan:    Patient s/p VATS on 02/23/2023. Chest tube removed this morning per CT surgery.   Chest x-ray reviewed from this morning 02/28/2023 showed left basilar pleuroparenchymal change/minimal atelectasis left diaphragm elevation. Status post chest tube removal repeat chest x-ray did not show much change and no pneumothorax seen. Patient is currently on Solu-Medrol 30 mg every 12 hours apparently for pericarditis. No need for steroids from pulmonary standpoint. Patient is currently on Lasix IV once daily creatinine is better today urine output is improving. Follow-up with nephrology and follow-up renal function  K supplemental oxygen if needed to keep oxygen saturation above 92%  DVT prophylaxis with heparin subcu when okay with CT surgery if there is no contraindication  On Rocephin and follow-up with infectious disease  Continue to encourage incentive spirometry Acapella deep breathing and ambulation  Bronchodilator therapy as needed  We will continue to follow    Discussed with the patient and updated questions answered. Discussed with nursing staff recommendations/plan discussed    We will continue to follow. I would like to thank you for allowing me to participate in the care of this patient. Please feel free to call with any further questions or concerns.       Kyaw Lau MD  Pulmonary and critical care medicine  2/28/2023, 2:42 PM

## 2023-02-28 NOTE — PLAN OF CARE
CXR reviewed, no PNX, CTS to sign off. Patient to f/u with Dr Reji Cage in 2 weeks.  Phone number to schedule is in follow up

## 2023-02-28 NOTE — PROGRESS NOTES
[facility] Cardiothoracic Surgical   Progress Note    2/28/2023 9:24 AM  Surgeon:  Rachell Perdue          POD# 4   S/P :  VATs decorticaion    Subjective:  Mr. Enrique Lentz no complaints, NAEO    Vital Signs: /72   Pulse 77   Temp 98.3 °F (36.8 °C) (Oral)   Resp 24   Ht 6' 1\" (1.854 m)   Wt 230 lb 9.6 oz (104.6 kg)   SpO2 98%   BMI 30.42 kg/m²  O2 Flow Rate (L/min): 2 L/min   Admit Weight: Weight: 231 lb 7.7 oz (105 kg)       Labs and Studies:  CBC:   Recent Labs     02/27/23 0327 02/28/23 0413   WBC 16.2* 16.3*   HGB 9.0* 8.3*   HCT 29.8* 27.0*   MCV 92.3 91.2    435       BMP:   Recent Labs     02/26/23  0343 02/27/23 0327 02/28/23 0413    132* 137   K 4.7 5.0 5.0    98 102   CO2 20 21 22   BUN 60* 63* 63*   CREATININE 4.72* 4.27* 4.14*       PT/INR: No results for input(s): PROTIME, INR in the last 72 hours. APTT:   No results for input(s): APTT in the last 72 hours. I/O:  I/O last 3 completed shifts: In: 2010.2 [P.O.:420; I.V.:1590.2]  Out: 1241 [Urine:4065; Chest Tube:110]    CXR   2/28/2023 2/27/2023  FINDINGS:   Left-sided chest tubes are stable. Partially loculated left pleural effusion   is again demonstrated with left basilar consolidation. Central line is   stable. Right lung is clear. Cardiomediastinal silhouette and bony thorax   are unchanged. Stable cardiomegaly. Impression   Left chest tubes present with partially loculated left pleural effusion and   left basilar opacity unchanged.        Scheduled Meds:    metoprolol tartrate  50 mg Oral BID    sodium chloride flush  5-40 mL IntraVENous 2 times per day    insulin glargine  20 Units SubCUTAneous Daily    heparin (porcine)  5,000 Units SubCUTAneous 3 times per day    furosemide  40 mg IntraVENous BID    insulin glargine  10 Units SubCUTAneous Nightly    insulin lispro  0-16 Units SubCUTAneous 4x Daily AC & HS    midodrine  10 mg Oral TID WC    methylPREDNISolone  30 mg IntraVENous Q12H    docusate sodium  100 mg Oral TID    senna  1 tablet Oral Nightly    cefTRIAXone (ROCEPHIN) IV  1,000 mg IntraVENous Q24H    pantoprazole  40 mg Oral QAM AC    colchicine  0.3 mg Oral Daily    sodium chloride flush  5-40 mL IntraVENous 2 times per day    mupirocin   Nasal BID    chlorhexidine   Topical See Admin Instructions    sodium chloride flush  5-40 mL IntraVENous 2 times per day    polyethylene glycol  17 g Oral Daily    sodium chloride flush  5-40 mL IntraVENous 2 times per day     Continuous Infusions:    sodium chloride      sodium chloride 30 mL/hr at 02/28/23 0404    amiodarone 0.5 mg/min (02/28/23 0733)    sodium chloride      sodium chloride      sodium chloride      dextrose       A/P:  2/28/2023  POD$   CT to waterseal, repeat XR shows no PNX, no airleak on assessement   Remove CT today, repeat XR this afternoon   CT surgery ok with discharge when medical team deems appropriate   D/c planning is [facility]     2/27/2023  POD3   CT to water seal today   On RA, continue IS and pulmonary toileting   AF in the setting of pericarditis, cardiology to manage, currently on amio gtt, planning for AFIA with cardioversion today   Cr downtrending today, nephrology following, hx of CKD   D/c planning: rehab vs SNF     POD # 2  Continue chest tubes to suction. Forced air leak.   Creatine climbing neprhology following  Pericarditis, cardiolgy following    Ronny Hoyos, APRN - CNP

## 2023-02-28 NOTE — CARE COORDINATION
Spoke with Leigh Jorgensen, admissions at [facility]. Updated on chest tube removal and percert to be started.

## 2023-02-28 NOTE — PROGRESS NOTES
Physical Therapy  Facility/Department: [facility] CAR 1- SICU  Physical Therapy Daily Treatment Note    Name: Aiyana Villafana  : 1955  MRN: [id number]  Date of Service: 2023    Discharge Recommendations:  Patient would benefit from continued therapy after discharge   PT Equipment Recommendations  Equipment Needed: Yes  Walker: Rolling      Patient Diagnosis(es): The encounter diagnosis was Empyema [facility]). Past Medical History:  has a past medical history of Acute interstitial nephritis, Aortic regurgitation, ARF (acute renal failure) ([facility] [address] Chronic kidney disease, Hyperlipidemia, Hypertension, Non-Hodgkin lymphoma ([facility] [address] Research study patient, Sarcoidosis, and Type II or unspecified type diabetes mellitus without mention of complication, not stated as uncontrolled. Past Surgical History:  has a past surgical history that includes eye surgery (Right); Knee arthroscopy; Rotator cuff repair (Right, ); Rotator cuff repair (Left, 5/15); Carpal tunnel release (Left, 11/15 ); Eye surgery (Right, ); Total knee arthroplasty (Right, 10/2018); malignant skin lesion excision; IR CHEST TUBE INSERTION (2023); IR CHEST TUBE INSERTION (2023); and Thoracoscopy (Left, 2023). Assessment   Body Structures, Functions, Activity Limitations Requiring Skilled Therapeutic Intervention: Decreased functional mobility ; Decreased ROM; Decreased strength;Decreased safe awareness;Decreased endurance;Decreased balance  Assessment: pt continues to be with impaired cognition and is with decreased strength in B LEs and decreased balance, requiring Min A for STS w/ RW and Mod A x1-2 ambulation for balance with RW this date 8ft + 8ft w/ rest break between. Pt unsafe to ambulate without skilled assist. Recommend cont therapy in acute setting to address deficits.   Therapy Prognosis: Good  Decision Making: High Complexity  Requires PT Follow-Up: Yes  Activity Tolerance  Activity Tolerance: Treatment limited secondary to decreased cognition;Patient limited by endurance; Patient limited by fatigue;Treatment limited secondary to medical complications (SOB limited ambulation.)  Activity Tolerance Comments: increased cueing     Plan   Physcial Therapy Plan  General Plan: 5-7 times per week  Current Treatment Recommendations: Strengthening, ROM, Functional mobility training, Transfer training, Endurance training, Gait training, Stair training, Balance training, Neuromuscular re-education, Home exercise program, Safety education & training, Patient/Caregiver education & training, Therapeutic activities, Equipment evaluation, education, & procurement  Safety Devices  Type of Devices: All fall risk precautions in place, Call light within reach, Gait belt, Nurse notified, Left in bed, Patient at risk for falls  Restraints  Restraints Initially in Place: No     Restrictions  Restrictions/Precautions  Restrictions/Precautions: Up as Tolerated, Fall Risk, Surgical Protocols  Required Braces or Orthoses?: No  Position Activity Restriction  Other position/activity restrictions: Chest tube. Bullock catheter. O2 NC 3 Lm. Subjective   Pain: n/c pain  General  Chart Reviewed: Yes  Patient assessed for rehabilitation services?: Yes  Response To Previous Treatment: Patient with no complaints from previous session. Family / Caregiver Present: No  Follows Commands: Within Functional Limits  Other (Comment): L chest tube, cath  General Comment  Comments: Pt retired to chair at end of session, call light given  Subjective  Subjective: Pt up in chair, pt and RN agreeable to therapy     Cognition   Orientation  Overall Orientation Status: Impaired  Orientation Level: Oriented to person;Disoriented to place; Disoriented to time;Disoriented to situation  Cognition  Overall Cognitive Status: Exceptions  Arousal/Alertness: Delayed responses to stimuli  Following Commands: Follows one step commands with repetition; Follows one step commands with increased time;Follows multistep commands with repitition; Follows multistep commands with increased time  Attention Span: Attends with cues to redirect  Memory: Decreased recall of recent events  Safety Judgement: Decreased awareness of need for assistance;Decreased awareness of need for safety  Problem Solving: Assistance required to identify errors made;Assistance required to correct errors made  Insights: Decreased awareness of deficits  Initiation: Requires cues for some  Sequencing: Requires cues for some  Cognition Comment: Easily distracted. After ambulation, patient was seated in recliner, and pt's head bowed, but pt returned to attention when prompted. Possible SOB symptoms, a little \"out of it\" on occasion. Objective   Heart Rate: 90  Heart Rate Source: Monitor  BP: 126/72  BP Location: Left upper arm  BP Method: Automatic  MAP (Calculated): 90  Resp: 23  SpO2: 97 %  O2 Device: 3L O2 via NC, (Initially ambulated on room air, pt reported SOB, but no dizziness. Pt  was provided with 3L O2 via NC at this time in treatment)  Bed mobility  Bridging: Unable to assess  Rolling to Left: Unable to assess  Rolling to Right: Unable to assess  Supine to Sit: Unable to assess  Sit to Supine: Unable to assess  Scooting: Unable to assess  Bed Mobility Comments: pt sitting up in chair when therapy arrived. Transfers  Sit to Stand: Minimal Assistance (Required v/c's for hand placement, with poor return. An impulsive transferer)  Stand to Sit: Minimal Assistance (Required v/c's for hand placement, with poor return. An impulsive transferer)  Comment: performed STS c WW with max VC And TC for hand placement and sequencing.  Increased time to complete due to confusion and weakness  Ambulation  WB Status: As tolerated  Ambulation  Surface: Level tile  Device: Rolling Walker  Other Apparatus: O2 (Pt initially on room air, c/o SOB midway during tx; therapy adminstered 3L portable O2 at that time, symptoms receded w/ rest)  Assistance: 2 Person assistance; Moderate assistance  Quality of Gait: mildly unsteady, flexed posture, decreased stride length, no true LOB. Gait Deviations: Slow Portia;Decreased step length;Decreased step height  Distance: 8ft + 8ft. Rest break between due to SOB. Pt returned to chair under his own power after portable O2 was administered  Comments: pt with decreased endurance with mobility and decreased stability, requires Mod A x1-2 for balance and cueing gait  More Ambulation?: No  Stairs/Curb  Stairs?: No     Balance  Posture: Fair  Sitting - Static: Fair;+  Sitting - Dynamic: Fair;+  Standing - Static: Fair  Standing - Dynamic: Fair;-  Comments: Standing balance assessed w/ RW  Exercise Treatment: Seated LE exercise program: Long Arc Quads, hip abduction/adduction, heel/toe raises, and marches. Reps: 15; UE bicep curls x15     AM-PAC Score  AM-PAC Inpatient Mobility Raw Score : 11 (02/28/23 1215)  AM-PAC Inpatient T-Scale Score : 33.86 (02/28/23 1215)  Mobility Inpatient CMS 0-100% Score: 72.57 (02/28/23 1215)  Mobility Inpatient CMS G-Code Modifier : CL (02/28/23 1215)    Goals  Short Term Goals  Time Frame for Short Term Goals: 14 visits  Short Term Goal 1: pt to perform bed mobility with Min A  Short Term Goal 2: pt to ambulate 50ft with [employer] with Min A x1  Short Term Goal 3: pt to transfer c [employer] c Min A x1  Short Term Goal 4: pt to ascend and descend 1 step with MOd A  Short Term Goal 5: pt to be indep with HEP  Patient Goals   Patient Goals : to be able to go home       Education  Patient Education  Education Given To: Patient  Education Provided: Role of Therapy;Plan of Care;Transfer Training;Precautions; Fall Prevention Strategies; Equipment  Education Provided Comments: Pt needs frequent v/c's to transfer STS w/ RW; is impulsive and unsafe, will use both hands on walker during transfer. Pt educated on use of incentive spirometer and flutter devices.  Pt educated on importance of BLE exercises during times of decreased activity. Education Method: Verbal;Demonstration  Barriers to Learning: Cognition  Education Outcome: Verbalized understanding;Continued education needed      Therapy Time   Individual Concurrent Group Co-treatment   Time In 0955         Time Out 1034         Minutes 39         Timed Code Treatment Minutes: [address] Treatment performed by Student PTA under the supervision of co-signing PTA who agrees with all treatment and documentation.      Nery oHwe PTA

## 2023-03-01 ENCOUNTER — APPOINTMENT (OUTPATIENT)
Dept: GENERAL RADIOLOGY | Age: 68
DRG: 853 | End: 2023-03-01
Attending: STUDENT IN AN ORGANIZED HEALTH CARE EDUCATION/TRAINING PROGRAM
Payer: COMMERCIAL

## 2023-03-01 LAB
ANION GAP SERPL CALCULATED.3IONS-SCNC: 11 MMOL/L (ref 9–17)
BUN SERPL-MCNC: 59 MG/DL (ref 8–23)
CALCIUM SERPL-MCNC: 9.4 MG/DL (ref 8.6–10.4)
CHLORIDE SERPL-SCNC: 102 MMOL/L (ref 98–107)
CO2 SERPL-SCNC: 24 MMOL/L (ref 20–31)
CREAT SERPL-MCNC: 3 MG/DL (ref 0.7–1.2)
GFR SERPL CREATININE-BSD FRML MDRD: 22 ML/MIN/1.73M2
GLUCOSE BLD-MCNC: 200 MG/DL (ref 75–110)
GLUCOSE BLD-MCNC: 206 MG/DL (ref 75–110)
GLUCOSE BLD-MCNC: 368 MG/DL (ref 75–110)
GLUCOSE SERPL-MCNC: 189 MG/DL (ref 70–99)
POTASSIUM SERPL-SCNC: 4.2 MMOL/L (ref 3.7–5.3)
SODIUM SERPL-SCNC: 137 MMOL/L (ref 135–144)

## 2023-03-01 PROCEDURE — 6370000000 HC RX 637 (ALT 250 FOR IP): Performed by: STUDENT IN AN ORGANIZED HEALTH CARE EDUCATION/TRAINING PROGRAM

## 2023-03-01 PROCEDURE — 82947 ASSAY GLUCOSE BLOOD QUANT: CPT

## 2023-03-01 PROCEDURE — 71045 X-RAY EXAM CHEST 1 VIEW: CPT

## 2023-03-01 PROCEDURE — 2580000003 HC RX 258: Performed by: STUDENT IN AN ORGANIZED HEALTH CARE EDUCATION/TRAINING PROGRAM

## 2023-03-01 PROCEDURE — 2580000003 HC RX 258

## 2023-03-01 PROCEDURE — 99232 SBSQ HOSP IP/OBS MODERATE 35: CPT | Performed by: INTERNAL MEDICINE

## 2023-03-01 PROCEDURE — 6360000002 HC RX W HCPCS

## 2023-03-01 PROCEDURE — 6360000002 HC RX W HCPCS: Performed by: FAMILY MEDICINE

## 2023-03-01 PROCEDURE — 2000000000 HC ICU R&B

## 2023-03-01 PROCEDURE — 6370000000 HC RX 637 (ALT 250 FOR IP): Performed by: INTERNAL MEDICINE

## 2023-03-01 PROCEDURE — 2580000003 HC RX 258: Performed by: PHYSICIAN ASSISTANT

## 2023-03-01 PROCEDURE — 36415 COLL VENOUS BLD VENIPUNCTURE: CPT

## 2023-03-01 PROCEDURE — 6370000000 HC RX 637 (ALT 250 FOR IP): Performed by: NURSE PRACTITIONER

## 2023-03-01 PROCEDURE — 2580000003 HC RX 258: Performed by: NURSE PRACTITIONER

## 2023-03-01 PROCEDURE — 97535 SELF CARE MNGMENT TRAINING: CPT

## 2023-03-01 PROCEDURE — 80048 BASIC METABOLIC PNL TOTAL CA: CPT

## 2023-03-01 PROCEDURE — 6360000002 HC RX W HCPCS: Performed by: INTERNAL MEDICINE

## 2023-03-01 PROCEDURE — 6370000000 HC RX 637 (ALT 250 FOR IP): Performed by: PHYSICIAN ASSISTANT

## 2023-03-01 PROCEDURE — 2580000003 HC RX 258: Performed by: ANESTHESIOLOGY

## 2023-03-01 PROCEDURE — 6360000002 HC RX W HCPCS: Performed by: NURSE PRACTITIONER

## 2023-03-01 PROCEDURE — 6370000000 HC RX 637 (ALT 250 FOR IP): Performed by: FAMILY MEDICINE

## 2023-03-01 RX ORDER — INSULIN GLARGINE 100 [IU]/ML
25 INJECTION, SOLUTION SUBCUTANEOUS DAILY
Status: DISCONTINUED | OUTPATIENT
Start: 2023-03-02 | End: 2023-03-02

## 2023-03-01 RX ORDER — INSULIN GLARGINE 100 [IU]/ML
15 INJECTION, SOLUTION SUBCUTANEOUS NIGHTLY
Status: DISCONTINUED | OUTPATIENT
Start: 2023-03-01 | End: 2023-03-02

## 2023-03-01 RX ADMIN — CEFTRIAXONE SODIUM 1000 MG: 10 INJECTION, POWDER, FOR SOLUTION INTRAVENOUS at 10:15

## 2023-03-01 RX ADMIN — INSULIN GLARGINE 20 UNITS: 100 INJECTION, SOLUTION SUBCUTANEOUS at 08:31

## 2023-03-01 RX ADMIN — METHYLPREDNISOLONE SODIUM SUCCINATE 30 MG: 40 INJECTION, POWDER, FOR SOLUTION INTRAMUSCULAR; INTRAVENOUS at 08:33

## 2023-03-01 RX ADMIN — SENNOSIDES 17.2 MG: 8.6 TABLET, COATED ORAL at 19:44

## 2023-03-01 RX ADMIN — HEPARIN SODIUM 5000 UNITS: 5000 INJECTION INTRAVENOUS; SUBCUTANEOUS at 22:01

## 2023-03-01 RX ADMIN — SODIUM CHLORIDE, PRESERVATIVE FREE 10 ML: 5 INJECTION INTRAVENOUS at 08:40

## 2023-03-01 RX ADMIN — PANTOPRAZOLE SODIUM 40 MG: 40 TABLET, DELAYED RELEASE ORAL at 08:33

## 2023-03-01 RX ADMIN — INSULIN LISPRO 12 UNITS: 100 INJECTION, SOLUTION INTRAVENOUS; SUBCUTANEOUS at 20:35

## 2023-03-01 RX ADMIN — HEPARIN SODIUM 5000 UNITS: 5000 INJECTION INTRAVENOUS; SUBCUTANEOUS at 06:44

## 2023-03-01 RX ADMIN — DOCUSATE SODIUM 100 MG: 100 CAPSULE, LIQUID FILLED ORAL at 08:32

## 2023-03-01 RX ADMIN — HEPARIN SODIUM 5000 UNITS: 5000 INJECTION INTRAVENOUS; SUBCUTANEOUS at 15:22

## 2023-03-01 RX ADMIN — SODIUM CHLORIDE, PRESERVATIVE FREE 10 ML: 5 INJECTION INTRAVENOUS at 19:45

## 2023-03-01 RX ADMIN — INSULIN LISPRO 12 UNITS: 100 INJECTION, SOLUTION INTRAVENOUS; SUBCUTANEOUS at 18:39

## 2023-03-01 RX ADMIN — SODIUM CHLORIDE, PRESERVATIVE FREE 10 ML: 5 INJECTION INTRAVENOUS at 22:01

## 2023-03-01 RX ADMIN — INSULIN GLARGINE 15 UNITS: 100 INJECTION, SOLUTION SUBCUTANEOUS at 20:36

## 2023-03-01 RX ADMIN — POLYETHYLENE GLYCOL 3350 17 G: 17 POWDER, FOR SOLUTION ORAL at 08:31

## 2023-03-01 RX ADMIN — DOCUSATE SODIUM 100 MG: 100 CAPSULE, LIQUID FILLED ORAL at 19:44

## 2023-03-01 RX ADMIN — METHYLPREDNISOLONE SODIUM SUCCINATE 30 MG: 40 INJECTION, POWDER, FOR SOLUTION INTRAMUSCULAR; INTRAVENOUS at 22:01

## 2023-03-01 RX ADMIN — AMIODARONE HYDROCHLORIDE 200 MG: 200 TABLET ORAL at 19:44

## 2023-03-01 RX ADMIN — FUROSEMIDE 40 MG: 10 INJECTION, SOLUTION INTRAMUSCULAR; INTRAVENOUS at 08:33

## 2023-03-01 RX ADMIN — INSULIN LISPRO 4 UNITS: 100 INJECTION, SOLUTION INTRAVENOUS; SUBCUTANEOUS at 12:11

## 2023-03-01 RX ADMIN — AMIODARONE HYDROCHLORIDE 200 MG: 200 TABLET ORAL at 08:33

## 2023-03-01 RX ADMIN — INSULIN LISPRO 4 UNITS: 100 INJECTION, SOLUTION INTRAVENOUS; SUBCUTANEOUS at 08:30

## 2023-03-01 RX ADMIN — MIDODRINE HYDROCHLORIDE 10 MG: 5 TABLET ORAL at 12:11

## 2023-03-01 RX ADMIN — COLCHICINE 0.3 MG: 0.6 TABLET, FILM COATED ORAL at 08:33

## 2023-03-01 RX ADMIN — METOPROLOL TARTRATE 50 MG: 25 TABLET ORAL at 08:34

## 2023-03-01 RX ADMIN — MUPIROCIN: 20 OINTMENT TOPICAL at 19:44

## 2023-03-01 RX ADMIN — METOPROLOL TARTRATE 50 MG: 25 TABLET ORAL at 19:44

## 2023-03-01 RX ADMIN — DOCUSATE SODIUM 100 MG: 100 CAPSULE, LIQUID FILLED ORAL at 15:21

## 2023-03-01 RX ADMIN — MUPIROCIN: 20 OINTMENT TOPICAL at 08:34

## 2023-03-01 RX ADMIN — MIDODRINE HYDROCHLORIDE 10 MG: 5 TABLET ORAL at 18:08

## 2023-03-01 NOTE — PROGRESS NOTES
[facility]   Progress Note                 Date:   2/28/2023  Patient name: Walden Gosselin  Date of admission:  2/12/2023 10:33 PM  MRN:   [id number]  YOB: 1955  PCP: Arcelia Cuevas MD    Reason for Admission:      Subjective:   Seen and examined at bedside. Overnight issues noted. Patient converted back to normal sinus rhythm. Patient denied any symptoms. Current heart rate is in 70s. Blood pressure is stable.     Medications:   Scheduled Meds:   metoprolol tartrate  50 mg Oral BID    [START ON 3/1/2023] furosemide  40 mg IntraVENous Daily    senna  2 tablet Oral Nightly    sodium chloride flush  5-40 mL IntraVENous 2 times per day    insulin glargine  20 Units SubCUTAneous Daily    heparin (porcine)  5,000 Units SubCUTAneous 3 times per day    insulin glargine  10 Units SubCUTAneous Nightly    insulin lispro  0-16 Units SubCUTAneous 4x Daily AC & HS    midodrine  10 mg Oral TID WC    methylPREDNISolone  30 mg IntraVENous Q12H    docusate sodium  100 mg Oral TID    cefTRIAXone (ROCEPHIN) IV  1,000 mg IntraVENous Q24H    pantoprazole  40 mg Oral QAM AC    colchicine  0.3 mg Oral Daily    sodium chloride flush  5-40 mL IntraVENous 2 times per day    mupirocin   Nasal BID    chlorhexidine   Topical See Admin Instructions    sodium chloride flush  5-40 mL IntraVENous 2 times per day    polyethylene glycol  17 g Oral Daily    sodium chloride flush  5-40 mL IntraVENous 2 times per day       Continuous Infusions:   sodium chloride      amiodarone 0.5 mg/min (02/28/23 0733)    sodium chloride      sodium chloride      sodium chloride      dextrose         CBC:   Recent Labs     02/27/23  0327 02/28/23  0413   WBC 16.2* 16.3*   HGB 9.0* 8.3*    435       BMP:    Recent Labs     02/27/23  0327 02/28/23  0413 02/28/23  1707   * 137 134*   K 5.0 5.0 4.5   CL 98 102 100   CO2 21 22 22   BUN 63* 63* 64*   CREATININE 4.27* 4.14* 3.79*   GLUCOSE 328* 287* 177* Objective:   Vitals: /69   Pulse 70   Temp 98.4 °F (36.9 °C) (Oral)   Resp 23   Ht 6' 1\" (1.854 m)   Wt 230 lb 9.6 oz (104.6 kg)   SpO2 99%   BMI 30.42 kg/m²     General appearance: awake, alert, in no apparent respiratory distress   HEENT: Head: Normocephalic, no lesions, without obvious abnormality  Neck: no JVD  Lungs: Decreased air entry on the left side with chest tube in place. Heart: Normal S1 and S2, regular rate and rhythm. Abdomen: soft, non-tender; bowel sounds normal  Extremities: No LE edema  Neurologic: Mental status: Alert, oriented. Motor and sensory not done. DATA  EKG:    Date: 02/21/23  Reading:  Sinus tachycardia  Moderate voltage criteria for LVH, may be normal variant  Nonspecific ST and T wave abnormality  Abnormal ECG  When compared with ECG of 16-FEB-2023 23:10,  No significant change was found     LAST ECHO:  2/21/23  Summary  Left ventricle is normal in size. Global left ventricular systolic function  is difficult to assess due to heart rate but appears normal. Calculated  ejection fraction 51% by Atkinson's method. Aortic valve is trileaflet and opens adequately. Trivial aortic insufficiency. Normal tricuspid valve structure. Trivial tricuspid regurgitation. Date:  Findings Summary: 10/22     Left Ventricle: Systolic function is normal with an ejection fraction   of 55-60%. Aortic Valve: There is mild regurgitation. Tricuspid Valve: There is trace to mild regurgitation. The right   ventricular systolic pressure normal. RVSP calculated at 24 mmHg. RVSP is   based on RA pressure of 3 mmHg. There is no pulmonary hypertension. Left Ventricle   Systolic function is normal with an ejection fraction of 55-60%. No obvious regional wall motion abnormalities. There is abnormal septal motion. Right Ventricle   Right ventricular size appears normal. Systolic function is normal.     Right Atrium   Right atrium is normal in size.      IVC/SVC   The right atrial pressure is estimated at 3 mmHg. IVC appears normal. There is normal collapse with deep inspiration. Tricuspid Valve   Tricuspid valve appears to be normal. There is trace to mild regurgitation. The right ventricular systolic pressure normal. RVSP calculated at 24 mmHg. RVSP is based on RA pressure of 3 mmHg. There is no pulmonary hypertension. Aortic Valve   The aortic valve is trileaflet. The leaflets are mildly thickened. There is mild regurgitation. Pulmonic Valve   Pulmonic valve structure is grossly normal. There is trace to mild regurgitation. Pericardium   There is no pericardial effusion. LAST Stress Test:   No prior     LAST Cardiac Angiography:. No prior    Assessment / Acute Cardiac Problems:   Left sided Empyema s/p VATS/decortication 1/23/2023  Acute pericarditis on renal dose colchicine and steroids  Community acquired pneumonia. Proving  Acute hypoxic respiratory failure due to above  New Onset Atrial fibrillation with RVR s/p AFIA/CV to NSR  History of non-Hodgkin's lymphoma  History of nonrheumatic mitral regurgitation/Tricuspid regurgitation  History of dilated aortic root  Type 2 diabetes  KELSEA on Chronic kidney disease stage III/IV  CLAY on CPAP    Plan of Treatment:   Patient remained in normal sinus rhythm. Stop IV amiodarone and start p.o. amiodarone 200 mg 2 times daily for 14 days followed by 200 mg daily  ASA held per nephrology. On Colchicine 0.3 qday for pericarditis (renal dose). On methylprednisolone 30 mg q12h  On antibiotics as per ID  Continue Lopressor 50 twice daily with holding parameters. Continue with as needed Lopressor 5 mg IV q6h for HR >110  On IV Lasix as per nephro, creatinine improving   On Midodrine 10 mg TID as per nephro.   Will continue to follow along       Plan to be discussed with rounding attending         Rosa Buckner MD.  Fellow, cardiovascular disease   [facility], [place], [place]      Attending Physician Statement  I have discussed the case of Caity Breaker including pertinent history and exam findings with the student/resident/fellow. I have seen and examined the patient and the key elements of the encounter have been performed by me. I agree with the assessment, plan and orders as documented by the resident With changes made to the note.      Electronically signed by Carina Carver MD on 3/1/2023 at 2:08 PM.    [facility]      [phone]

## 2023-03-01 NOTE — PLAN OF CARE
Problem: Discharge Planning  Goal: Discharge to home or other facility with appropriate resources  Outcome: Progressing  Flowsheets (Taken 2/28/2023 2000)  Discharge to home or other facility with appropriate resources: Identify barriers to discharge with patient and caregiver     Problem: Safety - Adult  Goal: Free from fall injury  Outcome: Progressing     Problem: ABCDS Injury Assessment  Goal: Absence of physical injury  Outcome: Progressing  Flowsheets (Taken 2/28/2023 2259)  Absence of Physical Injury: Implement safety measures based on patient assessment     Problem: Skin/Tissue Integrity  Goal: Absence of new skin breakdown  Description: 1. Monitor for areas of redness and/or skin breakdown  2. Assess vascular access sites hourly  3. Every 4-6 hours minimum:  Change oxygen saturation probe site  4. Every 4-6 hours:  If on nasal continuous positive airway pressure, respiratory therapy assess nares and determine need for appliance change or resting period.   Outcome: Progressing     Problem: Chronic Conditions and Co-morbidities  Goal: Patient's chronic conditions and co-morbidity symptoms are monitored and maintained or improved  Outcome: Progressing  Flowsheets (Taken 2/28/2023 2000)  Care Plan - Patient's Chronic Conditions and Co-Morbidity Symptoms are Monitored and Maintained or Improved: Monitor and assess patient's chronic conditions and comorbid symptoms for stability, deterioration, or improvement     Problem: Respiratory - Adult  Goal: Achieves optimal ventilation and oxygenation  Outcome: Progressing  Flowsheets (Taken 2/28/2023 2000)  Achieves optimal ventilation and oxygenation: Assess for changes in respiratory status     Problem: Pain  Goal: Verbalizes/displays adequate comfort level or baseline comfort level  Outcome: Progressing  Flowsheets (Taken 3/1/2023 0000)  Verbalizes/displays adequate comfort level or baseline comfort level: Encourage patient to monitor pain and request assistance     Problem: Neurosensory - Adult  Goal: Achieves stable or improved neurological status  Outcome: Progressing  Flowsheets (Taken 2/28/2023 2000)  Achieves stable or improved neurological status: Assess for and report changes in neurological status  Goal: Absence of seizures  Outcome: Progressing  Flowsheets (Taken 2/28/2023 2000)  Absence of seizures: Monitor for seizure activity.  If seizure occurs, document type and location of movements and any associated apnea  Goal: Remains free of injury related to seizures activity  Outcome: Progressing  Flowsheets (Taken 2/28/2023 2000)  Remains free of injury related to seizure activity: Maintain airway, patient safety  and administer oxygen as ordered  Goal: Achieves maximal functionality and self care  Outcome: Progressing  Flowsheets (Taken 2/28/2023 2000)  Achieves maximal functionality and self care: Monitor swallowing and airway patency with patient fatigue and changes in neurological status     Problem: Cardiovascular - Adult  Goal: Maintains optimal cardiac output and hemodynamic stability  Outcome: Progressing  Flowsheets (Taken 2/28/2023 2000)  Maintains optimal cardiac output and hemodynamic stability: Monitor blood pressure and heart rate  Goal: Absence of cardiac dysrhythmias or at baseline  Outcome: Progressing  Flowsheets (Taken 2/28/2023 2000)  Absence of cardiac dysrhythmias or at baseline: Monitor cardiac rate and rhythm     Problem: Skin/Tissue Integrity - Adult  Goal: Skin integrity remains intact  Outcome: Progressing  Flowsheets  Taken 2/28/2023 3809  Skin Integrity Remains Intact: Monitor for areas of redness and/or skin breakdown  Taken 2/28/2023 2000  Skin Integrity Remains Intact: Monitor for areas of redness and/or skin breakdown  Goal: Incisions, wounds, or drain sites healing without S/S of infection  Outcome: Progressing  Flowsheets  Taken 2/28/2023 2259  Incisions, Wounds, or Drain Sites Healing Without Sign and Symptoms of  Infection: ADMISSION and DAILY: Assess and document risk factors for pressure ulcer development  Taken 2/28/2023 2000  Incisions, Wounds, or Drain Sites Healing Without Sign and Symptoms of Infection: ADMISSION and DAILY: Assess and document risk factors for pressure ulcer development  Goal: Oral mucous membranes remain intact  Outcome: Progressing  Flowsheets  Taken 2/28/2023 2258  Oral Mucous Membranes Remain Intact: Assess oral mucosa and hygiene practices  Taken 2/28/2023 2000  Oral Mucous Membranes Remain Intact: Assess oral mucosa and hygiene practices     Problem: Musculoskeletal - Adult  Goal: Return mobility to safest level of function  Outcome: Progressing  Flowsheets (Taken 2/28/2023 2000)  Return Mobility to Safest Level of Function: Assess patient stability and activity tolerance for standing, transferring and ambulating with or without assistive devices  Goal: Maintain proper alignment of affected body part  Outcome: Progressing  Flowsheets (Taken 2/28/2023 2000)  Maintain proper alignment of affected body part: Support and protect limb and body alignment per provider's orders  Goal: Return ADL status to a safe level of function  Outcome: Progressing  Flowsheets (Taken 2/28/2023 2000)  Return ADL Status to a Safe Level of Function: Administer medication as ordered     Problem: Gastrointestinal - Adult  Goal: Minimal or absence of nausea and vomiting  Outcome: Progressing  Flowsheets (Taken 2/28/2023 2000)  Minimal or absence of nausea and vomiting: Administer IV fluids as ordered to ensure adequate hydration  Goal: Maintains or returns to baseline bowel function  Outcome: Progressing  Flowsheets (Taken 2/28/2023 2000)  Maintains or returns to baseline bowel function: Assess bowel function  Goal: Maintains adequate nutritional intake  Outcome: Progressing  Flowsheets (Taken 2/28/2023 2000)  Maintains adequate nutritional intake: Monitor percentage of each meal consumed  Goal: Establish and maintain optimal ostomy function  Outcome: Progressing  Flowsheets (Taken 2/28/2023 2000)  Establish and maintain optimal ostomy function: Monitor output from ostomies     Problem: Genitourinary - Adult  Goal: Absence of urinary retention  Outcome: Progressing  Goal: Urinary catheter remains patent  Outcome: Progressing     Problem: Infection - Adult  Goal: Absence of infection at discharge  Outcome: Progressing  Flowsheets (Taken 2/28/2023 2000)  Absence of infection at discharge: Assess and monitor for signs and symptoms of infection  Goal: Absence of infection during hospitalization  Outcome: Progressing  Flowsheets (Taken 2/28/2023 2000)  Absence of infection during hospitalization: Assess and monitor for signs and symptoms of infection  Goal: Absence of fever/infection during anticipated neutropenic period  Outcome: Progressing  Flowsheets (Taken 2/28/2023 2000)  Absence of fever/infection during anticipated neutropenic period: Monitor white blood cell count     Problem: Metabolic/Fluid and Electrolytes - Adult  Goal: Electrolytes maintained within normal limits  Outcome: Progressing  Flowsheets (Taken 2/28/2023 2000)  Electrolytes maintained within normal limits: Monitor labs and assess patient for signs and symptoms of electrolyte imbalances  Goal: Hemodynamic stability and optimal renal function maintained  Outcome: Progressing  Flowsheets (Taken 2/28/2023 2000)  Hemodynamic stability and optimal renal function maintained: Monitor labs and assess for signs and symptoms of volume excess or deficit  Goal: Glucose maintained within prescribed range  Outcome: Progressing  Flowsheets (Taken 2/28/2023 2000)  Glucose maintained within prescribed range: Monitor blood glucose as ordered     Problem: Hematologic - Adult  Goal: Maintains hematologic stability  Outcome: Progressing  Flowsheets (Taken 2/28/2023 2000)  Maintains hematologic stability: Assess for signs and symptoms of bleeding or hemorrhage     Problem: Nutrition Deficit:  Goal: Optimize nutritional status  Outcome: Progressing     Problem: Coping  Goal: Pt/Family able to verbalize concerns and demonstrate effective coping strategies  Description: INTERVENTIONS:  1. Assist patient/family to identify coping skills, available support systems and cultural and spiritual values  2. Provide emotional support, including active listening and acknowledgement of concerns of patient and caregivers  3. Reduce environmental stimuli, as able  4. Instruct patient/family in relaxation techniques, as appropriate  5. Assess for spiritual pain/suffering and initiate Spiritual Care, Psychosocial Clinical Specialist consults as needed  Outcome: Progressing  Flowsheets (Taken 2/28/2023 2000)  Patient/family able to verbalize anxieties, fears, and concerns, and demonstrate effective coping: Assist patient/family to identify coping skills, available support systems and cultural and spiritual values     Problem: Confusion  Goal: Confusion, delirium, dementia, or psychosis is improved or at baseline  Description: INTERVENTIONS:  1. Assess for possible contributors to thought disturbance, including medications, impaired vision or hearing, underlying metabolic abnormalities, dehydration, psychiatric diagnoses, and notify attending LIP  2. [place] high risk fall precautions, as indicated  3. Provide frequent short contacts to provide reality reorientation, refocusing and direction  4. Decrease environmental stimuli, including noise as appropriate  5. Monitor and intervene to maintain adequate nutrition, hydration, elimination, sleep and activity  6. If unable to ensure safety without constant attention obtain sitter and review sitter guidelines with assigned personnel  7.  Initiate Psychosocial CNS and Spiritual Care consult, as indicated  Outcome: Progressing  Flowsheets (Taken 2/28/2023 2000)  Effect of thought disturbance (confusion, delirium, dementia, or psychosis) are managed with adequate functional status: Assess for contributors to thought disturbance, including medications, impaired vision or hearing, underlying metabolic abnormalities, dehydration, psychiatric diagnoses, notify LIP

## 2023-03-01 NOTE — PLAN OF CARE
Problem: Discharge Planning  Goal: Discharge to home or other facility with appropriate resources  3/1/2023 1126 by Robyn Chaudhry RN  Outcome: Progressing  Flowsheets (Taken 3/1/2023 0800)  Discharge to home or other facility with appropriate resources: Identify barriers to discharge with patient and caregiver  3/1/2023 0042 by Rachid Tang RN  Outcome: Progressing  Flowsheets (Taken 2/28/2023 2000)  Discharge to home or other facility with appropriate resources: Identify barriers to discharge with patient and caregiver     Problem: Safety - Adult  Goal: Free from fall injury  3/1/2023 1126 by Robyn Chaudhry RN  Outcome: Progressing  3/1/2023 0042 by Rachid Tang RN  Outcome: Progressing     Problem: ABCDS Injury Assessment  Goal: Absence of physical injury  3/1/2023 1126 by Robyn Chaudhry RN  Outcome: Progressing  3/1/2023 0042 by Rachid Tang RN  Outcome: Progressing  Flowsheets (Taken 2/28/2023 2259)  Absence of Physical Injury: Implement safety measures based on patient assessment     Problem: Skin/Tissue Integrity  Goal: Absence of new skin breakdown  Description: 1. Monitor for areas of redness and/or skin breakdown  2. Assess vascular access sites hourly  3. Every 4-6 hours minimum:  Change oxygen saturation probe site  4. Every 4-6 hours:  If on nasal continuous positive airway pressure, respiratory therapy assess nares and determine need for appliance change or resting period.   3/1/2023 1126 by Robyn Chaudhry RN  Outcome: Progressing  3/1/2023 0042 by Rachid Tang RN  Outcome: Progressing     Problem: Chronic Conditions and Co-morbidities  Goal: Patient's chronic conditions and co-morbidity symptoms are monitored and maintained or improved  3/1/2023 1126 by Robyn Chaudhry RN  Outcome: Progressing  Flowsheets (Taken 3/1/2023 0800)  Care Plan - Patient's Chronic Conditions and Co-Morbidity Symptoms are Monitored and Maintained or Improved: Monitor and assess patient's chronic conditions and comorbid symptoms for stability, deterioration, or improvement  3/1/2023 0042 by Deb Hernandez RN  Outcome: Progressing  Flowsheets (Taken 2/28/2023 2000)  Care Plan - Patient's Chronic Conditions and Co-Morbidity Symptoms are Monitored and Maintained or Improved: Monitor and assess patient's chronic conditions and comorbid symptoms for stability, deterioration, or improvement     Problem: Respiratory - Adult  Goal: Achieves optimal ventilation and oxygenation  3/1/2023 1126 by Diana Guzman RN  Outcome: Progressing  Flowsheets (Taken 3/1/2023 0800)  Achieves optimal ventilation and oxygenation: Assess for changes in respiratory status  3/1/2023 0042 by Deb Hernandez RN  Outcome: Progressing  Flowsheets (Taken 2/28/2023 2000)  Achieves optimal ventilation and oxygenation: Assess for changes in respiratory status     Problem: Pain  Goal: Verbalizes/displays adequate comfort level or baseline comfort level  3/1/2023 1126 by Diana Guzman RN  Outcome: Progressing  Flowsheets (Taken 3/1/2023 0400 by Deb Hernandez RN)  Verbalizes/displays adequate comfort level or baseline comfort level: Encourage patient to monitor pain and request assistance  3/1/2023 0042 by Deb Hernandez RN  Outcome: Progressing  Flowsheets (Taken 3/1/2023 0000)  Verbalizes/displays adequate comfort level or baseline comfort level: Encourage patient to monitor pain and request assistance     Problem: Neurosensory - Adult  Goal: Achieves stable or improved neurological status  3/1/2023 1126 by Diana Guzman RN  Outcome: Progressing  Flowsheets (Taken 3/1/2023 0800)  Achieves stable or improved neurological status: Assess for and report changes in neurological status  3/1/2023 0042 by Deb Hernandez RN  Outcome: Progressing  Flowsheets (Taken 2/28/2023 2000)  Achieves stable or improved neurological status: Assess for and report changes in neurological status  Goal: Absence of seizures  3/1/2023 1126 by Guillermo Barbour TORRIE Gonzalez  Outcome: Progressing  Flowsheets (Taken 3/1/2023 0800)  Absence of seizures: Monitor for seizure activity. If seizure occurs, document type and location of movements and any associated apnea  3/1/2023 0042 by Deb Hernandez RN  Outcome: Progressing  Flowsheets (Taken 2/28/2023 2000)  Absence of seizures: Monitor for seizure activity.   If seizure occurs, document type and location of movements and any associated apnea  Goal: Remains free of injury related to seizures activity  3/1/2023 1126 by Diana Guzman RN  Outcome: Progressing  Flowsheets (Taken 3/1/2023 0800)  Remains free of injury related to seizure activity: Maintain airway, patient safety  and administer oxygen as ordered  3/1/2023 0042 by Deb Hernandez RN  Outcome: Progressing  Flowsheets (Taken 2/28/2023 2000)  Remains free of injury related to seizure activity: Maintain airway, patient safety  and administer oxygen as ordered  Goal: Achieves maximal functionality and self care  3/1/2023 1126 by Diana Guzman RN  Outcome: Progressing  Flowsheets (Taken 3/1/2023 0800)  Achieves maximal functionality and self care: Monitor swallowing and airway patency with patient fatigue and changes in neurological status  3/1/2023 0042 by Deb Hernandez RN  Outcome: Progressing  Flowsheets (Taken 2/28/2023 2000)  Achieves maximal functionality and self care: Monitor swallowing and airway patency with patient fatigue and changes in neurological status     Problem: Cardiovascular - Adult  Goal: Maintains optimal cardiac output and hemodynamic stability  3/1/2023 1126 by Diana Guzman RN  Outcome: Progressing  Flowsheets (Taken 3/1/2023 0800)  Maintains optimal cardiac output and hemodynamic stability: Monitor blood pressure and heart rate  3/1/2023 0042 by Deb Hernandez RN  Outcome: Progressing  Flowsheets (Taken 2/28/2023 2000)  Maintains optimal cardiac output and hemodynamic stability: Monitor blood pressure and heart rate  Goal: Absence of cardiac dysrhythmias or at baseline  3/1/2023 1126 by Amanda Dickson RN  Outcome: Progressing  Flowsheets (Taken 3/1/2023 0800)  Absence of cardiac dysrhythmias or at baseline: Monitor cardiac rate and rhythm  3/1/2023 0042 by Tony Lanier RN  Outcome: Progressing  Flowsheets (Taken 2/28/2023 2000)  Absence of cardiac dysrhythmias or at baseline: Monitor cardiac rate and rhythm     Problem: Skin/Tissue Integrity - Adult  Goal: Skin integrity remains intact  3/1/2023 1126 by Amanda Dickson RN  Outcome: Progressing  Flowsheets (Taken 3/1/2023 0800)  Skin Integrity Remains Intact: Monitor for areas of redness and/or skin breakdown  3/1/2023 0042 by Tony Lanier RN  Outcome: Progressing  Flowsheets  Taken 2/28/2023 2259  Skin Integrity Remains Intact: Monitor for areas of redness and/or skin breakdown  Taken 2/28/2023 2000  Skin Integrity Remains Intact: Monitor for areas of redness and/or skin breakdown  Goal: Incisions, wounds, or drain sites healing without S/S of infection  3/1/2023 1126 by Amanda Dickson RN  Outcome: Progressing  Flowsheets (Taken 3/1/2023 0800)  Incisions, Wounds, or Drain Sites Healing Without Sign and Symptoms of Infection: ADMISSION and DAILY: Assess and document risk factors for pressure ulcer development  3/1/2023 0042 by Tony Lanier RN  Outcome: Progressing  Flowsheets  Taken 2/28/2023 2259  Incisions, Wounds, or Drain Sites Healing Without Sign and Symptoms of Infection: ADMISSION and DAILY: Assess and document risk factors for pressure ulcer development  Taken 2/28/2023 2000  Incisions, Wounds, or Drain Sites Healing Without Sign and Symptoms of Infection: ADMISSION and DAILY: Assess and document risk factors for pressure ulcer development  Goal: Oral mucous membranes remain intact  3/1/2023 1126 by Amanda Dickson RN  Outcome: Progressing  Flowsheets (Taken 3/1/2023 0800)  Oral Mucous Membranes Remain Intact: Assess oral mucosa and hygiene practices  3/1/2023 0042 by Valentino Points, RN  Outcome: Progressing  Flowsheets  Taken 2/28/2023 2259  Oral Mucous Membranes Remain Intact: Assess oral mucosa and hygiene practices  Taken 2/28/2023 2000  Oral Mucous Membranes Remain Intact: Assess oral mucosa and hygiene practices     Problem: Musculoskeletal - Adult  Goal: Return mobility to safest level of function  3/1/2023 1126 by Jackolyn Leventhal, RN  Outcome: Progressing  3/1/2023 0042 by Valentino Points, RN  Outcome: Progressing  Flowsheets (Taken 2/28/2023 2000)  Return Mobility to Safest Level of Function: Assess patient stability and activity tolerance for standing, transferring and ambulating with or without assistive devices  Goal: Maintain proper alignment of affected body part  3/1/2023 1126 by Jackolyn Leventhal, RN  Outcome: Progressing  3/1/2023 0042 by Valentino Points, RN  Outcome: Progressing  Flowsheets (Taken 2/28/2023 2000)  Maintain proper alignment of affected body part: Support and protect limb and body alignment per provider's orders  Goal: Return ADL status to a safe level of function  3/1/2023 1126 by Jackolyn Leventhal, RN  Outcome: Progressing  3/1/2023 0042 by Valentino Points, RN  Outcome: Progressing  Flowsheets (Taken 2/28/2023 2000)  Return ADL Status to a Safe Level of Function: Administer medication as ordered     Problem: Gastrointestinal - Adult  Goal: Minimal or absence of nausea and vomiting  3/1/2023 1126 by Jackolyn Leventhal, RN  Outcome: Progressing  Flowsheets (Taken 3/1/2023 0800)  Minimal or absence of nausea and vomiting: Administer IV fluids as ordered to ensure adequate hydration  3/1/2023 0042 by Valentino Points, RN  Outcome: Progressing  Flowsheets (Taken 2/28/2023 2000)  Minimal or absence of nausea and vomiting: Administer IV fluids as ordered to ensure adequate hydration  Goal: Maintains or returns to baseline bowel function  3/1/2023 1126 by Jackolyn Leventhal, RN  Outcome: Progressing  Flowsheets (Taken 3/1/2023 0800)  Maintains or returns to baseline bowel function: Assess bowel function  3/1/2023 0042 by Gordo Toure RN  Outcome: Progressing  Flowsheets (Taken 2/28/2023 2000)  Maintains or returns to baseline bowel function: Assess bowel function  Goal: Maintains adequate nutritional intake  3/1/2023 1126 by Ifeoma Hurst RN  Outcome: Progressing  Flowsheets (Taken 3/1/2023 0800)  Maintains adequate nutritional intake: Monitor percentage of each meal consumed  3/1/2023 0042 by Gordo Toure RN  Outcome: Progressing  Flowsheets (Taken 2/28/2023 2000)  Maintains adequate nutritional intake: Monitor percentage of each meal consumed  Goal: Establish and maintain optimal ostomy function  3/1/2023 1126 by Ifeoma Hurst RN  Outcome: Progressing  Flowsheets (Taken 3/1/2023 0800)  Establish and maintain optimal ostomy function: Monitor output from ostomies  3/1/2023 0042 by Gordo Toure RN  Outcome: Progressing  Flowsheets (Taken 2/28/2023 2000)  Establish and maintain optimal ostomy function: Monitor output from ostomies     Problem: Genitourinary - Adult  Goal: Absence of urinary retention  3/1/2023 1126 by Ifeoma Hurst RN  Outcome: Progressing  Flowsheets (Taken 3/1/2023 0800)  Absence of urinary retention: Assess patients ability to void and empty bladder  3/1/2023 0042 by Gordo Toure RN  Outcome: Progressing  Goal: Urinary catheter remains patent  3/1/2023 1126 by Ifeoma Hurst RN  Outcome: Progressing  Flowsheets (Taken 3/1/2023 0800)  Urinary catheter remains patent: Assess patency of urinary catheter  3/1/2023 0042 by Gordo Toure RN  Outcome: Progressing     Problem: Infection - Adult  Goal: Absence of infection at discharge  3/1/2023 1126 by Ifeoma Hurst RN  Outcome: Progressing  Flowsheets (Taken 3/1/2023 0800)  Absence of infection at discharge: Assess and monitor for signs and symptoms of infection  3/1/2023 0042 by Gordo Toure RN  Outcome: Progressing  Flowsheets (Taken 2/28/2023 2000)  Absence of infection at discharge: Assess and monitor for signs and symptoms of infection  Goal: Absence of infection during hospitalization  3/1/2023 1126 by Heloise Alpers, RN  Outcome: Progressing  Flowsheets (Taken 3/1/2023 0800)  Absence of infection during hospitalization: Assess and monitor for signs and symptoms of infection  3/1/2023 0042 by Bello Hernandez RN  Outcome: Progressing  Flowsheets (Taken 2/28/2023 2000)  Absence of infection during hospitalization: Assess and monitor for signs and symptoms of infection  Goal: Absence of fever/infection during anticipated neutropenic period  3/1/2023 1126 by Heloise Alpers, RN  Outcome: Progressing  Flowsheets (Taken 3/1/2023 0800)  Absence of fever/infection during anticipated neutropenic period: Monitor white blood cell count  3/1/2023 0042 by Bello Hernandez RN  Outcome: Progressing  Flowsheets (Taken 2/28/2023 2000)  Absence of fever/infection during anticipated neutropenic period: Monitor white blood cell count     Problem: Metabolic/Fluid and Electrolytes - Adult  Goal: Electrolytes maintained within normal limits  3/1/2023 1126 by Heloise Alpers, RN  Outcome: Progressing  Flowsheets (Taken 3/1/2023 0800)  Electrolytes maintained within normal limits: Monitor labs and assess patient for signs and symptoms of electrolyte imbalances  3/1/2023 0042 by Bello Hernandez RN  Outcome: Progressing  Flowsheets (Taken 2/28/2023 2000)  Electrolytes maintained within normal limits: Monitor labs and assess patient for signs and symptoms of electrolyte imbalances  Goal: Hemodynamic stability and optimal renal function maintained  3/1/2023 1126 by Heloise Alpers, RN  Outcome: Progressing  Flowsheets (Taken 3/1/2023 0800)  Hemodynamic stability and optimal renal function maintained: Monitor labs and assess for signs and symptoms of volume excess or deficit  3/1/2023 0042 by Bello Hernandez RN  Outcome: Progressing  Flowsheets (Taken 2/28/2023 2000)  Hemodynamic stability and optimal renal function maintained: Monitor labs and assess for signs and symptoms of volume excess or deficit  Goal: Glucose maintained within prescribed range  3/1/2023 1126 by Milton Lisa RN  Outcome: Progressing  Flowsheets (Taken 3/1/2023 0800)  Glucose maintained within prescribed range: Monitor blood glucose as ordered  3/1/2023 0042 by Shauna Wang RN  Outcome: Progressing  Flowsheets (Taken 2/28/2023 2000)  Glucose maintained within prescribed range: Monitor blood glucose as ordered     Problem: Hematologic - Adult  Goal: Maintains hematologic stability  3/1/2023 1126 by Milton Lisa RN  Outcome: Progressing  Flowsheets (Taken 3/1/2023 0800)  Maintains hematologic stability: Assess for signs and symptoms of bleeding or hemorrhage  3/1/2023 0042 by Shauna Wang RN  Outcome: Progressing  Flowsheets (Taken 2/28/2023 2000)  Maintains hematologic stability: Assess for signs and symptoms of bleeding or hemorrhage     Problem: Nutrition Deficit:  Goal: Optimize nutritional status  3/1/2023 1126 by Milton Lisa RN  Outcome: Progressing  3/1/2023 0042 by Shauna Wang RN  Outcome: Progressing     Problem: Coping  Goal: Pt/Family able to verbalize concerns and demonstrate effective coping strategies  Description: INTERVENTIONS:  1. Assist patient/family to identify coping skills, available support systems and cultural and spiritual values  2. Provide emotional support, including active listening and acknowledgement of concerns of patient and caregivers  3. Reduce environmental stimuli, as able  4. Instruct patient/family in relaxation techniques, as appropriate  5.  Assess for spiritual pain/suffering and initiate Spiritual Care, Psychosocial Clinical Specialist consults as needed  3/1/2023 1126 by Milton Lisa RN  Outcome: Progressing  Flowsheets (Taken 3/1/2023 0800)  Patient/family able to verbalize anxieties, fears, and concerns, and demonstrate effective coping: Assist patient/family to identify coping skills, available support systems and cultural and spiritual values  3/1/2023 0042 by Jelena Rivera RN  Outcome: Progressing  Flowsheets (Taken 2/28/2023 2000)  Patient/family able to verbalize anxieties, fears, and concerns, and demonstrate effective coping: Assist patient/family to identify coping skills, available support systems and cultural and spiritual values     Problem: Confusion  Goal: Confusion, delirium, dementia, or psychosis is improved or at baseline  Description: INTERVENTIONS:  1. Assess for possible contributors to thought disturbance, including medications, impaired vision or hearing, underlying metabolic abnormalities, dehydration, psychiatric diagnoses, and notify attending LIP  2. [place] high risk fall precautions, as indicated  3. Provide frequent short contacts to provide reality reorientation, refocusing and direction  4. Decrease environmental stimuli, including noise as appropriate  5. Monitor and intervene to maintain adequate nutrition, hydration, elimination, sleep and activity  6. If unable to ensure safety without constant attention obtain sitter and review sitter guidelines with assigned personnel  7.  Initiate Psychosocial CNS and Spiritual Care consult, as indicated  3/1/2023 1126 by Roberto Granados RN  Outcome: Progressing  Flowsheets (Taken 3/1/2023 0800)  Effect of thought disturbance (confusion, delirium, dementia, or psychosis) are managed with adequate functional status: Assess for contributors to thought disturbance, including medications, impaired vision or hearing, underlying metabolic abnormalities, dehydration, psychiatric diagnoses, notify LIP  3/1/2023 0042 by Jelena Rivera RN  Outcome: Progressing  Flowsheets (Taken 2/28/2023 2000)  Effect of thought disturbance (confusion, delirium, dementia, or psychosis) are managed with adequate functional status: Assess for contributors to thought disturbance, including medications, impaired vision or hearing, underlying metabolic abnormalities, dehydration, psychiatric diagnoses, notify LIP

## 2023-03-01 NOTE — PROGRESS NOTES
[facility]  Office: [address], DO, Kamilah Bazan, DO, Kaia Gay, DO, Kathy Hira Lamar, DO, Meño Montejo MD, Shaylee Medrano MD, Rambo Adams MD, Skylar Gill MD,  Lul Sutherland MD, Henrik Frey MD, Fadia Topete, DO, Deborah Prince MD,  Jf Hawkins MD, Selwyn Morel MD, Asia Tao DO, Janes Steen MD, Nayeli Mancera MD, Sheldon Medrano DO, Kevan Aj MD, Elio Lind MD, Kasey Venegas MD, Charlene Lea MD, Mariana Rivas DO, Daniel Woo MD, Jeff Santiago MD, Elena Renae, Jim Loco, CNP, Luis Sandoval, CNP, Patricio Alcala, CNP,  Regine Kemp, [facility], Zach Henderson, CNP, Gia Hsu, CNP, Dylan Tracy, CNP, Jolynn Lux, CNP, Laura Lazcano, CNP, Itzel Castro PA-C, Mia Rivas, CNS, Hanna Wagner, CNP, Deepali Bains, [address] [place] [address]    Progress Note    3/1/2023    8:19 AM    Name:   Edgar Beverly  MRN:     [id number]     Jatinder [place]:      [de-identified]   Room:   [address] [place] [address] Day:  16  Admit Date:  2/12/2023 10:33 PM    PCP:   Kathy Mireles MD  Code Status:  Full Code    Subjective:     C/C: SOB    Interval History Status: improved. Patient is s/p chest tube removal.  His wife and daughter are in the room. He seems less confused today. He states that he stood up and walked a little with PT/OT. His appetite is much better, BS are elevated 200-300s. Cr 3.0, improving. Brief History:     Per my partner:  Dick Davidson is 79 y.o. male  Who was admitted to the hospital on 2/12/2023 for treatment of Pneumonia, unspecified organism. Patient presented to ER at [facility] on 2/12/2023 with dyspnea and was found to have left multiloculated pneumonia with large pleural effusion with mediastinal and bilateral axillary lymphadenopathy. Patient has prior history of non-Hodgkin's lymphoma, multiple myeloma.   Patient was requiring high flow nasal cannula and had thoracentesis. Pleural fluid was consistent with empyema and patient had chest tube placement by IR. He was treated with broad-spectrum antibiotics. Patient was also given dornase without much improvement. CT surgery was consulted and recommended increasing the size of chest tube and did not recommend any open surgical intervention at this time. Patient had exchange of chest tube on 2/18/2023. He had diffuse ST elevation secondary to pericarditis on 1/21/23. Patient was was started on colchicine. Patient had new onset atrial fibrillation with RVR on 10/22/23 and was given digoxin and amiodarone infusion . Patient also given colchicine and high-dose aspirin for pericarditis which was later changed to Solu-Medrol. Patient underwent VATS with decortication on 2/23/2023. He was extubated postoperatively on 2/23/2023. Patient underwent AFIA cardioversion on 2/27/2023 for rate uncontrolled atrial fibrillation. \"    Review of Systems:     Constitutional:  negative for chills, fevers, sweats  Respiratory:  Positive for cough, no dyspnea on exertion, shortness of breath, wheezing  Cardiovascular:  negative for chest pain, chest pressure/discomfort, lower extremity edema, palpitations  Gastrointestinal:  negative for abdominal pain, + constipation, no diarrhea, nausea, vomiting  Neurological:  negative for dizziness, headache    Medications: Allergies: Allergies   Allergen Reactions    Latex Rash    Gabapentin      Other reaction(s): Vomiting    Meperidine Anaphylaxis    Erythromycin      Other reaction(s): Fever  Had all side effects associated with this medication      Glimepiride      Other reaction(s): Dizziness    Lidocaine Swelling     States as a child having reaction to lidocaine where his face swelled.    Ever since then he has never had lidocaine    Hydrocodone      Other reaction(s): Vomiting    Macrolides And Ketolides Other (See Comments)    Niacin And Related     Trelegy Ellipta [Fluticasone-Umeclidin-Vilant]     Pregabalin Diarrhea    Xanax [Alprazolam] Anxiety     Anxiety, restlessness, insomnia       Current Meds:   Scheduled Meds:    metoprolol tartrate  50 mg Oral BID    furosemide  40 mg IntraVENous Daily    senna  2 tablet Oral Nightly    amiodarone  200 mg Oral BID    sodium chloride flush  5-40 mL IntraVENous 2 times per day    insulin glargine  20 Units SubCUTAneous Daily    heparin (porcine)  5,000 Units SubCUTAneous 3 times per day    insulin glargine  10 Units SubCUTAneous Nightly    insulin lispro  0-16 Units SubCUTAneous 4x Daily AC & HS    midodrine  10 mg Oral TID WC    methylPREDNISolone  30 mg IntraVENous Q12H    docusate sodium  100 mg Oral TID    cefTRIAXone (ROCEPHIN) IV  1,000 mg IntraVENous Q24H    pantoprazole  40 mg Oral QAM AC    colchicine  0.3 mg Oral Daily    sodium chloride flush  5-40 mL IntraVENous 2 times per day    mupirocin   Nasal BID    chlorhexidine   Topical See Admin Instructions    sodium chloride flush  5-40 mL IntraVENous 2 times per day    polyethylene glycol  17 g Oral Daily    sodium chloride flush  5-40 mL IntraVENous 2 times per day     Continuous Infusions:    sodium chloride      sodium chloride      sodium chloride      sodium chloride      dextrose       PRN Meds: bisacodyl, sodium chloride flush, sodium chloride, oxyCODONE-acetaminophen **OR** [DISCONTINUED] oxyCODONE-acetaminophen, metoprolol, sodium chloride flush, sodium chloride, sodium chloride flush, sodium chloride, sodium chloride flush, sodium chloride, glucose, dextrose bolus **OR** dextrose bolus, glucagon (rDNA), dextrose    Data:     Past Medical History:   has a past medical history of Acute interstitial nephritis, Aortic regurgitation, ARF (acute renal failure) ([facility] [address] Chronic kidney disease, Hyperlipidemia, Hypertension, Non-Hodgkin lymphoma ([facility] [address] [facility] Research study patient, Sarcoidosis, and Type II or unspecified type diabetes mellitus without mention of complication, not stated as uncontrolled. Social History:   reports that he has never smoked. He has quit using smokeless tobacco. He reports current alcohol use. He reports that he does not use drugs. Family History:   Family History   Problem Relation Age of Onset    High Blood Pressure Father     Other Father         AAA    Heart Disease Other         uncle x 2        Vitals:  /89   Pulse 74   Temp 97.8 °F (36.6 °C) (Oral)   Resp 19   Ht 6' 1\" (1.854 m)   Wt 230 lb 9.6 oz (104.6 kg)   SpO2 91%   BMI 30.42 kg/m²   Temp (24hrs), Av.2 °F (36.8 °C), Min:97.8 °F (36.6 °C), Max:98.6 °F (37 °C)    Recent Labs     23  1117 23  1313 23  1747 23   POCGLU 299* 200* 148* 212*       I/O (24Hr):     Intake/Output Summary (Last 24 hours) at 3/1/2023 0819  Last data filed at 3/1/2023 8571  Gross per 24 hour   Intake 939.64 ml   Output 1910 ml   Net -970.36 ml       Labs:  Hematology:  Recent Labs     23   WBC 16.2* 16.3*   RBC 3.23* 2.96*   HGB 9.0* 8.3*   HCT 29.8* 27.0*   MCV 92.3 91.2   MCH 27.9 28.0   MCHC 30.2 30.7   RDW 16.1* 16.4*    435   MPV 9.4 9.6   .8*  --      Chemistry:  Recent Labs     23  0327 23  0413 23  1707   * 137 134*   K 5.0 5.0 4.5   CL 98 102 100   CO2 21 22 22   GLUCOSE 328* 287* 177*   BUN 63* 63* 64*   CREATININE 4.27* 4.14* 3.79*   ANIONGAP 13 13 12   LABGLOM 15* 15* 17*   CALCIUM 9.1 8.9 9.1     Recent Labs     23  0529 23  0732 23  1117 23  1313 23  1747 23   POCGLU 256* 269* 299* 200* 148* 212*     ABG:  Lab Results   Component Value Date/Time    POCPH 7.356 2023 12:43 PM    POCPCO2 38.8 2023 12:43 PM    POCPO2 66.2 2023 12:43 PM    POCHCO3 21.7 2023 12:43 PM    NBEA 4 2023 12:43 PM    PBEA 2 2023 05:34 AM    GOJF9RSU 92 2023 12:43 PM    FIO2 50.0 2023 03:06 AM     Lab Results   Component Value Date/Time    SPECIAL LEFT PLEURAL PEEL 02/23/2023 12:00 PM    SPECIAL  LEFT PLEURAL PEEL 02/23/2023 12:00 PM    SPECIAL LEFT PLEURAL PEEL 02/23/2023 12:00 PM    SPECIAL LEFT PLURAL PEEL 02/23/2023 12:00 PM    SPECIAL LEFT PLEURAL PEEL 02/23/2023 12:00 PM     Lab Results   Component Value Date/Time    CULTURE NO GROWTH 4 DAYS 02/23/2023 12:00 PM    CULTURE NO GROWTH 5 DAYS 02/23/2023 12:00 PM    CULTURE NO GROWTH 3 DAYS 02/23/2023 12:00 PM    CULTURE  02/23/2023 12:00 PM     NEGATIVE. No Herpes Simplex Virus detected by Enzyme Linked Virus Inducible System culture. at day 2    CULTURE  02/23/2023 12:00 PM     NEGATIVE for Influenza A and B, Para influenza 1,2,3, Adenovirus and RSV. at day 2    CULTURE  02/23/2023 12:00 PM     Negative results do not preclude respiratory virus infection and should not be used as the sole basis for diagnosis, treatment or other management decisions. CULTURE NEGATIVE for Cytomegalovirus at day 3 02/23/2023 12:00 PM    CULTURE NEGATIVE for Enterovirus at day 5 02/23/2023 12:00 PM       Radiology:  XR CHEST PORTABLE    Result Date: 2/27/2023  Left chest tubes present with partially loculated left pleural effusion and left basilar opacity unchanged. XR CHEST PORTABLE    Result Date: 2/26/2023  No significant change from prior exam. Left chest tubes with left basilar airspace disease and small amount of pleural fluid. Linear left retrocardiac lucency could represent small pneumothorax. XR CHEST PORTABLE    Result Date: 2/25/2023  No significant change from prior exam.     XR CHEST PORTABLE    Result Date: 2/23/2023  Status post removal single small bore and placement 2 large-bore chest tubes left lung with reduced opacity, presumably multiloculated left pleural effusion and associated atelectasis. No pneumothorax. Slightly greater atelectasis right base.        Physical Examination:        General appearance:  alert, cooperative and no distress  Mental Status:  oriented to person, place but not time,  normal affect  Lungs:  + crackles bibasilar, normal effort  Heart:  regular rate and rhythm, no murmur  Abdomen:  soft, nontender, nondistended, normal bowel sounds, no masses, hepatomegaly, splenomegaly  Extremities:  no edema, redness, tenderness in the calves  Skin:  no gross lesions, rashes, induration    Assessment:        Hospital Problems             Last Modified POA    * (Principal) Pneumonia, unspecified organism 2/12/2023 Yes    Pleural effusion 2/13/2023 Yes    Hyperkalemia 2/13/2023 Yes    Acute respiratory failure with hypoxia ([address] 2/13/2023 Yes    Hyponatremia 2/13/2023 Yes    Hyperglycemia 2/13/2023 Yes    History of non-Hodgkin's lymphoma 2/13/2023 Yes    History of sarcoidosis 2/13/2023 Yes    Empyema ([facility] [address] 2/18/2023 Yes    History of atrial fibrillation 2/25/2023 Yes    History of type 2 diabetes mellitus 2/25/2023 Yes    KELSEA (acute kidney injury) ([facility] [address] 2/26/2023 Yes    Overview Addendum 8/24/2022  8:56 AM by Adan Allison MD     kidney biopsy from June 2022  showing findings of acute interstitial nephritis. Exact description not available. Trial of steroids will be given to see if I can improve renal function. Creatinine in October 2020 was 1.6, creatinine in June 2022 was 2.9. Trial of steroids will be given to see if renal function can be improved. After a 12-day course of prednisone creatinine has come down to 2.0, calcium is improved as well. Interstitial nephritis appears to be related to sarcoidosis.             Plan:        Left empyema- s/p decortication 2/23/23, con't IV Rocephin, CTS following and ID  A.fib with RVR- s/p cardioversion yesterday, con't Amiodarone  question of AC for Cardiology  Pericarditis- Colchicine, IV Solumedrol, wife states that he gets confused on steriods  KELSEA on CKD 3- slowly improving, Nephrology stopped IVF, decreased Lasix to daily, moon catheter in for strict I/Os, repeat BMP tomorrow, baseline Cr 1.8-2  CLAY- Cpap  COPD- stable  Acute delirium- seroquel at night, DM2- SSI,  DM2- BS elevated, Lantus started since he's on steriods, increase Lantus and SSI to moderate level  Gi/ DVT proph  PT/OT    Anthony signed, discharge planning for tomorrow?     Yoahna Hilliard MD  3/1/2023  8:19 AM

## 2023-03-01 NOTE — PROGRESS NOTES
PULMONARY & CRITICAL CARE MEDICINE PROGRESS NOTE     Patient:  Demetra Hinojosa  MRN: [id number]  Admit date: 2/12/2023  Primary Care Physician: Kathrin Hernandez MD  Consulting Physician: Hussain Marin MD  CODE Status: Full Code  LOS: 16     SUBJECTIVE     CHIEF COMPLAINT/REASON FOR CONSULT:  Acute Hypoxic respiratory failure    HISTORY OF PRESENT ILLNESS:  Demetra Hinojosa is a 74 y.o. male with past medical history significant for type 2 diabetes mellitus, multiple myeloma, non-Hodgkin lymphoma, sarcoidosis, referred from [facility] ER with worsening respiratory status, shortness of breath, nonproductive cough. He was started on BiPAP and given antibiotics. Chest x-ray was concerning for loculated left-sided pleural effusion. Transfer was made and accepted by hospitalist, further work-up. Critical care was consulted for worsening respiratory status. Patient was on high flow and respiratory rate was in 38-40. Patient was very dyspneic and having difficulty breathing with accessory muscle use. Initially he was unable to keep BiPAP but later on able to keep BiPAP. He improved symptomatically with BiPAP and respiratory rate was in 26. Patient is transferred to the ICU for higher level of care. 2/13: Attempted to perform bedside thoracentesis, no clear pocket identified, sent to IR, chest tube placed, appears that an empyema was encountered and chest tube was placed growing gram positive cocci in pairs, on cefepime and vanco     2/14: Placed chest tube yesterday, weaned off high flow, feeling \"100 x better\".       2/15: Continued TPA/Dornase chest tube flush, Got anxious overnight requiring xanax, 1200 cc of purulent chest tube output in the last 24 hours, patient did not sleep last night     2/16: Added seroquel to help sleep and agitation at night, continued Dornase/TPA, 700 cc of chest tube output that was less purulent, afebrile, WBC downtrending, continue cefepime     2/17: WBC uptrended, sent cultures, tachypneic, restless overnight, given xanax, more tachycardic overnight as well, plan for CT chest today, will discuss possible CTA to evaluate for PE.    02/18: Cardiothoracic surgery consulted and they increased the size of the chest tube. Will likely need decortication. 02/20: Cardiothoracic surgery determined to review imaging and diagnostics with on-call surgeon, plan to repeat CT scan at later date, hold off on plan for decortication. Infectious Disease discontinued IV cefepime 02/20/23.    02/21: Patient more confused, oriented to self. Trying to get out of bed. Denies agitation but feeling overall week. Kidney US did not show hydronephrosis. Infectious disease recommends starting IV Rocephin 1 gm q24 hrs until 03/12/23. [id number] [address] helms for outpatient therapy. Echo performed. Pt declined PT.    02/22: Pt is paranoid and declined PT. Cardiothoracic planning for left-sided VATS decortication on 02/23/23. Breathing stable, 99% O2 saturation on 3L NC 40% FiO2. INTERVAL HISTORY:  3/1/2023  Patient seen in CVICU. He is S/p VATS/decortication on 02/23/2023. Overnight he is hemodynamically stable, Tmax is 98.6 in last 24 hours. He remained on room air and off oxygen and saturating around 95% to 97%. Chest tube removed per CT surgery on 02/28/2023. According to patient he is feeling better he is able to ambulate to bathroom probably with nursing staff. His cough is better denies darker sputum denies hemoptysis his chest pain is better after chest tube removal.  He is off amiodarone drip currently and transition to oral amiodarone and he is in sinus rhythm. He is on Lasix 40 mg IV once daily urine output is 1910 in last 24 hours. Labs shows creatinine is 3.79 BUN is 64 bicarbonate 22 sodium 134. Chest x-ray this morning shows similar finding with pleural parenchymal change on the left side with possible minimal left pleural effusion/basilar atelectasis/infiltrate.   Not much change from chest x-ray on 2023. REVIEW OF SYSTEMS:  Review of Systems   Constitutional:  Positive for activity change. Negative for chills, diaphoresis, fever and unexpected weight change. HENT:  Negative for congestion. Respiratory:  Positive for cough and shortness of breath. Negative for apnea, choking, chest tightness, wheezing and stridor. Cardiovascular: Negative for palpitation, dizziness lightheadedness, positive for mild chest pain (at the site of surgery). Negative for leg swelling or leg pain. Gastrointestinal:  Negative for abdominal distention, diarrhea, nausea and vomiting. Genitourinary:  Negative for difficulty urinating. Neurological:  Negative for dizziness and numbness. Psychiatric/Behavioral:  Negative for agitation. OBJECTIVE     PaO2/FiO2 RATIO:  No results for input(s): POCPO2 in the last 72 hours. FiO2 : 40 %     VITAL SIGNS:   LAST:  /75   Pulse 82   Temp 97.8 °F (36.6 °C) (Oral)   Resp 24   Ht 6' 1\" (1.854 m)   Wt 230 lb 9.6 oz (104.6 kg)   SpO2 95%   BMI 30.42 kg/m²   8-24 HR RANGE:  TEMP Temp  Av.2 °F (36.8 °C)  Min: 97.8 °F (36.6 °C)  Max: 98.6 °F (37 °C)   BP Systolic (52ESU), RADAMES:960 , Min:103 , UOV:009      Diastolic (57FTJ), XQL:06, Min:65, Max:89     PULSE Pulse  Av  Min: 61  Max: 93   RR Resp  Av.3  Min: 18  Max: 24   O2 SAT SpO2  Av.6 %  Min: 91 %  Max: 100 %   OXYGEN DELIVERY No data recorded        SYSTEMIC EXAMINATION:   Constitutional:  Alert, cooperative and no distress. Mental Status:  Oriented to person, place and time and normal affect. Chest: Left-sided dressing is present. No drainage seen  Lungs: Air entry is good on the right side. Right decreased air entry on the left side as compared to right base and minimal crackles present.  t  Heart: regular rhythm, S1-S2 audible, no murmur. Abdomen:  Soft, nontender, nondistended, normal bowel sounds.   Extremities:  No edema, redness, tenderness in the calves. Skin:  Warm, dry, no gross lesions or rashes. DATA REVIEW     Medications: Current Inpatient  Scheduled Meds:   metoprolol tartrate  50 mg Oral BID    furosemide  40 mg IntraVENous Daily    senna  2 tablet Oral Nightly    amiodarone  200 mg Oral BID    sodium chloride flush  5-40 mL IntraVENous 2 times per day    insulin glargine  20 Units SubCUTAneous Daily    heparin (porcine)  5,000 Units SubCUTAneous 3 times per day    insulin glargine  10 Units SubCUTAneous Nightly    insulin lispro  0-16 Units SubCUTAneous 4x Daily AC & HS    midodrine  10 mg Oral TID WC    methylPREDNISolone  30 mg IntraVENous Q12H    docusate sodium  100 mg Oral TID    cefTRIAXone (ROCEPHIN) IV  1,000 mg IntraVENous Q24H    pantoprazole  40 mg Oral QAM AC    colchicine  0.3 mg Oral Daily    sodium chloride flush  5-40 mL IntraVENous 2 times per day    mupirocin   Nasal BID    chlorhexidine   Topical See Admin Instructions    sodium chloride flush  5-40 mL IntraVENous 2 times per day    polyethylene glycol  17 g Oral Daily    sodium chloride flush  5-40 mL IntraVENous 2 times per day     Continuous Infusions:   sodium chloride      sodium chloride      sodium chloride      sodium chloride      dextrose         INPUT/OUTPUT:  In: 939.6 [P.O.:240; I.V.:699.6]  Out: 1910 [Urine:1910]  Date 03/01/23 0000 - 03/01/23 2359   Shift 8150-7774 2711-2501 2805-6945 24 Hour Total   INTAKE   I.V.(mL/kg) 383.6(3.7)   383.6(3.7)   Shift Total(mL/kg) 383.6(3.7)   383.6(3.7)   OUTPUT   Urine(mL/kg/hr) 575(0.7)   575   Shift Total(mL/kg) 575(5.5)   575(5.5)   Weight (kg) 104.6 104.6 104.6 104.6        LABS:  ABGs:   No results for input(s): POCPH, POCPCO2, POCPO2, POCHCO3, OSWS7DEV in the last 72 hours.     CBC:   Recent Labs     02/27/23  0327 02/28/23  0413   WBC 16.2* 16.3*   HGB 9.0* 8.3*   HCT 29.8* 27.0*   MCV 92.3 91.2    435   LYMPHOPCT 27  --    RBC 3.23* 2.96*   MCH 27.9 28.0   MCHC 30.2 30.7   RDW 16.1* 16.4*       CRP:   Recent Labs     02/27/23 0327   .8*     LDH:   No results for input(s): LDH in the last 72 hours. BMP:   Recent Labs     02/27/23  0327 02/28/23  0413 02/28/23  1707   * 137 134*   K 5.0 5.0 4.5   CL 98 102 100   CO2 21 22 22   BUN 63* 63* 64*   CREATININE 4.27* 4.14* 3.79*   GLUCOSE 328* 287* 177*     Liver Function Test:   No results for input(s): PROT, LABALBU, ALT, AST, GGT, ALKPHOS, BILITOT in the last 72 hours. Coagulation Profile:   No results for input(s): INR, PROTIME, APTT in the last 72 hours. D-Dimer:  No results for input(s): DDIMER in the last 72 hours. Lactic Acid:  No results for input(s): LACTA in the last 72 hours. Cardiac Enzymes:  No results for input(s): CKTOTAL, CKMB, CKMBINDEX, TROPONINI in the last 72 hours. Invalid input(s): TROPONIN, HSTROP  BNP/ProBNP:   No results for input(s): BNP, PROBNP in the last 72 hours. Triglycerides:  No results for input(s): TRIG in the last 72 hours. Microbiology:  Urine Culture:  No components found for: CURINE  Blood Culture:  No components found for: CBLOOD, CFUNGUSBL  Sputum Culture:  No components found for: CSPUTUM  No results for input(s): SPECDESC, SPECIAL, CULTURE, STATUS, ORG, CDIFFTOXPCR, CAMPYLOBPCR, SALMONELLAPC, SHIGAPCR, SHIGELLAPCR, MPNEUG, MPNEUM, LACTOQL in the last 72 hours. No results for input(s): SPUTUM, SPECIAL, CULTURE, STATUS, ORG, CDIFFTOXPCR, MPNEUM, MPNEUG in the last 72 hours. Invalid input(s): Ayla Edmond, CFUNGUSBL,  [address] [place] [address]         Radiology Reports:  XR CHEST PORTABLE   Preliminary Result   No significant oval change. Small left pleural effusion and patchy opacities   at the left base. XR CHEST PORTABLE   Final Result   Interval removal of left-sided chest tube. No pneumothorax. Continued small left pleural effusion, unchanged. Left basilar airspace   disease is unchanged. XR CHEST PORTABLE   Final Result   No significant interval change.          XR CHEST PORTABLE   Final Result   Left chest tubes present with partially loculated left pleural effusion and   left basilar opacity unchanged. XR CHEST PORTABLE   Final Result   No significant change from prior exam.      Left chest tubes with left basilar airspace disease and small amount of   pleural fluid. Linear left retrocardiac lucency could represent small pneumothorax. XR CHEST PORTABLE   Final Result   No significant change from prior exam.         XR CHEST PORTABLE   Final Result   Status post removal single small bore and placement 2 large-bore chest tubes   left lung with reduced opacity, presumably multiloculated left pleural   effusion and associated atelectasis. No pneumothorax. Slightly greater   atelectasis right base. US RETROPERITONEAL COMPLETE   Final Result   1. Simple cortical cyst at the upper pole left kidney measuring 6 mm. 2. Limited renal ultrasound. No hydronephrosis. 3. Nonvisualization of ureteral jets. Minimal distention of the urinary   bladder. Patient no urge to void during the exam.      RECOMMENDATIONS:   Unavailable         IR CHEST TUBE INSERTION   Final Result   Successful fluoroscopic up sizing of left sided chest tube with 14 [place]   tube placed. XR CHEST PORTABLE   Final Result   Moderate left pleural effusion increased. No change left chest tube/pigtail   catheter. VL DUP LOWER EXTREMITY VENOUS BILATERAL   Final Result      CT CHEST WO CONTRAST   Final Result   Small to moderate-sized multiloculated left pleural effusions have improved. New small foci of air within the collection is presumably related to the   introduction of the left thoracostomy tube. Stable small to moderate sized mediastinal lymph nodes are nonspecific but   may relate to the patient's history of lymphoma. Additional small bilateral   axial lymph nodes are identified. Small hiatal hernia.          XR CHEST PORTABLE   Final Result   Similar findings when rotation taken into account; left chest tube with   unchanged or slightly increased loculated appearing left pleural effusion;   atelectatic appearing changes. No pneumothorax. XR CHEST PORTABLE   Final Result   Interval placement of a left chest tube with reduced left pleural fluid. IR CHEST TUBE INSERTION   Final Result   Successful ultrasound guided placement of a left 10 Stateless chest tube         CT CHEST WO CONTRAST   Final Result   1. Moderate to large amount of multiloculated left pleural fluid. Assessment   for pleural thickening and/or pleural nodularity is limited without IV   contrast.   2. Consolidation in the left lower lobe, likely atelectasis, but possibility   of superimposed pneumonia would be difficult to exclude. 3. Mediastinal and bilateral axillary lymphadenopathy, which may be related   to the patient's history of lymphoma. If available, comparison to any recent   imaging may be of use to assess for progression. XR CHEST PORTABLE   Final Result   No significant interval change. Redemonstration of opacification of the mid   to lower left lung field which may in part be due to loculated left pleural   effusion. Consider further evaluation with CT. XR CHEST PORTABLE    (Results Pending)        Echocardiogram:   No results found for this or any previous visit. ASSESSMENT AND PLAN     Assessment:    Acute hypoxic respiratory failure resolved  Community acquired pneumonia  Empyema left side s/p VATS/decortication (2/23/2023)  Mediastinal and bilateral axillary lymphadenopathy  Hx of lymphoma  Hypertension  Hx of AAA  Hx of mitral regurgitation  Atrial fibrillation. KELSEA      Plan:    Patient s/p VATS on 02/23/2023. Status post removal of the chest tube on 02/28/2023  Chest x-ray reviewed from this morning on 03/01/2023 and 02/28/2023 showed left basilar pleuroparenchymal change/minimal atelectasis.    Patient is currently on Solu-Medrol 30 mg every 12 hours apparently for pericarditis. No need for steroids from pulmonary standpoint. Patient is currently on Lasix IV once daily creatinine is better today urine output is 1900 in last 24 hours. Follow-up with nephrology and follow-up renal function  Supplemental oxygen if needed to keep oxygen saturation above 92%  DVT prophylaxis with heparin subcu when okay with CT surgery if there is no contraindication  On Rocephin and follow-up with infectious disease. Duration of antibiotic per infectious disease he has midline placed  Continue to encourage incentive spirometry Acapella deep breathing and ambulation  Bronchodilator therapy as needed  We will continue to follow    Discussed with the patient and updated questions answered. Discussed with nursing staff recommendations/plan discussed    We will continue to follow. I would like to thank you for allowing me to participate in the care of this patient. Please feel free to call with any further questions or concerns.       Veronica Naylor MD  Pulmonary and critical care medicine  3/1/2023, 10:24 AM

## 2023-03-01 NOTE — PROGRESS NOTES
Occupational Therapy  Facility/Department: [facility]- [facility]  Occupational Therapy Daily Treatment Note      Name: Rasheed Lewis  : 1955  MRN: [id number]  Date of Service: 3/1/2023    Discharge Recommendations:  Patient would benefit from continued therapy after discharge  OT Equipment Recommendations  ADL Assistive Devices: Shower Chair with back;Sock-Aid Hard;Reacher;Long-handled Shoe Horn;Long-handled Sponge       Patient Diagnosis(es): The encounter diagnosis was Empyema ([address] Past Medical History:  has a past medical history of Acute interstitial nephritis, Aortic regurgitation, ARF (acute renal failure) ([facility] [address] Chronic kidney disease, Hyperlipidemia, Hypertension, Non-Hodgkin lymphoma ([facility] [address] Research study patient, Sarcoidosis, and Type II or unspecified type diabetes mellitus without mention of complication, not stated as uncontrolled. Past Surgical History:  has a past surgical history that includes eye surgery (Right); Knee arthroscopy; Rotator cuff repair (Right, ); Rotator cuff repair (Left, 5/15); Carpal tunnel release (Left, 11/15 ); Eye surgery (Right, ); Total knee arthroplasty (Right, 10/2018); malignant skin lesion excision; IR CHEST TUBE INSERTION (2023); IR CHEST TUBE INSERTION (2023); and Thoracoscopy (Left, 2023). Treatment Diagnosis: PNA      Assessment   Performance deficits/impairments: Decreased functional mobility ; Decreased ADL status; Decreased cognition;Decreased endurance;Decreased balance;Decreased high-level IADLs;Decreased strength;Decreased safe awareness  Assessment: Pt will require continued OT services to address deficits listed through use of therapeutic interventions for improved independence and safety with ADLs/IADLs and functional transfers/mobility.   Treatment Diagnosis: PNA  Prognosis: Good  Activity Tolerance  Activity Tolerance: Patient Tolerated treatment well;Patient limited by fatigue;Treatment limited secondary to decreased cognition;Patient limited by pain        Plan   Occupational Therapy Plan  Times Per Week: 3-5 x/wk  Current Treatment Recommendations: Balance training, Functional mobility training, Endurance training, Cognitive reorientation, Safety education & training, Patient/Caregiver education & training, Equipment evaluation, education, & procurement, Self-Care / ADL, Home management training     Restrictions  Restrictions/Precautions  Restrictions/Precautions: Up as Tolerated, Fall Risk, Surgical Protocols, General Precautions  Required Braces or Orthoses?: No  Position Activity Restriction  Other position/activity restrictions: Bullock catheter    Subjective   General  Patient assessed for rehabilitation services?: Yes  Family / Caregiver Present: No  General Comment  Comments: RN ok'd pt for OT tx this date. Pt agreeable to session and very pleasant/cooperative and motivated to participate. Pt denies pain. Objective   SpO2: 96 %  Room Air  Safety Devices  Type of Devices: All fall risk precautions in place;Call light within reach;Gait belt;Nurse notified; Left in bed;Patient at risk for falls  Bed Mobility Training  Bed Mobility Training: No  Balance  Sitting: With support (seated in recliner supported by back of chair and unsupported at times for self care w/SBA ~50 min)  Standing: With support (stood for ~6 min for jones care and to change linen in chair w/CGA using RW)  Transfer Training  Transfer Training: Yes  Overall Level of Assistance: Contact-guard assistance  Interventions: Safety awareness training;Visual cues  Sit to Stand: Contact-guard assistance; Additional time; Adaptive equipment  Stand to Sit: Contact-guard assistance; Adaptive equipment; Additional time  Functional mobility: LEANN  Interventions: Verbal cues; Safety awareness training  Assistive Device: Walker, rolling        ADL  Feeding: Modified independent ;Setup; Increased time to complete (assist to open some containers, able to feed self)  Grooming: Setup;Verbal cueing; Increased time to complete;Stand by assistance (wash face/head, brush teeth)  UE Bathing: Setup;Verbal cueing; Increased time to complete;Stand by assistance (assist to wash back)  LE Bathing: Setup; Increased time to complete;Minimal assistance;Verbal cueing (assist to wash feet)  UE Dressing: Stand by assistance;Setup; Increased time to complete;Verbal cueing (assist to doff/don gown)  LE Dressing: Setup; Increased time to complete;Maximum assistance;Verbal cueing (assist to doff/don TOSHIA hose and footies)  Toileting: Dependent/Total (moon cath)  Additional Comments: Pt up in recliner, setup for self care using sx soap at tray table (see above for LOF). Pt needs increased time and assist d/t fatigue, pain and overall weakness. Pt is A&O X3 and pleasantly confused throughout. Cognition  Overall Cognitive Status: Exceptions  Arousal/Alertness: Appropriate responses to stimuli  Following Commands: Follows one step commands with repetition; Follows one step commands with increased time; Follows multistep commands with repitition; Follows multistep commands with increased time  Attention Span: Attends with cues to redirect  Memory: Decreased recall of recent events  Safety Judgement: Decreased awareness of need for assistance;Decreased awareness of need for safety  Problem Solving: Assistance required to identify errors made;Assistance required to correct errors made  Insights: Decreased awareness of deficits  Initiation: Requires cues for some  Sequencing: Requires cues for some  Orientation  Overall Orientation Status: Impaired  Orientation Level: Oriented to person;Disoriented to time;Oriented to place;Oriented to situation (knew was in the hospital, not the name)                  Education Given To: Patient  Education Provided: Role of Therapy;Plan of Care;ADL Adaptive Strategies;Transfer Training;Energy Conservation;IADL Safety; Family Education  Education Provided Comments: OT POC, transfer/walker safety, importance of participation in therapy, pursed lip breathing, use of IS/Acapella with fair return  Education Method: Demonstration;Verbal  Barriers to Learning: Cognition  Education Outcome: Verbalized understanding;Demonstrated understanding;Continued education needed     AM-PAC Score  AM-PAC Inpatient Daily Activity Raw Score: 15 (03/01/23 1439)  AM-PAC Inpatient ADL T-Scale Score : 34.69 (03/01/23 1439)  ADL Inpatient CMS 0-100% Score: 56.46 (03/01/23 1439)  ADL Inpatient CMS G-Code Modifier : CK (03/01/23 1439)       Goals  Short Term Goals  Time Frame for Short Term Goals: By discharge, pt will:  Short Term Goal 1: Demo I with bed mobility to increase independence with ADLs and to decrease risk for pressure injury  Short Term Goal 2: Demo Mod I for functional transfers and functional mobility with use of LRD for engagement in ADLs/IADLs  Short Term Goal 3: Demo I with all UB ADLs  Short Term Goal 4: Demo Mod I for LB ADLs and toileting, utilizing AE and adaptive tech PRN  Short Term Goal 5: Demo 8 min dynamic standing and reaching outside LI with unilateral hand release and SUP for improved standing balance during ADL/IADL participation       Therapy Time   Individual Concurrent Group Co-treatment   Time In 0859         Time Out 0955         Minutes [place] A JOSE, OLIVER/L

## 2023-03-01 NOTE — PROGRESS NOTES
[facility] - Progress Note  Today's Date and Time: 3/1/2023, 1:47 PM    Impression :   Left-sided empyema  Streptococci Gordonii on pleural fluid culture from 2/14/23  Repeat culture 2/18/23 with no bacterial growth   Shortness of breath  KELSEA on CKD  Hyponatremia  Leukocytosis  Hx non-Hodgkin's lymphoma  Hx multiple myeloma  Sarcoidosis  DM II  Interstitial nephritis  COPD  Hx partial right lung lobectomy  Allergies to macrolides and ketolides. Recommendations:   Discontinued IV Cefepime on 2-20-23  IV Rocephin 1 gm q 24 hrs until 3/12/23  Ok for midline for outpatient therapy when ready for discharge  S/p decortication on 2/23/23  Cardioversion completed 2/27/23 for continued AFIB    Medical Decision Making/Summary/Discussion:3/1/2023     Patient with Hx of non-Hodgkin's lymphoma and multiple myeloma  Admitted with Shortness of breath  Found to have Left-sided empyema  Streptococci Gordonii on pleural fluid culture from 2/14/23  Repeat culture 2/18/23 with no bacterial growth   Empyema partially drained by chest tube. Decortication planned 2/23/23  Confusion with associated hyponatremia  KELSEA on CKD 3  On treatment with ceftriaxone. Infection Control Recommendations   Crockett Precautions    Antimicrobial Stewardship Recommendations     Simplification of therapy  Targeted therapy  PK dosing    Coordination of Outpatient Care:   Estimated Length of IV antimicrobials:3/12/23  Patient will need Midline Catheter Insertion: TBD  Patient will need PICC line Insertion:TBD  Patient will need: Home IV , [place],  SNF,  LTAC: TBD  Patient will need outpatient wound care:No    Chief complaint/reason for consultation:   Empyema-streptococcus Gordonii      History of Present Illness:   Jailyn Alaniz is a 79y.o.-year-old male who was initially admitted on 2/12/2023.  Patient seen at the request of Dr. Conrad Em:    This patient, with a history of COPD, CKD, sarcoidosis, partial right lung lobectomy, multiple myeloma, non-hodgkin's lymphoma, acute interstitial nephritis and DM II, initially presented to [facility] ED on 2/12/23 with complaints of worsening shortness of breath over the previous week with a non-productive cough. He has has allergies to macrolides and ketolides. He was experiencing increased work of breathing upon evaluation, initially requiring CPAP. ABG values were grossly unremarkable. He denied any recent history of chemotherapy, fever, chills, N/V/D, abnormal bleeding, weight-loss or night sweats. Chest x-ray showed probable left-sided pneumonia with loculated pleural effusion which was confirmed via CT chest, which revealed a moderate to large amount of multiloculated left pleural fluid in the left lung. Mediastinal and bilateral axillary lymphadenopathy was also noted. He was transferred to [facility] for a higher level of care and was initiated on broad spectrum antibiotics with IV cefepime and vancomycin. Pulmonology was consulted and a thoracentesis was attempted, but was unable to be completed as no clear pocket was visualized via [address]. IR was consulted and a 10 [place], left-sided chest tube was placed on 2/14/23.  10 ml of purulent fluid was initially drained and sent for culture. TPA and dornase were administered with improvement in output. MRSA Nares was not detected and the pleural fluid culture grew PCN sensitive Streptococcus Gordonii. Vancomycin was discontinued on 2/14/23 and cefepime continued. A repeat CT of the patient's chest  on 2/17/23 revealed some improvement in the multiloculated left pleural effusions. A larger bore chest tube exchange was completed on 2/18/23 in IR. A repeat pleural fluid culture has not had any bacterial growth to date.      Chest tube output to date:   2/14 - 800 cc  2/15- 600 cc  2/16 - 700 cc  2/17 - 900 cc  2/18- 200 cc  2/19 - 910 cc  2/20 - 275 cc    ARIES jernigan was consulted for possible decortication, but they have no plans for decortication at this time. Abnormal labs at the time of consult include:   Na:133  Cr:2.24  WBC:32.4-->19.0  Hgb:10.0    There is no growth on urine or blood cultures. Imaging/Diagonstics:  CT CHEST WO CONTRAST     Result Date: 2/17/2023  Small to moderate-sized multiloculated left pleural effusions have improved. New small foci of air within the collection is presumably related to the introduction of the left thoracostomy tube. Stable small to moderate sized mediastinal lymph nodes are nonspecific but may relate to the patient's history of lymphoma. Additional small bilateral axial lymph nodes are identified. Small hiatal hernia. XR CHEST PORTABLE     Result Date: 2/18/2023  Moderate left pleural effusion increased. No change left chest tube/pigtail catheter. XR CHEST PORTABLE     Result Date: 2/17/2023  Similar findings when rotation taken into account; left chest tube with unchanged or slightly increased loculated appearing left pleural effusion; atelectatic appearing changes. No pneumothorax. XR CHEST PORTABLE     Result Date: 2/14/2023  Interval placement of a left chest tube with reduced left pleural fluid. IR CHEST TUBE INSERTION     Result Date: 2/14/2023  Successful ultrasound guided placement of a left 10 Romanian chest tube      US RETROPERITONEAL COMPLETE     Result Date: 2/19/2023  1. Simple cortical cyst at the upper pole left kidney measuring 6 mm. 2. Limited renal ultrasound. No hydronephrosis. 3. Nonvisualization of ureteral jets. Minimal distention of the urinary bladder. Patient no urge to void during the exam. RECOMMENDATIONS: Unavailable        Infectious disease was consulted for continued empyema.       CURRENT EVALUATION 3/1/2023  /69   Pulse 78   Temp 97.8 °F (36.6 °C) (Oral)   Resp 22   Ht 6' 1\" (1.854 m)   Wt 230 lb 9.6 oz (104.6 kg)   SpO2 96%   BMI 30.42 kg/m²     Patient evaluated in the ICU     Afebrile  Vs stable     The patient is stable today on room air  He is doing well s/p decortication with CT surgery on 2/23/23    He is alert and sitting up in bed upon evaluation. Family is at bedside    Chest tube removed 2/28/23  Midline in place 2/28/23    Discussed with patient and wife at length on 2-27-23  CT scans reviewed with wife and daughter on 2-20-23  Indicated that he will need about a month of antibiotics. Wife indicated similar empyema a few years ago on Rt chest. He had S aureus back then and was severely ill. Medications reviewed: On IV Ceftriaxone    No growth from VATS culture from 2/23/23 so far. WBC is elevated but is on a downward trend  Steroids initiated 2/26/23    Stable KELSEA   He is receiving 40 mg Lasix IV once a day     Discussed with RN    Labs, X rays reviewed: 3/1/2023    BUN:63-->47-->51-->50--->63-->63--->64  Cr:2.24-->1.96-->2.62-->3.2-->3.48--->4.27--->4.14--->3.79  Na 131-->130-->132-->132--->137--->134  K 5.1-->4.9--->5.0--->5.0--->4.5    WBC: 22.9--->24.1--->26.0---->25.0--->16.3  Hb:10.0-->11.2-->11.2-->9.2--->8.3  Plat: 425-->461-->434-->446-->433--->835    CRP: 202.8     Cultures:  Urine:  2/17/23: No growth  2/22/23 : No growth   Blood:  2/17/23: No growth  MRSA Nares:  2/13/23: Not detected  Pleural Fluid:  2/14/23: Streptococci Gordonii  2/18/23: No growth thus far  2-23-23: No growth    Discussed with patient, RN, IM. Chest x-ray: 3/1/23      Chest x-ray: 2/28/23      Chest x-ray: 2/23/23                                           CT: 2/17/23      I have personally reviewed the past medical history, past surgical history, medications, social history, and family history, and I have updated the database accordingly. Past Medical History:     Past Medical History:   Diagnosis Date    Acute interstitial nephritis 08/03/2022    Unclear cause. Creat bumped to 2.9, baseline in oct 2020 1.6.   Exact description of his kidney biopsy is not available to me however pathologist seems to think there is diabetic glomerosclerosis and acute interstitial nephritis seen on the biopsy specimen.  No mention about chronic changes.  Because of acute interstitial nephritis a trial of steroids for 15 days will be given to see if I can impro    Aortic regurgitation     ARF (acute renal failure) ([facility]) 08/03/2022    kidney biopsy from June 2022  showing findings of acute interstitial nephritis.  Exact description not available.  Trial of steroids will be given to see if I can improve renal function.  Creatinine in October 2020 was 1.6, creatinine in June 2022 was 2.9.  Trial of steroids will be given to see if renal function can be improved.    Chronic kidney disease     Hyperlipidemia     Hypertension     Non-Hodgkin lymphoma ([facility])     Research study patient 02/13/2023    [id number] Completed 2/15/23    Sarcoidosis     Type II or unspecified type diabetes mellitus without mention of complication, not stated as uncontrolled        Past Surgical  History:     Past Surgical History:   Procedure Laterality Date    CARPAL TUNNEL RELEASE Left 11/15     EYE SURGERY Right     shunt and cataract     EYE SURGERY Right 6/16    laser     IR CHEST TUBE INSERTION  2/13/2023    IR CHEST TUBE INSERTION 2/13/2023 [facility] PROCEDURES    IR CHEST TUBE INSERTION  2/18/2023    IR CHEST TUBE INSERTION 2/18/2023 Jose SANTACRUZ MD [facility] SPECIAL PROCEDURES    KNEE ARTHROSCOPY      MALIGNANT SKIN LESION EXCISION      ROTATOR CUFF REPAIR Right 7/14    ROTATOR CUFF REPAIR Left 5/15    THORACOSCOPY Left 2/23/2023    VIDEO ASSISTED THORACOSCOPY, DECORTICATION performed by Lucien Iverson MD at [facility]OR    TOTAL KNEE ARTHROPLASTY Right 10/2018       Medications:      metoprolol tartrate  50 mg Oral BID    furosemide  40 mg IntraVENous Daily    senna  2 tablet Oral Nightly    amiodarone  200 mg Oral BID    sodium chloride flush  5-40 mL IntraVENous 2 times per day    insulin glargine  20  Units SubCUTAneous Daily    heparin (porcine)  5,000 Units SubCUTAneous 3 times per day    insulin glargine  10 Units SubCUTAneous Nightly    insulin lispro  0-16 Units SubCUTAneous 4x Daily AC & HS    midodrine  10 mg Oral TID WC    methylPREDNISolone  30 mg IntraVENous Q12H    docusate sodium  100 mg Oral TID    cefTRIAXone (ROCEPHIN) IV  1,000 mg IntraVENous Q24H    pantoprazole  40 mg Oral QAM AC    colchicine  0.3 mg Oral Daily    sodium chloride flush  5-40 mL IntraVENous 2 times per day    mupirocin   Nasal BID    chlorhexidine   Topical See Admin Instructions    sodium chloride flush  5-40 mL IntraVENous 2 times per day    polyethylene glycol  17 g Oral Daily    sodium chloride flush  5-40 mL IntraVENous 2 times per day       Social History:     Social History     Socioeconomic History    Marital status:      Spouse name: Not on file    Number of children: Not on file    Years of education: Not on file    Highest education level: Not on file   Occupational History    Not on file   Tobacco Use    Smoking status: Never    Smokeless tobacco: Former    Tobacco comments:     minimal and infrequent    Substance and Sexual Activity    Alcohol use: Yes     Alcohol/week: 0.0 standard drinks     Comment: rare    Drug use: No    Sexual activity: Not on file   Other Topics Concern    Not on file   Social History Narrative    Not on file     Social Determinants of Health     Financial Resource Strain: Low Risk     Difficulty of Paying Living Expenses: Not very hard   Food Insecurity: No Food Insecurity    Worried About Running Out of Food in the Last Year: Never true    Ran Out of Food in the Last Year: Never true   Transportation Needs: No Transportation Needs    Lack of Transportation (Medical): No    Lack of Transportation (Non-Medical): No   Physical Activity: Not on file   Stress: Stress Concern Present    Feeling of Stress :  To some extent   Social Connections: Socially Integrated    Frequency of Communication with Friends and Family: Three times a week    Frequency of Social Gatherings with Friends and Family: Three times a week    Attends Congregation Services: 1 to 4 times per year    Active Member of Clubs or Organizations: No    Attends Club or Organization Meetings: 1 to 4 times per year    Marital Status:    Intimate Partner Violence: Not At Risk    Fear of Current or Ex-Partner: No    Emotionally Abused: No    Physically Abused: No    Sexually Abused: No   Housing Stability: Low Risk     Unable to Pay for Housing in the Last Year: No    Number of [place] in the Last Year: 1    Unstable Housing in the Last Year: No       Family History:     Family History   Problem Relation Age of Onset    High Blood Pressure Father     Other Father         AAA    Heart Disease Other         uncle x 2         Allergies:   Latex, Gabapentin, Meperidine, Erythromycin, Glimepiride, Lidocaine, Hydrocodone, Macrolides and ketolides, Niacin and related, Trelegy ellipta [fluticasone-umeclidin-vilant], Pregabalin, and Xanax [alprazolam]     Review of Systems:   Constitutional: No fevers or chills. No systemic complaints  Head: No headaches  Eyes: No double vision or blurry vision. No conjunctival inflammation. ENT: No sore throat or runny nose. . No hearing loss, tinnitus or vertigo. Cardiovascular: No chest pain or palpitations. shortness of breath. WANG  Lung: Left sided chest tube with some shortness of breath with dry cough. No sputum production  Abdomen: No nausea, vomiting, diarrhea, or abdominal pain. Glendy Red No cramps. Genitourinary: No increased urinary frequency, or dysuria. No hematuria. No suprapubic or CVA pain  Musculoskeletal: No muscle aches or pains. No joint effusions, swelling or deformities  Hematologic: No bleeding or bruising. Neurologic: No headache, weakness, numbness, or tingling. Integument: No rash, no ulcers. Psychiatric: No depression.    Endocrine: No polyuria, no polydipsia, no polyphagia. Physical Examination :   Patient Vitals for the past 8 hrs:   BP Temp Temp src Pulse Resp SpO2   03/01/23 1200 109/69 -- -- 78 22 96 %   03/01/23 1121 -- 97.8 °F (36.6 °C) Oral 73 22 96 %   03/01/23 1100 107/71 -- -- 76 17 96 %   03/01/23 1000 -- -- -- 71 22 96 %   03/01/23 0900 128/75 -- -- 82 24 95 %   03/01/23 0800 120/65 98.6 °F (37 °C) Oral 93 23 97 %   03/01/23 0744 -- 97.8 °F (36.6 °C) Oral 91 23 99 %   03/01/23 0700 112/89 -- -- 74 19 91 %   03/01/23 0600 (!) 144/85 -- -- 64 20 100 %     General Appearance: Awake, alert, and in no apparent distress  Head:  Normocephalic, no trauma  Eyes: Pupils equal, round, reactive to light and accommodation; extraocular movements intact; sclera anicteric; conjunctivae pink. No embolic phenomena. ENT: Oropharynx clear, without erythema, exudate, or thrush. No tenderness of sinuses. Mouth/throat: mucosa pink and moist. No lesions. Dentition in good repair. Neck:Supple, without lymphadenopathy. Thyroid normal, No bruits. Pulmonary/Chest: Left sided chest tube in place to suction with serosanguinous drainage. Diminished to auscultation. No dullness to percussion. Cardiovascular: Regular rate and rhythm without murmurs, rubs, or gallops. Abdomen: Soft, non tender. Bowel sounds normal. No organomegaly  All four Extremities: No cyanosis, clubbing, edema, or effusions. Neurologic: No gross sensory or motor deficits. Skin: Warm and dry with good turgor. No signs of peripheral arterial or venous insufficiency. No ulcerations.  Left sided-chest tube site CDI    Medical Decision Making -Laboratory:   I have independently reviewed/ordered the following labs:    CBC with Differential:   Recent Labs     02/27/23  0327 02/28/23  0413   WBC 16.2* 16.3*   HGB 9.0* 8.3*   HCT 29.8* 27.0*    435   LYMPHOPCT 27  --    MONOPCT 5  --        BMP:   Recent Labs     02/28/23  0413 02/28/23  1707    134*   K 5.0 4.5    100   CO2 22 22   BUN 63* 64* CREATININE 4.14* 3.79*     Hepatic Function Panel: No results for input(s): PROT, LABALBU, BILIDIR, IBILI, BILITOT, ALKPHOS, ALT, AST in the last 72 hours. No results for input(s): RPR in the last 72 hours. No results for input(s): HIV in the last 72 hours. No results for input(s): BC in the last 72 hours. Lab Results   Component Value Date/Time    MUCUS 1+ 02/13/2023 02:17 AM    RBC 2.96 02/28/2023 04:13 AM    RBC 5.15 04/03/2012 11:08 AM    WBC 16.3 02/28/2023 04:13 AM    TURBIDITY Cloudy 02/22/2023 01:17 PM     Lab Results   Component Value Date/Time    CREATININE 3.79 02/28/2023 05:07 PM    GLUCOSE 177 02/28/2023 05:07 PM    GLUCOSE 140 04/03/2012 11:08 AM       Medical Decision Making-Imaging:     Narrative   EXAMINATION:   CT OF THE CHEST WITHOUT CONTRAST, 2/17/2023 9:29 am       TECHNIQUE:   CT of the chest was performed without the administration of intravenous   contrast. Multiplanar reformatted images are provided for review. Automated   exposure control, iterative reconstruction, and/or weight based adjustment of   the mA/kV was utilized to reduce the radiation dose to as low as reasonably   achievable. COMPARISON:   02/13/2023       HISTORY:   ORDERING SYSTEM PROVIDED HISTORY:  Improvement? TECHNOLOGIST PROVIDED HISTORY:   Improvement? FINDINGS:   Mediastinum: Small bilateral axial lymph nodes are identified not   significantly changed. Small to moderate size mediastinal lymph nodes are   additionally present. Lungs/Pleura: There are multiple loculated effusions within the left chest   which have improved since previous exam.  Small amounts of new air are likely   secondary to the presence of the left thoracostomy tube. Left lower lobe atelectasis is identified. Upper Abdomen: There is a probable small hiatal hernia. Soft Tissues/Bones:  No acute osseous abnormality is appreciated. Old right   rib fractures are noted.            Impression   Small to moderate-sized multiloculated left pleural effusions have improved. New small foci of air within the collection is presumably related to the   introduction of the left thoracostomy tube. Stable small to moderate sized mediastinal lymph nodes are nonspecific but   may relate to the patient's history of lymphoma. Additional small bilateral   axial lymph nodes are identified. Small hiatal hernia. Narrative   EXAMINATION:   ONE XRAY VIEW OF THE CHEST       2/18/2023 3:04 pm       COMPARISON:   CT chest February 17, 2023       HISTORY:   ORDERING SYSTEM PROVIDED HISTORY: worsening   TECHNOLOGIST PROVIDED HISTORY:   worsening       FINDINGS:   Pigtail catheter on the left. Moderate left effusion appears   denser/increased. Mild bilateral interstitial infiltrates or edema. Heart   and mediastinum unremarkable. Bony thorax intact. No pneumothorax noted. Subsegmental atelectasis right lower lung. Impression   Moderate left pleural effusion increased. No change left chest tube/pigtail   catheter. Narrative   EXAMINATION:   ONE XRAY VIEW OF THE CHEST       2/28/2023 6:49 am       COMPARISON:   02/27/2023       HISTORY:   ORDERING SYSTEM PROVIDED HISTORY: s/p VATs with decortication   TECHNOLOGIST PROVIDED HISTORY: S/p VATs with decortication       FINDINGS:   Cardiomegaly is unchanged. Left-sided chest tubes remain in place. Small   left pleural effusion and patchy left basilar opacities are not significantly   changed. No evidence of pneumothorax. Impression   No significant interval change. Narrative   EXAMINATION:   ONE XRAY VIEW OF THE CHEST       2/28/2023 9:16 am       COMPARISON:   02/28/2023 at 06:20       HISTORY:   ORDERING SYSTEM PROVIDED HISTORY: post chest tube removal   TECHNOLOGIST PROVIDED HISTORY:   post chest tube removal   Reason for Exam: uprt port chest       FINDINGS:   Left-sided chest tube has been removed.   Small left pleural effusion   persists, unchanged. Left basilar airspace disease is similar as well. No   pneumothorax identified. Cardial pericardial silhouette is enlarged but similar when compared to the   previous exam.           Impression   Interval removal of left-sided chest tube. No pneumothorax. Continued small left pleural effusion, unchanged. Left basilar airspace   disease is unchanged. Narrative   EXAMINATION:   ONE XRAY VIEW OF THE CHEST       3/1/2023 5:39 am       COMPARISON:   02/28/2023       HISTORY:   ORDERING SYSTEM PROVIDED HISTORY: s/p VATs with decortication   TECHNOLOGIST PROVIDED HISTORY: S/p VATs with decortication       FINDINGS:   Mild cardiomegaly is unchanged. Small left pleural effusion and patchy   opacities at the left base are unchanged. No evidence of pneumothorax. No   free air beneath the diaphragm. Impression   No significant oval change. Small left pleural effusion and patchy opacities   at the left base. Medical Decision Srempi-[id number]:       Medical Decision Making-Other:     Note:  Labs, medications, radiologic studies were reviewed with personal review of films  Large amounts of data were reviewed  Discussed with nursing Staff, Discharge planner  Infection Control and Prevention measures reviewed  All prior entries were reviewed  Administer medications as ordered  Prognosis: [address]  Discharge planning reviewed  Follow up as outpatient. Thank you for allowing us to participate in the care of this patient. Please call with questions. Nicholas Wells participated in the evaluation of this patient, under supervision. ATTESTATION:    I have discussed the case, including pertinent history and exam findings with the APRN. I have evaluated the  History, physical findings and pictures of the patient and the key elements of the encounter have been performed by me.  I have reviewed the laboratory data, other diagnostic studies and discussed them with the APRN. I have updated the medical record where necessary. I agree with the assessment, plan and orders as documented by the APRN.     Selvin Davenport MD.      Pager: [phone] - Office: [phone]

## 2023-03-01 NOTE — CARE COORDINATION
Transition planning:    Attempts to contact [facility] Centralized Intake at [phone] twice today without response, phone rings several times and then disconnects. CM phones facility directly spoke with Abdi Jasmine informed of above she is unaware if this is still an active service. She connects writer with [employer] in admissions. CM inquires if pt is approved for admission and to clarify benefits as pt had Medicare listed as primary but pt's primary is [address] [place] [address] Drive per pt's wife's report. CM to pt's room after receiving message that RN MARBIN from [address] [place] [address] Drive attempted to contact this CM to inquire on approval for transportation. CM received update from pt's wife that she had recently spoken with the insurance CM. Pt's nurse Merrill Aguilar informs attending has not rounded yet today, pt does not have a d/c order at this time. Pt is appropriate for ambulette transport when pt is cleared for d/c and auth is received for admission. Continue to await status of precert.

## 2023-03-01 NOTE — PROGRESS NOTES
Renal Progress Note    Patient :  Jailyn Alaniz; 79 y.o. MRN# [id number]  Location:  1007/1007-01  Attending:  Josey Calloway MD  Admit Date:  2/12/2023   Hospital Day: 17    Subjective: Following for KELSEA on CKD secondary to diabetic nephrosclerosis, with baseline 1.9-2.4, s/p VATS, 2/20/2023, for left pleural effusion. Patient was seen and examined. No new issues reported overnight. Patient is status post cardioversion on 2/27/2023. No BMP results available from today. Diuretics were changed to once a day yesterday. Currently on Lasix 40 mg IV daily. Creatinine did improve to 3.79 mg/dl yesterday, peak creatinine this admission was 4.72 mg/dl. Urine output documented as about 1.9 L in the last 24 hours. Outpatient Medications:     Medications Prior to Admission: amLODIPine (NORVASC) 5 MG tablet, TAKE ONE TABLET BY MOUTH DAILY (Patient taking differently: nightly)  omeprazole (PRILOSEC) 20 MG delayed release capsule, Take 20 mg by mouth nightly  fluticasone-salmeterol (ADVAIR DISKUS) 250-50 MCG/ACT AEPB diskus inhaler, Inhale 1 puff into the lungs every 12 hours  metoprolol succinate (TOPROL XL) 25 MG extended release tablet, Take 25 mg by mouth at bedtime  dutasteride (AVODART) 0.5 mg capsule, Take 0.5 mg by mouth daily  finasteride (PROSCAR) 5 MG tablet, Take 5 mg by mouth nightly  Misc. Devices (NASAL SPRAY BOTTLE) MISC, by Does not apply route  CINNAMON PO, Take by mouth  repaglinide (PRANDIN) 1 MG tablet, Take 1 mg by mouth with breakfast and with evening meal  pimecrolimus (ELIDEL) 1 % cream, Apply topically as needed Apply topically 2 times daily.   albuterol sulfate  (90 BASE) MCG/ACT inhaler, Inhale 1 puff into the lungs in the morning and at bedtime  Liraglutide (VICTOZA SC), Inject 1.8 mg into the skin daily   fenofibrate (TRICOR) 145 MG tablet, Take 145 mg by mouth nightly  dicyclomine (BENTYL) 10 MG capsule, Take 10 mg by mouth 3 times daily (with meals)  atorvastatin (LIPITOR) 80 MG tablet, Take 80 mg by mouth daily.  DULoxetine (CYMBALTA) 60 MG capsule, Take 120 mg by mouth nightly    Current Medications:     Scheduled Meds:    metoprolol tartrate  50 mg Oral BID    furosemide  40 mg IntraVENous Daily    senna  2 tablet Oral Nightly    amiodarone  200 mg Oral BID    sodium chloride flush  5-40 mL IntraVENous 2 times per day    insulin glargine  20 Units SubCUTAneous Daily    heparin (porcine)  5,000 Units SubCUTAneous 3 times per day    insulin glargine  10 Units SubCUTAneous Nightly    insulin lispro  0-16 Units SubCUTAneous 4x Daily AC & HS    midodrine  10 mg Oral TID WC    methylPREDNISolone  30 mg IntraVENous Q12H    docusate sodium  100 mg Oral TID    cefTRIAXone (ROCEPHIN) IV  1,000 mg IntraVENous Q24H    pantoprazole  40 mg Oral QAM AC    colchicine  0.3 mg Oral Daily    sodium chloride flush  5-40 mL IntraVENous 2 times per day    mupirocin   Nasal BID    chlorhexidine   Topical See Admin Instructions    sodium chloride flush  5-40 mL IntraVENous 2 times per day    polyethylene glycol  17 g Oral Daily    sodium chloride flush  5-40 mL IntraVENous 2 times per day     Continuous Infusions:    sodium chloride      sodium chloride      sodium chloride      sodium chloride      dextrose       PRN Meds:  bisacodyl, sodium chloride flush, sodium chloride, oxyCODONE-acetaminophen **OR** [DISCONTINUED] oxyCODONE-acetaminophen, metoprolol, sodium chloride flush, sodium chloride, sodium chloride flush, sodium chloride, sodium chloride flush, sodium chloride, glucose, dextrose bolus **OR** dextrose bolus, glucagon (rDNA), dextrose    Input/Output:       I/O last 3 completed shifts:  In: 2569.5 [P.O.:420; I.V.:2149.5]  Out: 3805 [Urine:3785; Chest Tube:20].    Patient Vitals for the past 96 hrs (Last 3 readings):   Weight   23 0600 230 lb 9.6 oz (104.6 kg)       Vital Signs:   Temperature:  Temp: 97.8 °F (36.6 °C)  TMax:   Temp (24hrs), Av.2 °F (36.8 °C), Min:97.8 °F (36.6 °C),  Max:98.6 °F (37 °C)    Respirations:  Resp: 24  Pulse:   Heart Rate: 82  BP:    BP: 128/75  BP Range: Systolic (44ROY), XLP:497 , Min:103 , CRU:057       Diastolic (95NNM), CFQ:81, Min:65, Max:89      Physical Examination:     General:  AAO x 3, speaking in full sentences, no accessory muscle use. HEENT: Atraumatic, normocephalic, no throat congestion, moist mucosa. Eyes:   Pupils equal, round and reactive to light, EOMI. Neck:   Supple  Chest:   Bilateral vesicular breath sounds, no rales or wheezes. Cardiac:  S1 S2 RR, no murmurs, gallops or rubs. Abdomen: Soft, non-tender, no masses or organomegaly, BS audible. :   No suprapubic or flank tenderness. Neuro:  AAO x 3, No FND. SKIN:  No rashes, good skin turgor. Extremities:  No edema. Labs:       Recent Labs     02/27/23 0327 02/28/23 0413   WBC 16.2* 16.3*   RBC 3.23* 2.96*   HGB 9.0* 8.3*   HCT 29.8* 27.0*   MCV 92.3 91.2   MCH 27.9 28.0   MCHC 30.2 30.7   RDW 16.1* 16.4*    435   MPV 9.4 9.6      BMP:   Recent Labs     02/27/23 0327 02/28/23 0413 02/28/23  1707   * 137 134*   K 5.0 5.0 4.5   CL 98 102 100   CO2 21 22 22   BUN 63* 63* 64*   CREATININE 4.27* 4.14* 3.79*   GLUCOSE 328* 287* 177*   CALCIUM 9.1 8.9 9.1     SPEP:  Lab Results   Component Value Date/Time    PROT 6.5 02/13/2023 01:36 PM    PROT 6.4 05/29/2012 10:35 AM    ALBCAL 2.6 02/13/2023 03:07 AM    ALBPCT 50 02/13/2023 03:07 AM    LABALPH 0.5 02/13/2023 03:07 AM    LABALPH 1.0 02/13/2023 03:07 AM    A1PCT 8 02/13/2023 03:07 AM    A2PCT 20 02/13/2023 03:07 AM    LABBETA 0.7 02/13/2023 03:07 AM    BETAPCT 13 02/13/2023 03:07 AM    GAMGLOB 0.5 02/13/2023 03:07 AM    GGPCT 9 02/13/2023 03:07 AM    PATH ELECTRONICALLY SIGNED. Aron Álvarez M.D. 02/13/2023 03:07 AM    PATH ELECTRONICALLY SIGNED.  Aron Álvarez M.D. 02/13/2023 03:07 AM     C3:     Lab Results   Component Value Date/Time    C3 102 05/29/2012 10:35 AM     C4:     Lab Results   Component Value Date/Time    C4 14 05/29/2012 10:35 AM     Hep BsAg:         Lab Results   Component Value Date/Time    HEPBSAG NONREACTIVE 05/29/2012 10:35 AM     Hep C AB:          Lab Results   Component Value Date/Time    HEPCAB NONREACTIVE 05/29/2012 10:35 AM     Urinalysis/Chemistries:      Lab Results   Component Value Date/Time    NITRU NEGATIVE 02/22/2023 01:17 PM    COLORU Yellow 02/22/2023 01:17 PM    PHUR 5.0 02/22/2023 01:17 PM    WBCUA 2 TO 5 02/22/2023 01:17 PM    RBCUA None 02/22/2023 01:17 PM    MUCUS 1+ 02/13/2023 02:17 AM    BACTERIA MODERATE 02/22/2023 01:17 PM    SPECGRAV 1.020 02/22/2023 01:17 PM    LEUKOCYTESUR NEGATIVE 02/22/2023 01:17 PM    UROBILINOGEN Normal 02/22/2023 01:17 PM    BILIRUBINUR NEGATIVE 02/22/2023 01:17 PM    BILIRUBINUR NEGATIVE 04/03/2012 11:08 AM    GLUCOSEU 1+ 02/22/2023 01:17 PM    GLUCOSEU NEGATIVE 04/03/2012 11:08 AM    KETUA NEGATIVE 02/22/2023 01:17 PM    AMORPHOUS 1+ 02/22/2023 01:17 PM     Urine Sodium:     Lab Results   Component Value Date/Time    RUBY 43 02/13/2023 10:18 AM     Urine Potassium:    Lab Results   Component Value Date/Time    KUR 22.8 02/13/2023 10:18 AM     Urine Chloride:    Lab Results   Component Value Date/Time    CLUR 54 02/13/2023 10:18 AM     Urine Osmolarity:   Lab Results   Component Value Date/Time    OSMOU 468 02/13/2023 10:18 AM     Urine Creatinine:     Lab Results   Component Value Date/Time    LABCREA 45.3 02/13/2023 10:18 AM     Radiology:     Reviewed. Assessment:     Acute Kidney Injury nonoliguric likely due to ATN from hypotension requiring pressor support, A-fib, NSAID exposure and recent surgery. Creatinine still evolving. Shortness of breath. Left-sided empyema Streptococcus Gordonii on pleural fluid culture 2/14/2023. Status post VATS 2/23/2023. Hyponatremia. Acute pericarditis. New onset A-fib with RVR. Acute proximal respiratory failure-stable. History of non-Hodgkin lymphoma.   History of multiple myeloma. Sarcoidosis. Diabetes type 2. History of acute/nephritis in June 2022. History of partial right lung lobectomy. CKD 3B secondary to diabetic and hypertensive nephrosclerosis with baseline creatinine of around 1.9-2.4 mg/dl and follows up with Dr. Esperanza Minaya. Plan:   Continue Lasix 40 mg IV once a day. BMP for today ordered. Antibiotics as per renal dosing per infectious diseases. Monitor strict I's and O's and renal function. At risk for requiring RRT, will continue to monitor. For acute pericarditis, now changed to steroids from NSAIDs. Keep renal diet. Keep Bullock in for today. BMP in AM.  Will follow. Nutrition   Please ensure that patient is on a renal diet/TF. Avoid nephrotoxic drugs/contrast exposure. Harris Garcia MD  [facility]     This note is created with the assistance of a speech-recognition program. While intending to generate a document that actually reflects the content of the visit, no guarantees can be provided that every mistake has been identified and corrected by editing.

## 2023-03-02 ENCOUNTER — APPOINTMENT (OUTPATIENT)
Dept: GENERAL RADIOLOGY | Age: 68
DRG: 853 | End: 2023-03-02
Attending: STUDENT IN AN ORGANIZED HEALTH CARE EDUCATION/TRAINING PROGRAM
Payer: COMMERCIAL

## 2023-03-02 LAB
ANION GAP SERPL CALCULATED.3IONS-SCNC: 14 MMOL/L (ref 9–17)
BUN SERPL-MCNC: 59 MG/DL (ref 8–23)
CALCIUM SERPL-MCNC: 9 MG/DL (ref 8.6–10.4)
CHLORIDE SERPL-SCNC: 103 MMOL/L (ref 98–107)
CO2 SERPL-SCNC: 23 MMOL/L (ref 20–31)
CREAT SERPL-MCNC: 2.92 MG/DL (ref 0.7–1.2)
GFR SERPL CREATININE-BSD FRML MDRD: 23 ML/MIN/1.73M2
GLUCOSE BLD-MCNC: 125 MG/DL (ref 75–110)
GLUCOSE BLD-MCNC: 163 MG/DL (ref 75–110)
GLUCOSE BLD-MCNC: 228 MG/DL (ref 75–110)
GLUCOSE BLD-MCNC: 263 MG/DL (ref 75–110)
GLUCOSE BLD-MCNC: 308 MG/DL (ref 75–110)
GLUCOSE BLD-MCNC: 322 MG/DL (ref 75–110)
GLUCOSE SERPL-MCNC: 177 MG/DL (ref 70–99)
POTASSIUM SERPL-SCNC: 5 MMOL/L (ref 3.7–5.3)
SODIUM SERPL-SCNC: 140 MMOL/L (ref 135–144)

## 2023-03-02 PROCEDURE — 71045 X-RAY EXAM CHEST 1 VIEW: CPT

## 2023-03-02 PROCEDURE — 2000000000 HC ICU R&B

## 2023-03-02 PROCEDURE — 80048 BASIC METABOLIC PNL TOTAL CA: CPT

## 2023-03-02 PROCEDURE — 6370000000 HC RX 637 (ALT 250 FOR IP): Performed by: STUDENT IN AN ORGANIZED HEALTH CARE EDUCATION/TRAINING PROGRAM

## 2023-03-02 PROCEDURE — 82947 ASSAY GLUCOSE BLOOD QUANT: CPT

## 2023-03-02 PROCEDURE — 99232 SBSQ HOSP IP/OBS MODERATE 35: CPT | Performed by: INTERNAL MEDICINE

## 2023-03-02 PROCEDURE — 2580000003 HC RX 258: Performed by: ANESTHESIOLOGY

## 2023-03-02 PROCEDURE — 97530 THERAPEUTIC ACTIVITIES: CPT

## 2023-03-02 PROCEDURE — 97116 GAIT TRAINING THERAPY: CPT

## 2023-03-02 PROCEDURE — 97535 SELF CARE MNGMENT TRAINING: CPT

## 2023-03-02 PROCEDURE — 6360000002 HC RX W HCPCS

## 2023-03-02 PROCEDURE — 6370000000 HC RX 637 (ALT 250 FOR IP): Performed by: INTERNAL MEDICINE

## 2023-03-02 PROCEDURE — 6360000002 HC RX W HCPCS: Performed by: INTERNAL MEDICINE

## 2023-03-02 PROCEDURE — 36415 COLL VENOUS BLD VENIPUNCTURE: CPT

## 2023-03-02 PROCEDURE — 2580000003 HC RX 258: Performed by: STUDENT IN AN ORGANIZED HEALTH CARE EDUCATION/TRAINING PROGRAM

## 2023-03-02 PROCEDURE — 6370000000 HC RX 637 (ALT 250 FOR IP): Performed by: PHYSICIAN ASSISTANT

## 2023-03-02 PROCEDURE — 6370000000 HC RX 637 (ALT 250 FOR IP): Performed by: NURSE PRACTITIONER

## 2023-03-02 PROCEDURE — 6370000000 HC RX 637 (ALT 250 FOR IP): Performed by: FAMILY MEDICINE

## 2023-03-02 PROCEDURE — 2580000003 HC RX 258: Performed by: NURSE PRACTITIONER

## 2023-03-02 PROCEDURE — 6360000002 HC RX W HCPCS: Performed by: NURSE PRACTITIONER

## 2023-03-02 PROCEDURE — 97110 THERAPEUTIC EXERCISES: CPT

## 2023-03-02 PROCEDURE — 2580000003 HC RX 258: Performed by: PHYSICIAN ASSISTANT

## 2023-03-02 PROCEDURE — 6360000002 HC RX W HCPCS: Performed by: FAMILY MEDICINE

## 2023-03-02 PROCEDURE — 2580000003 HC RX 258

## 2023-03-02 RX ORDER — INSULIN GLARGINE 100 [IU]/ML
10 INJECTION, SOLUTION SUBCUTANEOUS NIGHTLY
Status: DISCONTINUED | OUTPATIENT
Start: 2023-03-02 | End: 2023-03-03

## 2023-03-02 RX ORDER — REPAGLINIDE 0.5 MG/1
1 TABLET ORAL
Status: DISCONTINUED | OUTPATIENT
Start: 2023-03-03 | End: 2023-03-06 | Stop reason: HOSPADM

## 2023-03-02 RX ORDER — FUROSEMIDE 40 MG/1
40 TABLET ORAL DAILY
Status: DISCONTINUED | OUTPATIENT
Start: 2023-03-03 | End: 2023-03-06 | Stop reason: HOSPADM

## 2023-03-02 RX ADMIN — HEPARIN SODIUM 5000 UNITS: 5000 INJECTION INTRAVENOUS; SUBCUTANEOUS at 14:38

## 2023-03-02 RX ADMIN — PANTOPRAZOLE SODIUM 40 MG: 40 TABLET, DELAYED RELEASE ORAL at 08:05

## 2023-03-02 RX ADMIN — OXYCODONE HYDROCHLORIDE AND ACETAMINOPHEN 1 TABLET: 5; 325 TABLET ORAL at 07:09

## 2023-03-02 RX ADMIN — INSULIN GLARGINE 10 UNITS: 100 INJECTION, SOLUTION SUBCUTANEOUS at 20:54

## 2023-03-02 RX ADMIN — METOPROLOL TARTRATE 50 MG: 25 TABLET ORAL at 20:50

## 2023-03-02 RX ADMIN — METOPROLOL TARTRATE 50 MG: 25 TABLET ORAL at 08:05

## 2023-03-02 RX ADMIN — SODIUM CHLORIDE, PRESERVATIVE FREE 10 ML: 5 INJECTION INTRAVENOUS at 20:00

## 2023-03-02 RX ADMIN — SENNOSIDES 17.2 MG: 8.6 TABLET, COATED ORAL at 20:50

## 2023-03-02 RX ADMIN — POLYETHYLENE GLYCOL 3350 17 G: 17 POWDER, FOR SOLUTION ORAL at 08:14

## 2023-03-02 RX ADMIN — MIDODRINE HYDROCHLORIDE 10 MG: 5 TABLET ORAL at 16:17

## 2023-03-02 RX ADMIN — METHYLPREDNISOLONE SODIUM SUCCINATE 30 MG: 40 INJECTION, POWDER, FOR SOLUTION INTRAMUSCULAR; INTRAVENOUS at 11:12

## 2023-03-02 RX ADMIN — DOCUSATE SODIUM 100 MG: 100 CAPSULE, LIQUID FILLED ORAL at 08:05

## 2023-03-02 RX ADMIN — AMIODARONE HYDROCHLORIDE 200 MG: 200 TABLET ORAL at 20:50

## 2023-03-02 RX ADMIN — MIDODRINE HYDROCHLORIDE 10 MG: 5 TABLET ORAL at 08:05

## 2023-03-02 RX ADMIN — INSULIN GLARGINE 25 UNITS: 100 INJECTION, SOLUTION SUBCUTANEOUS at 09:00

## 2023-03-02 RX ADMIN — SODIUM CHLORIDE, PRESERVATIVE FREE 10 ML: 5 INJECTION INTRAVENOUS at 08:06

## 2023-03-02 RX ADMIN — INSULIN LISPRO 12 UNITS: 100 INJECTION, SOLUTION INTRAVENOUS; SUBCUTANEOUS at 20:54

## 2023-03-02 RX ADMIN — HEPARIN SODIUM 5000 UNITS: 5000 INJECTION INTRAVENOUS; SUBCUTANEOUS at 20:50

## 2023-03-02 RX ADMIN — INSULIN LISPRO 8 UNITS: 100 INJECTION, SOLUTION INTRAVENOUS; SUBCUTANEOUS at 08:25

## 2023-03-02 RX ADMIN — AMIODARONE HYDROCHLORIDE 200 MG: 200 TABLET ORAL at 08:05

## 2023-03-02 RX ADMIN — DOCUSATE SODIUM 100 MG: 100 CAPSULE, LIQUID FILLED ORAL at 14:38

## 2023-03-02 RX ADMIN — MIDODRINE HYDROCHLORIDE 10 MG: 5 TABLET ORAL at 11:12

## 2023-03-02 RX ADMIN — SODIUM CHLORIDE, PRESERVATIVE FREE 10 ML: 5 INJECTION INTRAVENOUS at 08:08

## 2023-03-02 RX ADMIN — MUPIROCIN: 20 OINTMENT TOPICAL at 20:51

## 2023-03-02 RX ADMIN — COLCHICINE 0.3 MG: 0.6 TABLET, FILM COATED ORAL at 08:05

## 2023-03-02 RX ADMIN — DOCUSATE SODIUM 100 MG: 100 CAPSULE, LIQUID FILLED ORAL at 20:50

## 2023-03-02 RX ADMIN — INSULIN LISPRO 4 UNITS: 100 INJECTION, SOLUTION INTRAVENOUS; SUBCUTANEOUS at 11:16

## 2023-03-02 RX ADMIN — HEPARIN SODIUM 5000 UNITS: 5000 INJECTION INTRAVENOUS; SUBCUTANEOUS at 06:15

## 2023-03-02 RX ADMIN — FUROSEMIDE 40 MG: 10 INJECTION, SOLUTION INTRAMUSCULAR; INTRAVENOUS at 08:05

## 2023-03-02 RX ADMIN — CEFTRIAXONE SODIUM 1000 MG: 10 INJECTION, POWDER, FOR SOLUTION INTRAVENOUS at 09:00

## 2023-03-02 RX ADMIN — SODIUM CHLORIDE, PRESERVATIVE FREE 10 ML: 5 INJECTION INTRAVENOUS at 08:07

## 2023-03-02 RX ADMIN — SODIUM CHLORIDE, PRESERVATIVE FREE 10 ML: 5 INJECTION INTRAVENOUS at 20:51

## 2023-03-02 RX ADMIN — MUPIROCIN: 20 OINTMENT TOPICAL at 08:06

## 2023-03-02 ASSESSMENT — PAIN DESCRIPTION - DESCRIPTORS: DESCRIPTORS: ACHING

## 2023-03-02 ASSESSMENT — PAIN SCALES - GENERAL
PAINLEVEL_OUTOF10: 0
PAINLEVEL_OUTOF10: 8

## 2023-03-02 ASSESSMENT — PAIN DESCRIPTION - LOCATION: LOCATION: ABDOMEN

## 2023-03-02 ASSESSMENT — PAIN DESCRIPTION - ORIENTATION: ORIENTATION: LEFT;OTHER (COMMENT)

## 2023-03-02 NOTE — PROGRESS NOTES
[facility] - Progress Note  Today's Date and Time: 3/2/2023, 12:04 PM    Impression :   Left-sided empyema  Streptococci Gordonii on pleural fluid culture from 2/14/23  Repeat culture 2/18/23 with no bacterial growth   Shortness of breath  KELSEA on CKD  Hyponatremia  Leukocytosis  Hx non-Hodgkin's lymphoma  Hx multiple myeloma  Sarcoidosis  DM II  Interstitial nephritis  COPD  Hx partial right lung lobectomy  Allergies to macrolides and ketolides. Recommendations:   Discontinued IV Cefepime on 2-20-23  IV Rocephin 1 gm q 24 hrs until 3/12/23  Midline for outpatient therapy placed 2/28/23  S/p decortication on 2/23/23  Cardioversion completed 2/27/23 for continued AFIB    Medical Decision Making/Summary/Discussion:3/2/2023     Patient with Hx of non-Hodgkin's lymphoma and multiple myeloma  Admitted with Shortness of breath  Found to have Left-sided empyema  Streptococci Gordonii on pleural fluid culture from 2/14/23  Repeat culture 2/18/23 with no bacterial growth   Empyema partially drained by chest tube. Decortication planned 2/23/23  Confusion with associated hyponatremia  KELSEA on CKD 3  On treatment with ceftriaxone. Infection Control Recommendations   [place] Precautions    Antimicrobial Stewardship Recommendations     Simplification of therapy  Targeted therapy  PK dosing    Coordination of Outpatient Care:   Estimated Length of IV antimicrobials:3/12/23  Patient will need Midline Catheter Insertion: TBD  Patient will need PICC line Insertion:TBD  Patient will need: Home IV , [place],  SNF,  LTAC: TBD  Patient will need outpatient wound care:No    Chief complaint/reason for consultation:   Empyema-streptococcus Gordonii      History of Present Illness:   Avelino Colon is a 79y.o.-year-old male who was initially admitted on 2/12/2023.  Patient seen at the request of Dr. Fair Nim:    This patient, with a history of COPD, CKD, sarcoidosis, partial right lung lobectomy, multiple myeloma, non-hodgkin's lymphoma, acute interstitial nephritis and DM II, initially presented to [facility] ED on 2/12/23 with complaints of worsening shortness of breath over the previous week with a non-productive cough. He has has allergies to macrolides and ketolides. He was experiencing increased work of breathing upon evaluation, initially requiring CPAP. ABG values were grossly unremarkable. He denied any recent history of chemotherapy, fever, chills, N/V/D, abnormal bleeding, weight-loss or night sweats. Chest x-ray showed probable left-sided pneumonia with loculated pleural effusion which was confirmed via CT chest, which revealed a moderate to large amount of multiloculated left pleural fluid in the left lung. Mediastinal and bilateral axillary lymphadenopathy was also noted. He was transferred to [facility] for a higher level of care and was initiated on broad spectrum antibiotics with IV cefepime and vancomycin. Pulmonology was consulted and a thoracentesis was attempted, but was unable to be completed as no clear pocket was visualized via [address]. IR was consulted and a 10 [place], left-sided chest tube was placed on 2/14/23.  10 ml of purulent fluid was initially drained and sent for culture. TPA and dornase were administered with improvement in output. MRSA Nares was not detected and the pleural fluid culture grew PCN sensitive Streptococcus Gordonii. Vancomycin was discontinued on 2/14/23 and cefepime continued. A repeat CT of the patient's chest  on 2/17/23 revealed some improvement in the multiloculated left pleural effusions. A larger bore chest tube exchange was completed on 2/18/23 in IR. A repeat pleural fluid culture has not had any bacterial growth to date.      Chest tube output to date:   2/14 - 800 cc  2/15- 600 cc  2/16 - 700 cc  2/17 - 900 cc  2/18- 200 cc  2/19 - 910 cc  2/20 - 275 cc    CT sugery was consulted for possible decortication, but they have no plans for decortication at this time. Abnormal labs at the time of consult include:   Na:133  Cr:2.24  WBC:32.4-->19.0  Hgb:10.0    There is no growth on urine or blood cultures. Imaging/Diagonstics:  CT CHEST WO CONTRAST     Result Date: 2/17/2023  Small to moderate-sized multiloculated left pleural effusions have improved. New small foci of air within the collection is presumably related to the introduction of the left thoracostomy tube. Stable small to moderate sized mediastinal lymph nodes are nonspecific but may relate to the patient's history of lymphoma. Additional small bilateral axial lymph nodes are identified. Small hiatal hernia. XR CHEST PORTABLE     Result Date: 2/18/2023  Moderate left pleural effusion increased. No change left chest tube/pigtail catheter. XR CHEST PORTABLE     Result Date: 2/17/2023  Similar findings when rotation taken into account; left chest tube with unchanged or slightly increased loculated appearing left pleural effusion; atelectatic appearing changes. No pneumothorax. XR CHEST PORTABLE     Result Date: 2/14/2023  Interval placement of a left chest tube with reduced left pleural fluid. IR CHEST TUBE INSERTION     Result Date: 2/14/2023  Successful ultrasound guided placement of a left 10 [place] chest tube      US RETROPERITONEAL COMPLETE     Result Date: 2/19/2023  1. Simple cortical cyst at the upper pole left kidney measuring 6 mm. 2. Limited renal ultrasound. No hydronephrosis. 3. Nonvisualization of ureteral jets. Minimal distention of the urinary bladder. Patient no urge to void during the exam. RECOMMENDATIONS: Unavailable        Infectious disease was consulted for continued empyema.       CURRENT EVALUATION 3/2/2023  /72   Pulse 61   Temp 97.5 °F (36.4 °C) (Oral)   Resp 24   Ht 6' 1\" (1.854 m)   Wt 230 lb 9.6 oz (104.6 kg)   SpO2 95%   BMI 30.42 kg/m²     Patient evaluated in the ICU Afebrile  Vs stable     The patient is stable today on room air  He is doing well s/p decortication with CT surgery on 2/23/23  He is feeling much better and is looking forward to discharge. Chest tube removed 2/28/23  Midline in placed 2/28/23    Discussed with patient and wife at length on 3-2-23  CT scans reviewed with wife and daughter on 2-20-23  Indicated that he will need about a month of antibiotics. Wife indicated similar empyema a few years ago on Rt chest. He had S aureus back then and was severely ill. Medications reviewed: On IV Ceftriaxone    No growth from VATS culture from 2/23/23     WBC is elevated but is on a downward trend  Steroids initiated 2/26/23    Stable KELSEA   He is receiving 40 mg Lasix IV once a day     Discussed with RN    Labs, X rays reviewed: 3/2/2023    BUN:63-->47-->51-->50--->63-->63--->64-->59  Cr:2.24-->1.96-->2.62-->3.2-->3.48--->4.27--->4.14--->3.79-->2.92  Na 131-->130-->132-->132--->137--->134-->140  K 5.1-->4.9--->5.0--->5.0--->4.5-->5.0    WBC: 22.9--->24.1--->26.0---->25.0--->16.3  Hb:10.0-->11.2-->11.2-->9.2--->8.3  Plat: 425-->461-->434-->446-->433--->835    CRP: 202.8     Cultures:  Urine:  2/17/23: No growth  2/22/23 : No growth   Blood:  2/17/23: No growth  MRSA Nares:  2/13/23: Not detected  Pleural Fluid:  2/14/23: Streptococci Gordonii  2/18/23: No growth thus far  2-23-23: No growth    Discussed with patient, RN, IM. Chest x-ray: 3/1/23      Chest x-ray: 2/28/23      Chest x-ray: 2/23/23                                           CT: 2/17/23      I have personally reviewed the past medical history, past surgical history, medications, social history, and family history, and I have updated the database accordingly. Past Medical History:     Past Medical History:   Diagnosis Date    Acute interstitial nephritis 08/03/2022    Unclear cause. Creat bumped to 2.9, baseline in oct 2020 1.6.   Exact description of his kidney biopsy is not available to me however pathologist seems to think there is diabetic glomerosclerosis and acute interstitial nephritis seen on the biopsy specimen. No mention about chronic changes. Because of acute interstitial nephritis a trial of steroids for 15 days will be given to see if I can impro    Aortic regurgitation     ARF (acute renal failure) ([facility] [address] 08/03/2022    kidney biopsy from June 2022  showing findings of acute interstitial nephritis. Exact description not available. Trial of steroids will be given to see if I can improve renal function. Creatinine in October 2020 was 1.6, creatinine in June 2022 was 2.9. Trial of steroids will be given to see if renal function can be improved.     Chronic kidney disease     Hyperlipidemia     Hypertension     Non-Hodgkin lymphoma ([facility] [address]     Research study patient 02/13/2023    [id number] Completed 2/15/23    Sarcoidosis     Type II or unspecified type diabetes mellitus without mention of complication, not stated as uncontrolled        Past Surgical  History:     Past Surgical History:   Procedure Laterality Date    CARPAL TUNNEL RELEASE Left 11/15     EYE SURGERY Right     shunt and cataract     EYE SURGERY Right 6/16    laser     IR CHEST TUBE INSERTION  2/13/2023    IR CHEST TUBE INSERTION 2/13/2023 [facility]    IR CHEST TUBE INSERTION  2/18/2023    IR CHEST TUBE INSERTION 2/18/2023 Sushila Webb MD [facility]    KNEE ARTHROSCOPY      MALIGNANT SKIN LESION EXCISION      ROTATOR CUFF REPAIR Right 7/14    ROTATOR CUFF REPAIR Left 5/15    THORACOSCOPY Left 2/23/2023    VIDEO ASSISTED THORACOSCOPY, DECORTICATION performed by Vincenzo Lebron MD at [address] [facility] Right 10/2018       Medications:      [START ON 3/3/2023] furosemide  40 mg Oral Daily    insulin glargine  25 Units SubCUTAneous Daily    insulin glargine  15 Units SubCUTAneous Nightly    metoprolol tartrate  50 mg Oral BID    senna  2 tablet Oral Nightly    amiodarone  200 mg Oral BID    sodium chloride flush  5-40 mL IntraVENous 2 times per day    heparin (porcine)  5,000 Units SubCUTAneous 3 times per day    insulin lispro  0-16 Units SubCUTAneous 4x Daily AC & HS    midodrine  10 mg Oral TID WC    methylPREDNISolone  30 mg IntraVENous Q12H    docusate sodium  100 mg Oral TID    cefTRIAXone (ROCEPHIN) IV  1,000 mg IntraVENous Q24H    pantoprazole  40 mg Oral QAM AC    colchicine  0.3 mg Oral Daily    sodium chloride flush  5-40 mL IntraVENous 2 times per day    mupirocin   Nasal BID    chlorhexidine   Topical See Admin Instructions    sodium chloride flush  5-40 mL IntraVENous 2 times per day    polyethylene glycol  17 g Oral Daily    sodium chloride flush  5-40 mL IntraVENous 2 times per day       Social History:     Social History     Socioeconomic History    Marital status:      Spouse name: Not on file    Number of children: Not on file    Years of education: Not on file    Highest education level: Not on file   Occupational History    Not on file   Tobacco Use    Smoking status: Never    Smokeless tobacco: Former    Tobacco comments:     minimal and infrequent    Substance and Sexual Activity    Alcohol use: Yes     Alcohol/week: 0.0 standard drinks     Comment: rare    Drug use: No    Sexual activity: Not on file   Other Topics Concern    Not on file   Social History Narrative    Not on file     Social Determinants of Health     Financial Resource Strain: Low Risk     Difficulty of Paying Living Expenses: Not very hard   Food Insecurity: No Food Insecurity    Worried About Running Out of Food in the Last Year: Never true    Ran Out of Food in the Last Year: Never true   Transportation Needs: No Transportation Needs    Lack of Transportation (Medical): No    Lack of Transportation (Non-Medical): No   Physical Activity: Not on file   Stress: Stress Concern Present    Feeling of Stress :  To some extent   Social Connections: Socially Integrated    Frequency of Communication with Friends and Family: Three times a week    Frequency of Social Gatherings with Friends and Family: Three times a week    Attends [employer] Services: 1 to 4 times per year    Active Member of Clubs or Organizations: No    Attends Club or Organization Meetings: 1 to 4 times per year    Marital Status:    Intimate Partner Violence: Not At Risk    Fear of Current or Ex-Partner: No    Emotionally Abused: No    Physically Abused: No    Sexually Abused: No   Housing Stability: Low Risk     Unable to Pay for Housing in the Last Year: No    Number of Places Lived in the Last Year: 1    Unstable Housing in the Last Year: No       Family History:     Family History   Problem Relation Age of Onset    High Blood Pressure Father     Other Father         AAA    Heart Disease Other         uncle x 2         Allergies:   Latex, Gabapentin, Meperidine, Erythromycin, Glimepiride, Lidocaine, Hydrocodone, Macrolides and ketolides, Niacin and related, Trelegy ellipta [fluticasone-umeclidin-vilant], Pregabalin, and Xanax [alprazolam]     Review of Systems:   Constitutional: No fevers or chills. No systemic complaints  Head: No headaches  Eyes: No double vision or blurry vision. No conjunctival inflammation.  ENT: No sore throat or runny nose.. No hearing loss, tinnitus or vertigo.  Cardiovascular: No chest pain or palpitations. shortness of breath.  WANG  Lung: Left sided chest tube with some shortness of breath with dry cough. No sputum production  Abdomen: No nausea, vomiting, diarrhea, or abdominal pain.. No cramps.  Genitourinary: No increased urinary frequency, or dysuria. No hematuria. No suprapubic or CVA pain  Musculoskeletal: No muscle aches or pains. No joint effusions, swelling or deformities  Hematologic: No bleeding or bruising.  Neurologic: No headache, weakness, numbness, or tingling.  Integument: No rash, no ulcers.  Psychiatric: No depression.   Endocrine: No polyuria, no polydipsia, no polyphagia.    Physical  Examination :   Patient Vitals for the past 8 hrs:   BP Temp Temp src Pulse Resp SpO2   03/02/23 1200 113/72 -- -- 61 24 95 %   03/02/23 1100 92/73 -- -- 67 19 97 %   03/02/23 1000 119/74 97.5 °F (36.4 °C) Oral 73 (!) 9 96 %   03/02/23 0900 100/71 -- -- 82 18 (!) 89 %   03/02/23 0800 (!) 102/58 97.5 °F (36.4 °C) Oral 79 23 93 %   03/02/23 0709 -- -- -- 82 22 95 %   03/02/23 0700 (!) 108/57 -- -- 81 21 94 %   03/02/23 0600 109/71 -- -- 64 20 96 %   03/02/23 0500 109/67 -- -- 69 17 95 %     General Appearance: Awake, alert, and in no apparent distress  Head:  Normocephalic, no trauma  Eyes: Pupils equal, round, reactive to light and accommodation; extraocular movements intact; sclera anicteric; conjunctivae pink. No embolic phenomena. ENT: Oropharynx clear, without erythema, exudate, or thrush. No tenderness of sinuses. Mouth/throat: mucosa pink and moist. No lesions. Dentition in good repair. Neck:Supple, without lymphadenopathy. Thyroid normal, No bruits. Pulmonary/Chest: Left sided chest tube in place to suction with serosanguinous drainage. Diminished to auscultation. No dullness to percussion. Cardiovascular: Regular rate and rhythm without murmurs, rubs, or gallops. Abdomen: Soft, non tender. Bowel sounds normal. No organomegaly  All four Extremities: No cyanosis, clubbing, edema, or effusions. Neurologic: No gross sensory or motor deficits. Skin: Warm and dry with good turgor. No signs of peripheral arterial or venous insufficiency. No ulcerations.  Left sided-chest tube site CDI    Medical Decision Making -Laboratory:   I have independently reviewed/ordered the following labs:    CBC with Differential:   Recent Labs     02/28/23  0413   WBC 16.3*   HGB 8.3*   HCT 27.0*          BMP:   Recent Labs     03/01/23  1320 03/02/23  0456    140   K 4.2 5.0    103   CO2 24 23   BUN 59* 59*   CREATININE 3.00* 2.92*     Hepatic Function Panel: No results for input(s): PROT, LABALBU, BILIDIR, IBILI, BILITOT, ALKPHOS, ALT, AST in the last 72 hours. No results for input(s): RPR in the last 72 hours. No results for input(s): HIV in the last 72 hours. No results for input(s): BC in the last 72 hours. Lab Results   Component Value Date/Time    MUCUS 1+ 02/13/2023 02:17 AM    RBC 2.96 02/28/2023 04:13 AM    RBC 5.15 04/03/2012 11:08 AM    WBC 16.3 02/28/2023 04:13 AM    TURBIDITY Cloudy 02/22/2023 01:17 PM     Lab Results   Component Value Date/Time    CREATININE 2.92 03/02/2023 04:56 AM    GLUCOSE 177 03/02/2023 04:56 AM    GLUCOSE 140 04/03/2012 11:08 AM       Medical Decision Making-Imaging:     Narrative   EXAMINATION:   CT OF THE CHEST WITHOUT CONTRAST, 2/17/2023 9:29 am       TECHNIQUE:   CT of the chest was performed without the administration of intravenous   contrast. Multiplanar reformatted images are provided for review. Automated   exposure control, iterative reconstruction, and/or weight based adjustment of   the mA/kV was utilized to reduce the radiation dose to as low as reasonably   achievable. COMPARISON:   02/13/2023       HISTORY:   ORDERING SYSTEM PROVIDED HISTORY:  Improvement? TECHNOLOGIST PROVIDED HISTORY:   Improvement? FINDINGS:   Mediastinum: Small bilateral axial lymph nodes are identified not   significantly changed. Small to moderate size mediastinal lymph nodes are   additionally present. Lungs/Pleura: There are multiple loculated effusions within the left chest   which have improved since previous exam.  Small amounts of new air are likely   secondary to the presence of the left thoracostomy tube. Left lower lobe atelectasis is identified. Upper Abdomen: There is a probable small hiatal hernia. Soft Tissues/Bones:  No acute osseous abnormality is appreciated. Old right   rib fractures are noted. Impression   Small to moderate-sized multiloculated left pleural effusions have improved.    New small foci of air within the collection is presumably related to the   introduction of the left thoracostomy tube. Stable small to moderate sized mediastinal lymph nodes are nonspecific but   may relate to the patient's history of lymphoma. Additional small bilateral   axial lymph nodes are identified. Small hiatal hernia. Narrative   EXAMINATION:   ONE XRAY VIEW OF THE CHEST       2/18/2023 3:04 pm       COMPARISON:   CT chest February 17, 2023       HISTORY:   ORDERING SYSTEM PROVIDED HISTORY: worsening   TECHNOLOGIST PROVIDED HISTORY:   worsening       FINDINGS:   Pigtail catheter on the left. Moderate left effusion appears   denser/increased. Mild bilateral interstitial infiltrates or edema. Heart   and mediastinum unremarkable. Bony thorax intact. No pneumothorax noted. Subsegmental atelectasis right lower lung. Impression   Moderate left pleural effusion increased. No change left chest tube/pigtail   catheter. Narrative   EXAMINATION:   ONE XRAY VIEW OF THE CHEST       2/28/2023 6:49 am       COMPARISON:   02/27/2023       HISTORY:   ORDERING SYSTEM PROVIDED HISTORY: s/p VATs with decortication   TECHNOLOGIST PROVIDED HISTORY: S/p VATs with decortication       FINDINGS:   Cardiomegaly is unchanged. Left-sided chest tubes remain in place. Small   left pleural effusion and patchy left basilar opacities are not significantly   changed. No evidence of pneumothorax. Impression   No significant interval change. Narrative   EXAMINATION:   ONE XRAY VIEW OF THE CHEST       2/28/2023 9:16 am       COMPARISON:   02/28/2023 at 06:20       HISTORY:   ORDERING SYSTEM PROVIDED HISTORY: post chest tube removal   TECHNOLOGIST PROVIDED HISTORY:   post chest tube removal   Reason for Exam: uprt port chest       FINDINGS:   Left-sided chest tube has been removed. Small left pleural effusion   persists, unchanged. Left basilar airspace disease is similar as well.   No   pneumothorax identified. Cardial pericardial silhouette is enlarged but similar when compared to the   previous exam.           Impression   Interval removal of left-sided chest tube. No pneumothorax. Continued small left pleural effusion, unchanged. Left basilar airspace   disease is unchanged. Narrative   EXAMINATION:   ONE XRAY VIEW OF THE CHEST       3/1/2023 5:39 am       COMPARISON:   02/28/2023       HISTORY:   ORDERING SYSTEM PROVIDED HISTORY: s/p VATs with decortication   TECHNOLOGIST PROVIDED HISTORY: S/p VATs with decortication       FINDINGS:   Mild cardiomegaly is unchanged. Small left pleural effusion and patchy   opacities at the left base are unchanged. No evidence of pneumothorax. No   free air beneath the diaphragm. Impression   No significant oval change. Small left pleural effusion and patchy opacities   at the left base. Medical Decision [id number]:       Medical Decision Making-Other:     Note:  Labs, medications, radiologic studies were reviewed with personal review of films  Large amounts of data were reviewed  Discussed with nursing Staff, Discharge planner  Infection Control and Prevention measures reviewed  All prior entries were reviewed  Administer medications as ordered  Prognosis: [address]  Discharge planning reviewed  Follow up as outpatient. Thank you for allowing us to participate in the care of this patient. Please call with questions. FLOYD Wilson - CNP participated in the evaluation of this patient, under supervision. ATTESTATION:    I have discussed the case, including pertinent history and exam findings with the APRN. I have evaluated the  History, physical findings and pictures of the patient and the key elements of the encounter have been performed by me. I have reviewed the laboratory data, other diagnostic studies and discussed them with the APRN. I have updated the medical record where necessary.     I agree with the assessment, plan and orders as documented by the APRN.     Zara Sumner MD.      Pager: [phone] - Office: [phone]

## 2023-03-02 NOTE — PROGRESS NOTES
NEPHROLOGY PROGRESS NOTE      ASSESSMENT     #1 acute kidney injury secondary to ischemic ATN (hypotension/pneumonia-empyema/NSAID and A-fib) -improving  #2 chronic kidney disease stage IIIb secondary to biopsy-proven diabetic nephropathy with baseline creatinine 1.8-2 and follows up with Dr. Jaleel Gunderson  #3 left-sided empyema cultures positive for Streptococcus status post VATS  #4 acute pericarditis  #5 atrial fibrillation with RVR  #6 type 2 diabetes  #7 history of multiple myeloma  #8 history of non-Hodgkin's lymphoma  #9 history of sarcoidosis      PLAN     #1  P.o. Lasix  #2 antibiotics as per estimated GFR  #3 avoid nephrotoxins  #4 we will follow      SUBJECTIVE   Urine output excellent. Overall in negative cumulative balance. On IV Lasix 40 mg   No acute hemodynamic issues overnight. Receiving antibiotics  Marginal improvement in creatinine noted  Tolerating oral intake well    OBJECTIVE     Vitals:    03/02/23 0709 03/02/23 0800 03/02/23 0900 03/02/23 1000   BP:  (!) 102/58 100/71 119/74   Pulse: 82 79 82 73   Resp: 22 23 18 (!) 9   Temp:  97.5 °F (36.4 °C)  97.5 °F (36.4 °C)   TempSrc:  Oral  Oral   SpO2: 95% 93% (!) 89% 96%   Weight:       Height:         24HR INTAKE/OUTPUT:    Intake/Output Summary (Last 24 hours) at 3/2/2023 1104  Last data filed at 3/2/2023 1000  Gross per 24 hour   Intake 470 ml   Output 2580 ml   Net -2110 ml       General appearance:Awake, alert, in no acute distress  HEENT: PERRLA  Respiratory::vesicular breath sounds, present wheezing  Cardiovascular:S1 S2 normal,no gallop or organic murmur. Abdomen:Non tender/non distended. Bowel sounds present  Extremities: No Cyanosis or Clubbing,Lower extremity edema  Neurological:Alert and oriented. No abnormalities of mood, affect, memory, mentation, or behavior are noted      MEDICATIONS     Scheduled Meds:    [START ON 3/3/2023] furosemide  40 mg Oral Daily    insulin glargine  25 Units SubCUTAneous Daily    insulin glargine  15 Units SubCUTAneous Nightly    metoprolol tartrate  50 mg Oral BID    senna  2 tablet Oral Nightly    amiodarone  200 mg Oral BID    sodium chloride flush  5-40 mL IntraVENous 2 times per day    heparin (porcine)  5,000 Units SubCUTAneous 3 times per day    insulin lispro  0-16 Units SubCUTAneous 4x Daily AC & HS    midodrine  10 mg Oral TID WC    methylPREDNISolone  30 mg IntraVENous Q12H    docusate sodium  100 mg Oral TID    cefTRIAXone (ROCEPHIN) IV  1,000 mg IntraVENous Q24H    pantoprazole  40 mg Oral QAM AC    colchicine  0.3 mg Oral Daily    sodium chloride flush  5-40 mL IntraVENous 2 times per day    mupirocin   Nasal BID    chlorhexidine   Topical See Admin Instructions    sodium chloride flush  5-40 mL IntraVENous 2 times per day    polyethylene glycol  17 g Oral Daily    sodium chloride flush  5-40 mL IntraVENous 2 times per day     Continuous Infusions:    sodium chloride      sodium chloride      sodium chloride      sodium chloride      dextrose       PRN Meds:  bisacodyl, sodium chloride flush, sodium chloride, oxyCODONE-acetaminophen **OR** [DISCONTINUED] oxyCODONE-acetaminophen, metoprolol, sodium chloride flush, sodium chloride, sodium chloride flush, sodium chloride, sodium chloride flush, sodium chloride, glucose, dextrose bolus **OR** dextrose bolus, glucagon (rDNA), dextrose  Home Meds:                Medications Prior to Admission: amLODIPine (NORVASC) 5 MG tablet, TAKE ONE TABLET BY MOUTH DAILY (Patient taking differently: nightly)  omeprazole (PRILOSEC) 20 MG delayed release capsule, Take 20 mg by mouth nightly  fluticasone-salmeterol (ADVAIR DISKUS) 250-50 MCG/ACT AEPB diskus inhaler, Inhale 1 puff into the lungs every 12 hours  metoprolol succinate (TOPROL XL) 25 MG extended release tablet, Take 25 mg by mouth at bedtime  dutasteride (AVODART) 0.5 mg capsule, Take 0.5 mg by mouth daily  finasteride (PROSCAR) 5 MG tablet, Take 5 mg by mouth nightly  Misc.  Devices (NASAL SPRAY BOTTLE) MISC, by Does not apply route  CINNAMON PO, Take by mouth  repaglinide (PRANDIN) 1 MG tablet, Take 1 mg by mouth with breakfast and with evening meal  pimecrolimus (ELIDEL) 1 % cream, Apply topically as needed Apply topically 2 times daily. albuterol sulfate  (90 BASE) MCG/ACT inhaler, Inhale 1 puff into the lungs in the morning and at bedtime  Liraglutide (VICTOZA SC), Inject 1.8 mg into the skin daily   fenofibrate (TRICOR) 145 MG tablet, Take 145 mg by mouth nightly  dicyclomine (BENTYL) 10 MG capsule, Take 10 mg by mouth 3 times daily (with meals)  atorvastatin (LIPITOR) 80 MG tablet, Take 80 mg by mouth daily.   DULoxetine (CYMBALTA) 60 MG capsule, Take 120 mg by mouth nightly    INVESTIGATIONS     Last 3 CMP:    Recent Labs     02/28/23  1707 03/01/23  1320 03/02/23  0456   * 137 140   K 4.5 4.2 5.0    102 103   CO2 22 24 23   BUN 64* 59* 59*   CREATININE 3.79* 3.00* 2.92*   CALCIUM 9.1 9.4 9.0       Last 3 CBC:  Recent Labs     02/28/23  0413   WBC 16.3*   RBC 2.96*   HGB 8.3*   HCT 27.0*   MCV 91.2   MCH 28.0   MCHC 30.7   RDW 16.4*      MPV 9.6       Please do not hesitate to call with questions    This note is created with the assistance of a speech-recognition program. While intending to generate a document that actually reflects the content of the visit, no guarantees can be provided that every mistake has been identified and corrected by editing    Jerman Multani MD MD, [facility]), [address]   3/2/2023 11:04 AM  [facility]

## 2023-03-02 NOTE — PROGRESS NOTES
[facility]  Office: [address], DO, Ahmet Dalal, DO, Charlotte Benton, DO, Eugenio Baugh Blood, DO, Sophia Lou MD, Keiko Ramso MD, Juliette Contreras MD, Edwin Wadsworth MD,  Sheldon Salazar MD, Lorna Russell MD, Payam Pelayo, DO, Mone Shearer MD,  Radha Mcduffie MD, Viviana Trinidad MD, Fritz Tatum, DO, Tesha Banuelos MD, Eunice Zeng MD, Estephania Sheets, DO, Wade Diop MD, Eliseo Perez MD, Arabella Finney MD, Mumtaz Rebolledo MD, Yari Stafford DO, Lazaro Roger MD, Nan Oates MD, Emma Devine, Lazaro Ramírez, [facility], Romario Page, [facility], Andrew Francis, CNP,  Marian Howe, [facility], Zena Lombardi, CNP, Jazzmine Abarca, CNP, Claudeen Quarry, CNP, Lauri Malone, CNP, Prakash Mcgill, CNP, Rowan Oppenheim, PA-C, Siddhartha Arteaga, CNS, Suzanne Guerra, [facility], Solitario Patterson, [address] [place] [address]    Progress Note    3/2/2023    8:29 AM    Name:   Keila Powell  MRN:     [id number]     [place]:      [de-identified]   Room:   [address] [place] [address] Day:  25  Admit Date:  2/12/2023 10:33 PM    PCP:   Vazquez Davis MD  Code Status:  Full Code    Subjective:     C/C: SOB    Interval History Status: improved. Patient is s/p chest tube removal several days ago. His wife is at bedside. He seems confused today. He states that he stood up and walked a little with PT/OT. His appetite is much better, Cr 2.92 slowly improving. He is off oxygen, but O2 sat 88-92% on RA. He does c/o WANG. Brief History:     Per my partner:  Kai Lopez is 79 y.o. male  Who was admitted to the hospital on 2/12/2023 for treatment of Pneumonia, unspecified organism. Patient presented to ER at [facility] on 2/12/2023 with dyspnea and was found to have left multiloculated pneumonia with large pleural effusion with mediastinal and bilateral axillary lymphadenopathy.   Patient has prior history of non-Hodgkin's lymphoma, multiple myeloma. Patient was requiring high flow nasal cannula and had thoracentesis. Pleural fluid was consistent with empyema and patient had chest tube placement by IR. He was treated with broad-spectrum antibiotics. Patient was also given dornase without much improvement. CT surgery was consulted and recommended increasing the size of chest tube and did not recommend any open surgical intervention at this time. Patient had exchange of chest tube on 2/18/2023. He had diffuse ST elevation secondary to pericarditis on 1/21/23. Patient was was started on colchicine. Patient had new onset atrial fibrillation with RVR on 10/22/23 and was given digoxin and amiodarone infusion . Patient also given colchicine and high-dose aspirin for pericarditis which was later changed to Solu-Medrol. Patient underwent VATS with decortication on 2/23/2023. He was extubated postoperatively on 2/23/2023. Patient underwent AFIA cardioversion on 2/27/2023 for rate uncontrolled atrial fibrillation. \"    Review of Systems:     Constitutional:  negative for chills, fevers, sweats  Respiratory:  Positive for cough, dyspnea on exertion, shortness of breath, no wheezing  Cardiovascular:  negative for chest pain, chest pressure/discomfort, lower extremity edema, palpitations  Gastrointestinal:  negative for abdominal pain, constipation, no diarrhea, nausea, vomiting  Neurological:  negative for dizziness, headache    Medications: Allergies: Allergies   Allergen Reactions    Latex Rash    Gabapentin      Other reaction(s): Vomiting    Meperidine Anaphylaxis    Erythromycin      Other reaction(s): Fever  Had all side effects associated with this medication      Glimepiride      Other reaction(s): Dizziness    Lidocaine Swelling     States as a child having reaction to lidocaine where his face swelled.    Ever since then he has never had lidocaine    Hydrocodone      Other reaction(s): Vomiting    Macrolides And Ketolides Other (See Comments)    Niacin And Related     Trelegy Ellipta [Fluticasone-Umeclidin-Vilant]     Pregabalin Diarrhea    Xanax [Alprazolam] Anxiety     Anxiety, restlessness, insomnia       Current Meds:   Scheduled Meds:    insulin glargine  25 Units SubCUTAneous Daily    insulin glargine  15 Units SubCUTAneous Nightly    metoprolol tartrate  50 mg Oral BID    furosemide  40 mg IntraVENous Daily    senna  2 tablet Oral Nightly    amiodarone  200 mg Oral BID    sodium chloride flush  5-40 mL IntraVENous 2 times per day    heparin (porcine)  5,000 Units SubCUTAneous 3 times per day    insulin lispro  0-16 Units SubCUTAneous 4x Daily AC & HS    midodrine  10 mg Oral TID WC    methylPREDNISolone  30 mg IntraVENous Q12H    docusate sodium  100 mg Oral TID    cefTRIAXone (ROCEPHIN) IV  1,000 mg IntraVENous Q24H    pantoprazole  40 mg Oral QAM AC    colchicine  0.3 mg Oral Daily    sodium chloride flush  5-40 mL IntraVENous 2 times per day    mupirocin   Nasal BID    chlorhexidine   Topical See Admin Instructions    sodium chloride flush  5-40 mL IntraVENous 2 times per day    polyethylene glycol  17 g Oral Daily    sodium chloride flush  5-40 mL IntraVENous 2 times per day     Continuous Infusions:    sodium chloride      sodium chloride      sodium chloride      sodium chloride      dextrose       PRN Meds: bisacodyl, sodium chloride flush, sodium chloride, oxyCODONE-acetaminophen **OR** [DISCONTINUED] oxyCODONE-acetaminophen, metoprolol, sodium chloride flush, sodium chloride, sodium chloride flush, sodium chloride, sodium chloride flush, sodium chloride, glucose, dextrose bolus **OR** dextrose bolus, glucagon (rDNA), dextrose    Data:     Past Medical History:   has a past medical history of Acute interstitial nephritis, Aortic regurgitation, ARF (acute renal failure) ([facility] [address] Chronic kidney disease, Hyperlipidemia, Hypertension, [facility] Research study patient, Sarcoidosis, and Type II or unspecified type diabetes mellitus without mention of complication, not stated as uncontrolled. Social History:   reports that he has never smoked. He has quit using smokeless tobacco. He reports current alcohol use. He reports that he does not use drugs. Family History:   Family History   Problem Relation Age of Onset    High Blood Pressure Father     Other Father         AAA    Heart Disease Other         uncle x 2        Vitals:  /71   Pulse 64   Temp 97.7 °F (36.5 °C) (Oral)   Resp 22   Ht 6' 1\" (1.854 m)   Wt 230 lb 9.6 oz (104.6 kg)   SpO2 96%   BMI 30.42 kg/m²   Temp (24hrs), Av °F (36.7 °C), Min:97.7 °F (36.5 °C), Max:98.4 °F (36.9 °C)    Recent Labs     23  0801   POCGLU 200* 368* 322* 263*       I/O (24Hr):     Intake/Output Summary (Last 24 hours) at 3/2/2023 0829  Last data filed at 3/2/2023 0600  Gross per 24 hour   Intake 320 ml   Output 2555 ml   Net -2235 ml       Labs:  Hematology:  Recent Labs     23  0413   WBC 16.3*   RBC 2.96*   HGB 8.3*   HCT 27.0*   MCV 91.2   MCH 28.0   MCHC 30.7   RDW 16.4*      MPV 9.6     Chemistry:  Recent Labs     23  1707 23  1320 23  0456   * 137 140   K 4.5 4.2 5.0    102 103   CO2 22 24 23   GLUCOSE 177* 189* 177*   BUN 64* 59* 59*   CREATININE 3.79* 3.00* 2.92*   ANIONGAP 12 11 14   LABGLOM 17* 22* 23*   CALCIUM 9.1 9.4 9.0     Recent Labs     23  0823 23  1124 23  1800 23  0801   POCGLU 212* 206* 200* 368* 322* 263*     ABG:  Lab Results   Component Value Date/Time    POCPH 7.356 2023 12:43 PM    POCPCO2 38.8 2023 12:43 PM    POCPO2 66.2 2023 12:43 PM    POCHCO3 21.7 2023 12:43 PM    NBEA 4 2023 12:43 PM    PBEA 2 2023 05:34 AM    SITI9ZTZ 92 2023 12:43 PM    FIO2 50.0 2023 03:06 AM     Lab Results   Component Value Date/Time    SPECIAL LEFT PLEURAL PEEL 2023 12:00 PM    SPECIAL  LEFT PLEURAL PEEL 02/23/2023 12:00 PM    SPECIAL LEFT PLEURAL PEEL 02/23/2023 12:00 PM    SPECIAL LEFT PLURAL PEEL 02/23/2023 12:00 PM    SPECIAL LEFT PLEURAL PEEL 02/23/2023 12:00 PM     Lab Results   Component Value Date/Time    CULTURE NO GROWTH 4 DAYS 02/23/2023 12:00 PM    CULTURE NO GROWTH 5 DAYS 02/23/2023 12:00 PM    CULTURE NO GROWTH 3 DAYS 02/23/2023 12:00 PM    CULTURE  02/23/2023 12:00 PM     NEGATIVE. No Herpes Simplex Virus detected by Enzyme Linked Virus Inducible System culture. at day 2    CULTURE  02/23/2023 12:00 PM     NEGATIVE for Influenza A and B, Para influenza 1,2,3, Adenovirus and RSV. at day 2    CULTURE  02/23/2023 12:00 PM     Negative results do not preclude respiratory virus infection and should not be used as the sole basis for diagnosis, treatment or other management decisions. CULTURE NEGATIVE for Cytomegalovirus at day 3 02/23/2023 12:00 PM    CULTURE NEGATIVE for Enterovirus at day 5 02/23/2023 12:00 PM       Radiology:  XR CHEST PORTABLE    Result Date: 2/27/2023  Left chest tubes present with partially loculated left pleural effusion and left basilar opacity unchanged. XR CHEST PORTABLE    Result Date: 2/26/2023  No significant change from prior exam. Left chest tubes with left basilar airspace disease and small amount of pleural fluid. Linear left retrocardiac lucency could represent small pneumothorax. XR CHEST PORTABLE    Result Date: 2/25/2023  No significant change from prior exam.     XR CHEST PORTABLE    Result Date: 2/23/2023  Status post removal single small bore and placement 2 large-bore chest tubes left lung with reduced opacity, presumably multiloculated left pleural effusion and associated atelectasis. No pneumothorax. Slightly greater atelectasis right base.        Physical Examination:        General appearance:  alert, cooperative and no distress  Mental Status:  oriented to person, place but not time,  normal affect  Lungs:  + crackles bibasilar, normal effort  Heart:  regular rate and rhythm, no murmur  Abdomen:  soft, nontender, nondistended, normal bowel sounds, no masses, hepatomegaly, splenomegaly  Extremities:  no edema, redness, tenderness in the calves  Skin:  no gross lesions, rashes, induration    Assessment:        Hospital Problems             Last Modified POA    * (Principal) Pneumonia, unspecified organism 2/12/2023 Yes    Pleural effusion 2/13/2023 Yes    Hyperkalemia 2/13/2023 Yes    Acute respiratory failure with hypoxia ([address] 2/13/2023 Yes    Hyponatremia 2/13/2023 Yes    Hyperglycemia 2/13/2023 Yes    History of non-Hodgkin's lymphoma 2/13/2023 Yes    History of sarcoidosis 2/13/2023 Yes    Empyema ([address] 2/18/2023 Yes    History of atrial fibrillation 2/25/2023 Yes    History of type 2 diabetes mellitus 2/25/2023 Yes    KELSEA (acute kidney injury) ([address] 2/26/2023 Yes    Overview Addendum 8/24/2022  8:56 AM by Francisco Iqbal MD     kidney biopsy from June 2022  showing findings of acute interstitial nephritis. Exact description not available. Trial of steroids will be given to see if I can improve renal function. Creatinine in October 2020 was 1.6, creatinine in June 2022 was 2.9. Trial of steroids will be given to see if renal function can be improved. After a 12-day course of prednisone creatinine has come down to 2.0, calcium is improved as well. Interstitial nephritis appears to be related to sarcoidosis.             Plan:        Left empyema- s/p decortication 2/23/23, con't IV Rocephin, CTS following and ID  A.fib with RVR- s/p cardioversion, con't Amiodarone  question of AC for Cardiology  Pericarditis- Con't Colchicine, Cardiology stopped IV Solumedrol today, wife states that he gets confused on steriods  KELSEA on CKD 3- slowly improving, Nephrology stopped IVF, decreased Lasix to daily, moon catheter removed, repeat BMP tomorrow, baseline Cr 1.8-2  CLAY- Cpap  COPD- stable  Acute delirium- seroquel at night, may be from IV steriods  DM2- BS better, Lantus started since he's on steriods, decrease Lantus 10 units sq HS and SSI, resume Prandin before meals  Gi/ DVT proph  PT/OT    Anthony signed, discharge planning for tomorrow?  Dw wife    Venessa Drummond MD  3/2/2023  8:29 AM

## 2023-03-02 NOTE — CARE COORDINATION
Yajaira from with [facility] called to inform CM they had not received referrals from [facility] yet. Writer called [facility] and left message with admissions regarding Yajaira's message. Uma Lamas, fax [phone] from [employer] called with approval for [facility]. Ref # [id number].    5295 Confirmed with Ruthy at [address] pt access office pt has auth good through 3/7/23. Melodie Carter, pt RN updated. Per RN pt has midline for Rocephin through 3/12. Bullock out. No drains. 1415 Pt updated and agreeable to transition to [facility].

## 2023-03-02 NOTE — PROGRESS NOTES
Comprehensive Nutrition Assessment    Type and Reason for Visit:  Reassess    Nutrition Recommendations/Plan:   Continue current diet and ONS  Monitor weight, labs and intake. Malnutrition Assessment:  Malnutrition Status: At risk for malnutrition (Comment) (02/24/23 2205)    Context:  Acute Illness     Findings of the 6 clinical characteristics of malnutrition:  Energy Intake:  75% or less of estimated energy requirements for 7 or more days  Weight Loss:  1% to 2% over 1 week     Body Fat Loss:  No significant body fat loss     Muscle Mass Loss:  No significant muscle mass loss    Fluid Accumulation:  No significant fluid accumulation     Strength:  Not Performed    Nutrition Assessment:    Patient seen for follow up. Sitting up in chair upon visit, 100% breakfast and Nepro consumed. Patient reports good intake/appetite and a hope to be discharged this afternoone. glucose 177-322. Meds reviewed. Nutrition Related Findings:    Labs/meds reviewed Wound Type: None       Current Nutrition Intake & Therapies:    Average Meal Intake: %  Average Supplements Intake: %  ADULT ORAL NUTRITION SUPPLEMENT; Breakfast, Dinner; Renal Oral Supplement  ADULT DIET; Regular; Low Fat/Low Chol/High Fiber/2 gm Na; Low Sodium (2 gm); Low Potassium (Less than 3000 mg/day); Low Phosphorus (Less than 1000 mg)    Anthropometric Measures:  Height: 6' 1\" (185.4 cm)  Ideal Body Weight (IBW): 184 lbs (84 kg)    Admission Body Weight: 224 lb 13.9 oz (102 kg)  Current Body Weight: 230 lb 9.6 oz (104.6 kg), 131 % IBW. Weight Source: Standing Scale  Current BMI (kg/m2): 30.4        Weight Adjustment For: No Adjustment                 BMI Categories: Obese Class 1 (BMI 30.0-34. 9)    Estimated Daily Nutrient Needs:  Energy Requirements Based On: Formula  Weight Used for Energy Requirements: Current  Energy (kcal/day): 3500-0748 kcal/day  Weight Used for Protein Requirements: Ideal  Protein (g/day): 110-120 gm/day  Method Used for Fluid Requirements: Other (Comment)  Fluid (ml/day): per MD    Nutrition Diagnosis:   No nutrition diagnosis at this time related to other (comment) as evidenced by other (comment)    Nutrition Interventions:   Food and/or Nutrient Delivery: Continue Current Diet, Continue Oral Nutrition Supplement  Nutrition Education/Counseling: No recommendation at this time  Coordination of Nutrition Care: Continue to monitor while inpatient  Plan of Care discussed with: Patient    Goals:  Previous Goal Met: Goal(s) Achieved  Goals: Meet at least 75% of estimated needs, prior to discharge       Nutrition Monitoring and Evaluation:   Behavioral-Environmental Outcomes: None Identified  Food/Nutrient Intake Outcomes: Food and Nutrient Intake, Supplement Intake  Physical Signs/Symptoms Outcomes: Biochemical Data, Nutrition Focused Physical Findings, Skin, Weight, Fluid Status or Edema, Constipation    Discharge Planning:    No discharge needs at this time     Amando Suarez, [address]: [id number]

## 2023-03-02 NOTE — PROGRESS NOTES
Occupational Therapy  Facility/Department: [facility]- [facility]  Occupational Therapy Daily Treatment Note  Name: Tomasa Amaral  : 1955  MRN: [id number]  Date of Service: 3/2/2023    Discharge Recommendations:  Patient would benefit from continued therapy after discharge in order to increase pt balance, strength and independence            Assessment   Performance deficits / Impairments: Decreased functional mobility ; Decreased ADL status; Decreased cognition;Decreased endurance;Decreased balance;Decreased high-level IADLs;Decreased strength;Decreased safe awareness  Prognosis: Good  REQUIRES OT FOLLOW-UP: Yes  Activity Tolerance  Activity Tolerance: Patient Tolerated treatment well;Patient limited by fatigue        Plan   Occupational Therapy Plan  Times Per Week: 3-5 x/wk  Current Treatment Recommendations: Balance training, Functional mobility training, Endurance training, Cognitive reorientation, Safety education & training, Patient/Caregiver education & training, Equipment evaluation, education, & procurement, Self-Care / ADL, Home management training     Restrictions  Restrictions/Precautions  Restrictions/Precautions: Up as Tolerated, Fall Risk, Surgical Protocols, General Precautions  Required Braces or Orthoses?: No  Required Braces or Orthoses  Other: Heart Hugger Brace  Position Activity Restriction  Other position/activity restrictions: Bullock catheter    Subjective   General  Chart Reviewed: Yes  Family / Caregiver Present: Yes (wife)  General Comment  Comments: Pt and RN agreeable to therapy this day. Pt seated in chair at start of session and retired to seated in chair at session end with call light in reach, BLE elevated and all needs met.  Pt denied pain c/o SOB         Objective        Safety Devices  Type of Devices: Gait belt;Left in chair;Call light within reach;Nurse notified  Restraints  Restraints Initially in Place: No  Balance  Sitting: Without support (SBA static/dynamic sitting unsupported in chair and at toilet SUP tolerating approx 15 min)  Standing: With support (SBA increasing to intermitent CGA with increased fatigue tolerating 4 min dynamic standing for dynamic reaching activity at sink obtaining multiple objects reaching outside of LI from various heights and planes including crossing midline. Tolerated 1 min dynamic standing for ADL task at sink with RW)  Gait  Overall Level of Assistance: Minimum assistance (chair><bathroom utilizing RW requiring mod verbal cues to push walker and for proper placement pt demo P carry over requiring OLIVER assist)  Toilet Transfers  Toilet - Technique: Ambulating  Equipment Used: Standard toilet  Toilet Transfer: Contact guard assistance  Toilet Transfers Comments: utilizing RW and grab bars     ADL  Grooming: Stand by assistance;Setup  Grooming Skilled Clinical Factors: hand hygiene facilitated standing at sink with RW and 0-2 hand support demo forward flexed posture and increased SOB  UE Dressing: Stand by assistance;Setup  UE Dressing Skilled Clinical Factors: To doff/don gown x2 seated on toilet with second gown as simulated jacket  LE Dressing: Stand by assistance;Setup;Verbal cueing; Increased time to complete  LE Dressing Skilled Clinical Factors: to doff/don socks seated in chair. Education/demonstration provided on figure 4 dressing technique pt declined attempt stating, \"you will have to put them on for me I have a special way to do it at home. \" Pt began to demonstrate personal technique to doff/don socks able to reach BLE with trunk flexion and increased time  Additional Comments: Throughout session pt limited per fatigue, SOB and decreased cognition. Further ADLs not attempted pt becomming increasingly fatigued with activity demonstrated by SOB and trembling UE/LE     Activity Tolerance  Activity Tolerance: Treatment limited secondary to decreased cognition;Patient limited by endurance; Patient limited by fatigue  Activity Tolerance Comments: Patient felt weakness in legs which prompted return to chair after ambulation. No SOB or dizziness, just felt fatigued  Bed mobility  Bed Mobility Comments: pt seated in chair at start/end of session  Transfers  Sit to stand: Contact guard assistance  Stand to sit: Contact guard assistance  Transfer Comments: utilizing RW demo F hand placement     Cognition  Overall Cognitive Status: Exceptions  Arousal/Alertness: Appropriate responses to stimuli  Following Commands: Follows multistep commands with repitition; Follows multistep commands with increased time  Attention Span: Appears intact  Memory: Decreased recall of recent events  Safety Judgement: Decreased awareness of need for assistance;Decreased awareness of need for safety  Problem Solving: Decreased awareness of errors  Insights: Decreased awareness of deficits  Initiation: Requires cues for some  Sequencing: Does not require cues  Cognition Comment: Easily distracted.   Orientation  Overall Orientation Status: Within Functional Limits                  Education Given To: Patient  Education Provided: Role of Therapy;Transfer Training;ADL Adaptive Strategies;Precautions  Education Provided Comments: proper hand and foot placement; balance maintaince; walker management; safety precautions-F/P carry over  Education Method: Verbal  Barriers to Learning: Cognition  Education Outcome: Continued education needed          AM-PAC Score        AM-[facility] Inpatient Daily Activity Raw Score: 19 (03/02/23 1642)  AM-PAC Inpatient ADL T-Scale Score : 40.22 (03/02/23 1642)  ADL Inpatient CMS 0-100% Score: 42.8 (03/02/23 1642)  ADL Inpatient CMS G-Code Modifier : CK (03/02/23 1642)      Goals  Short Term Goals  Time Frame for Short Term Goals: By discharge, pt will:  Short Term Goal 1: Demo I with bed mobility to increase independence with ADLs and to decrease risk for pressure injury  Short Term Goal 2: Demo Mod I for functional transfers and functional mobility with use of LRD for engagement in ADLs/IADLs  Short Term Goal 3: Demo I with all UB ADLs  Short Term Goal 4: Demo Mod I for LB ADLs and toileting, utilizing AE and adaptive tech PRN  Short Term Goal 5: Demo 8 min dynamic standing and reaching outside LI with unilateral hand release and SUP for improved standing balance during ADL/IADL participation       Therapy Time   Individual Concurrent Group Co-treatment   Time In 1542         Time Out 1622         Minutes 40         Timed Code Treatment Minutes: [place] 190, OLIVER/L

## 2023-03-02 NOTE — PLAN OF CARE
Problem: Discharge Planning  Goal: Discharge to home or other facility with appropriate resources  3/2/2023 0933 by William Le RN  Outcome: Progressing  Flowsheets (Taken 3/2/2023 0800)  Discharge to home or other facility with appropriate resources:   Identify barriers to discharge with patient and caregiver   Arrange for needed discharge resources and transportation as appropriate  3/2/2023 0053 by Aurelia Taylor RN  Outcome: Progressing  Flowsheets (Taken 3/1/2023 2000)  Discharge to home or other facility with appropriate resources: Identify barriers to discharge with patient and caregiver     Problem: Safety - Adult  Goal: Free from fall injury  3/2/2023 0933 by William Le RN  Outcome: Progressing  3/2/2023 0053 by Aurelia Taylor RN  Outcome: Progressing  Flowsheets (Taken 3/1/2023 2344)  Free From Fall Injury: Instruct family/caregiver on patient safety     Problem: ABCDS Injury Assessment  Goal: Absence of physical injury  3/2/2023 0933 by William Le RN  Outcome: Progressing  3/2/2023 0053 by Aurelia Taylor RN  Outcome: Progressing  Flowsheets (Taken 3/1/2023 2344)  Absence of Physical Injury: Implement safety measures based on patient assessment     Problem: Skin/Tissue Integrity  Goal: Absence of new skin breakdown  Description: 1. Monitor for areas of redness and/or skin breakdown  2. Assess vascular access sites hourly  3. Every 4-6 hours minimum:  Change oxygen saturation probe site  4. Every 4-6 hours:  If on nasal continuous positive airway pressure, respiratory therapy assess nares and determine need for appliance change or resting period.   3/2/2023 0933 by William Le RN  Outcome: Progressing  3/2/2023 0053 by Aurelia Taylor RN  Outcome: Progressing     Problem: Chronic Conditions and Co-morbidities  Goal: Patient's chronic conditions and co-morbidity symptoms are monitored and maintained or improved  3/2/2023 0933 by William Le RN  Outcome: Progressing  Flowsheets (Taken 3/2/2023 0800)  Care Plan - Patient's Chronic Conditions and Co-Morbidity Symptoms are Monitored and Maintained or Improved: Monitor and assess patient's chronic conditions and comorbid symptoms for stability, deterioration, or improvement  3/2/2023 0053 by Deb Hernandez RN  Outcome: Progressing  Flowsheets (Taken 3/1/2023 2000)  Care Plan - Patient's Chronic Conditions and Co-Morbidity Symptoms are Monitored and Maintained or Improved: Monitor and assess patient's chronic conditions and comorbid symptoms for stability, deterioration, or improvement     Problem: Respiratory - Adult  Goal: Achieves optimal ventilation and oxygenation  3/2/2023 0933 by Rosalinda Desai RN  Outcome: Progressing  Flowsheets (Taken 3/2/2023 0800)  Achieves optimal ventilation and oxygenation:   Assess for changes in respiratory status   Assess for changes in mentation and behavior   Position to facilitate oxygenation and minimize respiratory effort  3/2/2023 0053 by Dbe Hernandez RN  Outcome: Progressing     Problem: Pain  Goal: Verbalizes/displays adequate comfort level or baseline comfort level  3/2/2023 0933 by Rosalinda Desai RN  Outcome: Progressing  Flowsheets  Taken 3/2/2023 0800 by Rosalinda Desai RN  Verbalizes/displays adequate comfort level or baseline comfort level:   Encourage patient to monitor pain and request assistance   Assess pain using appropriate pain scale  Taken 3/2/2023 0400 by Deb Hernandez RN  Verbalizes/displays adequate comfort level or baseline comfort level: Encourage patient to monitor pain and request assistance  3/2/2023 0053 by Deb Hernandez RN  Outcome: Progressing  Flowsheets  Taken 3/2/2023 0000  Verbalizes/displays adequate comfort level or baseline comfort level: Encourage patient to monitor pain and request assistance  Taken 3/1/2023 2000  Verbalizes/displays adequate comfort level or baseline comfort level: Encourage patient to monitor pain and request assistance     Problem: Neurosensory - Adult  Goal: Achieves stable or improved neurological status  3/2/2023 0933 by Sona Denis RN  Outcome: Progressing  Flowsheets (Taken 3/2/2023 0800)  Achieves stable or improved neurological status: Assess for and report changes in neurological status  3/2/2023 0053 by Mirna Tavarez RN  Outcome: Progressing  Flowsheets (Taken 3/1/2023 2000)  Achieves stable or improved neurological status: Assess for and report changes in neurological status  Goal: Absence of seizures  3/2/2023 0933 by Sona Denis RN  Outcome: Progressing  Flowsheets (Taken 3/2/2023 0800)  Absence of seizures: Monitor for seizure activity. If seizure occurs, document type and location of movements and any associated apnea  3/2/2023 0053 by Mirna Tavarez RN  Outcome: Progressing  Flowsheets (Taken 3/1/2023 2000)  Absence of seizures: Monitor for seizure activity.   If seizure occurs, document type and location of movements and any associated apnea  Goal: Remains free of injury related to seizures activity  3/2/2023 0933 by Sona Denis RN  Outcome: Progressing  Flowsheets (Taken 3/2/2023 0800)  Remains free of injury related to seizure activity:   Maintain airway, patient safety  and administer oxygen as ordered   Monitor patient for seizure activity, document and report duration and description of seizure to Licensed Independent Practitioner  3/2/2023 0053 by Mirna Tavarez RN  Outcome: Progressing  Flowsheets (Taken 3/1/2023 2000)  Remains free of injury related to seizure activity: Maintain airway, patient safety  and administer oxygen as ordered  Goal: Achieves maximal functionality and self care  3/2/2023 0933 by Sona Denis RN  Outcome: Progressing  Flowsheets (Taken 3/2/2023 0800)  Achieves maximal functionality and self care: Monitor swallowing and airway patency with patient fatigue and changes in neurological status  3/2/2023 0053 by Mirna Tavarez RN  Outcome: Progressing  Flowsheets (Taken 3/1/2023 2000)  Achieves maximal functionality and self care: Monitor swallowing and airway patency with patient fatigue and changes in neurological status     Problem: Cardiovascular - Adult  Goal: Maintains optimal cardiac output and hemodynamic stability  3/2/2023 0933 by Anita Vázquez RN  Outcome: Progressing  Flowsheets (Taken 3/2/2023 0800)  Maintains optimal cardiac output and hemodynamic stability:   Monitor blood pressure and heart rate   Monitor urine output and notify Licensed Independent Practitioner for values outside of normal range   Assess for signs of decreased cardiac output  3/2/2023 0053 by Sadaf Dobbins RN  Outcome: Progressing  Flowsheets (Taken 3/1/2023 2000)  Maintains optimal cardiac output and hemodynamic stability: Monitor blood pressure and heart rate  Goal: Absence of cardiac dysrhythmias or at baseline  3/2/2023 0933 by Anita Vázquez RN  Outcome: Progressing  Flowsheets (Taken 3/2/2023 0800)  Absence of cardiac dysrhythmias or at baseline: Monitor cardiac rate and rhythm  3/2/2023 0053 by Sadaf Dobbins RN  Outcome: Progressing  Flowsheets (Taken 3/1/2023 2000)  Absence of cardiac dysrhythmias or at baseline: Monitor cardiac rate and rhythm     Problem: Skin/Tissue Integrity - Adult  Goal: Skin integrity remains intact  3/2/2023 0933 by Anita Vázquez RN  Outcome: Progressing  Flowsheets (Taken 3/2/2023 0800)  Skin Integrity Remains Intact: Monitor for areas of redness and/or skin breakdown  3/2/2023 0053 by Sadaf Dobbins RN  Outcome: Progressing  Flowsheets  Taken 3/1/2023 2344  Skin Integrity Remains Intact: Monitor for areas of redness and/or skin breakdown  Taken 3/1/2023 2000  Skin Integrity Remains Intact: Monitor for areas of redness and/or skin breakdown  Goal: Incisions, wounds, or drain sites healing without S/S of infection  3/2/2023 0933 by Anita Vázquez RN  Outcome: Progressing  Flowsheets (Taken 3/2/2023 0800)  Incisions, Wounds, or Drain Sites Healing Without Sign and Symptoms of Infection: ADMISSION and DAILY: Assess and document risk factors for pressure ulcer development  3/2/2023 0053 by Patsy Boles RN  Outcome: Progressing  Flowsheets  Taken 3/1/2023 2344  Incisions, Wounds, or Drain Sites Healing Without Sign and Symptoms of Infection: ADMISSION and DAILY: Assess and document risk factors for pressure ulcer development  Taken 3/1/2023 2000  Incisions, Wounds, or Drain Sites Healing Without Sign and Symptoms of Infection: ADMISSION and DAILY: Assess and document risk factors for pressure ulcer development  Goal: Oral mucous membranes remain intact  3/2/2023 0933 by Michael Bassett RN  Outcome: Progressing  Flowsheets (Taken 3/2/2023 0800)  Oral Mucous Membranes Remain Intact: Assess oral mucosa and hygiene practices  3/2/2023 0053 by Patsy Boles RN  Outcome: Progressing  Flowsheets  Taken 3/1/2023 2344  Oral Mucous Membranes Remain Intact: Assess oral mucosa and hygiene practices  Taken 3/1/2023 2000  Oral Mucous Membranes Remain Intact: Assess oral mucosa and hygiene practices     Problem: Musculoskeletal - Adult  Goal: Return mobility to safest level of function  3/2/2023 0933 by Michael Bassett RN  Outcome: Progressing  Flowsheets (Taken 3/2/2023 0800)  Return Mobility to Safest Level of Function:   Assess patient stability and activity tolerance for standing, transferring and ambulating with or without assistive devices   Assist with transfers and ambulation using safe patient handling equipment as needed  3/2/2023 0053 by Patsy Boles RN  Outcome: Progressing  Flowsheets (Taken 3/1/2023 2000)  Return Mobility to Safest Level of Function: Assess patient stability and activity tolerance for standing, transferring and ambulating with or without assistive devices  Goal: Maintain proper alignment of affected body part  3/2/2023 0933 by Michael Bassett RN  Outcome: Progressing  Flowsheets (Taken 3/2/2023 0800)  Maintain proper alignment of affected body part: Support and protect limb and body alignment per provider's orders  3/2/2023 0053 by Aurelia Taylor RN  Outcome: Progressing  Flowsheets (Taken 3/1/2023 2000)  Maintain proper alignment of affected body part: Support and protect limb and body alignment per provider's orders  Goal: Return ADL status to a safe level of function  3/2/2023 0933 by William Le RN  Outcome: Progressing  Flowsheets (Taken 3/2/2023 0800)  Return ADL Status to a Safe Level of Function: Administer medication as ordered  3/2/2023 0053 by Aurelia Taylor RN  Outcome: Progressing  Flowsheets (Taken 3/1/2023 2000)  Return ADL Status to a Safe Level of Function: Administer medication as ordered     Problem: Gastrointestinal - Adult  Goal: Minimal or absence of nausea and vomiting  3/2/2023 0933 by William eL RN  Outcome: Progressing  Flowsheets (Taken 3/2/2023 0800)  Minimal or absence of nausea and vomiting: Administer IV fluids as ordered to ensure adequate hydration  3/2/2023 0053 by Aurelia Taylor RN  Outcome: Progressing  Flowsheets (Taken 3/1/2023 2000)  Minimal or absence of nausea and vomiting: Administer IV fluids as ordered to ensure adequate hydration  Goal: Maintains or returns to baseline bowel function  3/2/2023 0933 by William Le RN  Outcome: Progressing  Flowsheets (Taken 3/2/2023 0800)  Maintains or returns to baseline bowel function:   Assess bowel function   Encourage oral fluids to ensure adequate hydration  3/2/2023 0053 by Aurelia Taylor RN  Outcome: Progressing  Flowsheets (Taken 3/1/2023 2000)  Maintains or returns to baseline bowel function: Assess bowel function  Goal: Maintains adequate nutritional intake  3/2/2023 0933 by William Le RN  Outcome: Progressing  Flowsheets (Taken 3/2/2023 0800)  Maintains adequate nutritional intake:   Monitor percentage of each meal consumed   Identify factors contributing to decreased intake, treat as appropriate  3/2/2023 0053 by Erik Rasmussen RN  Outcome: Progressing  Flowsheets (Taken 3/1/2023 2000)  Maintains adequate nutritional intake: Monitor percentage of each meal consumed  Goal: Establish and maintain optimal ostomy function  3/2/2023 0933 by Courtney Hernandez RN  Outcome: Progressing  3/2/2023 0053 by Erik Rasmussen RN  Outcome: Progressing  Flowsheets (Taken 3/1/2023 2000)  Establish and maintain optimal ostomy function: Monitor output from ostomies     Problem: Genitourinary - Adult  Goal: Absence of urinary retention  3/2/2023 0933 by Courtney Hernandez RN  Outcome: Progressing  Flowsheets (Taken 3/2/2023 0800)  Absence of urinary retention:   Assess patients ability to void and empty bladder   Monitor intake/output and perform bladder scan as needed  3/2/2023 0053 by Erik Rasmussen RN  Outcome: Progressing  Flowsheets (Taken 3/1/2023 2000)  Absence of urinary retention: Assess patients ability to void and empty bladder  Goal: Urinary catheter remains patent  3/2/2023 0933 by Courtney Hernandez RN  Outcome: Progressing  Flowsheets (Taken 3/2/2023 0800)  Urinary catheter remains patent: Assess patency of urinary catheter  3/2/2023 0053 by Erik Rasmussen RN  Outcome: Progressing  Flowsheets (Taken 3/1/2023 2000)  Urinary catheter remains patent: Assess patency of urinary catheter     Problem: Infection - Adult  Goal: Absence of infection at discharge  3/2/2023 0933 by Courtney Hernandez RN  Outcome: Progressing  Flowsheets (Taken 3/2/2023 0800)  Absence of infection at discharge:   Assess and monitor for signs and symptoms of infection   Monitor lab/diagnostic results  3/2/2023 0053 by Erik Rasmussen RN  Outcome: Progressing  Flowsheets (Taken 3/1/2023 2000)  Absence of infection at discharge: Assess and monitor for signs and symptoms of infection  Goal: Absence of infection during hospitalization  3/2/2023 0933 by Courtney Hernandez RN  Outcome: Progressing  Flowsheets (Taken 3/2/2023 0800)  Absence of infection during hospitalization:   Assess and monitor for signs and symptoms of infection   Monitor lab/diagnostic results   Monitor all insertion sites i.e., indwelling lines, tubes and drains  3/2/2023 0053 by Lynn Rutherford RN  Outcome: Progressing  Flowsheets (Taken 3/1/2023 2000)  Absence of infection during hospitalization: Assess and monitor for signs and symptoms of infection  Goal: Absence of fever/infection during anticipated neutropenic period  3/2/2023 0933 by Adam Spears RN  Outcome: Progressing  Flowsheets (Taken 3/2/2023 0800)  Absence of fever/infection during anticipated neutropenic period: Monitor white blood cell count  3/2/2023 0053 by Lynn Rutherford RN  Outcome: Progressing  Flowsheets (Taken 3/1/2023 2000)  Absence of fever/infection during anticipated neutropenic period: Monitor white blood cell count     Problem: Metabolic/Fluid and Electrolytes - Adult  Goal: Electrolytes maintained within normal limits  3/2/2023 0933 by Adam Spears RN  Outcome: Progressing  Flowsheets (Taken 3/2/2023 0800)  Electrolytes maintained within normal limits: Monitor labs and assess patient for signs and symptoms of electrolyte imbalances  3/2/2023 0053 by Lynn Rutherford RN  Outcome: Progressing  Flowsheets (Taken 3/1/2023 2000)  Electrolytes maintained within normal limits: Monitor labs and assess patient for signs and symptoms of electrolyte imbalances  Goal: Hemodynamic stability and optimal renal function maintained  3/2/2023 0933 by Adam Spears RN  Outcome: Progressing  Flowsheets (Taken 3/2/2023 0800)  Hemodynamic stability and optimal renal function maintained: Monitor labs and assess for signs and symptoms of volume excess or deficit  3/2/2023 0053 by Lynn Rutherford RN  Outcome: Progressing  Flowsheets (Taken 3/1/2023 2000)  Hemodynamic stability and optimal renal function maintained: Monitor labs and assess for signs and symptoms of volume excess or deficit  Goal: Glucose maintained within prescribed range  3/2/2023 0933 by Megan Salas RN  Outcome: Progressing  Flowsheets (Taken 3/2/2023 0800)  Glucose maintained within prescribed range:   Monitor blood glucose as ordered   Assess for signs and symptoms of hyperglycemia and hypoglycemia   Administer ordered medications to maintain glucose within target range  3/2/2023 0053 by Boni Lynn RN  Outcome: Progressing  Flowsheets (Taken 3/1/2023 2000)  Glucose maintained within prescribed range: Monitor blood glucose as ordered     Problem: Coping  Goal: Pt/Family able to verbalize concerns and demonstrate effective coping strategies  Description: INTERVENTIONS:  1. Assist patient/family to identify coping skills, available support systems and cultural and spiritual values  2. Provide emotional support, including active listening and acknowledgement of concerns of patient and caregivers  3. Reduce environmental stimuli, as able  4. Instruct patient/family in relaxation techniques, as appropriate  5.  Assess for spiritual pain/suffering and initiate Spiritual Care, Psychosocial Clinical Specialist consults as needed  3/2/2023 0933 by Megan Salas RN  Outcome: Progressing  Flowsheets (Taken 3/2/2023 0800)  Patient/family able to verbalize anxieties, fears, and concerns, and demonstrate effective coping:   Assist patient/family to identify coping skills, available support systems and cultural and spiritual values   Provide emotional support, including active listening and acknowledgement of concerns of patient and caregivers  3/2/2023 0053 by Boni Lynn RN  Outcome: Progressing  Flowsheets (Taken 3/1/2023 2000)  Patient/family able to verbalize anxieties, fears, and concerns, and demonstrate effective coping: Assist patient/family to identify coping skills, available support systems and cultural and spiritual values     Problem: Confusion  Goal: Confusion, delirium, dementia, or psychosis is improved or at baseline  Description: INTERVENTIONS:  1. Assess for possible contributors to thought disturbance, including medications, impaired vision or hearing, underlying metabolic abnormalities, dehydration, psychiatric diagnoses, and notify attending LIP  2. [place] high risk fall precautions, as indicated  3. Provide frequent short contacts to provide reality reorientation, refocusing and direction  4. Decrease environmental stimuli, including noise as appropriate  5. Monitor and intervene to maintain adequate nutrition, hydration, elimination, sleep and activity  6. If unable to ensure safety without constant attention obtain sitter and review sitter guidelines with assigned personnel  7.  Initiate Psychosocial CNS and Spiritual Care consult, as indicated  3/2/2023 0933 by Parul Nieves RN  Outcome: Progressing  Flowsheets (Taken 3/2/2023 0800)  Effect of thought disturbance (confusion, delirium, dementia, or psychosis) are managed with adequate functional status:   Assess for contributors to thought disturbance, including medications, impaired vision or hearing, underlying metabolic abnormalities, dehydration, psychiatric diagnoses, notify [place] high risk fall precautions, as indicated  3/2/2023 0053 by Bello Hernandez RN  Outcome: Progressing  Flowsheets (Taken 3/1/2023 2000)  Effect of thought disturbance (confusion, delirium, dementia, or psychosis) are managed with adequate functional status: Assess for contributors to thought disturbance, including medications, impaired vision or hearing, underlying metabolic abnormalities, dehydration, psychiatric diagnoses, notify LIP

## 2023-03-02 NOTE — PROGRESS NOTES
[facility]   Progress Note                 Date:   3/2/2023  Patient name: Aiyana Villafana  Date of admission:  2/12/2023 10:33 PM  MRN:   [id number]  YOB: 1955  PCP: Sarah Rayo MD    Reason for Admission:      Subjective:   Seen and examined at bedside. Denied any active complaint. Feeling much better. In NSR with PACs/   Continue to be on colchicine and IV prednisone for pericarditis.     Medications:   Scheduled Meds:   insulin glargine  25 Units SubCUTAneous Daily    insulin glargine  15 Units SubCUTAneous Nightly    metoprolol tartrate  50 mg Oral BID    furosemide  40 mg IntraVENous Daily    senna  2 tablet Oral Nightly    amiodarone  200 mg Oral BID    sodium chloride flush  5-40 mL IntraVENous 2 times per day    heparin (porcine)  5,000 Units SubCUTAneous 3 times per day    insulin lispro  0-16 Units SubCUTAneous 4x Daily AC & HS    midodrine  10 mg Oral TID WC    methylPREDNISolone  30 mg IntraVENous Q12H    docusate sodium  100 mg Oral TID    cefTRIAXone (ROCEPHIN) IV  1,000 mg IntraVENous Q24H    pantoprazole  40 mg Oral QAM AC    colchicine  0.3 mg Oral Daily    sodium chloride flush  5-40 mL IntraVENous 2 times per day    mupirocin   Nasal BID    chlorhexidine   Topical See Admin Instructions    sodium chloride flush  5-40 mL IntraVENous 2 times per day    polyethylene glycol  17 g Oral Daily    sodium chloride flush  5-40 mL IntraVENous 2 times per day       Continuous Infusions:   sodium chloride      sodium chloride      sodium chloride      sodium chloride      dextrose         CBC:   Recent Labs     02/28/23  0413   WBC 16.3*   HGB 8.3*          BMP:    Recent Labs     02/28/23  0413 02/28/23  1707 03/01/23  1320    134* 137   K 5.0 4.5 4.2    100 102   CO2 22 22 24   BUN 63* 64* 59*   CREATININE 4.14* 3.79* 3.00*   GLUCOSE 287* 177* 189*             Objective:   Vitals: BP 96/67   Pulse 66   Temp 98.2 °F (36.8 °C) (Oral)   Resp 18   Ht 6' 1\" (1.854 m)   Wt 230 lb 9.6 oz (104.6 kg)   SpO2 92%   BMI 30.42 kg/m²     General appearance: awake, alert, in no apparent respiratory distress   HEENT: Head: Normocephalic, no lesions, without obvious abnormality  Neck: no JVD  Lungs: Decreased air entry on the left side. Heart: Normal S1 and S2, regular rate and rhythm. Abdomen: soft, non-tender; bowel sounds normal  Extremities: No LE edema  Neurologic: Mental status: Alert, oriented. Motor and sensory not done. DATA  EKG:    Date: 02/21/23  Reading:  Sinus tachycardia  Moderate voltage criteria for LVH, may be normal variant  Nonspecific ST and T wave abnormality  Abnormal ECG  When compared with ECG of 16-FEB-2023 23:10,  No significant change was found     LAST ECHO:  2/21/23  Summary  Left ventricle is normal in size. Global left ventricular systolic function  is difficult to assess due to heart rate but appears normal. Calculated  ejection fraction 51% by Atkinson's method. Aortic valve is trileaflet and opens adequately. Trivial aortic insufficiency. Normal tricuspid valve structure. Trivial tricuspid regurgitation. Date:  Findings Summary: 10/22     Left Ventricle: Systolic function is normal with an ejection fraction   of 55-60%. Aortic Valve: There is mild regurgitation. Tricuspid Valve: There is trace to mild regurgitation. The right   ventricular systolic pressure normal. RVSP calculated at 24 mmHg. RVSP is   based on RA pressure of 3 mmHg. There is no pulmonary hypertension. Left Ventricle   Systolic function is normal with an ejection fraction of 55-60%. No obvious regional wall motion abnormalities. There is abnormal septal motion. Right Ventricle   Right ventricular size appears normal. Systolic function is normal.     Right Atrium   Right atrium is normal in size. IVC/SVC   The right atrial pressure is estimated at 3 mmHg. IVC appears normal. There is normal collapse with deep inspiration.      Tricuspid Valve   Tricuspid valve appears to be normal. There is trace to mild regurgitation. The right ventricular systolic pressure normal. RVSP calculated at 24 mmHg. RVSP is based on RA pressure of 3 mmHg. There is no pulmonary hypertension. Aortic Valve   The aortic valve is trileaflet. The leaflets are mildly thickened. There is mild regurgitation. Pulmonic Valve   Pulmonic valve structure is grossly normal. There is trace to mild regurgitation. Pericardium   There is no pericardial effusion. LAST Stress Test:   No prior     LAST Cardiac Angiography:. No prior    Assessment / Acute Cardiac Problems:   Left sided Empyema s/p VATS/decortication 1/23/2023  Acute pericarditis on renal dose colchicine and steroids  Community acquired pneumonia. Improving  Acute hypoxic respiratory failure due to above. Improved  New Onset Atrial fibrillation with RVR s/p AFIA/CV to NSR, flipped back to A-fib  History of non-Hodgkin's lymphoma  History of nonrheumatic mitral regurgitation/Tricuspid regurgitation  History of dilated aortic root  Type 2 diabetes  KELSEA on Chronic kidney disease stage III/IV  CLAY on CPAP    Plan of Treatment: In NSR with PAC's. Continue p.o. amiodarone 200 mg 2 times daily for 14 days followed by 200 mg daily  ASA held per nephrology. On Colchicine 0.3 qday for pericarditis (renal dose). On methylprednisolone 30 mg q12h  On antibiotics as per ID  Continue Lopressor 50 twice daily with holding parameters. Continue with as needed Lopressor 5 mg IV q6h for HR >110  On IV Lasix as per nephro, creatinine improving   On Midodrine 10 mg TID as per nephro. Will continue to follow along       Plan to be discussed with rounding attending         Mickie Hoskins MD.  Fellow, cardiovascular disease   [place], [place]      I performed a history and physical examination of the patient and discussed management with the resident.  I reviewed the residents note and agree with the documented findings and plan of care. Any areas of disagreement are noted on the chart. I was personally present for the key portions of any procedures. I have documented in the chart those procedures where I was not present during the key portions. I have personally evaluated this patient and have completed at least one if not all key elements of the E/M (history, physical exam, and MDM). Additional findings are as noted. DC IV steroids. Continue colchicine.      Kiel Beavers MD

## 2023-03-02 NOTE — PROGRESS NOTES
Physical Therapy  Facility/Department: [facility]- S[facility]  Physical Therapy Daily Treatment Note    Name: Charlene Hwang  : 1955  MRN: [id number]  Date of Service: 3/2/2023    Discharge Recommendations:  Patient would benefit from continued therapy after discharge   PT Equipment Recommendations  Equipment Needed: Yes  Walker: Rolling      Patient Diagnosis(es): The encounter diagnosis was Empyema ([facility] [address] Past Medical History:  has a past medical history of Acute interstitial nephritis, Aortic regurgitation, ARF (acute renal failure) ([facility] [address] Chronic kidney disease, Hyperlipidemia, Hypertension, Non-Hodgkin lymphoma ([facility] [address] Research study patient, Sarcoidosis, and Type II or unspecified type diabetes mellitus without mention of complication, not stated as uncontrolled. Past Surgical History:  has a past surgical history that includes eye surgery (Right); Knee arthroscopy; Rotator cuff repair (Right, ); Rotator cuff repair (Left, 5/15); Carpal tunnel release (Left, 11/15 ); Eye surgery (Right, ); Total knee arthroplasty (Right, 10/2018); malignant skin lesion excision; IR CHEST TUBE INSERTION (2023); IR CHEST TUBE INSERTION (2023); and Thoracoscopy (Left, 2023). Assessment   Body Structures, Functions, Activity Limitations Requiring Skilled Therapeutic Intervention: Decreased functional mobility ; Decreased ROM; Decreased strength;Decreased safe awareness;Decreased endurance;Decreased balance  Assessment: pt continues to be with impaired cognition and is with decreased strength in B LEs and decreased balance, requires CGA for STS for safety, and CGA during ambulation for balance with RW this date. Pt ambulated 75 ft. with CGA using a RW while on room air. Pt unsafe to ambulate without skilled assist. Recommend cont therapy to address deficits.   Therapy Prognosis: Good  Decision Making: High Complexity  Requires PT Follow-Up: Yes  Activity Tolerance  Activity Tolerance: Treatment limited secondary to decreased cognition;Patient limited by endurance; Patient limited by fatigue  Activity Tolerance Comments: Patient felt weakness in legs which prompted return to chair after ambulation. No SOB or dizziness, just felt fatigued     Plan   Physcial Therapy Plan  General Plan: 5-7 times per week  Current Treatment Recommendations: Strengthening, ROM, Functional mobility training, Transfer training, Endurance training, Gait training, Stair training, Balance training, Neuromuscular re-education, Home exercise program, Safety education & training, Patient/Caregiver education & training, Therapeutic activities, Equipment evaluation, education, & procurement  Safety Devices  Type of Devices: All fall risk precautions in place, Call light within reach, Gait belt, Nurse notified, Patient at risk for falls, Left in chair  Restraints  Restraints Initially in Place: No     Restrictions  Restrictions/Precautions  Restrictions/Precautions: Up as Tolerated, Fall Risk, Surgical Protocols, General Precautions  Required Braces or Orthoses?: Yes  Required Braces or Orthoses  Other: Heart Hugger Brace  Position Activity Restriction  Other position/activity restrictions: none currently (Bullock catheter removed)     Subjective   Pain: n/c pain  General  Chart Reviewed: Yes  Patient assessed for rehabilitation services?: Yes  Response To Previous Treatment: Patient with no complaints from previous session. Family / Caregiver Present: No  Follows Commands: Within Functional Limits  Subjective  Subjective: Pt at EOB on entry, pt and RN agreeable to therapy. Pt in recliner chair upon exit         Cognition   Orientation  Overall Orientation Status: Impaired  Cognition  Overall Cognitive Status: Exceptions  Arousal/Alertness: Appropriate responses to stimuli  Following Commands: Follows one step commands with repetition; Follows one step commands with increased time; Follows multistep commands with repitition; Follows multistep commands with increased time  Attention Span: Attends with cues to redirect  Memory: Decreased recall of recent events  Safety Judgement: Decreased awareness of need for assistance;Decreased awareness of need for safety  Problem Solving: Assistance required to identify errors made;Assistance required to correct errors made  Insights: Decreased awareness of deficits  Initiation: Requires cues for some  Cognition Comment: Easily distracted. Impulsive at times. Objective   Heart Rate: 77 (70bpm s/p tx)  Heart Rate Source: Monitor  BP: 137/69 (122/79 s/p tx)  BP Location: Right upper arm  BP Method: Automatic  Patient Position: Sitting (EOB)  MAP (Calculated): 92  Resp: 22  SpO2: 96 % (93% s/p tx)  O2 Device: None (Room air)     Observation/Palpation  Posture: Fair    Bed mobility  Bridging: Unable to assess  Rolling to Left: Unable to assess  Rolling to Right: Unable to assess  Supine to Sit: Unable to assess  Sit to Supine: Unable to assess  Scooting: Unable to assess  Bed Mobility Comments: Pt EOB upon arrival, RN was present w/ pt before therapy arrived. Transfers  Sit to Stand: Contact guard assistance  Stand to Sit: Contact guard assistance  Comment: performed STS w/ RW. Ambulation  WB Status: As tolerated  Ambulation  Surface: Level tile  Device: Rolling Walker  Assistance: Contact guard assistance  Quality of Gait: flexed posture, decreased stride length, no true LOB. Gait Deviations: Decreased step length; Slow Portia  Distance: 76'  Comments: pt with decreased endurance with mobility and decreased stability  More Ambulation?: No  Stairs/Curb  Stairs?: No     Balance  Posture: Fair  Sitting - Static: Fair;+  Sitting - Dynamic: Fair;+  Standing - Static: Fair;+  Standing - Dynamic: Fair  Comments: Standing balance assessed w/ RW  Exercise Treatment: Seated LE exercise program: [employer], heel/toe raises, and marches.  Reps: 15; 75' ambulated  Static Sitting Balance Exercises: Pt sat EOB x5mins without assist    AM-PAC Score  AM-PAC Inpatient Mobility Raw Score : 14 (03/02/23 1427)  AM-PAC Inpatient T-Scale Score : 38.1 (03/02/23 1427)  Mobility Inpatient CMS 0-100% Score: 61.29 (03/02/23 1427)  Mobility Inpatient CMS G-Code Modifier : CL (03/02/23 1427)    Goals  Short Term Goals  Time Frame for Short Term Goals: 14 visits  Short Term Goal 1: pt to perform bed mobility with Min A  Short Term Goal 2: pt to ambulate 50ft with [employer] with Min A x1  Short Term Goal 3: pt to transfer c [employer] c Min A x1  Short Term Goal 4: pt to ascend and descend 1 step with MOd A  Short Term Goal 5: pt to be indep with HEP  Patient Goals   Patient Goals : to be able to go home       Education  Patient Education  Education Given To: Patient  Education Provided: Role of Therapy;Plan of Care;Transfer Training;Precautions; Fall Prevention Strategies; Equipment; Energy Conservation;Home Exercise Program  Education Provided Comments: Pt educated on role of therapy. Pt educated on safe transfers with good return. Pt educated on use of incentive spirometer and flutter devices. Pt educated on importance of BLE exercises during times of decreased activity. Pt educated on activity pacing, energy conservation. Education Method: Verbal;Demonstration  Barriers to Learning: Cognition  Education Outcome: Verbalized understanding;Demonstrated understanding;Continued education needed      Therapy Time   Individual Concurrent Group Co-treatment   Time In 1320         Time Out 1343         Minutes 23         Timed Code Treatment Minutes: [address], [address] Treatment performed by Student PTA under the supervision of co-signing PTA who agrees with all treatment and documentation.    Eli Villa PTA

## 2023-03-02 NOTE — PLAN OF CARE
Problem: Discharge Planning  Goal: Discharge to home or other facility with appropriate resources  3/2/2023 0053 by Lavenr Hill RN  Outcome: Progressing  Flowsheets (Taken 3/1/2023 2000)  Discharge to home or other facility with appropriate resources: Identify barriers to discharge with patient and caregiver  3/1/2023 1126 by Armando Baker RN  Outcome: Progressing  Flowsheets (Taken 3/1/2023 0800)  Discharge to home or other facility with appropriate resources: Identify barriers to discharge with patient and caregiver     Problem: Safety - Adult  Goal: Free from fall injury  3/2/2023 0053 by Lavern Hill RN  Outcome: Progressing  Flowsheets (Taken 3/1/2023 2344)  Free From Fall Injury: Instruct family/caregiver on patient safety  3/1/2023 1126 by Armando Baker RN  Outcome: Progressing     Problem: ABCDS Injury Assessment  Goal: Absence of physical injury  3/2/2023 0053 by Lavern Hill RN  Outcome: Progressing  Flowsheets (Taken 3/1/2023 2344)  Absence of Physical Injury: Implement safety measures based on patient assessment  3/1/2023 1126 by Armando Baker RN  Outcome: Progressing     Problem: Skin/Tissue Integrity  Goal: Absence of new skin breakdown  Description: 1. Monitor for areas of redness and/or skin breakdown  2. Assess vascular access sites hourly  3. Every 4-6 hours minimum:  Change oxygen saturation probe site  4. Every 4-6 hours:  If on nasal continuous positive airway pressure, respiratory therapy assess nares and determine need for appliance change or resting period.   3/2/2023 0053 by Lavern Hill RN  Outcome: Progressing  3/1/2023 1126 by Armando Baker RN  Outcome: Progressing     Problem: Chronic Conditions and Co-morbidities  Goal: Patient's chronic conditions and co-morbidity symptoms are monitored and maintained or improved  3/2/2023 0053 by Lavern Hill RN  Outcome: Progressing  Flowsheets (Taken 3/1/2023 2000)  Care Plan - Patient's Chronic Conditions and Co-Morbidity Symptoms are Monitored and Maintained or Improved: Monitor and assess patient's chronic conditions and comorbid symptoms for stability, deterioration, or improvement  3/1/2023 1126 by Armando Baker RN  Outcome: Progressing  Flowsheets (Taken 3/1/2023 0800)  Care Plan - Patient's Chronic Conditions and Co-Morbidity Symptoms are Monitored and Maintained or Improved: Monitor and assess patient's chronic conditions and comorbid symptoms for stability, deterioration, or improvement     Problem: Respiratory - Adult  Goal: Achieves optimal ventilation and oxygenation  3/2/2023 0053 by Lavern Hill RN  Outcome: Progressing  3/1/2023 1126 by Armando Baker RN  Outcome: Progressing  Flowsheets (Taken 3/1/2023 0800)  Achieves optimal ventilation and oxygenation: Assess for changes in respiratory status     Problem: Pain  Goal: Verbalizes/displays adequate comfort level or baseline comfort level  3/2/2023 0053 by Lavern Hill RN  Outcome: Progressing  Flowsheets  Taken 3/2/2023 0000  Verbalizes/displays adequate comfort level or baseline comfort level: Encourage patient to monitor pain and request assistance  Taken 3/1/2023 2000  Verbalizes/displays adequate comfort level or baseline comfort level: Encourage patient to monitor pain and request assistance  3/1/2023 1126 by Armando Baker RN  Outcome: Progressing  Flowsheets (Taken 3/1/2023 0400 by Lavern Hill RN)  Verbalizes/displays adequate comfort level or baseline comfort level: Encourage patient to monitor pain and request assistance     Problem: Neurosensory - Adult  Goal: Achieves stable or improved neurological status  3/2/2023 0053 by Lavern Hill RN  Outcome: Progressing  Flowsheets (Taken 3/1/2023 2000)  Achieves stable or improved neurological status: Assess for and report changes in neurological status  3/1/2023 1126 by Armando Baker RN  Outcome: Progressing  Flowsheets (Taken 3/1/2023 0800)  Achieves stable or improved neurological status: Assess for and report changes in neurological status  Goal: Absence of seizures  3/2/2023 0053 by Mirna Tavarez RN  Outcome: Progressing  Flowsheets (Taken 3/1/2023 2000)  Absence of seizures: Monitor for seizure activity. If seizure occurs, document type and location of movements and any associated apnea  3/1/2023 1126 by Aníbal Grace RN  Outcome: Progressing  Flowsheets (Taken 3/1/2023 0800)  Absence of seizures: Monitor for seizure activity.   If seizure occurs, document type and location of movements and any associated apnea  Goal: Remains free of injury related to seizures activity  3/2/2023 0053 by Mirna Tavarez RN  Outcome: Progressing  Flowsheets (Taken 3/1/2023 2000)  Remains free of injury related to seizure activity: Maintain airway, patient safety  and administer oxygen as ordered  3/1/2023 1126 by Aníbal Grace RN  Outcome: Progressing  Flowsheets (Taken 3/1/2023 0800)  Remains free of injury related to seizure activity: Maintain airway, patient safety  and administer oxygen as ordered  Goal: Achieves maximal functionality and self care  3/2/2023 0053 by Mirna Tavarez RN  Outcome: Progressing  Flowsheets (Taken 3/1/2023 2000)  Achieves maximal functionality and self care: Monitor swallowing and airway patency with patient fatigue and changes in neurological status  3/1/2023 1126 by Aníbal Grace RN  Outcome: Progressing  Flowsheets (Taken 3/1/2023 0800)  Achieves maximal functionality and self care: Monitor swallowing and airway patency with patient fatigue and changes in neurological status     Problem: Cardiovascular - Adult  Goal: Maintains optimal cardiac output and hemodynamic stability  3/2/2023 0053 by Mirna Tavarez RN  Outcome: Progressing  Flowsheets (Taken 3/1/2023 2000)  Maintains optimal cardiac output and hemodynamic stability: Monitor blood pressure and heart rate  3/1/2023 1126 by Aníbal Grace RN  Outcome: Progressing  Flowsheets (Taken 3/1/2023 0800)  Maintains optimal cardiac output and hemodynamic stability: Monitor blood pressure and heart rate  Goal: Absence of cardiac dysrhythmias or at baseline  3/2/2023 0053 by Stanley Posadas RN  Outcome: Progressing  Flowsheets (Taken 3/1/2023 2000)  Absence of cardiac dysrhythmias or at baseline: Monitor cardiac rate and rhythm  3/1/2023 1126 by Anoop Reece RN  Outcome: Progressing  Flowsheets (Taken 3/1/2023 0800)  Absence of cardiac dysrhythmias or at baseline: Monitor cardiac rate and rhythm     Problem: Skin/Tissue Integrity - Adult  Goal: Skin integrity remains intact  3/2/2023 0053 by Stanley Posadas RN  Outcome: Progressing  Flowsheets  Taken 3/1/2023 2344  Skin Integrity Remains Intact: Monitor for areas of redness and/or skin breakdown  Taken 3/1/2023 2000  Skin Integrity Remains Intact: Monitor for areas of redness and/or skin breakdown  3/1/2023 1126 by Anoop Reece RN  Outcome: Progressing  Flowsheets (Taken 3/1/2023 0800)  Skin Integrity Remains Intact: Monitor for areas of redness and/or skin breakdown  Goal: Incisions, wounds, or drain sites healing without S/S of infection  3/2/2023 0053 by Stanley Posadas RN  Outcome: Progressing  Flowsheets  Taken 3/1/2023 2344  Incisions, Wounds, or Drain Sites Healing Without Sign and Symptoms of Infection: ADMISSION and DAILY: Assess and document risk factors for pressure ulcer development  Taken 3/1/2023 2000  Incisions, Wounds, or Drain Sites Healing Without Sign and Symptoms of Infection: ADMISSION and DAILY: Assess and document risk factors for pressure ulcer development  3/1/2023 1126 by Anoop Reece RN  Outcome: Progressing  Flowsheets (Taken 3/1/2023 0800)  Incisions, Wounds, or Drain Sites Healing Without Sign and Symptoms of Infection: ADMISSION and DAILY: Assess and document risk factors for pressure ulcer development  Goal: Oral mucous membranes remain intact  3/2/2023 0053 by Stanley Posadas RN  Outcome: Progressing  Flowsheets  Taken 3/1/2023 2344  Oral Mucous Membranes Remain Intact: Assess oral mucosa and hygiene practices  Taken 3/1/2023 2000  Oral Mucous Membranes Remain Intact: Assess oral mucosa and hygiene practices  3/1/2023 1126 by Amanda Dickson RN  Outcome: Progressing  Flowsheets (Taken 3/1/2023 0800)  Oral Mucous Membranes Remain Intact: Assess oral mucosa and hygiene practices     Problem: Musculoskeletal - Adult  Goal: Return mobility to safest level of function  3/2/2023 0053 by Tony Lanier RN  Outcome: Progressing  Flowsheets (Taken 3/1/2023 2000)  Return Mobility to Safest Level of Function: Assess patient stability and activity tolerance for standing, transferring and ambulating with or without assistive devices  3/1/2023 1126 by Amanda Dickson RN  Outcome: Progressing  Goal: Maintain proper alignment of affected body part  3/2/2023 0053 by Tony Lanier RN  Outcome: Progressing  Flowsheets (Taken 3/1/2023 2000)  Maintain proper alignment of affected body part: Support and protect limb and body alignment per provider's orders  3/1/2023 1126 by Amanda Dickson RN  Outcome: Progressing  Goal: Return ADL status to a safe level of function  3/2/2023 0053 by Tony Lanier RN  Outcome: Progressing  Flowsheets (Taken 3/1/2023 2000)  Return ADL Status to a Safe Level of Function: Administer medication as ordered  3/1/2023 1126 by Amanda Dickson RN  Outcome: Progressing     Problem: Gastrointestinal - Adult  Goal: Minimal or absence of nausea and vomiting  3/2/2023 0053 by Tony Lanier RN  Outcome: Progressing  Flowsheets (Taken 3/1/2023 2000)  Minimal or absence of nausea and vomiting: Administer IV fluids as ordered to ensure adequate hydration  3/1/2023 1126 by Amanda Dickson RN  Outcome: Progressing  Flowsheets (Taken 3/1/2023 0800)  Minimal or absence of nausea and vomiting: Administer IV fluids as ordered to ensure adequate hydration  Goal: Maintains or returns to baseline bowel function  3/2/2023 0053 by Claudine Hyman RN  Outcome: Progressing  Flowsheets (Taken 3/1/2023 2000)  Maintains or returns to baseline bowel function: Assess bowel function  3/1/2023 1126 by Ivana Ambrosio RN  Outcome: Progressing  Flowsheets (Taken 3/1/2023 0800)  Maintains or returns to baseline bowel function: Assess bowel function  Goal: Maintains adequate nutritional intake  3/2/2023 0053 by Claudine Hyman RN  Outcome: Progressing  Flowsheets (Taken 3/1/2023 2000)  Maintains adequate nutritional intake: Monitor percentage of each meal consumed  3/1/2023 1126 by Ivana Ambrosio RN  Outcome: Progressing  Flowsheets (Taken 3/1/2023 0800)  Maintains adequate nutritional intake: Monitor percentage of each meal consumed  Goal: Establish and maintain optimal ostomy function  3/2/2023 0053 by Claudine Hyman RN  Outcome: Progressing  Flowsheets (Taken 3/1/2023 2000)  Establish and maintain optimal ostomy function: Monitor output from ostomies  3/1/2023 1126 by Ivana Ambrosio RN  Outcome: Progressing  Flowsheets (Taken 3/1/2023 0800)  Establish and maintain optimal ostomy function: Monitor output from ostomies     Problem: Genitourinary - Adult  Goal: Absence of urinary retention  3/2/2023 0053 by Claudine Hyman RN  Outcome: Progressing  Flowsheets (Taken 3/1/2023 2000)  Absence of urinary retention: Assess patients ability to void and empty bladder  3/1/2023 1126 by Ivana Ambrosio RN  Outcome: Progressing  Flowsheets (Taken 3/1/2023 0800)  Absence of urinary retention: Assess patients ability to void and empty bladder  Goal: Urinary catheter remains patent  3/2/2023 0053 by Claudine Hyman RN  Outcome: Progressing  Flowsheets (Taken 3/1/2023 2000)  Urinary catheter remains patent: Assess patency of urinary catheter  3/1/2023 1126 by Ivana Ambrosio RN  Outcome: Progressing  Flowsheets (Taken 3/1/2023 0800)  Urinary catheter remains patent: Assess patency of urinary catheter     Problem: Infection - Adult  Goal: Absence of infection at discharge  3/2/2023 0053 by Nii Mc RN  Outcome: Progressing  Flowsheets (Taken 3/1/2023 2000)  Absence of infection at discharge: Assess and monitor for signs and symptoms of infection  3/1/2023 1126 by Jon Gonzalez RN  Outcome: Progressing  Flowsheets (Taken 3/1/2023 0800)  Absence of infection at discharge: Assess and monitor for signs and symptoms of infection  Goal: Absence of infection during hospitalization  3/2/2023 0053 by Nii Mc RN  Outcome: Progressing  Flowsheets (Taken 3/1/2023 2000)  Absence of infection during hospitalization: Assess and monitor for signs and symptoms of infection  3/1/2023 1126 by Jon Gonzalez RN  Outcome: Progressing  Flowsheets (Taken 3/1/2023 0800)  Absence of infection during hospitalization: Assess and monitor for signs and symptoms of infection  Goal: Absence of fever/infection during anticipated neutropenic period  3/2/2023 0053 by Nii Mc RN  Outcome: Progressing  Flowsheets (Taken 3/1/2023 2000)  Absence of fever/infection during anticipated neutropenic period: Monitor white blood cell count  3/1/2023 1126 by Jon Gonzalez RN  Outcome: Progressing  Flowsheets (Taken 3/1/2023 0800)  Absence of fever/infection during anticipated neutropenic period: Monitor white blood cell count     Problem: Metabolic/Fluid and Electrolytes - Adult  Goal: Electrolytes maintained within normal limits  3/2/2023 0053 by Nii Mc RN  Outcome: Progressing  Flowsheets (Taken 3/1/2023 2000)  Electrolytes maintained within normal limits: Monitor labs and assess patient for signs and symptoms of electrolyte imbalances  3/1/2023 1126 by Jon Gonzalez RN  Outcome: Progressing  Flowsheets (Taken 3/1/2023 0800)  Electrolytes maintained within normal limits: Monitor labs and assess patient for signs and symptoms of electrolyte imbalances  Goal: Hemodynamic stability and optimal renal function  maintained  3/2/2023 0053 by Casey Luna RN  Outcome: Progressing  Flowsheets (Taken 3/1/2023 2000)  Hemodynamic stability and optimal renal function maintained: Monitor labs and assess for signs and symptoms of volume excess or deficit  3/1/2023 1126 by Sam Serrano RN  Outcome: Progressing  Flowsheets (Taken 3/1/2023 0800)  Hemodynamic stability and optimal renal function maintained: Monitor labs and assess for signs and symptoms of volume excess or deficit  Goal: Glucose maintained within prescribed range  3/2/2023 0053 by Casey Luna RN  Outcome: Progressing  Flowsheets (Taken 3/1/2023 2000)  Glucose maintained within prescribed range: Monitor blood glucose as ordered  3/1/2023 1126 by Sam Serrano RN  Outcome: Progressing  Flowsheets (Taken 3/1/2023 0800)  Glucose maintained within prescribed range: Monitor blood glucose as ordered     Problem: Hematologic - Adult  Goal: Maintains hematologic stability  3/2/2023 0053 by Casey Luna RN  Outcome: Progressing  Flowsheets (Taken 3/1/2023 2000)  Maintains hematologic stability: Assess for signs and symptoms of bleeding or hemorrhage  3/1/2023 1126 by Sam Serrano RN  Outcome: Progressing  Flowsheets (Taken 3/1/2023 0800)  Maintains hematologic stability: Assess for signs and symptoms of bleeding or hemorrhage     Problem: Nutrition Deficit:  Goal: Optimize nutritional status  3/2/2023 0053 by Casey Luna RN  Outcome: Progressing  3/1/2023 1126 by Sam Serrano RN  Outcome: Progressing     Problem: Coping  Goal: Pt/Family able to verbalize concerns and demonstrate effective coping strategies  Description: INTERVENTIONS:  1. Assist patient/family to identify coping skills, available support systems and cultural and spiritual values  2. Provide emotional support, including active listening and acknowledgement of concerns of patient and caregivers  3. Reduce environmental stimuli, as able  4.  Instruct patient/family in relaxation techniques, as appropriate  5. Assess for spiritual pain/suffering and initiate Spiritual Care, Psychosocial Clinical Specialist consults as needed  3/2/2023 0053 by Melody ePrry RN  Outcome: Progressing  Flowsheets (Taken 3/1/2023 2000)  Patient/family able to verbalize anxieties, fears, and concerns, and demonstrate effective coping: Assist patient/family to identify coping skills, available support systems and cultural and spiritual values  3/1/2023 1126 by Donna Vargas RN  Outcome: Progressing  Flowsheets (Taken 3/1/2023 0800)  Patient/family able to verbalize anxieties, fears, and concerns, and demonstrate effective coping: Assist patient/family to identify coping skills, available support systems and cultural and spiritual values     Problem: Confusion  Goal: Confusion, delirium, dementia, or psychosis is improved or at baseline  Description: INTERVENTIONS:  1. Assess for possible contributors to thought disturbance, including medications, impaired vision or hearing, underlying metabolic abnormalities, dehydration, psychiatric diagnoses, and notify attending LIP  2. [place] high risk fall precautions, as indicated  3. Provide frequent short contacts to provide reality reorientation, refocusing and direction  4. Decrease environmental stimuli, including noise as appropriate  5. Monitor and intervene to maintain adequate nutrition, hydration, elimination, sleep and activity  6. If unable to ensure safety without constant attention obtain sitter and review sitter guidelines with assigned personnel  7.  Initiate Psychosocial CNS and Spiritual Care consult, as indicated  3/2/2023 0053 by Melody Perry RN  Outcome: Progressing  Flowsheets (Taken 3/1/2023 2000)  Effect of thought disturbance (confusion, delirium, dementia, or psychosis) are managed with adequate functional status: Assess for contributors to thought disturbance, including medications, impaired vision or hearing, underlying metabolic abnormalities, dehydration, psychiatric diagnoses, notify LIP  3/1/2023 1126 by Amanda Dickson RN  Outcome: Progressing  Flowsheets (Taken 3/1/2023 0800)  Effect of thought disturbance (confusion, delirium, dementia, or psychosis) are managed with adequate functional status: Assess for contributors to thought disturbance, including medications, impaired vision or hearing, underlying metabolic abnormalities, dehydration, psychiatric diagnoses, notify LIP

## 2023-03-02 NOTE — PROGRESS NOTES
PULMONARY & CRITICAL CARE MEDICINE PROGRESS NOTE     Patient:  Keila Powell  MRN: [id number]  Admit date: 2/12/2023  Primary Care Physician: Vazquez Davis MD  Consulting Physician: Corinne Sumner MD  CODE Status: Full Code  LOS: 25     SUBJECTIVE     CHIEF COMPLAINT/REASON FOR CONSULT:  Acute Hypoxic respiratory failure    HISTORY OF PRESENT ILLNESS:  Keila Powell is a 74 y.o. male with past medical history significant for type 2 diabetes mellitus, multiple myeloma, non-Hodgkin lymphoma, sarcoidosis, referred from [facility] ER with worsening respiratory status, shortness of breath, nonproductive cough. He was started on BiPAP and given antibiotics. Chest x-ray was concerning for loculated left-sided pleural effusion. Transfer was made and accepted by hospitalist, further work-up. Critical care was consulted for worsening respiratory status. Patient was on high flow and respiratory rate was in 38-40. Patient was very dyspneic and having difficulty breathing with accessory muscle use. Initially he was unable to keep BiPAP but later on able to keep BiPAP. He improved symptomatically with BiPAP and respiratory rate was in 26. Patient is transferred to the ICU for higher level of care. 2/13: Attempted to perform bedside thoracentesis, no clear pocket identified, sent to IR, chest tube placed, appears that an empyema was encountered and chest tube was placed growing gram positive cocci in pairs, on cefepime and vanco     2/14: Placed chest tube yesterday, weaned off high flow, feeling \"100 x better\".       2/15: Continued TPA/Dornase chest tube flush, Got anxious overnight requiring xanax, 1200 cc of purulent chest tube output in the last 24 hours, patient did not sleep last night     2/16: Added seroquel to help sleep and agitation at night, continued Dornase/TPA, 700 cc of chest tube output that was less purulent, afebrile, WBC downtrending, continue cefepime     2/17: WBC uptrended, sent cultures, tachypneic, restless overnight, given xanax, more tachycardic overnight as well, plan for CT chest today, will discuss possible CTA to evaluate for PE.    02/18: Cardiothoracic surgery consulted and they increased the size of the chest tube. Will likely need decortication. 02/20: Cardiothoracic surgery determined to review imaging and diagnostics with on-call surgeon, plan to repeat CT scan at later date, hold off on plan for decortication. Infectious Disease discontinued IV cefepime 02/20/23.    02/21: Patient more confused, oriented to self. Trying to get out of bed. Denies agitation but feeling overall week. Kidney US did not show hydronephrosis. Infectious disease recommends starting IV Rocephin 1 gm q24 hrs until 03/12/23. [id number] Rufina helms for outpatient therapy. Echo performed. Pt declined PT.    02/22: Pt is paranoid and declined PT. Cardiothoracic planning for left-sided VATS decortication on 02/23/23. Breathing stable, 99% O2 saturation on 3L NC 40% FiO2. INTERVAL HISTORY:  3/2/2023  Patient sitting comfortably in the chair  Chest tubes have been removed  Currently on room air  Tolerating therapies    REVIEW OF SYSTEMS:  Review of Systems   Constitutional:  Positive for activity change. Negative for chills, diaphoresis, fever and unexpected weight change. HENT:  Negative for congestion. Respiratory:  Positive for cough and shortness of breath. Negative for apnea, choking, chest tightness, wheezing and stridor. Cardiovascular: Negative for palpitation, dizziness lightheadedness, positive for mild chest pain (at the site of surgery). Negative for leg swelling or leg pain. Gastrointestinal:  Negative for abdominal distention, diarrhea, nausea and vomiting. Genitourinary:  Negative for difficulty urinating. Neurological:  Negative for dizziness and numbness. Psychiatric/Behavioral:  Negative for agitation.       OBJECTIVE     PaO2/FiO2 RATIO:  No results for input(s): POCPO2 in the last 72 hours. FiO2 : 40 %     VITAL SIGNS:   LAST:  /69 Comment: 122/79 s/p tx  Pulse 77 Comment: 70bpm s/p tx  Temp 98.1 °F (36.7 °C) (Oral)   Resp 22   Ht 6' 1\" (1.854 m)   Wt 230 lb 9.6 oz (104.6 kg)   SpO2 96% Comment: 93% s/p tx  BMI 30.42 kg/m²   8-24 HR RANGE:  TEMP Temp  Av °F (36.7 °C)  Min: 97.5 °F (36.4 °C)  Max: 98.4 °F (24.7 °C)   BP Systolic (68YHN), ZLE:373 , Min:92 , CKI:807      Diastolic (78FIO), XXK:34, Min:57, Max:82     PULSE Pulse  Av.1  Min: 59  Max: 88   RR Resp  Av.6  Min: 9  Max: 24   O2 SAT SpO2  Av.4 %  Min: 89 %  Max: 97 %   OXYGEN DELIVERY No data recorded        SYSTEMIC EXAMINATION:   Constitutional:  Alert, cooperative and no distress. Mental Status:  Oriented to person, place and time and normal affect. Chest: Left-sided dressing is present. No drainage seen  Lungs: Air entry is good on the right side. Right decreased air entry on the left side as compared to right base and minimal crackles present. Heart: regular rhythm, S1-S2 audible, no murmur. Abdomen:  Soft, nontender, nondistended, normal bowel sounds. Extremities:  No edema, redness, tenderness in the calves. Skin:  Warm, dry, no gross lesions or rashes.     DATA REVIEW     Medications: Current Inpatient  Scheduled Meds:   [START ON 3/3/2023] furosemide  40 mg Oral Daily    insulin glargine  25 Units SubCUTAneous Daily    insulin glargine  15 Units SubCUTAneous Nightly    metoprolol tartrate  50 mg Oral BID    senna  2 tablet Oral Nightly    amiodarone  200 mg Oral BID    sodium chloride flush  5-40 mL IntraVENous 2 times per day    heparin (porcine)  5,000 Units SubCUTAneous 3 times per day    insulin lispro  0-16 Units SubCUTAneous 4x Daily AC & HS    midodrine  10 mg Oral TID WC    methylPREDNISolone  30 mg IntraVENous Q12H    docusate sodium  100 mg Oral TID    cefTRIAXone (ROCEPHIN) IV  1,000 mg IntraVENous Q24H    pantoprazole  40 mg Oral QAM AC    colchicine  0.3 mg Oral Daily    sodium chloride flush  5-40 mL IntraVENous 2 times per day    mupirocin   Nasal BID    chlorhexidine   Topical See Admin Instructions    sodium chloride flush  5-40 mL IntraVENous 2 times per day    polyethylene glycol  17 g Oral Daily    sodium chloride flush  5-40 mL IntraVENous 2 times per day     Continuous Infusions:   sodium chloride      sodium chloride      sodium chloride      sodium chloride      dextrose         INPUT/OUTPUT:  In: 970 [P.O.:970]  Out: 3580 [Urine:3580]  Date 03/02/23 0000 - 03/02/23 2359   Shift 4209-8273 1772-1952 8202-7918 24 Hour Total   INTAKE   P.O.(mL/kg/hr) 200(0.2) 650  850   Shift Total(mL/kg) 200(1.9) 650(6.2)  850(8.1)   OUTPUT   Urine(mL/kg/hr) 705(0.8) 1025  1730   Shift Total(mL/kg) 705(6.7) 1025(9.8)  1730(16.5)   Weight (kg) 104.6 104.6 104.6 104.6          LABS:  ABGs:   No results for input(s): POCPH, POCPCO2, POCPO2, POCHCO3, DERH0GAR in the last 72 hours. CBC:   Recent Labs     02/28/23 0413   WBC 16.3*   HGB 8.3*   HCT 27.0*   MCV 91.2      RBC 2.96*   MCH 28.0   MCHC 30.7   RDW 16.4*         CRP:   No results for input(s): CRP in the last 72 hours. LDH:   No results for input(s): LDH in the last 72 hours. BMP:   Recent Labs     02/28/23  0413 02/28/23  1707 03/01/23  1320 03/02/23  0456    134* 137 140   K 5.0 4.5 4.2 5.0    100 102 103   CO2 22 22 24 23   BUN 63* 64* 59* 59*   CREATININE 4.14* 3.79* 3.00* 2.92*   GLUCOSE 287* 177* 189* 177*       Liver Function Test:   No results for input(s): PROT, LABALBU, ALT, AST, GGT, ALKPHOS, BILITOT in the last 72 hours. Coagulation Profile:   No results for input(s): INR, PROTIME, APTT in the last 72 hours. D-Dimer:  No results for input(s): DDIMER in the last 72 hours. Lactic Acid:  No results for input(s): LACTA in the last 72 hours. Cardiac Enzymes:  No results for input(s): CKTOTAL, CKMB, CKMBINDEX, TROPONINI in the last 72 hours.     Invalid input(s): TROPONIN, HSTROP  BNP/ProBNP:   No results for input(s): BNP, PROBNP in the last 72 hours. Triglycerides:  No results for input(s): TRIG in the last 72 hours. Microbiology:  Urine Culture:  No components found for: CURINE  Blood Culture:  No components found for: CBLOOD, CFUNGUSBL  Sputum Culture:  No components found for: CSPUTUM  No results for input(s): SPECDESC, SPECIAL, CULTURE, STATUS, ORG, CDIFFTOXPCR, CAMPYLOBPCR, SALMONELLAPC, SHIGAPCR, SHIGELLAPCR, MPNEUG, MPNEUM, LACTOQL in the last 72 hours. No results for input(s): SPUTUM, SPECIAL, CULTURE, STATUS, ORG, CDIFFTOXPCR, MPNEUM, MPNEUG in the last 72 hours. Invalid input(s): To Lott, CFUNGUSBL,  [address] [facility]         Radiology Reports:  XR CHEST PORTABLE   Final Result   Moderate cardiomegaly with minimal pulmonary vascular prominence without   pulmonary edema. As compared to previous portable chest x-ray pulmonary   vascular congestion is decreased. At the left lung base decreased atelectatic changes, and/or infiltrates as   compared to previous study. Still there are some patchy infiltrates,   atelectasis or fibrotic changes present in the left lung base. In the rest of both lungs, there is no other focal abnormality or new   abnormality. XR CHEST PORTABLE   Final Result   No significant oval change. Small left pleural effusion and patchy opacities   at the left base. XR CHEST PORTABLE   Final Result   Interval removal of left-sided chest tube. No pneumothorax. Continued small left pleural effusion, unchanged. Left basilar airspace   disease is unchanged. XR CHEST PORTABLE   Final Result   No significant interval change. XR CHEST PORTABLE   Final Result   Left chest tubes present with partially loculated left pleural effusion and   left basilar opacity unchanged.          XR CHEST PORTABLE   Final Result   No significant change from prior exam.      Left chest tubes with left basilar airspace disease and small amount of   pleural fluid. Linear left retrocardiac lucency could represent small pneumothorax. XR CHEST PORTABLE   Final Result   No significant change from prior exam.         XR CHEST PORTABLE   Final Result   Status post removal single small bore and placement 2 large-bore chest tubes   left lung with reduced opacity, presumably multiloculated left pleural   effusion and associated atelectasis. No pneumothorax. Slightly greater   atelectasis right base. US RETROPERITONEAL COMPLETE   Final Result   1. Simple cortical cyst at the upper pole left kidney measuring 6 mm. 2. Limited renal ultrasound. No hydronephrosis. 3. Nonvisualization of ureteral jets. Minimal distention of the urinary   bladder. Patient no urge to void during the exam.      RECOMMENDATIONS:   Unavailable         IR CHEST TUBE INSERTION   Final Result   Successful fluoroscopic up sizing of left sided chest tube with 14 [place]   tube placed. XR CHEST PORTABLE   Final Result   Moderate left pleural effusion increased. No change left chest tube/pigtail   catheter. VL DUP LOWER EXTREMITY VENOUS BILATERAL   Final Result      CT CHEST WO CONTRAST   Final Result   Small to moderate-sized multiloculated left pleural effusions have improved. New small foci of air within the collection is presumably related to the   introduction of the left thoracostomy tube. Stable small to moderate sized mediastinal lymph nodes are nonspecific but   may relate to the patient's history of lymphoma. Additional small bilateral   axial lymph nodes are identified. Small hiatal hernia. XR CHEST PORTABLE   Final Result   Similar findings when rotation taken into account; left chest tube with   unchanged or slightly increased loculated appearing left pleural effusion;   atelectatic appearing changes. No pneumothorax.          XR CHEST PORTABLE   Final Result   Interval placement of a left chest tube with reduced left pleural fluid. IR CHEST TUBE INSERTION   Final Result   Successful ultrasound guided placement of a left 10 French chest tube         CT CHEST WO CONTRAST   Final Result   1. Moderate to large amount of multiloculated left pleural fluid. Assessment   for pleural thickening and/or pleural nodularity is limited without IV   contrast.   2. Consolidation in the left lower lobe, likely atelectasis, but possibility   of superimposed pneumonia would be difficult to exclude. 3. Mediastinal and bilateral axillary lymphadenopathy, which may be related   to the patient's history of lymphoma. If available, comparison to any recent   imaging may be of use to assess for progression. XR CHEST PORTABLE   Final Result   No significant interval change. Redemonstration of opacification of the mid   to lower left lung field which may in part be due to loculated left pleural   effusion. Consider further evaluation with CT. XR CHEST PORTABLE    (Results Pending)        Echocardiogram:   No results found for this or any previous visit. ASSESSMENT AND PLAN     Assessment:    Acute hypoxic respiratory failure resolved  Community acquired pneumonia  Empyema left side s/p VATS/decortication (2/23/2023)  Mediastinal and bilateral axillary lymphadenopathy  Hx of lymphoma  Hypertension  Hx of AAA  Hx of mitral regurgitation  Atrial fibrillation. KELSEA    Plan:    Patient s/p VATS on 02/23/2023.     Status post removal of the chest tubes  Currently on room air  Use supplemental oxygen if needed to keep oxygen saturation above 92%  DVT prophylaxis with heparin subcu when okay with CT surgery if there is no contraindication  Continue Rocephin  Steroids for pericarditis as per cardiology  Continue to encourage incentive spirometry, acapella  Bronchodilator therapy as needed  Physical/Occupational Therapy, advance as tolerated  Patient can be discharged from pulmonary standpoint    We will continue to follow. I would like to thank you for allowing me to participate in the care of this patient. Please feel free to call with any further questions or concerns.       David Nicole MD  Pulmonary and critical care medicine  3/2/2023, 2:45 PM

## 2023-03-03 ENCOUNTER — APPOINTMENT (OUTPATIENT)
Dept: GENERAL RADIOLOGY | Age: 68
DRG: 853 | End: 2023-03-03
Attending: STUDENT IN AN ORGANIZED HEALTH CARE EDUCATION/TRAINING PROGRAM
Payer: COMMERCIAL

## 2023-03-03 LAB
ANION GAP SERPL CALCULATED.3IONS-SCNC: 10 MMOL/L (ref 9–17)
BUN SERPL-MCNC: 51 MG/DL (ref 8–23)
CALCIUM SERPL-MCNC: 9.4 MG/DL (ref 8.6–10.4)
CHLORIDE SERPL-SCNC: 102 MMOL/L (ref 98–107)
CO2 SERPL-SCNC: 28 MMOL/L (ref 20–31)
CREAT SERPL-MCNC: 2.64 MG/DL (ref 0.7–1.2)
EKG ATRIAL RATE: 63 BPM
EKG P AXIS: 17 DEGREES
EKG P-R INTERVAL: 128 MS
EKG Q-T INTERVAL: 396 MS
EKG QRS DURATION: 98 MS
EKG QTC CALCULATION (BAZETT): 405 MS
EKG R AXIS: 42 DEGREES
EKG T AXIS: 44 DEGREES
EKG VENTRICULAR RATE: 63 BPM
GFR SERPL CREATININE-BSD FRML MDRD: 26 ML/MIN/1.73M2
GLUCOSE BLD-MCNC: 124 MG/DL (ref 75–110)
GLUCOSE BLD-MCNC: 139 MG/DL (ref 75–110)
GLUCOSE BLD-MCNC: 324 MG/DL (ref 75–110)
GLUCOSE BLD-MCNC: 69 MG/DL (ref 75–110)
GLUCOSE BLD-MCNC: 71 MG/DL (ref 75–110)
GLUCOSE SERPL-MCNC: 77 MG/DL (ref 70–99)
POTASSIUM SERPL-SCNC: 3.9 MMOL/L (ref 3.7–5.3)
REASON FOR REJECTION: NORMAL
SODIUM SERPL-SCNC: 140 MMOL/L (ref 135–144)
ZZ NTE CLEAN UP: ORDERED TEST: NORMAL
ZZ NTE WITH NAME CLEAN UP: SPECIMEN SOURCE: NORMAL

## 2023-03-03 PROCEDURE — 2580000003 HC RX 258: Performed by: ANESTHESIOLOGY

## 2023-03-03 PROCEDURE — 6370000000 HC RX 637 (ALT 250 FOR IP): Performed by: STUDENT IN AN ORGANIZED HEALTH CARE EDUCATION/TRAINING PROGRAM

## 2023-03-03 PROCEDURE — 6360000002 HC RX W HCPCS: Performed by: FAMILY MEDICINE

## 2023-03-03 PROCEDURE — 2580000003 HC RX 258: Performed by: NURSE PRACTITIONER

## 2023-03-03 PROCEDURE — 80048 BASIC METABOLIC PNL TOTAL CA: CPT

## 2023-03-03 PROCEDURE — 6370000000 HC RX 637 (ALT 250 FOR IP): Performed by: INTERNAL MEDICINE

## 2023-03-03 PROCEDURE — 6370000000 HC RX 637 (ALT 250 FOR IP): Performed by: PHYSICIAN ASSISTANT

## 2023-03-03 PROCEDURE — APPSS30 APP SPLIT SHARED TIME 16-30 MINUTES: Performed by: NURSE PRACTITIONER

## 2023-03-03 PROCEDURE — 97535 SELF CARE MNGMENT TRAINING: CPT

## 2023-03-03 PROCEDURE — 2580000003 HC RX 258

## 2023-03-03 PROCEDURE — 6360000002 HC RX W HCPCS

## 2023-03-03 PROCEDURE — 99232 SBSQ HOSP IP/OBS MODERATE 35: CPT | Performed by: INTERNAL MEDICINE

## 2023-03-03 PROCEDURE — 2580000003 HC RX 258: Performed by: PHYSICIAN ASSISTANT

## 2023-03-03 PROCEDURE — 82947 ASSAY GLUCOSE BLOOD QUANT: CPT

## 2023-03-03 PROCEDURE — 71045 X-RAY EXAM CHEST 1 VIEW: CPT

## 2023-03-03 PROCEDURE — 51798 US URINE CAPACITY MEASURE: CPT

## 2023-03-03 PROCEDURE — 36415 COLL VENOUS BLD VENIPUNCTURE: CPT

## 2023-03-03 PROCEDURE — 2060000000 HC ICU INTERMEDIATE R&B

## 2023-03-03 PROCEDURE — 93005 ELECTROCARDIOGRAM TRACING: CPT | Performed by: INTERNAL MEDICINE

## 2023-03-03 PROCEDURE — 6370000000 HC RX 637 (ALT 250 FOR IP): Performed by: FAMILY MEDICINE

## 2023-03-03 PROCEDURE — 2580000003 HC RX 258: Performed by: STUDENT IN AN ORGANIZED HEALTH CARE EDUCATION/TRAINING PROGRAM

## 2023-03-03 PROCEDURE — 99231 SBSQ HOSP IP/OBS SF/LOW 25: CPT | Performed by: INTERNAL MEDICINE

## 2023-03-03 PROCEDURE — 97530 THERAPEUTIC ACTIVITIES: CPT

## 2023-03-03 RX ORDER — INSULIN LISPRO 100 [IU]/ML
0-4 INJECTION, SOLUTION INTRAVENOUS; SUBCUTANEOUS NIGHTLY
Status: DISCONTINUED | OUTPATIENT
Start: 2023-03-03 | End: 2023-03-06 | Stop reason: HOSPADM

## 2023-03-03 RX ORDER — INSULIN LISPRO 100 [IU]/ML
0-8 INJECTION, SOLUTION INTRAVENOUS; SUBCUTANEOUS
Status: DISCONTINUED | OUTPATIENT
Start: 2023-03-04 | End: 2023-03-06 | Stop reason: HOSPADM

## 2023-03-03 RX ADMIN — INSULIN LISPRO 4 UNITS: 100 INJECTION, SOLUTION INTRAVENOUS; SUBCUTANEOUS at 21:02

## 2023-03-03 RX ADMIN — SENNOSIDES 17.2 MG: 8.6 TABLET, COATED ORAL at 21:02

## 2023-03-03 RX ADMIN — MUPIROCIN: 20 OINTMENT TOPICAL at 08:13

## 2023-03-03 RX ADMIN — REPAGLINIDE 1 MG: 0.5 TABLET ORAL at 06:30

## 2023-03-03 RX ADMIN — MIDODRINE HYDROCHLORIDE 10 MG: 5 TABLET ORAL at 12:25

## 2023-03-03 RX ADMIN — METOPROLOL TARTRATE 50 MG: 25 TABLET ORAL at 22:11

## 2023-03-03 RX ADMIN — DOCUSATE SODIUM 100 MG: 100 CAPSULE, LIQUID FILLED ORAL at 14:36

## 2023-03-03 RX ADMIN — CEFTRIAXONE SODIUM 1000 MG: 10 INJECTION, POWDER, FOR SOLUTION INTRAVENOUS at 09:00

## 2023-03-03 RX ADMIN — MIDODRINE HYDROCHLORIDE 10 MG: 5 TABLET ORAL at 16:36

## 2023-03-03 RX ADMIN — OXYCODONE HYDROCHLORIDE AND ACETAMINOPHEN 1 TABLET: 5; 325 TABLET ORAL at 06:30

## 2023-03-03 RX ADMIN — MUPIROCIN: 20 OINTMENT TOPICAL at 21:02

## 2023-03-03 RX ADMIN — SODIUM CHLORIDE, PRESERVATIVE FREE 10 ML: 5 INJECTION INTRAVENOUS at 08:17

## 2023-03-03 RX ADMIN — COLCHICINE 0.3 MG: 0.6 TABLET, FILM COATED ORAL at 08:12

## 2023-03-03 RX ADMIN — MIDODRINE HYDROCHLORIDE 10 MG: 5 TABLET ORAL at 08:16

## 2023-03-03 RX ADMIN — SODIUM CHLORIDE, PRESERVATIVE FREE 10 ML: 5 INJECTION INTRAVENOUS at 21:03

## 2023-03-03 RX ADMIN — AMIODARONE HYDROCHLORIDE 200 MG: 200 TABLET ORAL at 08:12

## 2023-03-03 RX ADMIN — FUROSEMIDE 40 MG: 40 TABLET ORAL at 08:12

## 2023-03-03 RX ADMIN — POLYETHYLENE GLYCOL 3350 17 G: 17 POWDER, FOR SOLUTION ORAL at 08:12

## 2023-03-03 RX ADMIN — HEPARIN SODIUM 5000 UNITS: 5000 INJECTION INTRAVENOUS; SUBCUTANEOUS at 14:00

## 2023-03-03 RX ADMIN — DOCUSATE SODIUM 100 MG: 100 CAPSULE, LIQUID FILLED ORAL at 08:12

## 2023-03-03 RX ADMIN — HEPARIN SODIUM 5000 UNITS: 5000 INJECTION INTRAVENOUS; SUBCUTANEOUS at 22:17

## 2023-03-03 RX ADMIN — METOPROLOL TARTRATE 50 MG: 25 TABLET ORAL at 08:22

## 2023-03-03 RX ADMIN — SODIUM CHLORIDE, PRESERVATIVE FREE 10 ML: 5 INJECTION INTRAVENOUS at 08:18

## 2023-03-03 RX ADMIN — DOCUSATE SODIUM 100 MG: 100 CAPSULE, LIQUID FILLED ORAL at 22:27

## 2023-03-03 RX ADMIN — PANTOPRAZOLE SODIUM 40 MG: 40 TABLET, DELAYED RELEASE ORAL at 08:13

## 2023-03-03 RX ADMIN — AMIODARONE HYDROCHLORIDE 200 MG: 200 TABLET ORAL at 21:02

## 2023-03-03 RX ADMIN — REPAGLINIDE 1 MG: 0.5 TABLET ORAL at 17:40

## 2023-03-03 RX ADMIN — HEPARIN SODIUM 5000 UNITS: 5000 INJECTION INTRAVENOUS; SUBCUTANEOUS at 05:23

## 2023-03-03 ASSESSMENT — PAIN SCALES - GENERAL
PAINLEVEL_OUTOF10: 0
PAINLEVEL_OUTOF10: 0

## 2023-03-03 NOTE — PLAN OF CARE
Problem: Discharge Planning  Goal: Discharge to home or other facility with appropriate resources  Outcome: Progressing     Problem: Safety - Adult  Goal: Free from fall injury  Outcome: Progressing     Problem: ABCDS Injury Assessment  Goal: Absence of physical injury  Outcome: Progressing     Problem: Skin/Tissue Integrity  Goal: Absence of new skin breakdown  Description: 1. Monitor for areas of redness and/or skin breakdown  2. Assess vascular access sites hourly  3. Every 4-6 hours minimum:  Change oxygen saturation probe site  4. Every 4-6 hours:  If on nasal continuous positive airway pressure, respiratory therapy assess nares and determine need for appliance change or resting period.   Outcome: Progressing     Problem: Chronic Conditions and Co-morbidities  Goal: Patient's chronic conditions and co-morbidity symptoms are monitored and maintained or improved  Outcome: Progressing     Problem: Respiratory - Adult  Goal: Achieves optimal ventilation and oxygenation  Outcome: Progressing     Problem: Pain  Goal: Verbalizes/displays adequate comfort level or baseline comfort level  Outcome: Progressing  Flowsheets (Taken 3/2/2023 1200 by Moiz Ga RN)  Verbalizes/displays adequate comfort level or baseline comfort level:   Encourage patient to monitor pain and request assistance   Assess pain using appropriate pain scale     Problem: Neurosensory - Adult  Goal: Achieves stable or improved neurological status  Outcome: Progressing  Flowsheets (Taken 3/2/2023 2000)  Achieves stable or improved neurological status: Assess for and report changes in neurological status  Goal: Absence of seizures  Outcome: Progressing  Goal: Remains free of injury related to seizures activity  Outcome: Progressing  Goal: Achieves maximal functionality and self care  Outcome: Progressing     Problem: Cardiovascular - Adult  Goal: Maintains optimal cardiac output and hemodynamic stability  Outcome: Progressing  Goal: Absence of cardiac dysrhythmias or at baseline  Outcome: Progressing     Problem: Skin/Tissue Integrity - Adult  Goal: Skin integrity remains intact  Outcome: Progressing  Goal: Incisions, wounds, or drain sites healing without S/S of infection  Outcome: Progressing  Goal: Oral mucous membranes remain intact  Outcome: Progressing     Problem: Musculoskeletal - Adult  Goal: Return mobility to safest level of function  Outcome: Progressing  Goal: Maintain proper alignment of affected body part  Outcome: Progressing  Goal: Return ADL status to a safe level of function  Outcome: Progressing     Problem: Gastrointestinal - Adult  Goal: Minimal or absence of nausea and vomiting  Outcome: Progressing  Goal: Maintains or returns to baseline bowel function  Outcome: Progressing  Goal: Maintains adequate nutritional intake  Outcome: Progressing  Goal: Establish and maintain optimal ostomy function  Outcome: Progressing     Problem: Genitourinary - Adult  Goal: Absence of urinary retention  Outcome: Progressing  Goal: Urinary catheter remains patent  Outcome: Progressing     Problem: Infection - Adult  Goal: Absence of infection at discharge  Outcome: Progressing  Goal: Absence of infection during hospitalization  Outcome: Progressing  Goal: Absence of fever/infection during anticipated neutropenic period  Outcome: Progressing     Problem: Metabolic/Fluid and Electrolytes - Adult  Goal: Electrolytes maintained within normal limits  Outcome: Progressing  Goal: Hemodynamic stability and optimal renal function maintained  Outcome: Progressing  Goal: Glucose maintained within prescribed range  Outcome: Progressing     Problem: Hematologic - Adult  Goal: Maintains hematologic stability  Outcome: Progressing     Problem: Confusion  Goal: Confusion, delirium, dementia, or psychosis is improved or at baseline  Description: INTERVENTIONS:  1.  Assess for possible contributors to thought disturbance, including medications, impaired vision or hearing, underlying metabolic abnormalities, dehydration, psychiatric diagnoses, and notify attending LIP  2. Robinson high risk fall precautions, as indicated  3. Provide frequent short contacts to provide reality reorientation, refocusing and direction  4. Decrease environmental stimuli, including noise as appropriate  5. Monitor and intervene to maintain adequate nutrition, hydration, elimination, sleep and activity  6. If unable to ensure safety without constant attention obtain sitter and review sitter guidelines with assigned personnel  7.  Initiate Psychosocial CNS and Spiritual Care consult, as indicated  Outcome: Progressing

## 2023-03-03 NOTE — PLAN OF CARE
Problem: Discharge Planning  Goal: Discharge to home or other facility with appropriate resources  3/3/2023 1056 by Rosalinda Desai RN  Outcome: Progressing  Flowsheets (Taken 3/3/2023 0800)  Discharge to home or other facility with appropriate resources: Identify barriers to discharge with patient and caregiver  3/3/2023 0133 by Tra Avila RN  Outcome: Progressing     Problem: Safety - Adult  Goal: Free from fall injury  3/3/2023 1056 by Rosalinda Desai RN  Outcome: Progressing  3/3/2023 0133 by Tra Avila RN  Outcome: Progressing     Problem: ABCDS Injury Assessment  Goal: Absence of physical injury  3/3/2023 1056 by Rosalinda Desai RN  Outcome: Progressing  3/3/2023 0133 by Tra Avila RN  Outcome: Progressing     Problem: Skin/Tissue Integrity  Goal: Absence of new skin breakdown  Description: 1. Monitor for areas of redness and/or skin breakdown  2. Assess vascular access sites hourly  3. Every 4-6 hours minimum:  Change oxygen saturation probe site  4. Every 4-6 hours:  If on nasal continuous positive airway pressure, respiratory therapy assess nares and determine need for appliance change or resting period.   3/3/2023 1056 by Rosalinda Desai RN  Outcome: Progressing  3/3/2023 0133 by Tra Avila RN  Outcome: Progressing     Problem: Chronic Conditions and Co-morbidities  Goal: Patient's chronic conditions and co-morbidity symptoms are monitored and maintained or improved  3/3/2023 1056 by Rosalinda Desai RN  Outcome: Progressing  Flowsheets (Taken 3/3/2023 0800)  Care Plan - Patient's Chronic Conditions and Co-Morbidity Symptoms are Monitored and Maintained or Improved: Monitor and assess patient's chronic conditions and comorbid symptoms for stability, deterioration, or improvement  3/3/2023 0133 by Tra Avila RN  Outcome: Progressing

## 2023-03-03 NOTE — PROGRESS NOTES
Occupational Therapy  Facility/Department: [facility]  Occupational Therapy Daily Treatment Note    Name: Deedee Ortega  : 1955  MRN: [id number]  Date of Service: 3/3/2023    Discharge Recommendations:  Patient would benefit from continued therapy after discharge in order to increase pt endurance, strength and independence          Assessment   Performance deficits / Impairments: Decreased functional mobility ; Decreased ADL status; Decreased cognition;Decreased endurance;Decreased balance;Decreased high-level IADLs;Decreased strength;Decreased safe awareness  Prognosis: Good  REQUIRES OT FOLLOW-UP: Yes  Activity Tolerance  Activity Tolerance: Patient Tolerated treatment well;Patient limited by fatigue        Plan   Occupational Therapy Plan  Times Per Week: 3-5 x/wk  Current Treatment Recommendations: Balance training, Functional mobility training, Endurance training, Cognitive reorientation, Safety education & training, Patient/Caregiver education & training, Equipment evaluation, education, & procurement, Self-Care / ADL, Home management training     Restrictions  Restrictions/Precautions  Restrictions/Precautions: Up as Tolerated, Fall Risk, Surgical Protocols, General Precautions  Required Braces or Orthoses?: No    Subjective   General  Chart Reviewed: Yes  Family / Caregiver Present: No  General Comment  Comments: Pt and RN agreeable to therapy this day. Pt seated in chair at start of session and retired to seated in chair at session end with call light in reach and all needs met.  Pt denied pain throughout session         Objective        Safety Devices  Type of Devices: Gait belt;Left in chair;Call light within reach;Nurse notified  Restraints  Restraints Initially in Place: No  Balance  Sitting: Without support (Pt tolerated approx 10 min static sitting unsupported SBA)  Standing: Without support (SBA static/dynamic standing with RW at sink for ADLs tolerating approx 5 min with 0 seated rest breaks)  Gait  Overall Level of Assistance: Minimum assistance (chair><bathroom and for simulated household distances utilizing RW requiring assist for walker management and mod verbal cues to push like a shopping cart and not  and place. Pt pushed walker too far forward requiring mod verbal/tactile cues for proper placement)        ADL  Feeding: Independent  Feeding Skilled Clinical Factors: self feeding seated in chair pt able to open all containers and manipulate standard utensils  Grooming: Stand by assistance;Setup  Grooming Skilled Clinical Factors: oral care and face washing facilitated standing at sink with 0-1 hand support  Additional Comments: Throughout session pt limited per fatigue, SOB and decreased strength.  Further ADLs not attempted pt becomming increasingly fatigued with activity demonstrated by SOB and trembling BUE        Bed mobility  Bed Mobility Comments: pt seated in chair at start/end of session  Transfers  Sit to stand: Contact guard assistance  Stand to sit: Contact guard assistance  Transfer Comments: utilizing RW demo F hand placement     Cognition  Overall Cognitive Status: Exceptions  Arousal/Alertness: Appropriate responses to stimuli  Safety Judgement: Decreased awareness of need for assistance;Decreased awareness of need for safety  Problem Solving: Decreased awareness of errors  Insights: Decreased awareness of deficits  Initiation: Requires cues for some  Orientation  Overall Orientation Status: Within Functional Limits                  Education Given To: Patient  Education Provided: Role of Therapy;Transfer Training;ADL Adaptive Strategies;Precautions  Education Provided Comments: proper hand and foot placement; balance maintaince; walker management; safety precautions-F/P carry over  Education Method: Verbal  Education Outcome: Continued education needed                 AM-PAC Score        AM-[facility] Inpatient Daily Activity Raw Score: 19 (03/03/23 6764)  AM-PAC Inpatient ADL T-Scale Score : 40.22 (03/03/23 1334)  ADL Inpatient CMS 0-100% Score: 42.8 (03/03/23 1334)  ADL Inpatient CMS G-Code Modifier : CK (03/03/23 1334)        Goals  Short Term Goals  Time Frame for Short Term Goals: By discharge, pt will:  Short Term Goal 1: Demo I with bed mobility to increase independence with ADLs and to decrease risk for pressure injury  Short Term Goal 2: Demo Mod I for functional transfers and functional mobility with use of LRD for engagement in ADLs/IADLs  Short Term Goal 3: Demo I with all UB ADLs  Short Term Goal 4: Demo Mod I for LB ADLs and toileting, utilizing AE and adaptive tech PRN  Short Term Goal 5: Demo 8 min dynamic standing and reaching outside LI with unilateral hand release and SUP for improved standing balance during ADL/IADL participation       Therapy Time   Individual Concurrent Group Co-treatment   Time In 0827         Time Out 0902         Minutes 35         Timed Code Treatment Minutes: [address], [place]/L

## 2023-03-03 NOTE — ADT AUTH CERT
Pneumonia - Care Day 17 (2/28/2023) by Delmy Nuñez RN       Review Status Review Entered   Completed 3/3/2023 0926       Created By   Delmy Nuñez RN      Criteria Review      Care Day: 17 Care Date: 2/28/2023 Level of Care: ICU    Guideline Day 2    Level Of Care    (X) Floor    3/3/2023 9:26 AM EST by Salvatore Andre      icu    Clinical Status    ( ) * No CO2 retention or acidosis    (X) * No requirement for mechanical ventilation    3/3/2023 9:26 AM EST by Salvatore Andre      02 2l nc    (X) * Hypotension absent    3/3/2023 9:26 AM EST by Salvatore Andre      133/96    (X) * Afebrile or fever improved    3/3/2023 9:26 AM EST by Salvatore Andre      37.1    ( ) * No hypoxia on room air or oxygenation improved    3/3/2023 9:26 AM EST by Salvatore Andre      02 2l nc  sp02 93%    ( ) * Mental status improved or at baseline    Activity    (X) * Increased activity    3/3/2023 9:26 AM EST by Salvatore Andre      ambulate    Routes    (X) Oral hydration    (X) Oral medications    3/3/2023 9:26 AM EST by Salvatore Andre      po and iv   cordarone gtt   16.7 ml hr  lasix 40 mg iv  2xd   solu medrol 30 mg iv  q12h    (X) Usual diet    3/3/2023 9:26 AM EST by Salvatore Andre      cardiac /  renal    Interventions    (X) Incentive spirometry    3/3/2023 9:26 AM EST by Salvatore Andre      is  q2h   acapella  q2h    (X) Pulse oximetry    3/3/2023 9:26 AM EST by Salvatore Andre      cont pulse ox    (X) Head of bed at 30 degrees    3/3/2023 9:26 AM EST by Salvatore Andre      hob 30    (X) Possible oxygen    3/3/2023 9:26 AM EST by Salvatore Andre      2lnc    Medications    (X) IV or oral antibiotics    3/3/2023 9:26 AM EST by Salvatore Andre      rocephin  1 g miv   q24h    * Milestone   Additional Notes   DATE:  2.28.23             VITALS:   0400 97.7 (36.5) 23 78 127/81 - - - 2L 93 % Nc             ABNL/PERTINENT LABS/RADIOLOGY/DIAGNOSTIC STUDIES:   Na 134   Bun 64   Cr  3.79   Gfr 17   Glcuose 177   Wbc 16.3    H/h  8.3 / 27.0 XR CHEST     Left-sided chest tubes remain in place. Small   left pleural effusion and patchy left basilar opacities are not significantly   changed. No evidence of pneumothorax. XR CHEST    Interval removal of left-sided chest tube. No pneumothorax. Continued small left pleural effusion, unchanged. Left basilar airspace   disease is unchanged. PHYSICAL EXAM:   HEENT: Head: Normocephalic, no lesions, without obvious abnormality   Neck: no JVD   Lungs: Decreased air entry on the left side with chest tube in place. Heart: Irregular heart rate. Abdomen: soft, non-tender; bowel sounds normal   Extremities: No LE edema   Neurologic: Mental status: Alert, oriented. Motor and sensory not done. MD CONSULTS/ASSESSMENT AND PLAN:   Cardiology    No acute overnight events. No reported chest pain, palpitations SOB. Telemetry showing afib overnight with RVR with conversion to sinus rhythm between 5-6 am today. Blood pressure 133/54 on exam. On amiodarone infusion 0.5 mg.       1. Left sided Empyema s/p VATS/decortication 1/23/2023   2. Acute pericarditis on renal dose colchicine and steroids   3. Community acquired pneumonia   4. Acute hypoxic respiratory failure due to above   5. New Onset Atrial fibrillation with RVR - on amiodarone s/p AFIA/CV on 2/27   6. History of non-Hodgkin's lymphoma   7. History of nonrheumatic mitral regurgitation/Tricuspid regurgitation   8. History of dilated aortic root   9. Type 2 diabetes   10. KELSEA on Chronic kidney disease stage III/IV   11. CLAY on CPAP       Plan of Treatment:   1. Continue to be in Afib with controlled heart rate. Continue amiodarone and switch to oral.    2. ASA held per nephrology. On Colchicine 0.3 qday for pericarditis (renal dose). 3. On methylprednisolone 30 mg q12h   4. On antibiotics as per ID   5. Lopressor 50 twice daily, uptitrate as HR/BP tolerate. 6. Lopressor 5 mg IV q6h PRN for >110   7.  On IV Lasix as per nephro, at risk for RRT. 8. On Midodrine 10 mg TID   9. On lasix 40 mg BID   10. Not a candidate for HD at this time as per nephrology          Medicine    Patient is s/p chest tube removal.  His wife and daughter are in the room. He is still confused, but oriented to name and place, not date. + constipation. He is still coughing up thick mucus, no complaints of pain      Mental Status:  oriented to person, place but not time,  normal affect   Lungs:  + crackles bibasilar, normal effort   Heart:  regular rate and rhythm, no murmur   Abdomen:  soft, nontender, nondistended, normal bowel sounds, no masses, hepatomegaly, splenomegaly   Extremities:  no edema, redness, tenderness in the calves   Skin:  no gross lesions, rashes, induration      1. Left empyema- s/p decortication 2/23/23, con't IV Rocephin, CTS following and ID   2. A.fib with RVR- s/p cardioversion yesterday, he flipped back into A.fib overnight, Cardiology changing IV Amiodarone to po pills, question of AC   3. Pericarditis- Colchicine, IV Solumedrol, wife states that he gets confused on steriods   4. KELSEA on CKD 3- slowly improving, Nephrology stopped IVF, decreased Lasix to daily, moon catheter in for strict I/Os, repeat BMP tomorrow, baseline Cr 1.8-2   5. CLAY- Cpap   6. COPD- stable   7. Acute delirium- seroquel at night, DM2- SSI, Lantus started since he's on steriods   8. Gi/ DVT proph   9. PT/OT             Cardiothoracic Surgical    POD # 2   Continue chest tubes to suction. Forced air leak.    Creatine climbing neprhology following   Pericarditis, cardiolgy following            NEPHROLOGY      #1 acute kidney injury secondary to ischemic ATN (hypotension/pneumonia-empyema/NSAID and A-fib) -improving   #2 chronic kidney disease stage IIIb secondary to biopsy-proven diabetic nephropathy with baseline creatinine 1.8-2 and follows up with Dr. Anita Aragon   #3 left-sided empyema cultures positive for Streptococcus status post VATS   #4 acute pericarditis   #5 atrial fibrillation with RVR   #6 type 2 diabetes   #7 history of multiple myeloma   #8 history of non-Hodgkin's lymphoma   #9 history of sarcoidosis           PLAN       #1 DC IV fluids and reduce Lasix to once a day   #2 antibiotics as per estimated GFR   #3 avoid nephrotoxins      Urine output excellent. Overall in negative cumulative balance. On IV Lasix 40 mg twice a day. No acute hemodynamic issues overnight. Plans to remove chest tube today   Receiving antibiotics   Marginal improvement in creatinine noted   Tolerating oral intake well      General appearance:Awake, alert, in no acute distress   HEENT: PERRLA   Respiratory::vesicular breath sounds, present wheezing   Cardiovascular:S1 S2 normal,no gallop or organic murmur. Abdomen:Non tender/non distended. Bowel sounds present   Extremities: No Cyanosis or Clubbing,Lower extremity edema   Neurological:Alert and oriented. No abnormalities of mood, affect, memory, mentation, or behavior are noted          Infectious Diseases    · Left-sided empyema   º Streptococci Gordonii on pleural fluid culture from 2/14/23   º Repeat culture 2/18/23 with no bacterial growth    · Shortness of breath   · KELSEA on CKD   · Hyponatremia   · Leukocytosis   · Hx non-Hodgkin's lymphoma   · Hx multiple myeloma   · Sarcoidosis   · DM II   · Interstitial nephritis   · COPD   · Hx partial right lung lobectomy   · Allergies to macrolides and ketolides. Recommendations:   · 2/20/23: Discontinued IV Cefepime on 2-20-23   · IV Rocephin 1 gm q 24 hrs until 3/12/23   · [id number] for midline for outpatient therapy when ready for discharge   · S/p decortication on 2/23/23   · Cardioversion completed 2/27/23 for continued AFIB         PULMONARY & CRITICAL    He is hemodynamically stable overnight no acute hypoxic events were reported. His heart rate is in 80s and he is on amiodarone drip he is a status post cardioversion on 02/27/2023.    He is on room air maintaining saturation and weaned off oxygen on room air is saturating 94 to 98%. He does have cough he does have sputum production sometimes darker sometimes more pale color denies hemoptysis. Chest pain is better since the chest tube was removed. Does not complain of shortness of breath at rest but on ambulation   He had chest tube in place this morning chest tube output was reported to be 70 mL in last 24 hours. Chest x-ray this morning shows chest tube in place with postop changes left pleuroparenchymal change/possible effusion. Chest tube removed by CT surgery after removal of the chest tube chest x-ray shows a similar finding without pneumothorax   He is currently on Lasix 40 mg IV once daily urine output is 2.9 L in last 24 hours. Creatinine is better 4.12 today. Other labs shows BUN is 63 bicarbonate 22 potassium is 5.0 WBC 16.3 hemoglobin 8.3 platelet 694. Mental Status:  Oriented to person, place and time and normal affect. Chest: Left-sided dressing is present. No drainage seen   Lungs: Air entry is good on the right side. Decreased air entry on the left side with chest tube in place. No crackles present   Heart: regular rhythm, no murmur. Abdomen:  Soft, nontender, nondistended, normal bowel sounds. Extremities:  No edema, redness, tenderness in the calves. Skin:  Warm, dry, no gross lesions or rashes. 1. Chest tube removed this morning per CT surgery. 2. Chest x-ray reviewed from this morning 02/28/2023 showed left basilar pleuroparenchymal change/minimal atelectasis left diaphragm elevation. Status post chest tube removal repeat chest x-ray did not show much change and no pneumothorax seen. 3. Patient is currently on Solu-Medrol 30 mg every 12 hours apparently for pericarditis. No need for steroids from pulmonary standpoint. 4. Patient is currently on Lasix IV once daily creatinine is better today urine output is improving.    5. Follow-up with nephrology and follow-up renal function   6. K supplemental oxygen if needed to keep oxygen saturation above 92%   7. DVT prophylaxis with heparin subcu when okay with CT surgery if there is no contraindication   8. On Rocephin and follow-up with infectious disease   9. Continue to encourage incentive spirometry Acapella deep breathing and ambulation   10. Bronchodilator therapy as needed         Midline catheter insertion      MEDICATIONS:   HEPAIRN  5000 UNITS  SC  3XD,     Cordarone gtt , colcrys 0.3 mg   daily, lopressor   50 mg  2xd ,           ORDERS:     cont pulse ox,  telemtry,   vs   daily wt,      I and o,   vs  RT . PT/OT/SLP/CM ASSESSMENT OR NOTES:    Dc plan  snf          Physical Therapy   Patient would benefit from continued therapy after discharge    Body Structures, Functions, Activity Limitations Requiring Skilled Therapeutic Intervention: Decreased functional mobility ; Decreased ROM; Decreased strength;Decreased safe awareness;Decreased endurance;Decreased balance   Assessment: pt continues to be with impaired cognition and is with decreased strength in B LEs and decreased balance, requiring Min A for STS w/ RW and Mod A x1-2 ambulation for balance with RW this date 8ft + 8ft w/ rest break between. Pt unsafe to ambulate without skilled assist. Recommend cont therapy in acute setting to address deficits.    Therapy Prognosis: Good   Decision Making: High Complexity                    Pneumonia - Care Day 15 (2/26/2023) by Ravindra Martinez RN       Review Status Review Entered   Completed 3/3/2023 0909       Created By   Ravindra Martinez RN      Criteria Review      Care Day: 15 Care Date: 2/26/2023 Level of Care: ICU    Guideline Day 2    Level Of Care    (X) Floor    3/3/2023 9:09 AM EST by Maura Johnson      icu    Clinical Status    ( ) * No CO2 retention or acidosis    (X) * No requirement for mechanical ventilation    3/3/2023 9:09 AM EST by Maura Johnson      ra    (X) * Hypotension absent    3/3/2023 9:09 AM EST by Neto Patel      141/81    (X) * Afebrile or fever improved    3/3/2023 9:09 AM EST by Neto Brasherter      98.7    ( ) * No hypoxia on room air or oxygenation improved    ( ) * Mental status improved or at baseline    Activity    (X) * Increased activity    3/3/2023 9:09 AM EST by Neto Patel      ambulate    Routes    (X) Oral hydration    3/3/2023 9:09 AM EST by Neto Brasherter      po and iv   ivf 30 mlhr    (X) Oral medications    3/3/2023 9:09 AM EST by Neto Fester      po and iv   cordarone gtt   16.7 ml hr  lasix 20 mg iv   2xd + lasix 20 mg iv x1   solu medrol 30 mg iv  q12h   lopressor 5 mg iv   q6hprn x3    Interventions    (X) Incentive spirometry    3/3/2023 9:09 AM EST by Reji Poplar  q2h   is q2h    (X) Pulse oximetry    3/3/2023 9:09 AM EST by Neto Patel      cont pulse ox    (X) Head of bed at 30 degrees    3/3/2023 9:09 AM EST by Neto Patel      hob 30    Medications    (X) IV or oral antibiotics    3/3/2023 9:09 AM EST by Neto Patel      rocephin 1 g m  iv   q24h    * Milestone   Additional Notes   DATE: 2.26.23             VITALS:   98.2   105  20  141/81  map 97   sp02  95%   ra         ABNL/PERTINENT LABS/RADIOLOGY/DIAGNOSTIC STUDIES:   Bun 60   Cr 4.72   Gfr 13   Glcuose 218, 222, 228,  299, 414,  467       XR CHEST    No significant change from prior exam.       Left chest tubes with left basilar airspace disease and small amount of   pleural fluid. Linear left retrocardiac lucency could represent small pneumothorax. PHYSICAL EXAM:   General appearance: awake, alert, in no apparent respiratory distress    HEENT: Head: Normocephalic, no lesions, without obvious abnormality   Neck: no JVD   Lungs: Decreased air entry on the left side with chest tube in place. Heart: Irregular heart rate. Abdomen: soft, non-tender; bowel sounds normal   Extremities: No LE edema   Neurologic: Mental status: Alert, oriented. Motor and sensory not done. MD CONSULTS/ASSESSMENT AND PLAN:   Cardiology    Overnight issues noted. Continues to be in A-fib with fluctuating heart rate. Blood pressure is stable. On 3 L nasal cannula oxygen saturating well. Continue to have a chest tube with output. At risk of going on hemodialysis as per nephrology. Creatinine worsened to 4.7 today. Assessment / Acute Cardiac Problems:   1. Left sided Empyema s/p VATS/decortication 1/23/2023   2. Community acquired pneumonia   3. Acute hypoxic respiratory failure due to above   4. New Onset Atrial fibrillation with RVR   5. Acute Pericarditis    6. History of non-Hodgkin's lymphoma   7. History of nonrheumatic mitral regurgitation/Tricuspid regurgitation   8. History of dilated aortic root   9. Type 2 diabetes   10. KELSEA on Chronic kidney disease stage III/IV   11. CLAY on CPAP       Plan of Treatment:   1. Continue to be in Afib with controlled heart rate. Continue amiodarone at current rate    2. Continue aspirin 650 mg tid and Colchicin 0.3 qday for pericarditis (renal dose)    3. On antibiotics as per ID   4. Continue Lopressor 12.5 twice daily, uptitrate as HR/BP tolerate. 5. Lopressor 5 mg IV q6h PRN for >110   6. On IV Lasix as per nephro, at risk for RRT. Medicine    Shortness of breath   Patient is having intermittent confusion. Patient is alert, awake but confused. He denies any breathing difficulty and is on room air. He denies any chest pain. Patient is eating less and has poor appetite. He remains in atrial fibrillation rhythm however heart rate is fairly controlled. Current heart rate 101. Patient is on amiodarone infusion. Patient had 130 mL out from chest tube in last 24 hours. Urine output is adequate. Vitals - Unstable afebrile, heart rate 101. Labs -kidney function worsening. Creatinine 4.7   Low hemoglobin 9.2. Leukocytosis 25.0. Neck:       Thyroid: No thyromegaly. Vascular: No JVD.        Comments: Left subclavian central line in place. Cardiovascular:       Rate and Rhythm: Tachycardia present. Rhythm irregular. Pulses:            Dorsalis pedis pulses are 2+ on the right side and 2+ on the left side. Heart sounds: Normal heart sounds. No murmur heard. Pulmonary:       Effort: Pulmonary effort is normal.       Breath sounds: Normal breath sounds. No wheezing or rales. Chest:       Comments: Left chest tube in place. 1. Left empyema - Pleural fluid growing strep viridans gordonii -not much improvement with tPA in chest tube. -underwent decortication on 2/23/2023. Continue Rocephin. CT surgery following. 2. New onset Afib RVR - continue amiodarone infusion, Cardiology following. Anticoagulation when okay with cardiovascular surgery. 3. Pericarditis - Preserved ejection fraction, no pericardial effusion. On colchicine and aspirin. 4. Acute respiratory failure with hypoxia -comfortable on room air. 5. H/o non-Hodgkin's lymphoma and multiple myeloma   6. H/o sarcoidosis -    7. Acute kidney injury with CKD stage III -kidney function worsening. Baseline creatinine 1.9  -high risk of renal replacement. Nephrology following. 8. Hyperkalemia due to decreased renal clearance- Kayexalate. 9. Demand ischemia from acute respiratory failure   10. Reactive thrombocytosis. 11. Obstructive sleep apnea -CPAP at nighttime. 12. COPD - albuterol as needed    13. Acute Delirium -Continue  Seroquel dose at night, reorientation. Renal Progress Note   Following for KELSEA on CKD secondary to diabetic nephrosclerosis, with baseline 1.9-2.4, s/p VATS, POD #3, for left pleural effusion   Patient is on amiodarone drip for persistent atrial fibrillation. Blood pressure low 576 systolic. Started on midodrine yesterday. Not on pressor support. Urine output 1475 last 24 hours. He is negative 7.9 liters since admission. He is on Lasix 20mg IV BID. He did receive one extra dose yesterday. Creatinine continues to worsen and now up to 4.72, from 4.38. He is on aspirin 650 TID, along with colchicine renal dose for pericarditis. He is awake and alert, but weak. At night when sleeping he requires nasal canula however during day, room air. HEENT:           Atraumatic, normocephalic, no throat congestion, moist mucosa. Eyes:               Pupils equal, round and reactive to light, EOMI. Neck:               No JVD, no thyromegaly, no lymphadenopathy. Chest:              Diminished breath sounds, rales in bases. Left side double chest tube   Cardiac:           S1 S2 irregular, no murmurs, gallops or rubs, JVP not raised. Abdomen:        Soft, non-tender, no masses or organomegaly, BS audible. :                  No suprapubic or flank tenderness. Neuro:                         AAO x 3, No FND. SKIN:               No rashes, good skin turgor. Extremities:     ++edema bilateral thighs      Acute Kidney Injury: ATN. Creatinine continues to worsen and now at 4.72. Likely a combination of causes including hypotension, atrial fibrillation, and recent surgery/VATS. Patient is also on aspirin 650mg TID. 2. CKD IIIB secondary to diabetic nephrosclerosis with baseline 1.9-2.4, managed by Dr Jenniffer Rico. 3. Pneumonia   4. Left side multiloculated pleural effusion s/p thoracentesis 2/13/23 with +BC with S. Viridians gordonii. Now s/p Left VATS and decortication 2/23/23.   5. Multiple Myeloma   6. Diabetes   7. Atrial Fibrillation. EF preserved on recent ECHO   8. History of Sarcoidosis        Plan:   1. Continue Normal saline to 30/hr   2. Renal function worsening. High risk for RRT   3. Encourage oral intake   4. Daily Labs   5. Increase Midodrine to 10mg TID   6. Continue Lasix 20mg IV BID    7. Discontinue high dose aspirin   8. Start IV solumedrol 30mg IV BID   9.  Give an additional 20mg Lasix this afternoon            PULMONARY & CRITICAL CARE    Patient s/p VATS/decortication on 02/23/2023. Seen in CVICU   Overnight patient did well postop. Chest tube in place. Hemodynamically he had been stable   He is on room air and saturation 97%. He is afebrile overnight    According to patient feeling better does not complain of shortness of breath at rest, but on ambulation positive mild cough. Chest pain is controlled at the chest tube site and surgery site. He is on amiodarone infusion. Chest tube drainage is improving and was only 110 mL in 24 hours   Chest tube-there is no leak present. 1. Chest tube is in place chest tube management per CT surgery. 2. Chest x-ray reviewed and did not show significant change from 2/23/2023. 3. Patient is having worsening renal function and is being followed by nephrology   4. Keep supplemental oxygen to keep oxygen saturation above 92%   5. DVT prophylaxis with heparin subcu when okay with CT surgery. EPC cuffs until then   6. On Rocephin and follow-up with infectious disease   7. Continue to encourage incentive spirometry Acapella deep breathing   8. Bronchodilator therapy as needed   9. Increase activity and have PT and OT involved in patient's care           Cardiothoracic Surgical    POD # 2   Continue chest tubes to suction. Forced air leak. Creatine climbing neprhology following   Pericarditis, cardiolgy following          Infectious Disease    1. Left-sided empyema   º Streptococci Gordonii on pleural fluid culture from 2/14/23   º Repeat culture 2/18/23 with no bacterial growth    2. Leukocytosis   3. Acute kidney injury on chronic kidney disease   4. Atrial fibrillation on amiodarone drip   5. Hx non-Hodgkin's lymphoma   6. Hx multiple myeloma   7. Sarcoidosis   8. DM II   9. Interstitial nephritis   10. COPD   6. Hx partial right lung lobectomy   12. Allergies to macrolides and ketolides.        Recommendations:   · Continue intravenous antimicrobial therapy with Rocephin   · The rate is currently controlled for the atrial fibrillation   · Plans are for potential AFIA cardioversion on Monday            MEDICATIONS:   Cordarone gtt , asa 650 mg   x1 , colcrys 0.3 mg   daily, lopressor   12.5 mg  2xd ,    Proamatine  10 mg   3xd   ivf 30 mlhr,  percocet  5.325 1 tab q4h prn x1       ORDERS:   Chest tube x2  cont suction,  care and mgmt,  cont pulse ox,  telemtry,   vs    I and o,   vs  RT .        PT/OT/SLP/CM ASSESSMENT OR NOTES:    Dc plan  snf         Pneumonia - Care Day 12 (2/23/2023) by Barry Mills RN       Review Status Review Entered   Completed 3/3/2023 0853       Created By   Barry Mills RN      Criteria Review      Care Day: 12 Care Date: 2/23/2023 Level of Care: ICU    Guideline Day 2    Level Of Care    (X) Floor    3/3/2023 8:53 AM EST by Chaz Moseley      icu    Clinical Status    ( ) * No CO2 retention or acidosis    (X) * No requirement for mechanical ventilation    3/3/2023 8:53 AM EST by Gay Saravia    ( ) * Hypotension absent    3/3/2023 8:53 AM EST by Chaz Moseley      86.62    (X) * Afebrile or fever improved    3/3/2023 8:53 AM EST by Chaz Moseley      97.9    ( ) * No hypoxia on room air or oxygenation improved    ( ) * Mental status improved or at baseline    Activity    (X) * Increased activity    3/3/2023 8:53 AM EST by Chaz Moseley      up in chair    Routes    (X) Oral hydration    3/3/2023 8:53 AM EST by Chaz Moseley      ivf 50 mlhr    (X) Oral medications    3/3/2023 8:53 AM EST by Chaz Moseley      po and iv   albumin 25%  25 g miv x1   albumin  5%  12.5 g miv  x2   cordarone gtt   16.7 ml hr    (X) Usual diet    3/3/2023 8:53 AM EST by Gay Saravia adv to reg diet    Interventions    (X) Incentive spirometry    3/3/2023 8:53 AM EST by Chaz Moseley      is  q2h    (X) Pulse oximetry    3/3/2023 8:53 AM EST by Chaz Moseley      cont pulse ox    (X) Head of bed at 30 degrees    3/3/2023 8:53 AM EST by Chaz Moseley      hob 30    Medications    (X) IV or oral antibiotics    3/3/2023 8:53 AM EST by Bradley Heller      ancef  2 gm iv  x1    * Milestone   Additional Notes   DATE: 2.23.23          VITALS:   07.9 114  86/62  map 72    A line   90/56  map 67      Sp02 95%        ABNL/PERTINENT LABS/RADIOLOGY/DIAGNOSTIC STUDIES:   Na 131   Bun 5   Cr  3.30   Gfr 20   Glucose 102    Wbc 28.8      H/h 8.8 / 28.5       XR CHEST    Status post removal single small bore and placement 2 large-bore chest tubes   left lung with reduced opacity, presumably multiloculated left pleural   effusion and associated atelectasis. No pneumothorax. Slightly greater   atelectasis right base. Brief Postoperative Note   Date of Procedure: 2/23/2023       Pre-Op Diagnosis: EMPYEMA   Procedure(s):   VIDEO ASSISTED THORACOSCOPY, DECORTICATION      Anesthesia: General       Estimated Blood Loss (mL): 081       Complications: None      Chest Tube Left Pleural 1 (Active)       Chest Tube Left Pleural 2 (Active)       Urinary Catheter 02/23/23 Bullock (Active)         Transesophageal Echocardiogram   Summary:        1. A AFIA was performed without complications. 2. LVEF 50%. 3. Mild AI. 4. No CAMILLA clot. 5. No thrombus or valvular vegetation identified   6. Proceed with Cardioversion. MD CONSULTS/ASSESSMENT AND PLAN:   Infectious Diseases    · Left-sided empyema   º Streptococci Gordonii on pleural fluid culture from 2/14/23   º Repeat culture 2/18/23 with no bacterial growth    · Shortness of breath   · KELSEA on CKD   · Hyponatremia   · Leukocytosis   · Hx non-Hodgkin's lymphoma   · Hx multiple myeloma   · Sarcoidosis   · DM II   · Interstitial nephritis   · COPD   · Hx partial right lung lobectomy   · Allergies to macrolides and ketolides.        Recommendations:   · 2/20/23: Discontinued IV Cefepime on 2-20-23   · IV Rocephin 1 gm q 24 hrs until 3/12/23   · [id number] [address] bull midline for outpatient therapy   · Plan for decortication on 2/23/23         Medicine    Patient seen after surgery today. Patient underwent VATS with decortication. He has left-sided chest tube in place. Patient is complaining of severe chest pain at surgical site. He is on supplemental oxygen by nonrebreather. He is alert, oriented. Wife and daughter at bedside. Vitals - Stable afebrile, tachycardia   Labs -worsening kidney function 3.2 creatinine, BUN 51. Kidney US did not show any hydronephrosis . HENT:       Head: Normocephalic and atraumatic. Nose:       Right Sinus: No maxillary sinus tenderness or frontal sinus tenderness. Left Sinus: No maxillary sinus tenderness or frontal sinus tenderness. Mouth/Throat:       Pharynx: No oropharyngeal exudate. Eyes:       General: No scleral icterus. Conjunctiva/sclera: Conjunctivae normal.       Pupils: Pupils are equal, round, and reactive to light. Neck:       Thyroid: No thyromegaly. Vascular: No JVD. Cardiovascular:       Rate and Rhythm: Normal rate and regular rhythm. Pulses:            Dorsalis pedis pulses are 2+ on the right side and 2+ on the left side. Heart sounds: Normal heart sounds. No murmur heard. Pulmonary:       Effort: Pulmonary effort is normal.       Breath sounds: Normal breath sounds. No wheezing or rales. Chest:       Comments: Left chest tube in place. Abdominal:       Palpations: Abdomen is soft. There is no mass. Tenderness: There is no abdominal tenderness. Musculoskeletal:       Cervical back: Full passive range of motion without pain and neck supple. Lymphadenopathy:       Head:       Right side of head: No submandibular adenopathy. Left side of head: No submandibular adenopathy. Cervical: No cervical adenopathy. Skin:      General: Skin is warm. Neurological:       Mental Status: He is alert and oriented to person, place, and time. Motor: No tremor. Psychiatric:          Behavior: Behavior is cooperative.         1. Left empyema - Pleural fluid growing strep viridans gordonii -ID following. Chest tube in place. Cefepime switched to Rocephin. Patient received tPA. Patient had poor improvement and underwent VATS with decortication on 2/23/2023.  s.   2. New onset Afib RVR - continue amiodarone infusion, Digoxin , no AC due to surgery. Cardiology following. 3. Pericarditis -preserved ejection fraction, no pericardial effusion. 4. Acute respiratory failure with hypoxia - continue O2 supplement. 5. H/o non-Hodgkin's lymphoma and multiple myeloma   6. H/o sarcoidosis -    7. Acute kidney injury with CKD stage III - baseline creatinine 1.9  -fluid management per nephrology. 8. Hyperkalemia due to decreased renal clearance- Kayexalate. 9. Demand ischemia from acute respiratory failure   10. Reactive thrombocytosis. 11. Obstructive sleep apnea -CPAP at nighttime. 12. COPD - albuterol as needed    13. Acute Delirium - continue  Seroquel dose at night           PULMONARY & CRITICAL CARE    Patient s/p VATS/decortication today   Hemodynamically stable   Chest tube currently to suction   On supplemental oxygen   Remains on amiodarone infusion for A-fib      Mental Status:  Oriented to person, place and time and normal affect. Lungs: Decreased air entry on the left side with chest tube in place   Heart:  Regular rate and rhythm, no murmur. Abdomen:  Soft, nontender, nondistended, normal bowel sounds. Extremities:  No edema, redness, tenderness in the calves. Skin:  Warm, dry, no gross lesions or rashes. 1. Patient s/p VATS this morning   2. Keep supplemental oxygen to keep oxygen saturation above 92%   3. Chest tube management as per cardiothoracic surgery   4. On Rocephin   5. Bronchodilator therapy   6. Encourage incentive spirometry/Acapella   7.  Maintain bronchopulmonary hygiene         MEDICATIONS:   Lipior 80 mg daily, lopressor 12.5 mg  [id number]  25 mg  2xd ,     Ivf 125 mlhr,  morphine 4 mg iv  q2h,  percocet 5325 2 tab  q4hprn x2,    ORDERS:   Chest tube x2  cont suction,  care and mgmt,  cont pulse ox,  telemtry,   vs    I and o,   vs  RT .        PT/OT/SLP/CM ASSESSMENT OR NOTES:    Dc plan  snf

## 2023-03-03 NOTE — PROGRESS NOTES
[facility]  Office: [address], DO, Bernard Humphrey, DO, Jessica Dry, DO, Gissell Weissu Blood, DO, Heavenly Sinha MD, Rachel Rodriguez MD, Cal Cohen MD, Nile Berger MD,  Eagle Narvaez MD, Mary Mayer MD, Hanny Mccullough DO, Kenrick Webster MD,  Mona Villa MD, Floyd Loomis MD, Mario Tamayo DO, Harsh Love MD, Miquel Barragan MD, Kasie Zhong DO, Rupesh Kerr MD, Shelli Tao MD, Elmer Junior MD, Douglas Connor MD, Claudell Orion, DO, Charlene Aviles MD, Deronda Kawasaki, MD, Kira Hawk, Sherryle Frames, CNP, Rae Steinberg, CNP, Pedro Luis Goncalves, CNP,  Hayley Andres, [facility], Elsa Maxwell, CNP, Berlinda Boast, CNP, Carol Baugh, CNP, Sandhya Del Real, CNP, Cira Hyde, CNP, Yesenia Guerra PAHaileeC, Alek Oakes, CNS, Brant Vera, CNP, Lilian Sanders, [facility]    Progress Note    3/3/2023    7:51 AM    Name:   Lisa Briones  MRN:     [id number]     [place]:      [de-identified]   Room:   [address] [place] [address] Day:  23  Admit Date:  2/12/2023 10:33 PM    PCP:   Sylvia oMsley MD  Code Status:  Full Code    Subjective:     C/C: SOB    Interval History Status: improved. Patient is resting in bed. He's been walking with PT/OT and is in good spirits. His appetite is much better, Cr 2.64 slowly improving. He is off oxygen, but O2 sat 88-92% on RA. He still has some WANG. He is waiting for a bed at [facility]. Brief History:     Per my partner:  Eddie Dumont is 79 y.o. male  Who was admitted to the hospital on 2/12/2023 for treatment of Pneumonia, unspecified organism. Patient presented to ER at [facility] on 2/12/2023 with dyspnea and was found to have left multiloculated pneumonia with large pleural effusion with mediastinal and bilateral axillary lymphadenopathy. Patient has prior history of non-Hodgkin's lymphoma, multiple myeloma.   Patient was requiring high flow nasal cannula and had thoracentesis. Pleural fluid was consistent with empyema and patient had chest tube placement by IR. He was treated with broad-spectrum antibiotics. Patient was also given dornase without much improvement. CT surgery was consulted and recommended increasing the size of chest tube and did not recommend any open surgical intervention at this time. Patient had exchange of chest tube on 2/18/2023. He had diffuse ST elevation secondary to pericarditis on 1/21/23. Patient was was started on colchicine. Patient had new onset atrial fibrillation with RVR on 10/22/23 and was given digoxin and amiodarone infusion . Patient also given colchicine and high-dose aspirin for pericarditis which was later changed to Solu-Medrol. Patient underwent VATS with decortication on 2/23/2023. He was extubated postoperatively on 2/23/2023. Patient underwent AFIA cardioversion on 2/27/2023 for rate uncontrolled atrial fibrillation. \"    Review of Systems:     Constitutional:  negative for chills, fevers, sweats  Respiratory:  Positive for cough, dyspnea on exertion, shortness of breath, no wheezing  Cardiovascular:  negative for chest pain, chest pressure/discomfort, lower extremity edema, palpitations  Gastrointestinal:  negative for abdominal pain, constipation, no diarrhea, nausea, vomiting  Neurological:  negative for dizziness, headache    Medications: Allergies: Allergies   Allergen Reactions    Latex Rash    Gabapentin      Other reaction(s): Vomiting    Meperidine Anaphylaxis    Erythromycin      Other reaction(s): Fever  Had all side effects associated with this medication      Glimepiride      Other reaction(s): Dizziness    Lidocaine Swelling     States as a child having reaction to lidocaine where his face swelled.    Ever since then he has never had lidocaine    Hydrocodone      Other reaction(s): Vomiting    Macrolides And Ketolides Other (See Comments)    Niacin And Related Trelegy Ellipta [Fluticasone-Umeclidin-Vilant]     Pregabalin Diarrhea    Xanax [Alprazolam] Anxiety     Anxiety, restlessness, insomnia       Current Meds:   Scheduled Meds:    furosemide  40 mg Oral Daily    repaglinide  1 mg Oral BID AC    insulin glargine  10 Units SubCUTAneous Nightly    metoprolol tartrate  50 mg Oral BID    senna  2 tablet Oral Nightly    amiodarone  200 mg Oral BID    sodium chloride flush  5-40 mL IntraVENous 2 times per day    heparin (porcine)  5,000 Units SubCUTAneous 3 times per day    insulin lispro  0-16 Units SubCUTAneous 4x Daily AC & HS    midodrine  10 mg Oral TID WC    docusate sodium  100 mg Oral TID    cefTRIAXone (ROCEPHIN) IV  1,000 mg IntraVENous Q24H    pantoprazole  40 mg Oral QAM AC    colchicine  0.3 mg Oral Daily    sodium chloride flush  5-40 mL IntraVENous 2 times per day    mupirocin   Nasal BID    chlorhexidine   Topical See Admin Instructions    sodium chloride flush  5-40 mL IntraVENous 2 times per day    polyethylene glycol  17 g Oral Daily    sodium chloride flush  5-40 mL IntraVENous 2 times per day     Continuous Infusions:    sodium chloride      sodium chloride      sodium chloride      sodium chloride      dextrose       PRN Meds: bisacodyl, sodium chloride flush, sodium chloride, oxyCODONE-acetaminophen **OR** [DISCONTINUED] oxyCODONE-acetaminophen, metoprolol, sodium chloride flush, sodium chloride, sodium chloride flush, sodium chloride, sodium chloride flush, sodium chloride, glucose, dextrose bolus **OR** dextrose bolus, glucagon (rDNA), dextrose    Data:     Past Medical History:   has a past medical history of Acute interstitial nephritis, Aortic regurgitation, ARF (acute renal failure) ([facility] [address] Chronic kidney disease, Hyperlipidemia, Hypertension, Non-Hodgkin lymphoma ([facility] [address] Research study patient, Sarcoidosis, and Type II or unspecified type diabetes mellitus without mention of complication, not stated as uncontrolled.     Social History:   reports that he has never smoked. He has quit using smokeless tobacco. He reports current alcohol use. He reports that he does not use drugs. Family History:   Family History   Problem Relation Age of Onset    High Blood Pressure Father     Other Father         AAA    Heart Disease Other         uncle x 2        Vitals:  BP 97/62   Pulse 66   Temp 97.7 °F (36.5 °C) (Oral)   Resp 16   Ht 6' 1\" (1.854 m)   Wt 222 lb 10.6 oz (101 kg)   SpO2 96%   BMI 29.38 kg/m²   Temp (24hrs), Av °F (36.7 °C), Min:97.5 °F (36.4 °C), Max:98.6 °F (37 °C)    Recent Labs     23  1107 23  1614 23   POCGLU 228* 125* 163* 308*       I/O (24Hr): Intake/Output Summary (Last 24 hours) at 3/3/2023 0751  Last data filed at 3/3/2023 0606  Gross per 24 hour   Intake 1130 ml   Output 1925 ml   Net -795 ml       Labs:  Hematology:  No results for input(s): WBC, RBC, HGB, HCT, MCV, MCH, MCHC, RDW, PLT, MPV, SEDRATE, CRP, INR, DDIMER, [id number], LABABSO in the last 72 hours.     Invalid input(s): PT    Chemistry:  Recent Labs     23  1707 23  1320 23  0456   * 137 140   K 4.5 4.2 5.0    102 103   CO2  24 23   GLUCOSE 177* 189* 177*   BUN 64* 59* 59*   CREATININE 3.79* 3.00* 2.92*   ANIONGAP 12 11 14   LABGLOM 17* 22* 23*   CALCIUM 9.1 9.4 9.0     Recent Labs     23  0801 23  1107 23  1614 23  16523   POCGLU 322* 263* 228* 125* 163* 308*     ABG:  Lab Results   Component Value Date/Time    POCPH 7.356 2023 12:43 PM    POCPCO2 38.8 2023 12:43 PM    POCPO2 66.2 2023 12:43 PM    POCHCO3 21.7 2023 12:43 PM    NBEA 4 2023 12:43 PM    PBEA 2 2023 05:34 AM    PVUE6KKG 92 2023 12:43 PM    FIO2 50.0 2023 03:06 AM     Lab Results   Component Value Date/Time    SPECIAL LEFT PLEURAL PEEL 2023 12:00 PM    SPECIAL  LEFT PLEURAL PEEL 2023 12:00 PM    SPECIAL LEFT PLEURAL PEEL 02/23/2023 12:00 PM    SPECIAL LEFT PLURAL PEEL 02/23/2023 12:00 PM    SPECIAL LEFT PLEURAL PEEL 02/23/2023 12:00 PM     Lab Results   Component Value Date/Time    CULTURE NO GROWTH 4 DAYS 02/23/2023 12:00 PM    CULTURE NO GROWTH 5 DAYS 02/23/2023 12:00 PM    CULTURE NO GROWTH 3 DAYS 02/23/2023 12:00 PM    CULTURE  02/23/2023 12:00 PM     NEGATIVE. No Herpes Simplex Virus detected by Enzyme Linked Virus Inducible System culture. at day 2    CULTURE  02/23/2023 12:00 PM     NEGATIVE for Influenza A and B, Para influenza 1,2,3, Adenovirus and RSV. at day 2    CULTURE  02/23/2023 12:00 PM     Negative results do not preclude respiratory virus infection and should not be used as the sole basis for diagnosis, treatment or other management decisions. CULTURE NEGATIVE for Cytomegalovirus at day 3 02/23/2023 12:00 PM    CULTURE NEGATIVE for Enterovirus at day 5 02/23/2023 12:00 PM       Radiology:  XR CHEST PORTABLE    Result Date: 2/27/2023  Left chest tubes present with partially loculated left pleural effusion and left basilar opacity unchanged. XR CHEST PORTABLE    Result Date: 2/26/2023  No significant change from prior exam. Left chest tubes with left basilar airspace disease and small amount of pleural fluid. Linear left retrocardiac lucency could represent small pneumothorax. XR CHEST PORTABLE    Result Date: 2/25/2023  No significant change from prior exam.     XR CHEST PORTABLE    Result Date: 2/23/2023  Status post removal single small bore and placement 2 large-bore chest tubes left lung with reduced opacity, presumably multiloculated left pleural effusion and associated atelectasis. No pneumothorax. Slightly greater atelectasis right base.        Physical Examination:        General appearance:  alert, cooperative and no distress  Mental Status:  oriented to person, place but not time,  normal affect  Lungs:  improved BS bilat, no W/R/R, normal effort  Heart:  regular rate and rhythm, no murmur  Abdomen:  soft, nontender, nondistended, normal bowel sounds, no masses, hepatomegaly, splenomegaly  Extremities:  no edema, redness, tenderness in the calves  Skin:  no gross lesions, rashes, induration    Assessment:        Hospital Problems             Last Modified POA    * (Principal) Pneumonia, unspecified organism 2/12/2023 Yes    Pleural effusion 2/13/2023 Yes    Hyperkalemia 2/13/2023 Yes    Acute respiratory failure with hypoxia ([address] 2/13/2023 Yes    Hyponatremia 2/13/2023 Yes    Hyperglycemia 2/13/2023 Yes    History of non-Hodgkin's lymphoma 2/13/2023 Yes    History of sarcoidosis 2/13/2023 Yes    Empyema ([address] 2/18/2023 Yes    History of atrial fibrillation 2/25/2023 Yes    History of type 2 diabetes mellitus 2/25/2023 Yes    KELSEA (acute kidney injury) ([address] 2/26/2023 Yes    Overview Addendum 8/24/2022  8:56 AM by Sary Bee MD     kidney biopsy from June 2022  showing findings of acute interstitial nephritis. Exact description not available. Trial of steroids will be given to see if I can improve renal function. Creatinine in October 2020 was 1.6, creatinine in June 2022 was 2.9. Trial of steroids will be given to see if renal function can be improved. After a 12-day course of prednisone creatinine has come down to 2.0, calcium is improved as well. Interstitial nephritis appears to be related to sarcoidosis.             Plan:        Left empyema- + Strep Gordonii on pleural culture s/p decortication 2/23/23, con't IV Rocephin until 3/12/23, CTS following and ID  A.fib with RVR- s/p cardioversion, con't Amiodarone   Pericarditis- Con't Colchicine, Cardiology stopped IV Solumedrol   KELSEA on CKD 3- slowly improving, Nephrology stopped IVF, decreased Lasix to daily, moon catheter removed, repeat BMP tomorrow, baseline Cr 1.8-2  CLAY- Cpap  COPD- stable  Acute delirium- seroquel at night, improving  DM2- BS better, low BS this morning, stop Lantus, reduce SSI to moderate livel, Prandin with meals  Gi/ DVT proph  PT/OT    Anthony signed, discharge planning for Sat?     Josey Calloway MD  3/3/2023  7:51 AM

## 2023-03-03 NOTE — PROGRESS NOTES
Renal Progress Note    Patient :  Cruzito Pittman; 68 y.o. MRN# [id number]  Location:  1007/1007-01  Attending:  Sumi Ferro MD  Admit Date:  2/12/2023   Hospital Day: 19    Subjective:     Following for KELSEA on CKD secondary to diabetic nephrosclerosis, with baseline 1.9-2.4, s/p VATS, 2/20/2023, for left pleural effusion.    Patient was seen and examined.  No new issues reported overnight.  Patient is status post cardioversion on 2/27/2023.    BMP results from today reviewed creatinine did improve to 2.64 mg/dl and BUN of 51, peak creatinine this admission was 4.72 mg/dl.  Electrolytes stable with sodium 140, potassium 3.9, chloride 102, bicarb 28, calcium 9.4.  Urine output documented as about 1.9 L and x3 more in the last 24 hours.  Diuretics were changed to Lasix 40 mg p.o. daily starting today.  Outpatient Medications:     Medications Prior to Admission: amLODIPine (NORVASC) 5 MG tablet, TAKE ONE TABLET BY MOUTH DAILY (Patient taking differently: nightly)  omeprazole (PRILOSEC) 20 MG delayed release capsule, Take 20 mg by mouth nightly  fluticasone-salmeterol (ADVAIR DISKUS) 250-50 MCG/ACT AEPB diskus inhaler, Inhale 1 puff into the lungs every 12 hours  metoprolol succinate (TOPROL XL) 25 MG extended release tablet, Take 25 mg by mouth at bedtime  dutasteride (AVODART) 0.5 mg capsule, Take 0.5 mg by mouth daily  finasteride (PROSCAR) 5 MG tablet, Take 5 mg by mouth nightly  Misc. Devices (NASAL SPRAY BOTTLE) MISC, by Does not apply route  CINNAMON PO, Take by mouth  repaglinide (PRANDIN) 1 MG tablet, Take 1 mg by mouth with breakfast and with evening meal  pimecrolimus (ELIDEL) 1 % cream, Apply topically as needed Apply topically 2 times daily.  albuterol sulfate  (90 BASE) MCG/ACT inhaler, Inhale 1 puff into the lungs in the morning and at bedtime  Liraglutide (VICTOZA SC), Inject 1.8 mg into the skin daily   fenofibrate (TRICOR) 145 MG tablet, Take 145 mg by mouth nightly  dicyclomine (BENTYL) 10 MG  capsule, Take 10 mg by mouth 3 times daily (with meals)  atorvastatin (LIPITOR) 80 MG tablet, Take 80 mg by mouth daily. DULoxetine (CYMBALTA) 60 MG capsule, Take 120 mg by mouth nightly    Current Medications:     Scheduled Meds:    furosemide  40 mg Oral Daily    repaglinide  1 mg Oral BID AC    insulin glargine  10 Units SubCUTAneous Nightly    metoprolol tartrate  50 mg Oral BID    senna  2 tablet Oral Nightly    amiodarone  200 mg Oral BID    sodium chloride flush  5-40 mL IntraVENous 2 times per day    heparin (porcine)  5,000 Units SubCUTAneous 3 times per day    insulin lispro  0-16 Units SubCUTAneous 4x Daily AC & HS    midodrine  10 mg Oral TID WC    docusate sodium  100 mg Oral TID    cefTRIAXone (ROCEPHIN) IV  1,000 mg IntraVENous Q24H    pantoprazole  40 mg Oral QAM AC    colchicine  0.3 mg Oral Daily    sodium chloride flush  5-40 mL IntraVENous 2 times per day    mupirocin   Nasal BID    chlorhexidine   Topical See Admin Instructions    sodium chloride flush  5-40 mL IntraVENous 2 times per day    polyethylene glycol  17 g Oral Daily    sodium chloride flush  5-40 mL IntraVENous 2 times per day     Continuous Infusions:    sodium chloride      sodium chloride      sodium chloride      sodium chloride      dextrose       PRN Meds:  bisacodyl, sodium chloride flush, sodium chloride, oxyCODONE-acetaminophen **OR** [DISCONTINUED] oxyCODONE-acetaminophen, metoprolol, sodium chloride flush, sodium chloride, sodium chloride flush, sodium chloride, sodium chloride flush, sodium chloride, glucose, dextrose bolus **OR** dextrose bolus, glucagon (rDNA), dextrose    Input/Output:       I/O last 3 completed shifts: In: 5775 [P.O.:1450]  Out: 5798 [Urine:3030].     Patient Vitals for the past 96 hrs (Last 3 readings):   Weight   23 0507 222 lb 10.6 oz (101 kg)       Vital Signs:   Temperature:  Temp: 97.9 °F (36.6 °C)  TMax:   Temp (24hrs), Av.1 °F (36.7 °C), Min:97.7 °F (36.5 °C), Max:98.6 °F (37 °C)    Respirations:  Resp: 22  Pulse:   Heart Rate: 63  BP:    BP: 114/76  BP Range: Systolic (57HGM), ACY:057 , Min:96 , ZFH:691       Diastolic (99MPN), KKQ:42, Min:57, Max:81      Physical Examination:     General:  AAO x 3, speaking in full sentences, no accessory muscle use. HEENT: Atraumatic, normocephalic, no throat congestion, moist mucosa. Eyes:   Pupils equal, round and reactive to light, EOMI. Neck:   Supple  Chest:   Bilateral vesicular breath sounds, no rales or wheezes. Cardiac:  S1 S2 RR, no murmurs, gallops or rubs. Abdomen: Soft, non-tender, no masses or organomegaly, BS audible. :   No suprapubic or flank tenderness. Neuro:  AAO x 3, No FND. SKIN:  No rashes, good skin turgor. Extremities:  No edema. Labs:       No results for input(s): WBC, RBC, HGB, HCT, MCV, MCH, MCHC, RDW, PLT, MPV in the last 72 hours. BMP:   Recent Labs     03/01/23  1320 03/02/23  0456 03/03/23  0819    140 140   K 4.2 5.0 3.9    103 102   CO2 24 23 28   BUN 59* 59* 51*   CREATININE 3.00* 2.92* 2.64*   GLUCOSE 189* 177* 77   CALCIUM 9.4 9.0 9.4     SPEP:  Lab Results   Component Value Date/Time    PROT 6.5 02/13/2023 01:36 PM    PROT 6.4 05/29/2012 10:35 AM    ALBCAL 2.6 02/13/2023 03:07 AM    ALBPCT 50 02/13/2023 03:07 AM    LABALPH 0.5 02/13/2023 03:07 AM    LABALPH 1.0 02/13/2023 03:07 AM    A1PCT 8 02/13/2023 03:07 AM    A2PCT 20 02/13/2023 03:07 AM    LABBETA 0.7 02/13/2023 03:07 AM    BETAPCT 13 02/13/2023 03:07 AM    GAMGLOB 0.5 02/13/2023 03:07 AM    GGPCT 9 02/13/2023 03:07 AM    PATH ELECTRONICALLY SIGNED. Filiberto Hwakins M.D. 02/13/2023 03:07 AM    PATH ELECTRONICALLY SIGNED.  Filiberto Hawkins M.D. 02/13/2023 03:07 AM     C3:     Lab Results   Component Value Date/Time    C3 102 05/29/2012 10:35 AM     C4:     Lab Results   Component Value Date/Time    C4 14 05/29/2012 10:35 AM     Hep BsAg:         Lab Results   Component Value Date/Time    HEPBSAG NONREACTIVE 05/29/2012 10:35 AM     Hep C AB:          Lab Results   Component Value Date/Time    HEPCAB NONREACTIVE 05/29/2012 10:35 AM     Urinalysis/Chemistries:      Lab Results   Component Value Date/Time    NITRU NEGATIVE 02/22/2023 01:17 PM    COLORU Yellow 02/22/2023 01:17 PM    PHUR 5.0 02/22/2023 01:17 PM    WBCUA 2 TO 5 02/22/2023 01:17 PM    RBCUA None 02/22/2023 01:17 PM    MUCUS 1+ 02/13/2023 02:17 AM    BACTERIA MODERATE 02/22/2023 01:17 PM    SPECGRAV 1.020 02/22/2023 01:17 PM    LEUKOCYTESUR NEGATIVE 02/22/2023 01:17 PM    UROBILINOGEN Normal 02/22/2023 01:17 PM    BILIRUBINUR NEGATIVE 02/22/2023 01:17 PM    BILIRUBINUR NEGATIVE 04/03/2012 11:08 AM    GLUCOSEU 1+ 02/22/2023 01:17 PM    GLUCOSEU NEGATIVE 04/03/2012 11:08 AM    KETUA NEGATIVE 02/22/2023 01:17 PM    AMORPHOUS 1+ 02/22/2023 01:17 PM     Urine Sodium:     Lab Results   Component Value Date/Time    RUBY 43 02/13/2023 10:18 AM     Urine Potassium:    Lab Results   Component Value Date/Time    KUR 22.8 02/13/2023 10:18 AM     Urine Chloride:    Lab Results   Component Value Date/Time    CLUR 54 02/13/2023 10:18 AM     Urine Osmolarity:   Lab Results   Component Value Date/Time    OSMOU 468 02/13/2023 10:18 AM     Urine Creatinine:     Lab Results   Component Value Date/Time    LABCREA 45.3 02/13/2023 10:18 AM     Radiology:     Reviewed. Assessment:     Acute Kidney Injury nonoliguric likely due to ATN from hypotension requiring pressor support, A-fib, NSAID exposure and recent surgery. Creatinine still evolving. Shortness of breath. Left-sided empyema Streptococcus Gordonii on pleural fluid culture 2/14/2023. Status post VATS 2/23/2023. Hyponatremia. Acute pericarditis. New onset A-fib with RVR. Acute proximal respiratory failure-stable. History of non-Hodgkin lymphoma. History of multiple myeloma. Sarcoidosis. Diabetes type 2. History of acute/nephritis in June 2022. History of partial right lung lobectomy.   CKD 3B secondary to diabetic and hypertensive nephrosclerosis with baseline creatinine of around 1.9-2.4 mg/dl and follows up with Dr. Nanette So. Plan:   Continue Lasix 40 mg p.o. once a day. Antibiotics as per renal dosing per infectious diseases. Monitor strict I's and O's and renal function. Since her renal function has much improved and urine output okay, nephrology will sign off. Please call with any other questions. BMP in 1 week post discharge and fax results to Dr. Nanette So office; Fax # [phone]. Follow up with Dr. Nanette So in 3-4 weeks post d/c. Nutrition   Please ensure that patient is on a renal diet/TF. Avoid nephrotoxic drugs/contrast exposure. Harris Duval MD  [facility]     This note is created with the assistance of a speech-recognition program. While intending to generate a document that actually reflects the content of the visit, no guarantees can be provided that every mistake has been identified and corrected by editing.

## 2023-03-03 NOTE — PROGRESS NOTES
[facility] - Progress Note  Today's Date and Time: 3/3/2023, 8:53 AM    Impression :   Left-sided empyema  Streptococci Gordonii on pleural fluid culture from 2/14/23  Repeat culture 2/18/23 with no bacterial growth   Shortness of breath  KELSEA on CKD  Hyponatremia  Leukocytosis  Hx non-Hodgkin's lymphoma  Hx multiple myeloma  Sarcoidosis  DM II  Interstitial nephritis  COPD  Hx partial right lung lobectomy  Allergies to macrolides and ketolides. Recommendations:   Discontinued IV Cefepime on 2-20-23  IV Rocephin 1 gm q 24 hrs until 3/12/23  Midline for outpatient therapy placed 2/28/23  S/p decortication on 2/23/23  Cardioversion completed 2/27/23 for continued AFIB    Medical Decision Making/Summary/Discussion:3/3/2023     Patient with Hx of non-Hodgkin's lymphoma and multiple myeloma  Admitted with Shortness of breath  Found to have Left-sided empyema  Streptococci Gordonii on pleural fluid culture from 2/14/23  Repeat culture 2/18/23 with no bacterial growth   Empyema partially drained by chest tube. Decortication planned 2/23/23  Confusion with associated hyponatremia  KELSEA on CKD 3  On treatment with ceftriaxone. Infection Control Recommendations   [place] Precautions    Antimicrobial Stewardship Recommendations     Simplification of therapy  Targeted therapy  PK dosing    Coordination of Outpatient Care:   Estimated Length of IV antimicrobials:3/12/23  Patient will need Midline Catheter Insertion: yes  Patient will need PICC line Insertion:No  Patient will need: Home IV , [place],  SNF,  LTAC: yes  Patient will need outpatient wound care:No    Chief complaint/reason for consultation:   Empyema-streptococcus Gordonii      History of Present Illness:   Abdulaziz Humphrey is a 79y.o.-year-old male who was initially admitted on 2/12/2023.  Patient seen at the request of Dr. Vannesa King:    This patient, with a history of COPD, CKD, sarcoidosis, partial right lung lobectomy, multiple myeloma, non-hodgkin's lymphoma, acute interstitial nephritis and DM II, initially presented to [facility] ED on 2/12/23 with complaints of worsening shortness of breath over the previous week with a non-productive cough. He has has allergies to macrolides and ketolides. He was experiencing increased work of breathing upon evaluation, initially requiring CPAP. ABG values were grossly unremarkable. He denied any recent history of chemotherapy, fever, chills, N/V/D, abnormal bleeding, weight-loss or night sweats. Chest x-ray showed probable left-sided pneumonia with loculated pleural effusion which was confirmed via CT chest, which revealed a moderate to large amount of multiloculated left pleural fluid in the left lung. Mediastinal and bilateral axillary lymphadenopathy was also noted. He was transferred to [facility] for a higher level of care and was initiated on broad spectrum antibiotics with IV cefepime and vancomycin. Pulmonology was consulted and a thoracentesis was attempted, but was unable to be completed as no clear pocket was visualized via [address]. IR was consulted and a 10 [place], left-sided chest tube was placed on 2/14/23.  10 ml of purulent fluid was initially drained and sent for culture. TPA and dornase were administered with improvement in output. MRSA Nares was not detected and the pleural fluid culture grew PCN sensitive Streptococcus Gordonii. Vancomycin was discontinued on 2/14/23 and cefepime continued. A repeat CT of the patient's chest  on 2/17/23 revealed some improvement in the multiloculated left pleural effusions. A larger bore chest tube exchange was completed on 2/18/23 in IR. A repeat pleural fluid culture has not had any bacterial growth to date.      Chest tube output to date:   2/14 - 800 cc  2/15- 600 cc  2/16 - 700 cc  2/17 - 900 cc  2/18- 200 cc  2/19 - 910 cc  2/20 - 275 cc    CT sugery was consulted for possible decortication, but they have no plans for decortication at this time. Abnormal labs at the time of consult include:   Na:133  Cr:2.24  WBC:32.4-->19.0  Hgb:10.0    There is no growth on urine or blood cultures. Imaging/Diagonstics:  CT CHEST WO CONTRAST     Result Date: 2/17/2023  Small to moderate-sized multiloculated left pleural effusions have improved. New small foci of air within the collection is presumably related to the introduction of the left thoracostomy tube. Stable small to moderate sized mediastinal lymph nodes are nonspecific but may relate to the patient's history of lymphoma. Additional small bilateral axial lymph nodes are identified. Small hiatal hernia. XR CHEST PORTABLE     Result Date: 2/18/2023  Moderate left pleural effusion increased. No change left chest tube/pigtail catheter. XR CHEST PORTABLE     Result Date: 2/17/2023  Similar findings when rotation taken into account; left chest tube with unchanged or slightly increased loculated appearing left pleural effusion; atelectatic appearing changes. No pneumothorax. XR CHEST PORTABLE     Result Date: 2/14/2023  Interval placement of a left chest tube with reduced left pleural fluid. IR CHEST TUBE INSERTION     Result Date: 2/14/2023  Successful ultrasound guided placement of a left 10 [place] chest tube      US RETROPERITONEAL COMPLETE     Result Date: 2/19/2023  1. Simple cortical cyst at the upper pole left kidney measuring 6 mm. 2. Limited renal ultrasound. No hydronephrosis. 3. Nonvisualization of ureteral jets. Minimal distention of the urinary bladder. Patient no urge to void during the exam. RECOMMENDATIONS: Unavailable        Infectious disease was consulted for continued empyema.       CURRENT EVALUATION 3/3/2023  BP 97/62   Pulse 73   Temp 97.7 °F (36.5 °C) (Oral)   Resp 16   Ht 6' 1\" (1.854 m)   Wt 222 lb 10.6 oz (101 kg)   SpO2 96%   BMI 29.38 kg/m²     Patient evaluated in the ICU Afebrile  Vs stable     The patient is feeling better and continues to do well on room air. He is s/p decortication with CT surgery on 2/23/23    Chest tube removed 2/28/23  Midline in placed 2/28/23    Discussed with patient and wife at length on 3-2-23  CT scans reviewed with wife and daughter on 2-20-23  Indicated that he will need about a month of antibiotics. Wife indicated similar empyema a few years ago on Rt chest. He had S aureus back then and was severely ill. Medications reviewed: On IV Ceftriaxone. Stop date 3-12-23    No growth from VATS culture from 2/23/23     WBC is elevated but is on a downward trend  Steroids initiated 2/26/23    Stable KELSEA   He is receiving 40 mg Lasix IV once a day     No acute issues noted    Labs, X rays reviewed: 3/3/2023    BUN:63-->47-->51-->50--->63-->63--->64-->59  Cr:2.24-->1.96-->2.62-->3.2-->3.48--->4.27--->4.14--->3.79-->2.92  Na 131-->130-->132-->132--->137--->134-->140  K 5.1-->4.9--->5.0--->5.0--->4.5-->5.0    WBC: 22.9--->24.1--->26.0---->25.0--->16.3  Hb:10.0-->11.2-->11.2-->9.2--->8.3  Plat: 425-->461-->434-->446-->433--->835    CRP: 202.8     Cultures:  Urine:  2/17/23: No growth  2/22/23 : No growth   Blood:  2/17/23: No growth  MRSA Nares:  2/13/23: Not detected  Pleural Fluid:  2/14/23: Streptococci Gordonii  2/18/23: No growth thus far  2-23-23: No growth    Discussed with patient, RN, IM. Chest x-ray: 3/1/23      Chest x-ray: 2/28/23      Chest x-ray: 2/23/23                                           CT: 2/17/23      I have personally reviewed the past medical history, past surgical history, medications, social history, and family history, and I have updated the database accordingly. Past Medical History:     Past Medical History:   Diagnosis Date    Acute interstitial nephritis 08/03/2022    Unclear cause. Creat bumped to 2.9, baseline in oct 2020 1.6.   Exact description of his kidney biopsy is not available to me however pathologist seems to think there is diabetic glomerosclerosis and acute interstitial nephritis seen on the biopsy specimen. No mention about chronic changes. Because of acute interstitial nephritis a trial of steroids for 15 days will be given to see if I can impro    Aortic regurgitation     ARF (acute renal failure) ([facility] [address] 08/03/2022    kidney biopsy from June 2022  showing findings of acute interstitial nephritis. Exact description not available. Trial of steroids will be given to see if I can improve renal function. Creatinine in October 2020 was 1.6, creatinine in June 2022 was 2.9. Trial of steroids will be given to see if renal function can be improved.     Chronic kidney disease     Hyperlipidemia     Hypertension     Non-Hodgkin lymphoma ([facility] [address]     Research study patient 02/13/2023    [id number] Completed 2/15/23    Sarcoidosis     Type II or unspecified type diabetes mellitus without mention of complication, not stated as uncontrolled        Past Surgical  History:     Past Surgical History:   Procedure Laterality Date    CARPAL TUNNEL RELEASE Left 11/15     EYE SURGERY Right     shunt and cataract     EYE SURGERY Right 6/16    laser     IR CHEST TUBE INSERTION  2/13/2023    IR CHEST TUBE INSERTION 2/13/2023 [facility]    IR CHEST TUBE INSERTION  2/18/2023    IR CHEST TUBE INSERTION 2/18/2023 Kael Martinez MD [facility]    KNEE ARTHROSCOPY      MALIGNANT SKIN LESION EXCISION      ROTATOR CUFF REPAIR Right 7/14    ROTATOR CUFF REPAIR Left 5/15    THORACOSCOPY Left 2/23/2023    VIDEO ASSISTED THORACOSCOPY, DECORTICATION performed by Dayami Palma MD at [address] [place] [address] Right 10/2018       Medications:      furosemide  40 mg Oral Daily    repaglinide  1 mg Oral BID AC    insulin glargine  10 Units SubCUTAneous Nightly    metoprolol tartrate  50 mg Oral BID    senna  2 tablet Oral Nightly    amiodarone  200 mg Oral BID    sodium chloride flush  5-40 mL IntraVENous 2 times per day    heparin (porcine)  5,000 Units SubCUTAneous 3 times per day    insulin lispro  0-16 Units SubCUTAneous 4x Daily AC & HS    midodrine  10 mg Oral TID WC    docusate sodium  100 mg Oral TID    cefTRIAXone (ROCEPHIN) IV  1,000 mg IntraVENous Q24H    pantoprazole  40 mg Oral QAM AC    colchicine  0.3 mg Oral Daily    sodium chloride flush  5-40 mL IntraVENous 2 times per day    mupirocin   Nasal BID    chlorhexidine   Topical See Admin Instructions    sodium chloride flush  5-40 mL IntraVENous 2 times per day    polyethylene glycol  17 g Oral Daily    sodium chloride flush  5-40 mL IntraVENous 2 times per day       Social History:     Social History     Socioeconomic History    Marital status:      Spouse name: Not on file    Number of children: Not on file    Years of education: Not on file    Highest education level: Not on file   Occupational History    Not on file   Tobacco Use    Smoking status: Never    Smokeless tobacco: Former    Tobacco comments:     minimal and infrequent    Substance and Sexual Activity    Alcohol use: Yes     Alcohol/week: 0.0 standard drinks     Comment: rare    Drug use: No    Sexual activity: Not on file   Other Topics Concern    Not on file   Social History Narrative    Not on file     Social Determinants of Health     Financial Resource Strain: Low Risk     Difficulty of Paying Living Expenses: Not very hard   Food Insecurity: No Food Insecurity    Worried About Running Out of Food in the Last Year: Never true    Ran Out of Food in the Last Year: Never true   Transportation Needs: No Transportation Needs    Lack of Transportation (Medical): No    Lack of Transportation (Non-Medical): No   Physical Activity: Not on file   Stress: Stress Concern Present    Feeling of Stress : To some extent   Social Connections: Socially Integrated    Frequency of Communication with Friends and Family: Three times a week    Frequency of Social Gatherings with Friends and Family:  Three times a week    Attends [employer] Services: 1 to 4 times per year    Active Member of Clubs or Organizations: No    Attends Club or Organization Meetings: 1 to 4 times per year    Marital Status:    Intimate Partner Violence: Not At Risk    Fear of Current or Ex-Partner: No    Emotionally Abused: No    Physically Abused: No    Sexually Abused: No   Housing Stability: Low Risk     Unable to Pay for Housing in the Last Year: No    Number of [place] in the Last Year: 1    Unstable Housing in the Last Year: No       Family History:     Family History   Problem Relation Age of Onset    High Blood Pressure Father     Other Father         AAA    Heart Disease Other         uncle x 2         Allergies:   Latex, Gabapentin, Meperidine, Erythromycin, Glimepiride, Lidocaine, Hydrocodone, Macrolides and ketolides, Niacin and related, Trelegy ellipta [fluticasone-umeclidin-vilant], Pregabalin, and Xanax [alprazolam]     Review of Systems:   Constitutional: No fevers or chills. No systemic complaints  Head: No headaches  Eyes: No double vision or blurry vision. No conjunctival inflammation. ENT: No sore throat or runny nose. . No hearing loss, tinnitus or vertigo. Cardiovascular: No chest pain or palpitations. shortness of breath. WANG  Lung: Left sided chest tube with some shortness of breath with dry cough. No sputum production  Abdomen: No nausea, vomiting, diarrhea, or abdominal pain. Xenia Fanning No cramps. Genitourinary: No increased urinary frequency, or dysuria. No hematuria. No suprapubic or CVA pain  Musculoskeletal: No muscle aches or pains. No joint effusions, swelling or deformities  Hematologic: No bleeding or bruising. Neurologic: No headache, weakness, numbness, or tingling. Integument: No rash, no ulcers. Psychiatric: No depression. Endocrine: No polyuria, no polydipsia, no polyphagia.     Physical Examination :   Patient Vitals for the past 8 hrs:   BP Temp Temp src Pulse Resp SpO2 Weight   03/03/23 0822 -- -- -- 68 -- -- --   03/03/23 0630 -- -- -- -- 16 -- --   03/03/23 0600 -- 97.7 °F (36.5 °C) Oral 66 (!) 7 96 % --   03/03/23 0545 -- -- -- 66 (!) 0 97 % --   03/03/23 0530 -- -- -- 62 (!) 0 97 % --   03/03/23 0515 -- -- -- 60 (!) 0 94 % --   03/03/23 0507 -- 97.7 °F (36.5 °C) -- -- -- -- 222 lb 10.6 oz (101 kg)   03/03/23 0500 -- -- -- 65 -- 95 % --   03/03/23 0445 -- -- -- 55 19 96 % --   03/03/23 0430 -- -- -- 64 15 97 % --   03/03/23 0415 -- -- -- 57 21 94 % --   03/03/23 0400 97/62 97.7 °F (36.5 °C) Oral 61 20 93 % --   03/03/23 0345 -- -- -- 65 16 (!) 89 % --   03/03/23 0330 -- -- -- 62 28 90 % --   03/03/23 0315 -- -- -- 62 22 93 % --   03/03/23 0300 96/64 -- -- 66 20 (!) 87 % --   03/03/23 0245 -- -- -- 65 28 91 % --   03/03/23 0230 -- -- -- 71 25 91 % --   03/03/23 0215 -- -- -- 61 17 94 % --   03/03/23 0200 101/64 98.6 °F (37 °C) Oral 59 16 94 % --   03/03/23 0145 -- -- -- 62 24 92 % --   03/03/23 0130 -- -- -- 61 21 93 % --   03/03/23 0115 -- -- -- 61 20 93 % --   03/03/23 0100 100/62 -- -- 62 23 93 % --     General Appearance: Awake, alert, and in no apparent distress  Head:  Normocephalic, no trauma  Eyes: Pupils equal, round, reactive to light and accommodation; extraocular movements intact; sclera anicteric; conjunctivae pink. No embolic phenomena. ENT: Oropharynx clear, without erythema, exudate, or thrush. No tenderness of sinuses. Mouth/throat: mucosa pink and moist. No lesions. Dentition in good repair. Neck:Supple, without lymphadenopathy. Thyroid normal, No bruits. Pulmonary/Chest: Left sided chest tube in place to suction with serosanguinous drainage. Diminished to auscultation. No dullness to percussion. Cardiovascular: Regular rate and rhythm without murmurs, rubs, or gallops. Abdomen: Soft, non tender. Bowel sounds normal. No organomegaly  All four Extremities: No cyanosis, clubbing, edema, or effusions. Neurologic: No gross sensory or motor deficits.   Skin: Warm and dry with good turgor. No signs of peripheral arterial or venous insufficiency. No ulcerations. Left sided-chest tube site CDI    Medical Decision Making -Laboratory:   I have independently reviewed/ordered the following labs:    CBC with Differential:   No results for input(s): WBC, HGB, HCT, PLT, SEGSPCT, BANDSPCT, LYMPHOPCT, MONOPCT, EOSPCT in the last 72 hours. BMP:   Recent Labs     03/01/23  1320 03/02/23  0456    140   K 4.2 5.0    103   CO2 24 23   BUN 59* 59*   CREATININE 3.00* 2.92*     Hepatic Function Panel: No results for input(s): PROT, LABALBU, BILIDIR, IBILI, BILITOT, ALKPHOS, ALT, AST in the last 72 hours. No results for input(s): RPR in the last 72 hours. No results for input(s): HIV in the last 72 hours. No results for input(s): BC in the last 72 hours. Lab Results   Component Value Date/Time    MUCUS 1+ 02/13/2023 02:17 AM    RBC 2.96 02/28/2023 04:13 AM    RBC 5.15 04/03/2012 11:08 AM    WBC 16.3 02/28/2023 04:13 AM    TURBIDITY Cloudy 02/22/2023 01:17 PM     Lab Results   Component Value Date/Time    CREATININE 2.92 03/02/2023 04:56 AM    GLUCOSE 177 03/02/2023 04:56 AM    GLUCOSE 140 04/03/2012 11:08 AM       Medical Decision Making-Imaging:     Narrative   EXAMINATION:   CT OF THE CHEST WITHOUT CONTRAST, 2/17/2023 9:29 am       TECHNIQUE:   CT of the chest was performed without the administration of intravenous   contrast. Multiplanar reformatted images are provided for review. Automated   exposure control, iterative reconstruction, and/or weight based adjustment of   the mA/kV was utilized to reduce the radiation dose to as low as reasonably   achievable. COMPARISON:   02/13/2023       HISTORY:   ORDERING SYSTEM PROVIDED HISTORY:  Improvement? TECHNOLOGIST PROVIDED HISTORY:   Improvement? FINDINGS:   Mediastinum: Small bilateral axial lymph nodes are identified not   significantly changed. Small to moderate size mediastinal lymph nodes are   additionally present. Lungs/Pleura: There are multiple loculated effusions within the left chest   which have improved since previous exam.  Small amounts of new air are likely   secondary to the presence of the left thoracostomy tube. Left lower lobe atelectasis is identified. Upper Abdomen: There is a probable small hiatal hernia. Soft Tissues/Bones:  No acute osseous abnormality is appreciated. Old right   rib fractures are noted. Impression   Small to moderate-sized multiloculated left pleural effusions have improved. New small foci of air within the collection is presumably related to the   introduction of the left thoracostomy tube. Stable small to moderate sized mediastinal lymph nodes are nonspecific but   may relate to the patient's history of lymphoma. Additional small bilateral   axial lymph nodes are identified. Small hiatal hernia. Narrative   EXAMINATION:   ONE XRAY VIEW OF THE CHEST       2/18/2023 3:04 pm       COMPARISON:   CT chest February 17, 2023       HISTORY:   ORDERING SYSTEM PROVIDED HISTORY: worsening   TECHNOLOGIST PROVIDED HISTORY:   worsening       FINDINGS:   Pigtail catheter on the left. Moderate left effusion appears   denser/increased. Mild bilateral interstitial infiltrates or edema. Heart   and mediastinum unremarkable. Bony thorax intact. No pneumothorax noted. Subsegmental atelectasis right lower lung. Impression   Moderate left pleural effusion increased. No change left chest tube/pigtail   catheter. Narrative   EXAMINATION:   ONE XRAY VIEW OF THE CHEST       2/28/2023 6:49 am       COMPARISON:   02/27/2023       HISTORY:   ORDERING SYSTEM PROVIDED HISTORY: s/p VATs with decortication   TECHNOLOGIST PROVIDED HISTORY: S/p VATs with decortication       FINDINGS:   Cardiomegaly is unchanged. Left-sided chest tubes remain in place.   Small   left pleural effusion and patchy left basilar opacities are not significantly changed. No evidence of pneumothorax. Impression   No significant interval change. Narrative   EXAMINATION:   ONE XRAY VIEW OF THE CHEST       2/28/2023 9:16 am       COMPARISON:   02/28/2023 at 06:20       HISTORY:   ORDERING SYSTEM PROVIDED HISTORY: post chest tube removal   TECHNOLOGIST PROVIDED HISTORY:   post chest tube removal   Reason for Exam: uprt port chest       FINDINGS:   Left-sided chest tube has been removed. Small left pleural effusion   persists, unchanged. Left basilar airspace disease is similar as well. No   pneumothorax identified. Cardial pericardial silhouette is enlarged but similar when compared to the   previous exam.           Impression   Interval removal of left-sided chest tube. No pneumothorax. Continued small left pleural effusion, unchanged. Left basilar airspace   disease is unchanged. Narrative   EXAMINATION:   ONE XRAY VIEW OF THE CHEST       3/1/2023 5:39 am       COMPARISON:   02/28/2023       HISTORY:   ORDERING SYSTEM PROVIDED HISTORY: s/p VATs with decortication   TECHNOLOGIST PROVIDED HISTORY: S/p VATs with decortication       FINDINGS:   Mild cardiomegaly is unchanged. Small left pleural effusion and patchy   opacities at the left base are unchanged. No evidence of pneumothorax. No   free air beneath the diaphragm. Impression   No significant oval change. Small left pleural effusion and patchy opacities   at the left base. Medical Decision [id number]:       Medical Decision Making-Other:     Note:  Labs, medications, radiologic studies were reviewed with personal review of films  Large amounts of data were reviewed  Discussed with nursing Staff, Discharge planner  Infection Control and Prevention measures reviewed  All prior entries were reviewed  Administer medications as ordered  Prognosis: [address]  Discharge planning reviewed  Follow up as outpatient.     Thank you for allowing us to participate in the care of this patient. Please call with questions. FLOYD Costa CNP participated in the evaluation of this patient, under supervision. ATTESTATION:    I have discussed the case, including pertinent history and exam findings with the APRN. I have evaluated the  History, physical findings and pictures of the patient and the key elements of the encounter have been performed by me. I have reviewed the laboratory data, other diagnostic studies and discussed them with the APRN. I have updated the medical record where necessary. I agree with the assessment, plan and orders as documented by the APRN.     Sahil Davis MD.      Pager: [phone] - Office: [phone]

## 2023-03-03 NOTE — PROGRESS NOTES
[facility]   Progress Note                 Date:   3/3/2023  Patient name: Lindsay Huff  Date of admission:  2/12/2023 10:33 PM  MRN:   [id number]  YOB: 1955  PCP: Nicolette Webster MD    Reason for Admission:      Subjective:   Seen and examined at bedside. Denied any active complaint. Feeling much better. In NSR with occasional PACs. Continue to be on colchicine. Steroids stopped yesterday. Intake/Output Summary (Last 24 hours) at 3/3/2023 0555  Last data filed at 3/3/2023 0000  Gross per 24 hour   Intake 850 ml   Output 1585 ml   Net -735 ml       Medications:   Scheduled Meds:   furosemide  40 mg Oral Daily    repaglinide  1 mg Oral BID AC    insulin glargine  10 Units SubCUTAneous Nightly    metoprolol tartrate  50 mg Oral BID    senna  2 tablet Oral Nightly    amiodarone  200 mg Oral BID    sodium chloride flush  5-40 mL IntraVENous 2 times per day    heparin (porcine)  5,000 Units SubCUTAneous 3 times per day    insulin lispro  0-16 Units SubCUTAneous 4x Daily AC & HS    midodrine  10 mg Oral TID WC    docusate sodium  100 mg Oral TID    cefTRIAXone (ROCEPHIN) IV  1,000 mg IntraVENous Q24H    pantoprazole  40 mg Oral QAM AC    colchicine  0.3 mg Oral Daily    sodium chloride flush  5-40 mL IntraVENous 2 times per day    mupirocin   Nasal BID    chlorhexidine   Topical See Admin Instructions    sodium chloride flush  5-40 mL IntraVENous 2 times per day    polyethylene glycol  17 g Oral Daily    sodium chloride flush  5-40 mL IntraVENous 2 times per day       Continuous Infusions:   sodium chloride      sodium chloride      sodium chloride      sodium chloride      dextrose         CBC:   No results for input(s): WBC, HGB, PLT in the last 72 hours.     BMP:    Recent Labs     02/28/23  1707 03/01/23  1320 03/02/23  0456   * 137 140   K 4.5 4.2 5.0    102 103   CO2 22 24 23   BUN 64* 59* 59*   CREATININE 3.79* 3.00* 2.92*   GLUCOSE 177* 189* 177* Objective:   Vitals: BP 97/62   Pulse 55   Temp 97.7 °F (36.5 °C)   Resp 19   Ht 6' 1\" (1.854 m)   Wt 222 lb 10.6 oz (101 kg)   SpO2 96%   BMI 29.38 kg/m²     General appearance: awake, alert, in no apparent respiratory distress   HEENT: Head: Normocephalic, no lesions, without obvious abnormality  Neck: no JVD  Lungs: Clear to auscultation on anterior chest.  Heart: Normal S1 and S2, regular rate and rhythm. Abdomen: soft, non-tender; bowel sounds normal  Extremities: No LE edema  Neurologic: Mental status: Alert, oriented. Motor and sensory not done. DATA  EKG:    Date: 02/21/23  Reading:  Sinus tachycardia  Moderate voltage criteria for LVH, may be normal variant  Nonspecific ST and T wave abnormality  Abnormal ECG  When compared with ECG of 16-FEB-2023 23:10,  No significant change was found     LAST ECHO:  2/21/23  Summary  Left ventricle is normal in size. Global left ventricular systolic function  is difficult to assess due to heart rate but appears normal. Calculated  ejection fraction 51% by Atkinson's method. Aortic valve is trileaflet and opens adequately. Trivial aortic insufficiency. Normal tricuspid valve structure. Trivial tricuspid regurgitation. Date:  Findings Summary: 10/22     Left Ventricle: Systolic function is normal with an ejection fraction   of 55-60%. Aortic Valve: There is mild regurgitation. Tricuspid Valve: There is trace to mild regurgitation. The right   ventricular systolic pressure normal. RVSP calculated at 24 mmHg. RVSP is   based on RA pressure of 3 mmHg. There is no pulmonary hypertension. Left Ventricle   Systolic function is normal with an ejection fraction of 55-60%. No obvious regional wall motion abnormalities. There is abnormal septal motion. Right Ventricle   Right ventricular size appears normal. Systolic function is normal.     Right Atrium   Right atrium is normal in size.      IVC/SVC   The right atrial pressure is estimated at 3 mmHg. IVC appears normal. There is normal collapse with deep inspiration. Tricuspid Valve   Tricuspid valve appears to be normal. There is trace to mild regurgitation. The right ventricular systolic pressure normal. RVSP calculated at 24 mmHg. RVSP is based on RA pressure of 3 mmHg. There is no pulmonary hypertension. Aortic Valve   The aortic valve is trileaflet. The leaflets are mildly thickened. There is mild regurgitation. Pulmonic Valve   Pulmonic valve structure is grossly normal. There is trace to mild regurgitation. Pericardium   There is no pericardial effusion. LAST Stress Test:   No prior     LAST Cardiac Angiography:. No prior    Assessment / Acute Cardiac Problems:   Left sided Empyema s/p VATS/decortication 1/23/2023  Acute pericarditis on renal dose colchicine. Steroids stopped. Community acquired pneumonia. Improving  Acute hypoxic respiratory failure due to above. Resolved  New Onset Atrial fibrillation with RVR s/p AFIA/CV to NSR. History of non-Hodgkin's lymphoma  History of nonrheumatic mitral regurgitation/Tricuspid regurgitation  History of dilated aortic root  Type 2 diabetes  KELSEA on Chronic kidney disease stage III/IV  CLAY on CPAP    Plan of Treatment: In NSR with PAC's. Continue p.o. amiodarone 200 mg 2 times daily for 14 doses followed by 200 mg daily  ASA held per nephrology. On Colchicine 0.3 qday for pericarditis (renal dose). IV steroids stopped  On antibiotics as per ID  Continue Lopressor 50 twice daily with holding parameters. Continue with as needed Lopressor 5 mg IV q6h for HR >110  On p.o. Lasix as per nephro, creatinine improving   On Midodrine 10 mg TID  Can be discharged from cardiology standpoint.       Plan to be discussed with rounding attending       Karolyn Chavez MD.  Fellow, cardiovascular disease   [place], [place]      I performed a history and physical examination of the patient and discussed management with the resident. I reviewed the residents note and agree with the documented findings and plan of care. Any areas of disagreement are noted on the chart. I was personally present for the key portions of any procedures. I have documented in the chart those procedures where I was not present during the key portions. I have personally evaluated this patient and have completed at least one if not all key elements of the E/M (history, physical exam, and MDM). Additional findings are as noted. Colchicine 0.3mg po qday for total 4 weeks.     Nydia Alvarez MD

## 2023-03-03 NOTE — PROGRESS NOTES
PULMONARY & CRITICAL CARE MEDICINE PROGRESS NOTE     Patient:  Gera Wheat  MRN: [id number]  Admit date: 2/12/2023  Primary Care Physician: Sowmya Carbajal MD  Consulting Physician: Katerin Plummer MD  CODE Status: Full Code  LOS: 23     SUBJECTIVE     CHIEF COMPLAINT/REASON FOR CONSULT:  Acute Hypoxic respiratory failure    HISTORY OF PRESENT ILLNESS:  Gera Wheat is a 74 y.o. male with past medical history significant for type 2 diabetes mellitus, multiple myeloma, non-Hodgkin lymphoma, sarcoidosis, referred from [facility] ER with worsening respiratory status, shortness of breath, nonproductive cough. He was started on BiPAP and given antibiotics. Chest x-ray was concerning for loculated left-sided pleural effusion. Transfer was made and accepted by hospitalist, further work-up. Critical care was consulted for worsening respiratory status. Patient was on high flow and respiratory rate was in 38-40. Patient was very dyspneic and having difficulty breathing with accessory muscle use. Initially he was unable to keep BiPAP but later on able to keep BiPAP. He improved symptomatically with BiPAP and respiratory rate was in 26. Patient is transferred to the ICU for higher level of care. 2/13: Attempted to perform bedside thoracentesis, no clear pocket identified, sent to IR, chest tube placed, appears that an empyema was encountered and chest tube was placed growing gram positive cocci in pairs, on cefepime and vanco     2/14: Placed chest tube yesterday, weaned off high flow, feeling \"100 x better\".       2/15: Continued TPA/Dornase chest tube flush, Got anxious overnight requiring xanax, 1200 cc of purulent chest tube output in the last 24 hours, patient did not sleep last night     2/16: Added seroquel to help sleep and agitation at night, continued Dornase/TPA, 700 cc of chest tube output that was less purulent, afebrile, WBC downtrending, continue cefepime     2/17: WBC uptrended, sent cultures, tachypneic, restless overnight, given xanax, more tachycardic overnight as well, plan for CT chest today, will discuss possible CTA to evaluate for PE.    02/18: Cardiothoracic surgery consulted and they increased the size of the chest tube. Will likely need decortication. 02/20: Cardiothoracic surgery determined to review imaging and diagnostics with on-call surgeon, plan to repeat CT scan at later date, hold off on plan for decortication. Infectious Disease discontinued IV cefepime 02/20/23.    02/21: Patient more confused, oriented to self. Trying to get out of bed. Denies agitation but feeling overall week. Kidney US did not show hydronephrosis. Infectious disease recommends starting IV Rocephin 1 gm q24 hrs until 03/12/23. [address] [address] helms for outpatient therapy. Echo performed. Pt declined PT.    02/22: Pt is paranoid and declined PT. Cardiothoracic planning for left-sided VATS decortication on 02/23/23. Breathing stable, 99% O2 saturation on 3L NC 40% FiO2. INTERVAL HISTORY:  3/3/2023  He is S/p VATS/decortication on 02/23/2023. Overnight he is stable no acute hypoxic events were reported. He remained on room air and saturating 94%. Respiratory rate is in low 20s he is hemodynamically stable. According to patient he is able to ambulate inside the room he is feeling better he has mild cough mild sputum production denies chest pain and denies hemoptysis does not complain of fever appetite is improving. He is on Lasix 40 mg oral once daily his creatinine is 2.64. Urine output is 1925 in last 24 hours  He is on oral amiodarone and on Lopressor. Chest x-ray this morning 3/03/23 shows similar finding with pleural parenchymal change on the left side with mild basilar atelectasis. Not much change from chest x-ray. REVIEW OF SYSTEMS:  Review of Systems   Constitutional:   Negative for chills, diaphoresis, fever and unexpected weight change. HENT:  Negative for congestion.   Respiratory:  Positive for cough and shortness of breath. Negative for apnea, choking, chest tightness, wheezing and stridor.    Cardiovascular: Negative for palpitation, dizziness lightheadedness, negative for chest pain. Negative for leg swelling or leg pain.   Gastrointestinal:  Negative for abdominal distention, diarrhea, nausea and vomiting.   Genitourinary:  Negative for difficulty urinating.   Neurological:  Negative for dizziness and numbness.   Psychiatric/Behavioral:  Negative for agitation.    OBJECTIVE     PaO2/FiO2 RATIO:  No results for input(s): POCPO2 in the last 72 hours.     FiO2 : 40 %     VITAL SIGNS:   LAST:  /63   Pulse 70   Temp 97.9 °F (36.6 °C) (Oral)   Resp 24   Ht 6' 1\" (1.854 m)   Wt 222 lb 10.6 oz (101 kg)   SpO2 94%   BMI 29.38 kg/m²   8-24 HR RANGE:  TEMP Temp  Av.1 °F (36.7 °C)  Min: 97.7 °F (36.5 °C)  Max: 98.6 °F (37 °C)   BP Systolic (24hrs), Av , Min:96 , Max:137      Diastolic (24hrs), Av, Min:57, Max:81     PULSE Pulse  Av.2  Min: 54  Max: 83   RR Resp  Av.8  Min: 0  Max: 24   O2 SAT SpO2  Av.5 %  Min: 94 %  Max: 98 %   OXYGEN DELIVERY No data recorded        SYSTEMIC EXAMINATION:   Constitutional:  Alert, cooperative and no distress.  Mental Status:  Oriented to person, place and time and normal affect.  Chest: Left-sided dressing is present.  No drainage seen  Lungs: Air entry is good on the right side.  Right decreased air entry on the left side as compared to right base and minimal crackles present.    Heart: regular rhythm, S1-S2 audible, no murmur.  Abdomen:  Soft, nontender, nondistended, normal bowel sounds.  Extremities:  No edema, redness, tenderness in the calves.  Skin:  Warm, dry, no gross lesions or rashes.    DATA REVIEW     Medications: Current Inpatient  Scheduled Meds:   furosemide  40 mg Oral Daily    repaglinide  1 mg Oral BID AC    insulin glargine  10 Units SubCUTAneous Nightly    metoprolol tartrate  50 mg Oral  BID    senna  2 tablet Oral Nightly    amiodarone  200 mg Oral BID    sodium chloride flush  5-40 mL IntraVENous 2 times per day    heparin (porcine)  5,000 Units SubCUTAneous 3 times per day    insulin lispro  0-16 Units SubCUTAneous 4x Daily AC & HS    midodrine  10 mg Oral TID WC    docusate sodium  100 mg Oral TID    cefTRIAXone (ROCEPHIN) IV  1,000 mg IntraVENous Q24H    pantoprazole  40 mg Oral QAM AC    colchicine  0.3 mg Oral Daily    sodium chloride flush  5-40 mL IntraVENous 2 times per day    mupirocin   Nasal BID    chlorhexidine   Topical See Admin Instructions    sodium chloride flush  5-40 mL IntraVENous 2 times per day    polyethylene glycol  17 g Oral Daily    sodium chloride flush  5-40 mL IntraVENous 2 times per day     Continuous Infusions:   sodium chloride      sodium chloride      sodium chloride      sodium chloride      dextrose         INPUT/OUTPUT:  In: 1130 [P.O.:1130]  Out: 2200 [Urine:2200]  Date 03/03/23 0000 - 03/03/23 2359   Shift 6200-2202 5782-3825 3122-6058 24 Hour Total   INTAKE   P.O.(mL/kg/hr) 480(0.6)   480   Shift Total(mL/kg) 480(4.8)   480(4.8)   OUTPUT   Urine(mL/kg/hr) 900(1.1) 275  1175   Shift Total(mL/kg) 900(8.9) 275(2.7)  1175(11.6)   Weight (kg) 101 101 101 101        LABS:  ABGs:   No results for input(s): POCPH, POCPCO2, POCPO2, POCHCO3, QKDN0TFF in the last 72 hours. CBC:   No results for input(s): WBC, HGB, HCT, MCV, PLT, LABLYMP, MID, GRAN, LYMPHOPCT, MIDPERCENT, GRANULOCYTES, RBC, MCH, MCHC, RDW in the last 72 hours. Invalid input(s): RELATIVEPERCENT      CRP:   No results for input(s): CRP in the last 72 hours. LDH:   No results for input(s): LDH in the last 72 hours.   BMP:   Recent Labs     02/28/23  1707 03/01/23  1320 03/02/23  0456 03/03/23  0819   * 137 140 140   K 4.5 4.2 5.0 3.9    102 103 102   CO2 22 24 23 28   BUN 64* 59* 59* 51*   CREATININE 3.79* 3.00* 2.92* 2.64*   GLUCOSE 177* 189* 177* 77     Liver Function Test:   No results for input(s): PROT, LABALBU, ALT, AST, GGT, ALKPHOS, BILITOT in the last 72 hours. Coagulation Profile:   No results for input(s): INR, PROTIME, APTT in the last 72 hours. D-Dimer:  No results for input(s): DDIMER in the last 72 hours. Lactic Acid:  No results for input(s): LACTA in the last 72 hours. Cardiac Enzymes:  No results for input(s): CKTOTAL, CKMB, CKMBINDEX, TROPONINI in the last 72 hours. Invalid input(s): TROPONIN, HSTROP  BNP/ProBNP:   No results for input(s): BNP, PROBNP in the last 72 hours. Triglycerides:  No results for input(s): TRIG in the last 72 hours. Microbiology:  Urine Culture:  No components found for: CURINE  Blood Culture:  No components found for: CBLOOD, CFUNGUSBL  Sputum Culture:  No components found for: CSPUTUM  No results for input(s): SPECDESC, SPECIAL, CULTURE, STATUS, ORG, CDIFFTOXPCR, CAMPYLOBPCR, SALMONELLAPC, SHIGAPCR, SHIGELLAPCR, MPNEUG, MPNEUM, LACTOQL in the last 72 hours. No results for input(s): SPUTUM, SPECIAL, CULTURE, STATUS, ORG, CDIFFTOXPCR, MPNEUM, MPNEUG in the last 72 hours. Invalid input(s): Skylar Mcknight, CFUNGUSBL,  [facility]         Radiology Reports:  XR CHEST PORTABLE   Final Result   No significant interval change. XR CHEST PORTABLE   Final Result   Moderate cardiomegaly with minimal pulmonary vascular prominence without   pulmonary edema. As compared to previous portable chest x-ray pulmonary   vascular congestion is decreased. At the left lung base decreased atelectatic changes, and/or infiltrates as   compared to previous study. Still there are some patchy infiltrates,   atelectasis or fibrotic changes present in the left lung base. In the rest of both lungs, there is no other focal abnormality or new   abnormality. XR CHEST PORTABLE   Final Result   No significant oval change. Small left pleural effusion and patchy opacities   at the left base.          XR CHEST PORTABLE   Final Result   Interval removal of left-sided chest tube. No pneumothorax. Continued small left pleural effusion, unchanged. Left basilar airspace   disease is unchanged. XR CHEST PORTABLE   Final Result   No significant interval change. XR CHEST PORTABLE   Final Result   Left chest tubes present with partially loculated left pleural effusion and   left basilar opacity unchanged. XR CHEST PORTABLE   Final Result   No significant change from prior exam.      Left chest tubes with left basilar airspace disease and small amount of   pleural fluid. Linear left retrocardiac lucency could represent small pneumothorax. XR CHEST PORTABLE   Final Result   No significant change from prior exam.         XR CHEST PORTABLE   Final Result   Status post removal single small bore and placement 2 large-bore chest tubes   left lung with reduced opacity, presumably multiloculated left pleural   effusion and associated atelectasis. No pneumothorax. Slightly greater   atelectasis right base. US RETROPERITONEAL COMPLETE   Final Result   1. Simple cortical cyst at the upper pole left kidney measuring 6 mm. 2. Limited renal ultrasound. No hydronephrosis. 3. Nonvisualization of ureteral jets. Minimal distention of the urinary   bladder. Patient no urge to void during the exam.      RECOMMENDATIONS:   Unavailable         IR CHEST TUBE INSERTION   Final Result   Successful fluoroscopic up sizing of left sided chest tube with 14 Frisian   tube placed. XR CHEST PORTABLE   Final Result   Moderate left pleural effusion increased. No change left chest tube/pigtail   catheter. VL DUP LOWER EXTREMITY VENOUS BILATERAL   Final Result      CT CHEST WO CONTRAST   Final Result   Small to moderate-sized multiloculated left pleural effusions have improved. New small foci of air within the collection is presumably related to the   introduction of the left thoracostomy tube.       Stable small to moderate sized mediastinal lymph nodes are nonspecific but   may relate to the patient's history of lymphoma. Additional small bilateral   axial lymph nodes are identified. Small hiatal hernia. XR CHEST PORTABLE   Final Result   Similar findings when rotation taken into account; left chest tube with   unchanged or slightly increased loculated appearing left pleural effusion;   atelectatic appearing changes. No pneumothorax. XR CHEST PORTABLE   Final Result   Interval placement of a left chest tube with reduced left pleural fluid. IR CHEST TUBE INSERTION   Final Result   Successful ultrasound guided placement of a left 10 Venezuelan chest tube         CT CHEST WO CONTRAST   Final Result   1. Moderate to large amount of multiloculated left pleural fluid. Assessment   for pleural thickening and/or pleural nodularity is limited without IV   contrast.   2. Consolidation in the left lower lobe, likely atelectasis, but possibility   of superimposed pneumonia would be difficult to exclude. 3. Mediastinal and bilateral axillary lymphadenopathy, which may be related   to the patient's history of lymphoma. If available, comparison to any recent   imaging may be of use to assess for progression. XR CHEST PORTABLE   Final Result   No significant interval change. Redemonstration of opacification of the mid   to lower left lung field which may in part be due to loculated left pleural   effusion. Consider further evaluation with CT. Echocardiogram:   No results found for this or any previous visit. ASSESSMENT AND PLAN     Assessment:    Acute hypoxic respiratory failure resolved  Community acquired pneumonia  Empyema left side s/p VATS/decortication (2/23/2023)  Mediastinal and bilateral axillary lymphadenopathy  Hx of lymphoma  Hypertension  Hx of AAA  Hx of mitral regurgitation  Atrial fibrillation. KELSEA      Plan:    Patient s/p VATS on 02/23/2023.   Status post removal of the chest tube on 02/28/2023  Chest x-ray reviewed from this morning on 03/03/2023 and is compared to 03/02/2023 and 03/01/2023 showed l mild eft basilar pleuroparenchymal change/minimal atelectasis. No change  Patient was on Solu-Medrol 30 mg every 12 hours apparently for pericarditis. He is off Solu-Medrol. No need for steroids from pulmonary standpoint. Patient is currently on Lasix oral once daily creatinine continue to improve. Follow-up with nephrology and follow-up renal function. Supplemental oxygen if needed to keep oxygen saturation above 92%  DVT prophylaxis with heparin subcu   On Rocephin and follow-up with infectious disease. Duration of antibiotic per infectious disease he has midline placed  Continue to encourage incentive spirometry Acapella deep breathing and ambulation  Bronchodilator therapy as needed  We will continue to follow    Discussed with the patient and updated questions answered. Discussed with nursing staff recommendations/plan discussed    We will continue to follow. I would like to thank you for allowing me to participate in the care of this patient. Please feel free to call with any further questions or concerns.       Sergio Esquivel MD  Pulmonary and critical care medicine  3/3/2023, 12:46 PM

## 2023-03-04 LAB
ANION GAP SERPL CALCULATED.3IONS-SCNC: 9 MMOL/L (ref 9–17)
BUN SERPL-MCNC: 49 MG/DL (ref 8–23)
CALCIUM SERPL-MCNC: 8.9 MG/DL (ref 8.6–10.4)
CHLORIDE SERPL-SCNC: 99 MMOL/L (ref 98–107)
CO2 SERPL-SCNC: 26 MMOL/L (ref 20–31)
CREAT SERPL-MCNC: 2.25 MG/DL (ref 0.7–1.2)
GFR SERPL CREATININE-BSD FRML MDRD: 31 ML/MIN/1.73M2
GLUCOSE BLD-MCNC: 121 MG/DL (ref 75–110)
GLUCOSE BLD-MCNC: 123 MG/DL (ref 75–110)
GLUCOSE BLD-MCNC: 127 MG/DL (ref 75–110)
GLUCOSE BLD-MCNC: 162 MG/DL (ref 75–110)
GLUCOSE BLD-MCNC: 327 MG/DL (ref 75–110)
GLUCOSE SERPL-MCNC: 136 MG/DL (ref 70–99)
POTASSIUM SERPL-SCNC: 4.4 MMOL/L (ref 3.7–5.3)
SODIUM SERPL-SCNC: 134 MMOL/L (ref 135–144)

## 2023-03-04 PROCEDURE — 2580000003 HC RX 258: Performed by: INTERNAL MEDICINE

## 2023-03-04 PROCEDURE — 82947 ASSAY GLUCOSE BLOOD QUANT: CPT

## 2023-03-04 PROCEDURE — 6360000002 HC RX W HCPCS: Performed by: FAMILY MEDICINE

## 2023-03-04 PROCEDURE — 6370000000 HC RX 637 (ALT 250 FOR IP): Performed by: INTERNAL MEDICINE

## 2023-03-04 PROCEDURE — 6370000000 HC RX 637 (ALT 250 FOR IP): Performed by: STUDENT IN AN ORGANIZED HEALTH CARE EDUCATION/TRAINING PROGRAM

## 2023-03-04 PROCEDURE — 2580000003 HC RX 258: Performed by: ANESTHESIOLOGY

## 2023-03-04 PROCEDURE — 99232 SBSQ HOSP IP/OBS MODERATE 35: CPT | Performed by: INTERNAL MEDICINE

## 2023-03-04 PROCEDURE — 2580000003 HC RX 258: Performed by: NURSE PRACTITIONER

## 2023-03-04 PROCEDURE — 6360000002 HC RX W HCPCS: Performed by: INTERNAL MEDICINE

## 2023-03-04 PROCEDURE — 2580000003 HC RX 258: Performed by: PHYSICIAN ASSISTANT

## 2023-03-04 PROCEDURE — 2580000003 HC RX 258: Performed by: STUDENT IN AN ORGANIZED HEALTH CARE EDUCATION/TRAINING PROGRAM

## 2023-03-04 PROCEDURE — 6370000000 HC RX 637 (ALT 250 FOR IP): Performed by: PHYSICIAN ASSISTANT

## 2023-03-04 PROCEDURE — 6370000000 HC RX 637 (ALT 250 FOR IP): Performed by: NURSE PRACTITIONER

## 2023-03-04 PROCEDURE — 36415 COLL VENOUS BLD VENIPUNCTURE: CPT

## 2023-03-04 PROCEDURE — 99231 SBSQ HOSP IP/OBS SF/LOW 25: CPT | Performed by: INTERNAL MEDICINE

## 2023-03-04 PROCEDURE — 80048 BASIC METABOLIC PNL TOTAL CA: CPT

## 2023-03-04 PROCEDURE — 2060000000 HC ICU INTERMEDIATE R&B

## 2023-03-04 PROCEDURE — 6370000000 HC RX 637 (ALT 250 FOR IP): Performed by: FAMILY MEDICINE

## 2023-03-04 PROCEDURE — 94762 N-INVAS EAR/PLS OXIMTRY CONT: CPT

## 2023-03-04 RX ORDER — MAGNESIUM HYDROXIDE/ALUMINUM HYDROXICE/SIMETHICONE 120; 1200; 1200 MG/30ML; MG/30ML; MG/30ML
30 SUSPENSION ORAL EVERY 6 HOURS PRN
Status: DISCONTINUED | OUTPATIENT
Start: 2023-03-04 | End: 2023-03-06 | Stop reason: HOSPADM

## 2023-03-04 RX ADMIN — SODIUM CHLORIDE, PRESERVATIVE FREE 5 ML: 5 INJECTION INTRAVENOUS at 10:02

## 2023-03-04 RX ADMIN — CEFTRIAXONE SODIUM 2000 MG: 10 INJECTION, POWDER, FOR SOLUTION INTRAVENOUS at 11:59

## 2023-03-04 RX ADMIN — MIDODRINE HYDROCHLORIDE 10 MG: 5 TABLET ORAL at 16:57

## 2023-03-04 RX ADMIN — HEPARIN SODIUM 5000 UNITS: 5000 INJECTION INTRAVENOUS; SUBCUTANEOUS at 16:40

## 2023-03-04 RX ADMIN — FUROSEMIDE 40 MG: 40 TABLET ORAL at 09:50

## 2023-03-04 RX ADMIN — DOCUSATE SODIUM 100 MG: 100 CAPSULE, LIQUID FILLED ORAL at 20:16

## 2023-03-04 RX ADMIN — DOCUSATE SODIUM 100 MG: 100 CAPSULE, LIQUID FILLED ORAL at 09:50

## 2023-03-04 RX ADMIN — SODIUM CHLORIDE, PRESERVATIVE FREE 5 ML: 5 INJECTION INTRAVENOUS at 09:58

## 2023-03-04 RX ADMIN — SODIUM CHLORIDE, PRESERVATIVE FREE 10 ML: 5 INJECTION INTRAVENOUS at 20:24

## 2023-03-04 RX ADMIN — INSULIN LISPRO 6 UNITS: 100 INJECTION, SOLUTION INTRAVENOUS; SUBCUTANEOUS at 16:44

## 2023-03-04 RX ADMIN — HEPARIN SODIUM 5000 UNITS: 5000 INJECTION INTRAVENOUS; SUBCUTANEOUS at 06:55

## 2023-03-04 RX ADMIN — ALUMINUM HYDROXIDE, MAGNESIUM HYDROXIDE, AND SIMETHICONE 30 ML: 200; 200; 20 SUSPENSION ORAL at 23:27

## 2023-03-04 RX ADMIN — SODIUM CHLORIDE, PRESERVATIVE FREE 5 ML: 5 INJECTION INTRAVENOUS at 10:03

## 2023-03-04 RX ADMIN — HEPARIN SODIUM 5000 UNITS: 5000 INJECTION INTRAVENOUS; SUBCUTANEOUS at 23:27

## 2023-03-04 RX ADMIN — DOCUSATE SODIUM 100 MG: 100 CAPSULE, LIQUID FILLED ORAL at 16:40

## 2023-03-04 RX ADMIN — POLYETHYLENE GLYCOL 3350 17 G: 17 POWDER, FOR SOLUTION ORAL at 11:52

## 2023-03-04 RX ADMIN — SODIUM CHLORIDE, PRESERVATIVE FREE 10 ML: 5 INJECTION INTRAVENOUS at 20:23

## 2023-03-04 RX ADMIN — SENNOSIDES 17.2 MG: 8.6 TABLET, COATED ORAL at 20:15

## 2023-03-04 RX ADMIN — METOPROLOL TARTRATE 50 MG: 25 TABLET ORAL at 20:20

## 2023-03-04 RX ADMIN — REPAGLINIDE 1 MG: 0.5 TABLET ORAL at 09:50

## 2023-03-04 RX ADMIN — COLCHICINE 0.3 MG: 0.6 TABLET, FILM COATED ORAL at 09:49

## 2023-03-04 RX ADMIN — MIDODRINE HYDROCHLORIDE 10 MG: 5 TABLET ORAL at 09:50

## 2023-03-04 RX ADMIN — OXYCODONE HYDROCHLORIDE AND ACETAMINOPHEN 1 TABLET: 5; 325 TABLET ORAL at 11:55

## 2023-03-04 RX ADMIN — REPAGLINIDE 1 MG: 0.5 TABLET ORAL at 16:40

## 2023-03-04 RX ADMIN — MIDODRINE HYDROCHLORIDE 10 MG: 5 TABLET ORAL at 11:55

## 2023-03-04 RX ADMIN — AMIODARONE HYDROCHLORIDE 200 MG: 200 TABLET ORAL at 20:19

## 2023-03-04 RX ADMIN — PANTOPRAZOLE SODIUM 40 MG: 40 TABLET, DELAYED RELEASE ORAL at 09:50

## 2023-03-04 RX ADMIN — OXYCODONE HYDROCHLORIDE AND ACETAMINOPHEN 1 TABLET: 5; 325 TABLET ORAL at 05:16

## 2023-03-04 RX ADMIN — AMIODARONE HYDROCHLORIDE 200 MG: 200 TABLET ORAL at 09:49

## 2023-03-04 RX ADMIN — METOPROLOL TARTRATE 50 MG: 25 TABLET ORAL at 09:50

## 2023-03-04 ASSESSMENT — PAIN SCALES - GENERAL
PAINLEVEL_OUTOF10: 6
PAINLEVEL_OUTOF10: 3
PAINLEVEL_OUTOF10: 6
PAINLEVEL_OUTOF10: 7
PAINLEVEL_OUTOF10: 7
PAINLEVEL_OUTOF10: 6
PAINLEVEL_OUTOF10: 10
PAINLEVEL_OUTOF10: 7
PAINLEVEL_OUTOF10: 6

## 2023-03-04 ASSESSMENT — PAIN DESCRIPTION - ORIENTATION
ORIENTATION: MID

## 2023-03-04 ASSESSMENT — PAIN DESCRIPTION - LOCATION
LOCATION: ABDOMEN
LOCATION: CHEST

## 2023-03-04 ASSESSMENT — PAIN DESCRIPTION - DESCRIPTORS
DESCRIPTORS: PRESSURE;SQUEEZING;TIGHTNESS
DESCRIPTORS: STABBING
DESCRIPTORS: PRESSURE;SQUEEZING;TIGHTNESS
DESCRIPTORS: PRESSURE
DESCRIPTORS: PRESSURE

## 2023-03-04 NOTE — PROGRESS NOTES
[facility]  Office: [address], DO, Haylee Idris, DO, Juan Gonzalez, DO, Patrica Chey Lamar, DO, Dinora Rich MD, Sabi Portillo MD, Estrada Nava MD, Gerry Proctor MD,  Loan Mccoy MD, Karla Casanova MD, Agustina Colón, DO, Dixie Presley MD,  Adolph Cason MD, Gabbie nAguiano MD, Katerine Cortez DO, Ta Barajas MD, Kevin Lam MD, Kori Castro DO, Rena Bobo MD, Thiago Meza MD, Wilber Conklin MD, Oliva Claros MD, Louann Becker, DO, Tamara Yap MD, Melly Gonzales MD, Kellee Lind, Shiela Paredes, CNP, Ava Joseph, CNP, Everette Conrad, CNP,  Ayala Tabares, [facility], Adela Davis, CNP, Aleyda Patricia, CNP, Felipa Mayorga, CNP, Betzy Kirkland, CNP, Ana Maria Montano, CNP, Michelle Carrero PAHaileeC, Gerson Guzman, CNS, Jose Hart, CNP, Lenny Garcia, [address] [place] [address]    Progress Note    3/4/2023    8:30 AM    Name:   Aiyana Villafana  MRN:     [id number]     Acct:      [de-identified]   Room:   [address] [place] [address] Day:  20  Admit Date:  2/12/2023 10:33 PM    PCP:   Sarah Rayo MD  Code Status:  Full Code    Subjective:     C/C: SOB    Interval History Status: improved. Patient is resting in bed. He's been walking with PT/OT and is in good spirits. His appetite is much better, Cr 2.25 slowly improving. He is off oxygen, but O2 sat 88-92% on RA. He still has some WANG. He is waiting for a bed at [facility], plan for discharge is Monday. Per family, his oxygen level went to 80% when he was sleeping. Brief History:     Per my partner:  Pedro Luis Jolley is 79 y.o. male  Who was admitted to the hospital on 2/12/2023 for treatment of Pneumonia, unspecified organism.    Patient presented to ER at [facility] on 2/12/2023 with dyspnea and was found to have left multiloculated pneumonia with large pleural effusion with mediastinal and bilateral axillary lymphadenopathy. Patient has prior history of non-Hodgkin's lymphoma, multiple myeloma. Patient was requiring high flow nasal cannula and had thoracentesis. Pleural fluid was consistent with empyema and patient had chest tube placement by IR. He was treated with broad-spectrum antibiotics. Patient was also given dornase without much improvement. CT surgery was consulted and recommended increasing the size of chest tube and did not recommend any open surgical intervention at this time. Patient had exchange of chest tube on 2/18/2023. He had diffuse ST elevation secondary to pericarditis on 1/21/23. Patient was was started on colchicine. Patient had new onset atrial fibrillation with RVR on 10/22/23 and was given digoxin and amiodarone infusion . Patient also given colchicine and high-dose aspirin for pericarditis which was later changed to Solu-Medrol. Patient underwent VATS with decortication on 2/23/2023. He was extubated postoperatively on 2/23/2023. Patient underwent AFIA cardioversion on 2/27/2023 for rate uncontrolled atrial fibrillation. \"    Review of Systems:     Constitutional:  negative for chills, fevers, sweats  Respiratory:  Positive for cough, dyspnea on exertion, shortness of breath, no wheezing  Cardiovascular:  negative for chest pain, chest pressure/discomfort, lower extremity edema, palpitations  Gastrointestinal:  negative for abdominal pain, constipation, no diarrhea, nausea, vomiting  Neurological:  negative for dizziness, headache    Medications: Allergies: Allergies   Allergen Reactions    Latex Rash    Gabapentin      Other reaction(s): Vomiting    Meperidine Anaphylaxis    Erythromycin      Other reaction(s): Fever  Had all side effects associated with this medication      Glimepiride      Other reaction(s): Dizziness    Lidocaine Swelling     States as a child having reaction to lidocaine where his face swelled.    Ever since then he has never had lidocaine    Hydrocodone Other reaction(s): Vomiting    Macrolides And Ketolides Other (See Comments)    Niacin And Related     Trelegy Ellipta [Fluticasone-Umeclidin-Vilant]     Pregabalin Diarrhea    Xanax [Alprazolam] Anxiety     Anxiety, restlessness, insomnia       Current Meds:   Scheduled Meds:    insulin lispro  0-8 Units SubCUTAneous TID WC    insulin lispro  0-4 Units SubCUTAneous Nightly    furosemide  40 mg Oral Daily    repaglinide  1 mg Oral BID AC    metoprolol tartrate  50 mg Oral BID    senna  2 tablet Oral Nightly    amiodarone  200 mg Oral BID    sodium chloride flush  5-40 mL IntraVENous 2 times per day    heparin (porcine)  5,000 Units SubCUTAneous 3 times per day    midodrine  10 mg Oral TID WC    docusate sodium  100 mg Oral TID    cefTRIAXone (ROCEPHIN) IV  1,000 mg IntraVENous Q24H    pantoprazole  40 mg Oral QAM AC    colchicine  0.3 mg Oral Daily    sodium chloride flush  5-40 mL IntraVENous 2 times per day    mupirocin   Nasal BID    chlorhexidine   Topical See Admin Instructions    sodium chloride flush  5-40 mL IntraVENous 2 times per day    polyethylene glycol  17 g Oral Daily    sodium chloride flush  5-40 mL IntraVENous 2 times per day     Continuous Infusions:    sodium chloride      sodium chloride      sodium chloride      sodium chloride      dextrose       PRN Meds: bisacodyl, sodium chloride flush, sodium chloride, oxyCODONE-acetaminophen **OR** [DISCONTINUED] oxyCODONE-acetaminophen, metoprolol, sodium chloride flush, sodium chloride, sodium chloride flush, sodium chloride, sodium chloride flush, sodium chloride, glucose, dextrose bolus **OR** dextrose bolus, glucagon (rDNA), dextrose    Data:     Past Medical History:   has a past medical history of Acute interstitial nephritis, Aortic regurgitation, ARF (acute renal failure) (HCC), Chronic kidney disease, Hyperlipidemia, Hypertension, Non-Hodgkin lymphoma ([facility] [address] Research study patient, Sarcoidosis, and Type II or unspecified type diabetes mellitus without mention of complication, not stated as uncontrolled.    Social History:   reports that he has never smoked. He has quit using smokeless tobacco. He reports current alcohol use. He reports that he does not use drugs.     Family History:   Family History   Problem Relation Age of Onset    High Blood Pressure Father     Other Father         AAA    Heart Disease Other         uncle x 2        Vitals:  /71   Pulse 73   Temp 98.8 °F (37.1 °C) (Oral)   Resp 20   Ht 6' 1\" (1.854 m)   Wt 231 lb 0.7 oz (104.8 kg)   SpO2 94%   BMI 30.48 kg/m²   Temp (24hrs), Av.1 °F (36.7 °C), Min:97.5 °F (36.4 °C), Max:98.9 °F (37.2 °C)    Recent Labs     23  1209 23  1633 03/03/23  2024 03/04/23  0814   POCGLU 139* 124* 324* 123*       I/O (24Hr):    Intake/Output Summary (Last 24 hours) at 3/4/2023 0830  Last data filed at 3/4/2023 0526  Gross per 24 hour   Intake 925 ml   Output 1950 ml   Net -1025 ml       Labs:  Hematology:  No results for input(s): WBC, RBC, HGB, HCT, MCV, MCH, MCHC, RDW, PLT, MPV, SEDRATE, CRP, INR, DDIMER, [id number], LABABSO in the last 72 hours.    Invalid input(s): PT    Chemistry:  Recent Labs     23  0456 23  0819 23  0251    140 134*   K 5.0 3.9 4.4    102 99   CO2 23 28 26   GLUCOSE 177* 77 136*   BUN 59* 51* 49*   CREATININE 2.92* 2.64* 2.25*   ANIONGAP 14 10 9   LABGLOM 23* 26* 31*   CALCIUM 9.0 9.4 8.9     Recent Labs     23  0802 23  0821 23  1209 23  1633 23  0814   POCGLU 69* 71* 139* 124* 324* 123*     ABG:  Lab Results   Component Value Date/Time    POCPH 7.356 2023 12:43 PM    POCPCO2 38.8 2023 12:43 PM    POCPO2 66.2 2023 12:43 PM    POCHCO3 21.7 2023 12:43 PM    NBEA 4 2023 12:43 PM    PBEA 2 2023 05:34 AM    AEGG5CLM 92 2023 12:43 PM    FIO2 50.0 2023 03:06 AM     Lab Results   Component Value Date/Time    SPECIAL LEFT PLEURAL PEEL  02/23/2023 12:00 PM    SPECIAL  LEFT PLEURAL PEEL 02/23/2023 12:00 PM    SPECIAL LEFT PLEURAL PEEL 02/23/2023 12:00 PM    SPECIAL LEFT PLURAL PEEL 02/23/2023 12:00 PM    SPECIAL LEFT PLEURAL PEEL 02/23/2023 12:00 PM     Lab Results   Component Value Date/Time    CULTURE NO GROWTH 4 DAYS 02/23/2023 12:00 PM    CULTURE NO GROWTH 5 DAYS 02/23/2023 12:00 PM    CULTURE NO GROWTH 3 DAYS 02/23/2023 12:00 PM    CULTURE  02/23/2023 12:00 PM     NEGATIVE. No Herpes Simplex Virus detected by Enzyme Linked Virus Inducible System culture. at day 2    CULTURE  02/23/2023 12:00 PM     NEGATIVE for Influenza A and B, Para influenza 1,2,3, Adenovirus and RSV. at day 2    CULTURE  02/23/2023 12:00 PM     Negative results do not preclude respiratory virus infection and should not be used as the sole basis for diagnosis, treatment or other management decisions. CULTURE NEGATIVE for Cytomegalovirus at day 3 02/23/2023 12:00 PM    CULTURE NEGATIVE for Enterovirus at day 5 02/23/2023 12:00 PM       Radiology:  XR CHEST PORTABLE    Result Date: 2/27/2023  Left chest tubes present with partially loculated left pleural effusion and left basilar opacity unchanged. XR CHEST PORTABLE    Result Date: 2/26/2023  No significant change from prior exam. Left chest tubes with left basilar airspace disease and small amount of pleural fluid. Linear left retrocardiac lucency could represent small pneumothorax. XR CHEST PORTABLE    Result Date: 2/25/2023  No significant change from prior exam.     XR CHEST PORTABLE    Result Date: 2/23/2023  Status post removal single small bore and placement 2 large-bore chest tubes left lung with reduced opacity, presumably multiloculated left pleural effusion and associated atelectasis. No pneumothorax. Slightly greater atelectasis right base.        Physical Examination:        General appearance:  alert, cooperative and no distress  Mental Status:  oriented to person, place but not time,  normal affect  Lungs:  improved BS bilat, no W/R/R, normal effort  Heart:  regular rate and rhythm, no murmur  Abdomen:  soft, nontender, nondistended, normal bowel sounds, no masses, hepatomegaly, splenomegaly  Extremities:  no edema, redness, tenderness in the calves  Skin:  no gross lesions, rashes, induration    Assessment:        Hospital Problems             Last Modified POA    * (Principal) Pneumonia, unspecified organism 2/12/2023 Yes    Pleural effusion 2/13/2023 Yes    Hyperkalemia 2/13/2023 Yes    Acute respiratory failure with hypoxia (HCC) 2/13/2023 Yes    Hyponatremia 2/13/2023 Yes    Hyperglycemia 2/13/2023 Yes    History of non-Hodgkin's lymphoma 2/13/2023 Yes    History of sarcoidosis 2/13/2023 Yes    Empyema ([facility] [address] 2/18/2023 Yes    History of atrial fibrillation 2/25/2023 Yes    History of type 2 diabetes mellitus 2/25/2023 Yes    KELSEA (acute kidney injury) ([facility] [address] 2/26/2023 Yes    Overview Addendum 8/24/2022  8:56 AM by Jaguar Cisse MD     kidney biopsy from June 2022  showing findings of acute interstitial nephritis. Exact description not available. Trial of steroids will be given to see if I can improve renal function. Creatinine in October 2020 was 1.6, creatinine in June 2022 was 2.9. Trial of steroids will be given to see if renal function can be improved. After a 12-day course of prednisone creatinine has come down to 2.0, calcium is improved as well. Interstitial nephritis appears to be related to sarcoidosis.             Plan:        Left empyema- + Strep Gordonii on pleural culture s/p decortication 2/23/23, con't IV Rocephin until 3/12/23, CTS following and ID  A.fib with RVR- s/p cardioversion, con't Amiodarone   Pericarditis- Con't Colchicine, Cardiology stopped IV Solumedrol   KELSEA on CKD 3- slowly improving, Nephrology stopped IVF, decreased Lasix to daily, moon catheter removed, repeat BMP tomorrow, baseline Cr 1.8-2  CLAY- Cpap, check nocturnal pulse oximeter  COPD- stable  Acute delirium- seroquel at night, improving  DM2- BS better, low BS this morning, start Lantus 10 units in the morning, reduce SSI to moderate livel, Prandin with meals  Gi/ DVT proph  PT/OT    Anthony signed, discharge planning for Monday    Ruth Aguila MD  3/4/2023  8:30 AM

## 2023-03-04 NOTE — PROGRESS NOTES
[facility] - Progress Note  Today's Date and Time: 3/4/2023, 9:52 AM    Impression :   Left-sided empyema  Streptococci Gordonii on pleural fluid culture from 2/14/23  Repeat culture 2/18/23 with no bacterial growth   Shortness of breath  KELSEA on CKD  Hyponatremia  Leukocytosis  Hx non-Hodgkin's lymphoma  Hx multiple myeloma  Sarcoidosis  DM II  Interstitial nephritis  COPD  Hx partial right lung lobectomy  Allergies to macrolides and ketolides. Recommendations:   Discontinued IV Cefepime on 2-20-23  IV Rocephin 1 gm q 24 hrs until 3/12/23  Midline for outpatient therapy placed 2/28/23  S/p decortication on 2/23/23  Cardioversion completed 2/27/23 for continued AFIB    Medical Decision Making/Summary/Discussion:3/4/2023     Patient with Hx of non-Hodgkin's lymphoma and multiple myeloma  Admitted with Shortness of breath  Found to have Left-sided empyema  Streptococci Gordonii on pleural fluid culture from 2/14/23  Repeat culture 2/18/23 with no bacterial growth   Empyema partially drained by chest tube. Decortication planned 2/23/23  Confusion with associated hyponatremia  KELSEA on CKD 3  On treatment with ceftriaxone. Infection Control Recommendations   [place] Precautions    Antimicrobial Stewardship Recommendations     Simplification of therapy  Targeted therapy  PK dosing    Coordination of Outpatient Care:   Estimated Length of IV antimicrobials:3/12/23  Patient will need Midline Catheter Insertion: yes  Patient will need PICC line Insertion:No  Patient will need: Home IV , [place],  SNF,  LTAC: yes  Patient will need outpatient wound care:No    Chief complaint/reason for consultation:   Empyema-streptococcus Gordonii      History of Present Illness:   Polo Che is a 79y.o.-year-old male who was initially admitted on 2/12/2023.  Patient seen at the request of Dr. Tapia Overcast:    This patient, with a history of COPD, CKD, sarcoidosis, partial right lung lobectomy, multiple myeloma, non-hodgkin's lymphoma, acute interstitial nephritis and DM II, initially presented to [facility] ED on 2/12/23 with complaints of worsening shortness of breath over the previous week with a non-productive cough. He has has allergies to macrolides and ketolides. He was experiencing increased work of breathing upon evaluation, initially requiring CPAP. ABG values were grossly unremarkable. He denied any recent history of chemotherapy, fever, chills, N/V/D, abnormal bleeding, weight-loss or night sweats. Chest x-ray showed probable left-sided pneumonia with loculated pleural effusion which was confirmed via CT chest, which revealed a moderate to large amount of multiloculated left pleural fluid in the left lung. Mediastinal and bilateral axillary lymphadenopathy was also noted. He was transferred to [facility] for a higher level of care and was initiated on broad spectrum antibiotics with IV cefepime and vancomycin. Pulmonology was consulted and a thoracentesis was attempted, but was unable to be completed as no clear pocket was visualized via [address]. IR was consulted and a 10 [place], left-sided chest tube was placed on 2/14/23.  10 ml of purulent fluid was initially drained and sent for culture. TPA and dornase were administered with improvement in output. MRSA Nares was not detected and the pleural fluid culture grew PCN sensitive Streptococcus Gordonii. Vancomycin was discontinued on 2/14/23 and cefepime continued. A repeat CT of the patient's chest  on 2/17/23 revealed some improvement in the multiloculated left pleural effusions. A larger bore chest tube exchange was completed on 2/18/23 in IR. A repeat pleural fluid culture has not had any bacterial growth to date.      Chest tube output to date:   2/14 - 800 cc  2/15- 600 cc  2/16 - 700 cc  2/17 - 900 cc  2/18- 200 cc  2/19 - 910 cc  2/20 - 275 cc    CT sugery was consulted for possible decortication, but they have no plans for decortication at this time. Abnormal labs at the time of consult include:   Na:133  Cr:2.24  WBC:32.4-->19.0  Hgb:10.0    There is no growth on urine or blood cultures. Imaging/Diagonstics:  CT CHEST WO CONTRAST     Result Date: 2/17/2023  Small to moderate-sized multiloculated left pleural effusions have improved. New small foci of air within the collection is presumably related to the introduction of the left thoracostomy tube. Stable small to moderate sized mediastinal lymph nodes are nonspecific but may relate to the patient's history of lymphoma. Additional small bilateral axial lymph nodes are identified. Small hiatal hernia. XR CHEST PORTABLE     Result Date: 2/18/2023  Moderate left pleural effusion increased. No change left chest tube/pigtail catheter. XR CHEST PORTABLE     Result Date: 2/17/2023  Similar findings when rotation taken into account; left chest tube with unchanged or slightly increased loculated appearing left pleural effusion; atelectatic appearing changes. No pneumothorax. XR CHEST PORTABLE     Result Date: 2/14/2023  Interval placement of a left chest tube with reduced left pleural fluid. IR CHEST TUBE INSERTION     Result Date: 2/14/2023  Successful ultrasound guided placement of a left 10 [place] chest tube      US RETROPERITONEAL COMPLETE     Result Date: 2/19/2023  1. Simple cortical cyst at the upper pole left kidney measuring 6 mm. 2. Limited renal ultrasound. No hydronephrosis. 3. Nonvisualization of ureteral jets. Minimal distention of the urinary bladder. Patient no urge to void during the exam. RECOMMENDATIONS: Unavailable        Infectious disease was consulted for continued empyema.       CURRENT EVALUATION 3/4/2023  /69   Pulse 67   Temp 98.5 °F (36.9 °C) (Oral)   Resp 22   Ht 6' 1\" (1.854 m)   Wt 231 lb 0.7 oz (104.8 kg)   SpO2 95%   BMI 30.48 kg/m²     Afebrile  VS stable    Patient feels better  No complaints  No new issues per RN    Medications reviewed: On IV Ceftriaxone. Stop date 3-12-23    The patient is feeling better and continues to do well on room air. He is s/p decortication with CT surgery on 2/23/23    Chest tube removed 2/28/23  Midline placed 2/28/23    Discussed with patient and wife at length on 3-4-23  CT scans reviewed with wife and daughter on 2-20-23  Indicated that he will need about a month of antibiotics. Wife indicated similar empyema a few years ago on Rt chest. He had S aureus back then and was severely ill. No growth from VATS culture from 2/23/23     WBC is elevated but is on a downward trend  Steroids initiated 2/26/23    Stable KELSEA   He is receiving 40 mg Lasix IV once a day     No acute issues noted    Labs, X rays reviewed: 3/4/2023    BUN: 63-->63--->64-->59-->49  Cr: 4.27--->4.14--->3.79-->2.92-->2.25  Na  137--->134-->140-->134  K 5.0--->4.5-->5.0-->4.4    WBC: 22.9--->24.1--->26.0---->25.0--->16.3  Hb:10.0-->11.2-->11.2-->9.2--->8.3  Plat: 425-->461-->434-->446-->433--->835    CRP: 202.8     Cultures:  Urine:  2/17/23: No growth  2/22/23 : No growth   Blood:  2/17/23: No growth  MRSA Nares:  2/13/23: Not detected  Pleural Fluid:  2/14/23: Streptococci Gordonii  2/18/23: No growth thus far  2-23-23: No growth    Discussed with patient, RN, IM. Chest x-ray: 3/1/23      Chest x-ray: 2/28/23      Chest x-ray: 2/23/23                                           CT: 2/17/23      I have personally reviewed the past medical history, past surgical history, medications, social history, and family history, and I have updated the database accordingly. Past Medical History:     Past Medical History:   Diagnosis Date    Acute interstitial nephritis 08/03/2022    Unclear cause. Creat bumped to 2.9, baseline in oct 2020 1.6.   Exact description of his kidney biopsy is not available to me however pathologist seems to think there is diabetic glomerosclerosis and acute interstitial nephritis seen on the biopsy specimen. No mention about chronic changes. Because of acute interstitial nephritis a trial of steroids for 15 days will be given to see if I can impro    Aortic regurgitation     ARF (acute renal failure) ([facility] [address] 08/03/2022    kidney biopsy from June 2022  showing findings of acute interstitial nephritis. Exact description not available. Trial of steroids will be given to see if I can improve renal function. Creatinine in October 2020 was 1.6, creatinine in June 2022 was 2.9. Trial of steroids will be given to see if renal function can be improved.     Chronic kidney disease     Hyperlipidemia     Hypertension     Non-Hodgkin lymphoma ([facility] [address]     Research study patient 02/13/2023    [id number] Completed 2/15/23    Sarcoidosis     Type II or unspecified type diabetes mellitus without mention of complication, not stated as uncontrolled        Past Surgical  History:     Past Surgical History:   Procedure Laterality Date    CARPAL TUNNEL RELEASE Left 11/15     EYE SURGERY Right     shunt and cataract     EYE SURGERY Right 6/16    laser     IR CHEST TUBE INSERTION  2/13/2023    IR CHEST TUBE INSERTION 2/13/2023 [facility]    IR CHEST TUBE INSERTION  2/18/2023    IR CHEST TUBE INSERTION 2/18/2023 Vira Hubbard MD [facility]    KNEE ARTHROSCOPY      MALIGNANT SKIN LESION EXCISION      ROTATOR CUFF REPAIR Right 7/14    ROTATOR CUFF REPAIR Left 5/15    THORACOSCOPY Left 2/23/2023    VIDEO ASSISTED THORACOSCOPY, DECORTICATION performed by Brayan Cornelius MD at [address] [facility] Right 10/2018       Medications:      insulin lispro  0-8 Units SubCUTAneous TID WC    insulin lispro  0-4 Units SubCUTAneous Nightly    furosemide  40 mg Oral Daily    repaglinide  1 mg Oral BID AC    metoprolol tartrate  50 mg Oral BID    senna  2 tablet Oral Nightly    amiodarone  200 mg Oral BID    sodium chloride flush  5-40 mL IntraVENous 2 times per day    heparin (porcine)  5,000 Units SubCUTAneous 3 times per day    midodrine  10 mg Oral TID WC    docusate sodium  100 mg Oral TID    cefTRIAXone (ROCEPHIN) IV  1,000 mg IntraVENous Q24H    pantoprazole  40 mg Oral QAM AC    colchicine  0.3 mg Oral Daily    sodium chloride flush  5-40 mL IntraVENous 2 times per day    mupirocin   Nasal BID    chlorhexidine   Topical See Admin Instructions    sodium chloride flush  5-40 mL IntraVENous 2 times per day    polyethylene glycol  17 g Oral Daily    sodium chloride flush  5-40 mL IntraVENous 2 times per day       Social History:     Social History     Socioeconomic History    Marital status:      Spouse name: Not on file    Number of children: Not on file    Years of education: Not on file    Highest education level: Not on file   Occupational History    Not on file   Tobacco Use    Smoking status: Never    Smokeless tobacco: Former    Tobacco comments:     minimal and infrequent    Substance and Sexual Activity    Alcohol use: Yes     Alcohol/week: 0.0 standard drinks     Comment: rare    Drug use: No    Sexual activity: Not on file   Other Topics Concern    Not on file   Social History Narrative    Not on file     Social Determinants of Health     Financial Resource Strain: Low Risk     Difficulty of Paying Living Expenses: Not very hard   Food Insecurity: No Food Insecurity    Worried About Running Out of Food in the Last Year: Never true    Ran Out of Food in the Last Year: Never true   Transportation Needs: No Transportation Needs    Lack of Transportation (Medical): No    Lack of Transportation (Non-Medical): No   Physical Activity: Not on file   Stress: Stress Concern Present    Feeling of Stress : To some extent   Social Connections: Socially Integrated    Frequency of Communication with Friends and Family: Three times a week    Frequency of Social Gatherings with Friends and Family:  Three times a week    Attends Orthodox Services: 1 to 4 times per year    Active Member of Clubs or Organizations: No    Attends Club or Organization Meetings: 1 to 4 times per year    Marital Status:    Intimate Partner Violence: Not At Risk    Fear of Current or Ex-Partner: No    Emotionally Abused: No    Physically Abused: No    Sexually Abused: No   Housing Stability: Low Risk     Unable to Pay for Housing in the Last Year: No    Number of [place] in the Last Year: 1    Unstable Housing in the Last Year: No       Family History:     Family History   Problem Relation Age of Onset    High Blood Pressure Father     Other Father         AAA    Heart Disease Other         uncle x 2         Allergies:   Latex, Gabapentin, Meperidine, Erythromycin, Glimepiride, Lidocaine, Hydrocodone, Macrolides and ketolides, Niacin and related, Trelegy ellipta [fluticasone-umeclidin-vilant], Pregabalin, and Xanax [alprazolam]     Review of Systems:   Constitutional: No fevers or chills. No systemic complaints  Head: No headaches  Eyes: No double vision or blurry vision. No conjunctival inflammation. ENT: No sore throat or runny nose. . No hearing loss, tinnitus or vertigo. Cardiovascular: No chest pain or palpitations. shortness of breath. WANG  Lung: Left sided chest tube with some shortness of breath with dry cough. No sputum production  Abdomen: No nausea, vomiting, diarrhea, or abdominal pain. Margurette Laughlin No cramps. Genitourinary: No increased urinary frequency, or dysuria. No hematuria. No suprapubic or CVA pain  Musculoskeletal: No muscle aches or pains. No joint effusions, swelling or deformities  Hematologic: No bleeding or bruising. Neurologic: No headache, weakness, numbness, or tingling. Integument: No rash, no ulcers. Psychiatric: No depression. Endocrine: No polyuria, no polydipsia, no polyphagia.     Physical Examination :   Patient Vitals for the past 8 hrs:   BP Temp Temp src Pulse Resp SpO2   03/04/23 0800 100/69 98.5 °F (36.9 °C) Oral 67 22 95 % 03/04/23 0335 105/71 98.8 °F (37.1 °C) Oral 73 20 94 %       General Appearance: Awake, alert, and in no apparent distress  Head:  Normocephalic, no trauma  Eyes: Pupils equal, round, reactive to light and accommodation; extraocular movements intact; sclera anicteric; conjunctivae pink. No embolic phenomena. ENT: Oropharynx clear, without erythema, exudate, or thrush. No tenderness of sinuses. Mouth/throat: mucosa pink and moist. No lesions. Dentition in good repair. Neck:Supple, without lymphadenopathy. Thyroid normal, No bruits. Pulmonary/Chest: Left sided chest tube in place to suction with serosanguinous drainage. Diminished to auscultation. No dullness to percussion. Cardiovascular: Regular rate and rhythm without murmurs, rubs, or gallops. Abdomen: Soft, non tender. Bowel sounds normal. No organomegaly  All four Extremities: No cyanosis, clubbing, edema, or effusions. Neurologic: No gross sensory or motor deficits. Skin: Warm and dry with good turgor. No signs of peripheral arterial or venous insufficiency. No ulcerations. Left sided-chest tube site CDI    Medical Decision Making -Laboratory:   I have independently reviewed/ordered the following labs:    CBC with Differential:   No results for input(s): WBC, HGB, HCT, PLT, SEGSPCT, BANDSPCT, LYMPHOPCT, MONOPCT, EOSPCT in the last 72 hours. BMP:   Recent Labs     03/03/23  0819 03/04/23  0251    134*   K 3.9 4.4    99   CO2 28 26   BUN 51* 49*   CREATININE 2.64* 2.25*       Hepatic Function Panel: No results for input(s): PROT, LABALBU, BILIDIR, IBILI, BILITOT, ALKPHOS, ALT, AST in the last 72 hours. No results for input(s): RPR in the last 72 hours. No results for input(s): HIV in the last 72 hours. No results for input(s): BC in the last 72 hours.   Lab Results   Component Value Date/Time    MUCUS 1+ 02/13/2023 02:17 AM    RBC 2.96 02/28/2023 04:13 AM    RBC 5.15 04/03/2012 11:08 AM    WBC 16.3 02/28/2023 04:13 AM    TURBIDITY Cloudy 02/22/2023 01:17 PM     Lab Results   Component Value Date/Time    CREATININE 2.25 03/04/2023 02:51 AM    GLUCOSE 136 03/04/2023 02:51 AM    GLUCOSE 140 04/03/2012 11:08 AM       Medical Decision Making-Imaging:     Narrative   EXAMINATION:   CT OF THE CHEST WITHOUT CONTRAST, 2/17/2023 9:29 am       TECHNIQUE:   CT of the chest was performed without the administration of intravenous   contrast. Multiplanar reformatted images are provided for review. Automated   exposure control, iterative reconstruction, and/or weight based adjustment of   the mA/kV was utilized to reduce the radiation dose to as low as reasonably   achievable. COMPARISON:   02/13/2023       HISTORY:   ORDERING SYSTEM PROVIDED HISTORY:  Improvement? TECHNOLOGIST PROVIDED HISTORY:   Improvement? FINDINGS:   Mediastinum: Small bilateral axial lymph nodes are identified not   significantly changed. Small to moderate size mediastinal lymph nodes are   additionally present. Lungs/Pleura: There are multiple loculated effusions within the left chest   which have improved since previous exam.  Small amounts of new air are likely   secondary to the presence of the left thoracostomy tube. Left lower lobe atelectasis is identified. Upper Abdomen: There is a probable small hiatal hernia. Soft Tissues/Bones:  No acute osseous abnormality is appreciated. Old right   rib fractures are noted. Impression   Small to moderate-sized multiloculated left pleural effusions have improved. New small foci of air within the collection is presumably related to the   introduction of the left thoracostomy tube. Stable small to moderate sized mediastinal lymph nodes are nonspecific but   may relate to the patient's history of lymphoma. Additional small bilateral   axial lymph nodes are identified. Small hiatal hernia.              Narrative   EXAMINATION:   ONE XRAY VIEW OF THE CHEST       2/18/2023 3:04 pm COMPARISON:   CT chest February 17, 2023       HISTORY:   ORDERING SYSTEM PROVIDED HISTORY: worsening   TECHNOLOGIST PROVIDED HISTORY:   worsening       FINDINGS:   Pigtail catheter on the left. Moderate left effusion appears   denser/increased. Mild bilateral interstitial infiltrates or edema. Heart   and mediastinum unremarkable. Bony thorax intact. No pneumothorax noted. Subsegmental atelectasis right lower lung. Impression   Moderate left pleural effusion increased. No change left chest tube/pigtail   catheter. Narrative   EXAMINATION:   ONE XRAY VIEW OF THE CHEST       2/28/2023 6:49 am       COMPARISON:   02/27/2023       HISTORY:   ORDERING SYSTEM PROVIDED HISTORY: s/p VATs with decortication   TECHNOLOGIST PROVIDED HISTORY: S/p VATs with decortication       FINDINGS:   Cardiomegaly is unchanged. Left-sided chest tubes remain in place. Small   left pleural effusion and patchy left basilar opacities are not significantly   changed. No evidence of pneumothorax. Impression   No significant interval change. Narrative   EXAMINATION:   ONE XRAY VIEW OF THE CHEST       2/28/2023 9:16 am       COMPARISON:   02/28/2023 at 06:20       HISTORY:   ORDERING SYSTEM PROVIDED HISTORY: post chest tube removal   TECHNOLOGIST PROVIDED HISTORY:   post chest tube removal   Reason for Exam: uprt port chest       FINDINGS:   Left-sided chest tube has been removed. Small left pleural effusion   persists, unchanged. Left basilar airspace disease is similar as well. No   pneumothorax identified. Cardial pericardial silhouette is enlarged but similar when compared to the   previous exam.           Impression   Interval removal of left-sided chest tube. No pneumothorax. Continued small left pleural effusion, unchanged. Left basilar airspace   disease is unchanged.      Narrative   EXAMINATION:   ONE XRAY VIEW OF THE CHEST       3/1/2023 5:39 am       COMPARISON: 02/28/2023       HISTORY:   ORDERING SYSTEM PROVIDED HISTORY: s/p VATs with decortication   TECHNOLOGIST PROVIDED HISTORY: S/p VATs with decortication       FINDINGS:   Mild cardiomegaly is unchanged. Small left pleural effusion and patchy   opacities at the left base are unchanged. No evidence of pneumothorax. No   free air beneath the diaphragm. Impression   No significant oval change. Small left pleural effusion and patchy opacities   at the left base. Medical Decision [id number]:       Medical Decision Making-Other:     Note:  Labs, medications, radiologic studies were reviewed with personal review of films  Large amounts of data were reviewed  Discussed with nursing Staff, Discharge planner  Infection Control and Prevention measures reviewed  All prior entries were reviewed  Administer medications as ordered  Prognosis: [address]  Discharge planning reviewed  Follow up as outpatient. Thank you for allowing us to participate in the care of this patient. Please call with questions.     Marleny Wilcox MD.      Pager: [phone] - Office: [phone]

## 2023-03-04 NOTE — PLAN OF CARE
Problem: Discharge Planning  Goal: Discharge to home or other facility with appropriate resources  3/4/2023 0529 by Ralph López RN  Outcome: Progressing  3/3/2023 2325 by Willam Cordero RN  Outcome: Progressing     Problem: Safety - Adult  Goal: Free from fall injury  3/4/2023 0529 by Ralph López RN  Outcome: Progressing  3/3/2023 2325 by Willam Cordero RN  Outcome: Progressing     Problem: ABCDS Injury Assessment  Goal: Absence of physical injury  3/4/2023 0529 by Ralph López RN  Outcome: Progressing  3/3/2023 2325 by Willam Cordero RN  Outcome: Progressing     Problem: Skin/Tissue Integrity  Goal: Absence of new skin breakdown  Description: 1. Monitor for areas of redness and/or skin breakdown  2. Assess vascular access sites hourly  3. Every 4-6 hours minimum:  Change oxygen saturation probe site  4. Every 4-6 hours:  If on nasal continuous positive airway pressure, respiratory therapy assess nares and determine need for appliance change or resting period.   3/4/2023 0529 by Ralph López RN  Outcome: Progressing  3/3/2023 2325 by Willam Cordero RN  Outcome: Progressing     Problem: Chronic Conditions and Co-morbidities  Goal: Patient's chronic conditions and co-morbidity symptoms are monitored and maintained or improved  3/4/2023 0529 by Ralph López RN  Outcome: Progressing  3/3/2023 2325 by Willam Cordero RN  Outcome: Progressing     Problem: Respiratory - Adult  Goal: Achieves optimal ventilation and oxygenation  3/4/2023 0529 by Ralph López RN  Outcome: Progressing  3/3/2023 2325 by Willam Cordero RN  Outcome: Progressing     Problem: Pain  Goal: Verbalizes/displays adequate comfort level or baseline comfort level  3/4/2023 0529 by Ralph López RN  Outcome: Progressing  3/3/2023 2325 by Willam Cordero RN  Outcome: Progressing  Flowsheets (Taken 3/3/2023 1200 by Sravani Soliman RN)  Verbalizes/displays adequate comfort level or baseline comfort level:   Encourage patient to monitor pain and request assistance   Assess pain using appropriate pain scale     Problem: Neurosensory - Adult  Goal: Achieves stable or improved neurological status  3/4/2023 0529 by Josiah Gifford RN  Outcome: Progressing  3/3/2023 2325 by Ciro Hanna RN  Outcome: Progressing  Goal: Absence of seizures  3/4/2023 0529 by Josiah Gifford RN  Outcome: Progressing  3/3/2023 2325 by Ciro Hanna RN  Outcome: Progressing  Goal: Remains free of injury related to seizures activity  3/4/2023 0529 by Josiah Gifford RN  Outcome: Progressing  3/3/2023 2325 by Ciro Hanna RN  Outcome: Progressing  Goal: Achieves maximal functionality and self care  3/4/2023 0529 by Josiah Gifford RN  Outcome: Progressing  3/3/2023 2325 by Ciro Hanna RN  Outcome: Progressing     Problem: Cardiovascular - Adult  Goal: Maintains optimal cardiac output and hemodynamic stability  3/4/2023 0529 by Josiah Gifford RN  Outcome: Progressing  3/3/2023 2325 by Ciro Hanna RN  Outcome: Progressing  Goal: Absence of cardiac dysrhythmias or at baseline  3/4/2023 0529 by Josiah Gifford RN  Outcome: Progressing  3/3/2023 2325 by Ciro Hanna RN  Outcome: Progressing     Problem: Skin/Tissue Integrity - Adult  Goal: Skin integrity remains intact  3/4/2023 0529 by Josiah Gifford RN  Outcome: Progressing  3/3/2023 2325 by Ciro Hanan RN  Outcome: Progressing  Goal: Incisions, wounds, or drain sites healing without S/S of infection  3/4/2023 0529 by Josiah Gifford RN  Outcome: Progressing  3/3/2023 2325 by Ciro Hanna RN  Outcome: Progressing  Goal: Oral mucous membranes remain intact  3/4/2023 0529 by Josiah Gifford RN  Outcome: Progressing  3/3/2023 2325 by Ciro Hanna RN  Outcome: Progressing     Problem: Musculoskeletal - Adult  Goal: Return mobility to safest level of function  3/4/2023 0529 by Maurice Mays TORRIE De La Garza  Outcome: Progressing  3/3/2023 2325 by Mendel Gola, RN  Outcome: Progressing  Goal: Maintain proper alignment of affected body part  3/4/2023 0529 by Vandana Baumann RN  Outcome: Progressing  3/3/2023 2325 by Mendel Gola, RN  Outcome: Progressing  Goal: Return ADL status to a safe level of function  3/4/2023 0529 by Vandana Baumann RN  Outcome: Progressing  3/3/2023 2325 by Mendel Gola, RN  Outcome: Progressing     Problem: Gastrointestinal - Adult  Goal: Minimal or absence of nausea and vomiting  3/4/2023 0529 by Vandana Baumann RN  Outcome: Progressing  3/3/2023 2325 by Mendel Gola, RN  Outcome: Progressing  Goal: Maintains or returns to baseline bowel function  3/4/2023 0529 by Vandana Baumann RN  Outcome: Progressing  3/3/2023 2325 by Mendel Gola, RN  Outcome: Progressing  Goal: Maintains adequate nutritional intake  3/4/2023 0529 by Vandana Baumann RN  Outcome: Progressing  3/3/2023 2325 by Mendel Gola, RN  Outcome: Progressing  Goal: Establish and maintain optimal ostomy function  3/4/2023 0529 by Vandana Baumann RN  Outcome: Progressing  3/3/2023 2325 by Mendel Gola, RN  Outcome: Progressing     Problem: Genitourinary - Adult  Goal: Absence of urinary retention  3/4/2023 0529 by Vandana Baumann RN  Outcome: Progressing  3/3/2023 2325 by Mendel Gola, RN  Outcome: Progressing  Goal: Urinary catheter remains patent  3/4/2023 0529 by Vandana Baumann RN  Outcome: Progressing  3/3/2023 2325 by Mendel Gola, RN  Outcome: Progressing     Problem: Infection - Adult  Goal: Absence of infection at discharge  3/4/2023 0529 by Vandana Baumann RN  Outcome: Progressing  3/3/2023 2325 by Mendel Gola, RN  Outcome: Progressing  Goal: Absence of infection during hospitalization  3/4/2023 0529 by Vandana Baumann RN  Outcome: Progressing  3/3/2023 2325 by Mendel Gola, RN  Outcome: Progressing  Goal: Absence of fever/infection during anticipated neutropenic period  3/4/2023 0529 by Mattie Grace RN  Outcome: Progressing  3/3/2023 2325 by Efrain Chaudhari RN  Outcome: Progressing     Problem: Metabolic/Fluid and Electrolytes - Adult  Goal: Electrolytes maintained within normal limits  3/4/2023 0529 by Mattie Grace RN  Outcome: Progressing  3/3/2023 2325 by Efrain Chaudhari RN  Outcome: Progressing  Goal: Hemodynamic stability and optimal renal function maintained  3/4/2023 0529 by Mattie Grace RN  Outcome: Progressing  3/3/2023 2325 by Efrain Chaudhari RN  Outcome: Progressing  Goal: Glucose maintained within prescribed range  3/4/2023 0529 by Mattie Grace RN  Outcome: Progressing  3/3/2023 2325 by Efrain Chaudhari RN  Outcome: Progressing     Problem: Hematologic - Adult  Goal: Maintains hematologic stability  3/4/2023 0529 by Mattie Grace RN  Outcome: Progressing  3/3/2023 2325 by Efrain Chaudhari RN  Outcome: Progressing     Problem: Nutrition Deficit:  Goal: Optimize nutritional status  3/4/2023 0529 by Mattie Grace RN  Outcome: Progressing  3/3/2023 2325 by Efrain Chaudhari RN  Outcome: Progressing     Problem: Coping  Goal: Pt/Family able to verbalize concerns and demonstrate effective coping strategies  Description: INTERVENTIONS:  1. Assist patient/family to identify coping skills, available support systems and cultural and spiritual values  2. Provide emotional support, including active listening and acknowledgement of concerns of patient and caregivers  3. Reduce environmental stimuli, as able  4. Instruct patient/family in relaxation techniques, as appropriate  5.  Assess for spiritual pain/suffering and initiate Spiritual Care, Psychosocial Clinical Specialist consults as needed  3/4/2023 0529 by Mattie Grace RN  Outcome: Progressing  3/3/2023 2325 by Efrain Chaudhari RN  Outcome: Progressing     Problem: Confusion  Goal: Confusion, delirium, dementia, or psychosis is improved or at baseline  Description: INTERVENTIONS:  1. Assess for possible contributors to thought disturbance, including medications, impaired vision or hearing, underlying metabolic abnormalities, dehydration, psychiatric diagnoses, and notify attending LIP  2. [place] high risk fall precautions, as indicated  3. Provide frequent short contacts to provide reality reorientation, refocusing and direction  4. Decrease environmental stimuli, including noise as appropriate  5. Monitor and intervene to maintain adequate nutrition, hydration, elimination, sleep and activity  6. If unable to ensure safety without constant attention obtain sitter and review sitter guidelines with assigned personnel  7.  Initiate Psychosocial CNS and Spiritual Care consult, as indicated  3/4/2023 0516 by Veronica Fernández RN  Outcome: Progressing  3/3/2023 0034 by Dora Headley RN  Outcome: Progressing

## 2023-03-04 NOTE — PLAN OF CARE
Problem: Discharge Planning  Goal: Discharge to home or other facility with appropriate resources  3/3/2023 2325 by Damaris Reynolds RN  Outcome: Progressing  3/3/2023 1056 by John Paul Everett RN  Outcome: Progressing  Flowsheets (Taken 3/3/2023 0800)  Discharge to home or other facility with appropriate resources: Identify barriers to discharge with patient and caregiver     Problem: Safety - Adult  Goal: Free from fall injury  3/3/2023 2325 by Damaris Reynolds RN  Outcome: Progressing  3/3/2023 1056 by John Paul Everett RN  Outcome: Progressing     Problem: ABCDS Injury Assessment  Goal: Absence of physical injury  3/3/2023 2325 by Damaris Reynolds RN  Outcome: Progressing  3/3/2023 1056 by John Paul Everett RN  Outcome: Progressing     Problem: Skin/Tissue Integrity  Goal: Absence of new skin breakdown  Description: 1. Monitor for areas of redness and/or skin breakdown  2. Assess vascular access sites hourly  3. Every 4-6 hours minimum:  Change oxygen saturation probe site  4. Every 4-6 hours:  If on nasal continuous positive airway pressure, respiratory therapy assess nares and determine need for appliance change or resting period.   3/3/2023 2325 by Damaris Reynolds RN  Outcome: Progressing  3/3/2023 1056 by John Paul Everett RN  Outcome: Progressing     Problem: Chronic Conditions and Co-morbidities  Goal: Patient's chronic conditions and co-morbidity symptoms are monitored and maintained or improved  3/3/2023 2325 by Damaris Reynolds RN  Outcome: Progressing  3/3/2023 1056 by John Paul Everett RN  Outcome: Progressing  Flowsheets (Taken 3/3/2023 0800)  Care Plan - Patient's Chronic Conditions and Co-Morbidity Symptoms are Monitored and Maintained or Improved: Monitor and assess patient's chronic conditions and comorbid symptoms for stability, deterioration, or improvement     Problem: Respiratory - Adult  Goal: Achieves optimal ventilation and oxygenation  3/3/2023 2325 by Damaris Reynolds RN  Outcome: Progressing  3/3/2023 1056 by Dariel Mccormack RN  Outcome: Progressing  Flowsheets (Taken 3/3/2023 0800)  Achieves optimal ventilation and oxygenation:   Assess for changes in respiratory status   Assess for changes in mentation and behavior     Problem: Pain  Goal: Verbalizes/displays adequate comfort level or baseline comfort level  3/3/2023 2325 by Efrain Chaudhari RN  Outcome: Progressing  Flowsheets (Taken 3/3/2023 1200 by Dariel Mccormack RN)  Verbalizes/displays adequate comfort level or baseline comfort level:   Encourage patient to monitor pain and request assistance   Assess pain using appropriate pain scale  3/3/2023 1056 by Dariel Mccormack RN  Outcome: Progressing  Flowsheets (Taken 3/3/2023 0800)  Verbalizes/displays adequate comfort level or baseline comfort level: Encourage patient to monitor pain and request assistance     Problem: Neurosensory - Adult  Goal: Achieves stable or improved neurological status  3/3/2023 2325 by Efrain Chaudhari RN  Outcome: Progressing  3/3/2023 1056 by Dariel Mccormack RN  Outcome: Progressing  Flowsheets (Taken 3/3/2023 0800)  Achieves stable or improved neurological status:   Assess for and report changes in neurological status   Initiate measures to prevent increased intracranial pressure  Goal: Absence of seizures  3/3/2023 2325 by Efrain Chaudhari RN  Outcome: Progressing  3/3/2023 1056 by Dariel Mccormack RN  Outcome: Progressing  Flowsheets (Taken 3/3/2023 0800)  Absence of seizures: Monitor for seizure activity.   If seizure occurs, document type and location of movements and any associated apnea  Goal: Remains free of injury related to seizures activity  3/3/2023 2325 by Efrain Chaudhari RN  Outcome: Progressing  3/3/2023 1056 by Dariel Mccormack RN  Outcome: Progressing  Flowsheets (Taken 3/3/2023 0800)  Remains free of injury related to seizure activity:   Maintain airway, patient safety  and administer oxygen as ordered   Monitor patient for seizure activity, document and report duration and description of seizure to Licensed Independent Practitioner  Goal: Achieves maximal functionality and self care  3/3/2023 2325 by Elvira Salazar RN  Outcome: Progressing  3/3/2023 1056 by Michael Bassett RN  Outcome: Progressing  Flowsheets (Taken 3/3/2023 0800)  Achieves maximal functionality and self care: Monitor swallowing and airway patency with patient fatigue and changes in neurological status     Problem: Cardiovascular - Adult  Goal: Maintains optimal cardiac output and hemodynamic stability  3/3/2023 2325 by Elvira Salazar RN  Outcome: Progressing  3/3/2023 1056 by Michael Bassett RN  Outcome: Progressing  Flowsheets (Taken 3/3/2023 0800)  Maintains optimal cardiac output and hemodynamic stability:   Monitor blood pressure and heart rate   Monitor urine output and notify Licensed Independent Practitioner for values outside of normal range  Goal: Absence of cardiac dysrhythmias or at baseline  3/3/2023 2325 by Elvira Salazar RN  Outcome: Progressing  3/3/2023 1056 by Michael Bassett RN  Outcome: Progressing  Flowsheets (Taken 3/3/2023 0800)  Absence of cardiac dysrhythmias or at baseline: Monitor cardiac rate and rhythm     Problem: Skin/Tissue Integrity - Adult  Goal: Skin integrity remains intact  3/3/2023 2325 by Elvira Salazar RN  Outcome: Progressing  3/3/2023 1056 by Michael Bassett RN  Outcome: Progressing  Flowsheets (Taken 3/3/2023 0800)  Skin Integrity Remains Intact: Monitor for areas of redness and/or skin breakdown  Goal: Incisions, wounds, or drain sites healing without S/S of infection  3/3/2023 2325 by Elvira Salazar RN  Outcome: Progressing  3/3/2023 1056 by Michael Bassett RN  Outcome: Progressing  Flowsheets (Taken 3/3/2023 0800)  Incisions, Wounds, or Drain Sites Healing Without Sign and Symptoms of Infection: ADMISSION and DAILY: Assess and document risk factors for pressure ulcer development  Goal: Oral mucous membranes remain intact  3/3/2023 2325 by Stefani Casiano RN  Outcome: Progressing  3/3/2023 1056 by Brandt Koenig RN  Outcome: Progressing  Flowsheets (Taken 3/3/2023 0800)  Oral Mucous Membranes Remain Intact: Assess oral mucosa and hygiene practices     Problem: Musculoskeletal - Adult  Goal: Return mobility to safest level of function  3/3/2023 2325 by Stefani Casiano RN  Outcome: Progressing  3/3/2023 1056 by Brandt Koenig RN  Outcome: Progressing  Flowsheets (Taken 3/3/2023 0800)  Return Mobility to Safest Level of Function: Assess patient stability and activity tolerance for standing, transferring and ambulating with or without assistive devices  Goal: Maintain proper alignment of affected body part  3/3/2023 2325 by Stefani Casiano RN  Outcome: Progressing  3/3/2023 1056 by Brandt Koenig RN  Outcome: Progressing  Flowsheets (Taken 3/3/2023 0800)  Maintain proper alignment of affected body part: Support and protect limb and body alignment per provider's orders  Goal: Return ADL status to a safe level of function  3/3/2023 2325 by Stefani Casiano RN  Outcome: Progressing  3/3/2023 1056 by Brandt Koenig RN  Outcome: Progressing  Flowsheets (Taken 3/3/2023 0800)  Return ADL Status to a Safe Level of Function: Administer medication as ordered     Problem: Gastrointestinal - Adult  Goal: Minimal or absence of nausea and vomiting  3/3/2023 2325 by Stefani Casiano RN  Outcome: Progressing  3/3/2023 1056 by Brandt Koenig RN  Outcome: Progressing  Flowsheets (Taken 3/3/2023 0800)  Minimal or absence of nausea and vomiting: Administer IV fluids as ordered to ensure adequate hydration  Goal: Maintains or returns to baseline bowel function  3/3/2023 2325 by Stefani Casiano RN  Outcome: Progressing  3/3/2023 1056 by Brandt Koenig RN  Outcome: Progressing  Flowsheets (Taken 3/3/2023 0800)  Maintains or returns to baseline bowel function: Assess bowel function  Goal: Maintains adequate nutritional intake  3/3/2023 2325 by Stefani Casiano RN  Outcome: Progressing  3/3/2023 1056 by Pieter Parisi RN  Outcome: Progressing  Flowsheets (Taken 3/3/2023 0800)  Maintains adequate nutritional intake: Monitor percentage of each meal consumed  Goal: Establish and maintain optimal ostomy function  3/3/2023 2325 by Mathew Benitez RN  Outcome: Progressing  3/3/2023 1056 by Pieter Parisi RN  Outcome: Progressing     Problem: Genitourinary - Adult  Goal: Absence of urinary retention  3/3/2023 2325 by Mathew Benitez RN  Outcome: Progressing  3/3/2023 1056 by Pieter Parisi RN  Outcome: Progressing  Flowsheets (Taken 3/3/2023 0800)  Absence of urinary retention: Assess patients ability to void and empty bladder  Goal: Urinary catheter remains patent  3/3/2023 2325 by Mathew Benitez RN  Outcome: Progressing  3/3/2023 1056 by Pieter Praisi RN  Outcome: Progressing  Flowsheets (Taken 3/3/2023 0800)  Urinary catheter remains patent: Assess patency of urinary catheter     Problem: Infection - Adult  Goal: Absence of infection at discharge  3/3/2023 2325 by Mathew Benitez RN  Outcome: Progressing  3/3/2023 1056 by Pieter Parisi RN  Outcome: Progressing  Flowsheets (Taken 3/3/2023 0800)  Absence of infection at discharge: Assess and monitor for signs and symptoms of infection  Goal: Absence of infection during hospitalization  3/3/2023 2325 by Mathew Benitez RN  Outcome: Progressing  3/3/2023 1056 by Pieter Parisi RN  Outcome: Progressing  Flowsheets (Taken 3/3/2023 0800)  Absence of infection during hospitalization: Assess and monitor for signs and symptoms of infection  Goal: Absence of fever/infection during anticipated neutropenic period  3/3/2023 2325 by Mathew Benitez RN  Outcome: Progressing  3/3/2023 1056 by Pieter Parisi RN  Outcome: Progressing  Flowsheets (Taken 3/3/2023 0800)  Absence of fever/infection during anticipated neutropenic period: Monitor white blood cell count     Problem: Metabolic/Fluid and Electrolytes - Adult  Goal: Electrolytes maintained within normal limits  3/3/2023 2325 by Elvira Salazar RN  Outcome: Progressing  3/3/2023 1056 by Michael Bassett RN  Outcome: Progressing  Flowsheets (Taken 3/3/2023 0800)  Electrolytes maintained within normal limits: Monitor labs and assess patient for signs and symptoms of electrolyte imbalances  Goal: Hemodynamic stability and optimal renal function maintained  3/3/2023 2325 by Elvira Salazar RN  Outcome: Progressing  3/3/2023 1056 by Michael Bassett RN  Outcome: Progressing  Flowsheets (Taken 3/3/2023 0800)  Hemodynamic stability and optimal renal function maintained: Monitor labs and assess for signs and symptoms of volume excess or deficit  Goal: Glucose maintained within prescribed range  3/3/2023 2325 by Elvira Salazar RN  Outcome: Progressing  3/3/2023 1056 by Michael Bassett RN  Outcome: Progressing  Flowsheets (Taken 3/3/2023 0800)  Glucose maintained within prescribed range: Monitor blood glucose as ordered     Problem: Hematologic - Adult  Goal: Maintains hematologic stability  3/3/2023 2325 by Elvira Salazar RN  Outcome: Progressing  3/3/2023 1056 by Michael Bassett RN  Outcome: Progressing  Flowsheets (Taken 3/3/2023 0800)  Maintains hematologic stability: Assess for signs and symptoms of bleeding or hemorrhage     Problem: Nutrition Deficit:  Goal: Optimize nutritional status  3/3/2023 2325 by Elvira Salazar RN  Outcome: Progressing  3/3/2023 1056 by Michael Bassett RN  Outcome: Progressing     Problem: Coping  Goal: Pt/Family able to verbalize concerns and demonstrate effective coping strategies  Description: INTERVENTIONS:  1. Assist patient/family to identify coping skills, available support systems and cultural and spiritual values  2. Provide emotional support, including active listening and acknowledgement of concerns of patient and caregivers  3. Reduce environmental stimuli, as able  4. Instruct patient/family in relaxation techniques, as appropriate  5. Assess for spiritual pain/suffering and initiate Spiritual Care, Psychosocial Clinical Specialist consults as needed  3/3/2023 2325 by Jay Wang RN  Outcome: Progressing  3/3/2023 1056 by Moiz Ga, RN  Outcome: Progressing  Flowsheets (Taken 3/3/2023 0800)  Patient/family able to verbalize anxieties, fears, and concerns, and demonstrate effective coping:   Assist patient/family to identify coping skills, available support systems and cultural and spiritual values   Provide emotional support, including active listening and acknowledgement of concerns of patient and caregivers     Problem: Confusion  Goal: Confusion, delirium, dementia, or psychosis is improved or at baseline  Description: INTERVENTIONS:  1. Assess for possible contributors to thought disturbance, including medications, impaired vision or hearing, underlying metabolic abnormalities, dehydration, psychiatric diagnoses, and notify attending LIP  2. [place] high risk fall precautions, as indicated  3. Provide frequent short contacts to provide reality reorientation, refocusing and direction  4. Decrease environmental stimuli, including noise as appropriate  5. Monitor and intervene to maintain adequate nutrition, hydration, elimination, sleep and activity  6. If unable to ensure safety without constant attention obtain sitter and review sitter guidelines with assigned personnel  7.  Initiate Psychosocial CNS and Spiritual Care consult, as indicated  3/3/2023 2325 by Jay Wang RN  Outcome: Progressing  3/3/2023 1056 by Moiz Ga, RN  Outcome: Progressing  Flowsheets (Taken 3/3/2023 0800)  Effect of thought disturbance (confusion, delirium, dementia, or psychosis) are managed with adequate functional status: Assess for contributors to thought disturbance, including medications, impaired vision or hearing, underlying metabolic abnormalities, dehydration, psychiatric diagnoses, notify LIP

## 2023-03-04 NOTE — PLAN OF CARE
Patient planned for discharge today  If the patient develops new inpatient pulmonary needs we can assist with, please don't hesitate to re-consult us.

## 2023-03-05 LAB
GLUCOSE BLD-MCNC: 128 MG/DL (ref 75–110)
GLUCOSE BLD-MCNC: 187 MG/DL (ref 75–110)
GLUCOSE BLD-MCNC: 233 MG/DL (ref 75–110)
GLUCOSE BLD-MCNC: 246 MG/DL (ref 75–110)
GLUCOSE BLD-MCNC: 252 MG/DL (ref 75–110)

## 2023-03-05 PROCEDURE — 82947 ASSAY GLUCOSE BLOOD QUANT: CPT

## 2023-03-05 PROCEDURE — 6360000002 HC RX W HCPCS: Performed by: FAMILY MEDICINE

## 2023-03-05 PROCEDURE — 2060000000 HC ICU INTERMEDIATE R&B

## 2023-03-05 PROCEDURE — 99231 SBSQ HOSP IP/OBS SF/LOW 25: CPT | Performed by: INTERNAL MEDICINE

## 2023-03-05 PROCEDURE — 6370000000 HC RX 637 (ALT 250 FOR IP): Performed by: STUDENT IN AN ORGANIZED HEALTH CARE EDUCATION/TRAINING PROGRAM

## 2023-03-05 PROCEDURE — 2580000003 HC RX 258: Performed by: STUDENT IN AN ORGANIZED HEALTH CARE EDUCATION/TRAINING PROGRAM

## 2023-03-05 PROCEDURE — 6370000000 HC RX 637 (ALT 250 FOR IP): Performed by: FAMILY MEDICINE

## 2023-03-05 PROCEDURE — 6370000000 HC RX 637 (ALT 250 FOR IP): Performed by: PHYSICIAN ASSISTANT

## 2023-03-05 PROCEDURE — 2580000003 HC RX 258: Performed by: PHYSICIAN ASSISTANT

## 2023-03-05 PROCEDURE — 6370000000 HC RX 637 (ALT 250 FOR IP): Performed by: NURSE PRACTITIONER

## 2023-03-05 PROCEDURE — 6370000000 HC RX 637 (ALT 250 FOR IP): Performed by: INTERNAL MEDICINE

## 2023-03-05 PROCEDURE — 99232 SBSQ HOSP IP/OBS MODERATE 35: CPT | Performed by: INTERNAL MEDICINE

## 2023-03-05 PROCEDURE — 2580000003 HC RX 258: Performed by: NURSE PRACTITIONER

## 2023-03-05 PROCEDURE — 2580000003 HC RX 258: Performed by: INTERNAL MEDICINE

## 2023-03-05 PROCEDURE — 6360000002 HC RX W HCPCS: Performed by: INTERNAL MEDICINE

## 2023-03-05 PROCEDURE — 2580000003 HC RX 258: Performed by: ANESTHESIOLOGY

## 2023-03-05 RX ADMIN — HEPARIN SODIUM 5000 UNITS: 5000 INJECTION INTRAVENOUS; SUBCUTANEOUS at 13:55

## 2023-03-05 RX ADMIN — SODIUM CHLORIDE, PRESERVATIVE FREE 10 ML: 5 INJECTION INTRAVENOUS at 20:37

## 2023-03-05 RX ADMIN — DOCUSATE SODIUM 100 MG: 100 CAPSULE, LIQUID FILLED ORAL at 11:01

## 2023-03-05 RX ADMIN — REPAGLINIDE 1 MG: 0.5 TABLET ORAL at 17:58

## 2023-03-05 RX ADMIN — MIDODRINE HYDROCHLORIDE 10 MG: 5 TABLET ORAL at 11:01

## 2023-03-05 RX ADMIN — MIDODRINE HYDROCHLORIDE 10 MG: 5 TABLET ORAL at 17:58

## 2023-03-05 RX ADMIN — SODIUM CHLORIDE, PRESERVATIVE FREE 10 ML: 5 INJECTION INTRAVENOUS at 20:36

## 2023-03-05 RX ADMIN — CEFTRIAXONE SODIUM 2000 MG: 10 INJECTION, POWDER, FOR SOLUTION INTRAVENOUS at 13:38

## 2023-03-05 RX ADMIN — REPAGLINIDE 1 MG: 0.5 TABLET ORAL at 11:01

## 2023-03-05 RX ADMIN — FUROSEMIDE 40 MG: 40 TABLET ORAL at 11:01

## 2023-03-05 RX ADMIN — SODIUM CHLORIDE, PRESERVATIVE FREE 10 ML: 5 INJECTION INTRAVENOUS at 13:39

## 2023-03-05 RX ADMIN — SODIUM CHLORIDE, PRESERVATIVE FREE 10 ML: 5 INJECTION INTRAVENOUS at 18:00

## 2023-03-05 RX ADMIN — HEPARIN SODIUM 5000 UNITS: 5000 INJECTION INTRAVENOUS; SUBCUTANEOUS at 20:41

## 2023-03-05 RX ADMIN — PANTOPRAZOLE SODIUM 40 MG: 40 TABLET, DELAYED RELEASE ORAL at 11:02

## 2023-03-05 RX ADMIN — AMIODARONE HYDROCHLORIDE 200 MG: 200 TABLET ORAL at 20:40

## 2023-03-05 RX ADMIN — METOPROLOL TARTRATE 50 MG: 25 TABLET ORAL at 11:01

## 2023-03-05 RX ADMIN — AMIODARONE HYDROCHLORIDE 200 MG: 200 TABLET ORAL at 11:02

## 2023-03-05 RX ADMIN — DOCUSATE SODIUM 100 MG: 100 CAPSULE, LIQUID FILLED ORAL at 17:57

## 2023-03-05 RX ADMIN — METOPROLOL TARTRATE 50 MG: 25 TABLET ORAL at 20:41

## 2023-03-05 RX ADMIN — SODIUM CHLORIDE, PRESERVATIVE FREE 10 ML: 5 INJECTION INTRAVENOUS at 18:01

## 2023-03-05 RX ADMIN — POLYETHYLENE GLYCOL 3350 17 G: 17 POWDER, FOR SOLUTION ORAL at 13:50

## 2023-03-05 RX ADMIN — INSULIN LISPRO 4 UNITS: 100 INJECTION, SOLUTION INTRAVENOUS; SUBCUTANEOUS at 09:38

## 2023-03-05 RX ADMIN — MUPIROCIN: 20 OINTMENT TOPICAL at 02:00

## 2023-03-05 RX ADMIN — HEPARIN SODIUM 5000 UNITS: 5000 INJECTION INTRAVENOUS; SUBCUTANEOUS at 06:11

## 2023-03-05 RX ADMIN — SODIUM CHLORIDE, PRESERVATIVE FREE 10 ML: 5 INJECTION INTRAVENOUS at 13:56

## 2023-03-05 RX ADMIN — MIDODRINE HYDROCHLORIDE 10 MG: 5 TABLET ORAL at 13:44

## 2023-03-05 RX ADMIN — COLCHICINE 0.3 MG: 0.6 TABLET, FILM COATED ORAL at 11:00

## 2023-03-05 NOTE — CARE COORDINATION
Transitional planning: Per Dr. Felicita Pablo note, plan for discharge tomorrow. [place] 455 to family and patient at [facility] to answer question if plan remains to discharge to [facility] tomorrow. [phone] Phone call to Adam Kelley at [facility] to confirm that patient can go to [facility] and he confirms this. [address] [facility] notified family and patient that writer confirmed with [facility], they were ready for patient to arrive tomorrow. The family has asked that the daughter, Mary Lo, be notified of transport time ([phone]) once arranged.

## 2023-03-05 NOTE — PROGRESS NOTES
[facility] - Progress Note  Today's Date and Time: 3/5/2023, 8:24 AM    Impression :   Left-sided empyema  Streptococci Gordonii on pleural fluid culture from 2/14/23  Repeat culture 2/18/23 with no bacterial growth   Shortness of breath  KELSEA on CKD  Hyponatremia  Leukocytosis  Hx non-Hodgkin's lymphoma  Hx multiple myeloma  Sarcoidosis  DM II  Interstitial nephritis  COPD  Hx partial right lung lobectomy  Allergies to macrolides and ketolides. Recommendations:   Discontinued IV Cefepime on 2-20-23  IV Rocephin 1 gm q 24 hrs until 3/12/23  Midline for outpatient therapy placed 2/28/23  S/p decortication on 2/23/23  Cardioversion completed 2/27/23 for continued AFIB    Medical Decision Making/Summary/Discussion:3/5/2023     Patient with Hx of non-Hodgkin's lymphoma and multiple myeloma  Admitted with Shortness of breath  Found to have Left-sided empyema  Streptococci Gordonii on pleural fluid culture from 2/14/23  Repeat culture 2/18/23 with no bacterial growth   Empyema partially drained by chest tube. Decortication planned 2/23/23  Confusion with associated hyponatremia  KELSEA on CKD 3  On treatment with ceftriaxone. Infection Control Recommendations   [place] Precautions    Antimicrobial Stewardship Recommendations     Simplification of therapy  Targeted therapy  PK dosing    Coordination of Outpatient Care:   Estimated Length of IV antimicrobials:3/12/23  Patient will need Midline Catheter Insertion: yes  Patient will need PICC line Insertion:No  Patient will need: Home IV , [place],  SNF,  LTAC: yes  Patient will need outpatient wound care:No    Chief complaint/reason for consultation:   Empyema-streptococcus Gordonii      History of Present Illness:   Bernie Austin is a 79y.o.-year-old male who was initially admitted on 2/12/2023.  Patient seen at the request of Dr. Leonidas Ross:    This patient, with a history of COPD, CKD, sarcoidosis, partial right lung lobectomy, multiple myeloma, non-hodgkin's lymphoma, acute interstitial nephritis and DM II, initially presented to [facility] ED on 2/12/23 with complaints of worsening shortness of breath over the previous week with a non-productive cough. He has has allergies to macrolides and ketolides. He was experiencing increased work of breathing upon evaluation, initially requiring CPAP. ABG values were grossly unremarkable. He denied any recent history of chemotherapy, fever, chills, N/V/D, abnormal bleeding, weight-loss or night sweats. Chest x-ray showed probable left-sided pneumonia with loculated pleural effusion which was confirmed via CT chest, which revealed a moderate to large amount of multiloculated left pleural fluid in the left lung. Mediastinal and bilateral axillary lymphadenopathy was also noted. He was transferred to [facility] for a higher level of care and was initiated on broad spectrum antibiotics with IV cefepime and vancomycin. Pulmonology was consulted and a thoracentesis was attempted, but was unable to be completed as no clear pocket was visualized via [address]. IR was consulted and a 10 [place], left-sided chest tube was placed on 2/14/23.  10 ml of purulent fluid was initially drained and sent for culture. TPA and dornase were administered with improvement in output. MRSA Nares was not detected and the pleural fluid culture grew PCN sensitive Streptococcus Gordonii. Vancomycin was discontinued on 2/14/23 and cefepime continued. A repeat CT of the patient's chest  on 2/17/23 revealed some improvement in the multiloculated left pleural effusions. A larger bore chest tube exchange was completed on 2/18/23 in IR. A repeat pleural fluid culture has not had any bacterial growth to date.      Chest tube output to date:   2/14 - 800 cc  2/15- 600 cc  2/16 - 700 cc  2/17 - 900 cc  2/18- 200 cc  2/19 - 910 cc  2/20 - 275 cc    CT sugery was consulted for possible decortication, but they have no plans for decortication at this time. Abnormal labs at the time of consult include:   Na:133  Cr:2.24  WBC:32.4-->19.0  Hgb:10.0    There is no growth on urine or blood cultures. Imaging/Diagonstics:  CT CHEST WO CONTRAST     Result Date: 2/17/2023  Small to moderate-sized multiloculated left pleural effusions have improved. New small foci of air within the collection is presumably related to the introduction of the left thoracostomy tube. Stable small to moderate sized mediastinal lymph nodes are nonspecific but may relate to the patient's history of lymphoma. Additional small bilateral axial lymph nodes are identified. Small hiatal hernia. XR CHEST PORTABLE     Result Date: 2/18/2023  Moderate left pleural effusion increased. No change left chest tube/pigtail catheter. XR CHEST PORTABLE     Result Date: 2/17/2023  Similar findings when rotation taken into account; left chest tube with unchanged or slightly increased loculated appearing left pleural effusion; atelectatic appearing changes. No pneumothorax. XR CHEST PORTABLE     Result Date: 2/14/2023  Interval placement of a left chest tube with reduced left pleural fluid. IR CHEST TUBE INSERTION     Result Date: 2/14/2023  Successful ultrasound guided placement of a left 10 [place] chest tube      US RETROPERITONEAL COMPLETE     Result Date: 2/19/2023  1. Simple cortical cyst at the upper pole left kidney measuring 6 mm. 2. Limited renal ultrasound. No hydronephrosis. 3. Nonvisualization of ureteral jets. Minimal distention of the urinary bladder. Patient no urge to void during the exam. RECOMMENDATIONS: Unavailable        Infectious disease was consulted for continued empyema.       CURRENT EVALUATION 3/5/2023  BP 99/62   Pulse 63   Temp 98.2 °F (36.8 °C) (Oral)   Resp 21   Ht 6' 1\" (1.854 m)   Wt 229 lb 4.5 oz (104 kg)   SpO2 97%   BMI 30.25 kg/m²     Afebrile  VS stable on room air    The patient continues to feel well, stated that his breathing is continuing to improve. He does report one episode of feeling hot/chills, but states he has been dealing with that every now and then for awhile, and it goes away in a couple minutes. No new issues per RN    Medications reviewed: On IV Ceftriaxone. Stop date 3-12-23    He is s/p decortication with CT surgery on 2/23/23    Chest tube removed 2/28/23  Midline placed 2/28/23    Discussed with patient and wife at length on 3-4-23  CT scans reviewed with wife and daughter on 2-20-23  Indicated that he will need about a month of antibiotics. Wife indicated similar empyema a few years ago on Rt chest. He had S aureus back then and was severely ill. No growth from VATS culture from 2/23/23     WBC is elevated but is on a downward trend  Steroids initiated 2/26/23    Stable KELSEA   He is receiving 40 mg Lasix IV once a day     No acute issues noted    Labs, X rays reviewed: 3/5/2023    BUN: 63-->63--->64-->59-->49  Cr: 4.27--->4.14--->3.79-->2.92-->2.25  Na  137--->134-->140-->134  K 5.0--->4.5-->5.0-->4.4    WBC: 22.9--->24.1--->26.0---->25.0--->16.3  Hb:10.0-->11.2-->11.2-->9.2--->8.3  Plat: 425-->461-->434-->446-->433--->835    CRP: 202.8     Cultures:  Urine:  2/17/23: No growth  2/22/23 : No growth   Blood:  2/17/23: No growth  MRSA Nares:  2/13/23: Not detected  Pleural Fluid:  2/14/23: Streptococci Gordonii  2/18/23: No growth  2/23/23: No growth    Discussed with patient, RN, IM. Chest x-ray: 3/1/23      Chest x-ray: 2/28/23      Chest x-ray: 2/23/23                                           CT: 2/17/23      I have personally reviewed the past medical history, past surgical history, medications, social history, and family history, and I have updated the database accordingly. Past Medical History:     Past Medical History:   Diagnosis Date    Acute interstitial nephritis 08/03/2022    Unclear cause.  Creat bumped to 2.9, baseline in oct 2020 1.6.  Exact description of his kidney biopsy is not available to me however pathologist seems to think there is diabetic glomerosclerosis and acute interstitial nephritis seen on the biopsy specimen. No mention about chronic changes. Because of acute interstitial nephritis a trial of steroids for 15 days will be given to see if I can impro    Aortic regurgitation     ARF (acute renal failure) ([address] 08/03/2022    kidney biopsy from June 2022  showing findings of acute interstitial nephritis. Exact description not available. Trial of steroids will be given to see if I can improve renal function. Creatinine in October 2020 was 1.6, creatinine in June 2022 was 2.9. Trial of steroids will be given to see if renal function can be improved.     Chronic kidney disease     Hyperlipidemia     Hypertension     Non-Hodgkin lymphoma ([address]     Research study patient 02/13/2023    [id number] Completed 2/15/23    Sarcoidosis     Type II or unspecified type diabetes mellitus without mention of complication, not stated as uncontrolled        Past Surgical  History:     Past Surgical History:   Procedure Laterality Date    CARPAL TUNNEL RELEASE Left 11/15     EYE SURGERY Right     shunt and cataract     EYE SURGERY Right 6/16    laser     IR CHEST TUBE INSERTION  2/13/2023    IR CHEST TUBE INSERTION 2/13/2023 [facility] PROC[facility]    IR CHEST TUBE INSERTION  2/18/2023    IR CHEST TUBE INSERTION 2/18/2023 Ishaan Falk MD [facility] SPECIAL PROCEDURES    KNEE ARTHROSCOPY      MALIGNANT SKIN LESION EXCISION      ROTATOR CUFF REPAIR Right 7/14    ROTATOR CUFF REPAIR Left 5/15    THORACOSCOPY Left 2/23/2023    VIDEO ASSISTED THORACOSCOPY, DECORTICATION performed by Corie Ayers MD at [address] [facility] Right 10/2018       Medications:      cefTRIAXone (ROCEPHIN) IV  2,000 mg IntraVENous Q24H    insulin lispro  0-8 Units SubCUTAneous TID     insulin lispro  0-4 Units SubCUTAneous Nightly    furosemide  40 mg Oral Daily    repaglinide  1 mg Oral BID AC    metoprolol tartrate  50 mg Oral BID    senna  2 tablet Oral Nightly    amiodarone  200 mg Oral BID    sodium chloride flush  5-40 mL IntraVENous 2 times per day    heparin (porcine)  5,000 Units SubCUTAneous 3 times per day    midodrine  10 mg Oral TID WC    docusate sodium  100 mg Oral TID    pantoprazole  40 mg Oral QAM AC    colchicine  0.3 mg Oral Daily    sodium chloride flush  5-40 mL IntraVENous 2 times per day    mupirocin   Nasal BID    chlorhexidine   Topical See Admin Instructions    sodium chloride flush  5-40 mL IntraVENous 2 times per day    polyethylene glycol  17 g Oral Daily    sodium chloride flush  5-40 mL IntraVENous 2 times per day       Social History:     Social History     Socioeconomic History    Marital status:      Spouse name: Not on file    Number of children: Not on file    Years of education: Not on file    Highest education level: Not on file   Occupational History    Not on file   Tobacco Use    Smoking status: Never    Smokeless tobacco: Former    Tobacco comments:     minimal and infrequent    Substance and Sexual Activity    Alcohol use: Yes     Alcohol/week: 0.0 standard drinks     Comment: rare    Drug use: No    Sexual activity: Not on file   Other Topics Concern    Not on file   Social History Narrative    Not on file     Social Determinants of Health     Financial Resource Strain: Low Risk     Difficulty of Paying Living Expenses: Not very hard   Food Insecurity: No Food Insecurity    Worried About Running Out of Food in the Last Year: Never true    Ran Out of Food in the Last Year: Never true   Transportation Needs: No Transportation Needs    Lack of Transportation (Medical): No    Lack of Transportation (Non-Medical): No   Physical Activity: Not on file   Stress: Stress Concern Present    Feeling of Stress :  To some extent   Social Connections: Socially Integrated    Frequency of Communication with Friends and Family: Three times a week    Frequency of Social Gatherings with Friends and Family: Three times a week    Attends Anabaptism Services: 1 to 4 times per year    Active Member of Clubs or Organizations: No    Attends Club or Organization Meetings: 1 to 4 times per year    Marital Status:    Intimate Partner Violence: Not At Risk    Fear of Current or Ex-Partner: No    Emotionally Abused: No    Physically Abused: No    Sexually Abused: No   Housing Stability: Low Risk     Unable to Pay for Housing in the Last Year: No    Number of [place] in the Last Year: 1    Unstable Housing in the Last Year: No       Family History:     Family History   Problem Relation Age of Onset    High Blood Pressure Father     Other Father         AAA    Heart Disease Other         uncle x 2         Allergies:   Latex, Gabapentin, Meperidine, Erythromycin, Glimepiride, Lidocaine, Hydrocodone, Macrolides and ketolides, Niacin and related, Trelegy ellipta [fluticasone-umeclidin-vilant], Pregabalin, and Xanax [alprazolam]     Review of Systems:   Constitutional: No fevers or chills. No systemic complaints  Head: No headaches  Eyes: No double vision or blurry vision. No conjunctival inflammation. ENT: No sore throat or runny nose. . No hearing loss, tinnitus or vertigo. Cardiovascular: No chest pain or palpitations. shortness of breath. WANG  Lung: Left sided chest tube with some shortness of breath with dry cough. No sputum production  Abdomen: No nausea, vomiting, diarrhea, or abdominal pain. Margurette Laughlin No cramps. Genitourinary: No increased urinary frequency, or dysuria. No hematuria. No suprapubic or CVA pain  Musculoskeletal: No muscle aches or pains. No joint effusions, swelling or deformities  Hematologic: No bleeding or bruising. Neurologic: No headache, weakness, numbness, or tingling. Integument: No rash, no ulcers. Psychiatric: No depression. Endocrine: No polyuria, no polydipsia, no polyphagia.     Physical Examination :   Patient Vitals for the past 8 hrs:   BP Temp Temp src Pulse Resp SpO2 Weight   03/05/23 0739 99/62 98.2 °F (36.8 °C) Oral 63 21 97 % --   03/05/23 0433 -- -- -- -- -- -- 229 lb 4.5 oz (104 kg)   03/05/23 0415 -- 98.8 °F (37.1 °C) Oral -- -- 100 % --   03/05/23 0414 114/68 -- -- 66 23 92 % --       General Appearance: Awake, alert, and in no apparent distress  Head:  Normocephalic, no trauma  Eyes: Pupils equal, round, reactive to light and accommodation; extraocular movements intact; sclera anicteric; conjunctivae pink. No embolic phenomena. ENT: Oropharynx clear, without erythema, exudate, or thrush. No tenderness of sinuses. Mouth/throat: mucosa pink and moist. No lesions. Dentition in good repair. Neck:Supple, without lymphadenopathy. Thyroid normal, No bruits. Pulmonary/Chest: Left sided chest tube in place to suction with serosanguinous drainage. Diminished to auscultation. No dullness to percussion. Cardiovascular: Regular rate and rhythm without murmurs, rubs, or gallops. Abdomen: Soft, non tender. Bowel sounds normal. No organomegaly  All four Extremities: No cyanosis, clubbing, edema, or effusions. Neurologic: No gross sensory or motor deficits. Skin: Warm and dry with good turgor. No signs of peripheral arterial or venous insufficiency. No ulcerations. Left sided-chest tube site CDI    Medical Decision Making -Laboratory:   I have independently reviewed/ordered the following labs:    CBC with Differential:   No results for input(s): WBC, HGB, HCT, PLT, SEGSPCT, BANDSPCT, LYMPHOPCT, MONOPCT, EOSPCT in the last 72 hours. BMP:   Recent Labs     03/03/23  0819 03/04/23  0251    134*   K 3.9 4.4    99   CO2 28 26   BUN 51* 49*   CREATININE 2.64* 2.25*       Hepatic Function Panel: No results for input(s): PROT, LABALBU, BILIDIR, IBILI, BILITOT, ALKPHOS, ALT, AST in the last 72 hours. No results for input(s): RPR in the last 72 hours. No results for input(s): HIV in the last 72 hours.   No results for input(s): BC in the last 72 hours. Lab Results   Component Value Date/Time    MUCUS 1+ 02/13/2023 02:17 AM    RBC 2.96 02/28/2023 04:13 AM    RBC 5.15 04/03/2012 11:08 AM    WBC 16.3 02/28/2023 04:13 AM    TURBIDITY Cloudy 02/22/2023 01:17 PM     Lab Results   Component Value Date/Time    CREATININE 2.25 03/04/2023 02:51 AM    GLUCOSE 136 03/04/2023 02:51 AM    GLUCOSE 140 04/03/2012 11:08 AM       Medical Decision Making-Imaging:     Narrative   EXAMINATION:   CT OF THE CHEST WITHOUT CONTRAST, 2/17/2023 9:29 am       TECHNIQUE:   CT of the chest was performed without the administration of intravenous   contrast. Multiplanar reformatted images are provided for review. Automated   exposure control, iterative reconstruction, and/or weight based adjustment of   the mA/kV was utilized to reduce the radiation dose to as low as reasonably   achievable. COMPARISON:   02/13/2023       HISTORY:   ORDERING SYSTEM PROVIDED HISTORY:  Improvement? TECHNOLOGIST PROVIDED HISTORY:   Improvement? FINDINGS:   Mediastinum: Small bilateral axial lymph nodes are identified not   significantly changed. Small to moderate size mediastinal lymph nodes are   additionally present. Lungs/Pleura: There are multiple loculated effusions within the left chest   which have improved since previous exam.  Small amounts of new air are likely   secondary to the presence of the left thoracostomy tube. Left lower lobe atelectasis is identified. Upper Abdomen: There is a probable small hiatal hernia. Soft Tissues/Bones:  No acute osseous abnormality is appreciated. Old right   rib fractures are noted. Impression   Small to moderate-sized multiloculated left pleural effusions have improved. New small foci of air within the collection is presumably related to the   introduction of the left thoracostomy tube.        Stable small to moderate sized mediastinal lymph nodes are nonspecific but   may relate to the patient's history of lymphoma. Additional small bilateral   axial lymph nodes are identified. Small hiatal hernia. Narrative   EXAMINATION:   ONE XRAY VIEW OF THE CHEST       2/18/2023 3:04 pm       COMPARISON:   CT chest February 17, 2023       HISTORY:   ORDERING SYSTEM PROVIDED HISTORY: worsening   TECHNOLOGIST PROVIDED HISTORY:   worsening       FINDINGS:   Pigtail catheter on the left. Moderate left effusion appears   denser/increased. Mild bilateral interstitial infiltrates or edema. Heart   and mediastinum unremarkable. Bony thorax intact. No pneumothorax noted. Subsegmental atelectasis right lower lung. Impression   Moderate left pleural effusion increased. No change left chest tube/pigtail   catheter. Narrative   EXAMINATION:   ONE XRAY VIEW OF THE CHEST       2/28/2023 6:49 am       COMPARISON:   02/27/2023       HISTORY:   ORDERING SYSTEM PROVIDED HISTORY: s/p VATs with decortication   TECHNOLOGIST PROVIDED HISTORY: S/p VATs with decortication       FINDINGS:   Cardiomegaly is unchanged. Left-sided chest tubes remain in place. Small   left pleural effusion and patchy left basilar opacities are not significantly   changed. No evidence of pneumothorax. Impression   No significant interval change. Narrative   EXAMINATION:   ONE XRAY VIEW OF THE CHEST       2/28/2023 9:16 am       COMPARISON:   02/28/2023 at 06:20       HISTORY:   ORDERING SYSTEM PROVIDED HISTORY: post chest tube removal   TECHNOLOGIST PROVIDED HISTORY:   post chest tube removal   Reason for Exam: uprt port chest       FINDINGS:   Left-sided chest tube has been removed. Small left pleural effusion   persists, unchanged. Left basilar airspace disease is similar as well. No   pneumothorax identified. Cardial pericardial silhouette is enlarged but similar when compared to the   previous exam.           Impression   Interval removal of left-sided chest tube. No pneumothorax. Continued small left pleural effusion, unchanged. Left basilar airspace   disease is unchanged. Narrative   EXAMINATION:   ONE XRAY VIEW OF THE CHEST       3/1/2023 5:39 am       COMPARISON:   02/28/2023       HISTORY:   ORDERING SYSTEM PROVIDED HISTORY: s/p VATs with decortication   TECHNOLOGIST PROVIDED HISTORY: S/p VATs with decortication       FINDINGS:   Mild cardiomegaly is unchanged. Small left pleural effusion and patchy   opacities at the left base are unchanged. No evidence of pneumothorax. No   free air beneath the diaphragm. Impression   No significant oval change. Small left pleural effusion and patchy opacities   at the left base. Narrative   EXAMINATION:   ONE XRAY VIEW OF THE CHEST       3/3/2023 5:54 am       COMPARISON:   03/02/2023, 03/01/2023       HISTORY:   ORDERING SYSTEM PROVIDED HISTORY: s/p VATs with decortication   TECHNOLOGIST PROVIDED HISTORY:   s/p VATs with decortication       FINDINGS:   Shallow inflation. The cardiac and mediastinal contours appear unchanged. Mild vascular congestion, basilar opacities and blunting of the left   costophrenic angle appear unchanged. No new airspace disease identified in   the interval.  No evidence for pneumothorax. Impression   No significant interval change. Medical Decision Blhtac-Eyrukhhs-Orcdy:       Medical Decision Making-Other:     Note:  Labs, medications, radiologic studies were reviewed with personal review of films  Large amounts of data were reviewed  Discussed with nursing Staff, Discharge planner  Infection Control and Prevention measures reviewed  All prior entries were reviewed  Administer medications as ordered  Prognosis: [address]  Discharge planning reviewed  Follow up as outpatient. Thank you for allowing us to participate in the care of this patient. Please call with questions.     Jean-Pierre Law MD.    ATTESTATION:    I have discussed the case, including pertinent history and exam findings with the residents and students. I have seen and examined the patient and the key elements of the encounter have been performed by me. I was present when the student obtained his information or examined the patient. I have reviewed the laboratory data, other diagnostic studies and discussed them with the residents. I have updated the medical record where necessary. I agree with the assessment, plan and orders as documented by the resident/ student.     Misty Burgos MD.    Pager: [phone] - Office: [phone]

## 2023-03-05 NOTE — PLAN OF CARE
Problem: Discharge Planning  Goal: Discharge to home or other facility with appropriate resources  Outcome: Progressing     Problem: Safety - Adult  Goal: Free from fall injury  Outcome: Progressing  Flowsheets (Taken 3/4/2023 2000)  Free From Fall Injury: Instruct family/caregiver on patient safety     Problem: ABCDS Injury Assessment  Goal: Absence of physical injury  Outcome: Progressing  Flowsheets (Taken 3/4/2023 2000)  Absence of Physical Injury: Implement safety measures based on patient assessment     Problem: Skin/Tissue Integrity  Goal: Absence of new skin breakdown  Description: 1. Monitor for areas of redness and/or skin breakdown  2. Assess vascular access sites hourly  3. Every 4-6 hours minimum:  Change oxygen saturation probe site  4. Every 4-6 hours:  If on nasal continuous positive airway pressure, respiratory therapy assess nares and determine need for appliance change or resting period.   Outcome: Progressing     Problem: Chronic Conditions and Co-morbidities  Goal: Patient's chronic conditions and co-morbidity symptoms are monitored and maintained or improved  Outcome: Progressing     Problem: Respiratory - Adult  Goal: Achieves optimal ventilation and oxygenation  Outcome: Progressing  Flowsheets (Taken 3/4/2023 2000)  Achieves optimal ventilation and oxygenation: Assess for changes in respiratory status     Problem: Pain  Goal: Verbalizes/displays adequate comfort level or baseline comfort level  Outcome: Progressing  Flowsheets (Taken 3/4/2023 1953)  Verbalizes/displays adequate comfort level or baseline comfort level: Encourage patient to monitor pain and request assistance     Problem: Neurosensory - Adult  Goal: Achieves stable or improved neurological status  Outcome: Progressing  Flowsheets (Taken 3/4/2023 2000)  Achieves stable or improved neurological status: Assess for and report changes in neurological status  Goal: Absence of seizures  Outcome: Progressing  Flowsheets (Taken 3/4/2023 2000)  Absence of seizures: Monitor for seizure activity.   If seizure occurs, document type and location of movements and any associated apnea  Goal: Remains free of injury related to seizures activity  Outcome: Progressing  Flowsheets (Taken 3/4/2023 2000)  Remains free of injury related to seizure activity: Maintain airway, patient safety  and administer oxygen as ordered  Goal: Achieves maximal functionality and self care  Outcome: Progressing  Flowsheets (Taken 3/4/2023 2000)  Achieves maximal functionality and self care: Monitor swallowing and airway patency with patient fatigue and changes in neurological status     Problem: Cardiovascular - Adult  Goal: Maintains optimal cardiac output and hemodynamic stability  Outcome: Progressing  Flowsheets (Taken 3/4/2023 2000)  Maintains optimal cardiac output and hemodynamic stability: Monitor blood pressure and heart rate  Goal: Absence of cardiac dysrhythmias or at baseline  Outcome: Progressing  Flowsheets (Taken 3/4/2023 2000)  Absence of cardiac dysrhythmias or at baseline: Monitor cardiac rate and rhythm     Problem: Skin/Tissue Integrity - Adult  Goal: Skin integrity remains intact  Outcome: Progressing  Flowsheets (Taken 3/4/2023 2000)  Skin Integrity Remains Intact: Monitor for areas of redness and/or skin breakdown  Goal: Incisions, wounds, or drain sites healing without S/S of infection  Outcome: Progressing  Flowsheets (Taken 3/4/2023 2000)  Incisions, Wounds, or Drain Sites Healing Without Sign and Symptoms of Infection: ADMISSION and DAILY: Assess and document risk factors for pressure ulcer development  Goal: Oral mucous membranes remain intact  Outcome: Progressing  Flowsheets (Taken 3/4/2023 2000)  Oral Mucous Membranes Remain Intact: Assess oral mucosa and hygiene practices     Problem: Musculoskeletal - Adult  Goal: Return mobility to safest level of function  Outcome: Progressing  Goal: Maintain proper alignment of affected body part  Outcome: Progressing  Goal: Return ADL status to a safe level of function  Outcome: Progressing     Problem: Gastrointestinal - Adult  Goal: Minimal or absence of nausea and vomiting  Outcome: Progressing  Flowsheets (Taken 3/4/2023 2000)  Minimal or absence of nausea and vomiting: Administer IV fluids as ordered to ensure adequate hydration  Goal: Maintains or returns to baseline bowel function  Outcome: Progressing  Flowsheets (Taken 3/4/2023 2000)  Maintains or returns to baseline bowel function: Assess bowel function  Goal: Maintains adequate nutritional intake  Outcome: Progressing  Flowsheets (Taken 3/4/2023 2000)  Maintains adequate nutritional intake: Monitor percentage of each meal consumed  Goal: Establish and maintain optimal ostomy function  Outcome: Progressing  Flowsheets (Taken 3/4/2023 2000)  Establish and maintain optimal ostomy function: Monitor output from ostomies     Problem: Genitourinary - Adult  Goal: Absence of urinary retention  Outcome: Progressing  Flowsheets (Taken 3/4/2023 2000)  Absence of urinary retention: Assess patients ability to void and empty bladder  Goal: Urinary catheter remains patent  Outcome: Progressing  Flowsheets (Taken 3/4/2023 2000)  Urinary catheter remains patent: Assess patency of urinary catheter     Problem: Infection - Adult  Goal: Absence of infection at discharge  Outcome: Progressing  Flowsheets (Taken 3/4/2023 2000)  Absence of infection at discharge: Assess and monitor for signs and symptoms of infection  Goal: Absence of infection during hospitalization  Outcome: Progressing  Flowsheets (Taken 3/4/2023 2000)  Absence of infection during hospitalization: Assess and monitor for signs and symptoms of infection  Goal: Absence of fever/infection during anticipated neutropenic period  Outcome: Progressing  Flowsheets (Taken 3/4/2023 2000)  Absence of fever/infection during anticipated neutropenic period: Monitor white blood cell count     Problem: Metabolic/Fluid and Electrolytes - Adult  Goal: Electrolytes maintained within normal limits  Outcome: Progressing  Goal: Hemodynamic stability and optimal renal function maintained  Outcome: Progressing  Goal: Glucose maintained within prescribed range  Outcome: Progressing     Problem: Hematologic - Adult  Goal: Maintains hematologic stability  Outcome: Progressing     Problem: Nutrition Deficit:  Goal: Optimize nutritional status  Outcome: Progressing     Problem: Coping  Goal: Pt/Family able to verbalize concerns and demonstrate effective coping strategies  Description: INTERVENTIONS:  1. Assist patient/family to identify coping skills, available support systems and cultural and spiritual values  2. Provide emotional support, including active listening and acknowledgement of concerns of patient and caregivers  3. Reduce environmental stimuli, as able  4. Instruct patient/family in relaxation techniques, as appropriate  5. Assess for spiritual pain/suffering and initiate Spiritual Care, Psychosocial Clinical Specialist consults as needed  Outcome: Progressing  Flowsheets (Taken 3/4/2023 2000)  Patient/family able to verbalize anxieties, fears, and concerns, and demonstrate effective coping: Assist patient/family to identify coping skills, available support systems and cultural and spiritual values     Problem: Confusion  Goal: Confusion, delirium, dementia, or psychosis is improved or at baseline  Description: INTERVENTIONS:  1. Assess for possible contributors to thought disturbance, including medications, impaired vision or hearing, underlying metabolic abnormalities, dehydration, psychiatric diagnoses, and notify attending LIP  2. Erwin high risk fall precautions, as indicated  3. Provide frequent short contacts to provide reality reorientation, refocusing and direction  4. Decrease environmental stimuli, including noise as appropriate  5.  Monitor and intervene to maintain adequate nutrition, hydration, elimination, sleep and activity  6. If unable to ensure safety without constant attention obtain sitter and review sitter guidelines with assigned personnel  7.  Initiate Psychosocial CNS and Spiritual Care consult, as indicated  Outcome: Progressing  Flowsheets (Taken 3/4/2023 2000)  Effect of thought disturbance (confusion, delirium, dementia, or psychosis) are managed with adequate functional status: Assess for contributors to thought disturbance, including medications, impaired vision or hearing, underlying metabolic abnormalities, dehydration, psychiatric diagnoses, notify LIP

## 2023-03-05 NOTE — PROGRESS NOTES
[facility]  Office: [address], DO, Merle Jain, DO, Delbert Osborn, DO, Jani Kavitha Lamar, DO, Jo-Ann Kohli MD, Veronica King MD, Nupur Bustamante MD, Eun Pozo MD,  Lucrecia Hearn MD, Bill Jameson MD, Brayan Nelson, DO, Ralph Avila MD,  Alvin Banks, DO, Arlene Ware MD, Lieutenant Nolan MD, Toshia Eng, DO, Analilia Francis MD, Meredith Tinajero MD, Jaron Ho DO, Markel Carter MD, Audrey Sanchez MD, Riddhi Zaldivar MD, Mainor Velázquez MD, Hitesh Mock, DO, Emeli Escobar MD, Anselmo Perrin MD, Jesse Shaw, Alexia Diop, CNP, Sherie Ku, CNP, Thaddeus Harris, CNP,  Farrah Douglas, [facility], Last Parikh, CNP, Shital Gillespie, CNP, Mike Adler, CNP, Samara Ruelas, CNP, Jolynn Campbell, CNP, ROSE GuzmanC, Adolph De La Rosa, CNS, Xu Fuchs, CNP, Suraj Aguayo, [address] [place] [address]    Progress Note    3/5/2023    7:59 AM    Name:   Phyllis Horner  MRN:     [id number]     Acct:      [de-identified]   Room:   [address] [place] [address] Day:  21  Admit Date:  2/12/2023 10:33 PM    PCP:   Bernard Bella MD  Code Status:  Full Code    Subjective:     C/C: SOB    Interval History Status: improved. Patient is resting in bed. He is off oxygen, but O2 sat 88-92% on RA. He still has some WANG. He is waiting for a bed at [facility], plan for discharge is Monday. Brief History:     Per my partner:  Tay Vaca is 79 y.o. male  Who was admitted to the hospital on 2/12/2023 for treatment of Pneumonia, unspecified organism. Patient presented to ER at [facility] on 2/12/2023 with dyspnea and was found to have left multiloculated pneumonia with large pleural effusion with mediastinal and bilateral axillary lymphadenopathy. Patient has prior history of non-Hodgkin's lymphoma, multiple myeloma. Patient was requiring high flow nasal cannula and had thoracentesis.   Pleural fluid was consistent with empyema and patient had chest tube placement by IR. He was treated with broad-spectrum antibiotics. Patient was also given dornase without much improvement. CT surgery was consulted and recommended increasing the size of chest tube and did not recommend any open surgical intervention at this time. Patient had exchange of chest tube on 2/18/2023. He had diffuse ST elevation secondary to pericarditis on 1/21/23. Patient was was started on colchicine. Patient had new onset atrial fibrillation with RVR on 10/22/23 and was given digoxin and amiodarone infusion . Patient also given colchicine and high-dose aspirin for pericarditis which was later changed to Solu-Medrol. Patient underwent VATS with decortication on 2/23/2023. He was extubated postoperatively on 2/23/2023. Patient underwent AFIA cardioversion on 2/27/2023 for rate uncontrolled atrial fibrillation. \"    Review of Systems:     Constitutional:  negative for chills, fevers, sweats  Respiratory:  Positive for cough, dyspnea on exertion, shortness of breath, no wheezing  Cardiovascular:  negative for chest pain, chest pressure/discomfort, lower extremity edema, palpitations  Gastrointestinal:  negative for abdominal pain, constipation, no diarrhea, nausea, vomiting  Neurological:  negative for dizziness, headache    Medications: Allergies: Allergies   Allergen Reactions    Latex Rash    Gabapentin      Other reaction(s): Vomiting    Meperidine Anaphylaxis    Erythromycin      Other reaction(s): Fever  Had all side effects associated with this medication      Glimepiride      Other reaction(s): Dizziness    Lidocaine Swelling     States as a child having reaction to lidocaine where his face swelled.    Ever since then he has never had lidocaine    Hydrocodone      Other reaction(s): Vomiting    Macrolides And Ketolides Other (See Comments)    Niacin And Related     Trelegy Ellipta [Fluticasone-Umeclidin-Vilant]     Pregabalin Diarrhea    Xanax [Alprazolam] Anxiety     Anxiety, restlessness, insomnia       Current Meds:   Scheduled Meds:    cefTRIAXone (ROCEPHIN) IV  2,000 mg IntraVENous Q24H    insulin lispro  0-8 Units SubCUTAneous TID WC    insulin lispro  0-4 Units SubCUTAneous Nightly    furosemide  40 mg Oral Daily    repaglinide  1 mg Oral BID AC    metoprolol tartrate  50 mg Oral BID    senna  2 tablet Oral Nightly    amiodarone  200 mg Oral BID    sodium chloride flush  5-40 mL IntraVENous 2 times per day    heparin (porcine)  5,000 Units SubCUTAneous 3 times per day    midodrine  10 mg Oral TID WC    docusate sodium  100 mg Oral TID    pantoprazole  40 mg Oral QAM AC    colchicine  0.3 mg Oral Daily    sodium chloride flush  5-40 mL IntraVENous 2 times per day    mupirocin   Nasal BID    chlorhexidine   Topical See Admin Instructions    sodium chloride flush  5-40 mL IntraVENous 2 times per day    polyethylene glycol  17 g Oral Daily    sodium chloride flush  5-40 mL IntraVENous 2 times per day     Continuous Infusions:    sodium chloride      sodium chloride      sodium chloride      sodium chloride      dextrose       PRN Meds: aluminum & magnesium hydroxide-simethicone, bisacodyl, sodium chloride flush, sodium chloride, oxyCODONE-acetaminophen **OR** [DISCONTINUED] oxyCODONE-acetaminophen, metoprolol, sodium chloride flush, sodium chloride, sodium chloride flush, sodium chloride, sodium chloride flush, sodium chloride, glucose, dextrose bolus **OR** dextrose bolus, glucagon (rDNA), dextrose    Data:     Past Medical History:   has a past medical history of Acute interstitial nephritis, Aortic regurgitation, ARF (acute renal failure) ([facility] [address] Chronic kidney disease, Hyperlipidemia, Hypertension, Non-Hodgkin lymphoma ([facility] [address] Research study patient, Sarcoidosis, and Type II or unspecified type diabetes mellitus without mention of complication, not stated as uncontrolled. Social History:   reports that he has never smoked.  He has quit using smokeless tobacco. He reports current alcohol use. He reports that he does not use drugs.     Family History:   Family History   Problem Relation Age of Onset    High Blood Pressure Father     Other Father         AAA    Heart Disease Other         uncle x 2        Vitals:  BP 99/62   Pulse 63   Temp 98.2 °F (36.8 °C) (Oral)   Resp 21   Ht 6' 1\" (1.854 m)   Wt 229 lb 4.5 oz (104 kg)   SpO2 97%   BMI 30.25 kg/m²   Temp (24hrs), Av.4 °F (36.9 °C), Min:98.2 °F (36.8 °C), Max:98.8 °F (37.1 °C)    Recent Labs     23  1558 23  19423  0739   POCGLU 327* 121* 127* 252*       I/O (24Hr):    Intake/Output Summary (Last 24 hours) at 3/5/2023 0759  Last data filed at 3/4/2023 2300  Gross per 24 hour   Intake 150 ml   Output 1376 ml   Net -1226 ml       Labs:  Hematology:  No results for input(s): WBC, RBC, HGB, HCT, MCV, MCH, MCHC, RDW, PLT, MPV, SEDRATE, CRP, INR, DDIMER, GY1HLGWA, LABABSO in the last 72 hours.    Invalid input(s): PT    Chemistry:  Recent Labs     23  0819 23  0251    134*   K 3.9 4.4    99   CO2 28 26   GLUCOSE 77 136*   BUN 51* 49*   CREATININE 2.64* 2.25*   ANIONGAP 10 9   LABGLOM 26* 31*   CALCIUM 9.4 8.9     Recent Labs     23  0814 23  1146 23  1558 23  19423  0739   POCGLU 123* 162* 327* 121* 127* 252*     ABG:  Lab Results   Component Value Date/Time    POCPH 7.356 2023 12:43 PM    POCPCO2 38.8 2023 12:43 PM    POCPO2 66.2 2023 12:43 PM    POCHCO3 21.7 2023 12:43 PM    NBEA 4 2023 12:43 PM    PBEA 2 2023 05:34 AM    PFRG8HPM 92 2023 12:43 PM    FIO2 50.0 2023 03:06 AM     Lab Results   Component Value Date/Time    SPECIAL LEFT PLEURAL PEEL 2023 12:00 PM    SPECIAL  LEFT PLEURAL PEEL 2023 12:00 PM    SPECIAL LEFT PLEURAL PEEL 2023 12:00 PM    SPECIAL LEFT PLURAL PEEL 2023 12:00 PM    SPECIAL LEFT PLEURAL PEEL  02/23/2023 12:00 PM     Lab Results   Component Value Date/Time    CULTURE NO GROWTH 4 DAYS 02/23/2023 12:00 PM    CULTURE NO GROWTH 5 DAYS 02/23/2023 12:00 PM    CULTURE NO GROWTH 3 DAYS 02/23/2023 12:00 PM    CULTURE  02/23/2023 12:00 PM     NEGATIVE. No Herpes Simplex Virus detected by Enzyme Linked Virus Inducible System culture. at day 2    CULTURE  02/23/2023 12:00 PM     NEGATIVE for Influenza A and B, Para influenza 1,2,3, Adenovirus and RSV. at day 2    CULTURE  02/23/2023 12:00 PM     Negative results do not preclude respiratory virus infection and should not be used as the sole basis for diagnosis, treatment or other management decisions. CULTURE NEGATIVE for Cytomegalovirus at day 3 02/23/2023 12:00 PM    CULTURE NEGATIVE for Enterovirus at day 5 02/23/2023 12:00 PM       Radiology:  XR CHEST PORTABLE    Result Date: 2/27/2023  Left chest tubes present with partially loculated left pleural effusion and left basilar opacity unchanged. XR CHEST PORTABLE    Result Date: 2/26/2023  No significant change from prior exam. Left chest tubes with left basilar airspace disease and small amount of pleural fluid. Linear left retrocardiac lucency could represent small pneumothorax. XR CHEST PORTABLE    Result Date: 2/25/2023  No significant change from prior exam.     XR CHEST PORTABLE    Result Date: 2/23/2023  Status post removal single small bore and placement 2 large-bore chest tubes left lung with reduced opacity, presumably multiloculated left pleural effusion and associated atelectasis. No pneumothorax. Slightly greater atelectasis right base.        Physical Examination:        General appearance:  alert, cooperative and no distress  Mental Status:  oriented to person, place but not time,  normal affect  Lungs:  improved BS bilat, no W/R/R, normal effort  Heart:  regular rate and rhythm, no murmur  Abdomen:  soft, nontender, nondistended, normal bowel sounds, no masses, hepatomegaly, splenomegaly  Extremities:  no edema, redness, tenderness in the calves  Skin:  no gross lesions, rashes, induration    Assessment:        Hospital Problems             Last Modified POA    * (Principal) Pneumonia, unspecified organism 2/12/2023 Yes    Pleural effusion 2/13/2023 Yes    Hyperkalemia 2/13/2023 Yes    Acute respiratory failure with hypoxia ([facility] [address] 2/13/2023 Yes    Hyponatremia 2/13/2023 Yes    Hyperglycemia 2/13/2023 Yes    History of non-Hodgkin's lymphoma 2/13/2023 Yes    History of sarcoidosis 2/13/2023 Yes    Empyema ([address] 2/18/2023 Yes    History of atrial fibrillation 2/25/2023 Yes    History of type 2 diabetes mellitus 2/25/2023 Yes    KELSEA (acute kidney injury) ([facility] [address] 2/26/2023 Yes    Overview Addendum 8/24/2022  8:56 AM by Sharmaine Cordero MD     kidney biopsy from June 2022  showing findings of acute interstitial nephritis. Exact description not available. Trial of steroids will be given to see if I can improve renal function. Creatinine in October 2020 was 1.6, creatinine in June 2022 was 2.9. Trial of steroids will be given to see if renal function can be improved. After a 12-day course of prednisone creatinine has come down to 2.0, calcium is improved as well. Interstitial nephritis appears to be related to sarcoidosis.             Plan:        Left empyema- + Strep Gordonii on pleural culture s/p decortication 2/23/23, con't IV Rocephin until 3/12/23, CTS following and ID  A.fib with RVR- s/p cardioversion, con't Amiodarone   Pericarditis- Con't Colchicine, Cardiology stopped IV Solumedrol   KELSEA on CKD 3- slowly improving, Nephrology stopped IVF, decreased Lasix to daily, moon catheter removed, baseline Cr 1.8-2  CLAY- Cpap, check nocturnal pulse oximeter  COPD- stable  Acute delirium- seroquel at night, improving  DM2- BS better, Con't Lantus 10 units in the morning, reduce SSI to moderate livel, Prandin with meals  Gi/ DVT proph  PT/OT    Anthony signed, discharge planning for Monday,  pt and wife    Sumi Ferro MD  3/5/2023  7:59 AM

## 2023-03-06 VITALS
TEMPERATURE: 98.3 F | BODY MASS INDEX: 30.68 KG/M2 | HEART RATE: 65 BPM | HEIGHT: 73 IN | SYSTOLIC BLOOD PRESSURE: 103 MMHG | DIASTOLIC BLOOD PRESSURE: 79 MMHG | RESPIRATION RATE: 23 BRPM | WEIGHT: 231.48 LBS | OXYGEN SATURATION: 96 %

## 2023-03-06 LAB
GLUCOSE BLD-MCNC: 114 MG/DL (ref 75–110)
GLUCOSE BLD-MCNC: 114 MG/DL (ref 75–110)
MICROORGANISM SPEC CULT: NORMAL
MICROORGANISM/AGENT SPEC: NORMAL
MICROORGANISM/AGENT SPEC: NORMAL
SERVICE CMNT-IMP: NORMAL
SERVICE CMNT-IMP: NORMAL
SPECIMEN DESCRIPTION: NORMAL

## 2023-03-06 PROCEDURE — 2580000003 HC RX 258: Performed by: ANESTHESIOLOGY

## 2023-03-06 PROCEDURE — 6370000000 HC RX 637 (ALT 250 FOR IP): Performed by: STUDENT IN AN ORGANIZED HEALTH CARE EDUCATION/TRAINING PROGRAM

## 2023-03-06 PROCEDURE — 6370000000 HC RX 637 (ALT 250 FOR IP): Performed by: INTERNAL MEDICINE

## 2023-03-06 PROCEDURE — 2580000003 HC RX 258: Performed by: INTERNAL MEDICINE

## 2023-03-06 PROCEDURE — 99239 HOSP IP/OBS DSCHRG MGMT >30: CPT | Performed by: INTERNAL MEDICINE

## 2023-03-06 PROCEDURE — 6370000000 HC RX 637 (ALT 250 FOR IP): Performed by: FAMILY MEDICINE

## 2023-03-06 PROCEDURE — 82947 ASSAY GLUCOSE BLOOD QUANT: CPT

## 2023-03-06 PROCEDURE — 6360000002 HC RX W HCPCS: Performed by: FAMILY MEDICINE

## 2023-03-06 PROCEDURE — 6360000002 HC RX W HCPCS: Performed by: INTERNAL MEDICINE

## 2023-03-06 PROCEDURE — 99232 SBSQ HOSP IP/OBS MODERATE 35: CPT | Performed by: INTERNAL MEDICINE

## 2023-03-06 RX ORDER — INSULIN LISPRO 100 [IU]/ML
0-4 INJECTION, SOLUTION INTRAVENOUS; SUBCUTANEOUS NIGHTLY
DISCHARGE
Start: 2023-03-06

## 2023-03-06 RX ORDER — FUROSEMIDE 40 MG/1
40 TABLET ORAL DAILY
Qty: 60 TABLET | Refills: 3 | DISCHARGE
Start: 2023-03-06

## 2023-03-06 RX ORDER — METOPROLOL TARTRATE 50 MG/1
50 TABLET, FILM COATED ORAL 2 TIMES DAILY
Qty: 60 TABLET | Refills: 3 | DISCHARGE
Start: 2023-03-06

## 2023-03-06 RX ORDER — PSEUDOEPHEDRINE HCL 30 MG
100 TABLET ORAL 3 TIMES DAILY
DISCHARGE
Start: 2023-03-06

## 2023-03-06 RX ORDER — POLYETHYLENE GLYCOL 3350 17 G/17G
17 POWDER, FOR SOLUTION ORAL DAILY PRN
Qty: 527 G | Refills: 1 | DISCHARGE
Start: 2023-03-06 | End: 2023-04-05

## 2023-03-06 RX ORDER — MIDODRINE HYDROCHLORIDE 10 MG/1
10 TABLET ORAL
Qty: 90 TABLET | Refills: 3 | DISCHARGE
Start: 2023-03-06

## 2023-03-06 RX ORDER — INSULIN LISPRO 100 [IU]/ML
0-8 INJECTION, SOLUTION INTRAVENOUS; SUBCUTANEOUS
DISCHARGE
Start: 2023-03-06

## 2023-03-06 RX ORDER — MAGNESIUM HYDROXIDE/ALUMINUM HYDROXICE/SIMETHICONE 120; 1200; 1200 MG/30ML; MG/30ML; MG/30ML
30 SUSPENSION ORAL EVERY 6 HOURS PRN
Refills: 0 | DISCHARGE
Start: 2023-03-06

## 2023-03-06 RX ORDER — AMIODARONE HYDROCHLORIDE 200 MG/1
200 TABLET ORAL 2 TIMES DAILY
Qty: 16 TABLET | Refills: 0 | DISCHARGE
Start: 2023-03-06 | End: 2023-03-10

## 2023-03-06 RX ORDER — PANTOPRAZOLE SODIUM 40 MG/1
40 TABLET, DELAYED RELEASE ORAL
Qty: 30 TABLET | Refills: 3 | DISCHARGE
Start: 2023-03-06

## 2023-03-06 RX ORDER — SENNA PLUS 8.6 MG/1
2 TABLET ORAL NIGHTLY
Qty: 60 TABLET | Refills: 0 | DISCHARGE
Start: 2023-03-06 | End: 2023-04-05

## 2023-03-06 RX ORDER — HEPARIN SODIUM 5000 [USP'U]/ML
5000 INJECTION, SOLUTION INTRAVENOUS; SUBCUTANEOUS EVERY 8 HOURS SCHEDULED
DISCHARGE
Start: 2023-03-06

## 2023-03-06 RX ORDER — AMIODARONE HYDROCHLORIDE 200 MG/1
200 TABLET ORAL 2 TIMES DAILY
Qty: 4 TABLET | Refills: 0 | DISCHARGE
Start: 2023-03-06 | End: 2023-03-06 | Stop reason: SDUPTHER

## 2023-03-06 RX ORDER — COLCHICINE 0.6 MG/1
0.3 TABLET ORAL DAILY
Qty: 30 TABLET | Refills: 3 | DISCHARGE
Start: 2023-03-06

## 2023-03-06 RX ADMIN — COLCHICINE 0.3 MG: 0.6 TABLET, FILM COATED ORAL at 10:07

## 2023-03-06 RX ADMIN — METOPROLOL TARTRATE 50 MG: 25 TABLET ORAL at 10:08

## 2023-03-06 RX ADMIN — CEFTRIAXONE SODIUM 2000 MG: 10 INJECTION, POWDER, FOR SOLUTION INTRAVENOUS at 10:10

## 2023-03-06 RX ADMIN — FUROSEMIDE 40 MG: 40 TABLET ORAL at 10:08

## 2023-03-06 RX ADMIN — MIDODRINE HYDROCHLORIDE 10 MG: 5 TABLET ORAL at 10:07

## 2023-03-06 RX ADMIN — SODIUM CHLORIDE, PRESERVATIVE FREE 5 ML: 5 INJECTION INTRAVENOUS at 14:00

## 2023-03-06 RX ADMIN — HEPARIN SODIUM 5000 UNITS: 5000 INJECTION INTRAVENOUS; SUBCUTANEOUS at 05:06

## 2023-03-06 RX ADMIN — AMIODARONE HYDROCHLORIDE 200 MG: 200 TABLET ORAL at 10:08

## 2023-03-06 RX ADMIN — PANTOPRAZOLE SODIUM 40 MG: 40 TABLET, DELAYED RELEASE ORAL at 10:08

## 2023-03-06 NOTE — DISCHARGE INSTR - DIET

## 2023-03-06 NOTE — PROGRESS NOTES
[facility]  Office: [address], DO, Mattie Auguste, DO, Shayy Doyle, DO, Hangzack Tinocoer Blood, DO, Paulina Sutton MD, Anette Soler MD, Gita Laughlin MD, Williams Truong MD,  Josias Rush MD, Malick Venegas MD, Radha Pearson, DO, Samy Glaser MD,  Cr Cruz MD, Roxy Sargent MD, Sunil Prince DO, Smita Proctor MD, America Fountain MD, Emy Sarabia, DO, Maggy Sanchez MD, Collette Hahn, MD, Chan Orourke MD, Drake Corea MD, Madie Guevara, DO, Cornell Sarmiento MD, Deedee Orantes MD, Mattie Pryor, Maryam Benedict, CNP, Antwon Huddleston, CNP, Tabatha Gandhi, CNP,  Gardenia Herr, [facility], Curt Piña, CNP, Osman Stewart, CNP, Opal Wharton, CNP, Deandre Avila, CNP, Angeli Calix, CNP, Michael Mendenhall PA-C, Nicci Ardon, CNS, Shan Montoya, CNP, Lisa Monteiro, [address] [place] [address]    Progress Note    3/6/2023    8:22 AM    Name:   Caity Miranda  MRN:     [id number]     Acct:      [de-identified]   Room:   [address] [place] [address] Day:  25  Admit Date:  2/12/2023 10:33 PM    PCP:   Rosio Alonso MD  Code Status:  Full Code    Subjective:     C/C: SOB    Interval History Status: improved. Patient is sitting in chair. He just ate breakfast.   He is waiting for a bed at [facility], plan for discharge later today. No SOB. Daughter is present. Brief History:     Per my partner:  Kristyn Tse is 79 y.o. male  Who was admitted to the hospital on 2/12/2023 for treatment of Pneumonia, unspecified organism. Patient presented to ER at [facility] on 2/12/2023 with dyspnea and was found to have left multiloculated pneumonia with large pleural effusion with mediastinal and bilateral axillary lymphadenopathy. Patient has prior history of non-Hodgkin's lymphoma, multiple myeloma. Patient was requiring high flow nasal cannula and had thoracentesis.   Pleural fluid was consistent with empyema and patient had chest tube placement by IR. He was treated with broad-spectrum antibiotics. Patient was also given dornase without much improvement. CT surgery was consulted and recommended increasing the size of chest tube and did not recommend any open surgical intervention at this time. Patient had exchange of chest tube on 2/18/2023. He had diffuse ST elevation secondary to pericarditis on 1/21/23. Patient was was started on colchicine. Patient had new onset atrial fibrillation with RVR on 10/22/23 and was given digoxin and amiodarone infusion . Patient also given colchicine and high-dose aspirin for pericarditis which was later changed to Solu-Medrol. Patient underwent VATS with decortication on 2/23/2023. He was extubated postoperatively on 2/23/2023. Patient underwent AFIA cardioversion on 2/27/2023 for rate uncontrolled atrial fibrillation. \"    Review of Systems:     Constitutional:  negative for chills, fevers, sweats  Respiratory:  Negative for cough, dyspnea on exertion, shortness of breath, no wheezing  Cardiovascular:  negative for chest pain, chest pressure/discomfort, lower extremity edema, palpitations  Gastrointestinal:  negative for abdominal pain, constipation, no diarrhea, nausea, vomiting  Neurological:  negative for dizziness, headache    Medications: Allergies: Allergies   Allergen Reactions    Latex Rash    Gabapentin      Other reaction(s): Vomiting    Meperidine Anaphylaxis    Erythromycin      Other reaction(s): Fever  Had all side effects associated with this medication      Glimepiride      Other reaction(s): Dizziness    Lidocaine Swelling     States as a child having reaction to lidocaine where his face swelled.    Ever since then he has never had lidocaine    Hydrocodone      Other reaction(s): Vomiting    Macrolides And Ketolides Other (See Comments)    Niacin And Related     Trelegy Ellipta [Fluticasone-Umeclidin-Vilant]     Pregabalin Diarrhea    Xanax [Alprazolam] Anxiety     Anxiety, restlessness, insomnia       Current Meds:   Scheduled Meds:    cefTRIAXone (ROCEPHIN) IV  2,000 mg IntraVENous Q24H    insulin lispro  0-8 Units SubCUTAneous TID WC    insulin lispro  0-4 Units SubCUTAneous Nightly    furosemide  40 mg Oral Daily    repaglinide  1 mg Oral BID AC    metoprolol tartrate  50 mg Oral BID    senna  2 tablet Oral Nightly    amiodarone  200 mg Oral BID    sodium chloride flush  5-40 mL IntraVENous 2 times per day    heparin (porcine)  5,000 Units SubCUTAneous 3 times per day    midodrine  10 mg Oral TID WC    docusate sodium  100 mg Oral TID    pantoprazole  40 mg Oral QAM AC    colchicine  0.3 mg Oral Daily    sodium chloride flush  5-40 mL IntraVENous 2 times per day    mupirocin   Nasal BID    chlorhexidine   Topical See Admin Instructions    sodium chloride flush  5-40 mL IntraVENous 2 times per day    polyethylene glycol  17 g Oral Daily    sodium chloride flush  5-40 mL IntraVENous 2 times per day     Continuous Infusions:    sodium chloride      sodium chloride      sodium chloride      sodium chloride      dextrose       PRN Meds: aluminum & magnesium hydroxide-simethicone, bisacodyl, sodium chloride flush, sodium chloride, oxyCODONE-acetaminophen **OR** [DISCONTINUED] oxyCODONE-acetaminophen, metoprolol, sodium chloride flush, sodium chloride, sodium chloride flush, sodium chloride, sodium chloride flush, sodium chloride, glucose, dextrose bolus **OR** dextrose bolus, glucagon (rDNA), dextrose    Data:     Past Medical History:   has a past medical history of Acute interstitial nephritis, Aortic regurgitation, ARF (acute renal failure) ([facility] [address] Chronic kidney disease, Hyperlipidemia, Hypertension, Non-Hodgkin lymphoma ([facility] [address] Research study patient, Sarcoidosis, and Type II or unspecified type diabetes mellitus without mention of complication, not stated as uncontrolled. Social History:   reports that he has never smoked.  He has quit using smokeless tobacco. He reports current alcohol use. He reports that he does not use drugs. Family History:   Family History   Problem Relation Age of Onset    High Blood Pressure Father     Other Father         AAA    Heart Disease Other         uncle x 2        Vitals:  /73   Pulse 66   Temp 98.3 °F (36.8 °C) (Oral)   Resp 23   Ht 6' 1\" (1.854 m)   Wt 231 lb 7.7 oz (105 kg)   SpO2 94%   BMI 30.54 kg/m²   Temp (24hrs), Av.2 °F (36.8 °C), Min:97.7 °F (36.5 °C), Max:98.4 °F (36.9 °C)    Recent Labs     23  1649 23  0741   POCGLU 187* 233* 246* 114*       I/O (24Hr): Intake/Output Summary (Last 24 hours) at 3/6/2023 6196  Last data filed at 3/5/2023 2330  Gross per 24 hour   Intake 100 ml   Output 550 ml   Net -450 ml       Labs:  Hematology:  No results for input(s): WBC, RBC, HGB, HCT, MCV, MCH, MCHC, RDW, PLT, MPV, SEDRATE, CRP, INR, DDIMER, [id number], LABABSO in the last 72 hours.     Invalid input(s): PT    Chemistry:  Recent Labs     23  0251   *   K 4.4   CL 99   CO2 26   GLUCOSE 136*   BUN 49*   CREATININE 2.25*   ANIONGAP 9   LABGLOM 31*   CALCIUM 8.9     Recent Labs     23  0739 23  1123 23  1649 23  0741   POCGLU 252* 128* 187* 233* 246* 114*     ABG:  Lab Results   Component Value Date/Time    POCPH 7.356 2023 12:43 PM    POCPCO2 38.8 2023 12:43 PM    POCPO2 66.2 2023 12:43 PM    POCHCO3 21.7 2023 12:43 PM    NBEA 4 2023 12:43 PM    PBEA 2 2023 05:34 AM    MWHN5JQZ 92 2023 12:43 PM    FIO2 50.0 2023 03:06 AM     Lab Results   Component Value Date/Time    SPECIAL LEFT PLEURAL PEEL 2023 12:00 PM    SPECIAL  LEFT PLEURAL PEEL 2023 12:00 PM    SPECIAL LEFT PLEURAL PEEL 2023 12:00 PM    SPECIAL LEFT PLURAL PEEL 2023 12:00 PM    SPECIAL LEFT PLEURAL PEEL 2023 12:00 PM     Lab Results   Component Value Date/Time    CULTURE NO GROWTH 4 DAYS 02/23/2023 12:00 PM    CULTURE NO GROWTH 5 DAYS 02/23/2023 12:00 PM    CULTURE NO GROWTH 3 DAYS 02/23/2023 12:00 PM    CULTURE  02/23/2023 12:00 PM     NEGATIVE.  No Herpes Simplex Virus detected by Enzyme Linked Virus Inducible System culture. at day 2    CULTURE  02/23/2023 12:00 PM     NEGATIVE for Influenza A and B, Para influenza 1,2,3, Adenovirus and RSV. at day 2    CULTURE  02/23/2023 12:00 PM     Negative results do not preclude respiratory virus infection and should not be used as the sole basis for diagnosis, treatment or other management decisions.    CULTURE NEGATIVE for Cytomegalovirus at day 3 02/23/2023 12:00 PM    CULTURE NEGATIVE for Enterovirus at day 5 02/23/2023 12:00 PM       Radiology:  XR CHEST PORTABLE    Result Date: 2/27/2023  Left chest tubes present with partially loculated left pleural effusion and left basilar opacity unchanged.     XR CHEST PORTABLE    Result Date: 2/26/2023  No significant change from prior exam. Left chest tubes with left basilar airspace disease and small amount of pleural fluid. Linear left retrocardiac lucency could represent small pneumothorax.     XR CHEST PORTABLE    Result Date: 2/25/2023  No significant change from prior exam.     XR CHEST PORTABLE    Result Date: 2/23/2023  Status post removal single small bore and placement 2 large-bore chest tubes left lung with reduced opacity, presumably multiloculated left pleural effusion and associated atelectasis.  No pneumothorax.  Slightly greater atelectasis right base.       Physical Examination:        General appearance:  alert, cooperative and no distress  Mental Status:  oriented to person, place but not time,  normal affect  Lungs:  improved BS bilat, no W/R/R, normal effort  Heart:  regular rate and rhythm, no murmur  Abdomen:  soft, nontender, nondistended, normal bowel sounds, no masses, hepatomegaly, splenomegaly  Extremities:  no edema, redness, tenderness in the  calves  Skin:  no gross lesions, rashes, induration    Assessment:        Hospital Problems             Last Modified POA    * (Principal) Pneumonia, unspecified organism 2/12/2023 Yes    Pleural effusion 2/13/2023 Yes    Hyperkalemia 2/13/2023 Yes    Acute respiratory failure with hypoxia ([address] 2/13/2023 Yes    Hyponatremia 2/13/2023 Yes    Hyperglycemia 2/13/2023 Yes    History of non-Hodgkin's lymphoma 2/13/2023 Yes    History of sarcoidosis 2/13/2023 Yes    Empyema ([address] 2/18/2023 Yes    History of atrial fibrillation 2/25/2023 Yes    History of type 2 diabetes mellitus 2/25/2023 Yes    KELSEA (acute kidney injury) ([facility] [address] 2/26/2023 Yes    Overview Addendum 8/24/2022  8:56 AM by Gayathri Edwards MD     kidney biopsy from June 2022  showing findings of acute interstitial nephritis. Exact description not available. Trial of steroids will be given to see if I can improve renal function. Creatinine in October 2020 was 1.6, creatinine in June 2022 was 2.9. Trial of steroids will be given to see if renal function can be improved. After a 12-day course of prednisone creatinine has come down to 2.0, calcium is improved as well. Interstitial nephritis appears to be related to sarcoidosis.             Plan:        Left empyema- + Strep Gordonii on pleural culture s/p decortication 2/23/23, con't IV Rocephin until 3/12/23, CTS following and ID  A.fib with RVR- s/p cardioversion, con't Amiodarone BiD x 14 days, then daily  Pericarditis- Con't Colchicine, Cardiology stopped IV Solumedrol   KELSEA on CKD 3- slowly improving, Nephrology stopped IVF, decreased Lasix to daily, moon catheter removed, baseline Cr 1.8-2  CLAY- Cpap  COPD- stable  Acute delirium- seroquel at night, improving  DM2- BS better, Con't Lantus 10 units in the morning, reduce SSI to moderate livel, Prandin with meals  Gi/ DVT proph  PT/OT    Anthony signed, discharge planning for Monday, dw pt and daughter, f/u with Pulmonary, Cardiology, and PCP in 1 week    Nikki High MD  3/6/2023  8:22 AM

## 2023-03-06 NOTE — CARE COORDINATION
Transitional planning-called [facility] intake-patient can come today. 0900 talked with daughter and patient about transportation-they are wanting me to set up transportation. [phone]talked with [facility] notified [facility] intake of transport time-no need for COVID test. Notified patient and daughter. 1300 faxed AVS and HENS to [facility].

## 2023-03-06 NOTE — PLAN OF CARE
Problem: Discharge Planning  Goal: Discharge to home or other facility with appropriate resources  Outcome: Progressing     Problem: Safety - Adult  Goal: Free from fall injury  Outcome: Progressing  Flowsheets (Taken 3/5/2023 2000)  Free From Fall Injury: Instruct family/caregiver on patient safety     Problem: ABCDS Injury Assessment  Goal: Absence of physical injury  Outcome: Progressing  Flowsheets (Taken 3/5/2023 2000)  Absence of Physical Injury: Implement safety measures based on patient assessment     Problem: Skin/Tissue Integrity  Goal: Absence of new skin breakdown  Description: 1. Monitor for areas of redness and/or skin breakdown  2. Assess vascular access sites hourly  3. Every 4-6 hours minimum:  Change oxygen saturation probe site  4. Every 4-6 hours:  If on nasal continuous positive airway pressure, respiratory therapy assess nares and determine need for appliance change or resting period.   Outcome: Progressing     Problem: Chronic Conditions and Co-morbidities  Goal: Patient's chronic conditions and co-morbidity symptoms are monitored and maintained or improved  Outcome: Progressing     Problem: Respiratory - Adult  Goal: Achieves optimal ventilation and oxygenation  Outcome: Progressing     Problem: Pain  Goal: Verbalizes/displays adequate comfort level or baseline comfort level  Outcome: Progressing  Flowsheets (Taken 3/5/2023 1958)  Verbalizes/displays adequate comfort level or baseline comfort level: Encourage patient to monitor pain and request assistance     Problem: Neurosensory - Adult  Goal: Achieves stable or improved neurological status  Outcome: Progressing  Goal: Absence of seizures  Outcome: Progressing  Goal: Remains free of injury related to seizures activity  Outcome: Progressing  Goal: Achieves maximal functionality and self care  Outcome: Progressing     Problem: Cardiovascular - Adult  Goal: Maintains optimal cardiac output and hemodynamic stability  Outcome: Progressing  Goal: Absence of cardiac dysrhythmias or at baseline  Outcome: Progressing     Problem: Skin/Tissue Integrity - Adult  Goal: Skin integrity remains intact  Outcome: Progressing  Flowsheets (Taken 3/5/2023 2000)  Skin Integrity Remains Intact: Monitor for areas of redness and/or skin breakdown  Goal: Incisions, wounds, or drain sites healing without S/S of infection  Outcome: Progressing  Flowsheets (Taken 3/5/2023 2000)  Incisions, Wounds, or Drain Sites Healing Without Sign and Symptoms of Infection: ADMISSION and DAILY: Assess and document risk factors for pressure ulcer development  Goal: Oral mucous membranes remain intact  Outcome: Progressing  Flowsheets (Taken 3/5/2023 2000)  Oral Mucous Membranes Remain Intact: Assess oral mucosa and hygiene practices     Problem: Musculoskeletal - Adult  Goal: Return mobility to safest level of function  Outcome: Progressing  Goal: Maintain proper alignment of affected body part  Outcome: Progressing  Goal: Return ADL status to a safe level of function  Outcome: Progressing     Problem: Gastrointestinal - Adult  Goal: Minimal or absence of nausea and vomiting  Outcome: Progressing  Goal: Maintains or returns to baseline bowel function  Outcome: Progressing  Goal: Maintains adequate nutritional intake  Outcome: Progressing  Goal: Establish and maintain optimal ostomy function  Outcome: Progressing     Problem: Genitourinary - Adult  Goal: Absence of urinary retention  Outcome: Progressing  Goal: Urinary catheter remains patent  Outcome: Progressing     Problem: Infection - Adult  Goal: Absence of infection at discharge  Outcome: Progressing  Goal: Absence of infection during hospitalization  Outcome: Progressing  Goal: Absence of fever/infection during anticipated neutropenic period  Outcome: Progressing     Problem: Metabolic/Fluid and Electrolytes - Adult  Goal: Electrolytes maintained within normal limits  Outcome: Progressing  Goal: Hemodynamic stability and optimal renal function maintained  Outcome: Progressing  Goal: Glucose maintained within prescribed range  Outcome: Progressing     Problem: Hematologic - Adult  Goal: Maintains hematologic stability  Outcome: Progressing     Problem: Nutrition Deficit:  Goal: Optimize nutritional status  Outcome: Progressing     Problem: Coping  Goal: Pt/Family able to verbalize concerns and demonstrate effective coping strategies  Description: INTERVENTIONS:  1. Assist patient/family to identify coping skills, available support systems and cultural and spiritual values  2. Provide emotional support, including active listening and acknowledgement of concerns of patient and caregivers  3. Reduce environmental stimuli, as able  4. Instruct patient/family in relaxation techniques, as appropriate  5. Assess for spiritual pain/suffering and initiate Spiritual Care, Psychosocial Clinical Specialist consults as needed  Outcome: Progressing     Problem: Confusion  Goal: Confusion, delirium, dementia, or psychosis is improved or at baseline  Description: INTERVENTIONS:  1. Assess for possible contributors to thought disturbance, including medications, impaired vision or hearing, underlying metabolic abnormalities, dehydration, psychiatric diagnoses, and notify attending LIP  2. [place] high risk fall precautions, as indicated  3. Provide frequent short contacts to provide reality reorientation, refocusing and direction  4. Decrease environmental stimuli, including noise as appropriate  5. Monitor and intervene to maintain adequate nutrition, hydration, elimination, sleep and activity  6. If unable to ensure safety without constant attention obtain sitter and review sitter guidelines with assigned personnel  7.  Initiate Psychosocial CNS and Spiritual Care consult, as indicated  Outcome: Progressing

## 2023-03-06 NOTE — PROGRESS NOTES
[facility] - Progress Note  Today's Date and Time: 3/6/2023, 1:31 PM    Impression :   Left-sided empyema  Streptococci Gordonii on pleural fluid culture from 2/14/23  Repeat culture 2/18/23 with no bacterial growth   Shortness of breath  KELSEA on CKD  Hyponatremia  Leukocytosis  Hx non-Hodgkin's lymphoma  Hx multiple myeloma  Sarcoidosis  DM II  Interstitial nephritis  COPD  Hx partial right lung lobectomy  Allergies to macrolides and ketolides. Recommendations:     IV Rocephin 2 gm q 24 hrs until 3/12/23  Midline for outpatient therapy placed 2/28/23  S/p decortication on 2/23/23  Cardioversion completed 2/27/23 for continued AFIB  Office f/up in 3 weeks with Dr Angelika Lazo for infection. Please call [phone] for appointment      Medical Decision Making/Summary/Discussion:3/6/2023     Patient with Hx of non-Hodgkin's lymphoma and multiple myeloma  Admitted with Shortness of breath  Found to have Left-sided empyema  Streptococci Gordonii on pleural fluid culture from 2/14/23  Repeat culture 2/18/23 with no bacterial growth   Empyema partially drained by chest tube. Decortication planned 2/23/23  Confusion with associated hyponatremia  KELSEA on CKD 3  On treatment with ceftriaxone. Infection Control Recommendations   [place] Precautions    Antimicrobial Stewardship Recommendations     Simplification of therapy  Targeted therapy  PK dosing    Coordination of Outpatient Care:   Estimated Length of IV antimicrobials:3/12/23  Patient will need Midline Catheter Insertion: yes  Patient will need PICC line Insertion:No  Patient will need: Home IV , [place],  SNF,  LTAC: yes  Patient will need outpatient wound care:No    Chief complaint/reason for consultation:   Empyema-streptococcus Gordonii      History of Present Illness:   Demetra Hinojosa is a 79y.o.-year-old male who was initially admitted on 2/12/2023.  Patient seen at the request of Dr. Flor Henao HISTORY:    This patient, with a history of COPD, CKD, sarcoidosis, partial right lung lobectomy, multiple myeloma, non-hodgkin's lymphoma, acute interstitial nephritis and DM II, initially presented to [facility] ED on 2/12/23 with complaints of worsening shortness of breath over the previous week with a non-productive cough. He has has allergies to macrolides and ketolides. He was experiencing increased work of breathing upon evaluation, initially requiring CPAP. ABG values were grossly unremarkable. He denied any recent history of chemotherapy, fever, chills, N/V/D, abnormal bleeding, weight-loss or night sweats. Chest x-ray showed probable left-sided pneumonia with loculated pleural effusion which was confirmed via CT chest, which revealed a moderate to large amount of multiloculated left pleural fluid in the left lung. Mediastinal and bilateral axillary lymphadenopathy was also noted. He was transferred to [facility] for a higher level of care and was initiated on broad spectrum antibiotics with IV cefepime and vancomycin. Pulmonology was consulted and a thoracentesis was attempted, but was unable to be completed as no clear pocket was visualized via [address]. IR was consulted and a 10 [place], left-sided chest tube was placed on 2/14/23.  10 ml of purulent fluid was initially drained and sent for culture. TPA and dornase were administered with improvement in output. MRSA Nares was not detected and the pleural fluid culture grew PCN sensitive Streptococcus Gordonii. Vancomycin was discontinued on 2/14/23 and cefepime continued. A repeat CT of the patient's chest  on 2/17/23 revealed some improvement in the multiloculated left pleural effusions. A larger bore chest tube exchange was completed on 2/18/23 in IR. A repeat pleural fluid culture has not had any bacterial growth to date.      Chest tube output to date:   2/14 - 800 cc  2/15- 600 cc  2/16 - 700 cc  2/17 - 900 cc  2/18- 200 cc  2/19 - 910 cc  2/20 - 275 cc    CT sugery was consulted for possible decortication, but they have no plans for decortication at this time. Abnormal labs at the time of consult include:   Na:133  Cr:2.24  WBC:32.4-->19.0  Hgb:10.0    There is no growth on urine or blood cultures. Imaging/Diagonstics:  CT CHEST WO CONTRAST     Result Date: 2/17/2023  Small to moderate-sized multiloculated left pleural effusions have improved. New small foci of air within the collection is presumably related to the introduction of the left thoracostomy tube. Stable small to moderate sized mediastinal lymph nodes are nonspecific but may relate to the patient's history of lymphoma. Additional small bilateral axial lymph nodes are identified. Small hiatal hernia. XR CHEST PORTABLE     Result Date: 2/18/2023  Moderate left pleural effusion increased. No change left chest tube/pigtail catheter. XR CHEST PORTABLE     Result Date: 2/17/2023  Similar findings when rotation taken into account; left chest tube with unchanged or slightly increased loculated appearing left pleural effusion; atelectatic appearing changes. No pneumothorax. XR CHEST PORTABLE     Result Date: 2/14/2023  Interval placement of a left chest tube with reduced left pleural fluid. IR CHEST TUBE INSERTION     Result Date: 2/14/2023  Successful ultrasound guided placement of a left 10 [place] chest tube      US RETROPERITONEAL COMPLETE     Result Date: 2/19/2023  1. Simple cortical cyst at the upper pole left kidney measuring 6 mm. 2. Limited renal ultrasound. No hydronephrosis. 3. Nonvisualization of ureteral jets. Minimal distention of the urinary bladder. Patient no urge to void during the exam. RECOMMENDATIONS: Unavailable        Infectious disease was consulted for continued empyema.       CURRENT EVALUATION 3/6/2023  /79   Pulse 65   Temp 98.3 °F (36.8 °C) (Oral)   Resp 23   Ht 6' 1\" (1.854 m)   Wt 231 lb 7.7 oz (105 kg)   SpO2 96%   BMI 30.54 kg/m²     Afebrile  VS stable    Patient feels better  No complaints  No new issues per RN    Medications reviewed: On IV Ceftriaxone. Stop date 3-12-23    The patient continues to feel well, stated that his breathing is continuing to improve. He is s/p decortication with CT surgery on 2/23/23  Plans for discharge with completion of antibiotics as outpatient. Chest tube removed 2/28/23  Midline placed 2/28/23    No growth from VATS culture from 2/23/23     WBC is elevated but is on a downward trend    Stable KELSEA   He is receiving 40 mg Lasix IV once a day     No acute issues noted    Labs, X rays reviewed: 3/6/2023    BUN: 63-->63--->64-->59-->49  Cr: 4.27--->4.14--->3.79-->2.92-->2.25  Na  137--->134-->140-->134  K 5.0--->4.5-->5.0-->4.4    WBC: 22.9--->24.1--->26.0---->25.0--->16.3  Hb:10.0-->11.2-->11.2-->9.2--->8.3  Plat: 425-->461-->434-->446-->433--->835    CRP: 202.8     Cultures:  Urine:  2/17/23: No growth  2/22/23 : No growth   Blood:  2/17/23: No growth  MRSA Nares:  2/13/23: Not detected  Pleural Fluid:  2/14/23: Streptococci Gordonii  2/18/23: No growth  2/23/23: No growth    Discussed with patient, RN, IM. Chest x-ray: 3/1/23      Chest x-ray: 2/28/23      Chest x-ray: 2/23/23                                           CT: 2/17/23      I have personally reviewed the past medical history, past surgical history, medications, social history, and family history, and I have updated the database accordingly. Past Medical History:     Past Medical History:   Diagnosis Date    Acute interstitial nephritis 08/03/2022    Unclear cause. Creat bumped to 2.9, baseline in oct 2020 1.6. Exact description of his kidney biopsy is not available to me however pathologist seems to think there is diabetic glomerosclerosis and acute interstitial nephritis seen on the biopsy specimen. No mention about chronic changes.   Because of acute interstitial nephritis a trial of steroids for 15 days will be given to see if I can impro    Aortic regurgitation     ARF (acute renal failure) ([facility] [address] 08/03/2022    kidney biopsy from June 2022  showing findings of acute interstitial nephritis. Exact description not available. Trial of steroids will be given to see if I can improve renal function. Creatinine in October 2020 was 1.6, creatinine in June 2022 was 2.9. Trial of steroids will be given to see if renal function can be improved.     Chronic kidney disease     Hyperlipidemia     Hypertension     Non-Hodgkin lymphoma ([facility] [address]     Research study patient 02/13/2023    [id number] Completed 2/15/23    Sarcoidosis     Type II or unspecified type diabetes mellitus without mention of complication, not stated as uncontrolled        Past Surgical  History:     Past Surgical History:   Procedure Laterality Date    CARPAL TUNNEL RELEASE Left 11/15     EYE SURGERY Right     shunt and cataract     EYE SURGERY Right 6/16    laser     IR CHEST TUBE INSERTION  2/13/2023    IR CHEST TUBE INSERTION 2/13/2023 [facility]    IR CHEST TUBE INSERTION  2/18/2023    IR CHEST TUBE INSERTION 2/18/2023 Salvatore Smith MD [facility]    KNEE ARTHROSCOPY      MALIGNANT SKIN LESION EXCISION      ROTATOR CUFF REPAIR Right 7/14    ROTATOR CUFF REPAIR Left 5/15    THORACOSCOPY Left 2/23/2023    VIDEO ASSISTED THORACOSCOPY, DECORTICATION performed by Ignacio Cueto MD at [address] [facility] Right 10/2018       Medications:      cefTRIAXone (ROCEPHIN) IV  2,000 mg IntraVENous Q24H    insulin lispro  0-8 Units SubCUTAneous TID WC    insulin lispro  0-4 Units SubCUTAneous Nightly    furosemide  40 mg Oral Daily    repaglinide  1 mg Oral BID AC    metoprolol tartrate  50 mg Oral BID    senna  2 tablet Oral Nightly    amiodarone  200 mg Oral BID    sodium chloride flush  5-40 mL IntraVENous 2 times per day    heparin (porcine)  5,000 Units SubCUTAneous 3 times per day    midodrine  10 mg Oral TID WC    docusate sodium  100 mg Oral TID    pantoprazole  40 mg Oral QAM AC    colchicine  0.3 mg Oral Daily    sodium chloride flush  5-40 mL IntraVENous 2 times per day    mupirocin   Nasal BID    chlorhexidine   Topical See Admin Instructions    sodium chloride flush  5-40 mL IntraVENous 2 times per day    polyethylene glycol  17 g Oral Daily    sodium chloride flush  5-40 mL IntraVENous 2 times per day       Social History:     Social History     Socioeconomic History    Marital status:      Spouse name: Not on file    Number of children: Not on file    Years of education: Not on file    Highest education level: Not on file   Occupational History    Not on file   Tobacco Use    Smoking status: Never    Smokeless tobacco: Former    Tobacco comments:     minimal and infrequent    Substance and Sexual Activity    Alcohol use: Yes     Alcohol/week: 0.0 standard drinks     Comment: rare    Drug use: No    Sexual activity: Not on file   Other Topics Concern    Not on file   Social History Narrative    Not on file     Social Determinants of Health     Financial Resource Strain: Low Risk     Difficulty of Paying Living Expenses: Not very hard   Food Insecurity: No Food Insecurity    Worried About Running Out of Food in the Last Year: Never true    Ran Out of Food in the Last Year: Never true   Transportation Needs: No Transportation Needs    Lack of Transportation (Medical): No    Lack of Transportation (Non-Medical): No   Physical Activity: Not on file   Stress: Stress Concern Present    Feeling of Stress : To some extent   Social Connections: Socially Integrated    Frequency of Communication with Friends and Family: Three times a week    Frequency of Social Gatherings with Friends and Family:  Three times a week    Attends Congregational Services: 1 to 4 times per year    Active Member of Clubs or Organizations: No    Attends Club or Organization Meetings: 1 to 4 times per year    Marital Status:    Intimate Partner Violence: Not At Risk    Fear of Current or Ex-Partner: No    Emotionally Abused: No    Physically Abused: No    Sexually Abused: No   Housing Stability: Low Risk     Unable to Pay for Housing in the Last Year: No    Number of Places Lived in the Last Year: 1    Unstable Housing in the Last Year: No       Family History:     Family History   Problem Relation Age of Onset    High Blood Pressure Father     Other Father         AAA    Heart Disease Other         uncle x 2         Allergies:   Latex, Gabapentin, Meperidine, Erythromycin, Glimepiride, Lidocaine, Hydrocodone, Macrolides and ketolides, Niacin and related, Trelegy ellipta [fluticasone-umeclidin-vilant], Pregabalin, and Xanax [alprazolam]     Review of Systems:   Constitutional: No fevers or chills. No systemic complaints  Head: No headaches  Eyes: No double vision or blurry vision. No conjunctival inflammation. ENT: No sore throat or runny nose. . No hearing loss, tinnitus or vertigo. Cardiovascular: No chest pain or palpitations. shortness of breath. WANG  Lung: Left sided chest tube with some shortness of breath with dry cough. No sputum production  Abdomen: No nausea, vomiting, diarrhea, or abdominal pain. Lawernce Roughen No cramps. Genitourinary: No increased urinary frequency, or dysuria. No hematuria. No suprapubic or CVA pain  Musculoskeletal: No muscle aches or pains. No joint effusions, swelling or deformities  Hematologic: No bleeding or bruising. Neurologic: No headache, weakness, numbness, or tingling. Integument: No rash, no ulcers. Psychiatric: No depression. Endocrine: No polyuria, no polydipsia, no polyphagia.     Physical Examination :   Patient Vitals for the past 8 hrs:   BP Temp Temp src Pulse Resp SpO2   03/06/23 1147 -- -- -- 65 23 96 %   03/06/23 1146 -- -- -- 66 14 96 %   03/06/23 1145 -- -- -- 69 17 97 %   03/06/23 1144 -- -- -- 68 17 98 %   03/06/23 1143 103/79 98.3 °F (36.8 °C) Oral 69 22 96 %   03/06/23 1142 -- -- -- 69 -- 95 % 03/06/23 0740 118/73 -- -- 66 23 94 %       General Appearance: Awake, alert, and in no apparent distress  Head:  Normocephalic, no trauma  Eyes: Pupils equal, round, reactive to light and accommodation; extraocular movements intact; sclera anicteric; conjunctivae pink. No embolic phenomena. ENT: Oropharynx clear, without erythema, exudate, or thrush. No tenderness of sinuses. Mouth/throat: mucosa pink and moist. No lesions. Dentition in good repair. Neck:Supple, without lymphadenopathy. Thyroid normal, No bruits. Pulmonary/Chest: Left sided chest tube in place to suction with serosanguinous drainage. Diminished to auscultation. No dullness to percussion. Cardiovascular: Regular rate and rhythm without murmurs, rubs, or gallops. Abdomen: Soft, non tender. Bowel sounds normal. No organomegaly  All four Extremities: No cyanosis, clubbing, edema, or effusions. Neurologic: No gross sensory or motor deficits. Skin: Warm and dry with good turgor. No signs of peripheral arterial or venous insufficiency. No ulcerations. Left sided-chest tube site CDI    Medical Decision Making -Laboratory:   I have independently reviewed/ordered the following labs:    CBC with Differential:   No results for input(s): WBC, HGB, HCT, PLT, SEGSPCT, BANDSPCT, LYMPHOPCT, MONOPCT, EOSPCT in the last 72 hours. BMP:   Recent Labs     03/04/23  0251   *   K 4.4   CL 99   CO2 26   BUN 49*   CREATININE 2.25*       Hepatic Function Panel: No results for input(s): PROT, LABALBU, BILIDIR, IBILI, BILITOT, ALKPHOS, ALT, AST in the last 72 hours. No results for input(s): RPR in the last 72 hours. No results for input(s): HIV in the last 72 hours. No results for input(s): BC in the last 72 hours.   Lab Results   Component Value Date/Time    MUCUS 1+ 02/13/2023 02:17 AM    RBC 2.96 02/28/2023 04:13 AM    RBC 5.15 04/03/2012 11:08 AM    WBC 16.3 02/28/2023 04:13 AM    TURBIDITY Cloudy 02/22/2023 01:17 PM     Lab Results   Component Value Date/Time    CREATININE 2.25 03/04/2023 02:51 AM    GLUCOSE 136 03/04/2023 02:51 AM    GLUCOSE 140 04/03/2012 11:08 AM       Medical Decision Making-Imaging:     Narrative   EXAMINATION:   CT OF THE CHEST WITHOUT CONTRAST, 2/17/2023 9:29 am       TECHNIQUE:   CT of the chest was performed without the administration of intravenous   contrast. Multiplanar reformatted images are provided for review. Automated   exposure control, iterative reconstruction, and/or weight based adjustment of   the mA/kV was utilized to reduce the radiation dose to as low as reasonably   achievable. COMPARISON:   02/13/2023       HISTORY:   ORDERING SYSTEM PROVIDED HISTORY:  Improvement? TECHNOLOGIST PROVIDED HISTORY:   Improvement? FINDINGS:   Mediastinum: Small bilateral axial lymph nodes are identified not   significantly changed. Small to moderate size mediastinal lymph nodes are   additionally present. Lungs/Pleura: There are multiple loculated effusions within the left chest   which have improved since previous exam.  Small amounts of new air are likely   secondary to the presence of the left thoracostomy tube. Left lower lobe atelectasis is identified. Upper Abdomen: There is a probable small hiatal hernia. Soft Tissues/Bones:  No acute osseous abnormality is appreciated. Old right   rib fractures are noted. Impression   Small to moderate-sized multiloculated left pleural effusions have improved. New small foci of air within the collection is presumably related to the   introduction of the left thoracostomy tube. Stable small to moderate sized mediastinal lymph nodes are nonspecific but   may relate to the patient's history of lymphoma. Additional small bilateral   axial lymph nodes are identified. Small hiatal hernia.              Narrative   EXAMINATION:   ONE XRAY VIEW OF THE CHEST       2/18/2023 3:04 pm       COMPARISON:   CT chest February 17, 2023 HISTORY:   ORDERING SYSTEM PROVIDED HISTORY: worsening   TECHNOLOGIST PROVIDED HISTORY:   worsening       FINDINGS:   Pigtail catheter on the left. Moderate left effusion appears   denser/increased. Mild bilateral interstitial infiltrates or edema. Heart   and mediastinum unremarkable. Bony thorax intact. No pneumothorax noted. Subsegmental atelectasis right lower lung. Impression   Moderate left pleural effusion increased. No change left chest tube/pigtail   catheter. Narrative   EXAMINATION:   ONE XRAY VIEW OF THE CHEST       2/28/2023 6:49 am       COMPARISON:   02/27/2023       HISTORY:   ORDERING SYSTEM PROVIDED HISTORY: s/p VATs with decortication   TECHNOLOGIST PROVIDED HISTORY: S/p VATs with decortication       FINDINGS:   Cardiomegaly is unchanged. Left-sided chest tubes remain in place. Small   left pleural effusion and patchy left basilar opacities are not significantly   changed. No evidence of pneumothorax. Impression   No significant interval change. Narrative   EXAMINATION:   ONE XRAY VIEW OF THE CHEST       2/28/2023 9:16 am       COMPARISON:   02/28/2023 at 06:20       HISTORY:   ORDERING SYSTEM PROVIDED HISTORY: post chest tube removal   TECHNOLOGIST PROVIDED HISTORY:   post chest tube removal   Reason for Exam: uprt port chest       FINDINGS:   Left-sided chest tube has been removed. Small left pleural effusion   persists, unchanged. Left basilar airspace disease is similar as well. No   pneumothorax identified. Cardial pericardial silhouette is enlarged but similar when compared to the   previous exam.           Impression   Interval removal of left-sided chest tube. No pneumothorax. Continued small left pleural effusion, unchanged. Left basilar airspace   disease is unchanged.      Narrative   EXAMINATION:   ONE XRAY VIEW OF THE CHEST       3/1/2023 5:39 am       COMPARISON:   02/28/2023       HISTORY:   ORDERING SYSTEM PROVIDED HISTORY: s/p VATs with decortication   TECHNOLOGIST PROVIDED HISTORY: S/p VATs with decortication       FINDINGS:   Mild cardiomegaly is unchanged. Small left pleural effusion and patchy   opacities at the left base are unchanged. No evidence of pneumothorax. No   free air beneath the diaphragm. Impression   No significant oval change. Small left pleural effusion and patchy opacities   at the left base. Narrative   EXAMINATION:   ONE XRAY VIEW OF THE CHEST       3/3/2023 5:54 am       COMPARISON:   03/02/2023, 03/01/2023       HISTORY:   ORDERING SYSTEM PROVIDED HISTORY: s/p VATs with decortication   TECHNOLOGIST PROVIDED HISTORY:   s/p VATs with decortication       FINDINGS:   Shallow inflation. The cardiac and mediastinal contours appear unchanged. Mild vascular congestion, basilar opacities and blunting of the left   costophrenic angle appear unchanged. No new airspace disease identified in   the interval.  No evidence for pneumothorax. Impression   No significant interval change. Medical Decision [id number]:       Medical Decision Making-Other:     Note:  Labs, medications, radiologic studies were reviewed with personal review of films  Large amounts of data were reviewed  Discussed with nursing Staff, Discharge planner  Infection Control and Prevention measures reviewed  All prior entries were reviewed  Administer medications as ordered  Prognosis: [address]  Discharge planning reviewed  Follow up as outpatient. Thank you for allowing us to participate in the care of this patient. Please call with questions.     Caroline Ivory MD.        Pager: [phone] - Office: [phone]

## 2023-03-06 NOTE — CARE COORDINATION
Discharge 1 [facility] [place] Case Management Department  Written by: Charline Bernard RN    Patient Name: Sangita Pena  Attending Provider: Natalia Headley MD  Admit Date: 2023 10:33 PM  MRN: [id number]  Account: [de-identified]                     : 1955  Discharge Date: 3-6-2023      Disposition: [facility]    Charline Bernard RN

## 2023-03-06 NOTE — DISCHARGE SUMMARY
[facility]  Office: [address], DO, Solitario Mcknight, DO, Puma Talamantes, DO, Maribell Okeefe Blood, DO, Vinay Byers MD, Sandra Nina MD, Katerin Cancino MD, Marly Murillo MD,  Apollo Florez MD, Moy Ramirez MD, Gianna Yoo, DO, Caity Alcocer MD,  Handy Okeefe MD, Jamia Castro MD, Aaron Jaime, DO, Gavino Stafford MD, Tadeo Garcia MD, Khurram Yip, DO, Maikol Mcgill MD, Mounika Avelar MD, Kingsley Gillis MD, Vega Watson MD, Aundrea Steele, DO, Sergey Simon MD, Hafsa Soler MD, Pancho Saldana, CNP,  Melanie Quiroga, CNP, Jaimee Khalil, CNP, Tennille Galdamez, CNP,  Gagandeep Duarte, DNP, Chely Jesus, CNP, Jorje Chicas, CNP, Andres Dodge, CNP, Tomasa Oviedo, CNP, Michele Cancer, CNP, Reymundo De Jesus PA-C, Sonny Nesbitt, CNS, Nii Stanton, CNP, Melanie Shakir, [facility]    Discharge Summary     Patient ID: Deedee Ortega  :  1955   MRN: [id number]     ACCOUNT:  [de-identified]   Patient's PCP: Francisca Delaney MD  Admit Date: 2023   Discharge Date: 3/6/2023 ***    Length of Stay: 22  Code Status:  Full Code  Admitting Physician: Susanna Mejia MD  Discharge Physician: Susanna Mejia MD     Active Discharge Diagnoses:     Hospital Problem Lists:  Principal Problem:    Pneumonia, unspecified organism  Active Problems:    Pleural effusion    Hyperkalemia    Acute respiratory failure with hypoxia (HCC)    Hyponatremia    Hyperglycemia    History of non-Hodgkin's lymphoma    History of sarcoidosis    Empyema (HCC)    History of atrial fibrillation    History of type 2 diabetes mellitus    K[facility]acute kidney [facility]) [facility]  Resolved Problems:    * No resolved hospital problems.  *      Admission Condition:  poor     Discharged Condition: fair    Hospital Stay:     Hospital Course:  Deedee Ortega is a 79 y.o. male who was admitted for the management of   Pneumonia, unspecified organism , presented to ER with shortness of breath. Per my partner:  Kole Moreno is 79 y.o. male  Who was admitted to the hospital on 2/12/2023 for treatment of Pneumonia, unspecified organism. Patient presented to ER at [facility] on 2/12/2023 with dyspnea and was found to have left multiloculated pneumonia with large pleural effusion with mediastinal and bilateral axillary lymphadenopathy. Patient has prior history of non-Hodgkin's lymphoma, multiple myeloma. Patient was requiring high flow nasal cannula and had thoracentesis. Pleural fluid was consistent with empyema and patient had chest tube placement by IR. He was treated with broad-spectrum antibiotics. Patient was also given dornase without much improvement. CT surgery was consulted and recommended increasing the size of chest tube and did not recommend any open surgical intervention at this time. Patient had exchange of chest tube on 2/18/2023. He had diffuse ST elevation secondary to pericarditis on 1/21/23. Patient was was started on colchicine. Patient had new onset atrial fibrillation with RVR on 10/22/23 and was given digoxin and amiodarone infusion . Patient also given colchicine and high-dose aspirin for pericarditis which was later changed to Solu-Medrol. Patient underwent VATS with decortication on 2/23/2023. He was extubated postoperatively on 2/23/2023. Patient underwent AFIA cardioversion on 2/27/2023 for rate uncontrolled atrial fibrillation. \"      Significant therapeutic interventions: ***    Significant Diagnostic Studies:   Labs / Micro:  {[id number]}   ***  Radiology:  XR CHEST PORTABLE    Result Date: 3/3/2023  No significant interval change. XR CHEST PORTABLE    Result Date: 3/2/2023  Moderate cardiomegaly with minimal pulmonary vascular prominence without pulmonary edema. As compared to previous portable chest x-ray pulmonary vascular congestion is decreased.  At the left lung base decreased atelectatic changes, and/or infiltrates as compared to previous study. Still there are some patchy infiltrates, atelectasis or fibrotic changes present in the left lung base. In the rest of both lungs, there is no other focal abnormality or new abnormality. XR CHEST PORTABLE    Result Date: 3/1/2023  No significant oval change. Small left pleural effusion and patchy opacities at the left base. XR CHEST PORTABLE    Result Date: 2/28/2023  No significant interval change. XR CHEST PORTABLE    Result Date: 2/28/2023  Interval removal of left-sided chest tube. No pneumothorax. Continued small left pleural effusion, unchanged. Left basilar airspace disease is unchanged. Consultations:    Consults:     Final Specialist Recommendations/Findings:   IP CONSULT TO CRITICAL CARE  IP CONSULT TO NEPHROLOGY  IP CONSULT TO CARDIOLOGY  IP CONSULT TO NEPHROLOGY  IP CONSULT TO CARDIOTHORACIC SURGERY  IP CONSULT TO INFECTIOUS DISEASES  IP CONSULT TO CARDIOLOGY  IP CONSULT TO NEPHROLOGY  IP CONSULT TO IV TEAM      The patient was seen and examined on day of discharge and this discharge summary is in conjunction with any daily progress note from day of discharge. Discharge plan:     Disposition:  To a non-[facility] facility, [facility]    Physician Follow Up:     Ava Sargent MD  [address] [place] [address]  [phone]    Call in 2 week(s)      [facility] [address] [place] [address] [facility]  [address] [place] [id number]  [phone]  Follow up in 2 week(s)  For hospital Follow Up    Estela Goodson MD  [address] [facility] [address]  [phone]    Follow up  For hospital Follow Up       Diet: cardiac diet and diabetic diet    Activity: As tolerated      Discharge Medications:      Medication List        START taking these medications      aluminum & magnesium hydroxide-simethicone 199-119-45 MG/5ML Susp suspension  Commonly known as: MAALOX  Take 30 mLs by mouth every 6 hours as needed for Indigestion     amiodarone 200 MG tablet  Commonly known as: CORDARONE  Take 1 tablet by mouth 2 times daily for 8 doses Then take 1 tab daily x 30 days     colchicine 0.6 MG tablet  Commonly known as: COLCRYS  Take 0.5 tablets by mouth daily     docusate 100 MG Caps  Commonly known as: COLACE, DULCOLAX  Take 100 mg by mouth 3 times daily     furosemide 40 MG tablet  Commonly known as: LASIX  Take 1 tablet by mouth daily     heparin (porcine) 5000 UNIT/ML injection  Inject 1 mL into the skin every 8 hours     * insulin lispro 100 UNIT/ML Soln injection vial  Commonly known as: HUMALOG  Inject 0-8 Units into the skin 3 times daily (with meals)     * insulin lispro 100 UNIT/ML Soln injection vial  Commonly known as: HUMALOG  Inject 0-4 Units into the skin nightly     metoprolol tartrate 50 MG tablet  Commonly known as: LOPRESSOR  Take 1 tablet by mouth 2 times daily     midodrine 10 MG tablet  Commonly known as: PROAMATINE  Take 1 tablet by mouth 3 times daily (with meals)     pantoprazole 40 MG tablet  Commonly known as: PROTONIX  Take 1 tablet by mouth every morning (before breakfast)     polyethylene glycol 17 g packet  Commonly known as: GLYCOLAX  Take 17 g by mouth daily as needed for Constipation     senna 8.6 MG tablet  Commonly known as: SENOKOT  Take 2 tablets by mouth nightly           * This list has 2 medication(s) that are the same as other medications prescribed for you. Read the directions carefully, and ask your doctor or other care provider to review them with you.                 CONTINUE taking these medications      Advair Diskus 250-50 MCG/ACT Aepb diskus inhaler  Generic drug: fluticasone-salmeterol     albuterol sulfate  (90 Base) MCG/ACT inhaler  Commonly known as: PROVENTIL;VENTOLIN;PROAIR     atorvastatin 80 MG tablet  Commonly known as: LIPITOR fenofibrate 145 MG tablet  Commonly known as: TRICOR     Nasal Spray Bottle Misc     repaglinide 1 MG tablet  Commonly known as: PRANDIN            STOP taking these medications      amLODIPine 5 MG tablet  Commonly known as: NORVASC     brimonidine-timolol 0.2-0.5 % ophthalmic solution  Commonly known as: COMBIGAN     CINNAMON PO     dicyclomine 10 MG capsule  Commonly known as: BENTYL     DULoxetine 60 MG extended release capsule  Commonly known as: CYMBALTA     dutasteride 0.5 MG capsule  Commonly known as: AVODART     finasteride 5 MG tablet  Commonly known as: PROSCAR     metoprolol succinate 25 MG extended release tablet  Commonly known as: TOPROL XL     omeprazole 20 MG delayed release capsule  Commonly known as: PRILOSEC     pimecrolimus 1 % cream  Commonly known as: ELIDEL     VICTOZA SC               Where to Get Your Medications        Information about where to get these medications is not yet available    Ask your nurse or doctor about these medications  aluminum & magnesium hydroxide-simethicone 200-200-20 MG/5ML Susp suspension  amiodarone 200 MG tablet  colchicine 0.6 MG tablet  docusate 100 MG Caps  furosemide 40 MG tablet  heparin (porcine) 5000 UNIT/ML injection  insulin lispro 100 UNIT/ML Soln injection vial  insulin lispro 100 UNIT/ML Soln injection vial  metoprolol tartrate 50 MG tablet  midodrine 10 MG tablet  pantoprazole 40 MG tablet  polyethylene glycol 17 g packet  senna 8.6 MG tablet         No discharge procedures on file. Time Spent on discharge is  {Blank single:[id number]::\"15 mins\",\"17 mins\",\"20 mins\",\"31 mins\",\"32 mins\",\"33 mins\",\"34 mins\",\"35 mins\",\"36 mins\",\"37 mins\",\"38 mins\",\"39 mins\",\"40 mins\",\"41 mins\",\"43 mins\",\"45 mins\",\"50 mins\",\"***\"} in patient examination, evaluation, counseling as well as medication reconciliation, prescriptions for required medications, discharge plan and follow up.     Electronically signed by   Olya Chapman MD  3/6/2023  10:16 AM      Thank you  Cassandra Wang MD for the opportunity to be involved in this patient's care.

## 2023-03-07 NOTE — ADT AUTH CERT
Patient Demographics    Name Patient ID SSN Gender Identity Birth Date   Olman Bender [id number]  Male [birth year] (79 yrs)     Address Phone Email Employer    [address]   [place] [address] [phone] (G)   [phone] (M) [email or web address] NOT 2 [employer] Race Occupation Emp Status    -- White (non-) -- Not Employed      Reg Status PCP Date Last Verified Next Review Date    Verified Toñito Ask, [address] [facility]  [phone] 23      Admission Date Discharge Date Admitting Provider     23 Natalia Headley MD       Marital Status Voodoo       Non-Judaism        Emergency Contact 1 Emergency Contact [address] [facility] 0826 [address] [facility]  [address] Lily Lizarr[address]   [phone] (P)   [phone] ([address] [place] [address]) Corazon Deng (C)   X   [place]   [phone] (H)     [address] [place] [address] [address] Account [de-identified]  CVG Subscriber Name/Sex/Relation Subscriber  Subscriber Address/Phone Subscriber Emp/Emp Phone   4. [address] [facility]   [id number] [facility] GHAZAL - Female   (Spouse) 1954 [address]   [facility],  [address]   [phone](G) [address] [place] [address]    2.  [employer]   [id number] Toan Dwyer - Male   (Self) 1955 [address]59 [address]   [facility], [address]   [phone](J) NOT EMPLOYED      Utilization Reviews       Pneumonia - Care Day 22 (3/5/2023) by Johnnie Conde RN       Review Status Review Entered   Completed 3/7/2023 1257       Created By   Johnnie Conde RN      Criteria Review      Care Day: 22 Care Date: 3/5/2023 Level of Care: Intermediate Care    Guideline Day 2    Level Of Care    (X) Floor    3/7/2023 12:57 PM EST by Roxie Denise      intermediate    Clinical Status    ( ) * No CO2 retention or acidosis    (X) * No requirement for mechanical ventilation    3/7/2023 12:57 PM EST by Roxie Denise      room air    (X) * Hypotension absent    3/7/2023 12:57 PM EST by Marita Castañeda      102/62    (X) * Afebrile or fever improved    3/7/2023 12:57 PM EST by Marita Castañeda      36.8    (X) * No hypoxia on room air or oxygenation improved    3/7/2023 12:57 PM EST by Jacob Adams 93%  ra    ( ) * Mental status improved or at baseline    Activity    (X) * Increased activity    3/7/2023 12:57 PM EST by Marita Castañeda      ambulate    Routes    (X) Oral hydration    (X) Oral medications    (X) Usual diet    3/7/2023 12:57 PM EST by Marita Castañeda      cardiac /  renal    Interventions    (X) Incentive spirometry    3/7/2023 12:57 PM EST by Marita Castañeda      is q2h    (X) Pulse oximetry    3/7/2023 12:57 PM EST by Marita Castañeda      at night    Medications    (X) IV or oral antibiotics    3/7/2023 12:57 PM EST by Marita Castañeda      rocephin  2 gm iv  q24h    * Milestone   Additional Notes   DATE: 3/5/23       VITALS:   98.2 (36.8) 28 71 102/62 - Semi fowlers - - 92%  ra      Wt   229 lbs             ABNL/PERTINENT LABS/RADIOLOGY/DIAGNOSTIC STUDIES:   [place]  252, 128,  187,  233       PHYSICAL EXAM:   Mental Status:  oriented to person, place but not time,  normal affect   Lungs:  improved BS bilat, no W/R/R, normal effort   Heart:  regular rate and rhythm, no murmur   Abdomen:  soft, nontender, nondistended, normal bowel sounds, no masses, hepatomegaly, splenomegaly   Extremities:  no edema, redness, tenderness in the calves   Skin:  no gross lesions, rashes, induration            MD CONSULTS/ASSESSMENT AND PLAN:   [facility]    He is off oxygen, but O2 sat 88-92% on RA. He still has some WANG. 1. Left empyema- + Strep Gordonii on pleural culture s/p decortication 2/23/23, con't IV Rocephin until 3/12/23, CTS following and ID   2. A.fib with RVR- s/p cardioversion, con't Amiodarone    3. Pericarditis- Con't Colchicine, Cardiology stopped IV Solumedrol    4.  KELSEA on CKD 3- slowly improving, Nephrology stopped IVF, decreased Lasix to daily, moon catheter removed, baseline Cr 1.8-2   5. CLAY- Cpap, check nocturnal pulse oximeter   6. COPD- stable   7. Acute delirium- seroquel at night, improving   8. DM2- BS better, Con't Lantus 10 units in the morning, reduce SSI to moderate livel, Prandin with meals   9. Gi/ DVT proph   10. PT/OT         Infectious Diseases    · Left-sided empyema   º Streptococci Gordonii on pleural fluid culture from 2/14/23   º Repeat culture 2/18/23 with no bacterial growth    · Shortness of breath   · KELSEA on CKD   · Hyponatremia   · Leukocytosis   · Hx non-Hodgkin's lymphoma   · Hx multiple myeloma   · Sarcoidosis   · DM II   · Interstitial nephritis   · COPD   · Hx partial right lung lobectomy   · Allergies to macrolides and ketolides. Recommendations:   · Discontinued IV Cefepime on 2-20-23   · IV Rocephin 1 gm q 24 hrs until 3/12/23   · Midline for outpatient therapy placed 2/28/23   · S/p decortication on 2/23/23   · Cardioversion completed 2/27/23 for continued AFIB      MEDICATIONS:   Cordarone 200 mg   2xd , colcrys 0.3 mg   daily, heparin  5000 units sc  3xd,     Lopressor   50 mg   2xd,  proamatine 10 mg   3xd,  protonix 40 mg daily, lasix 40 mg dialy   prandin 1 mg   2xd  percocet  5-325  1 tab  q4hprn x1     ORDERS:     cont pulse ox at night ,  telemtry,   vs   daily wt,   I and o,   vs  RT .              PT/OT/SLP/CM ASSESSMENT OR NOTES:    Dc plan  snf  tomorrow         Pneumonia - Care Day 20 (3/3/2023) by Kenn Hodge RN       Review Status Review Entered   Completed 3/7/2023 1244       Created By   Kenn Hodge RN      Criteria Review      Care Day: 20 Care Date: 3/3/2023 Level of Care: Intermediate Care    Guideline Day 2    Level Of Care    (X) Floor    3/7/2023 12:44 PM EST by Reina Nieves      intermediate    Clinical Status    ( ) * No CO2 retention or acidosis    (X) * No requirement for mechanical ventilation    3/7/2023 12:44 PM EST by Reina Nieves      ra    (X) * Hypotension absent    3/7/2023 12:44 PM EST by Spike Garcia      110.68    (X) * Afebrile or fever improved    3/7/2023 12:44 PM EST by Spike Garcia      37.0    (X) * No hypoxia on room air or oxygenation improved    3/7/2023 12:44 PM EST by Spike Mansfielding      sp02 95%  ra    ( ) * Mental status improved or at baseline    Activity    (X) * Increased activity    3/7/2023 12:44 PM EST by Spike Mansfielding      ambulate    Routes    (X) Oral hydration    3/7/2023 12:44 PM EST by Spike Mansfielding      po    (X) Oral medications    3/7/2023 12:44 PM EST by Spike Mansfielding      po    (X) Usual diet    3/7/2023 12:44 PM EST by Spike Mansfielding      cardiac Bj [place]    Interventions    (X) Incentive spirometry    3/7/2023 12:44 PM EST by Spike Garcia      is q2h    (X) Pulse oximetry    3/7/2023 12:44 PM EST by Spike Garcia      cont pulse ox over night    Medications    (X) IV or oral antibiotics    3/7/2023 12:44 PM EST by Spike Mansfielding      rocephin  1 gm iv   q24h    * Milestone   Additional Notes   DATE: 3.3/23       VITALS:        DESAT  X2 2   87%  AND  89%   on room air       98.9 (37.2) 27 85 142/77 -  sp02 94%  ra      Rr  14-33    Wt 231 lbs             ABNL/PERTINENT LABS/RADIOLOGY/DIAGNOSTIC STUDIES:   Bun 51   Cr  2.64   Gfr 26   Glcuose  69, 77,  71,  139       Sinus rhythm with Premature atrial complexes   Nonspecificst ST- T wave changes   Abnormal ECG   When compared with ECG of 24-FEB-2023 08:07,   Sinus rhythm has replaced Atrial flutter   Vent. rate has decreased BY  57 BPM      Cxr    No significant interval change         PHYSICAL EXAM:   General appearance: awake, alert, in no apparent respiratory distress    HEENT: Head: Normocephalic, no lesions, without obvious abnormality   Neck: no JVD   Lungs: Clear to auscultation on anterior chest.   Heart: Normal S1 and S2, regular rate and rhythm. Abdomen: soft, non-tender; bowel sounds normal   Extremities: No LE edema   Neurologic: Mental status: Alert, oriented. Motor and sensory not done. MD CONSULTS/ASSESSMENT AND PLAN:   Cardiology     Denied any active complaint. Feeling much better. In NSR with occasional PACs. Continue to be on colchicine. Steroids stopped yesterday. 1. Left sided Empyema s/p VATS/decortication 1/23/2023   2. Acute pericarditis on renal dose colchicine. Steroids stopped. 3. Community acquired pneumonia. Improving   4. Acute hypoxic respiratory failure due to above. Resolved   5. New Onset Atrial fibrillation with RVR s/p AFIA/CV to NSR.    6. History of non-Hodgkin's lymphoma   7. History of nonrheumatic mitral regurgitation/Tricuspid regurgitation   8. History of dilated aortic root   9. Type 2 diabetes   10. KELSEA on Chronic kidney disease stage III/IV   11. CLAY on CPAP       Plan of Treatment:   1. In NSR with PAC's. 2. Continue p.o. amiodarone 200 mg 2 times daily for 14 doses followed by 200 mg daily   3. ASA held per nephrology. On Colchicine 0.3 qday for pericarditis (renal dose). 4. IV steroids stopped   5. On antibiotics as per ID   6. Continue Lopressor 50 twice daily with holding parameters. 7. Continue with as needed Lopressor 5 mg IV q6h for HR >110   8. On p.o. Lasix as per nephro, creatinine improving    9. On Midodrine 10 mg TID               Medicine    He's been walking with PT/OT and is in good spirits. His appetite is much better, Cr 2.64 slowly improving. He is off oxygen, but O2 sat 88-92% on RA. He still has some WANG. Mental Status:  oriented to person, place but not time,  normal affect   Lungs:  improved BS bilat, no W/R/R, normal effort   Heart:  regular rate and rhythm, no murmur   Abdomen:  soft, nontender, nondistended, normal bowel sounds, no masses, hepatomegaly, splenomegaly   Extremities:  no edema, redness, tenderness in the calves   Skin:  no gross lesions, rashes, induration      1.  Left empyema- + Strep Gordonii on pleural culture s/p decortication 2/23/23, con't IV Rocephin until 3/12/23, CTS following and ID   2. A.fib with RVR- s/p cardioversion, con't Amiodarone    3. Pericarditis- Con't Colchicine, Cardiology stopped IV Solumedrol    4. KELSEA on CKD 3- slowly improving, Nephrology stopped IVF, decreased Lasix to daily, moon catheter removed, repeat BMP tomorrow, baseline Cr 1.8-2   5. CLAY- Cpap   6. COPD- stable   7. Acute delirium- seroquel at night, improving   8. DM2- BS better, low BS this morning, stop Lantus, reduce SSI to moderate livel, Prandin with meals   9. Gi/ DVT proph   10. PT/OT         Infectious Diseases    · Left-sided empyema   º Streptococci Gordonii on pleural fluid culture from 2/14/23   º Repeat culture 2/18/23 with no bacterial growth    · Shortness of breath   · KELSEA on CKD   · Hyponatremia   · Leukocytosis   · Hx non-Hodgkin's lymphoma   · Hx multiple myeloma   · Sarcoidosis   · DM II   · Interstitial nephritis   · COPD   · Hx partial right lung lobectomy   · Allergies to macrolides and ketolides. Recommendations:   · Discontinued IV Cefepime on 2-20-23   · IV Rocephin 1 gm q 24 hrs until 3/12/23   · Midline for outpatient therapy placed 2/28/23   · S/p decortication on 2/23/23   · Cardioversion completed 2/27/23 for continued AFIB       Medical Decision Making/Summary/Discussion:3/3/2023       · Patient with Hx of non-Hodgkin's lymphoma and multiple myeloma   · Admitted with Shortness of breath   · Found to have Left-sided empyema   º Streptococci Gordonii on pleural fluid culture from 2/14/23   º Repeat culture 2/18/23 with no bacterial growth    · Empyema partially drained by chest tube. º Decortication planned 2/23/23   · Confusion with associated hyponatremia   · KELSEA on CKD 3   · On treatment with ceftriaxone. Renal Progress Note   BMP results from today reviewed creatinine did improve to 2.64 mg/dl and BUN of 51, peak creatinine this admission was 4.72 mg/dl. Electrolytes stable with sodium 140, potassium 3.9, chloride 102, bicarb 28, calcium 9.4.    Urine output documented as about 1.9 L and x3 more in the last 24 hours. Diuretics were changed to Lasix 40 mg p.o. daily starting today. General:          AAO x 3, speaking in full sentences, no accessory muscle use. HEENT:           Atraumatic, normocephalic, no throat congestion, moist mucosa. Eyes:               Pupils equal, round and reactive to light, EOMI. Neck:               Supple   Chest:              Bilateral vesicular breath sounds, no rales or wheezes. Cardiac:           S1 S2 RR, no murmurs, gallops or rubs. Abdomen:        Soft, non-tender, no masses or organomegaly, BS audible. :                  No suprapubic or flank tenderness. Neuro:             AAO x 3, No FND. SKIN:               No rashes, good skin turgor. Extremities:     No edema. 1. Acute Kidney Injury nonoliguric likely due to ATN from hypotension requiring pressor support, A-fib, NSAID exposure and recent surgery. Creatinine still evolving. 2. Shortness of breath. 3. Left-sided empyema Streptococcus Gordonii on pleural fluid culture 2/14/2023. Status post VATS 2/23/2023. 4. Hyponatremia. 5. Acute pericarditis. 6. New onset A-fib with RVR. 7. Acute proximal respiratory failure-stable. 8. History of non-Hodgkin lymphoma. 9. History of multiple myeloma. 10. Sarcoidosis. 11. Diabetes type 2.   12. History of acute/nephritis in June 2022. 13. History of partial right lung lobectomy. 14. CKD 3B secondary to diabetic and hypertensive nephrosclerosis with baseline creatinine of around 1.9-2.4 mg/dl and follows up with Dr. Ronald Petit. Plan:   1. Continue Lasix 40 mg p.o. once a day. 2. Antibiotics as per renal dosing per infectious diseases. 3. Monitor strict I's and O's and renal function. PULMONARY & CRITICAL CARE    Overnight he is stable no acute hypoxic events were reported. He remained on room air and saturating 94%.   Respiratory rate is in low 20s he is hemodynamically stable. According to patient he is able to ambulate inside the room he is feeling better he has mild cough mild sputum production denies chest pain and denies hemoptysis does not complain of fever appetite is improving. He is on Lasix 40 mg oral once daily his creatinine is 2.64. Urine output is 1925 in last 24 hours   He is on oral amiodarone and on Lopressor. Chest x-ray this morning 3/03/23 shows similar finding with pleural parenchymal change on the left side with mild basilar atelectasis. Not much change from chest x-ray. Chest: Left-sided dressing is present. No drainage seen   Lungs: Air entry is good on the right side. Right decreased air entry on the left side as compared to right base and minimal crackles present. Heart: regular rhythm, S1-S2 audible, no murmur. Abdomen:  Soft, nontender, nondistended, normal bowel sounds. Extremities:  No edema, redness, tenderness in the calves. Skin:  Warm, dry, no gross lesions or rashes. 1. Chest x-ray reviewed from this morning on 03/03/2023 and is compared to 03/02/2023 and 03/01/2023 showed l mild eft basilar pleuroparenchymal change/minimal atelectasis. No change   2. Patient was on Solu-Medrol 30 mg every 12 hours apparently for pericarditis. He is off Solu-Medrol. No need for steroids from pulmonary standpoint. 3. Patient is currently on Lasix oral once daily creatinine continue to improve. Follow-up with nephrology and follow-up renal function. 4. Supplemental oxygen if needed to keep oxygen saturation above 92%   5. DVT prophylaxis with heparin subcu    6. On Rocephin and follow-up with infectious disease. Duration of antibiotic per infectious disease he has midline placed   7. Continue to encourage incentive spirometry Acapella deep breathing and ambulation   8.  Bronchodilator therapy as needed         MEDICATIONS:   Cordarone 200 mg   2xd , colcrys 0.3 mg   daily, heparin  5000 units sc  3xd,     Lopressor 50 mg   2xd,  proamatine 10 mg   3xd,  protonix 40 mg daily, lasix 40 mg dialy   prandin 1 mg   2xd  percocet  5-325  1 tab  q4hprn x1     ORDERS:     cont pulse ox at night ,  telemtry,   vs   daily wt,   I and o,   vs  RT . PT/OT/SLP/CM ASSESSMENT OR NOTES:    Occupational Therapy   Patient would benefit from continued therapy after discharge in order to increase pt endurance, strength and independence   Performance deficits / Impairments: Decreased functional mobility ; Decreased ADL status; Decreased cognition;Decreased endurance;Decreased balance;Decreased high-level IADLs;Decreased strength;Decreased safe awareness   Prognosis: Good         Dc plan  snf                  Pneumonia - Care Day 18 (3/1/2023) by Delmy Nuñez RN       Review Status Review Entered   Completed 3/7/2023 1229       Created By   Delmy Nuñez RN      Criteria Review      Care Day: 18 Care Date: 3/1/2023 Level of Care: ICU    Guideline Day 2    Level Of Care    (X) Floor    3/7/2023 12:29 PM EST by Salvatore Andre      icu    Clinical Status    ( ) * No CO2 retention or acidosis    (X) * No requirement for mechanical ventilation    3/7/2023 12:29 PM EST by Salvatore Andre      room air    (X) * Hypotension absent    3/7/2023 12:29 PM EST by Salvatore Andre      106.63    (X) * Afebrile or fever improved    3/7/2023 12:29 PM EST by Salvatore Andre      97.8    (X) * No hypoxia on room air or oxygenation improved    3/7/2023 12:29 PM EST by Salvatore [employer]      sp02 95%  ra    ( ) * Mental status improved or at baseline    Activity    (X) * Increased activity    3/7/2023 12:29 PM EST by Salvatore Andre      ambulate    Routes    (X) Oral hydration    (X) Oral medications    3/7/2023 12:29 PM EST by Salvatore Andre      lasix  40 mg  iv   daily   solu medrol 30 mg iv  q12h    (X) Usual diet    3/7/2023 12:29 PM EST by Salvatore Andre      cardiac / renal    Interventions    (X) Incentive spirometry    3/7/2023 12:29 PM EST by Shiela Pollard Petra Lau      is q2h    (X) Pulse oximetry    3/7/2023 12:29 PM EST by Rafa Guerra      cont pulse ox    (X) Head of bed at 30 degrees    3/7/2023 12:29 PM EST by Rafa Guerra      hob 30    Medications    (X) IV or oral antibiotics    3/7/2023 12:29 PM EST by Rafa Guerra      rocephin 1 gm iv   q24    * Milestone   Additional Notes   DATE: 3.1.23       VITALS:   97.8  74  18   106/53  map 72  sp02  95%  ra        ABNL/PERTINENT LABS/RADIOLOGY/DIAGNOSTIC STUDIES:   Bun 59   Cr 3.00   Gfr 22    Glucose 189         PHYSICAL EXAM:   General appearance: awake, alert, in no apparent respiratory distress    HEENT: Head: Normocephalic, no lesions, without obvious abnormality   Neck: no JVD   Lungs: Decreased air entry on the left side with chest tube in place. Heart: Normal S1 and S2, regular rate and rhythm. Abdomen: soft, non-tender; bowel sounds normal   Extremities: No LE edema   Neurologic: Mental status: Alert, oriented. Motor and sensory not done. MD CONSULTS/ASSESSMENT AND PLAN:   Cardiology   . Overnight issues noted. Patient converted back to normal sinus rhythm. Patient denied any symptoms. Current heart rate is in 70s. Blood pressure is stable. Assessment / Acute Cardiac Problems:   1. Left sided Empyema s/p VATS/decortication 1/23/2023   2. Acute pericarditis on renal dose colchicine and steroids   3. Community acquired pneumonia. Proving   4. Acute hypoxic respiratory failure due to above   5. New Onset Atrial fibrillation with RVR s/p AFIA/CV to NSR   6. History of non-Hodgkin's lymphoma   7. History of nonrheumatic mitral regurgitation/Tricuspid regurgitation   8. History of dilated aortic root   9. Type 2 diabetes   10. KELSEA on Chronic kidney disease stage III/IV   11. CLAY on CPAP       Plan of Treatment:   1. Patient remained in normal sinus rhythm. 2. Stop IV amiodarone and start p.o. amiodarone 200 mg 2 times daily for 14 days followed by 200 mg daily   3.  ASA held per nephrology. On Colchicine 0.3 qday for pericarditis (renal dose). 4. On methylprednisolone 30 mg q12h   5. On antibiotics as per ID   6. Continue Lopressor 50 twice daily with holding parameters. 7. Continue with as needed Lopressor 5 mg IV q6h for HR >110   8. On IV Lasix as per nephro, creatinine improving    9. On Midodrine 10 mg TID as per nephro. 10. Will continue to follow along             Medicine    Patient is s/p chest tube removal.  His wife and daughter are in the room. He seems less confused today. He states that he stood up and walked a little with PT/OT. His appetite is much better, BS are elevated 200-300s. Cr 3.0, improving. Mental Status:  oriented to person, place but not time,  normal affect   Lungs:  + crackles bibasilar, normal effort   Heart:  regular rate and rhythm, no murmur   Abdomen:  soft, nontender, nondistended, normal bowel sounds, no masses, hepatomegaly, splenomegaly   Extremities:  no edema, redness, tenderness in the calves   Skin:  no gross lesions, rashes, induration      1. Left empyema- s/p decortication 2/23/23, con't IV Rocephin, CTS following and ID   2. A.fib with RVR- s/p cardioversion yesterday, con't Amiodarone  question of AC for Cardiology   3. Pericarditis- Colchicine, IV Solumedrol, wife states that he gets confused on steriods   4. KELSEA on CKD 3- slowly improving, Nephrology stopped IVF, decreased Lasix to daily, moon catheter in for strict I/Os, repeat BMP tomorrow, baseline Cr 1.8-2   5. CLAY- Cpap   6. COPD- stable   7. Acute delirium- seroquel at night, DM2- SSI,   8. DM2- BS elevated, Lantus started since he's on steriods, increase Lantus and SSI to moderate level   9. Gi/ DVT proph   10. PT/OT         PULMONARY & CRITICAL CARE    Patient seen in CVICU. He is S/p VATS/decortication on 02/23/2023. Overnight he is hemodynamically stable, Tmax is 98.6 in last 24 hours. He remained on room air and off oxygen and saturating around 95% to 97%. Chest tube removed per CT surgery on 02/28/2023. According to patient he is feeling better he is able to ambulate to bathroom probably with nursing staff. His cough is better denies darker sputum denies hemoptysis his chest pain is better after chest tube removal.   He is off amiodarone drip currently and transition to oral amiodarone and he is in sinus rhythm. He is on Lasix 40 mg IV once daily urine output is 1910 in last 24 hours. Labs shows creatinine is 3.79 BUN is 64 bicarbonate 22 sodium 134. Mental Status:  Oriented to person, place and time and normal affect. Chest: Left-sided dressing is present. No drainage seen   Lungs: Air entry is good on the right side. Right decreased air entry on the left side as compared to right base and minimal crackles present.  t   Heart: regular rhythm, S1-S2 audible, no murmur. Abdomen:  Soft, nontender, nondistended, normal bowel sounds. Extremities:  No edema, redness, tenderness in the calves. Skin:  Warm, dry, no gross lesions or rashes. 1. Chest x-ray reviewed from this morning on 03/01/2023 and 02/28/2023 showed left basilar pleuroparenchymal change/minimal atelectasis. 2. Patient is currently on Solu-Medrol 30 mg every 12 hours apparently for pericarditis. No need for steroids from pulmonary standpoint. 3. Patient is currently on Lasix IV once daily creatinine is better today urine output is 1900 in last 24 hours. Follow-up with nephrology and follow-up renal function   4. Supplemental oxygen if needed to keep oxygen saturation above 92%   5. DVT prophylaxis with heparin subcu when okay with CT surgery if there is no contraindication   6. On Rocephin and follow-up with infectious disease. Duration of antibiotic per infectious disease he has midline placed   7. Continue to encourage incentive spirometry Acapella deep breathing and ambulation   8.  Bronchodilator therapy as needed            Renal Progress Note   No new issues reported overnight. Patient is status post cardioversion on 2/27/2023. No BMP results available from today. Diuretics were changed to once a day yesterday. Currently on Lasix 40 mg IV daily. Creatinine did improve to 3.79 mg/dl yesterday, peak creatinine this admission was 4.72 mg/dl. Urine output documented as about 1.9 L in the last 24 hours. General:          AAO x 3, speaking in full sentences, no accessory muscle use. HEENT:           Atraumatic, normocephalic, no throat congestion, moist mucosa. Eyes:               Pupils equal, round and reactive to light, EOMI. Neck:               Supple   Chest:              Bilateral vesicular breath sounds, no rales or wheezes. Cardiac:           S1 S2 RR, no murmurs, gallops or rubs. Abdomen:        Soft, non-tender, no masses or organomegaly, BS audible. :                  No suprapubic or flank tenderness. Neuro:             AAO x 3, No FND. SKIN:               No rashes, good skin turgor. Extremities:     No edema. 1. Acute Kidney Injury nonoliguric likely due to ATN from hypotension requiring pressor support, A-fib, NSAID exposure and recent surgery. Creatinine still evolving. 2. Shortness of breath. 3. Left-sided empyema Streptococcus Gordonii on pleural fluid culture 2/14/2023. Status post VATS 2/23/2023. 4. Hyponatremia. 5. Acute pericarditis. 6. New onset A-fib with RVR. 7. Acute proximal respiratory failure-stable. 8. History of non-Hodgkin lymphoma. 9. History of multiple myeloma. 10. Sarcoidosis. 11. Diabetes type 2.   12. History of acute/nephritis in June 2022. 13. History of partial right lung lobectomy. 14. CKD 3B secondary to diabetic and hypertensive nephrosclerosis with baseline creatinine of around 1.9-2.4 mg/dl and follows up with Dr. Mona Wilson. Plan:   1. Continue Lasix 40 mg IV once a day. 2. BMP for today ordered.      3. Antibiotics as per renal dosing per infectious diseases. 4. Monitor strict I's and O's and renal function. 5. At risk for requiring RRT, will continue to monitor. 6. For acute pericarditis, now changed to steroids from NSAIDs. 7. Keep renal diet. 8. Keep Bullock in for today. 9. BMP in AM.         Infectious Diseases    · Left-sided empyema   º Streptococci Gordonii on pleural fluid culture from 2/14/23   º Repeat culture 2/18/23 with no bacterial growth    · Shortness of breath   · KELSEA on CKD   · Hyponatremia   · Leukocytosis   · Hx non-Hodgkin's lymphoma   · Hx multiple myeloma   · Sarcoidosis   · DM II   · Interstitial nephritis   · COPD   · Hx partial right lung lobectomy   · Allergies to macrolides and ketolides. Recommendations:   · Discontinued IV Cefepime on 2-20-23   · IV Rocephin 1 gm q 24 hrs until 3/12/23   · Pricilla Spivey for midline for outpatient therapy when ready for discharge   · S/p decortication on 2/23/23   · Cardioversion completed 2/27/23 for continued AFIB   · Patient with Hx of non-Hodgkin's lymphoma and multiple myeloma   · Admitted with Shortness of breath   · Found to have Left-sided empyema   º Streptococci Gordonii on pleural fluid culture from 2/14/23   º Repeat culture 2/18/23 with no bacterial growth    · Empyema partially drained by chest tube. º Decortication planned 2/23/23   · Confusion with associated hyponatremia   · KELSEA on CKD 3   · On treatment with ceftriaxone. MEDICATIONS:   Cordarone 200 mg   2xd , colcrys 0.3 mg   daily, heparin  5000 units sc  3xd,     Lopressor   50 mg   2xd,  proamatine 10 mg   3xd,  protonix 40 mg daily,    ORDERS:     cont pulse ox,  telemtry,   vs   daily wt,   I and o,   vs  RT . PT/OT/SLP/CM ASSESSMENT OR NOTES:    Occupational Therapy   Patient would benefit from continued therapy after discharge   Performance deficits/impairments: Decreased functional mobility ; Decreased ADL status; Decreased cognition;Decreased endurance;Decreased balance;Decreased high-level IADLs; Decreased strength;Decreased safe awareness   Assessment: Pt will require continued OT services to address deficits listed through use of therapeutic interventions for improved independence and safety with ADLs/IADLs and functional transfers/mobility.    Treatment Diagnosis: PNA   Prognosis: Good      Dc plan  snf

## 2023-03-23 ENCOUNTER — OFFICE VISIT (OUTPATIENT)
Dept: CARDIOTHORACIC SURGERY | Age: 68
End: 2023-03-23

## 2023-03-23 VITALS
SYSTOLIC BLOOD PRESSURE: 103 MMHG | HEART RATE: 76 BPM | BODY MASS INDEX: 26.37 KG/M2 | HEIGHT: 73 IN | TEMPERATURE: 97.2 F | RESPIRATION RATE: 20 BRPM | OXYGEN SATURATION: 98 % | WEIGHT: 199 LBS | DIASTOLIC BLOOD PRESSURE: 68 MMHG

## 2023-03-23 DIAGNOSIS — Z09 S/P LUNG SURGERY, FOLLOW-UP EXAM: Primary | ICD-10-CM

## 2023-03-23 DIAGNOSIS — J86.9 EMPYEMA OF PLEURAL SPACE (HCC): Primary | ICD-10-CM

## 2023-03-23 PROCEDURE — 99024 POSTOP FOLLOW-UP VISIT: CPT | Performed by: NURSE PRACTITIONER

## 2023-03-23 NOTE — PROGRESS NOTES
Units into the skin nightly, Disp: , Rfl:     metoprolol tartrate (LOPRESSOR) 50 MG tablet, Take 1 tablet by mouth 2 times daily, Disp: 60 tablet, Rfl: 3    midodrine (PROAMATINE) 10 MG tablet, Take 1 tablet by mouth 3 times daily (with meals), Disp: 90 tablet, Rfl: 3    pantoprazole (PROTONIX) 40 MG tablet, Take 1 tablet by mouth every morning (before breakfast), Disp: 30 tablet, Rfl: 3    polyethylene glycol (GLYCOLAX) 17 g packet, Take 17 g by mouth daily as needed for Constipation, Disp: 527 g, Rfl: 1    senna (SENOKOT) 8.6 MG tablet, Take 2 tablets by mouth nightly, Disp: 60 tablet, Rfl: 0    fluticasone-salmeterol (ADVAIR) 250-50 MCG/ACT AEPB diskus inhaler, Inhale 1 puff into the lungs every 12 hours, Disp: , Rfl:     Misc. Devices (NASAL SPRAY BOTTLE) MISC, by Does not apply route, Disp: , Rfl:     repaglinide (PRANDIN) 1 MG tablet, Take 1 mg by mouth with breakfast and with evening meal, Disp: , Rfl:     albuterol sulfate  (90 BASE) MCG/ACT inhaler, Inhale 1 puff into the lungs in the morning and at bedtime, Disp: , Rfl:     fenofibrate (TRICOR) 145 MG tablet, Take 145 mg by mouth nightly, Disp: , Rfl:     atorvastatin (LIPITOR) 80 MG tablet, Take 80 mg by mouth daily. , Disp: , Rfl:     amiodarone (CORDARONE) 200 MG tablet, Take 1 tablet by mouth 2 times daily for 8 doses Then take 1 tab daily x 30 days, Disp: 16 tablet, Rfl: 0    Past Surgical History:   Procedure Laterality Date    CARPAL TUNNEL RELEASE Left 11/15     EYE SURGERY Right     shunt and cataract     EYE SURGERY Right 6/16    laser     IR CHEST TUBE INSERTION  2/13/2023    IR CHEST TUBE INSERTION 2/13/2023 [facility] PROCEDURES    IR CHEST TUBE INSERTION  2/18/2023    IR CHEST TUBE INSERTION 2/18/2023 Swathi Guevara MD [facility] SPECIAL PROCEDURES    KNEE ARTHROSCOPY      MALIGNANT SKIN LESION EXCISION      ROTATOR CUFF REPAIR Right 7/14    ROTATOR CUFF REPAIR Left 5/15    THORACOSCOPY Left 2/23/2023    VIDEO ASSISTED THORACOSCOPY,

## 2023-03-27 LAB
MICROORGANISM SPEC CULT: NORMAL
MICROORGANISM/AGENT SPEC: NORMAL
MICROORGANISM/AGENT SPEC: NORMAL
SERVICE CMNT-IMP: NORMAL
SERVICE CMNT-IMP: NORMAL
SPECIMEN DESCRIPTION: NORMAL

## 2023-03-28 ENCOUNTER — OFFICE VISIT (OUTPATIENT)
Dept: INFECTIOUS DISEASES | Age: 68
End: 2023-03-28
Payer: COMMERCIAL

## 2023-03-28 VITALS
TEMPERATURE: 97.5 F | OXYGEN SATURATION: 97 % | WEIGHT: 211 LBS | SYSTOLIC BLOOD PRESSURE: 118 MMHG | RESPIRATION RATE: 16 BRPM | HEIGHT: 73 IN | HEART RATE: 101 BPM | BODY MASS INDEX: 27.96 KG/M2 | DIASTOLIC BLOOD PRESSURE: 76 MMHG

## 2023-03-28 DIAGNOSIS — J18.9 PNEUMONIA OF LEFT LOWER LOBE DUE TO INFECTIOUS ORGANISM: ICD-10-CM

## 2023-03-28 DIAGNOSIS — Z85.72 HISTORY OF NON-HODGKIN'S LYMPHOMA: ICD-10-CM

## 2023-03-28 DIAGNOSIS — J86.9 EMPYEMA (HCC): Primary | ICD-10-CM

## 2023-03-28 DIAGNOSIS — J90 PLEURAL EFFUSION: ICD-10-CM

## 2023-03-28 PROCEDURE — 3078F DIAST BP <80 MM HG: CPT | Performed by: INTERNAL MEDICINE

## 2023-03-28 PROCEDURE — 99213 OFFICE O/P EST LOW 20 MIN: CPT | Performed by: INTERNAL MEDICINE

## 2023-03-28 PROCEDURE — 3074F SYST BP LT 130 MM HG: CPT | Performed by: INTERNAL MEDICINE

## 2023-03-28 PROCEDURE — 1123F ACP DISCUSS/DSCN MKR DOCD: CPT | Performed by: INTERNAL MEDICINE

## 2023-03-28 RX ORDER — FERROUS SULFATE 325(65) MG
325 TABLET ORAL
COMMUNITY

## 2023-03-28 NOTE — PROGRESS NOTES
without lymphadenopathy. Thyroid normal, No bruits. Pulmonary/Chest: Left sided chest tube in place to suction with serosanguinous drainage. Diminished to auscultation. No dullness to percussion. Cardiovascular: Regular rate and rhythm without murmurs, rubs, or gallops. Abdomen: Soft, non tender. Bowel sounds normal. No organomegaly  All four Extremities: No cyanosis, clubbing, edema, or effusions. Neurologic: No gross sensory or motor deficits. Skin: Warm and dry with good turgor. No signs of peripheral arterial or venous insufficiency. No ulcerations. Left sided-chest tube site CDI    Medical Decision Making -Laboratory:   I have independently reviewed/ordered the following labs:    CBC with Differential:   No results for input(s): WBC, HGB, HCT, PLT, SEGSPCT, BANDSPCT, LYMPHOPCT, MONOPCT, EOSPCT in the last 72 hours. BMP:   No results for input(s): NA, K, CL, CO2, BUN, CREATININE, CA, MG in the last 72 hours. Hepatic Function Panel: No results for input(s): PROT, LABALBU, BILIDIR, IBILI, BILITOT, ALKPHOS, ALT, AST in the last 72 hours. No results for input(s): RPR in the last 72 hours. No results for input(s): HIV in the last 72 hours. No results for input(s): BC in the last 72 hours. Lab Results   Component Value Date/Time    MUCUS 1+ 02/13/2023 02:17 AM    RBC 2.96 02/28/2023 04:13 AM    RBC 5.15 04/03/2012 11:08 AM    WBC 16.3 02/28/2023 04:13 AM    TURBIDITY Cloudy 02/22/2023 01:17 PM     Lab Results   Component Value Date/Time    CREATININE 2.25 03/04/2023 02:51 AM    GLUCOSE 136 03/04/2023 02:51 AM    GLUCOSE 140 04/03/2012 11:08 AM       Medical Decision Making-Imaging:     Narrative   EXAMINATION:   CT OF THE CHEST WITHOUT CONTRAST, 2/17/2023 9:29 am       TECHNIQUE:   CT of the chest was performed without the administration of intravenous   contrast. Multiplanar reformatted images are provided for review.  Automated   exposure control, iterative reconstruction, and/or weight based
effusions within the left chest   which have improved since previous exam.  Small amounts of new air are likely   secondary to the presence of the left thoracostomy tube. Left lower lobe atelectasis is identified. Upper Abdomen: There is a probable small hiatal hernia. Soft Tissues/Bones:  No acute osseous abnormality is appreciated. Old right   rib fractures are noted. Impression   Small to moderate-sized multiloculated left pleural effusions have improved. New small foci of air within the collection is presumably related to the   introduction of the left thoracostomy tube. Stable small to moderate sized mediastinal lymph nodes are nonspecific but   may relate to the patient's history of lymphoma. Additional small bilateral   axial lymph nodes are identified. Small hiatal hernia. Narrative   EXAMINATION:   ONE XRAY VIEW OF THE CHEST       2/18/2023 3:04 pm       COMPARISON:   CT chest February 17, 2023       HISTORY:   ORDERING SYSTEM PROVIDED HISTORY: worsening   TECHNOLOGIST PROVIDED HISTORY:   worsening       FINDINGS:   Pigtail catheter on the left. Moderate left effusion appears   denser/increased. Mild bilateral interstitial infiltrates or edema. Heart   and mediastinum unremarkable. Bony thorax intact. No pneumothorax noted. Subsegmental atelectasis right lower lung. Impression   Moderate left pleural effusion increased. No change left chest tube/pigtail   catheter. Narrative   EXAMINATION:   ONE XRAY VIEW OF THE CHEST       2/28/2023 6:49 am       COMPARISON:   02/27/2023       HISTORY:   ORDERING SYSTEM PROVIDED HISTORY: s/p VATs with decortication   TECHNOLOGIST PROVIDED HISTORY: S/p VATs with decortication       FINDINGS:   Cardiomegaly is unchanged. Left-sided chest tubes remain in place. Small   left pleural effusion and patchy left basilar opacities are not significantly   changed. No evidence of pneumothorax.

## 2023-04-03 LAB
MICROORGANISM SPEC CULT: NORMAL
MICROORGANISM SPEC CULT: NORMAL
MICROORGANISM/AGENT SPEC: NORMAL
MICROORGANISM/AGENT SPEC: NORMAL
SERVICE CMNT-IMP: NORMAL
SPECIMEN DESCRIPTION: NORMAL
SPECIMEN DESCRIPTION: NORMAL

## 2023-12-01 RX ORDER — FINASTERIDE 5 MG/1
5 TABLET, FILM COATED ORAL DAILY
Qty: 30 TABLET | Refills: 3 | OUTPATIENT
Start: 2023-12-01

## 2023-12-01 NOTE — TELEPHONE ENCOUNTER
I have never seen this patient. It appears that patient sees Dr. Reid Favors with Urology and it would more appropriate for them to manage this medication. Also, patient is on both finasteride and dutasteride and per the previous Cardiology notes I can see, Urology should clarify whether it is necessary for patient to be on both of these medications.

## 2023-12-04 RX ORDER — FENOFIBRATE 145 MG/1
TABLET, COATED ORAL
Qty: 180 TABLET | Refills: 0 | Status: SHIPPED | OUTPATIENT
Start: 2023-12-04

## 2024-08-09 NOTE — ED PROVIDER NOTES
[address] [facility] Emergency Department  [id number] [address] [facility] [address] [facility] [address]  Phone: [phone]  Fax: [phone]        ADDENDUM:      Care of this patient was assumed from Dr. Jean-Pierre Bundy. The patient was seen for Shortness of Breath (Sob x 1 week)  . The patient's initial evaluation and plan have been discussed with the prior provider who initially evaluated the patient. Nursing Notes, Past Medical Hx, Past Surgical Hx, Allergies, were all reviewed. PAST MEDICAL HISTORY    has a past medical history of Acute interstitial nephritis, Aortic regurgitation, ARF (acute renal failure) ([facility] [address] Chronic kidney disease, Hyperlipidemia, Hypertension, Non-Hodgkin lymphoma ([facility] [address] Sarcoidosis, and Type II or unspecified type diabetes mellitus without mention of complication, not stated as uncontrolled. SURGICAL HISTORY      has a past surgical history that includes eye surgery (Right); Knee arthroscopy; Rotator cuff repair (Right, 7/14); Rotator cuff repair (Left, 5/15); Carpal tunnel release (Left, 11/15 ); Eye surgery (Right, 6/16); Total knee arthroplasty (Right, 10/2018); and malignant skin lesion excision. CURRENT MEDICATIONS       Discharge Medication List as of 2/12/2023 10:23 PM        CONTINUE these medications which have NOT CHANGED    Details   amLODIPine (NORVASC) 5 MG tablet TAKE ONE TABLET BY MOUTH DAILY, Disp-90 tablet, R-1Normal      omeprazole (PRILOSEC) 20 MG delayed release capsule Take 20 mg by mouth DailyHistorical Med      fluticasone-salmeterol (ADVAIR DISKUS) 250-50 MCG/ACT AEPB diskus inhaler Inhale 1 puff into the lungs every 12 hoursHistorical Med      metoprolol succinate (TOPROL XL) 25 MG extended release tablet Take 25 mg by mouth at bedtimeHistorical Med      dutasteride (AVODART) 0.5 mg capsule Take 0.5 mg by mouth dailyHistorical Med      finasteride (PROSCAR) 5 MG tablet Take 5 mg by mouth dailyHistorical Med      Misc.  Devices (NASAL SPRAY BOTTLE) MISC Historical Med      CINNAMON PO Take by mouthHistorical Med      repaglinide (PRANDIN) 1 MG tablet Take 1 mg by mouth 3 times daily (before meals) Historical Med      pimecrolimus (ELIDEL) 1 % cream Apply topically as needed Apply topically 2 times daily. , Topical, Historical Med      brimonidine-timolol (COMBIGAN) 0.2-0.5 % ophthalmic solution Place 1 drop into the right eye every 12 hours      albuterol sulfate  (90 BASE) MCG/ACT inhaler Inhale 1 puff into the lungs in the morning and at bedtimeHistorical Med      Liraglutide (VICTOZA SC) Inject 1.8 mg into the skin daily Historical Med      fenofibrate (TRICOR) 145 MG tablet Take 145 mg by mouth 2 times daily. dicyclomine (BENTYL) 10 MG capsule Take 10 mg by mouth 4 times daily (before meals and nightly) Historical Med      atorvastatin (LIPITOR) 80 MG tablet Take 80 mg by mouth daily. DULoxetine (CYMBALTA) 60 MG capsule Take 60 mg by mouth 2 times daily. ALLERGIES     is allergic to latex, gabapentin, meperidine, erythromycin, glimepiride, hydrocodone, macrolides and ketolides, niacin and related, trelegy ellipta [fluticasone-umeclidin-vilant], and pregabalin. Diagnostic Results     LABS:   Results for orders placed or performed during the hospital encounter of 02/12/23   Rapid influenza A/B antigens    Specimen: Nares   Result Value Ref Range    Flu A Antigen NEGATIVE NEGATIVE    Flu B Antigen NEGATIVE NEGATIVE   COVID-19, Rapid    Specimen: Nasopharyngeal Swab   Result Value Ref Range    Specimen Description . NASOPHARYNGEAL SWAB     SARS-CoV-2, Rapid Not Detected Not Detected   Culture, Blood 1    Specimen: Blood   Result Value Ref Range    Specimen Description . BLOOD     Special Requests 18ML RIGHT FOREARM     Culture NO GROWTH <24 HRS    Culture, Blood 1    Specimen: Blood   Result Value Ref Range    Specimen Description . BLOOD     Special Requests 20 ML RIGHT ANTECUBITAL     Culture NO GROWTH <24 HRS    CBC with Auto Differential   Result Value Ref Range    WBC 22.5 (H) 3.5 - 11.0 k/uL    RBC 4.59 4.5 - 5.9 m/uL    Hemoglobin 12.8 (L) 13.5 - 17.5 g/dL    Hematocrit 39.1 (L) 41 - 53 %    MCV 85.2 80 - 100 fL    MCH 28.0 26 - 34 pg    MCHC 32.8 31 - 37 g/dL    RDW 15.1 12.5 - 15.4 %    Platelets 276 (H) 206 - 450 k/uL    MPV 7.5 6.0 - 12.0 fL    Seg Neutrophils 78 (H) 36 - 66 %    Lymphocytes 16 (L) 24 - 44 %    Monocytes 6 1 - 7 %    Eosinophils % 0 (L) 1 - 4 %    Basophils 0 0 - 2 %    Segs Absolute 17.55 (H) 1.8 - 7.7 k/uL    Absolute Lymph # 3.60 1.0 - 4.8 k/uL    Absolute Mono # 1.35 (H) 0.1 - 0.8 k/uL    Absolute Eos # 0.00 0.0 - 0.4 k/uL    Basophils Absolute 0.00 0.0 - 0.2 k/uL    Morphology Normal    Basic Metabolic Panel   Result Value Ref Range    Glucose 394 (H) 70 - 99 mg/dL    BUN 77 (H) 8 - 23 mg/dL    Creatinine 2.16 (H) 0.70 - 1.20 mg/dL    Est, Glom Filt Rate 33 (L) >60 mL/min/1.73m2    Calcium 10.1 8.6 - 10.4 mg/dL    Sodium 132 (L) 135 - 144 mmol/L    Potassium 4.7 3.7 - 5.3 mmol/L    Chloride 93 (L) 98 - 107 mmol/L    CO2 25 20 - 31 mmol/L    Anion Gap 14 9 - 17 mmol/L   Troponin   Result Value Ref Range    Troponin, High Sensitivity 29 (H) 0 - 22 ng/L   D-Dimer, Quantitative   Result Value Ref Range    D-Dimer, Quant 3.88 mg/L FEU   Brain Natriuretic Peptide   Result Value Ref Range    Pro- (H) <300 pg/mL   Troponin   Result Value Ref Range    Troponin, High Sensitivity 31 (H) 0 - 22 ng/L   Troponin   Result Value Ref Range    Troponin, High Sensitivity 33 (H) 0 - 22 ng/L   Arterial Blood Gas, POC   Result Value Ref Range    POC pH 7.372 7.350 - 7.450    POC pCO2 42.9 35.0 - 48.0 mm Hg    POC PO2 102.5 83.0 - 108.0 mm Hg    POC HCO3 24.9 21.0 - 28.0 mmol/L    Positive Base Excess, Art 0 0.0 - 3.0    POC O2 SAT 98 94.0 - 98.0 %    O2 Device/Flow/% BIPAP     Vaughn Test POSITIVE     Sample Site Right Radial Artery     Mode Bi-Level Ventilation     FIO2 50.0    POC Glucose Fingerstick   Result Value Ref Range    POC Glucose 371 (H) 75 - 110 mg/dL       RADIOLOGY:  XR CHEST PORTABLE   Final Result   Probable left-sided pneumonia with loculated pleural effusion. Underlying   pathology not excluded, as above. Mild cardiomegaly and venous hypertension. Discoid atelectasis right base. RECOMMENDATION:   Consider CT chest.             RECENT VITALS:  BP: 126/87, Temp: 98.2 °F (36.8 °C), Heart Rate: (!) 116, Resp: (!) 33     ED Course     The patient was given the following medications:  Orders Placed This Encounter   Medications    DISCONTD: 0.9 % sodium chloride infusion    LORazepam (ATIVAN) injection 0.25 mg    levoFLOXacin (LEVAQUIN) 750 MG/150ML infusion 750 mg     Order Specific Question:   Antimicrobial Indications     Answer:   Pneumonia (CAP)    DISCONTD: enoxaparin (LOVENOX) injection 100 mg     Order Specific Question:   Indication of Use     Answer:   Treatment-DVT/PE    enoxaparin Sodium (LOVENOX) injection 30 mg     Order Specific Question:   Indication of Use     Answer:   Prophylaxis-DVT/PE    ondansetron (ZOFRAN) injection 4 mg    acetaminophen (TYLENOL) tablet 1,000 mg    DISCONTD: insulin regular (HUMULIN R;NOVOLIN R) injection 8 Units    insulin lispro (HUMALOG) injection vial 8 Units    DISCONTD: insulin regular (HUMULIN R;NOVOLIN R) injection 8 Units    fentaNYL (SUBLIMAZE) injection 50 mcg       Medical Decision Making           The patient is a 72-year-old male who presents for evaluation of COPD exacerbation. He has significant work of breathing upon arrival and was placed on CPAP with improvement. The patient had difficulty tolerating the CPAP secondary to anxiety. He was given Ativan with minimal improvement. The patient was ultimately transition to nasal cannula. He continues to have increased work of breathing but ABG is grossly unremarkable when he is on BiPAP. CBC shows a leukocytosis. BMP shows acute on chronic kidney injury. D-dimer and BNP are elevated.   Troponin is mildly elevated 29 and is unchanged on serial troponins. Cultures were sent. Rapid flu and COVID are negative. Chest x-ray shows probable left-sided pneumonia with loculated pleural effusion. There is mild cardiomegaly and venous hypertension. He was started on Levaquin. He initially wanted to go to [place] [address] because that is where his pulmonologist is. [facility] does not have any availability. The patient was subsequently okay with being transferred to the hospital but there is a over 24-hour wait for transfers. He ultimately was agreeable to be transferred to [facility]. I spoke to the [employer], Zacarias Hernandez, at [address] [place] [address] and she agrees to accept the patient for transfer at [facility]. The patient and his wife are agreeable and were updated on plan. The patient was subsequently transferred via ground unit. I did attempt to place the patient back on BiPAP but he refuses. He was subsequently put on high flow nasal cannula. EKG 2005 sinus tachycardia, rate 116 bpm, normal axis, normal intervals, no ST elevation or depression, no T wave inversion, good R wave progression, Q wave in aVR, no change from first EKG    CRITICAL CARE: There was a high probability of clinically significant/life threatening deterioration in this patient's condition which required my urgent intervention. Total critical care time was 35 minutes. This excludes any time for separately reportable procedures. Disposition     FINAL IMPRESSION      1. Pneumonia of left lung due to infectious organism, unspecified part of lung    2. Acute kidney injury ([address]    3. Acute respiratory failure, unspecified whether with hypoxia or hypercapnia ([address]    4. Hyperglycemia    5.  Pleural effusion          DISPOSITION/PLAN   DISPOSITION Admitted 02/12/2023 07:53:11 PM      CONDITION ON DISPOSITION:   Stable          (Please note that portions of this note were completed with a voice recognition program.  Efforts were made to edit the dictations but occasionally words are mis-transcribed.)    Trinidad Gonzalez DO  Emergency Medicine Physician               Trinidad Gonzalez DO  02/12/23 2212 Declines

## 2024-09-20 NOTE — BRIEF OP NOTE
Brief Postoperative Note for Chest Tube    Rasheed Lewis  YOB: 1955  [id number]    Pre-operative Diagnosis: left empyema       Post-operative Diagnosis: Same    Procedure: Chest Tube Placement     Anesthesia: 1% Lidocaine     Surgeons/Assistants: Regino Powers MD    Complications: none    EBL: Minimal    Specimens: were obtained    Successful placement of 10 fr left chest tube performed. Approximately 10 mL of purulent fluid obtained. Catheter was sutured in place and occlusive dressing were applied.       Electronically signed by JOSEPH Horton on 2/13/2023 at 4:25 PM Patient has been scheduled to come see the np on 9/25 .

## 2024-11-19 ENCOUNTER — OFFICE VISIT (OUTPATIENT)
Dept: NEUROLOGY | Age: 69
End: 2024-11-19
Payer: COMMERCIAL

## 2024-11-19 VITALS
DIASTOLIC BLOOD PRESSURE: 69 MMHG | HEART RATE: 84 BPM | SYSTOLIC BLOOD PRESSURE: 117 MMHG | WEIGHT: 246 LBS | BODY MASS INDEX: 32.6 KG/M2 | HEIGHT: 73 IN

## 2024-11-19 DIAGNOSIS — R41.3 MEMORY CHANGE: Primary | ICD-10-CM

## 2024-11-19 DIAGNOSIS — F05 DELIRIUM DUE TO ANOTHER MEDICAL CONDITION: ICD-10-CM

## 2024-11-19 PROCEDURE — 99203 OFFICE O/P NEW LOW 30 MIN: CPT | Performed by: PHYSICIAN ASSISTANT

## 2024-11-19 PROCEDURE — 3074F SYST BP LT 130 MM HG: CPT | Performed by: PHYSICIAN ASSISTANT

## 2024-11-19 PROCEDURE — 3078F DIAST BP <80 MM HG: CPT | Performed by: PHYSICIAN ASSISTANT

## 2024-11-19 PROCEDURE — 1123F ACP DISCUSS/DSCN MKR DOCD: CPT | Performed by: PHYSICIAN ASSISTANT

## 2024-11-19 NOTE — PROGRESS NOTES
Other reaction(s): Fever  Had all side effects associated with this medication      Glimepiride      Other reaction(s): Dizziness    Lidocaine Swelling     States as a child having reaction to lidocaine where his face swelled.   Ever since then he has never had lidocaine    Hydrocodone      Other reaction(s): Vomiting    Macrolides And Ketolides Other (See Comments)    Niacin And Related     Trelegy Ellipta [Fluticasone-Umeclidin-Vilant]     Pregabalin Diarrhea    Xanax [Alprazolam] Anxiety     Anxiety, restlessness, insomnia        REVIEW OF SYSTEMS:       All items selected indicate a positive finding.    Those items not selected are negative.  Constitutional [] Weight loss/gain   [] Fatigue  [] Fever/Chills   HEENT [] Hearing Loss  [] Visual Disturbance  [] Tinnitus  [] Eye pain  [] Vertigo   Respiratory [] Shortness of Breath  [] Cough  [] Snoring   Cardiovascular [] Chest Pain  [] Palpitations  [] Lightheaded   GI [] Constipation  [] Diarrhea  [] Swallowing change  [] Nausea/vomiting    [] Urinary Frequency  [] Urinary Urgency   Musculoskeletal [] Neck pain  [] Back pain  [] Muscle pain  [] Restless legs  [] Joint pain   Dermatologic [] Skin changes   Neurologic [] Memory loss/confusion  [] Seizures  [] Trouble walking or imbalance  [] Dizziness  [] Sleep disturbance  [] Weakness  [] Numbness  [] Tremors  [] Speech Difficulty  [] Headaches  [] Light Sensitivity  [] Sound Sensitivity   Endocrinology []Excessive thirst  []Excessive hunger   Psychiatric [] Anxiety/Depression  [] Hallucination   Allergy/immunology []Hives/environmental allergies   Hematologic/lymph [] Abnormal bleeding  [] Abnormal bruising         NEUROLOGICAL EXAMINATION:   VITALS  Ht 1.854 m (6' 1\")   Wt 111.6 kg (246 lb)   BMI 32.46 kg/m²      General Appearance:  Alert, cooperative, no signs of distress, appears stated age   Head:  Normocephalic, no signs of trauma   Eyes:  Conjunctiva/corneas clear;  eyelids intact   Ears:  Normal

## 2025-02-13 DIAGNOSIS — R41.3 MEMORY CHANGE: ICD-10-CM

## 2025-02-13 DIAGNOSIS — F05 DELIRIUM DUE TO ANOTHER MEDICAL CONDITION: ICD-10-CM

## (undated) DEVICE — GLOVE SURG SZ 8 L12IN FNGR THK79MIL GRN LTX FREE

## (undated) DEVICE — APPLICATOR MEDICATED 26 CC SOLUTION HI LT ORNG CHLORAPREP

## (undated) DEVICE — GLOVE SURG SZ 65 L12IN FNGR THK79MIL GRN LTX FREE

## (undated) DEVICE — WRAP, BONDED,GEMINI,24X24,LT WGH: Brand: MEDLINE

## (undated) DEVICE — NEEDLE SPNL 22GA L35IN PNCL PNT ATRAUM TIP PENCAN

## (undated) DEVICE — CATHETER THOR 36FR L23CM DIA12MM POLYVI CHL TAPR CONN TIP

## (undated) DEVICE — 48" PROBE COVER W/GEL, ULTRASOUND, STERILE: Brand: SITE-RITE

## (undated) DEVICE — BLOWER PWD 3GM FOR STERITALC TALCAIR NOVATECH

## (undated) DEVICE — GLOVE SURG SZ 7 CRM LTX FREE POLYISOPRENE POLYMER BEAD ANTI

## (undated) DEVICE — DRESSING TRNSPAR W5XL4.5IN FLM SHT SEMIPERMEABLE WIND

## (undated) DEVICE — PENCIL ES L3M BTTN SWCH HOLSTER W/ BLDE ELECTRD EDGE

## (undated) DEVICE — PROTECTOR ULN NRV PUR FOAM HK LOOP STRP ANATOMICALLY

## (undated) DEVICE — TOWEL,OR,DSP,ST,BLUE,DLX,XR,4/PK,20PK/CS: Brand: MEDLINE

## (undated) DEVICE — COVER,LIGHT HANDLE,FLX,2/PK: Brand: MEDLINE INDUSTRIES, INC.

## (undated) DEVICE — SUTURE PERMAHAND SZ 0 L30IN NONABSORBABLE BLK L26MM SH 1/2 K834H

## (undated) DEVICE — TOTAL TRAY, 16FR 10ML SIL FOLEY, URN: Brand: MEDLINE

## (undated) DEVICE — CLEANER,CAUTERY TIP,2X2",STERILE: Brand: MEDLINE

## (undated) DEVICE — GOWN,SIRUS,NONRNF,SETINSLV,XL,20/CS: Brand: MEDLINE

## (undated) DEVICE — INTENDED FOR TISSUE SEPARATION, AND OTHER PROCEDURES THAT REQUIRE A SHARP SURGICAL BLADE TO PUNCTURE OR CUT.: Brand: BARD-PARKER ® CARBON RIB-BACK BLADES

## (undated) DEVICE — SCISSOR SURG METZ CRV TIP

## (undated) DEVICE — CONTAINER,SPECIMEN,4OZ,OR STRL: Brand: MEDLINE

## (undated) DEVICE — SUTURE VCRL SZ 2-0 L27IN ABSRB VLT L26MM UR-6 5/8 CIR J602H

## (undated) DEVICE — AGENT HEMSTAT W2XL14IN OXIDIZED REGENERATED CELOS ABSRB FOR

## (undated) DEVICE — TUBING, SUCTION, 9/32" X 20', STRAIGHT: Brand: MEDLINE INDUSTRIES, INC.

## (undated) DEVICE — GOWN,SIRUS,POLYRNF,XLN/3XL,18/CS: Brand: MEDLINE

## (undated) DEVICE — PREMIUM DRY TRAY LF: Brand: MEDLINE INDUSTRIES, INC.

## (undated) DEVICE — CONNECTOR TBNG Y 6IN 1 PLAS LTWT

## (undated) DEVICE — GLOVE SURG SZ 7 L12IN FNGR THK79MIL GRN LTX FREE

## (undated) DEVICE — SUTURE VCRL + SZ 4-0 L18IN ABSRB UD L19MM PS-2 3/8 CIR PRIM VCP496H

## (undated) DEVICE — PACK PROCEDURE SURG SVMMC THORACOTOMY

## (undated) DEVICE — GLOVE SURG SZ 8 CRM LTX FREE POLYISOPRENE POLYMER BEAD ANTI

## (undated) DEVICE — TUBE LUKENS 20CC 6 1/4": Brand: ALLEGIANCE

## (undated) DEVICE — DISSECTOR LAP DIA5MM BLNT TIP ENDOPATH

## (undated) DEVICE — GOWN,AURORA,NONREINFORCED,LARGE: Brand: MEDLINE

## (undated) DEVICE — DISPOSABLE SUCTION/IRRIGATOR TUBE SET, DUAL SPIKE: Brand: AHTO

## (undated) DEVICE — GARMENT,MEDLINE,DVT,INT,CALF,MED, GEN2: Brand: MEDLINE

## (undated) DEVICE — GAUZE,SPONGE,FLUFF,6"X6.75",STRL,5/TRAY: Brand: MEDLINE

## (undated) DEVICE — GLOVE SURG SZ 75 L12IN FNGR THK79MIL GRN LTX FREE

## (undated) DEVICE — ADHESIVE SKIN CLOSURE TOP 36 CC HI VISC DERMBND MINI

## (undated) DEVICE — YANKAUER,FLEXIBLE HANDLE,REGLR CAPACITY: Brand: MEDLINE INDUSTRIES, INC.